# Patient Record
Sex: FEMALE | Race: WHITE | Employment: OTHER | URBAN - METROPOLITAN AREA
[De-identification: names, ages, dates, MRNs, and addresses within clinical notes are randomized per-mention and may not be internally consistent; named-entity substitution may affect disease eponyms.]

---

## 2017-01-10 RX ORDER — SERTRALINE HYDROCHLORIDE 100 MG/1
TABLET, FILM COATED ORAL
Qty: 135 TAB | Refills: 0 | Status: SHIPPED | OUTPATIENT
Start: 2017-01-10 | End: 2017-04-17 | Stop reason: SDUPTHER

## 2017-01-12 RX ORDER — BENAZEPRIL HYDROCHLORIDE 20 MG/1
TABLET ORAL
Qty: 90 TAB | Refills: 0 | Status: SHIPPED | OUTPATIENT
Start: 2017-01-12 | End: 2017-04-17 | Stop reason: SDUPTHER

## 2017-01-20 ENCOUNTER — TELEPHONE (OUTPATIENT)
Dept: INTERNAL MEDICINE CLINIC | Age: 82
End: 2017-01-20

## 2017-01-20 DIAGNOSIS — Z76.89 REFERRAL OF PATIENT: Primary | ICD-10-CM

## 2017-01-20 NOTE — TELEPHONE ENCOUNTER
Called, spoke to pt. Two pt identifiers confirmed. Pt requesting referral for ENT for constant nasal congestion. Pt states that when she wakes up, she sees a substance. Pt referred to Dr. Ayana Lott ENT. Pt verbalized understanding of information discussed w/ no further questions at this time.

## 2017-02-05 ENCOUNTER — HOSPITAL ENCOUNTER (EMERGENCY)
Age: 82
Discharge: HOME | End: 2017-02-05
Attending: EMERGENCY MEDICINE
Payer: MEDICARE

## 2017-02-05 ENCOUNTER — APPOINTMENT (OUTPATIENT)
Dept: GENERAL RADIOLOGY | Age: 82
End: 2017-02-05
Attending: EMERGENCY MEDICINE
Payer: MEDICARE

## 2017-02-05 ENCOUNTER — APPOINTMENT (OUTPATIENT)
Dept: GENERAL RADIOLOGY | Age: 82
End: 2017-02-05
Attending: PHYSICIAN ASSISTANT
Payer: MEDICARE

## 2017-02-05 VITALS
SYSTOLIC BLOOD PRESSURE: 129 MMHG | HEART RATE: 86 BPM | HEIGHT: 61 IN | BODY MASS INDEX: 32.13 KG/M2 | OXYGEN SATURATION: 98 % | DIASTOLIC BLOOD PRESSURE: 50 MMHG | RESPIRATION RATE: 16 BRPM | TEMPERATURE: 97.7 F | WEIGHT: 170.19 LBS

## 2017-02-05 DIAGNOSIS — S62.102A LEFT WRIST FRACTURE, CLOSED, INITIAL ENCOUNTER: Primary | ICD-10-CM
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
## 2017-02-05 LAB
APPEARANCE UR: CLEAR
BACTERIA URNS QL MICRO: NEGATIVE /HPF
BILIRUB UR QL: NEGATIVE
COLOR UR: ABNORMAL
EPITH CASTS URNS QL MICRO: ABNORMAL /LPF
GLUCOSE UR STRIP.AUTO-MCNC: NEGATIVE MG/DL
HGB UR QL STRIP: ABNORMAL
HYALINE CASTS URNS QL MICRO: ABNORMAL /LPF (ref 0–5)
KETONES UR QL STRIP.AUTO: NEGATIVE MG/DL
LEUKOCYTE ESTERASE UR QL STRIP.AUTO: NEGATIVE
NITRITE UR QL STRIP.AUTO: NEGATIVE
PH UR STRIP: 5.5 [PH] (ref 5–8)
PROT UR STRIP-MCNC: NEGATIVE MG/DL
RBC #/AREA URNS HPF: ABNORMAL /HPF (ref 0–5)
SP GR UR REFRACTOMETRY: 1.02 (ref 1–1.03)
UA: UC IF INDICATED,UAUC: ABNORMAL
UROBILINOGEN UR QL STRIP.AUTO: 0.2 EU/DL (ref 0.2–1)
WBC URNS QL MICRO: ABNORMAL /HPF (ref 0–4)

## 2017-02-05 PROCEDURE — 75810000053 HC SPLINT APPLICATION

## 2017-02-05 PROCEDURE — 73130 X-RAY EXAM OF HAND: CPT

## 2017-02-05 PROCEDURE — 74011250637 HC RX REV CODE- 250/637: Performed by: PHYSICIAN ASSISTANT

## 2017-02-05 PROCEDURE — 93005 ELECTROCARDIOGRAM TRACING: CPT

## 2017-02-05 PROCEDURE — 74011250636 HC RX REV CODE- 250/636: Performed by: PHYSICIAN ASSISTANT

## 2017-02-05 PROCEDURE — 99284 EMERGENCY DEPT VISIT MOD MDM: CPT

## 2017-02-05 PROCEDURE — 77030036550 HC SLNG ARM PCH S2SG -A

## 2017-02-05 PROCEDURE — 81001 URINALYSIS AUTO W/SCOPE: CPT | Performed by: PHYSICIAN ASSISTANT

## 2017-02-05 PROCEDURE — 96372 THER/PROPH/DIAG INJ SC/IM: CPT

## 2017-02-05 PROCEDURE — 73110 X-RAY EXAM OF WRIST: CPT

## 2017-02-05 RX ORDER — OXYCODONE AND ACETAMINOPHEN 5; 325 MG/1; MG/1
1 TABLET ORAL
Status: COMPLETED | OUTPATIENT
Start: 2017-02-05 | End: 2017-02-05

## 2017-02-05 RX ORDER — FENTANYL CITRATE 50 UG/ML
50 INJECTION, SOLUTION INTRAMUSCULAR; INTRAVENOUS
Status: COMPLETED | OUTPATIENT
Start: 2017-02-05 | End: 2017-02-05

## 2017-02-05 RX ORDER — POLYETHYLENE GLYCOL 3350 17 G/17G
17 POWDER, FOR SOLUTION ORAL DAILY
Qty: 119 G | Refills: 0 | Status: SHIPPED | OUTPATIENT
Start: 2017-02-05 | End: 2017-12-06

## 2017-02-05 RX ORDER — HYDROCODONE BITARTRATE AND ACETAMINOPHEN 5; 325 MG/1; MG/1
1 TABLET ORAL
Qty: 20 TAB | Refills: 0 | Status: SHIPPED | OUTPATIENT
Start: 2017-02-05 | End: 2017-06-07

## 2017-02-05 RX ADMIN — FENTANYL CITRATE 50 MCG: 50 INJECTION, SOLUTION INTRAMUSCULAR; INTRAVENOUS at 14:22

## 2017-02-05 RX ADMIN — OXYCODONE HYDROCHLORIDE AND ACETAMINOPHEN 1 TABLET: 5; 325 TABLET ORAL at 13:12

## 2017-02-05 NOTE — ED NOTES
Assumed care of patient. Patient is alert and oriented, does not appear to be in distress. Patient c/o left arm/wrist injury last night. Patient tripped and fell in a parking lot. Denies injury. Pulses palpable. Swelling and bruising noted. Patient positioned for comfort with call bell within reach. Side rails up for safety. PA has evaluated in the green.

## 2017-02-05 NOTE — DISCHARGE INSTRUCTIONS
Broken Wrist: Care Instructions  Your Care Instructions    Your wrist can break, or fracture, during sports, a fall, or other accidents. The break may happen when your wrist is hit or is used to protect you in a fall. Fractures can range from a small, hairline crack, to a bone or bones broken into two or more pieces. Your treatment depends on how bad the break is. Your doctor may have put your wrist in a cast or splint. This will help keep your wrist stable until your follow-up appointment. It may take weeks or months for your wrist to heal. You can help it heal with care at home. You heal best when you take good care of yourself. Eat a variety of healthy foods, and don't smoke. Follow-up care is a key part of your treatment and safety. Be sure to make and go to all appointments, and call your doctor if you are having problems. It's also a good idea to know your test results and keep a list of the medicines you take. How can you care for yourself at home? · Put ice or a cold pack on your wrist for 10 to 20 minutes at a time. Try to do this every 1 to 2 hours for the next 3 days (when you are awake). Put a thin cloth between the ice and your cast or splint. Keep your cast or splint dry. · Follow the splint or cast care instructions your doctor gives you. If you have a splint, do not take it off unless your doctor tells you to. Be careful not to put the splint on too tight. · Be safe with medicines. Take pain medicines exactly as directed. ¨ If the doctor gave you a prescription medicine for pain, take it as prescribed. ¨ If you are not taking a prescription pain medicine, ask your doctor if you can take an over-the-counter medicine. · Prop up your wrist on pillows when you sit or lie down in the first few days after the injury. Keep your wrist higher than the level of your heart. This will help reduce swelling.   · Move your fingers often to reduce swelling and stiffness, but do not use that hand to grab or carry anything. · Follow instructions for exercises to keep your arm strong. When should you call for help? Call your doctor now or seek immediate medical care if:  · You have increased or severe pain. · Your cast or splint feels too tight. · You cannot move your fingers. · You have tingling, weakness, or numbness in your hand and fingers. · Your hand and fingers are cool or pale or change color. · You have a lot of swelling near your cast or splint. · The skin under your cast or splint is burning or stinging. Watch closely for changes in your health, and be sure to contact your doctor if:  · You do not get better as expected. Where can you learn more? Go to http://mason-saw.info/. Enter [id number] in the search box to learn more about \"Broken Wrist: Care Instructions. \"  Current as of: May 23, 2016  Content Version: 11.1  © 5229-3347 [employer]. Care instructions adapted under license by [employer] (which disclaims liability or warranty for this information). If you have questions about a medical condition or this instruction, always ask your healthcare professional. [address] any warranty or liability for your use of this information.

## 2017-02-05 NOTE — ED PROVIDER NOTES
HPI Comments: Nico Brown is a 80 y.o. female with PMHx significant for osteoarthrits,cervical spinal stenosis who presents ambulatory to the ED with cc of left hand and wrist pain rated 10/10 s/p GLF last night. Pt reports that she was walking through a parking lot, tripped over a bump in the asphalt, falling backwards. She denies dizziness, CP, and SOB prior to the fall, LOC, and head trauma stating \"the guys caught me\". She is not sure if she landed on her left wrist. Pt reports being right handed. Pt denies any h/o surgery or injury to the left hand but spouse notes that pt \"cracked a finger\" in that hand before. Of note, pt states that she had a surgery for her cervical stenosis. Pt states she hit her left knee in the fall but denies any pain there currently. She denies any other arthralgia, myalgia, CP, SOB, abdominal pain, N/V/D, and ha. Of note, patient is requesting EKG stating she has a history of afib for which she is taking Eliquis and would like to know if she is currently in afib. PCP: Kaden Cao MD    PMHx: sarcoidosis, DM, HTN, hypercholesterolemia, cervical spine stenosis, ataxia, anemia, fatigue, osteoarthritis, coronary atherosclerosis of unspecified type of vessel, native or graft, fatigue, hyperlipidemia, TIAs, sleep apnea with c pap, GERD, gooden's esophagus, liver disease    Social hx: smoking (-) EtOH (-)      There are no other complaints, changes, or physical findings at this time. The history is provided by the patient.         Past Medical History:   Diagnosis Date    Anemia     Ataxia     Gooden's esophagus     Cervical spinal stenosis     Coronary atherosclerosis of unspecified type of vessel, native or graft     Diabetes (HCC)     Fatigue     Fatigue     GERD (gastroesophageal reflux disease)     Hypercholesterolemia     Hyperlipidemia     Hypertension     Ill-defined condition      Gooden's Esophagus    Liver disease     Osteoarthritis     Sarcoidosis ([facility] [address]     Sleep apnea      uses cpap    Stroke [facility])      TIA'S       Past Surgical History:   Procedure Laterality Date    Hx cyst removal  10/15     on head     Hx colonoscopy  5/8/2013     Repeat in 5 years    Hx cholecystectomy      Hx hysterectomy      Hx orthopaedic Bilateral      knee replacement to both knees    Hx orthopaedic       spacer btwn L4-5 and L5-6     Hx cataract removal       both eyes    Hx tonsillectomy      Colonoscopy N/A 12/28/2016     COLONOSCOPY performed by Gorge Degroot MD at [facility]         Family History:   Problem Relation Age of Onset    Other Mother      Fibromyalgia    Pacemaker Mother     Heart Disease Mother     Heart Failure Mother     COPD Mother     Heart Attack Father 61    Cancer Sister      Multiple Myeloma    Diabetes Brother     Obesity Brother     Pacemaker Brother     Heart Attack Brother      [place]    No Known Problems Son     Psychiatric Disorder Daughter      Multiple       Social History     Social History    Marital status:      Spouse name: N/A    Number of children: N/A    Years of education: N/A     Occupational History    Not on file. Social History Main Topics    Smoking status: Never Smoker    Smokeless tobacco: Never Used    Alcohol use No    Drug use: No    Sexual activity: Yes     Partners: Male     Other Topics Concern    Not on file     Social History Narrative         ALLERGIES: Procardia xl [nifedipine]    Review of Systems   Constitutional: Negative. Negative for chills and fever. HENT: Negative. Negative for rhinorrhea and sore throat. Eyes: Negative. Negative for visual disturbance. Respiratory: Negative. Negative for cough, chest tightness, shortness of breath and wheezing. Cardiovascular: Negative. Negative for chest pain and palpitations. Gastrointestinal: Negative. Negative for abdominal pain, constipation, diarrhea, nausea and vomiting. Genitourinary: Negative. Negative for dysuria and hematuria. Musculoskeletal: Positive for arthralgias. Skin: Positive for color change. Negative for rash and wound. Allergic/Immunologic: Negative. Negative for environmental allergies and food allergies. Neurological: Negative. Negative for headaches. Psychiatric/Behavioral: Negative. Negative for suicidal ideas. Vitals:    02/05/17 1253 02/05/17 1353   BP: 151/83 140/53   Pulse: 61    Resp: 18    Temp: 97.7 °F (36.5 °C)    SpO2: 97% 97%   Weight: 77.2 kg (170 lb 3.1 oz)    Height: 5' 1\" (1.549 m)             Physical Exam   Constitutional: She is oriented to person, place, and time. She appears well-developed and well-nourished. No distress. Pt appears in moderate discomfort, awake and alert in mild distress secondary to discomfort. HENT:   Head: Normocephalic and atraumatic. Right Ear: External ear normal.   Left Ear: External ear normal.   Nose: Nose normal.   Mouth/Throat: Oropharynx is clear and moist.   Eyes: Conjunctivae are normal. Right eye exhibits no discharge. Left eye exhibits no discharge. Neck: Normal range of motion. Cardiovascular: Normal rate, normal heart sounds and intact distal pulses. Pulmonary/Chest: Effort normal and breath sounds normal. No respiratory distress. She has no wheezes. She has no rales. She exhibits no tenderness. Abdominal: Soft. Bowel sounds are normal. There is no tenderness. There is no guarding. No CVA tenderness b/l. Musculoskeletal: She exhibits edema and tenderness. LUE: + edema noted to diffuse wrist and 3rd digit. + bruising noted to proximal thumb and 3rd digit. No erythema or break in skin noted. + TTP over diffuse wrist, snuffbox. Patient refuses ROM secondary to discomfort. 2+ radial pulses b/l. Lymphadenopathy:     She has no cervical adenopathy. Neurological: She is alert and oriented to person, place, and time. No cranial nerve deficit.  Coordination normal.   No focal neuro deficits. Skin: Skin is warm and dry. No rash noted. She is not diaphoretic. No erythema. No pallor. Psychiatric: She has a normal mood and affect. Her behavior is normal.   Nursing note and vitals reviewed. MDM  Number of Diagnoses or Management Options  Fall, initial encounter:   Left wrist fracture, closed, initial encounter:   Diagnosis management comments: Ddx: fx, sprain, strain, UTI    Mechanical fall. Patient reports falling over a bump in the asphalt, denies CP, SOB, and dizziness. Amount and/or Complexity of Data Reviewed  Tests in the radiology section of CPT®: ordered and reviewed  Discussion of test results with the performing providers: yes (Ortho)  Review and summarize past medical records: yes  Discuss the patient with other providers: yes (ortho)    Patient Progress  Patient progress: stable    ED Course       Procedures    CONSULT NOTE:   2:25 PM  Cheryl Sanchez PA-C spoke with Delano Ivy RN,   Specialty: Ortho  Discussed pt's hx, disposition, and available diagnostic and imaging results. Reviewed care plans. Consultant agrees with plans as outlined. Advise placing a volar splint  . Written by Romain Graf ED Scribe, as dictated by Cheryl Sanchez PA-C. Procedure Note - Splint Placement:  2:37 PM  Performed by: Cheryl Sanchez PA-C  Neurovascularly intact prior to tx. An volar splint was placed on pt's left wrist.  Joint was placed in neutral position. Neurovascularly intact after tx. The procedure took 1-15 minutes, and pt tolerated well.   Written by Romain Graf ED Scribe, as dictated by Cheryl Sanchez PA-C.       LABORATORY TESTS:  Recent Results (from the past 12 hour(s))   EKG, 12 LEAD, INITIAL    Collection Time: 02/05/17  2:26 PM   Result Value Ref Range    Ventricular Rate 60 BPM    Atrial Rate 60 BPM    P-R Interval 194 ms    QRS Duration 92 ms    Q-T Interval 490 ms    QTC Calculation (Bezet) 490 ms    Calculated P Axis 48 degrees    Calculated R Axis -2 degrees Calculated T Axis 59 degrees    Diagnosis       Sinus rhythm with premature atrial complexes  Nonspecific T wave abnormality  When compared with ECG of 08-SEP-2016 12:45,  premature atrial complexes are now present  LA interval has decreased  Nonspecific T wave abnormality, worse in Lateral leads     URINALYSIS W/ REFLEX CULTURE    Collection Time: 02/05/17  3:19 PM   Result Value Ref Range    Color YELLOW/STRAW      Appearance CLEAR CLEAR      Specific gravity 1.021 1.003 - 1.030      pH (UA) 5.5 5.0 - 8.0      Protein NEGATIVE  NEG mg/dL    Glucose NEGATIVE  NEG mg/dL    Ketone NEGATIVE  NEG mg/dL    Bilirubin NEGATIVE  NEG      Blood TRACE (A) NEG      Urobilinogen 0.2 0.2 - 1.0 EU/dL    Nitrites NEGATIVE  NEG      Leukocyte Esterase NEGATIVE  NEG      WBC 0-4 0 - 4 /hpf    RBC 5-10 0 - 5 /hpf    Epithelial cells FEW FEW /lpf    Bacteria NEGATIVE  NEG /hpf    UA:UC IF INDICATED CULTURE NOT INDICATED BY UA RESULT CNI      Hyaline cast 2-5 0 - 5 /lpf       IMAGING RESULTS:  XR HAND LT MIN 3 V   Final ResultEXAM: XR HAND LT MIN 3 V     INDICATION: Trauma with snuffbox and 3rd digit TTP with bruising and edema x  last night.     COMPARISON: None.     FINDINGS: Three views of the left hand demonstrate a nondisplaced  intra-articular fracture of the radial styloid process. No other fracture or  dislocation is evident. There is osteoarthritis in the first carpal metacarpal  joint. The soft tissues are within normal limits.     IMPRESSION  IMPRESSION: Nondisplaced radial styloid process fracture. No other fracture or  dislocation. .            XR WRIST LT AP/LAT/OBL MIN 3V   Final ResultEXAM: XR WRIST LT AP/LAT/OBL MIN 3V     INDICATION: injury.     COMPARISON: None.     FINDINGS: Three views of the left wrist demonstrate a nondisplaced  intra-articular fracture of the radial styloid process. No other acute fracture  or dislocation is evident.  The soft tissues are within normal limits.     IMPRESSION  IMPRESSION: Nondisplaced intra-articular fracture of the radial styloid  process. MEDICATIONS GIVEN:  Medications   oxyCODONE-acetaminophen (PERCOCET) 5-325 mg per tablet 1 Tab (1 Tab Oral Given 2/5/17 1312)   fentaNYL citrate (PF) injection 50 mcg (50 mcg IntraMUSCular Given 2/5/17 1422)       IMPRESSION:  1. Left wrist fracture, closed, initial encounter    2. Fall, initial encounter        PLAN:  1. Current Discharge Medication List      START taking these medications    Details   HYDROcodone-acetaminophen (NORCO) 5-325 mg per tablet Take 1 Tab by mouth every four (4) hours as needed for Pain. Max Daily Amount: 6 Tabs. Qty: 20 Tab, Refills: 0      polyethylene glycol (MIRALAX) 17 gram/dose powder Take 17 g by mouth daily. 1 tablespoon with 8 oz of water daily  Qty: 119 g, Refills: 0         CONTINUE these medications which have NOT CHANGED    Details   benazepril (LOTENSIN) 20 mg tablet TAKE ONE TABLET BY MOUTH ONCE DAILY  Qty: 90 Tab, Refills: 0      sertraline (ZOLOFT) 100 mg tablet TAKE ONE & ONE-HALF TABLETS BY MOUTH ONCE DAILY  Qty: 135 Tab, Refills: 0      pravastatin (PRAVACHOL) 20 mg tablet TAKE ONE TABLET BY MOUTH NIGHTLY  Qty: 90 Tab, Refills: 0      multivit-min-FA-lycopen-lutein (CENTRUM SILVER) 0.4-300-250 mg-mcg-mcg tab Take 1 Tab by mouth daily. diltiazem XR (DILTIA XT) 120 mg XR capsule Take 1 Cap by mouth daily. Qty: 30 Cap, Refills: 11      metFORMIN ER (GLUCOPHAGE XR) 750 mg tablet TAKE ONE TABLET BY MOUTH TWICE DAILY  Qty: 180 Tab, Refills: 0      apixaban (ELIQUIS) 5 mg tablet Take 1 Tab by mouth two (2) times a day. Qty: 60 Tab, Refills: 11      hydrochlorothiazide (HYDRODIURIL) 25 mg tablet Take 1 Tab by mouth daily. Qty: 30 Tab, Refills: 3      oxybutynin chloride XL (DITROPAN XL) 10 mg CR tablet Take 1 Tab by mouth daily.   Qty: 30 Tab, Refills: 6    Associated Diagnoses: Stress bladder incontinence, female      levothyroxine (SYNTHROID) 50 mcg tablet Take 1 Tab by mouth Daily (before breakfast). Qty: 90 Tab, Refills: 3      omeprazole (PRILOSEC) 20 mg capsule Take 40 mg by mouth two (2) times a day. magnesium 250 mg tab Take 250 mg by mouth daily. biotin 2,500 mcg tab Take 1 Tab by mouth daily. aspirin delayed-release 81 mg tablet Take 81 mg by mouth daily. STOP taking these medications       acetaminophen (TYLENOL) 500 mg tablet Comments:   Reason for Stoppin.   Follow-up Information     Follow up With Details Comments Contact Info    Kassidy Xavier MD Schedule an appointment as soon as possible for a visit in 2 days  [address] [facility]  [phone]      [facility] EMERGENCY DEPT  As needed or, If symptoms worsen [address] [place] [address] Drive  [address] [facility]  [phone]      3. Rest, ice, compression, and elevation    Return to ED if worse       DISCHARGE NOTE  4:16 PM  The patient has been re-evaluated and is ready for discharge. Reviewed available results with patient. Counseled pt on diagnosis and care plan. Pt has expressed understanding, and all questions have been answered. Pt agrees with plan and agrees to F/U as recommended, or return to the ED if their sxs worsen. Discharge instructions have been provided and explained to the pt, along with reasons to return to the ED. Written by Ashley Robison, ED Scribe, as dictated by Stanford Brown PA-C. This note is prepared by Ashley Robison, acting as Scribe for Stanford Brown PA-C. Stanford Brown PA-C: The scribe's documentation has been prepared under my direction and personally reviewed by me in its entirety. I confirm that the note above accurately reflects all work, treatment, procedures, and medical decision making performed by me. This note will not be viewable in [address]

## 2017-02-05 NOTE — LETTER
[address] 
[facility] EMERGENCY DEPT 
[address] [place] [address] [address] [id number] [phone] Work/School Note Date: 2/5/2017 To Whom It May concern: 
 
Chelly Oliver was seen and treated today in the emergency room by the following provider(s): 
Attending Provider: An Lara MD 
Physician Assistant: MELI Mayen Cap. Please excuse Roque Walsh from work today as he accompanied the Mrs. Whatley Daily to the Emergency Dept. Sincerely, Charlette Mayen Cap

## 2017-02-06 LAB
ATRIAL RATE: 60 BPM
CALCULATED P AXIS, ECG09: 48 DEGREES
CALCULATED R AXIS, ECG10: -2 DEGREES
CALCULATED T AXIS, ECG11: 59 DEGREES
DIAGNOSIS, 93000: NORMAL
P-R INTERVAL, ECG05: 194 MS
Q-T INTERVAL, ECG07: 490 MS
QRS DURATION, ECG06: 92 MS
QTC CALCULATION (BEZET), ECG08: 490 MS
VENTRICULAR RATE, ECG03: 60 BPM

## 2017-02-13 ENCOUNTER — OFFICE VISIT (OUTPATIENT)
Dept: NEUROLOGY | Age: 82
End: 2017-02-13

## 2017-02-13 VITALS
WEIGHT: 170 LBS | BODY MASS INDEX: 32.12 KG/M2 | HEART RATE: 78 BPM | SYSTOLIC BLOOD PRESSURE: 130 MMHG | OXYGEN SATURATION: 97 % | DIASTOLIC BLOOD PRESSURE: 70 MMHG

## 2017-02-13 DIAGNOSIS — M48.02 CERVICAL STENOSIS OF SPINAL CANAL: Primary | ICD-10-CM

## 2017-02-13 DIAGNOSIS — R26.89 BALANCE DISORDER: ICD-10-CM

## 2017-02-13 NOTE — LETTER
Date:             2017 Name:  Carlee Guzmán 
:  1935 MRN:  [id number] PCP:  Jordyn Mora MD 
 
Chief Complaint Patient presents with  Neurologic Problem  
  fall on 17 Fx wrist   
 
 
 
HISTORY OF PRESENT ILLNESS: 
Simran Mendiola is a 80 y.o., female who presents today for follow up for cervical stenosis. She broke her left wrist after a fall. Since her last visit she [facility] She saw him in January. She did well but then started having balance issues again and dropping things. She was advised to restart her home exercises. She will be walking and suddenly feel like she is falling backwards. She knows how to catch herself. Her fall when she broke her arm she was in a dark location. She feels like she is being pulled to the left randomly. 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 She has been diagnosed with gluten intolerance after an EGD. She just had her esophagus dilated. She has also diagnosed with afib. She had to be shocked back into rhythm and started on eliquis. Recap: 
She had surgery in February of this year and was initially doing well, she went to [facility] for therapy after that and her balance improved significantly. She was essentially back to baseline. She has not gone back to see Dr LEIGH [facility] since July, she was stable when seen for post-op and released from his care at that time. Return of balance issues is new since that visit. She now complains that she is losing her balance again and dropping things. Balance initially got better after surgery now she will list to the side when walking. She is usually able to catch herself, but did fall last week. Denies weakness, just feels off balance. Does not feel dizzy. It occurs sometimes when she first stands up, sometimes when she is walking. No lightheadedness with standing.   She is keeping up with her PT exercises, although she is somewhat off balance when doing them. She sees an ENT for sinus issues, has not had any workup for BPPV. Has a h/o migraines before menopause, and has been told that she has IIH, has not had headaches since her hysterectomy. Is due for an eye exam soon and has that scheduled. Post-op MRI cervical spine 2.19.2016 1. Status post interval anterior cervical fusion from C4 to C6. 
 
2. New acute central disc herniation at C4-5 causing severe spinal stenosis. 3. Unchanged moderate spinal stenosis at C5-6. 
 
4. Unchanged mild spinal stenosis at C6-7. 
 
5. Areas of neural foraminal narrowing as above. 
 
5.2.2016 recap Ms. Tank Ervin is here for follow up today of cervical stenosis. She had cervical fusion in February. I saw her in the hospital after the surgery when she had a fall at home. Since that time she had done extensive outpt PT at [facility]. She states within in one month she was doing great. She was too good for inpatient so did outpt PT at [facility]. She improved remarkably. She can turn her neck with no issues now. No more balance issues or falls. She was very happy with Dr. Zeenat Meza. She reports she may need further surgery in the future but for now she is doing well and expects to be released from him after her next visit. When I saw her in the hospital she was complaining of a history of IIH. Pt has no headaches or issues. She is getting an eye exam in a few months. Current Outpatient Prescriptions Medication Sig  
 HYDROcodone-acetaminophen (NORCO) 5-325 mg per tablet Take 1 Tab by mouth every four (4) hours as needed for Pain. Max Daily Amount: 6 Tabs.  polyethylene glycol (MIRALAX) 17 gram/dose powder Take 17 g by mouth daily. 1 tablespoon with 8 oz of water daily  benazepril (LOTENSIN) 20 mg tablet TAKE ONE TABLET BY MOUTH ONCE DAILY  sertraline (ZOLOFT) 100 mg tablet TAKE ONE & ONE-HALF TABLETS BY MOUTH ONCE DAILY  pravastatin (PRAVACHOL) 20 mg tablet TAKE ONE TABLET BY MOUTH NIGHTLY  multivit-min-FA-lycopen-lutein (CENTRUM SILVER) 0.4-300-250 mg-mcg-mcg tab Take 1 Tab by mouth daily.  biotin 2,500 mcg tab Take 1 Tab by mouth daily.  diltiazem XR (DILTIA XT) 120 mg XR capsule Take 1 Cap by mouth daily.  metFORMIN ER (GLUCOPHAGE XR) 750 mg tablet TAKE ONE TABLET BY MOUTH TWICE DAILY  apixaban (ELIQUIS) 5 mg tablet Take 1 Tab by mouth two (2) times a day.  aspirin delayed-release 81 mg tablet Take 81 mg by mouth daily.  hydrochlorothiazide (HYDRODIURIL) 25 mg tablet Take 1 Tab by mouth daily. (Patient taking differently: Take 12.5 mg by mouth daily.)  oxybutynin chloride XL (DITROPAN XL) 10 mg CR tablet Take 1 Tab by mouth daily.  levothyroxine (SYNTHROID) 50 mcg tablet Take 1 Tab by mouth Daily (before breakfast).  omeprazole (PRILOSEC) 20 mg capsule Take 40 mg by mouth two (2) times a day.  magnesium 250 mg tab Take 250 mg by mouth daily. No current facility-administered medications for this visit. Allergies Allergen Reactions  Procardia Xl [Nifedipine] Other (comments)  
  weakness Past Medical History Diagnosis Date  Anemia  Ataxia  Webb's esophagus  Cervical spinal stenosis  Coronary atherosclerosis of unspecified type of vessel, native or graft  Diabetes ([address]  Fatigue  Fatigue  GERD (gastroesophageal reflux disease)  Hypercholesterolemia  Hyperlipidemia  Hypertension  Ill-defined condition Webb's Esophagus  Liver disease  Osteoarthritis  Sarcoidosis ([address]  Sleep apnea   
  uses cpap  Stroke ([facility] [address] [address] TIA'S Past Surgical History Procedure Laterality Date  Hx cyst removal  10/15  
  on head  Hx colonoscopy  5/8/2013 Repeat in 5 years  Hx cholecystectomy  Hx hysterectomy  Hx orthopaedic Bilateral   
  knee replacement to both knees  Hx orthopaedic spacer btwn L4-5 and L5-6   
 Hx cataract removal    
  both eyes  Hx tonsillectomy  Colonoscopy N/A 12/28/2016 COLONOSCOPY performed by Swapnil Oseguera MD at [facility] ENDOSCOPY Social History Social History  Marital status:  Spouse name: N/A  
 Number of children: N/A  
 Years of education: N/A Occupational History  Not on file. Social History Main Topics  Smoking status: Never Smoker  Smokeless tobacco: Never Used  Alcohol use No  
 Drug use: No  
 Sexual activity: Yes  
  Partners: Male Other Topics Concern  Not on file Social History Narrative Family History Problem Relation Age of Onset Herschrista Shara Other Mother Fibromyalgia  Pacemaker Mother  Heart Disease Mother  Heart Failure Mother  COPD Mother  Heart Attack Father 61  Cancer Sister Multiple Myeloma  Diabetes Brother  Obesity Brother  Pacemaker Brother  Heart Attack Brother Bundle Branch Block  No Known Problems Son  Psychiatric Disorder Daughter Multiple PHYSICAL EXAMINATION:   
Visit Vitals  Wt 170 lb (77.1 kg)  BMI 32.12 kg/m2 General:  Well defined, nourished, and groomed individual in no acute distress. Neck: Supple, nontender, no bruits, no pain with resistance to active range of motion. Heart: Regular rate and rhythm, no murmurs, rub, or gallop. Normal S1S2. Lungs:  Clear to auscultation bilaterally with equal chest expansion, no cough, no wheeze Musculoskeletal:  Extremities revealed no edema and had full range of motion of joints. Psych:  Good mood and bright affect NEUROLOGICAL EXAMINATION:    
Mental Status:   Alert and oriented to person, place, and time with recent and remote memory intact. Attention span and concentration are normal. Speech is fluent with a full fund of knowledge. Cranial Nerves:   
II, III, IV, VI:  Visual acuity grossly intact. Pupils are equal, round, and reactive to light. Extra-ocular movements are full and fluid. No ptosis or nystagmus. V-XII: Hearing is grossly intact. Facial features are symmetric, with normal sensation and strength. The palate rises symmetrically and the tongue protrudes midline. Sternocleidomastoids 5/5. Motor Examination: Normal tone, bulk, and strength, 5/5 muscle strength throughout except left wrist is 4/5 due to fracture Sensory: Intact throughout to LT, temp and vibration Coordination:  Finger to nose testing was normal.   No resting or intention tremor Gait and Station: Unsteady while walking, tends to fall to the left abruptly. Normal arm swing. No pronator drift. No muscle wasting or fasciculations noted. DTRs: 2+ and equal throughout ASSESSMENT AND PLAN 
 
42-year-old female seen in follow-up for cervical stenosis. She is also complaining now of balance issues and feeling pulled to the left. Since her last visit she has been diagnosed with atrial fibrillation. I question if she could have had any vascular event in her cerebellum. Will do MRI of the brain to evaluate. Also she is having continued weakness in her hands. Will repeat her cervical spine 1. MRI brain 2. Continue Eliquis for stroke prevention 3. MRI cervical spine 4. Continue to follow with Dr. Sharri Partida 5. Continue home physical therapy exercises 6. May need EMG/NCS Follow-up in 4 months Collette Gaucher, MD 
 
This note was created using voice recognition software. Despite editing, there may be syntax errors. This note will not be viewable in [address]

## 2017-02-13 NOTE — PROGRESS NOTES
Date:             2017      Name:  Zora Shepherd  :  1935  MRN:  [id number]     PCP:  Kaden Cao MD    Chief Complaint   Patient presents with    Neurologic Problem     fall on 17 Fx wrist          HISTORY OF PRESENT ILLNESS:  Nico Brown is a 80 y.o., female who presents today for follow up for cervical stenosis. She broke her left wrist after a fall. Since her last visit she was seen by Dr. Cornelio Vance. She saw him in January. She did well but then started having balance issues again and dropping things. She was advised to restart her home exercises. She will be walking and suddenly feel like she is falling backwards. She knows how to catch herself. Her fall when she broke her arm she was in a dark location. She feels like she is being pulled to the left randomly. 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 She has been diagnosed with gluten intolerance after an EGD. She just had her esophagus dilated. She has also diagnosed with afib. She had to be shocked back into rhythm and started on eliquis. Recap:  She had surgery in February of this year and was initially doing well, she went to [facility] for therapy after that and her balance improved significantly. She was essentially back to baseline. She has not gone back to see Dr Cornelio Vance since July, she was stable when seen for post-op and released from his care at that time. Return of balance issues is new since that visit. She now complains that she is losing her balance again and dropping things. Balance initially got better after surgery now she will list to the side when walking. She is usually able to catch herself, but did fall last week. Denies weakness, just feels off balance. Does not feel dizzy. It occurs sometimes when she first stands up, sometimes when she is walking. No lightheadedness with standing.   She is keeping up with her PT exercises, although she is somewhat off balance when doing them. She sees an ENT for sinus issues, has not had any workup for BPPV. Has a h/o migraines before menopause, and has been told that she has IIH, has not had headaches since her hysterectomy. Is due for an eye exam soon and has that scheduled. Post-op MRI cervical spine 2.19.2016  1. Status post interval anterior cervical fusion from C4 to C6.    2. New acute central disc herniation at C4-5 causing severe spinal stenosis. 3. Unchanged moderate spinal stenosis at C5-6.    4. Unchanged mild spinal stenosis at C6-7.    5. Areas of neural foraminal narrowing as above.    5.2.2016 recap  Ms. Beena Paul is here for follow up today of cervical stenosis. She had cervical fusion in February. I saw her in the hospital after the surgery when she had a fall at home. Since that time she had done extensive outpt PT at [facility]. She states within in one month she was doing great. She was too good for inpatient so did outpt PT at [facility]. She improved remarkably. She can turn her neck with no issues now. No more balance issues or falls. She was very happy with Dr. Xavier Medel. She reports she may need further surgery in the future but for now she is doing well and expects to be released from him after her next visit. When I saw her in the hospital she was complaining of a history of IIH. Pt has no headaches or issues. She is getting an eye exam in a few months. Current Outpatient Prescriptions   Medication Sig    HYDROcodone-acetaminophen (NORCO) 5-325 mg per tablet Take 1 Tab by mouth every four (4) hours as needed for Pain. Max Daily Amount: 6 Tabs.  polyethylene glycol (MIRALAX) 17 gram/dose powder Take 17 g by mouth daily.  1 tablespoon with 8 oz of water daily    benazepril (LOTENSIN) 20 mg tablet TAKE ONE TABLET BY MOUTH ONCE DAILY    sertraline (ZOLOFT) 100 mg tablet TAKE ONE & ONE-HALF TABLETS BY MOUTH ONCE DAILY    pravastatin (PRAVACHOL) 20 mg tablet TAKE ONE TABLET BY MOUTH NIGHTLY    multivit-min-FA-lycopen-lutein (CENTRUM SILVER) 0.4-300-250 mg-mcg-mcg tab Take 1 Tab by mouth daily.  biotin 2,500 mcg tab Take 1 Tab by mouth daily.  diltiazem XR (DILTIA XT) 120 mg XR capsule Take 1 Cap by mouth daily.  metFORMIN ER (GLUCOPHAGE XR) 750 mg tablet TAKE ONE TABLET BY MOUTH TWICE DAILY    apixaban (ELIQUIS) 5 mg tablet Take 1 Tab by mouth two (2) times a day.  aspirin delayed-release 81 mg tablet Take 81 mg by mouth daily.  hydrochlorothiazide (HYDRODIURIL) 25 mg tablet Take 1 Tab by mouth daily. (Patient taking differently: Take 12.5 mg by mouth daily.)    oxybutynin chloride XL (DITROPAN XL) 10 mg CR tablet Take 1 Tab by mouth daily.  levothyroxine (SYNTHROID) 50 mcg tablet Take 1 Tab by mouth Daily (before breakfast).  omeprazole (PRILOSEC) 20 mg capsule Take 40 mg by mouth two (2) times a day.  magnesium 250 mg tab Take 250 mg by mouth daily. No current facility-administered medications for this visit.       Allergies   Allergen Reactions    Procardia Xl [Nifedipine] Other (comments)     weakness     Past Medical History   Diagnosis Date    Anemia     Ataxia     Webb's esophagus     Cervical spinal stenosis     Coronary atherosclerosis of unspecified type of vessel, native or graft     Diabetes (HCC)     Fatigue     Fatigue     GERD (gastroesophageal reflux disease)     Hypercholesterolemia     Hyperlipidemia     Hypertension     Ill-defined condition      Webb's Esophagus    Liver disease     Osteoarthritis     S[facility]     Sleep apnea      uses cpap    Stroke [facility])      TIA'S     Past Surgical History   Procedure Laterality Date    Hx cyst removal  10/15     on head     Hx colonoscopy  5/8/2013     Repeat in 5 years    Hx cholecystectomy      Hx hysterectomy      Hx orthopaedic Bilateral      knee replacement to both knees    Hx orthopaedic       spacer btwn L4-5 and L5-6     Hx cataract removal       both eyes    Hx tonsillectomy      Colonoscopy N/A 12/28/2016     COLONOSCOPY performed by Tripp Rogers MD at \[facility]"" ENDOSCOPY     Social History     Social History    Marital status:      Spouse name: N/A    Number of children: N/A    Years of education: N/A     Occupational History    Not on file. Social History Main Topics    Smoking status: Never Smoker    Smokeless tobacco: Never Used    Alcohol use No    Drug use: No    Sexual activity: Yes     Partners: Male     Other Topics Concern    Not on file     Social History Narrative     Family History   Problem Relation Age of Onset    Other Mother      Fibromyalgia    Pacemaker Mother     Heart Disease Mother     Heart Failure Mother     COPD Mother     Heart Attack Father 61    Cancer Sister      Multiple Myeloma    Diabetes Brother     Obesity Brother     Pacemaker Brother     Heart Attack Brother      Bundle Branch Block    No Known Problems Son     Psychiatric Disorder Daughter      Multiple         PHYSICAL EXAMINATION:    Visit Vitals    Wt 170 lb (77.1 kg)    BMI 32.12 kg/m2     General:  Well defined, nourished, and groomed individual in no acute distress. Neck: Supple, nontender, no bruits, no pain with resistance to active range of motion. Heart: Regular rate and rhythm, no murmurs, rub, or gallop. Normal S1S2. Lungs:  Clear to auscultation bilaterally with equal chest expansion, no cough, no wheeze  Musculoskeletal:  Extremities revealed no edema and had full range of motion of joints. Psych:  Good mood and bright affect    NEUROLOGICAL EXAMINATION:     Mental Status:   Alert and oriented to person, place, and time with recent and remote memory intact. Attention span and concentration are normal. Speech is fluent with a full fund of knowledge. Cranial Nerves:    II, III, IV, VI:  Visual acuity grossly intact. Pupils are equal, round, and reactive to light. Extra-ocular movements are full and fluid.   No ptosis or nystagmus. V-XII: Hearing is grossly intact. Facial features are symmetric, with normal sensation and strength. The palate rises symmetrically and the tongue protrudes midline. Sternocleidomastoids 5/5. Motor Examination: Normal tone, bulk, and strength, 5/5 muscle strength throughout except left wrist is 4/5 due to fracture  Sensory: Intact throughout to LT, temp and vibration  Coordination:  Finger to nose testing was normal.   No resting or intention tremor  Gait and Station: Unsteady while walking, tends to fall to the left abruptly. Normal arm swing. No pronator drift. No muscle wasting or fasciculations noted. DTRs: 2+ and equal throughout      ASSESSMENT AND PLAN    53-year-old female seen in follow-up for cervical stenosis. She is also complaining now of balance issues and feeling pulled to the left. Since her last visit she has been diagnosed with atrial fibrillation. I question if she could have had any vascular event in her cerebellum. Will do MRI of the brain to evaluate. Also she is having continued weakness in her hands. Will repeat her cervical spine    1. MRI brain  2. Continue Eliquis for stroke prevention  3. MRI cervical spine  4. Continue to follow with Dr. Danny Concepcion  5. Continue home physical therapy exercises  6. May need EMG/NCS    Follow-up in 4 months    Zainab Hill MD    This note was created using voice recognition software. Despite editing, there may be syntax errors. This note will not be viewable in [address]

## 2017-02-13 NOTE — PATIENT INSTRUCTIONS
Patient Instructions/Plans:      As of May 1st 2017 Dr. Darshana King will be at the following locations:    Please call for an appointment in June.     [facility] on [facility] [address] ([address])  [address] [place] [address] [facility], [address] [place] [address]    [phone] [address] [facility] at [facility]   [place] [address]  [address] [place] [address]    [phone]

## 2017-02-13 NOTE — MR AVS SNAPSHOT
Visit Information Date & Time Provider Department Dept. Phone Encounter #  
 2/13/2017  1:40 PM Yelena Ricketts MD Neurology Clinic at [facility] [phone] [id number] Your Appointments 3/7/2017  1:30 PM  
ROUTINE CARE with Inna Leonard, [address] [place] [address],[address]) Appt Note: 3 month follow up  
 [address]  
[phone]  
  
   
 [facility] [address] [facility]  [address] [address]  
  
    
 4/18/2017  9:00 AM  
6 MONTH with Donavan Scott MD  
[facility] [address]) Appt Note: 6 month per Dr. Domenico Brown, no cp; 6mo switching to Dr TAY $0CP. REM  
 [facility]  
[phone] [facility] Upcoming Health Maintenance Date Due HEMOGLOBIN A1C Q6M 5/29/2017 EYE EXAM RETINAL OR DILATED Q1 6/23/2017 FOOT EXAM Q1 8/16/2017 Pneumococcal 65+ Low/Medium Risk (2 of 2 - PPSV23) 8/18/2017 LIPID PANEL Q1 9/8/2017 MICROALBUMIN Q1 11/29/2017 MEDICARE YEARLY EXAM 12/8/2017 GLAUCOMA SCREENING Q2Y 6/23/2018 DTaP/Tdap/Td series (2 - Td) 4/27/2025 Allergies as of 2/13/2017  Review Complete On: 2/5/2017 By: Lazaro Mir RN Severity Noted Reaction Type Reactions Procardia Xl [Nifedipine]  07/09/2014    Other (comments)  
 weakness Current Immunizations  Reviewed on 12/7/2016 Name Date Influenza High Dose Vaccine PF 9/19/2015 Influenza Vaccine 10/10/2014 12:38 PM  
 Influenza Vaccine (Quad) PF 9/14/2016 Pneumococcal Conjugate (PCV-13) 8/18/2016 Tdap 4/27/2015 Zoster Vaccine, Live 2/29/2016 Not reviewed this visit You Were Diagnosed With   
  
 Codes Comments Balance disorder    -  Primary ICD-10-CM: R26.89 
ICD-9-CM: 781.99 Cervical stenosis of spinal canal     ICD-10-CM: M48.02 
ICD-9-CM: 723.0 Vitals BP Pulse Weight(growth percentile) SpO2 BMI OB Status 130/70 78 170 lb (77.1 kg) 97% 32.12 kg/m2 Hysterectomy Smoking Status Never Smoker Vitals History BMI and BSA Data Body Mass Index Body Surface Area  
 32.12 kg/m 2 1.82 m 2 Preferred Pharmacy Pharmacy Name Phone [facility] [address] [place] [address] [phone] Your Updated Medication List  
  
   
This list is accurate as of: 2/13/17  1:56 PM.  Always use your most recent med list.  
  
  
  
  
 apixaban 5 mg tablet Commonly known as:  Aide Haver Take 1 Tab by mouth two (2) times a day. aspirin delayed-release 81 mg tablet Take 81 mg by mouth daily. benazepril 20 mg tablet Commonly known as:  LOTENSIN  
TAKE ONE TABLET BY MOUTH ONCE DAILY  
  
 biotin 2,500 mcg Tab Take 1 Tab by mouth daily. CENTRUM SILVER 0.4-300-250 mg-mcg-mcg Tab Generic drug:  multivit-min-FA-lycopen-lutein Take 1 Tab by mouth daily. dilTIAZem  mg XR capsule Commonly known as:  Heartwell Bene Take 1 Cap by mouth daily. hydroCHLOROthiazide 25 mg tablet Commonly known as:  HYDRODIURIL Take 1 Tab by mouth daily. HYDROcodone-acetaminophen 5-325 mg per tablet Commonly known as:  Nomi Songster Take 1 Tab by mouth every four (4) hours as needed for Pain. Max Daily Amount: 6 Tabs. levothyroxine 50 mcg tablet Commonly known as:  SYNTHROID Take 1 Tab by mouth Daily (before breakfast). magnesium 250 mg Tab Take 250 mg by mouth daily. metFORMIN  mg tablet Commonly known as:  GLUCOPHAGE XR  
TAKE ONE TABLET BY MOUTH TWICE DAILY  
  
 omeprazole 20 mg capsule Commonly known as:  PRILOSEC Take 40 mg by mouth two (2) times a day. oxybutynin chloride XL 10 mg CR tablet Commonly known as:  DITROPAN XL Take 1 Tab by mouth daily. polyethylene glycol 17 gram/dose powder Commonly known as:  Darrylrachel Painting Take 17 g by mouth daily. 1 tablespoon with 8 oz of water daily  
  
 pravastatin 20 mg tablet Commonly known as:  PRAVACHOL  
TAKE ONE TABLET BY MOUTH NIGHTLY  
  
 sertraline 100 mg tablet Commonly known as:  ZOLOFT  
TAKE ONE & ONE-HALF TABLETS BY MOUTH ONCE DAILY To-Do List   
 02/14/2017 Imaging:  MRI BRAIN WO CONT   
  
 02/14/2017 Imaging:  MRI CERV SPINE WO CONT Patient Instructions Patient Instructions/Plans: As of May 1st 2017 Dr. Darshana King will be at the following locations: 
 
Please call for an appointment in June. [facility] on [address] ([address]) [address]  [address] 
 
[phone] [facility] [address] [facility] [address] 
 
[phone] [facility] SERVICES! Dear [place]: Thank you for requesting a Click With Me Now account. Our records indicate that you already have an active Click With Me Now account. You can access your account anytime at [email or web address] Did you know that you can access your hospital and ER discharge instructions at any time in Click With Me Now? You can also review all of your test results from your hospital stay or ER visit. Additional Information If you have questions, please visit the Frequently Asked Questions section of the Click With Me Now website at [email or web address] Remember, Click With Me Now is NOT to be used for urgent needs. For medical emergencies, dial 911. Now available from your iPhone and Android! Please provide this summary of care documentation to your next provider. Your primary care clinician is listed as Nette Moreno. If you have any questions after today's visit, please call [phone].

## 2017-02-21 ENCOUNTER — HOSPITAL ENCOUNTER (OUTPATIENT)
Dept: MRI IMAGING | Age: 82
Discharge: HOME | End: 2017-02-21
Attending: PSYCHIATRY & NEUROLOGY
Payer: MEDICARE

## 2017-02-21 DIAGNOSIS — M48.02 CERVICAL STENOSIS OF SPINAL CANAL: ICD-10-CM

## 2017-02-21 DIAGNOSIS — R26.89 BALANCE DISORDER: ICD-10-CM

## 2017-02-21 PROCEDURE — 72141 MRI NECK SPINE W/O DYE: CPT

## 2017-02-21 PROCEDURE — 70551 MRI BRAIN STEM W/O DYE: CPT

## 2017-02-23 RX ORDER — OXYBUTYNIN CHLORIDE 10 MG/1
TABLET, EXTENDED RELEASE ORAL
Qty: 90 TAB | Refills: 0 | Status: SHIPPED | OUTPATIENT
Start: 2017-02-23 | End: 2017-06-07

## 2017-02-28 ENCOUNTER — TELEPHONE (OUTPATIENT)
Dept: NEUROLOGY | Age: 82
End: 2017-02-28

## 2017-02-28 NOTE — TELEPHONE ENCOUNTER
----- Message from Jimmy Warren sent at 2/28/2017  2:48 PM EST -----  Regarding: FW: Dr. Matthew Flies: [phone]      ----- Message -----     From: Karolyn Vila: 2/27/2017  10:31 AM       To: [facility] Front Office  Subject: Dr. Xavier Mendoza                             Message: Pt requesting results of MRI done this past week. Best contact number is [phone]. Thank you!

## 2017-03-03 ENCOUNTER — HOSPITAL ENCOUNTER (OUTPATIENT)
Dept: LAB | Age: 82
Discharge: HOME | End: 2017-03-03
Payer: MEDICARE

## 2017-03-03 ENCOUNTER — APPOINTMENT (OUTPATIENT)
Dept: INTERNAL MEDICINE CLINIC | Age: 82
End: 2017-03-03

## 2017-03-03 DIAGNOSIS — D50.9 IRON DEFICIENCY ANEMIA, UNSPECIFIED IRON DEFICIENCY ANEMIA TYPE: ICD-10-CM

## 2017-03-03 DIAGNOSIS — F32.9 REACTIVE DEPRESSION: ICD-10-CM

## 2017-03-03 DIAGNOSIS — I48.0 PAROXYSMAL ATRIAL FIBRILLATION (HCC): ICD-10-CM

## 2017-03-03 DIAGNOSIS — I10 ESSENTIAL HYPERTENSION: ICD-10-CM

## 2017-03-03 DIAGNOSIS — E03.9 HYPOTHYROIDISM, ADULT: ICD-10-CM

## 2017-03-03 DIAGNOSIS — E78.5 HYPERLIPIDEMIA, UNSPECIFIED HYPERLIPIDEMIA TYPE: ICD-10-CM

## 2017-03-03 DIAGNOSIS — E11.9 TYPE 2 DIABETES MELLITUS WITHOUT COMPLICATION, WITHOUT LONG-TERM CURRENT USE OF INSULIN (HCC): ICD-10-CM

## 2017-03-03 DIAGNOSIS — M48.02 CERVICAL STENOSIS OF SPINE: ICD-10-CM

## 2017-03-03 DIAGNOSIS — G47.33 OSA (OBSTRUCTIVE SLEEP APNEA): ICD-10-CM

## 2017-03-03 PROCEDURE — 84443 ASSAY THYROID STIM HORMONE: CPT

## 2017-03-03 PROCEDURE — 36415 COLL VENOUS BLD VENIPUNCTURE: CPT

## 2017-03-03 PROCEDURE — 80053 COMPREHEN METABOLIC PANEL: CPT

## 2017-03-03 PROCEDURE — 85027 COMPLETE CBC AUTOMATED: CPT

## 2017-03-03 PROCEDURE — 83036 HEMOGLOBIN GLYCOSYLATED A1C: CPT

## 2017-03-04 LAB
ALBUMIN SERPL-MCNC: 3.8 G/DL (ref 3.5–4.8)
ALBUMIN/GLOB SERPL: 1.4 {RATIO} (ref 1.1–2.5)
ALP SERPL-CCNC: 71 IU/L (ref 39–117)
ALT SERPL-CCNC: 10 IU/L (ref 0–32)
AST SERPL-CCNC: 14 IU/L (ref 0–40)
BILIRUB SERPL-MCNC: 0.4 MG/DL (ref 0–1.2)
BUN SERPL-MCNC: 23 MG/DL (ref 8–27)
BUN/CREAT SERPL: 23 (ref 11–26)
CALCIUM SERPL-MCNC: 9.2 MG/DL (ref 8.7–10.3)
CHLORIDE SERPL-SCNC: 99 MMOL/L (ref 96–106)
CO2 SERPL-SCNC: 25 MMOL/L (ref 18–29)
CREAT SERPL-MCNC: 0.99 MG/DL (ref 0.57–1)
ERYTHROCYTE [DISTWIDTH] IN BLOOD BY AUTOMATED COUNT: 14.9 % (ref 12.3–15.4)
EST. AVERAGE GLUCOSE BLD GHB EST-MCNC: 148 MG/DL
GLOBULIN SER CALC-MCNC: 2.8 G/DL (ref 1.5–4.5)
GLUCOSE SERPL-MCNC: 106 MG/DL (ref 65–99)
HBA1C MFR BLD: 6.8 % (ref 4.8–5.6)
HCT VFR BLD AUTO: 33.2 % (ref 34–46.6)
HGB BLD-MCNC: 10.4 G/DL (ref 11.1–15.9)
MCH RBC QN AUTO: 26.9 PG (ref 26.6–33)
MCHC RBC AUTO-ENTMCNC: 31.3 G/DL (ref 31.5–35.7)
MCV RBC AUTO: 86 FL (ref 79–97)
PLATELET # BLD AUTO: 309 X10E3/UL (ref 150–379)
POTASSIUM SERPL-SCNC: 4.7 MMOL/L (ref 3.5–5.2)
PROT SERPL-MCNC: 6.6 G/DL (ref 6–8.5)
RBC # BLD AUTO: 3.87 X10E6/UL (ref 3.77–5.28)
SODIUM SERPL-SCNC: 140 MMOL/L (ref 134–144)
TSH SERPL DL<=0.005 MIU/L-ACNC: 1.52 UIU/ML (ref 0.45–4.5)
WBC # BLD AUTO: 9.6 X10E3/UL (ref 3.4–10.8)

## 2017-03-07 ENCOUNTER — OFFICE VISIT (OUTPATIENT)
Dept: INTERNAL MEDICINE CLINIC | Age: 82
End: 2017-03-07

## 2017-03-07 VITALS
TEMPERATURE: 97.3 F | SYSTOLIC BLOOD PRESSURE: 126 MMHG | DIASTOLIC BLOOD PRESSURE: 65 MMHG | HEIGHT: 61 IN | HEART RATE: 60 BPM | BODY MASS INDEX: 31.95 KG/M2 | OXYGEN SATURATION: 99 % | WEIGHT: 169.2 LBS

## 2017-03-07 DIAGNOSIS — E03.9 HYPOTHYROIDISM, ADULT: ICD-10-CM

## 2017-03-07 DIAGNOSIS — K22.70 BARRETT'S ESOPHAGUS WITHOUT DYSPLASIA: ICD-10-CM

## 2017-03-07 DIAGNOSIS — E78.00 PURE HYPERCHOLESTEROLEMIA: ICD-10-CM

## 2017-03-07 DIAGNOSIS — G47.33 OSA (OBSTRUCTIVE SLEEP APNEA): ICD-10-CM

## 2017-03-07 DIAGNOSIS — M48.02 CERVICAL STENOSIS OF SPINE: ICD-10-CM

## 2017-03-07 DIAGNOSIS — G45.9 TRANSIENT CEREBRAL ISCHEMIA, UNSPECIFIED TYPE: ICD-10-CM

## 2017-03-07 DIAGNOSIS — E11.9 TYPE 2 DIABETES MELLITUS WITHOUT COMPLICATION, WITHOUT LONG-TERM CURRENT USE OF INSULIN (HCC): Primary | ICD-10-CM

## 2017-03-07 DIAGNOSIS — I48.0 PAROXYSMAL ATRIAL FIBRILLATION (HCC): ICD-10-CM

## 2017-03-07 DIAGNOSIS — I10 ESSENTIAL HYPERTENSION: ICD-10-CM

## 2017-03-07 DIAGNOSIS — F32.9 REACTIVE DEPRESSION: ICD-10-CM

## 2017-03-07 DIAGNOSIS — K90.0 CELIAC SPRUE: ICD-10-CM

## 2017-03-07 RX ORDER — MONTELUKAST SODIUM 4 MG/1
TABLET, CHEWABLE ORAL
COMMUNITY
Start: 2017-02-14 | End: 2017-10-15

## 2017-03-07 NOTE — PROGRESS NOTES
HISTORY OF PRESENT ILLNESS  Keyanna Trevino is a 80 y.o. female. HPI   Last here 12/07/16.  Pt is here to f/u on chronic conditions    BP today is 126/65  BP at home running around 127/63    Continues benazepril 20mg, half tablet HCTZ 25mg, and dilt 120mg daily   No longer taking norvasc- I stopped this as her BP was too low and was not needed anymore    BS at home running around 110, 115, 120s, occasionally 130s  Continues metformin XR 750mg BID   She also takes ASA 81mg daily     Pt previously followed with Dr. Danika Ely (cardio)   She is now following with Dr. Mirza Anton (cardio), has appt pending 4/18/17  Continues on eliquis for stroke prevention    Reviewed last labs 3/17  a1c mildly improved and at goal, kidney nl, liver nl, minimal anemia    Continues pravachol 20mg daily for cholesterol     Pt was in ED 2/05/17 for wrist fracture following a fall   Reviewed notes from Dr. Radha Deleon (ortho): L radius fracture healing well   She notes of some other falls at home in addition to this   She has seen Dr. Ness Ruiz (ortho) to f/u on her knees     Continues ditropan for urinary sx which works well overall   She states she continues to have sx but is not overly bothersome  Advised Kegel exercises and timed bathroom breaks to improve this    Pt has been following with Dr. Jan Nassar (ENT)   Reviewed last notes from 1/26/17 and will get most recent notes  She last saw him earlier this morning   She had her nose cauterized d/t recurrent bleeding  Per pt, this physician suggested she f/u with rheumatology d/t abnormal autoimmune labs   Discussed that Soham Escobar is a rheumatologist and she already sees her   She has f/u pending with rheumatologist 3/14/17    Wt is down 8 lbs since last visit  She has been working on her diet   Pt has not been eating candy at home as frequently   She does like eating muffins in the mornings for breakfast   Discussed diet and need for further weight loss     Continues zoloft 150mg daily for depression, works well, happy with dose    Pt had EGD completed 12/28/16 with Dr. 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 Elizabeth Saeed for the celiac sprue  Discussed gluten free diet would improve her anemia  She is also now taking increased dose prilosec 40mg BID   Advised her to start taking iron daily OTC at this time for the anemia- pt is amenable  She wonders if iron causes constipation-addressed this, can use miralax prn if needed with the iron    Continues synthroid 50mcg daily     Advised completing sleep study    Reviewed last notes from Dr. Deborah Skinner (neuro) 2/13/17  ASSESSMENT AND PLAN  79-year-old female seen in follow-up for cervical stenosis. She is also complaining now of balance issues and feeling pulled to the left. Since her last visit she has been diagnosed with atrial fibrillation. I question if she could have had any vascular event in her cerebellum. Will do MRI of the brain to evaluate. Also she is having continued weakness in her hands. Will repeat her cervical spine  1. MRI brain  2. Continue Eliquis for stroke prevention  3. MRI cervical spine  4. Continue to follow with Dr. Levi Maria  5. Continue home physical therapy exercises  6. May need EMG/NCS  Follow-up in 4 months    Recall that She has a h/o sarcoid, gets annual cxr for this, not seeing pulmonary Yet.  Will monitor.            PREVENTIVE:    Colonoscopy: 12/28/16, Dr. Elizabeth Saeed, repeat 10 years  EGD: 12/28/16, Dr. Elizabeth Saeed   Pap: 2013, declines further   Mammogram: 6/03/16 negative  Dexa: 11/15 mild osteopenia    Tdap: 4/27/2015  Pneumovax: 2014 per pt  Agarhzd27: 8/18/2016  Zostavax: 2/29/2016  Flu shot: 9/14/2016  Foot exam: 8/16/16  Microalbumin: 11/16 minimal  A1c: 7/14 6.6, 10/14 6.6,4/15 6.8, 7/15 6.8, 11/15 6.9, 2/16 6.9 , 5/16 6.5, 8/16 6.8, 11/16 7.0, 3/17 6.8  Eye exam: Dr. Ramon Barajas, 6/23/16, mild diabetic retinopathy in R eye   Lipids: 9/16 LDL 42        Patient Active Problem List    Diagnosis Date Noted    Paroxysmal atrial fibrillation ([facility] 09/07/2016    Precordial pain 09/07/2016    Ataxia 02/17/2016    Dehydration 02/17/2016    Webb esophagus 11/11/2015    ACP (advance care planning) 11/11/2015    Hypothyroidism, adult 07/31/2015    Type 2 diabetes mellitus without complication ([facility] 23/90/0338    Essential hypertension 04/27/2015    MALLORY (obstructive sleep apnea) 07/09/2014    Hyperlipidemia 07/09/2014    Sarcoid ([facility] 07/09/2014    Stress bladder incontinence, female 07/09/2014    Obesity 07/09/2014    TIA (transient ischemic attack) 07/09/2014    AR (aortic regurgitation) 07/09/2014    Mitral valve insufficiency 07/09/2014    Cervical stenosis of spine 07/09/2014     Current Outpatient Prescriptions   Medication Sig Dispense Refill    colestipol (COLESTID) 1 gram tablet       HYDROcodone-acetaminophen (NORCO) 5-325 mg per tablet Take 1 Tab by mouth every four (4) hours as needed for Pain. Max Daily Amount: 6 Tabs. 20 Tab 0    polyethylene glycol (MIRALAX) 17 gram/dose powder Take 17 g by mouth daily. 1 tablespoon with 8 oz of water daily 119 g 0    benazepril (LOTENSIN) 20 mg tablet TAKE ONE TABLET BY MOUTH ONCE DAILY 90 Tab 0    sertraline (ZOLOFT) 100 mg tablet TAKE ONE & ONE-HALF TABLETS BY MOUTH ONCE DAILY 135 Tab 0    pravastatin (PRAVACHOL) 20 mg tablet TAKE ONE TABLET BY MOUTH NIGHTLY 90 Tab 0    multivit-min-FA-lycopen-lutein (CENTRUM SILVER) 0.4-300-250 mg-mcg-mcg tab Take 1 Tab by mouth daily.  biotin 2,500 mcg tab Take 1 Tab by mouth daily.  diltiazem XR (DILTIA XT) 120 mg XR capsule Take 1 Cap by mouth daily. 30 Cap 11    metFORMIN ER (GLUCOPHAGE XR) 750 mg tablet TAKE ONE TABLET BY MOUTH TWICE DAILY 180 Tab 0    apixaban (ELIQUIS) 5 mg tablet Take 1 Tab by mouth two (2) times a day.  60 Tab 11    aspirin delayed-release 81 mg tablet Take 81 mg by mouth daily.  hydrochlorothiazide (HYDRODIURIL) 25 mg tablet Take 1 Tab by mouth daily. (Patient taking differently: Take 12.5 mg by mouth daily.) 30 Tab 3    oxybutynin chloride XL (DITROPAN XL) 10 mg CR tablet Take 1 Tab by mouth daily. 30 Tab 6    levothyroxine (SYNTHROID) 50 mcg tablet Take 1 Tab by mouth Daily (before breakfast). 90 Tab 3    omeprazole (PRILOSEC) 20 mg capsule Take 40 mg by mouth two (2) times a day.  magnesium 250 mg tab Take 250 mg by mouth daily.  oxybutynin chloride XL (DITROPAN XL) 10 mg CR tablet TAKE ONE TABLET BY MOUTH NIGHTLY 90 Tab 0     Past Surgical History:   Procedure Laterality Date    COLONOSCOPY N/A 12/28/2016    COL[facility]COPY performed [facility] at [facility] ENDOSCOPY    HX CATARACT REMOVAL      both eyes    HX CHOLECYSTECTOMY      HX COLONOSCOPY  5/8/2013    Repeat in 5 years    HX CYST REMOVAL  10/15    on head     HX HYSTERECTOMY      HX ORTHOPAEDIC Bilateral     knee replacement to both knees    HX ORTHOPAEDIC      spacer btwn L4-5 and L5-6     HX TONSILLECTOMY        Lab Results  Component Value Date/Time   WBC 9.6 03/03/2017 09:49 AM   HGB 10.4 03/03/2017 09:49 AM   HCT 33.2 03/03/2017 09:49 AM   PLATELET 986 74/02/0505 09:49 AM   MCV 86 03/03/2017 09:49 AM       Lab Results  Component Value Date/Time   Cholesterol, total 138 09/08/2016 02:13 AM   HDL Cholesterol 71 09/08/2016 02:13 AM   LDL, calculated 45.2 09/08/2016 02:13 AM   Triglyceride 109 09/08/2016 02:13 AM   CHOL/HDL Ratio 1.9 09/08/2016 02:13 AM       Lab Results  Component Value Date/Time   GFR est AA 63 03/03/2017 09:49 AM   GFR est non-AA 54 03/03/2017 09:49 AM   Creatinine 0.99 03/03/2017 09:49 AM   BUN 23 03/03/2017 09:49 AM   Sodium 140 03/03/2017 09:49 AM   Potassium 4.7 03/03/2017 09:49 AM   Chloride 99 03/03/2017 09:49 AM   CO2 25 03/03/2017 09:49 AM         Review of Systems   Respiratory: Negative for shortness of breath.     Cardiovascular: Negative for chest pain.       Physical Exam   Constitutional: She is oriented to person, place, and time. She appears well-developed and well-nourished. No distress. HENT:   Head: Normocephalic and atraumatic. Mouth/Throat: Oropharynx is clear and moist. No oropharyngeal exudate. Eyes: Conjunctivae and EOM are normal. Right eye exhibits no discharge. Left eye exhibits no discharge. Neck: Normal range of motion. Neck supple. Cardiovascular: Normal rate, regular rhythm and intact distal pulses. Exam reveals no gallop and no friction rub. Murmur (2/6) heard. Pulmonary/Chest: Effort normal and breath sounds normal. No respiratory distress. She has no wheezes. She has no rales. She exhibits no tenderness. Musculoskeletal: She exhibits no edema, tenderness or deformity. Lymphadenopathy:     She has no cervical adenopathy. Neurological: She is alert and oriented to person, place, and time. Coordination abnormal.   Skin: Skin is warm and dry. No rash noted. She is not diaphoretic. No erythema. No pallor. Psychiatric: She has a normal mood and affect. Her behavior is normal.       ASSESSMENT and PLAN    ICD-10-CM ICD-9-CM    1. Type 2 diabetes mellitus without complication, without long-term current use of insulin (HCC)    A1c now at goal, diet improved, wt improved, continue metformin 750mg BID E11.9 250.00 HEMOGLOBIN A1C WITH EAG      METABOLIC PANEL, COMPREHENSIVE   2. Celiac sprue    New dx per Dr. Nidia Smith, she is working on gluten free diet, also had some abnormal autoimmune labs per ENT that she will be reviewing with her rheumatologist later this month, also discussed with her that her anemia is likely related to her celiac sprue and gluten free diet would improve this, will get notes from Dr. Nidia Smith. K90.0 579.0    3.  Paroxysmal atrial fibrillation (HCC)    Currently in nsr, rate controlled on dilt, continues eliquis for anticoagulation, has had episodes of epistaxis but this has been cauterized by ENT I48.0 427.31    4. MALLORY (obstructive sleep apnea)    Still needs to complete sleep study, addressed this with her, she has information to schedule   G47.33 327.23    5. Cervical stenosis of spine    Follows with Dr. hess and Dr. Cj Colorado for this, had recent MRI imaging, still issues with balance and neuropathy, does PT exercises at home. Will have EMG next if progressive neuropathy sx   M48.02 723.0    6. Hypothyroidism, adult    On synthroid 50mcg daily, at goal last check    E03.9 244.9    7. Essential hypertension    Controlled on dilt 120, half tablet HCTZ daily, and benazepril   I10 401.9    8. Webb's esophagus without dysplasia    UTD with Dr. Nik Kerns, had recent EGD. Will get his notes for review. K22.70 530.85    9. Transient cerebral ischemia, unspecified type    Currently anticoagulated with eliquis, no recurrent episodes   G45.9 435.9    10. Pure hypercholesterolemia    On pravachol, controlled   E78.00 272.0    11. Reactive depression    Stable on zoloft 150mg daily    F32.9 300.4           Depression screen reviewed and negative      Written by Glory Shepard, as dictated by Jamie Marsh MD.    Current diagnosis and concerns discussed with pt at length. Understands risks and benefits or current treatment plan and medications and accepts the treatment and medication with any possible risks.   Pt asks appropriate questions which were answered.   Pt instructed to call with any concerns or problems. This note will not be viewable in [address]

## 2017-03-07 NOTE — MR AVS SNAPSHOT
Visit Information Date & Time Provider Department Dept. Phone Encounter #  
 3/7/2017  1:30 PM Ashanti Levy, [address] [place] [address],[address] [phone] Follow-up Instructions Return in about 3 months (around 6/7/2017). Your Appointments 4/18/2017  9:00 AM  
6 MONTH with Suraj Wayne MD  
[facility]) Appt Note: 6 month per Dr. Eber Squires, no cp; 6mo switching to Dr TAY $0CP. REM  
 [address] [facility]  
[phone] [address] [facility] Upcoming Health Maintenance Date Due  
 EYE EXAM RETINAL OR DILATED Q1 6/23/2017 FOOT EXAM Q1 8/16/2017 Pneumococcal 65+ Low/Medium Risk (2 of 2 - PPSV23) 8/18/2017 HEMOGLOBIN A1C Q6M 9/3/2017 LIPID PANEL Q1 9/8/2017 MICROALBUMIN Q1 11/29/2017 MEDICARE YEARLY EXAM 12/8/2017 GLAUCOMA SCREENING Q2Y 6/23/2018 DTaP/Tdap/Td series (2 - Td) 4/27/2025 Allergies as of 3/7/2017  Review Complete On: 3/7/2017 By: Ashanti Levy MD  
  
 Severity Noted Reaction Type Reactions Procardia Xl [Nifedipine]  07/09/2014    Other (comments)  
 weakness Current Immunizations  Reviewed on 12/7/2016 Name Date Influenza High Dose Vaccine PF 9/19/2015 Influenza Vaccine 10/10/2014 12:38 PM  
 Influenza Vaccine (Quad) PF 9/14/2016 Pneumococcal Conjugate (PCV-13) 8/18/2016 Tdap 4/27/2015 Zoster Vaccine, Live 2/29/2016 Not reviewed this visit You Were Diagnosed With   
  
 Codes Comments Type 2 diabetes mellitus without complication, without long-term current use of insulin (HCC)    -  Primary ICD-10-CM: E11.9 ICD-9-CM: 250.00 Celiac sprue     ICD-10-CM: K90.0 ICD-9-CM: 579.0 Paroxysmal atrial fibrillation (HCC)     ICD-10-CM: I48.0 ICD-9-CM: 427.31   
 MALLORY (obstructive sleep apnea)     ICD-10-CM: G47.33 
ICD-9-CM: 327.23   
 Cervical stenosis of spine     ICD-10-CM: M48.02 
ICD-9-CM: 723.0 Hypothyroidism, adult     ICD-10-CM: E03.9 ICD-9-CM: 244.9 Essential hypertension     ICD-10-CM: I10 
ICD-9-CM: 401.9 Webb's esophagus without dysplasia     ICD-10-CM: K22.70 ICD-9-CM: 530.85 Transient cerebral ischemia, unspecified type     ICD-10-CM: G45.9 ICD-9-CM: 435.9 Pure hypercholesterolemia     ICD-10-CM: E78.00 ICD-9-CM: 272.0 Reactive depression     ICD-10-CM: F32.9 ICD-9-CM: 300.4 Vitals BP Pulse Temp Height(growth percentile) Weight(growth percentile) SpO2  
 126/65 (BP 1 Location: Right arm, BP Patient Position: Sitting) 60 97.3 °F (36.3 °C) (Oral) 5' 1\" (1.549 m) 169 lb 3.2 oz (76.7 kg) 99% BMI OB Status Smoking Status 31.97 kg/m2 Hysterectomy Never Smoker BMI and BSA Data Body Mass Index Body Surface Area  
 31.97 kg/m 2 1.82 m 2 Preferred Pharmacy Pharmacy Name Phone [facility] [address] [phone] Your Updated Medication List  
  
   
This list is accurate as of: 3/7/17  2:03 PM.  Always use your most recent med list.  
  
  
  
  
 apixaban 5 mg tablet Commonly known as:  Kellie Can Take 1 Tab by mouth two (2) times a day. aspirin delayed-release 81 mg tablet Take 81 mg by mouth daily. benazepril 20 mg tablet Commonly known as:  LOTENSIN  
TAKE ONE TABLET BY MOUTH ONCE DAILY  
  
 biotin 2,500 mcg Tab Take 1 Tab by mouth daily. CENTRUM SILVER 0.4-300-250 mg-mcg-mcg Tab Generic drug:  multivit-min-FA-lycopen-lutein Take 1 Tab by mouth daily. colestipol 1 gram tablet Commonly known as:  COLESTID  
  
 dilTIAZem  mg XR capsule Commonly known as:  Micheal Erwin Take 1 Cap by mouth daily. hydroCHLOROthiazide 25 mg tablet Commonly known as:  HYDRODIURIL Take 1 Tab by mouth daily. HYDROcodone-acetaminophen 5-325 mg per tablet Commonly known as:  Toni Bent Take 1 Tab by mouth every four (4) hours as needed for Pain. Max Daily Amount: 6 Tabs. levothyroxine 50 mcg tablet Commonly known as:  SYNTHROID Take 1 Tab by mouth Daily (before breakfast). magnesium 250 mg Tab Take 250 mg by mouth daily. metFORMIN  mg tablet Commonly known as:  GLUCOPHAGE XR  
TAKE ONE TABLET BY MOUTH TWICE DAILY  
  
 omeprazole 20 mg capsule Commonly known as:  PRILOSEC Take 40 mg by mouth two (2) times a day. * oxybutynin chloride XL 10 mg CR tablet Commonly known as:  DITROPAN XL Take 1 Tab by mouth daily. * oxybutynin chloride XL 10 mg CR tablet Commonly known as:  DITROPAN XL  
TAKE ONE TABLET BY MOUTH NIGHTLY polyethylene glycol 17 gram/dose powder Commonly known as:  Kody Sport Take 17 g by mouth daily. 1 tablespoon with 8 oz of water daily  
  
 pravastatin 20 mg tablet Commonly known as:  PRAVACHOL  
TAKE ONE TABLET BY MOUTH NIGHTLY  
  
 sertraline 100 mg tablet Commonly known as:  ZOLOFT  
TAKE ONE & ONE-HALF TABLETS BY MOUTH ONCE DAILY * Notice: This list has 2 medication(s) that are the same as other medications prescribed for you. Read the directions carefully, and ask your doctor or other care provider to review them with you. Follow-up Instructions Return in about 3 months (around 6/7/2017). To-Do List   
 03/08/2017 Lab:  HEMOGLOBIN A1C WITH EAG   
  
 03/08/2017 Lab:  METABOLIC PANEL, COMPREHENSIVE Patient Instructions Start daily iron for anemia Labs prior to follow up Introducing [facility] SERVICES! Dear Beena Muñoz: Thank you for requesting a quitchen account. Our records indicate that you already have an active quitchen account. You can access your account anytime at [email or web address] Did you know that you can access your hospital and ER discharge instructions at any time in Breather? You can also review all of your test results from your hospital stay or ER visit. Additional Information If you have questions, please visit the Frequently Asked Questions section of the Breather website at [email or web address] Remember, Breather is NOT to be used for urgent needs. For medical emergencies, dial 911. Now available from your iPhone and Android! Please provide this summary of care documentation to your next provider. Your primary care clinician is listed as Flaca Ugarte. If you have any questions after today's visit, please call [phone].

## 2017-03-15 RX ORDER — PRAVASTATIN SODIUM 20 MG/1
TABLET ORAL
Qty: 90 TAB | Refills: 0 | Status: SHIPPED | OUTPATIENT
Start: 2017-03-15 | End: 2017-06-15 | Stop reason: SDUPTHER

## 2017-03-20 RX ORDER — METFORMIN HYDROCHLORIDE 750 MG/1
TABLET, EXTENDED RELEASE ORAL
Qty: 180 TAB | Refills: 0 | Status: SHIPPED | OUTPATIENT
Start: 2017-03-20 | End: 2017-06-22 | Stop reason: SDUPTHER

## 2017-04-05 ENCOUNTER — PATIENT OUTREACH (OUTPATIENT)
Dept: INTERNAL MEDICINE CLINIC | Age: 82
End: 2017-04-05

## 2017-04-17 RX ORDER — LEVOTHYROXINE SODIUM 50 UG/1
TABLET ORAL
Qty: 90 TAB | Refills: 0 | Status: SHIPPED | OUTPATIENT
Start: 2017-04-17 | End: 2017-07-20 | Stop reason: SDUPTHER

## 2017-04-17 RX ORDER — BENAZEPRIL HYDROCHLORIDE 20 MG/1
TABLET ORAL
Qty: 90 TAB | Refills: 0 | Status: SHIPPED | OUTPATIENT
Start: 2017-04-17 | End: 2017-07-20 | Stop reason: SDUPTHER

## 2017-04-17 RX ORDER — SERTRALINE HYDROCHLORIDE 100 MG/1
TABLET, FILM COATED ORAL
Qty: 135 TAB | Refills: 0 | Status: SHIPPED | OUTPATIENT
Start: 2017-04-17 | End: 2017-07-20 | Stop reason: SDUPTHER

## 2017-04-18 ENCOUNTER — OFFICE VISIT (OUTPATIENT)
Dept: CARDIOLOGY CLINIC | Age: 82
End: 2017-04-18

## 2017-04-18 VITALS
SYSTOLIC BLOOD PRESSURE: 140 MMHG | DIASTOLIC BLOOD PRESSURE: 64 MMHG | RESPIRATION RATE: 18 BRPM | WEIGHT: 169.1 LBS | HEIGHT: 61 IN | OXYGEN SATURATION: 97 % | BODY MASS INDEX: 31.93 KG/M2 | HEART RATE: 58 BPM

## 2017-04-18 DIAGNOSIS — G45.9 TRANSIENT CEREBRAL ISCHEMIA, UNSPECIFIED TYPE: ICD-10-CM

## 2017-04-18 DIAGNOSIS — I48.0 PAROXYSMAL ATRIAL FIBRILLATION (HCC): Primary | Chronic | ICD-10-CM

## 2017-04-18 DIAGNOSIS — I10 ESSENTIAL HYPERTENSION: ICD-10-CM

## 2017-04-18 DIAGNOSIS — I35.1 AORTIC VALVE REGURGITATION, UNSPECIFIED ETIOLOGY: ICD-10-CM

## 2017-04-18 DIAGNOSIS — I34.0 MITRAL VALVE INSUFFICIENCY, UNSPECIFIED ETIOLOGY: ICD-10-CM

## 2017-04-18 RX ORDER — CHOLECALCIFEROL (VITAMIN D3) 125 MCG
CAPSULE ORAL
COMMUNITY
End: 2017-10-15 | Stop reason: DRUGHIGH

## 2017-04-18 RX ORDER — ACETAMINOPHEN 500 MG
TABLET ORAL
COMMUNITY
End: 2017-10-15 | Stop reason: DRUGHIGH

## 2017-04-18 RX ORDER — TITANIUM DIOXIDE/OXYBEN/CINOX
325 LOTION (ML) TOPICAL EVERY OTHER DAY
COMMUNITY

## 2017-04-18 NOTE — PROGRESS NOTES
Keaton Maldonado NP  Subjective/HPI:     Ko Osborne is a 80 y.o. female is here for routine f/u. The patient denies chest pain/ shortness of breath, orthopnea, PND, LE edema, palpitations, syncope, presyncope or fatigue. Patient reports feeling well and in her usual state of health. Recently placed on oral iron and concerns were darkening of her stools. Tolerating Eliquis well without any excessive bruising or bleeding.       PCP Provider  Rosario Scruggs MD  Past Medical History:   Diagnosis Date    Anemia     Ataxia     Webb's esophagus     Cervical spinal stenosis     Coronary atherosclerosis of unspecified type of vessel, native or graft     Diabetes ([facility] [address]     Fatigue     Fatigue     GERD (gastroesophageal reflux disease)     Hypercholesterolemia     Hyperlipidemia     Hypertension     Ill-defined condition     Webb's Esophagus    Liver disease     Osteoarthritis     [facility]arcoidosis ([facility] [address]     Sleep apnea     uses cpap    Stroke [facility])     TIA'S      Past Surgical History:   Procedure Laterality Date    COLONOSCOPY N/A 12/28/2016    COLONOSCOPY performed by [facility]jane Zheng [facility] at [facility] END[facility]COPY    HX CATARACT REMOVAL      both eyes    HX CHOLECYSTECTOMY      HX COLONOSCOPY  5/8/2013    Repeat in 5 years    HX CYST REMOVAL  10/15    on head     HX HYSTERECTOMY      HX ORTHOPAEDIC Bilateral     knee replacement to both knees    HX ORTHOPAEDIC      spacer btwn L4-5 and L5-6     HX TONSILLECTOMY       Allergies   Allergen Reactions    Procardia Xl [Nifedipine] Other (comments)     weakness      Family History   Problem Relation Age of Onset    Other Mother      Fibromyalgia    Pacemaker Mother     Heart Disease Mother     Heart Failure Mother     COPD Mother     Heart Attack Father 61    Cancer Sister      Multiple Myeloma    Diabetes Brother     Obesity Brother     Pacemaker Brother     Heart Attack Brother      Bundle Branch Block    No Known Problems Son  Psychiatric Disorder Daughter      Multiple      Current Outpatient Prescriptions   Medication Sig    cholecalciferol, vitamin D3, (VITAMIN D3) 2,000 unit tab Take  by mouth.  ferrous sulfate (IRON) 325 mg (65 mg iron) tablet Take  by mouth Daily (before breakfast).  acetaminophen (TYLENOL EXTRA STRENGTH) 500 mg tablet Take  by mouth every six (6) hours as needed for Pain.  benazepril (LOTENSIN) 20 mg tablet TAKE ONE TABLET BY MOUTH ONCE DAILY    sertraline (ZOLOFT) 100 mg tablet TAKE ONE & ONE-HALF TABLETS BY MOUTH ONCE DAILY    levothyroxine (SYNTHROID) 50 mcg tablet TAKE ONE TABLET BY MOUTH ONCE DAILY BEFORE BREAKFAST    metFORMIN ER (GLUCOPHAGE XR) 750 mg tablet TAKE ONE TABLET BY MOUTH TWICE DAILY    pravastatin (PRAVACHOL) 20 mg tablet TAKE ONE TABLET BY MOUTH ONCE NIGHTLY    colestipol (COLESTID) 1 gram tablet     oxybutynin chloride XL (DITROPAN XL) 10 mg CR tablet TAKE ONE TABLET BY MOUTH NIGHTLY    multivit-min-FA-lycopen-lutein (CENTRUM SILVER) 0.4-300-250 mg-mcg-mcg tab Take 1 Tab by mouth daily.  biotin 2,500 mcg tab Take 1 Tab by mouth daily.  diltiazem XR (DILTIA XT) 120 mg XR capsule Take 1 Cap by mouth daily.  apixaban (ELIQUIS) 5 mg tablet Take 1 Tab by mouth two (2) times a day.  aspirin delayed-release 81 mg tablet Take 81 mg by mouth daily.  hydrochlorothiazide (HYDRODIURIL) 25 mg tablet Take 1 Tab by mouth daily. (Patient taking differently: Take 12.5 mg by mouth daily.)    omeprazole (PRILOSEC) 20 mg capsule Take 40 mg by mouth two (2) times a day.  magnesium 250 mg tab Take 250 mg by mouth daily.  HYDROcodone-acetaminophen (NORCO) 5-325 mg per tablet Take 1 Tab by mouth every four (4) hours as needed for Pain. Max Daily Amount: 6 Tabs.  polyethylene glycol (MIRALAX) 17 gram/dose powder Take 17 g by mouth daily. 1 tablespoon with 8 oz of water daily     No current facility-administered medications for this visit.        Vitals:    04/18/17 9900 04/18/17 0908   BP: 142/66 140/64   Pulse: (!) 58    Resp: 18    SpO2: 97%    Weight: 169 lb 1.6 oz (76.7 kg)    Height: 5' 1\" (1.549 m)      Social History     Social History    Marital status:      Spouse name: N/A    Number of children: N/A    Years of education: N/A     Occupational History    Not on file. Social History Main Topics    Smoking status: Never Smoker    Smokeless tobacco: Never Used    Alcohol use No    Drug use: No    Sexual activity: Yes     Partners: Male     Other Topics Concern    Not on file     Social History Narrative       I have reviewed the nurses notes, vitals, problem list, allergy list, medical history, family, social history and medications. Review of Symptoms:    General: Pt denies excessive weight gain or loss. Pt is able to conduct ADL's  HEENT: Denies blurred vision, headaches, epistaxis and difficulty swallowing. Respiratory: Denies shortness of breath, STRICKLAND, wheezing or stridor. Cardiovascular: Denies precordial pain, palpitations, edema or PND  Gastrointestinal: Denies poor appetite, indigestion, abdominal pain or blood in stool  Musculoskeletal: Denies pain or swelling from muscles or joints  Neurologic: Denies tremor, paresthesias, or sensory motor disturbance  Skin: Denies rash, itching or texture change. Physical Exam:      General: Well developed, in no acute distress, cooperative and alert  HEENT: No carotid bruits, no JVD, trach is midline. Neck Supple, PEERL, EOM intact. Heart:  Normal S1/S2 negative S3 or S4. Regular, no murmur, gallop or rub.   Respiratory: Clear bilaterally x 4, no wheezing or rales  Abdomen:   Soft, non-tender, no masses, bowel sounds are active.   Extremities:  No edema, normal cap refill, no cyanosis, atraumatic. Neuro: A&Ox3, speech clear, gait stable. Skin: Skin color is normal. No rashes or lesions.  Non diaphoretic  Vascular: 2+ pulses symmetric in all extremities    Cardiographics    ECG: Sinus bradycardia  Results for orders placed or performed during the hospital encounter of 02/05/17   EKG, 12 LEAD, INITIAL   Result Value Ref Range    Ventricular Rate 60 BPM    Atrial Rate 60 BPM    P-R Interval 194 ms    QRS Duration 92 ms    Q-T Interval 490 ms    QTC Calculation (Bezet) 490 ms    Calculated P Axis 48 degrees    Calculated R Axis -2 degrees    Calculated T Axis 59 degrees    Diagnosis       Sinus rhythm with premature atrial complexes  Nonspecific T wave abnormality  When compared with ECG of 08-SEP-2016 12:45,  premature atrial complexes are now present  Nonspecific T wave abnormality, worse in Lateral leads  Confirmed by Felisha Hayes PHELEN ([id number]) on 2/6/2017 2:30:54 PM           Cardiology Labs:  Lab Results   Component Value Date/Time    Cholesterol, total 138 09/08/2016 02:13 AM    HDL Cholesterol 71 09/08/2016 02:13 AM    LDL, calculated 45.2 09/08/2016 02:13 AM    Triglyceride 109 09/08/2016 02:13 AM    CHOL/HDL Ratio 1.9 09/08/2016 02:13 AM       Lab Results   Component Value Date/Time    Sodium 140 03/03/2017 09:49 AM    Potassium 4.7 03/03/2017 09:49 AM    Chloride 99 03/03/2017 09:49 AM    CO2 25 03/03/2017 09:49 AM    Anion gap 9 09/08/2016 02:13 AM    Glucose 106 03/03/2017 09:49 AM    BUN 23 03/03/2017 09:49 AM    Creatinine 0.99 03/03/2017 09:49 AM    BUN/Creatinine ratio 23 03/03/2017 09:49 AM    GFR est AA 63 03/03/2017 09:49 AM    GFR est non-AA 54 03/03/2017 09:49 AM    Calcium 9.2 03/03/2017 09:49 AM    Bilirubin, total 0.4 03/03/2017 09:49 AM    AST (SGOT) 14 03/03/2017 09:49 AM    Alk. phosphatase 71 03/03/2017 09:49 AM    Protein, total 6.6 03/03/2017 09:49 AM    Albumin 3.8 03/03/2017 09:49 AM    Globulin 4.2 09/07/2016 01:11 AM    A-G Ratio 1.4 03/03/2017 09:49 AM    ALT (SGPT) 10 03/03/2017 09:49 AM           Assessment:     Assessment:     Vida Jerome was seen today for other.     Diagnoses and all orders for this visit:    Paroxysmal atrial fibrillation [facility])    Essential hypertension  -     AMB POC EKG ROUTINE W/ 12 LEADS, INTER & REP    Mitral valve insufficiency, unspecified etiology    Aortic valve regurgitation, unspecified etiology    Transient cerebral ischemia, unspecified type        ICD-10-CM ICD-9-CM    1. Paroxysmal atrial fibrillation (HCC) I48.0 427.31    2. Essential hypertension I10 401.9 AMB POC EKG ROUTINE W/ 12 LEADS, INTER & REP   3. Mitral valve insufficiency, unspecified etiology I34.0 424.0    4. Aortic valve regurgitation, unspecified etiology I35.1 424.1    5. Transient cerebral ischemia, unspecified type G45.9 435.9      Orders Placed This Encounter    AMB POC EKG ROUTINE W/ 12 LEADS, INTER & REP     Order Specific Question:   Reason for Exam:     Answer:   routine    cholecalciferol, vitamin D3, (VITAMIN D3) 2,000 unit tab     Sig: Take  by mouth.  ferrous sulfate (IRON) 325 mg (65 mg iron) tablet     Sig: Take  by mouth Daily (before breakfast).  acetaminophen (TYLENOL EXTRA STRENGTH) 500 mg tablet     Sig: Take  by mouth every six (6) hours as needed for Pain. Plan:     Patient presents doing well and is stable from cardiac stand point. Continue current care and f/u in 6 months. 1.  Paroxysmal atrial fibrillation: She is post cardioversion maintaining sinus rhythm with calcium channel blocker. Chads vas score 7 continue Eliquis 5 mg twice a day. Discussed with patient black stools typically from oral iron therapy. She plans to discuss further with Dr. Josephine Jaquez. 2.  Hypertension: Repeat 132/74. Discussed with patient low-sodium diet, congratulated on weight loss.   3.  Hyperlipidemia: On low-dose statin therapy continue current medications labs per PCP  Adali Maxwell NP

## 2017-04-18 NOTE — MR AVS SNAPSHOT
Visit Information Date & Time Provider Department Dept. Phone Encounter #  
 4/18/2017  9:00 AM Hunter Burnette, [address] [facility] [phone] [id number] Your Appointments 6/7/2017  1:15 PM  
ROUTINE CARE with Akua Garcia, [address] [place] [address],[address] [facility]) Appt Note: 3 month follow up  
 [address] [id number]  
[address] [facility] [address] Upcoming Health Maintenance Date Due  
 EYE EXAM RETINAL OR DILATED Q1 6/23/2017 FOOT EXAM Q1 8/16/2017 Pneumococcal 65+ Low/Medium Risk (2 of 2 - PPSV23) 8/18/2017 HEMOGLOBIN A1C Q6M 9/3/2017 LIPID PANEL Q1 9/8/2017 MICROALBUMIN Q1 11/29/2017 MEDICARE YEARLY EXAM 12/8/2017 GLAUCOMA SCREENING Q2Y 6/23/2018 DTaP/Tdap/Td series (2 - Td) 4/27/2025 Allergies as of 4/18/2017  Review Complete On: 4/18/2017 By: Elisha Sevilla NP Severity Noted Reaction Type Reactions Procardia Xl [Nifedipine]  07/09/2014    Other (comments)  
 weakness Current Immunizations  Reviewed on 12/7/2016 Name Date Influenza High Dose Vaccine PF 9/19/2015 Influenza Vaccine 10/10/2014 12:38 PM  
 Influenza Vaccine (Quad) PF 9/14/2016 Pneumococcal Conjugate (PCV-13) 8/18/2016 Tdap 4/27/2015 Zoster Vaccine, Live 2/29/2016 Not reviewed this visit You Were Diagnosed With   
  
 Codes Comments Paroxysmal atrial fibrillation (HCC)    -  Primary ICD-10-CM: I48.0 ICD-9-CM: 427.31 Essential hypertension     ICD-10-CM: I10 
ICD-9-CM: 401.9 Mitral valve insufficiency, unspecified etiology     ICD-10-CM: I34.0 ICD-9-CM: 424.0 Aortic valve regurgitation, unspecified etiology     ICD-10-CM: I35.1 ICD-9-CM: 424.1 Transient cerebral ischemia, unspecified type     ICD-10-CM: G45.9 ICD-9-CM: 435.9 Vitals BP Pulse Resp Height(growth percentile) Weight(growth percentile) SpO2  
 140/64 (BP 1 Location: Right arm, BP Patient Position: Sitting) (!) 58 18 5' 1\" (1.549 m) 169 lb 1.6 oz (76.7 kg) 97% BMI OB Status Smoking Status 31.95 kg/m2 Hysterectomy Never Smoker Vitals History BMI and BSA Data Body Mass Index Body Surface Area 31.95 kg/m 2 1.82 m 2 Preferred Pharmacy Pharmacy Name Phone [facility] [address] [phone] Your Updated Medication List  
  
   
This list is accurate as of: 4/18/17  9:29 AM.  Always use your most recent med list.  
  
  
  
  
 apixaban 5 mg tablet Commonly known as:  Vernelle Olegario Take 1 Tab by mouth two (2) times a day. aspirin delayed-release 81 mg tablet Take 81 mg by mouth daily. benazepril 20 mg tablet Commonly known as:  LOTENSIN  
TAKE ONE TABLET BY MOUTH ONCE DAILY  
  
 biotin 2,500 mcg Tab Take 1 Tab by mouth daily. CENTRUM SILVER 0.4-300-250 mg-mcg-mcg Tab Generic drug:  multivit-min-FA-lycopen-lutein Take 1 Tab by mouth daily. colestipol 1 gram tablet Commonly known as:  COLESTID  
  
 dilTIAZem  mg XR capsule Commonly known as:  Sharlette Adrian Take 1 Cap by mouth daily. hydroCHLOROthiazide 25 mg tablet Commonly known as:  HYDRODIURIL Take 1 Tab by mouth daily. HYDROcodone-acetaminophen 5-325 mg per tablet Commonly known as:  Elyn Barley Take 1 Tab by mouth every four (4) hours as needed for Pain. Max Daily Amount: 6 Tabs. Iron 325 mg (65 mg iron) tablet Generic drug:  ferrous sulfate Take  by mouth Daily (before breakfast). levothyroxine 50 mcg tablet Commonly known as:  SYNTHROID  
TAKE ONE TABLET BY MOUTH ONCE DAILY BEFORE BREAKFAST  
  
 magnesium 250 mg Tab Take 250 mg by mouth daily. metFORMIN  mg tablet Commonly known as:  GLUCOPHAGE XR  
TAKE ONE TABLET BY MOUTH TWICE DAILY omeprazole 20 mg capsule Commonly known as:  PRILOSEC Take 40 mg by mouth two (2) times a day. oxybutynin chloride XL 10 mg CR tablet Commonly known as:  DITROPAN XL  
TAKE ONE TABLET BY MOUTH NIGHTLY polyethylene glycol 17 gram/dose powder Commonly known as:  Azell Beans Take 17 g by mouth daily. 1 tablespoon with 8 oz of water daily  
  
 pravastatin 20 mg tablet Commonly known as:  PRAVACHOL  
TAKE ONE TABLET BY MOUTH ONCE NIGHTLY  
  
 sertraline 100 mg tablet Commonly known as:  ZOLOFT  
TAKE ONE & ONE-HALF TABLETS BY MOUTH ONCE DAILY  
  
 TYLENOL EXTRA STRENGTH 500 mg tablet Generic drug:  acetaminophen Take  by mouth every six (6) hours as needed for Pain. VITAMIN D3 2,000 unit Tab Generic drug:  cholecalciferol (vitamin D3) Take  by mouth. We Performed the Following AMB POC EKG ROUTINE W/ 12 LEADS, INTER & REP [50981 CPT(R)] Introducing [facility] SERVICES! Dear Betzy Bowles: Thank you for requesting a [employer] account. Our records indicate that you already have an active [email or web address] account. You can access your account anytime at [email or web address]. [email or web address] Did you know that you can access your hospital and ER discharge instructions at any time in [email or web address]? You can also review all of your test results from your hospital stay or ER visit. Additional Information If you have questions, please visit the Frequently Asked Questions section of the [employer] website at [email or web address] Remember, [employer] is NOT to be used for urgent needs. For medical emergencies, dial 911. Now available from your iPhone and Android! Please provide this summary of care documentation to your next provider. Your primary care clinician is listed as Katt Law. If you have any questions after today's visit, please call [phone].

## 2017-06-02 ENCOUNTER — TELEPHONE (OUTPATIENT)
Dept: INTERNAL MEDICINE CLINIC | Age: 82
End: 2017-06-02

## 2017-06-02 NOTE — TELEPHONE ENCOUNTER
Patient states Oxybutin is not working and wants another recommendation. If something else is prescribed, please use [employer].

## 2017-06-05 ENCOUNTER — HOSPITAL ENCOUNTER (OUTPATIENT)
Dept: MAMMOGRAPHY | Age: 82
Discharge: HOME | End: 2017-06-05
Attending: INTERNAL MEDICINE
Payer: MEDICARE

## 2017-06-05 DIAGNOSIS — Z12.31 VISIT FOR SCREENING MAMMOGRAM: ICD-10-CM

## 2017-06-05 PROCEDURE — 77067 SCR MAMMO BI INCL CAD: CPT

## 2017-06-05 NOTE — TELEPHONE ENCOUNTER
Left vm for pt that PCP will address this matter at [facility] 6/7/17. Will close encounter.

## 2017-06-06 ENCOUNTER — HOSPITAL ENCOUNTER (OUTPATIENT)
Dept: LAB | Age: 82
Discharge: HOME | End: 2017-06-06
Payer: MEDICARE

## 2017-06-06 ENCOUNTER — LAB ONLY (OUTPATIENT)
Dept: INTERNAL MEDICINE CLINIC | Age: 82
End: 2017-06-06

## 2017-06-06 DIAGNOSIS — E11.9 TYPE 2 DIABETES MELLITUS WITHOUT COMPLICATION, WITHOUT LONG-TERM CURRENT USE OF INSULIN (HCC): ICD-10-CM

## 2017-06-06 PROCEDURE — 36415 COLL VENOUS BLD VENIPUNCTURE: CPT

## 2017-06-06 PROCEDURE — 80053 COMPREHEN METABOLIC PANEL: CPT

## 2017-06-06 PROCEDURE — 83036 HEMOGLOBIN GLYCOSYLATED A1C: CPT

## 2017-06-07 ENCOUNTER — OFFICE VISIT (OUTPATIENT)
Dept: INTERNAL MEDICINE CLINIC | Age: 82
End: 2017-06-07

## 2017-06-07 ENCOUNTER — TELEPHONE (OUTPATIENT)
Dept: INTERNAL MEDICINE CLINIC | Age: 82
End: 2017-06-07

## 2017-06-07 VITALS
BODY MASS INDEX: 32.51 KG/M2 | RESPIRATION RATE: 16 BRPM | WEIGHT: 172.2 LBS | HEIGHT: 61 IN | DIASTOLIC BLOOD PRESSURE: 73 MMHG | TEMPERATURE: 97.9 F | HEART RATE: 56 BPM | SYSTOLIC BLOOD PRESSURE: 147 MMHG | OXYGEN SATURATION: 97 %

## 2017-06-07 DIAGNOSIS — N39.3 STRESS INCONTINENCE OF URINE: ICD-10-CM

## 2017-06-07 DIAGNOSIS — K90.0 CELIAC SPRUE: ICD-10-CM

## 2017-06-07 DIAGNOSIS — I10 ESSENTIAL HYPERTENSION: ICD-10-CM

## 2017-06-07 DIAGNOSIS — E03.9 HYPOTHYROIDISM, ADULT: ICD-10-CM

## 2017-06-07 DIAGNOSIS — I48.0 PAROXYSMAL ATRIAL FIBRILLATION (HCC): ICD-10-CM

## 2017-06-07 DIAGNOSIS — E11.9 TYPE 2 DIABETES MELLITUS WITHOUT COMPLICATION, WITHOUT LONG-TERM CURRENT USE OF INSULIN (HCC): Primary | ICD-10-CM

## 2017-06-07 DIAGNOSIS — D50.0 IRON DEFICIENCY ANEMIA DUE TO CHRONIC BLOOD LOSS: ICD-10-CM

## 2017-06-07 DIAGNOSIS — K21.9 GASTROESOPHAGEAL REFLUX DISEASE WITHOUT ESOPHAGITIS: ICD-10-CM

## 2017-06-07 DIAGNOSIS — B37.9 CANDIDA INFECTION: ICD-10-CM

## 2017-06-07 DIAGNOSIS — G47.33 OSA (OBSTRUCTIVE SLEEP APNEA): ICD-10-CM

## 2017-06-07 DIAGNOSIS — M48.02 CERVICAL STENOSIS OF SPINE: ICD-10-CM

## 2017-06-07 DIAGNOSIS — E78.5 HYPERLIPIDEMIA, UNSPECIFIED HYPERLIPIDEMIA TYPE: ICD-10-CM

## 2017-06-07 DIAGNOSIS — I34.0 MITRAL VALVE INSUFFICIENCY, UNSPECIFIED ETIOLOGY: ICD-10-CM

## 2017-06-07 LAB
ALBUMIN SERPL-MCNC: 3.9 G/DL (ref 3.5–4.8)
ALBUMIN/GLOB SERPL: 1.5 {RATIO} (ref 1.2–2.2)
ALP SERPL-CCNC: 71 IU/L (ref 39–117)
ALT SERPL-CCNC: 17 IU/L (ref 0–32)
AST SERPL-CCNC: 18 IU/L (ref 0–40)
BILIRUB SERPL-MCNC: 0.4 MG/DL (ref 0–1.2)
BUN SERPL-MCNC: 27 MG/DL (ref 8–27)
BUN/CREAT SERPL: 26 (ref 12–28)
CALCIUM SERPL-MCNC: 9.1 MG/DL (ref 8.7–10.3)
CHLORIDE SERPL-SCNC: 102 MMOL/L (ref 96–106)
CO2 SERPL-SCNC: 24 MMOL/L (ref 18–29)
CREAT SERPL-MCNC: 1.03 MG/DL (ref 0.57–1)
EST. AVERAGE GLUCOSE BLD GHB EST-MCNC: 151 MG/DL
GLOBULIN SER CALC-MCNC: 2.6 G/DL (ref 1.5–4.5)
GLUCOSE SERPL-MCNC: 111 MG/DL (ref 65–99)
HBA1C MFR BLD: 6.9 % (ref 4.8–5.6)
POTASSIUM SERPL-SCNC: 4.7 MMOL/L (ref 3.5–5.2)
PROT SERPL-MCNC: 6.5 G/DL (ref 6–8.5)
SODIUM SERPL-SCNC: 142 MMOL/L (ref 134–144)

## 2017-06-07 RX ORDER — SOLIFENACIN SUCCINATE 5 MG/1
5 TABLET, FILM COATED ORAL DAILY
Qty: 30 TAB | Refills: 3 | Status: SHIPPED | OUTPATIENT
Start: 2017-06-07 | End: 2017-09-13

## 2017-06-07 RX ORDER — NYSTATIN 100000 [USP'U]/G
POWDER TOPICAL 2 TIMES DAILY
Qty: 60 G | Refills: 1 | Status: SHIPPED | OUTPATIENT
Start: 2017-06-07 | End: 2018-02-05

## 2017-06-07 NOTE — TELEPHONE ENCOUNTER
Pt returned a voicemail that was left in regards to her prescriptions. Pt best contact number is [phone].        Message received & copied from [facility]

## 2017-06-07 NOTE — TELEPHONE ENCOUNTER
Call to pt, left general message on VM to return call to office. Re: per Dr. Iesha Rocha, has the pt received the Prevnar 13 vaccine?

## 2017-06-07 NOTE — PROGRESS NOTES
HISTORY OF PRESENT ILLNESS  Hailee Cedeño is a 80 y.o. female. HPI  Last here 3/7/17. Pt is here to f/u on chronic conditions    Pt followed by dr. Bekah Varela (neurosurg)    Pt reports seeing him 1.5 months ago and she was having some difficulty with balance. He recommended getting a cane for walking and she has used this intermittently. Pt follows with Dr. Marie Colby (neuro), has f/u pending      Pt follows with Dr. Liberty Zheng (rheum) --but plans to change to dr Hany Stearns   Reviewed 4/17 note: Hx of sarcoid in remission. PT for back. She completed PT for balance, which has improved. She has received some back injections for pain  Pt c/o some back pain when she is walking around the house and with movement   She wanted to switch rheumatologists because she was not happy with her at her last visit. Pt saw Dr. Stan Egan (rheum). --sent to him by [facility] ent  Reviewed note from last visit: /60. SICCA syndrome and hx of sarcoidosis. He checked ACE enzyme. Positive CHEVY, but was not concerned about this. Followed by Dr. Shanda Ray (ENT)  Reviewed note 5/9/17: increased 40 mg Prilosec BID, he states throat clearing was from laryngeal reflux.      Blood sugars at home running around 108-112, up to 120s occasionally  Continues on metformin [id number] BID    BP today is 147/73  BP running around 143/65, 130s SBP  She has element of [facility]  Continues on 1/2 tablet HCTZ, diltiazem 120 mg, and benazepril 20mg    Pt continues on zoloft 150mg for depression, she is happy with dose overall but does report feeling depressed and down some days  She is depressed sometimes that her grandchildren live in [place] and she is not able to see them often     Reviewed last labs 6/17  Kidney nl, liver nl, tsh nl     Continues on pravachol for cholesterol - at goal   Pt wonders if she needs fish oil -- addressed this, she does not need this at this time.     Pt continues on 50mcg synthroid daily    Pt is using her cpap for kelby intermittently--rarely   Her sister states she snores frequently   Discussed importance of using the cpap machine  She states when she uses this she has much more energy     Pt saw Dr. 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 She has been moderately compliant with gluten free diet. She denies abdominal cramping.      Pt needs to schedule f/u with Dr. Julio Prince (cardio), she is working on getting an appt   It has been over a year since she last saw him  Pt sees Dr. Xi Dejesus (cardio)  Reviewed note from 4/2017: He was continuing Eliquis for a fib. She mentioned iron was making her stool very dark. She is wondering if she could take iron every other day to see if this improves stool colo will do this    Pt reports ongoing shoulder pain. She has been evaluated by Dr. Ramsay (ortho) and was told she had a rotator cuff tear. She is undergoing PT and received a cortisone injection. He did not want to pursue surgery at this time. Follow up appointment scheduled for 6/19/17 with Dr. Ramsay. Pt reports urinary frequency. She had been on ditropan, but stopped taking this because she no longer felt this was effective. She is interested in trying vesicare. Pt c/o of rash over RLQ and right inguinal region for the past few months. She has tried using cornstarch over the area with minimal improvement.      Pt states she had a hearing test and has some high frequency hearing loss  She is holding off on getting a hearing aide at this time  It is not affecting her significantly at this point      Wt is up 3 lbs since last visit  Pt states she does have a heavy appetite but she is eating a lot of sweets   Her sister states pt eats at odd times and snacks between meal times   Advised to avoid eating right before bedtime.     Recall that She has a h/o sarcoid, gets annual cxr for this, not seeing pulmonary Yet. Will monitor.      Reviewed labs    Reviewed colo report and egd:     1. Scope advanced to the cecum and terminal ileum. 2. Narrowed area in the left colon in sigmoid in area of moderate diverticulosis. 3. S/P Bx of R and L to r/o microscopic colitis. 4. No polyps seen.      PREVENTIVE:    Colonoscopy: 12/28/16 manetas, repeat 10 years  EGD: s12/16 manetas  Pap: 2013    Mammogram 06/05/17 -- normal  Dexa: 11/15 mild osteopenia    Tdap:4/15    Pneumovax: around 2012 per the patient    Prevnar 13: 8/16  Zostavax: 2/29/16  Flu shot 9/16  Ophthalmology:  Dr. 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    ACP (advance care planning) 11/11/2015    Hypothyroidism, adult 07/31/2015    Type 2 diabetes mellitus without complication ([facility] 94/02/7442    Essential hypertension 04/27/2015    MALLORY (obstructive sleep apnea) 07/09/2014    Hyperlipidemia 07/09/2014    Sarcoid ([facility] 07/09/2014    Stress bladder incontinence, female 07/09/2014    Obesity 07/09/2014    TIA (transient ischemic attack) 07/09/2014    AR (aortic regurgitation) 07/09/2014    Mitral valve insufficiency 07/09/2014    Cervical stenosis of spine 07/09/2014     Current Outpatient Prescriptions   Medication Sig Dispense Refill    cholecalciferol, vitamin D3, (VITAMIN D3) 2,000 unit tab Take  by mouth.  ferrous sulfate (IRON) 325 mg (65 mg iron) tablet Take  by mouth Daily (before breakfast).  acetaminophen (TYLENOL EXTRA STRENGTH) 500 mg tablet Take  by mouth every six (6) hours as needed for Pain.       benazepril (LOTENSIN) 20 mg tablet TAKE ONE TABLET BY MOUTH ONCE DAILY 90 Tab 0    sertraline (ZOLOFT) 100 mg tablet TAKE ONE & ONE-HALF TABLETS BY MOUTH ONCE DAILY 135 Tab 0    levothyroxine (SYNTHROID) 50 mcg tablet TAKE ONE TABLET BY MOUTH ONCE DAILY BEFORE BREAKFAST 90 Tab 0    metFORMIN ER (GLUCOPHAGE XR) 750 mg tablet TAKE ONE TABLET BY MOUTH TWICE DAILY 180 Tab 0    pravastatin (PRAVACHOL) 20 mg tablet TAKE ONE TABLET BY MOUTH ONCE NIGHTLY 90 Tab 0    colestipol (COLESTID) 1 gram tablet       oxybutynin chloride XL (DITROPAN XL) 10 mg CR tablet TAKE ONE TABLET BY MOUTH NIGHTLY 90 Tab 0    HYDROcodone-acetaminophen (NORCO) 5-325 mg per tablet Take 1 Tab by mouth every four (4) hours as needed for Pain. Max Daily Amount: 6 Tabs. 20 Tab 0    polyethylene glycol (MIRALAX) 17 gram/dose powder Take 17 g by mouth daily. 1 tablespoon with 8 oz of water daily 119 g 0    multivit-min-FA-lycopen-lutein (CENTRUM SILVER) 0.4-300-250 mg-mcg-mcg tab Take 1 Tab by mouth daily.  biotin 2,500 mcg tab Take 1 Tab by mouth daily.  diltiazem XR (DILTIA XT) 120 mg XR capsule Take 1 Cap by mouth daily. 30 Cap 11    apixaban (ELIQUIS) 5 mg tablet Take 1 Tab by mouth two (2) times a day. 60 Tab 11    aspirin delayed-release 81 mg tablet Take 81 mg by mouth daily.  hydrochlorothiazide (HYDRODIURIL) 25 mg tablet Take 1 Tab by mouth daily. (Patient taking differently: Take 12.5 mg by mouth daily.) 30 Tab 3    omeprazole (PRILOSEC) 20 mg capsule Take 40 mg by mouth two (2) times a day.  magnesium 250 mg tab Take 250 mg by mouth daily.        Past Surgical History:   Procedure Laterality Date    COLONOSCOPY N/A 12/28/2016    COL[facility]OSCOPY performed by Gerard Campbell [facility] at [facility] ENDOSCOPY    HX CATARACT REMOVAL      both eyes    HX CHOLECYSTECTOMY      HX COLONOSCOPY  5/8/2013    Repeat in 5 years    HX CYST REMOVAL  10/15    on head     HX HYSTERECTOMY      HX ORTHOPAEDIC Bilateral     knee replacement to both knees    HX ORTHOPAEDIC      spacer btwn L4-5 and L5-6     HX TONSILLECTOMY        Lab Results  Component Value Date/Time   WBC 9.6 03/03/2017 09:49 AM   HGB 10.4 03/03/2017 09:49 AM   HCT 33.2 03/03/2017 09:49 AM   PLATELET 351 00/11/2035 09:49 AM   MCV 86 03/03/2017 09:49 AM       Lab Results  Component Value Date/Time   Cholesterol, total 138 09/08/2016 02:13 AM   HDL Cholesterol 71 09/08/2016 02:13 AM   LDL, calculated 45.2 09/08/2016 02:13 AM   Triglyceride 109 09/08/2016 02:13 AM   CHOL/HDL Ratio 1.9 09/08/2016 02:13 AM       Lab Results  Component Value Date/Time   GFR est AA 60 06/06/2017 08:24 AM   GFR est non-AA 52 06/06/2017 08:24 AM   Creatinine 1.03 06/06/2017 08:24 AM   BUN 27 06/06/2017 08:24 AM   Sodium 142 06/06/2017 08:24 AM   Potassium 4.7 06/06/2017 08:24 AM   Chloride 102 06/06/2017 08:24 AM   CO2 24 06/06/2017 08:24 AM         Review of Systems   Respiratory: Negative for shortness of breath. Cardiovascular: Negative for chest pain. Gastrointestinal: Negative for abdominal pain. Genitourinary: Positive for frequency. Musculoskeletal: Positive for joint pain (shoulder pain). Skin: Positive for rash. Physical Exam   Constitutional: She is oriented to person, place, and time. She appears well-developed and well-nourished. No distress. HENT:   Head: Normocephalic and atraumatic. Mouth/Throat: Oropharynx is clear and moist. No oropharyngeal exudate. Eyes: Conjunctivae and EOM are normal. Right eye exhibits no discharge. Left eye exhibits no discharge. Neck: Normal range of motion. Neck supple. Cardiovascular: Normal rate, regular rhythm and intact distal pulses. Exam reveals no gallop and no friction rub. Murmur (2/6) heard. Pulmonary/Chest: Effort normal and breath sounds normal. No respiratory distress. She has no wheezes. She has no rales. She exhibits no tenderness. Abdominal: Soft. She exhibits no distension and no mass. There is no tenderness.  There is no rebound and no guarding. Musculoskeletal: She exhibits no edema, tenderness or deformity. Lymphadenopathy:     She has no cervical adenopathy. Neurological: She is alert and oriented to person, place, and time. Coordination abnormal.   Skin: Skin is warm and dry. Rash noted. She is not diaphoretic. There is erythema (over RLQ of pannus). No pallor. Psychiatric: She has a normal mood and affect. Her behavior is normal.       ASSESSMENT and PLAN--high complex med decision making    ICD-10-CM ICD-9-CM    1. Type 2 diabetes mellitus without complication, without long-term current use of insulin (HCC)    Controlled on metformin  mg BID. Eye exam due this month, advised to schedule. Continue to work on weight loss. E11.9 250.00 MICROALBUMIN, UR, RAND W/ MICROALBUMIN/CREA RATIO      METABOLIC PANEL, COMPREHENSIVE      HEMOGLOBIN A1C WITH EAG      LIPID PANEL      TSH 3RD GENERATION      CBC W/O DIFF       DIABETES FOOT EXAM   2. Paroxysmal atrial fibrillation ([facility]    Follows with Dr. Pardeep Camilo for this. Continues on 5 mg eliquis for anticoagulation. Remains in NSR. I48.0 427.31 MICROALBUMIN, UR, RAND W/ MICROALBUMIN/CREA RATIO      METABOLIC PANEL, COMPREHENSIVE      HEMOGLOBIN A1C WITH EAG      LIPID PANEL      TSH 3RD GENERATION      CBC W/O DIFF   3. Hypothyroidism, adult    Controlled on synthroid 50 mcg daily. Continue. E03.9 244.9 MICROALBUMIN, UR, RAND W/ MICROALBUMIN/CREA RATIO      METABOLIC PANEL, COMPREHENSIVE      HEMOGLOBIN A1C WITH EAG      LIPID PANEL      TSH 3RD GENERATION      CBC W/O DIFF   4. Essential hypertension    Borderline control. Ok given advanced age of almost [de-identified]. Continue 1/2 tablet HCTZ, diltiazem 120 mg, and benazepril 20mg. I10 401.9 MICROALBUMIN, UR, RAND W/ MICROALBUMIN/CREA RATIO      METABOLIC PANEL, COMPREHENSIVE      HEMOGLOBIN A1C WITH EAG      LIPID PANEL      TSH 3RD GENERATION      CBC W/O DIFF   5. MALLORY (obstructive sleep apnea)    Rarely using CPAP.  Still needs to complete sleep study. She has the information and states she will do this. G47.33 327.23 MICROALBUMIN, UR, RAND W/ MICROALBUMIN/CREA RATIO      METABOLIC PANEL, COMPREHENSIVE      HEMOGLOBIN A1C WITH EAG      LIPID PANEL      TSH 3RD GENERATION      CBC W/O DIFF   6. Hyperlipidemia, unspecified hyperlipidemia type    Controlled on pravachol in 9/16. Repeat lipids at next blood draw.  E78.5 272.4 MICROALBUMIN, UR, RAND W/ MICROALBUMIN/CREA RATIO      METABOLIC PANEL, COMPREHENSIVE      HEMOGLOBIN A1C WITH EAG      LIPID PANEL      TSH 3RD GENERATION      CBC W/O DIFF   7. Mitral valve insufficiency, unspecified etiology    Follows with Dr. Ricki Garcia UTD. I34.0 424.0 MICROALBUMIN, UR, RAND W/ MICROALBUMIN/CREA RATIO      METABOLIC PANEL, COMPREHENSIVE      HEMOGLOBIN A1C WITH EAG      LIPID PANEL      TSH 3RD GENERATION      CBC W/O DIFF   8. Cervical stenosis of spine    Had surgery with Dr. Luis E Govea. Has completed PT. Overall balance and sxs improved. M48.02 723.0 MICROALBUMIN, UR, RAND W/ MICROALBUMIN/CREA RATIO      METABOLIC PANEL, COMPREHENSIVE      HEMOGLOBIN A1C WITH EAG      LIPID PANEL      TSH 3RD GENERATION      CBC W/O DIFF   9. Celiac sprue    Pt is supposed to follow gluten free diet but appears to not be following this diet at all. Discussed important of following gluten free diet. K90.0 579.0 MICROALBUMIN, UR, RAND W/ MICROALBUMIN/CREA RATIO      METABOLIC PANEL, COMPREHENSIVE      HEMOGLOBIN A1C WITH EAG      LIPID PANEL      TSH 3RD GENERATION      CBC W/O DIFF   10. Iron deficiency anemia due to chronic blood loss    Would like to decrease iron to every other day dosing. Repeat CBC at follow up. Celiac sprue contributes to this. D50.0 280.0 MICROALBUMIN, UR, RAND W/ MICROALBUMIN/CREA RATIO   Had recent colo/egd in 31/88   METABOLIC PANEL, COMPREHENSIVE      HEMOGLOBIN A1C WITH EAG      LIPID PANEL      TSH 3RD GENERATION      CBC W/O DIFF   11.  Gastroesophageal reflux disease without esophagitis    Controlled on prilosec. Takes 40 mg BID. This was increased by ENT Dr. Oh Pat due to laryngeal reflux. K21.9 530.81    12. Stress incontinence of urine    Pt sxs are not adequately contrlled on ditropan and she would like an alternate medications. Will given vesicare 5 mg daily. N39.3 [id number]    92. Candida infection    Will tx with nystatin powder. B37.9 112.9         Written by Harinder Li, as dictated by Jean-Pierre Dominguez MD.     Current diagnosis and concerns discussed with pt at length. Understands risks and benefits or current treatment plan and medications and accepts the treatment and medication with any possible risks.   Pt asks appropriate questions which were answered.   Pt instructed to call with any concerns or problems.

## 2017-06-07 NOTE — TELEPHONE ENCOUNTER
Called, spoke to pt. Two pt identifiers confirmed. Pt informed that pcp inquiring if pt had prevnar 13. Pt had prevnar in 2016. Pt verbalized understanding of information discussed w/ no further questions at this time.

## 2017-06-07 NOTE — MR AVS SNAPSHOT
Visit Information Date & Time Provider Department Dept. Phone Encounter #  
 6/7/2017  1:15 PM Hola Barroso, [address] [place] [address],[address] [phone] Follow-up Instructions Return in about 3 months (around 9/7/2017). Your Appointments 10/24/2017  9:00 AM  
6 MONTH with Ilsa Childress MD  
[facility] [address] [place] [address]) Appt Note: $0 cp  ekr [address]  
[phone] [address] Upcoming Health Maintenance Date Due  
 EYE EXAM RETINAL OR DILATED Q1 6/23/2017 INFLUENZA AGE 9 TO ADULT 8/1/2017 FOOT EXAM Q1 8/16/2017 Pneumococcal 65+ Low/Medium Risk (2 of 2 - PPSV23) 8/18/2017 HEMOGLOBIN A1C Q6M 9/3/2017 LIPID PANEL Q1 9/8/2017 MICROALBUMIN Q1 11/29/2017 MEDICARE YEARLY EXAM 12/8/2017 GLAUCOMA SCREENING Q2Y 6/23/2018 DTaP/Tdap/Td series (2 - Td) 4/27/2025 Allergies as of 6/7/2017  Review Complete On: 6/5/2017 By: [facility] Severity Noted Reaction Type Reactions Procardia Xl [Nifedipine]  07/09/2014    Other (comments)  
 weakness Current Immunizations  Reviewed on 12/7/2016 Name Date Influenza High Dose Vaccine PF 9/19/2015 Influenza Vaccine 10/10/2014 12:38 PM  
 Influenza Vaccine (Quad) PF 9/14/2016 Pneumococcal Conjugate (PCV-13) 8/18/2016 Tdap 4/27/2015 Zoster Vaccine, Live 2/29/2016 Not reviewed this visit You Were Diagnosed With   
  
 Codes Comments Type 2 diabetes mellitus without complication, without long-term current use of insulin (HCC)    -  Primary ICD-10-CM: E11.9 ICD-9-CM: 250.00 Paroxysmal atrial fibrillation (HCC)     ICD-10-CM: I48.0 ICD-9-CM: 427.31 Hypothyroidism, adult     ICD-10-CM: E03.9 ICD-9-CM: 244.9 Essential hypertension     ICD-10-CM: I10 
ICD-9-CM: 401.9  MALLORY (obstructive sleep apnea)     ICD-10-CM: R27.54 
 ICD-9-CM: 327.23 Hyperlipidemia, unspecified hyperlipidemia type     ICD-10-CM: E78.5 ICD-9-CM: 272.4 Mitral valve insufficiency, unspecified etiology     ICD-10-CM: I34.0 ICD-9-CM: 424.0 Cervical stenosis of spine     ICD-10-CM: M48.02 
ICD-9-CM: 723.0 Celiac sprue     ICD-10-CM: K90.0 ICD-9-CM: 579.0 Iron deficiency anemia due to chronic blood loss     ICD-10-CM: D50.0 ICD-9-CM: 280.0 Gastroesophageal reflux disease without esophagitis     ICD-10-CM: K21.9 ICD-9-CM: 530.81 Stress incontinence of urine     ICD-10-CM: N39.3 ICD-9-CM: [id number] Candida infection     ICD-10-CM: B37.9 ICD-9-CM: 112.9 Vitals Weight(growth percentile) BMI OB Status Smoking Status 172 lb (78 kg) 32.5 kg/m2 Hysterectomy Never Smoker BMI and BSA Data Body Mass Index Body Surface Area 32.5 kg/m 2 1.83 m 2 Preferred Pharmacy Pharmacy Name Phone [facility] [address] [place] [address] [phone] Your Updated Medication List  
  
   
This list is accurate as of: 6/7/17  1:26 PM.  Always use your most recent med list.  
  
  
  
  
 apixaban 5 mg tablet Commonly known as:  Leeroy Cruz Take 1 Tab by mouth two (2) times a day. aspirin delayed-release 81 mg tablet Take 81 mg by mouth daily. benazepril 20 mg tablet Commonly known as:  LOTENSIN  
TAKE ONE TABLET BY MOUTH ONCE DAILY  
  
 biotin 2,500 mcg Tab Take 1 Tab by mouth daily. CENTRUM SILVER 0.4-300-250 mg-mcg-mcg Tab Generic drug:  multivit-min-FA-lycopen-lutein Take 1 Tab by mouth daily. colestipol 1 gram tablet Commonly known as:  COLESTID  
  
 dilTIAZem  mg XR capsule Commonly known as:  Martita Fraction Take 1 Cap by mouth daily. hydroCHLOROthiazide 25 mg tablet Commonly known as:  HYDRODIURIL Take 1 Tab by mouth daily. Iron 325 mg (65 mg iron) tablet Generic drug:  ferrous sulfate Take  by mouth Daily (before breakfast). levothyroxine 50 mcg tablet Commonly known as:  SYNTHROID  
TAKE ONE TABLET BY MOUTH ONCE DAILY BEFORE BREAKFAST  
  
 magnesium 250 mg Tab Take 250 mg by mouth daily. metFORMIN  mg tablet Commonly known as:  GLUCOPHAGE XR  
TAKE ONE TABLET BY MOUTH TWICE DAILY  
  
 nystatin powder Commonly known as:  MYCOSTATIN Apply  to affected area two (2) times a day. omeprazole 20 mg capsule Commonly known as:  PRILOSEC Take 40 mg by mouth two (2) times a day. polyethylene glycol 17 gram/dose powder Commonly known as:  Ellan Grout Take 17 g by mouth daily. 1 tablespoon with 8 oz of water daily  
  
 pravastatin 20 mg tablet Commonly known as:  PRAVACHOL  
TAKE ONE TABLET BY MOUTH ONCE NIGHTLY  
  
 sertraline 100 mg tablet Commonly known as:  ZOLOFT  
TAKE ONE & ONE-HALF TABLETS BY MOUTH ONCE DAILY  
  
 solifenacin 5 mg tablet Commonly known as:  VESIcare Take 1 Tab by mouth daily. TYLENOL EXTRA STRENGTH 500 mg tablet Generic drug:  acetaminophen Take  by mouth every six (6) hours as needed for Pain. VITAMIN D3 2,000 unit Tab Generic drug:  cholecalciferol (vitamin D3) Take  by mouth. Prescriptions Sent to Pharmacy Refills  
 solifenacin (VESICARE) 5 mg tablet 3 Sig: Take 1 Tab by mouth daily. Class: Normal  
 Pharmacy: [address] Ph #: [phone] Route: Oral  
 nystatin (MYCOSTATIN) powder 1 Sig: Apply  to affected area two (2) times a day. Class: Normal  
 Pharmacy: [address] Ph #: [phone] Route: Topical  
  
We Performed the Following  DIABETES FOOT EXAM [[facility]] Follow-up Instructions Return in about 3 months (around 9/7/2017). To-Do List   
 06/08/2017 Lab:  CBC W/O DIFF   
  
 06/08/2017   Lab:  HEMOGLOBIN A1C WITH EAG   
  
 06/08/2017 Lab:  LIPID PANEL   
  
 06/08/2017 Lab:  METABOLIC PANEL, COMPREHENSIVE   
  
 06/08/2017 Lab:  MICROALBUMIN, UR, RAND W/ MICROALBUMIN/CREA RATIO   
  
 06/08/2017 Lab:  TSH 3RD GENERATION Patient Instructions Try vesicare for bladder Labs prior to follow up See eye doctor. Introducing [facility] SERVICES! Dear Faiza Mark: Thank you for requesting a [facility] account. Our records indicate that you already have an active [facility] account. You can access your account anytime at [email or web address] Did you know that you can access your hospital and ER discharge instructions at any time in [facility]? You can also review all of your test results from your hospital stay or ER visit. Additional Information If you have questions, please visit the Frequently Asked Questions section of the [employer] website at [email or web address] Remember, [facility] is NOT to be used for urgent needs. For medical emergencies, dial 911. Now available from your iPhone and Android! Please provide this summary of care documentation to your next provider. Your primary care clinician is listed as Griselda Hayes. If you have any questions after today's visit, please call [phone].

## 2017-06-13 ENCOUNTER — APPOINTMENT (OUTPATIENT)
Dept: GENERAL RADIOLOGY | Age: 82
End: 2017-06-13
Attending: PHYSICIAN ASSISTANT
Payer: MEDICARE

## 2017-06-13 ENCOUNTER — HOSPITAL ENCOUNTER (EMERGENCY)
Age: 82
Discharge: HOME | End: 2017-06-13
Attending: EMERGENCY MEDICINE
Payer: MEDICARE

## 2017-06-13 VITALS
RESPIRATION RATE: 18 BRPM | SYSTOLIC BLOOD PRESSURE: 104 MMHG | TEMPERATURE: 98.6 F | BODY MASS INDEX: 32.31 KG/M2 | OXYGEN SATURATION: 100 % | HEART RATE: 56 BPM | WEIGHT: 171 LBS | DIASTOLIC BLOOD PRESSURE: 58 MMHG

## 2017-06-13 DIAGNOSIS — S76.311A RIGHT HAMSTRING MUSCLE STRAIN, INITIAL ENCOUNTER: Primary | ICD-10-CM

## 2017-06-13 PROCEDURE — 97116 GAIT TRAINING THERAPY: CPT

## 2017-06-13 PROCEDURE — G8979 MOBILITY GOAL STATUS: HCPCS

## 2017-06-13 PROCEDURE — G8978 MOBILITY CURRENT STATUS: HCPCS

## 2017-06-13 PROCEDURE — 99284 EMERGENCY DEPT VISIT MOD MDM: CPT

## 2017-06-13 PROCEDURE — G8980 MOBILITY D/C STATUS: HCPCS

## 2017-06-13 PROCEDURE — 74011250637 HC RX REV CODE- 250/637: Performed by: PHYSICIAN ASSISTANT

## 2017-06-13 PROCEDURE — 73502 X-RAY EXAM HIP UNI 2-3 VIEWS: CPT

## 2017-06-13 PROCEDURE — 97161 PT EVAL LOW COMPLEX 20 MIN: CPT

## 2017-06-13 RX ORDER — OXYCODONE AND ACETAMINOPHEN 5; 325 MG/1; MG/1
1 TABLET ORAL
Qty: 20 TAB | Refills: 0 | Status: SHIPPED | OUTPATIENT
Start: 2017-06-13 | End: 2017-09-13

## 2017-06-13 RX ORDER — OXYCODONE AND ACETAMINOPHEN 5; 325 MG/1; MG/1
1 TABLET ORAL
Status: COMPLETED | OUTPATIENT
Start: 2017-06-13 | End: 2017-06-13

## 2017-06-13 RX ORDER — ONDANSETRON 4 MG/1
4 TABLET, ORALLY DISINTEGRATING ORAL
Qty: 20 TAB | Refills: 0 | Status: SHIPPED | OUTPATIENT
Start: 2017-06-13 | End: 2017-12-06

## 2017-06-13 RX ORDER — ONDANSETRON 4 MG/1
4 TABLET, ORALLY DISINTEGRATING ORAL
Status: COMPLETED | OUTPATIENT
Start: 2017-06-13 | End: 2017-06-13

## 2017-06-13 RX ADMIN — ONDANSETRON 4 MG: 4 TABLET, ORALLY DISINTEGRATING ORAL at 12:10

## 2017-06-13 RX ADMIN — OXYCODONE HYDROCHLORIDE AND ACETAMINOPHEN 1 TABLET: 5; 325 TABLET ORAL at 12:10

## 2017-06-13 NOTE — ED TRIAGE NOTES
Pt brought back to triage with reports of feeling short of breath, pt reports she is feeling \"anxious\", pt able to slow breathing down and vital signs reassessed, pt reports feeling better at this time

## 2017-06-13 NOTE — ED NOTES
Pt placed in bed in position of comfort with side rails up x 2, call bell within reach. Pt instructed to call for nursing assistance with any ambulation or other needs.

## 2017-06-13 NOTE — PROGRESS NOTES
physical Therapy Emergency Department EVALUATION/DISCHARGE with CMS G codes  Patient: Hailee Cedeño (20 y.o. female)  Date: 6/13/2017  Primary Diagnosis: There are no admission diagnoses documented for this encounter. Precautions:      ASSESSMENT : Asked by MELI Buchanan to see pt for eval  Based on the objective data described below, the patient presents with R buttocks pain w/o radiation limiting functional mobility and gait. Pt states she was in process of sitting down over weekend and felt at \"pop\" and had pain in her buttocks and when she sits on that area. Pt lives with  and sister in one story home with 3 steps to enter with rail. She is normally fully independent and uses a SPC in the home and at [facility]. She is currently in OPPT for her R shoulder. She states she has had therapy at [facility] in the past post cervical surgery for balance issues. Pt has had one small dose of percocet and states pain is improved. She exhibits good strength and ROM. She c/o pain with resisted R knee flexion and with ROM knee extension with hip flexed at 90 degrees. She describes same pain when trying to reach to floor without stepping back with R leg. She is tender to palpation at the R ischial tuberosity. She has no radiation of pain and no back pain. Pain is very localized to the ischial tuberosity area and is painful with sitting on hard surface. Pt is able to complete rolling independently. She requires min A to sit up to EOB due to pain c/o. She stands with S.  She amb with RW with S with very minimal antalgia to gait. Amb with no device shows a greater degree of antalgia and greater unsteadiness due to it. Given local tenderness over ischial tuberosity, pain with hamstring stretch and with resisted knee flexion, suspect pain due to local musculotendinous sprain/strain injury at proximal hamstring origin.   Recommend use of RW which pt has at home until pain subsides and pt regains steady gait given previous balance issues. Discussed stair climbing sequence with which pt is familiar from previous knee surgeries. Reviewed walker height adjust and home safety issues. Provided home safety checklist.  Reviewed caution with any side effects of pain meds effecting safety in mobility. Rounded with PA who concurs with above and has made decision to d/c pt from ED to home with family. Pt and  voiced no further questions. Further acute physical therapy is not indicated at this time. PLAN :  Discharge Recommendations:     [x]   Home with family  []   Skilled nursing facility  []   Admission to hospital with rehab likely needed  []   Inpatient rehab referral  []   Outpatient physical therapy referral  []   Other:    Further Equipment Recommendations for Discharge:   [x]   Rolling walker with 5\" wheels (pt owns)  []   Crutches   []   [address] [facility]   []   Wheelchair   []   Other:     COMMUNICATION/EDUCATION:   Communication/Collaboration:  [x]   Fall prevention education was provided and the patient/caregiver indicated understanding. [x]   Patient/family have participated as able and agree with findings and recommendations. []   Patient is unable to participate in plan of care at this time. Findings and recommendations were discussed with: MD physician and physician assistant       SUBJECTIVE:   Patient stated It feels much better after the meds.     OBJECTIVE DATA SUMMARY:     Past Medical History:   Diagnosis Date    Anemia     Ataxia     Webb's esophagus     Cervical spinal stenosis     Coronary atherosclerosis of unspecified type of vessel, native or graft     Diabetes (HCC)     Fatigue     Fatigue     GERD (gastroesophageal reflux disease)     Hypercholesterolemia     Hyperlipidemia     Hypertension     Ill-defined condition     Webb's Esophagus    Liver disease     Osteoarthritis     Sarcoidosis ([facility] [address]     Sleep apnea     uses cpap    Stroke [facility])     TIA'S     Past Surgical History:   Procedure Laterality Date    COLONOSCOPY N/A 12/28/2016    COLONOSCOPY performed by Praveen Salguero MD at hospitals ENDOSCOPY    HX CATARACT REMOVAL      both eyes    HX CHOLECYSTECTOMY      HX COLONOSCOPY  5/8/2013    Repeat in 5 years    HX CYST REMOVAL  10/15    on head     HX HYSTERECTOMY      HX ORTHOPAEDIC Bilateral     knee replacement to both knees    HX ORTHOPAEDIC      spacer btwn L4-5 and L5-6     HX TONSILLECTOMY       Prior Level of Function/Home Situation: Pt lives with  and sister in one story home with 3 steps to enter with rail. She is normally fully independent and uses a SPC in the home and at [facility]. She is currently in OPPT for her R shoulder. She states she has had therapy at [facility] in the past post cervical surgery for balance issues. Home Situation  Home Environment: Private residence  # Steps to Enter: 4  Rails to Enter: Yes  Hand Rails : Right  One/Two Story Residence: One story  Living Alone: No  Support Systems: Family member(s), Spouse/Significant Other/Partner  Patient Expects to be Discharged to[de-identified] Private residence  Current DME Used/Available at Home: Lonell Brilliant, straight, Walker, rolling  Critical Behavior:  Neurologic State: Alert  Orientation Level: Oriented X4  Cognition: Follows commands     Strength:    Strength: Within functional limits                    Tone & Sensation:   Tone: Normal              Sensation: Intact               Range Of Motion:  AROM: Within functional limits           PROM: Within functional limits           Coordination:  Coordination: Within functional limits    Functional Mobility:  Bed Mobility:  Rolling: Independent  Supine to Sit: Minimum assistance        Transfers:  Sit to Stand: Supervision  Stand to Sit: Supervision                       Balance:   Sitting: Intact  Standing: Impaired  Standing - Static: Good; Other (comment) (with RW)  Standing - Dynamic :  (good with RW)  Ambulation/Gait Training:  Distance (ft): 75 Feet (ft)  Assistive Device: Walker, rolling  Ambulation - Level of Assistance: Supervision        Gait Abnormalities: Antalgic (minimally antalgic on R)              Speed/Kayla: Slow             Functional Measure:  Barthel Index:    Bathin  Bladder: 10  Bowels: 10  Groomin  Dressin  Feeding: 10  Mobility: 10  Stairs: 5  Toilet Use: 10  Transfer (Bed to Chair and Back): 10  Total: 75       Barthel and G-code impairment scale:  Percentage of impairment CH  0% CI  1-19% CJ  20-39% CK  40-59% CL  60-79% CM  80-99% CN  100%   Barthel Score 0-100 100 99-80 79-60 59-40 20-39 1-19   0   Barthel Score 0-20 20 17-19 13-16 9-12 5-8 1-4 0      The Barthel ADL Index: Guidelines  1. The index should be used as a record of what a patient does, not as a record of what a patient could do. 2. The main aim is to establish degree of independence from any help, physical or verbal, however minor and for whatever reason. 3. The need for supervision renders the patient not independent. 4. A patient's performance should be established using the best available evidence. Asking the patient, friends/relatives and nurses are the usual sources, but direct observation and common sense are also important. However direct testing is not needed. 5. Usually the patient's performance over the preceding 24-48 hours is important, but occasionally longer periods will be relevant. 6. Middle categories imply that the patient supplies over 50 per cent of the effort. 7. Use of aids to be independent is allowed. Cuco Lovett., Barthel, D.W. (1260). Functional evaluation: the Barthel Index. [address] [facility] (14)2. SAKINA Laureano, Tre Gonzalez., Ahmet Sal., [facility], [address] [place] [address] (). Measuring the change indisability after inpatient rehabilitation; comparison of the responsiveness of the Barthel Index and Functional [place] Measure. Journal of Neurology, Neurosurgery, and Psychiatry, 66(4), 298-060.   HARVEY Mirza, Christina op ROSSY Roman, Nancy Cordero M.A. (2004.) Assessment of post-stroke quality of life in cost-effectiveness studies: The usefulness of the Barthel Index and the EuroQoL-5D. Quality of Life Research, 13, 427-28       In compliance with CMSs Claims Based Outcome Reporting, the following G-code set was chosen for this patient based on their primary functional limitation being treated: The outcome measure chosen to determine the severity of the functional limitation was the barthel with a score of 75/100 which was correlated with the impairment scale. ? Mobility - Walking and Moving Around:     - CURRENT STATUS: CJ - 20%-39% impaired, limited or restricted    - GOAL STATUS: CJ - 20%-39% impaired, limited or restricted    - D/C STATUS:  CJ - 20%-39% impaired, limited or restricted   Pain:  Pain Scale 1: Numeric (0 - 10)  Pain Intensity 1: 8  Pain Location 1: Buttocks  Pain Orientation 1: Right        Activity Tolerance:   Improved post meds and with use of walker  Please refer to the flowsheet for vital signs taken during this treatment.   After treatment:   [x]         Patient left in no apparent distress sitting at EOB with  present   []         Patient left in no apparent distress in bed  [x]         Call bell left within reach  [x]         Nursing notified  []         Caregiver present  []         Bed alarm activated        Thank you for this referral.  Pierce Browne, PT   Time Calculation: 20 mins

## 2017-06-13 NOTE — ED NOTES
Discharge instructions given to patient by Brigida GARRISON. Patient verbalized understanding of discharge instructions. Pt discharged without difficulty. Pt. Discharged in stable condition via wheelchair with RN , accompanied by .

## 2017-06-13 NOTE — DISCHARGE INSTRUCTIONS
Hamstring Syndrome: Care Instructions  Your Care Instructions  The hamstring muscles are the three muscles in the back of the thigh. The sciatic nerve is a large nerve that runs from the low back down the legs. Hamstring syndrome is a condition caused by pressure on this nerve. The nerve may be pinched between the hamstring muscles and the pelvic bone or by the band of tissue that connects the hamstring muscles. This condition can cause pain in the hip and buttock and sometimes numbness down the back of the leg. It may hurt to sit down or stretch the hamstrings. You may have less pain when you lie on your back. Hamstring syndrome may be the result of wear and tear to the back and hamstrings. It is most often seen in people who play sports that involve running, kicking, or jumping. Other problems can cause leg pain and numbness. To diagnose hamstring syndrome, the doctor will ask about your symptoms and your activities and examine your leg. Hamstring syndrome usually gets better in a few weeks with rest and home care. The doctor may recommend exercises to stretch and strengthen your hip muscles. If home care doesn't help, your doctor may suggest a steroid shot to help reduce pain and swelling. Follow-up care is a key part of your treatment and safety. Be sure to make and go to all appointments, and call your doctor if you are having problems. It's also a good idea to know your test results and keep a list of the medicines you take. How can you care for yourself at home? · Ask your doctor if you can take an over-the-counter pain medicine, such as acetaminophen (Tylenol), ibuprofen (Advil, Motrin), or naproxen (Aleve). Be safe with medicines. Read and follow all instructions on the label. · Put ice or a cold pack on the painful area for 10 to 20 minutes at a time. Try to do this every 1 to 2 hours for the next 3 days (when you are awake) or until the swelling goes down.  Put a thin cloth between the ice and your skin. · After 2 or 3 days, if your swelling is gone, apply heat. Put a warm water bottle, a heating pad set on low, or a warm cloth over the painful area. Do not go to sleep with a heating pad on your skin. · Avoid sitting if possible, unless it feels better than standing. · Alternate lying down with short walks. Increase your walking distance as you are able to walk without making your symptoms worse. · Don't do anything that makes your symptoms worse. Return to your usual level of activity slowly. When should you call for help? Watch closely for changes in your health, and be sure to contact your doctor if:  · You have new or worse pain. · You have new symptoms. · You do not get better as expected. Where can you learn more? Go to http://mason-saw.info/. Enter G214 in the search box to learn more about \"Hamstring Syndrome: Care Instructions. \"  Current as of: May 23, 2016  Content Version: 11.2  © 3181-9903 [employer]. Care instructions adapted under license by [employer] (which disclaims liability or warranty for this information). If you have questions about a medical condition or this instruction, always ask your healthcare professional. [place] [address] any warranty or liability for your use of this information.

## 2017-06-13 NOTE — ED PROVIDER NOTES
HPI Comments: Noa Edward is a [de-identified] y.o. female with hx of HTN, HLD, DM, liver disease who presents ambulatory to the ED c/o R buttock and upper leg pain x 2 days. She also notes breathing heavily during triage, which she states is possibly associated with anxiety and has since resolved. Pt reports she was sitting on a chair when symptoms started. She expresses concern for muscle strain. Pt notes difficulty ambulating due to severity of pain. Pt reports taking tylenol with no significant relief. She reports using a cane at home. She specifically denies back pain, fever, chills, cough, CP, SOB, sore throat, rhinorrhea, and N/V/D. Social hx: - Tobacco use, - EtOH use, - Illicit drug use    PCP: Leanna Mueller MD    There are no other complaints, changes or physical findings at this time. The history is provided by the patient. No  was used.         Past Medical History:   Diagnosis Date    Anemia     Ataxia     Webb's esophagus     Cervical spinal stenosis     Coronary atherosclerosis of unspecified type of vessel, native or graft     Diabetes (HCC)     Fatigue     Fatigue     GERD (gastroesophageal reflux disease)     Hypercholesterolemia     Hyperlipidemia     Hypertension     Ill-defined condition     Webb's Esophagus    Liver disease     Osteoarthritis     Sarcoidosis ([facility] [address]     Sleep apnea     uses cpap    Stroke [facility])     TIA'S       Past Surgical History:   Procedure Laterality Date    COLONOSCOPY N/A 12/28/2016    COLON[facility] by Vin [facility]a [facility] at [facility] ENDOSCOPY    HX CATARACT REMOVAL      both eyes    HX CHOLECYSTECTOMY      HX COLONOSCOPY  5/8/2013    Repeat in 5 years    HX CYST REMOVAL  10/15    on head     HX HYSTERECTOMY      HX ORTHOPAEDIC Bilateral     knee replacement to both knees    HX ORTHOPAEDIC      spacer btwn L4-5 and L5-6     HX TONSILLECTOMY           Family History:   Problem Relation Age of Onset    Other Mother      Fibromyalgia    Pacemaker Mother     Heart Disease Mother     Heart Failure Mother     COPD Mother     Heart Attack Father 61    Cancer Sister      Multiple Myeloma    Diabetes Brother     Obesity Brother     Pacemaker Brother     Heart Attack Brother      [place]    No Known Problems Son     Psychiatric Disorder Daughter      Multiple       Social History     Social History    Marital status:      Spouse name: N/A    Number of children: N/A    Years of education: N/A     Occupational History    Not on file. Social History Main Topics    Smoking status: Never Smoker    Smokeless tobacco: Never Used    Alcohol use No    Drug use: No    Sexual activity: Yes     Partners: Male     Other Topics Concern    Not on file     Social History Narrative         ALLERGIES: Procardia xl [nifedipine]    Review of Systems   Constitutional: Negative for fatigue and fever. HENT: Negative for ear pain and sore throat. Eyes: Negative for pain, redness and visual disturbance. Respiratory: Negative for cough and shortness of breath. Cardiovascular: Negative for chest pain and palpitations. Gastrointestinal: Negative for abdominal pain, nausea and vomiting. Genitourinary: Negative for dysuria, frequency and urgency. Musculoskeletal: Negative for back pain, gait problem, neck pain and neck stiffness. Positive for R buttock and R upper leg pain   Skin: Negative for rash and wound. Neurological: Negative for dizziness, weakness, light-headedness, numbness and headaches. Vitals:    06/13/17 1026 06/13/17 1036   BP: 144/56 104/58   Pulse: 66 (!) 56   Resp: 16 18   Temp: 98.6 °F (37 °C)    SpO2: 96% 100%   Weight: 77.6 kg (171 lb)             Physical Exam   Constitutional: She is oriented to person, place, and time. She appears well-developed and well-nourished. Non-toxic appearance. No distress. HENT:   Head: Normocephalic and atraumatic.    Right Ear: External ear normal.   Left Ear: External ear normal.   Nose: Nose normal.   Mouth/Throat: Uvula is midline. No trismus in the jaw. Eyes: Conjunctivae and EOM are normal. Pupils are equal, round, and reactive to light. No scleral icterus. Neck: Normal range of motion and full passive range of motion without pain. Cardiovascular: Normal rate and regular rhythm. Pulmonary/Chest: Effort normal. No accessory muscle usage. No tachypnea. No respiratory distress. She has no decreased breath sounds. She has no wheezes. Abdominal: Soft. There is no tenderness. Musculoskeletal: Normal range of motion. Able to flex R hip > 90 with pain. No ecchymosis, erythema, or edema. Tenderness of ischial tuberosity radiating along hamstring. Neurological: She is alert and oriented to person, place, and time. She is not disoriented. No cranial nerve deficit. GCS eye subscore is 4. GCS verbal subscore is 5. GCS motor subscore is 6. Skin: Skin is intact. No rash noted. Psychiatric: She has a normal mood and affect. Her speech is normal.   Nursing note and vitals reviewed. MDM  Number of Diagnoses or Management Options  Diagnosis management comments: DDx: sprain, strain, fracture       Amount and/or Complexity of Data Reviewed  Tests in the radiology section of CPT®: ordered and reviewed  Review and summarize past medical records: yes    Patient Progress  Patient progress: stable    ED Course       Procedures    12:52 PM  Discussed results with pt. She states he has somewhat improved. Will ask PT to help ambulate. CONSULT NOTE:   1:25PM  ALON Fontaine spoke with Cristela Oneal, PT,  Specialty: physical therapy  Discussed pt's hx, disposition, and available diagnostic and imaging results. Reviewed care plans. Consultant agrees with plans as outlined. Consultant will evaluate pt and assist with exercises. Written by TRICIA Lara, as dictated by Elizabet Hart.     1:38 PM  Pt is ambulating with assistance from physical therapist.    LABORATORY TESTS:  No results found for this or any previous visit (from the past 12 hour(s)). IMAGING RESULTS:    XR HIP RT W OR WO PELV 2-3 VWS   Final Result   EXAM: XR HIP RT W OR WO PELV 2-3 VWS     INDICATION: pain. No trauma, strain.     COMPARISON: None.     FINDINGS: An AP view of the pelvis and a frogleg lateral view of the right hip  demonstrate no fracture, dislocation or other acute abnormality. There is no  focal bone destruction or significant arthritic change.     IMPRESSION  IMPRESSION: No significant abnormalities       MEDICATIONS GIVEN:  Medications   oxyCODONE-acetaminophen (PERCOCET) 5-325 mg per tablet 1 Tab (1 Tab Oral Given 6/13/17 1210)   ondansetron (ZOFRAN ODT) tablet 4 mg (4 mg Oral Given 6/13/17 1210)       IMPRESSION:  1. Right hamstring muscle strain, initial encounter        PLAN: Discharge home  1. Current Discharge Medication List      START taking these medications    Details   ondansetron (ZOFRAN ODT) 4 mg disintegrating tablet Take 1 Tab by mouth every eight (8) hours as needed for Nausea. Qty: 20 Tab, Refills: 0      oxyCODONE-acetaminophen (PERCOCET) 5-325 mg per tablet Take 1 Tab by mouth every four (4) hours as needed for Pain. Max Daily Amount: 6 Tabs. Qty: 20 Tab, Refills: 0           2. Follow-up Information     Follow up With Details Comments Contact Info    Karen Horn MD Schedule an appointment as soon as possible for a visit  [address] [place] [address]  [phone]          3. Return to ED if worse     DISCHARGE NOTE  2:10 PM  The patient has been re-evaluated and is ready for discharge. Reviewed available results with patient. Counseled pt on diagnosis and care plan. Pt has expressed understanding, and all questions have been answered. Pt agrees with plan and agrees to F/U as recommended, or return to the ED if their sxs worsen.  Discharge instructions have been provided and explained to the pt, along with reasons to return to the ED. This note is prepared by Sivlia Sanchez, acting as Scribe for Lara Fang. ALON Timmons Second: The scribe's documentation has been prepared under my direction and personally reviewed by me in its entirety. I confirm that the note above accurately reflects all work, treatment, procedures, and medical decision making performed by me.

## 2017-06-15 RX ORDER — PRAVASTATIN SODIUM 20 MG/1
TABLET ORAL
Qty: 90 TAB | Refills: 0 | Status: SHIPPED | OUTPATIENT
Start: 2017-06-15 | End: 2017-09-21 | Stop reason: SDUPTHER

## 2017-06-22 RX ORDER — HYDROCHLOROTHIAZIDE 25 MG/1
TABLET ORAL
Qty: 30 TAB | Refills: 0 | Status: SHIPPED | OUTPATIENT
Start: 2017-06-22 | End: 2017-08-16 | Stop reason: SDUPTHER

## 2017-06-22 RX ORDER — METFORMIN HYDROCHLORIDE 750 MG/1
TABLET, EXTENDED RELEASE ORAL
Qty: 180 TAB | Refills: 0 | Status: SHIPPED | OUTPATIENT
Start: 2017-06-22 | End: 2017-09-21 | Stop reason: SDUPTHER

## 2017-07-03 ENCOUNTER — HOSPITAL ENCOUNTER (OUTPATIENT)
Dept: MRI IMAGING | Age: 82
Discharge: HOME | End: 2017-07-03
Attending: ORTHOPAEDIC SURGERY
Payer: MEDICARE

## 2017-07-03 DIAGNOSIS — M75.41 IMPINGEMENT SYNDROME OF RIGHT SHOULDER: ICD-10-CM

## 2017-07-03 PROCEDURE — 73221 MRI JOINT UPR EXTREM W/O DYE: CPT

## 2017-07-20 RX ORDER — BENAZEPRIL HYDROCHLORIDE 20 MG/1
TABLET ORAL
Qty: 90 TAB | Refills: 0 | Status: SHIPPED | OUTPATIENT
Start: 2017-07-20 | End: 2017-10-23 | Stop reason: SDUPTHER

## 2017-07-20 RX ORDER — LEVOTHYROXINE SODIUM 50 UG/1
TABLET ORAL
Qty: 90 TAB | Refills: 0 | Status: SHIPPED | OUTPATIENT
Start: 2017-07-20 | End: 2017-10-23 | Stop reason: SDUPTHER

## 2017-07-20 RX ORDER — SERTRALINE HYDROCHLORIDE 100 MG/1
TABLET, FILM COATED ORAL
Qty: 135 TAB | Refills: 0 | Status: SHIPPED | OUTPATIENT
Start: 2017-07-20 | End: 2017-10-23 | Stop reason: SDUPTHER

## 2017-08-08 ENCOUNTER — TELEPHONE (OUTPATIENT)
Dept: CARDIOLOGY CLINIC | Age: 82
End: 2017-08-08

## 2017-08-08 NOTE — TELEPHONE ENCOUNTER
Returned pt's call,verified pt with two pt identifiers, pt stated that she is in donut hole with her Eliquis. She filled out assistance papers and faxed to our office. Advised her to refax to our back office. Gave her the number. She advised that she would fax once her sister gets home. I advised her we would fill out the paperwork and fax back to her at home @ [phone] once done. She verbalized that she understood everything. Received pt's assistance form for Eliquis. Filled out form and will have  sign once he is in office. Called pt,verified pt with two pt identifiers, told pt that her Eliquis paperwork is ready. She advised that she would call me once her sister is home and then I can fax to her at home. Faxed paperwork to Eliquis assistance program and received fax confirmation. Called pt,verified pt with two pt identifiers, asked pt if she is ready for me to fax paperwork to her. She advised that she faxed her portion to [employer] and said she has received a phone call from them stating that they are working on this. She asked if there are any samples she may get. I advised I would get her samples together and she can p/u today at 3:30-4:30 pm. She verbalized that she understood everything. RECEIVED LETTER FROM [employer] STATING THAT PT  HAS BEEN APPROVED FOR HER ELIQUIS THRU THE [facility]. SHE CAN RECEIVE FREE  ELIQUIS FROM 8/11/17-12/31/17. Pt called in confused because on her my chart says a Rx has been sent to her pharmacy. Advised that it is just the samples I pulled for her earlier. Advised her that she has been approved for the assistance program. Advised her to come and p/u the samples any way. She verbalized that she understood everything. Pt received her samples today on 8/11/17.

## 2017-08-16 RX ORDER — HYDROCHLOROTHIAZIDE 25 MG/1
TABLET ORAL
Qty: 30 TAB | Refills: 0 | Status: SHIPPED | OUTPATIENT
Start: 2017-08-16 | End: 2017-12-06

## 2017-09-07 ENCOUNTER — TELEPHONE (OUTPATIENT)
Dept: CARDIOLOGY CLINIC | Age: 82
End: 2017-09-07

## 2017-09-08 ENCOUNTER — LAB ONLY (OUTPATIENT)
Dept: INTERNAL MEDICINE CLINIC | Age: 82
End: 2017-09-08

## 2017-09-08 ENCOUNTER — HOSPITAL ENCOUNTER (OUTPATIENT)
Dept: LAB | Age: 82
Discharge: HOME | End: 2017-09-08
Payer: MEDICARE

## 2017-09-08 DIAGNOSIS — E03.9 HYPOTHYROIDISM, ADULT: ICD-10-CM

## 2017-09-08 DIAGNOSIS — M48.02 CERVICAL STENOSIS OF SPINE: ICD-10-CM

## 2017-09-08 DIAGNOSIS — I34.0 MITRAL VALVE INSUFFICIENCY, UNSPECIFIED ETIOLOGY: ICD-10-CM

## 2017-09-08 DIAGNOSIS — E78.5 HYPERLIPIDEMIA, UNSPECIFIED HYPERLIPIDEMIA TYPE: ICD-10-CM

## 2017-09-08 DIAGNOSIS — D50.0 IRON DEFICIENCY ANEMIA DUE TO CHRONIC BLOOD LOSS: ICD-10-CM

## 2017-09-08 DIAGNOSIS — I48.0 PAROXYSMAL ATRIAL FIBRILLATION (HCC): ICD-10-CM

## 2017-09-08 DIAGNOSIS — K90.0 CELIAC SPRUE: ICD-10-CM

## 2017-09-08 DIAGNOSIS — G47.33 OSA (OBSTRUCTIVE SLEEP APNEA): ICD-10-CM

## 2017-09-08 DIAGNOSIS — E11.9 TYPE 2 DIABETES MELLITUS WITHOUT COMPLICATION, WITHOUT LONG-TERM CURRENT USE OF INSULIN (HCC): ICD-10-CM

## 2017-09-08 DIAGNOSIS — I10 ESSENTIAL HYPERTENSION: ICD-10-CM

## 2017-09-08 PROCEDURE — 80061 LIPID PANEL: CPT

## 2017-09-08 PROCEDURE — 80053 COMPREHEN METABOLIC PANEL: CPT

## 2017-09-08 PROCEDURE — 85027 COMPLETE CBC AUTOMATED: CPT

## 2017-09-08 PROCEDURE — 36415 COLL VENOUS BLD VENIPUNCTURE: CPT

## 2017-09-08 PROCEDURE — 83036 HEMOGLOBIN GLYCOSYLATED A1C: CPT

## 2017-09-08 PROCEDURE — 84443 ASSAY THYROID STIM HORMONE: CPT

## 2017-09-08 PROCEDURE — 82043 UR ALBUMIN QUANTITATIVE: CPT

## 2017-09-08 NOTE — TELEPHONE ENCOUNTER
Verified patient with two identifiers. Pt called back asking if she can be off of Eliquis for Botox procedure on Monday 9/11. Dr. Sadia Mojica told pt to stop medication today. Pt did not stop until she had the ok from Dr. Sai Palomo. Pt is to see Dr. Sadia Mojica on Monday.

## 2017-09-09 LAB
ALBUMIN SERPL-MCNC: 3.9 G/DL (ref 3.5–4.7)
ALBUMIN/CREAT UR: 9 MG/G CREAT (ref 0–30)
ALBUMIN/GLOB SERPL: 1.4 {RATIO} (ref 1.2–2.2)
ALP SERPL-CCNC: 84 IU/L (ref 39–117)
ALT SERPL-CCNC: 12 IU/L (ref 0–32)
AST SERPL-CCNC: 17 IU/L (ref 0–40)
BILIRUB SERPL-MCNC: 0.4 MG/DL (ref 0–1.2)
BUN SERPL-MCNC: 25 MG/DL (ref 8–27)
BUN/CREAT SERPL: 23 (ref 12–28)
CALCIUM SERPL-MCNC: 9.1 MG/DL (ref 8.7–10.3)
CHLORIDE SERPL-SCNC: 102 MMOL/L (ref 96–106)
CHOLEST SERPL-MCNC: 149 MG/DL (ref 100–199)
CO2 SERPL-SCNC: 23 MMOL/L (ref 18–29)
CREAT SERPL-MCNC: 1.08 MG/DL (ref 0.57–1)
CREAT UR-MCNC: 73.4 MG/DL
ERYTHROCYTE [DISTWIDTH] IN BLOOD BY AUTOMATED COUNT: 15.1 % (ref 12.3–15.4)
EST. AVERAGE GLUCOSE BLD GHB EST-MCNC: 134 MG/DL
GLOBULIN SER CALC-MCNC: 2.7 G/DL (ref 1.5–4.5)
GLUCOSE SERPL-MCNC: 110 MG/DL (ref 65–99)
HBA1C MFR BLD: 6.3 % (ref 4.8–5.6)
HCT VFR BLD AUTO: 33.3 % (ref 34–46.6)
HDLC SERPL-MCNC: 82 MG/DL
HGB BLD-MCNC: 11 G/DL (ref 11.1–15.9)
LDLC SERPL CALC-MCNC: 48 MG/DL (ref 0–99)
MCH RBC QN AUTO: 30.2 PG (ref 26.6–33)
MCHC RBC AUTO-ENTMCNC: 33 G/DL (ref 31.5–35.7)
MCV RBC AUTO: 92 FL (ref 79–97)
MICROALBUMIN UR-MCNC: 6.6 UG/ML
PLATELET # BLD AUTO: 294 X10E3/UL (ref 150–379)
POTASSIUM SERPL-SCNC: 4.9 MMOL/L (ref 3.5–5.2)
PROT SERPL-MCNC: 6.6 G/DL (ref 6–8.5)
RBC # BLD AUTO: 3.64 X10E6/UL (ref 3.77–5.28)
SODIUM SERPL-SCNC: 141 MMOL/L (ref 134–144)
TRIGL SERPL-MCNC: 96 MG/DL (ref 0–149)
TSH SERPL DL<=0.005 MIU/L-ACNC: 1.96 UIU/ML (ref 0.45–4.5)
VLDLC SERPL CALC-MCNC: 19 MG/DL (ref 5–40)
WBC # BLD AUTO: 9.1 X10E3/UL (ref 3.4–10.8)

## 2017-09-11 NOTE — TELEPHONE ENCOUNTER
Returned pt's call,verified pt with two pt identifiers,  Pt stated that she had her Botox procedure today and was off of her Eliquis since last Thursday. She will be starting her Eliquis tomorrow per the dr's orders. She had no side effects from stopping. Advised that I had tried to call pt before and left messages. She verbalized that she got them and that she understood everything.

## 2017-09-13 ENCOUNTER — OFFICE VISIT (OUTPATIENT)
Dept: INTERNAL MEDICINE CLINIC | Age: 82
End: 2017-09-13

## 2017-09-13 VITALS
OXYGEN SATURATION: 94 % | BODY MASS INDEX: 32.47 KG/M2 | TEMPERATURE: 97.3 F | HEIGHT: 61 IN | SYSTOLIC BLOOD PRESSURE: 135 MMHG | DIASTOLIC BLOOD PRESSURE: 66 MMHG | HEART RATE: 90 BPM | RESPIRATION RATE: 16 BRPM | WEIGHT: 172 LBS

## 2017-09-13 DIAGNOSIS — E66.01 MORBID OBESITY DUE TO EXCESS CALORIES (HCC): ICD-10-CM

## 2017-09-13 DIAGNOSIS — D64.9 ANEMIA, UNSPECIFIED TYPE: ICD-10-CM

## 2017-09-13 DIAGNOSIS — F32.9 REACTIVE DEPRESSION: ICD-10-CM

## 2017-09-13 DIAGNOSIS — M85.89 OSTEOPENIA OF MULTIPLE SITES: ICD-10-CM

## 2017-09-13 DIAGNOSIS — K90.0 CELIAC SPRUE: ICD-10-CM

## 2017-09-13 DIAGNOSIS — M15.9 PRIMARY OSTEOARTHRITIS INVOLVING MULTIPLE JOINTS: ICD-10-CM

## 2017-09-13 DIAGNOSIS — I10 ESSENTIAL HYPERTENSION: ICD-10-CM

## 2017-09-13 DIAGNOSIS — I48.0 PAROXYSMAL ATRIAL FIBRILLATION (HCC): ICD-10-CM

## 2017-09-13 DIAGNOSIS — I34.0 MITRAL VALVE INSUFFICIENCY, UNSPECIFIED ETIOLOGY: ICD-10-CM

## 2017-09-13 DIAGNOSIS — E03.9 HYPOTHYROIDISM, ADULT: ICD-10-CM

## 2017-09-13 DIAGNOSIS — N39.3 STRESS BLADDER INCONTINENCE, FEMALE: ICD-10-CM

## 2017-09-13 DIAGNOSIS — Z23 ENCOUNTER FOR IMMUNIZATION: ICD-10-CM

## 2017-09-13 DIAGNOSIS — E11.9 TYPE 2 DIABETES MELLITUS WITHOUT COMPLICATION, WITHOUT LONG-TERM CURRENT USE OF INSULIN (HCC): Primary | ICD-10-CM

## 2017-09-13 DIAGNOSIS — G47.33 OSA (OBSTRUCTIVE SLEEP APNEA): ICD-10-CM

## 2017-09-13 DIAGNOSIS — D86.9 SARCOID: ICD-10-CM

## 2017-09-13 NOTE — MR AVS SNAPSHOT
Visit Information Date & Time Provider Department Dept. Phone Encounter #  
 9/13/2017  1:15 PM Melchor Knapp, [address] [id number] Follow-up Instructions Return in about 3 months (around 12/13/2017). Your Appointments 10/24/2017  9:00 AM  
6 MONTH with Yvette Castaneda MD  
[facility] [address]) Appt Note: $0 cp  ekr [id number] [facility] [address]  
[phone] [id number] [facility] [address] Upcoming Health Maintenance Date Due INFLUENZA AGE 9 TO ADULT 8/1/2017 Pneumococcal 65+ Low/Medium Risk (2 of 2 - PPSV23) 8/18/2017 MEDICARE YEARLY EXAM 12/8/2017 HEMOGLOBIN A1C Q6M 3/8/2018 EYE EXAM RETINAL OR DILATED Q1 3/20/2018 FOOT EXAM Q1 6/7/2018 MICROALBUMIN Q1 9/8/2018 LIPID PANEL Q1 9/8/2018 GLAUCOMA SCREENING Q2Y 3/20/2019 DTaP/Tdap/Td series (2 - Td) 4/27/2025 Allergies as of 9/13/2017  Review Complete On: 9/13/2017 By: Melchor Knapp MD  
  
 Severity Noted Reaction Type Reactions Procardia Xl [Nifedipine]  07/09/2014    Other (comments)  
 weakness Current Immunizations  Reviewed on 6/7/2017 Name Date Influenza High Dose Vaccine PF 9/19/2015 Influenza Vaccine 10/10/2014 12:38 PM  
 Influenza Vaccine (Quad) PF 9/14/2016 Pneumococcal Conjugate (PCV-13) 8/18/2016 Tdap 4/27/2015 Zoster Vaccine, Live 2/29/2016 Not reviewed this visit You Were Diagnosed With   
  
 Codes Comments Type 2 diabetes mellitus without complication, without long-term current use of insulin (HCC)    -  Primary ICD-10-CM: E11.9 ICD-9-CM: 250.00 Paroxysmal atrial fibrillation (HCC)     ICD-10-CM: I48.0 ICD-9-CM: 427.31 Celiac sprue     ICD-10-CM: K90.0 ICD-9-CM: 579.0 Essential hypertension     ICD-10-CM: I10 
ICD-9-CM: 401.9  MALLORY (obstructive sleep apnea)     ICD-10-CM: Q17.19 
 ICD-9-CM: 327.23 Morbid obesity due to excess calories (HCC)     ICD-10-CM: E66.01 
ICD-9-CM: 278.01 Stress bladder incontinence, female     ICD-10-CM: N39.3 ICD-9-CM: 625.6 Mitral valve insufficiency, unspecified etiology     ICD-10-CM: I34.0 ICD-9-CM: 424.0 Hypothyroidism, adult     ICD-10-CM: E03.9 ICD-9-CM: 244.9 Anemia, unspecified type     ICD-10-CM: D64.9 ICD-9-CM: 285.9 Osteopenia of multiple sites     ICD-10-CM: M85.89 ICD-9-CM: 733.90 Vitals BP Pulse Temp Resp Height(growth percentile) Weight(growth percentile) 135/66 (BP 1 Location: Left arm, BP Patient Position: Sitting) 90 97.3 °F (36.3 °C) (Oral) 16 5' 1\" (1.549 m) 172 lb (78 kg) SpO2 BMI OB Status Smoking Status 94% 32.5 kg/m2 Hysterectomy Never Smoker BMI and BSA Data Body Mass Index Body Surface Area 32.5 kg/m 2 1.83 m 2 Preferred Pharmacy Pharmacy Name Phone [facility] [phone] Your Updated Medication List  
  
   
This list is accurate as of: 9/13/17  2:00 PM.  Always use your most recent med list.  
  
  
  
  
 apixaban 5 mg tablet Commonly known as:  Rayo Corrente Take 1 Tab by mouth two (2) times a day. aspirin delayed-release 81 mg tablet Take 81 mg by mouth daily. benazepril 20 mg tablet Commonly known as:  LOTENSIN  
TAKE ONE TABLET BY MOUTH ONCE DAILY  
  
 biotin 2,500 mcg Tab Take 1 Tab by mouth daily. CENTRUM SILVER 0.4-300-250 mg-mcg-mcg Tab Generic drug:  multivit-min-FA-lycopen-lutein Take 1 Tab by mouth daily. colestipol 1 gram tablet Commonly known as:  COLESTID  
  
 dilTIAZem  mg XR capsule Commonly known as:  Dori Fermo Take 1 Cap by mouth daily. hydroCHLOROthiazide 25 mg tablet Commonly known as:  HYDRODIURIL  
TAKE ONE TABLET BY MOUTH ONCE DAILY Iron 325 mg (65 mg iron) tablet Generic drug:  ferrous sulfate Take  by mouth Daily (before breakfast). levothyroxine 50 mcg tablet Commonly known as:  SYNTHROID  
TAKE ONE TABLET BY MOUTH ONCE DAILY BEFORE BREAKFAST  
  
 magnesium 250 mg Tab Take 250 mg by mouth daily. metFORMIN  mg tablet Commonly known as:  GLUCOPHAGE XR  
TAKE ONE TABLET BY MOUTH TWICE DAILY  
  
 nystatin powder Commonly known as:  MYCOSTATIN Apply  to affected area two (2) times a day. omeprazole 20 mg capsule Commonly known as:  PRILOSEC Take 40 mg by mouth two (2) times a day. ondansetron 4 mg disintegrating tablet Commonly known as:  ZOFRAN ODT Take 1 Tab by mouth every eight (8) hours as needed for Nausea. polyethylene glycol 17 gram/dose powder Commonly known as:  Marlane Blinks Take 17 g by mouth daily. 1 tablespoon with 8 oz of water daily  
  
 pravastatin 20 mg tablet Commonly known as:  PRAVACHOL  
TAKE ONE TABLET BY MOUTH NIGHTLY  
  
 sertraline 100 mg tablet Commonly known as:  ZOLOFT  
TAKE ONE & ONE-HALF TABLETS BY MOUTH ONCE DAILY  
  
 TYLENOL EXTRA STRENGTH 500 mg tablet Generic drug:  acetaminophen Take  by mouth every six (6) hours as needed for Pain. VITAMIN D3 2,000 unit Tab Generic drug:  cholecalciferol (vitamin D3) Take  by mouth. Follow-up Instructions Return in about 3 months (around 12/13/2017). To-Do List   
 09/14/2017 Lab:  HEMOGLOBIN A1C WITH EAG   
  
 09/14/2017 Lab:  METABOLIC PANEL, COMPREHENSIVE   
  
 11/13/2017 Imaging:  DEXA BONE DENSITY STUDY AXIAL Patient Instructions Schedule bone density for November Schedule sleep evaluation Follow up with dr hess Introducing \[facility]\"" & HEALTH SERVICES! Dear Lynsey Current: Thank you for requesting a frenting account. Our records indicate that you already have an active frenting account. You can access your account anytime at [email or web address] Did you know that you can access your hospital and ER discharge instructions at any time in [employer]? You can also review all of your test results from your hospital stay or ER visit. Additional Information If you have questions, please visit the Frequently Asked Questions section of the [employer] website at [email or web address] Remember, [employer] is NOT to be used for urgent needs. For medical emergencies, dial 911. Now available from your iPhone and Android! Please provide this summary of care documentation to your next provider. Your primary care clinician is listed as Annabelle Fox. If you have any questions after today's visit, please call [phone].

## 2017-09-13 NOTE — PROGRESS NOTES
HISTORY OF PRESENT ILLNESS  Rachel Blair is a [de-identified] y.o. female. HPI   Last here 6/7/17. Pt is here to f/u on chronic conditions. Pt presents with her sister. BP today is 147/73  BP at home running around 130s-140s/80s  Continues on 1/2 tablet HCTZ, diltiazem 120 mg, and benazepril 20mg  Of note, she has an element of [facility]    Blood sugars at home running around 110, 112, 115 in the AM fasting  Continues on metformin XR 750mg BID    Wt is stable since last visit  Pt has been exercising regularly  She and her sister joined a fitness club recently  Discussed continued diet and w/l    Pt follows with Dr. Brennen Tobias (neuro)  Has not seen this physician in some time    Pt follows with Dr. Richa Ferguson (uro) for urinary incontinence  Reviewed notes from Dr. Richa Ferguson (uro) 8/23/17: urinary incontinence, ditropan and vesicare did not help well, botox is a possibility   She had a botox shot on Monday, she is not sure if it helped or not yet    Reviewed notes from Dr. Carolann Pardo (ortho) 6/17: R rotator cuff injury, will give injection in shoulder  She has been doing PT, works well  Pt is no longer taking percocet for chronic pain    Reviewed notes from Dr. Kyler Mcdaniel (ophtho) 3/20/17: 1 year f/u    Pt is interested in seeing another rheum  She is followed for sicca syndrome and h/o sarcoidosis has pos CHEVY as well   Provided referral for Dr. Leigha Antonio (rheum) today  Recall that she has a h/o sarcoid, gets annual cxr for this, not seeing pulmonary yet.  Will monitor.      Reviewed last labs  a1c 6.3, kidney nl, thyroid nl, liver nl    Pt sees Dr. Petey Lovett (cardio)  Last visit was 4/17  Next visit scheduled for 10/17  Continues on eliquis 5mg per Dr. Tyson Mckinney (cardio) for h/o afib  She stopped this for 3 days per Dr. Tyson Mckinney (cardio) - she has since resumed this medication  She was in the donut hole, but called the company and now is provided with eliquis for free    Continues on iron 325mg daily, works well  Has h/o mild anemia which is stable    Pt is followed by Dr. Josephine Castaneda (ENT)  Continues on prilosec BID per Dr. Josephine Castaneda (ENT)  And flonase and this has improved her cough dramatically  Very happy with this. Last visit, pt c/o of rash over RLQ and right inguinal region  I provided pt with nystatin for rash, works well and rash has primarily resolved.      Pt saw Dr. Grazyna Garcia in 9/16 for EGD and COLO  Of note, pt has celiac sprue, discussed what this means with her  She has not been compliant with gluten free diet  Stressed adherence to this diet    Pt is rarely using her CPAP for MALLORY   Discussed importance of using the CPAP machine  Pt has not had a sleep apnea test as of yet  She is interested in scheduling an appt with sleep specialist soon     Pt was followed by Dr. Leatha Givens (neurosurg)  She has since been d/c    Continues on zoloft 150mg for depression, works well, happy with dose  Not too many down days     Continues on pravachol 20mg for cholesterol, tolerating well     Continues on 50mcg synthroid daily      PREVENTIVE:    Colonoscopy: 12/28/16, Dr. Grazyna Garcia, repeat 10 years  EGD: s12/16 manetas  Pap: 2013    Mammogram: 06/05/17 -- normal  Dexa: 11/15 mild osteopenia    Tdap: 4/15    Pneumovax: around 2012 per the patient    Prevnar 13: 8/16  Zostavax: 2/29/16  Flu shot: 9/13/17  Ophthalmology:  Dr. 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    ACP (advance care planning) 11/11/2015    Hypothyroidism, adult 07/31/2015    [facility] 53/15/9461    Essential hypertension 04/27/2015    MALLORY (obstructive sleep apnea) 07/09/2014    Hyperlipidemia 07/09/2014    Sarcoid ([address] 07/09/2014    Stress bladder incontinence, female 07/09/2014    Obesity 07/09/2014    TIA (transient ischemic attack) 07/09/2014    AR (aortic regurgitation) 07/09/2014    Mitral valve insufficiency 07/09/2014    Cervical stenosis of spine 07/09/2014     Current Outpatient Prescriptions   Medication Sig Dispense Refill    hydroCHLOROthiazide (HYDRODIURIL) 25 mg tablet TAKE ONE TABLET BY MOUTH ONCE DAILY 30 Tab 0    apixaban (ELIQUIS) 5 mg tablet Take 1 Tab by mouth two (2) times a day. 56 Tab 0    benazepril (LOTENSIN) 20 mg tablet TAKE ONE TABLET BY MOUTH ONCE DAILY 90 Tab 0    sertraline (ZOLOFT) 100 mg tablet TAKE ONE & ONE-HALF TABLETS BY MOUTH ONCE DAILY 135 Tab 0    levothyroxine (SYNTHROID) 50 mcg tablet TAKE ONE TABLET BY MOUTH ONCE DAILY BEFORE BREAKFAST 90 Tab 0    solifenacin (VESICARE) 10 mg tablet Take 1 Tab by mouth daily. 30 Tab 1    metFORMIN ER (GLUCOPHAGE XR) 750 mg tablet TAKE ONE TABLET BY MOUTH TWICE DAILY 180 Tab 0    pravastatin (PRAVACHOL) 20 mg tablet TAKE ONE TABLET BY MOUTH NIGHTLY 90 Tab 0    ondansetron (ZOFRAN ODT) 4 mg disintegrating tablet Take 1 Tab by mouth every eight (8) hours as needed for Nausea. 20 Tab 0    oxyCODONE-acetaminophen (PERCOCET) 5-325 mg per tablet Take 1 Tab by mouth every four (4) hours as needed for Pain. Max Daily Amount: 6 Tabs. 20 Tab 0    solifenacin (VESICARE) 5 mg tablet Take 1 Tab by mouth daily. 30 Tab 3    nystatin (MYCOSTATIN) powder Apply  to affected area two (2) times a day. 60 g 1    cholecalciferol, vitamin D3, (VITAMIN D3) 2,000 unit tab Take  by mouth.  ferrous sulfate (IRON) 325 mg (65 mg iron) tablet Take  by mouth Daily (before breakfast).  acetaminophen (TYLENOL EXTRA STRENGTH) 500 mg tablet Take  by mouth every six (6) hours as needed for Pain.       colestipol (COLESTID) 1 gram tablet       polyethylene glycol (MIRALAX) 17 gram/dose powder Take 17 g by mouth daily. 1 tablespoon with 8 oz of water daily 119 g 0    multivit-min-FA-lycopen-lutein (CENTRUM SILVER) 0.4-300-250 mg-mcg-mcg tab Take 1 Tab by mouth daily.  biotin 2,500 mcg tab Take 1 Tab by mouth daily.  diltiazem XR (DILTIA XT) 120 mg XR capsule Take 1 Cap by mouth daily. 30 Cap 11    aspirin delayed-release 81 mg tablet Take 81 mg by mouth daily.  omeprazole (PRILOSEC) 20 mg capsule Take 40 mg by mouth two (2) times a day.  magnesium 250 mg tab Take 250 mg by mouth daily.        Past Surgical History:   Procedure Laterality Date    COLONOSCOPY N/A 12/28/2016    COLONOSCOPY performed by Blaine Thorpe MD at \[facility]\"" ENDOSCOPY    HX CATARACT REMOVAL      both eyes    HX CHOLECYSTECTOMY      HX COLONOSCOPY  5/8/2013    Repeat in 5 years    HX CYST REMOVAL  10/15    on head     HX HYSTERECTOMY      HX ORTHOPAEDIC Bilateral     knee replacement to both knees    HX ORTHOPAEDIC      spacer btwn L4-5 and L5-6     HX TONSILLECTOMY        Lab Results  Component Value Date/Time   WBC 9.1 09/08/2017 09:47 AM   HGB 11.0 09/08/2017 09:47 AM   HCT 33.3 09/08/2017 09:47 AM   PLATELET 106 62/47/5751 09:47 AM   MCV 92 09/08/2017 09:47 AM     Lab Results  Component Value Date/Time   Cholesterol, total 149 09/08/2017 09:47 AM   HDL Cholesterol 82 09/08/2017 09:47 AM   LDL, calculated 48 09/08/2017 09:47 AM   LDL-C, External 77 10/18/2013 11:09 AM   Triglyceride 96 09/08/2017 09:47 AM   CHOL/HDL Ratio 1.9 09/08/2016 02:13 AM     Lab Results  Component Value Date/Time   GFR est non-AA 49 09/08/2017 09:47 AM   GFR est AA 56 09/08/2017 09:47 AM   Creatinine 1.08 09/08/2017 09:47 AM   BUN 25 09/08/2017 09:47 AM   Sodium 141 09/08/2017 09:47 AM   Potassium 4.9 09/08/2017 09:47 AM   Chloride 102 09/08/2017 09:47 AM   CO2 23 09/08/2017 09:47 AM   Magnesium 1.9 09/07/2016 01:11 AM   Phosphorus 3.3 09/07/2016 01:11 AM          Review of Systems   Respiratory: Negative for shortness of breath. Cardiovascular: Negative for chest pain. Physical Exam   Constitutional: She is oriented to person, place, and time. She appears well-developed and well-nourished. No distress. HENT:   Head: Normocephalic and atraumatic. Mouth/Throat: No oropharyngeal exudate. Slightly dry mucous membranes   Eyes: Conjunctivae and EOM are normal. Right eye exhibits no discharge. Left eye exhibits no discharge. Neck: Normal range of motion. Neck supple. No thyromegaly present. Cardiovascular: Normal rate, regular rhythm, normal heart sounds and intact distal pulses. Exam reveals no gallop and no friction rub. No murmur heard. Pulmonary/Chest: Effort normal and breath sounds normal. No respiratory distress. She has no wheezes. She has no rales. She exhibits no tenderness. Musculoskeletal: She exhibits no edema, tenderness or deformity. Lymphadenopathy:     She has no cervical adenopathy. Neurological: She is alert and oriented to person, place, and time. She has normal reflexes. She exhibits normal muscle tone. Coordination abnormal.   Skin: Skin is warm and dry. No rash noted. She is not diaphoretic. No erythema. No pallor. Psychiatric: She has a normal mood and affect. Her behavior is normal.       ASSESSMENT and PLAN    ICD-10-CM ICD-9-CM    1. Type 2 diabetes mellitus without complication, without long-term current use of insulin (HCC)    Controlled on metformin XR 750mg, two tabs per day X56.5 016.53 METABOLIC PANEL, COMPREHENSIVE      HEMOGLOBIN A1C WITH EAG   2. Paroxysmal atrial fibrillation (HCC)    Remains in nsr and on eliquis for anticoagulation, continue, no change to dose, follows with Dr. Jesus Sutherland and has an appt scheduled to see him U04.8 718.41 METABOLIC PANEL, COMPREHENSIVE      HEMOGLOBIN A1C WITH EAG   3.  Celiac sprue    Partially following gluten-free diet, stressed importance of complete adherence to gluten-free diet K90.0 180.2 METABOLIC PANEL, COMPREHENSIVE      HEMOGLOBIN A1C WITH EAG   4. Essential hypertension    Controlled on half tab HCTZ, dilt and benazepril, continue, no change to dose B21 300.9 METABOLIC PANEL, COMPREHENSIVE      HEMOGLOBIN A1C WITH EAG   5. MALLORY (obstructive sleep apnea)    Not compliant with CPAP, still has not had sleep eval to have her machine eval, addressed this with her, she states that she will complete this Q29.24 617.42 METABOLIC PANEL, COMPREHENSIVE      HEMOGLOBIN A1C WITH EAG   6. Morbid obesity due to excess calories (HCC)    Wt not improved, has joined gym recently and is planning a new w/l exercise routine with her sister L46.16 058.30 METABOLIC PANEL, COMPREHENSIVE      HEMOGLOBIN A1C WITH EAG   7. Stress bladder incontinence, female    Had botox this month with Dr. Merritt Abel as ditropan and vesicare did not improve her sx O19.9 816.1 METABOLIC PANEL, COMPREHENSIVE      HEMOGLOBIN A1C WITH EAG   8. Mitral valve insufficiency, unspecified etiology    ECHO UTD, follows with cardio X08.3 420.5 METABOLIC PANEL, COMPREHENSIVE      HEMOGLOBIN A1C WITH EAG   9. Hypothyroidism, adult    On synthroid 50mcg daily, at goal on recent labs B23.4 214.4 METABOLIC PANEL, COMPREHENSIVE      HEMOGLOBIN A1C WITH EAG   10. Anemia, unspecified type    Mild anemia, stable, related to chronic disease, will continue to monitor S87.4 426.6 METABOLIC PANEL, COMPREHENSIVE      HEMOGLOBIN A1C WITH EAG   11. Osteopenia of multiple sites    Vit D for tx, due for DEXA in 11/17, advised her to schedule M85.89 733.90 DEXA BONE DENSITY STUDY AXIAL      METABOLIC PANEL, COMPREHENSIVE      HEMOGLOBIN A1C WITH EAG   12. Primary osteoarthritis involving multiple joints    Would like to see a new rheum, refer to Dr. Emiliano Antonio, she is also followed for sicca syndrome and h/o sarcoidosis   M15.0 715.09    13.  Sarcoid ([facility] [address]    See above   D86.9 135 REFERRAL TO RHEUMATOLOGY   14. Encounter for immunization    Flu shot today Z23 V03.89 ADMIN INFLUENZA VIRUS VAC      INFLUENZA VIRUS VACCINE, HIGH DOSE SEASONAL, PRESERVATIVE FREE    15. Depression - pt has +1 on dpr screen, we addressed this, she is on zoloft 150mg and this actually does a good job for her depression, no need to adjust dose today    Depression screen reviewed and positive. Scribed by Rachel Justice of [address] [place] [address] [address], as dictated by Dr. Li Rojas. Current diagnosis and concerns discussed with pt at length. Understands risks and benefits or current treatment plan and medications and accepts the treatment and medication with any possible risks. Pt asks appropriate questions which were answered. Pt instructed to call with any concerns or problems. This note will not be viewable in [address]

## 2017-09-21 RX ORDER — METFORMIN HYDROCHLORIDE 750 MG/1
TABLET, EXTENDED RELEASE ORAL
Qty: 180 TAB | Refills: 0 | Status: SHIPPED | OUTPATIENT
Start: 2017-09-21 | End: 2018-01-24 | Stop reason: SDUPTHER

## 2017-09-21 RX ORDER — PRAVASTATIN SODIUM 20 MG/1
TABLET ORAL
Qty: 90 TAB | Refills: 0 | Status: SHIPPED | OUTPATIENT
Start: 2017-09-21 | End: 2018-01-02 | Stop reason: SDUPTHER

## 2017-10-05 RX ORDER — DILTIAZEM HYDROCHLORIDE 120 MG/1
CAPSULE, EXTENDED RELEASE ORAL
Qty: 30 CAP | Refills: 11 | Status: SHIPPED | OUTPATIENT
Start: 2017-10-05 | End: 2018-04-12

## 2017-10-11 ENCOUNTER — OFFICE VISIT (OUTPATIENT)
Dept: INTERNAL MEDICINE CLINIC | Age: 82
End: 2017-10-11

## 2017-10-11 VITALS
BODY MASS INDEX: 32.1 KG/M2 | RESPIRATION RATE: 16 BRPM | WEIGHT: 170 LBS | SYSTOLIC BLOOD PRESSURE: 117 MMHG | HEART RATE: 61 BPM | DIASTOLIC BLOOD PRESSURE: 65 MMHG | HEIGHT: 61 IN | OXYGEN SATURATION: 96 % | TEMPERATURE: 97.2 F

## 2017-10-11 DIAGNOSIS — I10 ESSENTIAL HYPERTENSION: ICD-10-CM

## 2017-10-11 DIAGNOSIS — I72.9 ANEURYSM (HCC): ICD-10-CM

## 2017-10-11 DIAGNOSIS — E11.9 TYPE 2 DIABETES MELLITUS WITHOUT COMPLICATION, WITHOUT LONG-TERM CURRENT USE OF INSULIN (HCC): ICD-10-CM

## 2017-10-11 DIAGNOSIS — R26.89 POOR BALANCE: ICD-10-CM

## 2017-10-11 DIAGNOSIS — D72.829 LEUKOCYTOSIS, UNSPECIFIED TYPE: ICD-10-CM

## 2017-10-11 DIAGNOSIS — S06.0X0A CONCUSSION WITHOUT LOSS OF CONSCIOUSNESS, INITIAL ENCOUNTER: Primary | ICD-10-CM

## 2017-10-11 DIAGNOSIS — N28.1 KIDNEY CYSTS: ICD-10-CM

## 2017-10-11 DIAGNOSIS — I77.89 ENLARGED THORACIC AORTA (HCC): ICD-10-CM

## 2017-10-11 DIAGNOSIS — I48.0 PAROXYSMAL ATRIAL FIBRILLATION (HCC): ICD-10-CM

## 2017-10-11 DIAGNOSIS — E78.5 HYPERLIPIDEMIA, UNSPECIFIED HYPERLIPIDEMIA TYPE: ICD-10-CM

## 2017-10-11 NOTE — MR AVS SNAPSHOT
Visit Information Date & Time Provider Department Dept. Phone Encounter #  
 10/11/2017  2:15 PM Cristina Hawk, [address] [id number] Follow-up Instructions Return for as scheduled. Your Appointments 10/24/2017  9:00 AM  
6 MONTH with MD [facility]) Appt Note: $0 cp  ekr [address]  
[phone] [address]  
  
    
 12/15/2017  1:30 PM  
ROUTINE CARE with Cristina Arechiga, [address] [facility]) Appt Note: 3 month follow up  
 [facility] [address] [id number]  
[address] [place] [address] [address] [place] [address] Upcoming Health Maintenance Date Due Pneumococcal 65+ Low/Medium Risk (2 of 2 - PPSV23) 8/18/2017 MEDICARE YEARLY EXAM 12/8/2017 HEMOGLOBIN A1C Q6M 3/8/2018 EYE EXAM RETINAL OR DILATED Q1 3/20/2018 FOOT EXAM Q1 6/7/2018 MICROALBUMIN Q1 9/8/2018 LIPID PANEL Q1 9/8/2018 GLAUCOMA SCREENING Q2Y 3/20/2019 DTaP/Tdap/Td series (2 - Td) 4/27/2025 Allergies as of 10/11/2017  Review Complete On: 10/11/2017 By: Debi Chaney LPN Severity Noted Reaction Type Reactions Procardia Xl [Nifedipine]  07/09/2014    Other (comments)  
 weakness Current Immunizations  Reviewed on 6/7/2017 Name Date Influenza High Dose Vaccine PF 9/13/2017, 9/19/2015 Influenza Vaccine 10/10/2014 12:38 PM  
 Influenza Vaccine (Quad) PF 9/14/2016 Pneumococcal Conjugate (PCV-13) 8/18/2016 Tdap 4/27/2015 Zoster Vaccine, Live 2/29/2016 Not reviewed this visit You Were Diagnosed With   
  
 Codes Comments Concussion without loss of consciousness, initial encounter    -  Primary ICD-10-CM: S06.0X0A 
ICD-9-CM: 850.0 Kidney cysts     ICD-10-CM: N28.1 ICD-9-CM: 753.10 Type 2 diabetes mellitus without complication, without long-term current use of insulin (HCC)     ICD-10-CM: E11.9 ICD-9-CM: 250.00 Leukocytosis, unspecified type     ICD-10-CM: D72.829 ICD-9-CM: 288.60 Poor balance     ICD-10-CM: R26.89 
ICD-9-CM: 781.99 Aneurysm ([address]     ICD-10-CM: I72.9 ICD-9-CM: 442.9 Enlarged thoracic aorta (HCC)     ICD-10-CM: I77.89 ICD-9-CM: 447.8 Paroxysmal atrial fibrillation (HCC)     ICD-10-CM: I48.0 ICD-9-CM: 427.31 Hyperlipidemia, unspecified hyperlipidemia type     ICD-10-CM: E78.5 ICD-9-CM: 272.4 Essential hypertension     ICD-10-CM: I10 
ICD-9-CM: 401.9 Vitals BP Pulse Temp Resp Height(growth percentile) Weight(growth percentile)  
 117/65 (BP 1 Location: Left arm, BP Patient Position: Sitting) 61 97.2 °F (36.2 °C) (Oral) 16 5' 1\" (1.549 m) 170 lb (77.1 kg) SpO2 BMI OB Status Smoking Status 96% 32.12 kg/m2 Hysterectomy Never Smoker BMI and BSA Data Body Mass Index Body Surface Area  
 32.12 kg/m 2 1.82 m 2 Preferred Pharmacy Pharmacy Name Phone [facility] [place] [phone] Your Updated Medication List  
  
   
This list is accurate as of: 10/11/17  2:55 PM.  Always use your most recent med list.  
  
  
  
  
 apixaban 5 mg tablet Commonly known as:  Dave Net Take 1 Tab by mouth two (2) times a day. aspirin delayed-release 81 mg tablet Take 81 mg by mouth daily. benazepril 20 mg tablet Commonly known as:  LOTENSIN  
TAKE ONE TABLET BY MOUTH ONCE DAILY  
  
 biotin 2,500 mcg Tab Take 1 Tab by mouth daily. CENTRUM SILVER 0.4-300-250 mg-mcg-mcg Tab Generic drug:  multivit-min-FA-lycopen-lutein Take 1 Tab by mouth daily. colestipol 1 gram tablet Commonly known as:  COLESTID DILT- mg XR capsule Generic drug:  dilTIAZem XR  
TAKE ONE CAPSULE BY MOUTH ONCE DAILY  
  
 hydroCHLOROthiazide 25 mg tablet Commonly known as:  HYDRODIURIL  
TAKE ONE TABLET BY MOUTH ONCE DAILY Iron 325 mg (65 mg iron) tablet Generic drug:  ferrous sulfate Take  by mouth every other day. levothyroxine 50 mcg tablet Commonly known as:  SYNTHROID  
TAKE ONE TABLET BY MOUTH ONCE DAILY BEFORE BREAKFAST  
  
 magnesium 250 mg Tab Take 250 mg by mouth daily. metFORMIN  mg tablet Commonly known as:  GLUCOPHAGE XR  
TAKE ONE TABLET BY MOUTH TWICE DAILY  
  
 nystatin powder Commonly known as:  MYCOSTATIN Apply  to affected area two (2) times a day. omeprazole 20 mg capsule Commonly known as:  PRILOSEC Take 40 mg by mouth two (2) times a day. ondansetron 4 mg disintegrating tablet Commonly known as:  ZOFRAN ODT Take 1 Tab by mouth every eight (8) hours as needed for Nausea. polyethylene glycol 17 gram/dose powder Commonly known as:  Vaishali Justine Take 17 g by mouth daily. 1 tablespoon with 8 oz of water daily  
  
 pravastatin 20 mg tablet Commonly known as:  PRAVACHOL  
TAKE ONE TABLET BY MOUTH NIGHTLY  
  
 sertraline 100 mg tablet Commonly known as:  ZOLOFT  
TAKE ONE & ONE-HALF TABLETS BY MOUTH ONCE DAILY  
  
 TYLENOL EXTRA STRENGTH 500 mg tablet Generic drug:  acetaminophen Take  by mouth every six (6) hours as needed for Pain. VITAMIN D3 2,000 unit Tab Generic drug:  cholecalciferol (vitamin D3) Take  by mouth. We Performed the Following CBC W/O DIFF [48919 CPT(R)] METABOLIC PANEL, COMPREHENSIVE [34839 CPT(R)] [address] Comments:  
 Needs HH and PT at home traumatic injury and concussion Follow-up Instructions Return for as scheduled. To-Do List   
 11/20/2017 9:30 AM  
  Appointment with [facility] DEXSRINIVAS 1 at [address] [place] ([phone]) Please, no calcium supplements or antacids that coat the stomach (ex: Emil Kebede) 24 hours prior to procedure. Maintain normal diet and medications. Dairy products are allowed. Wear an outfit with an elastic waistband (no zipper or metal snaps). Check in at registration 15min before your appointment time unless you were instructed to do otherwise. Referral Information Referral ID Referred By Referred To  
  
 [id number] Ronel Cheema Not Available Visits Status Start Date End Date 1 New Request 10/11/17 10/11/18 If your referral has a status of pending review or denied, additional information will be sent to support the outcome of this decision. Patient Instructions Home health Come back for labs in 1 week Monitor blood pressure Stop hctz for now See cardiology as schedule Schedule dr hess and [facility] appointment for next week. Introducing [facility] & HEALTH SERVICES! Dear Eliana Tavera: Thank you for requesting a [employer] account. Our records indicate that you already have an active [employer] account. You can access your account anytime at [email or web address] Did you know that you can access your hospital and ER discharge instructions at any time in [employer]? You can also review all of your test results from your hospital stay or ER visit. Additional Information If you have questions, please visit the Frequently Asked Questions section of the [employer] website at [email or web address] Remember, [employer] is NOT to be used for urgent needs. For medical emergencies, dial 911. Now available from your iPhone and Android! Please provide this summary of care documentation to your next provider. Your primary care clinician is listed as Leonel Hinojosa. If you have any questions after today's visit, please call [phone].

## 2017-10-11 NOTE — PROGRESS NOTES
HISTORY OF PRESENT ILLNESS  Radha Dias is a [de-identified] y.o. female. HPI   Last here 9/13/17. Pt is here for transitional care. Pt presents with her . Pt ambulates with a cane. Ms. Radha Dias is a [de-identified]y.o. year old female, she is seen today for Transition of Care services following a hospital discharge for a fall 10/7/17-10/10/17. Our office Nurse Navigator has not yet performed an outreach to Ms. Chandu Moran,  But is seeing me today 1 day after she was discharged, but will complete medication reconciliation and a telephonic assessment of her condition.     Pt was admitted to [address] [place] [address] for a fall 10/7/17-10/10/17  Pt states that she was talking to her neighbors and fell down the steps  Pt hit her head, but does not report having a syncopal episode at that time    Reviewed both CT Scans of Head 10/17: non-specific C spine image  Reviewed US kidneys 10/17: kidney cysts, repeat in 6 months-1 year  Pt states that she was very dizzy when going to the bathroom in the hospital  Pt attributes this to lying flat for 3-4 days and then, getting up to go to the bathroom  Pt denies vomiting in the hospital  Pt was on an NPO diet from 10/7/17-10/8/17 d/t tests that had to be completed    Reviewed discharge summary 10/10/17: pt was told to f/u with Trauma surgery Team in 2 weeks, staples in place, remove in 7-10 days, bacitracin ointment to wound, sx of concussion, if concussion sx do not improve in 1-2 weeks go to Dr. Ac Montoya or Dr. 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 Eliana Flores (neurosurg) and Dr. Darshana King (neuro) for next week    Advised pt to have her staples removed in 7-10 days--can remove here if needed    [address] for BP    Mammogram: 06/05/17 -- normal  Dexa: 11/15 mild osteopenia    Tdap: 4/15    Pneumovax: around 2012 per the patient    Prevnar 13: 8/16  Zostavax: 2/29/16  Flu shot: 9/13/17  Ophthalmology:  Dr. 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    ACP (advance care planning) 11/11/2015    Hypothyroidism, adult 07/31/2015    Type [facility] diabetes mellitus [facility] ([facility] 08/87/3541    Essential hypertension 04/27/2015    MALLORY (obstructive sleep apnea) 07/09/2014    Hyperlipidemia 07/09/2014    Sarcoid 07/09/2014    Stress bladder incontinence, female 07/09/2014    Obesity 07/09/2014    TIA (transient ischemic attack) 07/09/2014    AR (aortic regurgitation) 07/09/2014    Mitral valve insufficiency 07/09/2014    Cervical stenosis of spine 07/09/2014     Current Outpatient Prescriptions   Medication Sig Dispense Refill    DILT- mg XR capsule TAKE ONE CAPSULE BY MOUTH ONCE DAILY 30 Cap 11    pravastatin (PRAVACHOL) 20 mg tablet TAKE ONE TABLET BY MOUTH NIGHTLY 90 Tab 0    metFORMIN ER (GLUCOPHAGE XR) 750 mg tablet TAKE ONE TABLET BY MOUTH TWICE DAILY 180 Tab 0    hydroCHLOROthiazide (HYDRODIURIL) 25 mg tablet TAKE ONE TABLET BY MOUTH ONCE DAILY 30 Tab 0    apixaban (ELIQUIS) 5 mg tablet Take 1 Tab by mouth two (2) times a day.  56 Tab 0    benazepril (LOTENSIN) 20 mg tablet TAKE ONE TABLET BY MOUTH ONCE DAILY 90 Tab 0    sertraline (ZOLOFT) 100 mg tablet TAKE ONE & ONE-HALF TABLETS BY MOUTH ONCE DAILY 135 Tab 0    levothyroxine (SYNTHROID) 50 mcg tablet TAKE ONE TABLET BY MOUTH ONCE DAILY BEFORE BREAKFAST 90 Tab 0    nystatin (MYCOSTATIN) powder Apply  to affected area two (2) times a day. 60 g 1    cholecalciferol, vitamin D3, (VITAMIN D3) 2,000 unit tab Take  by mouth.  ferrous sulfate (IRON) 325 mg (65 mg iron) tablet Take  by mouth every other day.  multivit-min-FA-lycopen-lutein (CENTRUM SILVER) 0.4-300-250 mg-mcg-mcg tab Take 1 Tab by mouth daily.  biotin 2,500 mcg tab Take 1 Tab by mouth daily.  aspirin delayed-release 81 mg tablet Take 81 mg by mouth daily.  omeprazole (PRILOSEC) 20 mg capsule Take 40 mg by mouth two (2) times a day.  magnesium 250 mg tab Take 250 mg by mouth daily.  ondansetron (ZOFRAN ODT) 4 mg disintegrating tablet Take 1 Tab by mouth every eight (8) hours as needed for Nausea. 20 Tab 0    acetaminophen (TYLENOL EXTRA STRENGTH) 500 mg tablet Take  by mouth every six (6) hours as needed for Pain.  colestipol (COLESTID) 1 gram tablet       polyethylene glycol (MIRALAX) 17 gram/dose powder Take 17 g by mouth daily.  1 tablespoon with 8 oz of water daily 119 g 0     Past Surgical History:   Procedure Laterality Date    COLONOSCOPY N/A 12/28/2016    COLONOSCOPY performed by Nette Dennis MD at \[facility]\"" ENDOSCOPY    HX CATARACT REMOVAL      both eyes    HX CHOLECYSTECTOMY      HX COLONOSCOPY  5/8/2013    Repeat in 5 years    HX CYST REMOVAL  10/15    on head     HX HYSTERECTOMY      HX ORTHOPAEDIC Bilateral     knee replacement to both knees    HX ORTHOPAEDIC      spacer btwn L4-5 and L5-6     HX TONSILLECTOMY        Lab Results  Component Value Date/Time   WBC 9.1 09/08/2017 09:47 AM   HGB 11.0 09/08/2017 09:47 AM   HCT 33.3 09/08/2017 09:47 AM   PLATELET 119 74/51/2604 09:47 AM   MCV 92 09/08/2017 09:47 AM     Lab Results  Component Value Date/Time   Cholesterol, total 149 09/08/2017 09:47 AM   HDL Cholesterol 82 09/08/2017 09:47 AM   LDL, calculated 48 09/08/2017 09:47 AM   LDL-C, External 77 10/18/2013 11:09 AM Triglyceride 96 09/08/2017 09:47 AM   CHOL/HDL Ratio 1.9 09/08/2016 02:13 AM     Lab Results  Component Value Date/Time   GFR est non-AA 49 09/08/2017 09:47 AM   GFR est AA 56 09/08/2017 09:47 AM   Creatinine 1.08 09/08/2017 09:47 AM   BUN 25 09/08/2017 09:47 AM   Sodium 141 09/08/2017 09:47 AM   Potassium 4.9 09/08/2017 09:47 AM   Chloride 102 09/08/2017 09:47 AM   CO2 23 09/08/2017 09:47 AM   Magnesium 1.9 09/07/2016 01:11 AM   Phosphorus 3.3 09/07/2016 01:11 AM          Review of Systems   Respiratory: Negative for shortness of breath. Cardiovascular: Negative for chest pain. Gastrointestinal: Negative for vomiting. Musculoskeletal: Negative for falls. Neurological: Negative for dizziness and loss of consciousness. Physical Exam   Constitutional: She is oriented to person, place, and time. She appears well-developed and well-nourished. No distress. HENT:   Head: Normocephalic and atraumatic. Mouth/Throat: Oropharynx is clear and moist. No oropharyngeal exudate. Eyes: Conjunctivae and EOM are normal. Right eye exhibits no discharge. Left eye exhibits no discharge. Neck: Normal range of motion. Neck supple. No thyromegaly present. Cardiovascular: Normal rate, regular rhythm and intact distal pulses. Exam reveals no gallop and no friction rub. Murmur (Slight) heard. Pulmonary/Chest: Effort normal and breath sounds normal. No respiratory distress. She has no wheezes. She has no rales. She exhibits no tenderness. Abdominal: Soft. She exhibits no distension and no mass. There is no tenderness. There is no rebound and no guarding. Musculoskeletal: She exhibits no edema, tenderness or deformity. Lymphadenopathy:     She has no cervical adenopathy. Neurological: She is alert and oriented to person, place, and time. She has normal reflexes. She exhibits normal muscle tone. Coordination abnormal.   Skin: Skin is warm and dry. No rash noted. She is not diaphoretic. No erythema.  No pallor. Ecchymosis on R buttocks  Staples in place on R side of head   Psychiatric: She has a normal mood and affect. Her behavior is normal.   wound clean/dry /intact    ASSESSMENT and PLAN    ICD-10-CM ICD-9-CM    1. Concussion without loss of consciousness, initial encounter    Pt was admitted to [facility] and had several head CTs completed, she had no intercranial bleeding and her blood thinner had not been held, she has staples in place from the laceration she sustained on her scalp, staples need to be removed in 10 days, she has f/u with Trauma pending and will likely have staples removed at that time, she has mild concussive sx primarily dizziness, if her sx pass 2-4 weeks she is to f/u with concussion clinic at [facility] with Dejan Thompson or Charmaine Valenzuela    Will order Home Health for BP monitoring and PT S06.0X0A 850.0 CBC W/O DIFF      METABOLIC PANEL, COMPREHENSIVE      REFERRAL TO HOME HEALTH   2. Kidney cysts    Found during her hospitalization, will repeat and US kidneys in 6 months to ensure stability N28.1 753.10 CBC W/O DIFF      METABOLIC PANEL, COMPREHENSIVE      REFERRAL TO HOME HEALTH   3. Type 2 diabetes mellitus without complication, without long-term current use of insulin (HCC)    Metformin was held during her hospital stay, she is now back on it and BS have been stable and well-controlled, will continue to monitor  E11.9 250.00 CBC W/O DIFF      METABOLIC PANEL, COMPREHENSIVE      REFERRAL TO HOME HEALTH   4. Leukocytosis, unspecified type    Repeat CBC in one week to ensure back to nl D72.829 288.60 CBC W/O DIFF      METABOLIC PANEL, COMPREHENSIVE      REFERRAL TO HOME HEALTH   5. Poor balance    PT at home for now and once she is feeling better will start outpatient PT R26.89 781.99 CBC W/O DIFF      METABOLIC PANEL, COMPREHENSIVE      REFERRAL TO HOME HEALTH   6.  Aneurysm ([address]    There was a small possibly insignificant R ICA aneurysm, neurosurg at [facility] evaluated this and said no intervention is recommended, pt normally f/u with Dr. Jose Guadalupe Marcum in any case and will address with him further as needed I72.9 442.9 CBC W/O DIFF      METABOLIC PANEL, COMPREHENSIVE      REFERRAL TO HOME HEALTH   7. Enlarged thoracic aorta (HCC)    Unclear significance, mildly enlarged per [facility] report but not aneurysmal, pt has an appt with Dr. Markel Goins pending, last ECHO was a year ago, may just need a repeat ECHO if anything at all I77.89 447.8 CBC W/O DIFF      METABOLIC PANEL, COMPREHENSIVE      REFERRAL TO [place] [address]   8. Paroxysmal atrial fibrillation (HCC)    Continues on eliquis, will be seeing cardio next week, remains in nsr   I48.0 427.31    9. Hyperlipidemia, unspecified hyperlipidemia type    Controled on pravachol   E78.5 272.4    10. Essential hypertension    BP too low today, will stop her HCTZ, continue dilt, benazepril and benicar for now, monitor BP at home, if low BP persists may need to stop benicar, I suspect that as she starts to feel better HCTZ may need to be added back on, monitor for now  I10 401.9         Scribed by Devendra Gomez of [address] [place] [address] [address], as dictated by Dr. Edgardo Morales. Current diagnosis and concerns discussed with pt at length. Pt understands risks and benefits or current treatment plan and medications, and accepts the treatment and medication with any possible risks. Pt asks appropriate questions, which were answered. Pt was instructed to call with any concerns or problems. This note will not be viewable in [address]

## 2017-10-11 NOTE — PATIENT INSTRUCTIONS
Home health    Come back for labs in 1 week    Monitor blood pressure    Stop hctz for now     See cardiology as schedule    Schedule dr hess and [facility] appointment for next week.

## 2017-10-12 ENCOUNTER — PATIENT OUTREACH (OUTPATIENT)
Dept: INTERNAL MEDICINE CLINIC | Age: 82
End: 2017-10-12

## 2017-10-12 NOTE — PROGRESS NOTES
[address] - AWILDA - 10/12/2017          Hospital Discharge [facility] Follow-Up    Desiree Borden, was hospitalized @ [facility] 10/7/17 - 10/10/17. Diagnosis:  Fall with head injury / concussion    Discharge Instructions/Plans:  Reviewed with Dr. Fernie Leon - 10/11/17    NN post hospital interactive contact done by telephone within 2 business days of discharge     10/12/2017  - pt outreach (call). Pt ID verified with 2 identifiers, name and . NN introduction. Reason for call stated.     Pt reported she had many questions about hospital stay and information given to her by various MDs. States she continues to feel some dizziness when she lays down and seems to need a lot of sleep. Patient encouraged to rest as much as she wants. Provided education for patient's concerns. Med reconciliation done with pt during OV with Dr. Fernie Leon on 10/11/17. Red flags- reviewed and patient understands when to seek medical attention from PCP/ED. Low BP readings and symptoms reviewed. Patient verbalized understanding to seek help if HA appears, dizziness / light-headedness increases.     Support System:  Family    ACP - On file     Discharge Instructions- reviewed with pt. Pt verbalized understanding. Given opportunity to ask questions. NN contact # given to call as needed. Goals      Attends follow-up appointments as directed. 10/12/2017-AFD    Subjective - \"I have so many appointments to make. \"  Objective - Patient reviewed the requests from various physicians for f/u appointments. Asking information about why the MDs at [facility] want several appointments. Assessment - Patient denies transportation issues. States her  will drive. Patient made multiple comments about the number of physicians and appointments she needed to schedule and how she was prioritizing the different medical issues.   Plan - Patient encouraged to get a calendar and make the most important appointments first.       Understands red flags post discharge. 10/12/2017 - AFD    Subjective - \"I've never had problems with my blood pressure but it was really high while I was in [facility] for my head injury. \"  Assessment - BP in [facility] was recorded as 190s/80s. Patient states her usual BP is 130-140/70-80. Patient has been started on medication to manage BP. Patient acknowledges Dr. Awa Carlisle request that she check her BP every day and keep a record to bring with her on OVs. Patient denied knowledge of what a low BP reading was or s/s. Plan - Patient asked to take her BP every day at the same time of day, in the same arm, and while sitting - then record the BP in a notebook.  If SBP (\"top number\") is lower than 100, patient is to call the office to speak to an office nurse or NN for instructions about taking her medication.

## 2017-10-13 ENCOUNTER — TELEPHONE (OUTPATIENT)
Dept: INTERNAL MEDICINE CLINIC | Age: 82
End: 2017-10-13

## 2017-10-13 ENCOUNTER — HOME HEALTH ADMISSION (OUTPATIENT)
Dept: HOME HEALTH SERVICES | Facility: HOME HEALTH | Age: 82
End: 2017-10-13
Payer: MEDICARE

## 2017-10-13 DIAGNOSIS — R26.89 POOR BALANCE: Primary | ICD-10-CM

## 2017-10-13 NOTE — TELEPHONE ENCOUNTER
#[phone] Jimmy RODRIGUEZ [facility] requesting call as she needs more information on pt's home bound status.

## 2017-10-13 NOTE — TELEPHONE ENCOUNTER
Spoke to [facility]. Jaci states referral needs reason why pt is home bound. Also, that it needs to say pt need Home health nursing and physical therapy eval treatment and safety eval; for insurance purposes. New referral placed and faxed to [facility].

## 2017-10-15 ENCOUNTER — HOME CARE VISIT (OUTPATIENT)
Dept: SCHEDULING | Facility: HOME HEALTH | Age: 82
End: 2017-10-15
Payer: MEDICARE

## 2017-10-15 VITALS
DIASTOLIC BLOOD PRESSURE: 70 MMHG | TEMPERATURE: 97.8 F | SYSTOLIC BLOOD PRESSURE: 140 MMHG | WEIGHT: 169 LBS | HEIGHT: 63 IN | HEART RATE: 60 BPM | BODY MASS INDEX: 29.95 KG/M2 | RESPIRATION RATE: 18 BRPM | OXYGEN SATURATION: 98 %

## 2017-10-15 PROCEDURE — 400013 HH SOC

## 2017-10-15 PROCEDURE — 3331090001 HH PPS REVENUE CREDIT

## 2017-10-15 PROCEDURE — 3331090002 HH PPS REVENUE DEBIT

## 2017-10-15 PROCEDURE — G0299 HHS/HOSPICE OF RN EA 15 MIN: HCPCS

## 2017-10-16 ENCOUNTER — HOME CARE VISIT (OUTPATIENT)
Dept: SCHEDULING | Facility: HOME HEALTH | Age: 82
End: 2017-10-16
Payer: MEDICARE

## 2017-10-16 PROCEDURE — G0151 HHCP-SERV OF PT,EA 15 MIN: HCPCS

## 2017-10-16 PROCEDURE — 3331090001 HH PPS REVENUE CREDIT

## 2017-10-16 PROCEDURE — 3331090002 HH PPS REVENUE DEBIT

## 2017-10-17 PROCEDURE — 3331090002 HH PPS REVENUE DEBIT

## 2017-10-17 PROCEDURE — 3331090001 HH PPS REVENUE CREDIT

## 2017-10-18 ENCOUNTER — HOME CARE VISIT (OUTPATIENT)
Dept: SCHEDULING | Facility: HOME HEALTH | Age: 82
End: 2017-10-18
Payer: MEDICARE

## 2017-10-18 VITALS — SYSTOLIC BLOOD PRESSURE: 130 MMHG | HEART RATE: 52 BPM | DIASTOLIC BLOOD PRESSURE: 60 MMHG | OXYGEN SATURATION: 97 %

## 2017-10-18 PROCEDURE — G0151 HHCP-SERV OF PT,EA 15 MIN: HCPCS

## 2017-10-18 PROCEDURE — 3331090002 HH PPS REVENUE DEBIT

## 2017-10-18 PROCEDURE — 3331090001 HH PPS REVENUE CREDIT

## 2017-10-19 ENCOUNTER — PATIENT OUTREACH (OUTPATIENT)
Dept: INTERNAL MEDICINE CLINIC | Age: 82
End: 2017-10-19

## 2017-10-19 ENCOUNTER — HOME CARE VISIT (OUTPATIENT)
Dept: SCHEDULING | Facility: HOME HEALTH | Age: 82
End: 2017-10-19
Payer: MEDICARE

## 2017-10-19 PROCEDURE — 3331090002 HH PPS REVENUE DEBIT

## 2017-10-19 PROCEDURE — G0300 HHS/HOSPICE OF LPN EA 15 MIN: HCPCS

## 2017-10-19 PROCEDURE — 3331090001 HH PPS REVENUE CREDIT

## 2017-10-19 NOTE — PROGRESS NOTES
[address] RODRIGUEZ [facility] - 10/19/2017    Attempted to contact patient for f/u, message left with contact information for this NN.

## 2017-10-20 ENCOUNTER — HOME CARE VISIT (OUTPATIENT)
Dept: SCHEDULING | Facility: HOME HEALTH | Age: 82
End: 2017-10-20
Payer: MEDICARE

## 2017-10-20 VITALS
RESPIRATION RATE: 17 BRPM | SYSTOLIC BLOOD PRESSURE: 112 MMHG | OXYGEN SATURATION: 97 % | DIASTOLIC BLOOD PRESSURE: 78 MMHG | TEMPERATURE: 97.9 F | HEART RATE: 88 BPM

## 2017-10-20 PROCEDURE — 3331090002 HH PPS REVENUE DEBIT

## 2017-10-20 PROCEDURE — 3331090001 HH PPS REVENUE CREDIT

## 2017-10-20 PROCEDURE — G0151 HHCP-SERV OF PT,EA 15 MIN: HCPCS

## 2017-10-21 VITALS
TEMPERATURE: 97.8 F | HEART RATE: 57 BPM | OXYGEN SATURATION: 97 % | SYSTOLIC BLOOD PRESSURE: 124 MMHG | DIASTOLIC BLOOD PRESSURE: 62 MMHG

## 2017-10-21 PROCEDURE — 3331090001 HH PPS REVENUE CREDIT

## 2017-10-21 PROCEDURE — 3331090002 HH PPS REVENUE DEBIT

## 2017-10-22 VITALS
HEART RATE: 65 BPM | SYSTOLIC BLOOD PRESSURE: 152 MMHG | OXYGEN SATURATION: 98 % | DIASTOLIC BLOOD PRESSURE: 70 MMHG | TEMPERATURE: 97.8 F

## 2017-10-22 PROCEDURE — 3331090002 HH PPS REVENUE DEBIT

## 2017-10-22 PROCEDURE — 3331090001 HH PPS REVENUE CREDIT

## 2017-10-23 ENCOUNTER — HOME CARE VISIT (OUTPATIENT)
Dept: SCHEDULING | Facility: HOME HEALTH | Age: 82
End: 2017-10-23
Payer: MEDICARE

## 2017-10-23 VITALS
DIASTOLIC BLOOD PRESSURE: 72 MMHG | RESPIRATION RATE: 18 BRPM | SYSTOLIC BLOOD PRESSURE: 138 MMHG | HEART RATE: 53 BPM | TEMPERATURE: 96.6 F | OXYGEN SATURATION: 95 %

## 2017-10-23 PROCEDURE — 3331090001 HH PPS REVENUE CREDIT

## 2017-10-23 PROCEDURE — 3331090002 HH PPS REVENUE DEBIT

## 2017-10-23 PROCEDURE — G0299 HHS/HOSPICE OF RN EA 15 MIN: HCPCS

## 2017-10-23 RX ORDER — SERTRALINE HYDROCHLORIDE 100 MG/1
TABLET, FILM COATED ORAL
Qty: 135 TAB | Refills: 0 | Status: SHIPPED | OUTPATIENT
Start: 2017-10-23 | End: 2018-01-23 | Stop reason: SDUPTHER

## 2017-10-23 RX ORDER — BENAZEPRIL HYDROCHLORIDE 20 MG/1
TABLET ORAL
Qty: 90 TAB | Refills: 0 | Status: SHIPPED | OUTPATIENT
Start: 2017-10-23 | End: 2018-01-23 | Stop reason: SDUPTHER

## 2017-10-23 RX ORDER — LEVOTHYROXINE SODIUM 50 UG/1
TABLET ORAL
Qty: 90 TAB | Refills: 0 | Status: SHIPPED | OUTPATIENT
Start: 2017-10-23 | End: 2018-01-23 | Stop reason: SDUPTHER

## 2017-10-24 ENCOUNTER — HOME CARE VISIT (OUTPATIENT)
Dept: SCHEDULING | Facility: HOME HEALTH | Age: 82
End: 2017-10-24
Payer: MEDICARE

## 2017-10-24 ENCOUNTER — HOSPITAL ENCOUNTER (OUTPATIENT)
Dept: LAB | Age: 82
Discharge: HOME | End: 2017-10-24
Payer: MEDICARE

## 2017-10-24 ENCOUNTER — LAB ONLY (OUTPATIENT)
Dept: INTERNAL MEDICINE CLINIC | Age: 82
End: 2017-10-24

## 2017-10-24 ENCOUNTER — TELEPHONE (OUTPATIENT)
Dept: INTERNAL MEDICINE CLINIC | Age: 82
End: 2017-10-24

## 2017-10-24 ENCOUNTER — OFFICE VISIT (OUTPATIENT)
Dept: CARDIOLOGY CLINIC | Age: 82
End: 2017-10-24

## 2017-10-24 VITALS
HEART RATE: 60 BPM | SYSTOLIC BLOOD PRESSURE: 140 MMHG | HEIGHT: 63 IN | WEIGHT: 174.9 LBS | RESPIRATION RATE: 16 BRPM | OXYGEN SATURATION: 95 % | BODY MASS INDEX: 30.99 KG/M2 | DIASTOLIC BLOOD PRESSURE: 60 MMHG

## 2017-10-24 DIAGNOSIS — G47.33 OSA (OBSTRUCTIVE SLEEP APNEA): ICD-10-CM

## 2017-10-24 DIAGNOSIS — I34.0 MITRAL VALVE INSUFFICIENCY, UNSPECIFIED ETIOLOGY: ICD-10-CM

## 2017-10-24 DIAGNOSIS — E11.9 TYPE 2 DIABETES MELLITUS WITHOUT COMPLICATION, WITHOUT LONG-TERM CURRENT USE OF INSULIN (HCC): ICD-10-CM

## 2017-10-24 DIAGNOSIS — M85.89 OSTEOPENIA OF MULTIPLE SITES: ICD-10-CM

## 2017-10-24 DIAGNOSIS — S06.0X0D CONCUSSION WITHOUT LOSS OF CONSCIOUSNESS, SUBSEQUENT ENCOUNTER: ICD-10-CM

## 2017-10-24 DIAGNOSIS — I48.0 PAROXYSMAL ATRIAL FIBRILLATION (HCC): ICD-10-CM

## 2017-10-24 DIAGNOSIS — E03.9 HYPOTHYROIDISM, ADULT: ICD-10-CM

## 2017-10-24 DIAGNOSIS — N39.3 STRESS BLADDER INCONTINENCE, FEMALE: ICD-10-CM

## 2017-10-24 DIAGNOSIS — D64.9 ANEMIA, UNSPECIFIED TYPE: ICD-10-CM

## 2017-10-24 DIAGNOSIS — K90.0 CELIAC SPRUE: ICD-10-CM

## 2017-10-24 DIAGNOSIS — E66.01 MORBID OBESITY DUE TO EXCESS CALORIES (HCC): ICD-10-CM

## 2017-10-24 DIAGNOSIS — E78.2 MIXED HYPERLIPIDEMIA: ICD-10-CM

## 2017-10-24 DIAGNOSIS — I10 ESSENTIAL HYPERTENSION: ICD-10-CM

## 2017-10-24 DIAGNOSIS — R27.0 ATAXIA: ICD-10-CM

## 2017-10-24 DIAGNOSIS — I48.0 PAROXYSMAL ATRIAL FIBRILLATION (HCC): Primary | ICD-10-CM

## 2017-10-24 PROCEDURE — 3331090002 HH PPS REVENUE DEBIT

## 2017-10-24 PROCEDURE — 85025 COMPLETE CBC W/AUTO DIFF WBC: CPT

## 2017-10-24 PROCEDURE — G0151 HHCP-SERV OF PT,EA 15 MIN: HCPCS

## 2017-10-24 PROCEDURE — 3331090001 HH PPS REVENUE CREDIT

## 2017-10-24 NOTE — PROGRESS NOTES
10/24/2017 9:38 AM      Subjective:     Thalia Cancino is here for f/u visit after recent admission at [facility] for fall and concussion. Recurrent event. Now feels better. She denies chest pain, chest pressure/discomfort, dyspnea, palpitations, irregular heart beats, near-syncope, syncope, fatigue, orthopnea, paroxysmal nocturnal dyspnea, exertional chest pressure/discomfort, claudication, lower extremity edema. Visit Vitals    /60    Pulse 60    Resp 16    Ht 5' 3\" (1.6 m)    Wt 174 lb 14.4 oz (79.3 kg)    SpO2 95%    BMI 30.98 kg/m2     Current Outpatient Prescriptions   Medication Sig    levothyroxine (SYNTHROID) 50 mcg tablet TAKE ONE TABLET BY MOUTH ONCE DAILY BEFORE BREAKFAST    sertraline (ZOLOFT) 100 mg tablet TAKE ONE AND ONE-HALF TABLETS BY MOUTH ONCE DAILY    benazepril (LOTENSIN) 20 mg tablet TAKE ONE TABLET BY MOUTH ONCE DAILY    acetaminophen (TYLENOL) 500 mg tablet Take 500 mg by mouth every four (4) hours as needed for Pain.  cholecalciferol (VITAMIN D3) 1,000 unit cap Take 1,000 Units by mouth daily.  omeprazole (PRILOSEC) 20 mg capsule Take 2 Caps by mouth two (2) times a day.  DILT- mg XR capsule TAKE ONE CAPSULE BY MOUTH ONCE DAILY    pravastatin (PRAVACHOL) 20 mg tablet TAKE ONE TABLET BY MOUTH NIGHTLY    metFORMIN ER (GLUCOPHAGE XR) 750 mg tablet TAKE ONE TABLET BY MOUTH TWICE DAILY    apixaban (ELIQUIS) 5 mg tablet Take 1 Tab by mouth two (2) times a day.  nystatin (MYCOSTATIN) powder Apply  to affected area two (2) times a day.  ferrous sulfate (IRON) 325 mg (65 mg iron) tablet Take  by mouth every other day.  multivit-min-FA-lycopen-lutein (CENTRUM SILVER) 0.4-300-250 mg-mcg-mcg tab Take 1 Tab by mouth daily.  aspirin delayed-release 81 mg tablet Take 81 mg by mouth daily.  magnesium 250 mg tab Take 250 mg by mouth daily.     hydroCHLOROthiazide (HYDRODIURIL) 25 mg tablet TAKE ONE TABLET BY MOUTH ONCE DAILY (Patient not taking: No sig reported)    ondansetron (ZOFRAN ODT) 4 mg disintegrating tablet Take 1 Tab by mouth every eight (8) hours as needed for Nausea. (Patient not taking: Reported on 10/15/2017)    polyethylene glycol (MIRALAX) 17 gram/dose powder Take 17 g by mouth daily. 1 tablespoon with 8 oz of water daily     No current facility-administered medications for this visit.           Objective:      Visit Vitals    /60    Pulse 60    Resp 16    Ht 5' 3\" (1.6 m)    Wt 174 lb 14.4 oz (79.3 kg)    SpO2 95%    BMI 30.98 kg/m2       Data Review:     EKG: Normal sinus rhythm, 1st degree AV block, no acute st/t changes    Reviewed and/or ordered active problem list, medication list tests    Past Medical History:   Diagnosis Date    Anemia     Ataxia     Webb's esophagus     Cervical spinal stenosis     Coronary atherosclerosis of unspecified type of vessel, native or graft     Diabetes (HCC)     Fatigue     Fatigue     GERD (gastroesophageal reflux disease)     Hypercholesterolemia     Hyperlipidemia     Hypertension     Ill-defined condition     Webb's Esophagus    Liver disease     Osteoarthritis     Sarcoidosis     Sleep apnea     uses cpap    Stroke [facility])     TIA'S      Past Surgical History:   Procedure Laterality Date    COLONOSCOPY N/A 12/28/2016    COLONOSCOPY performed by Isaiah Acuña [facility] at [facility] ENDOSCOPY    HX CATARACT REMOVAL      both eyes    HX CHOLECYSTECTOMY      HX COLONOSCOPY  5/8/2013    Repeat in 5 years    HX CYST REMOVAL  10/15    on head     HX HYSTERECTOMY      HX ORTHOPAEDIC Bilateral     knee replacement to both knees    HX ORTHOPAEDIC      spacer btwn L4-5 and L5-6     HX TONSILLECTOMY       Allergies   Allergen Reactions    Procardia Xl [Nifedipine] Other (comments)     weakness      Family History   Problem Relation Age of Onset    Other Mother      Fibromyalgia    Pacemaker Mother     Heart Disease Mother     Heart Failure Mother     COPD Mother     Heart Attack Father 61    Cancer Sister      Multiple Myeloma    Diabetes Brother     Obesity Brother     Pacemaker Brother     Heart Attack Brother      Bundle Branch Block    No Known Problems Son     Psychiatric Disorder Daughter      Multiple      Social History     Social History    Marital status:      Spouse name: N/A    Number of children: N/A    Years of education: N/A     Occupational History    Not on file. Social History Main Topics    Smoking status: Never Smoker    Smokeless tobacco: Never Used    Alcohol use No    Drug use: No    Sexual activity: Yes     Partners: Male     Other Topics Concern    Not on file     Social History Narrative         Review of Systems     General: Not Present- Anorexia, Chills, Dietary Changes, Fatigue, Fever, Medication Changes, Night Sweats, Weight Gain > 10lbs. and Weight Loss > 10lbs. .  Skin: Not Present- Bruising and Excessive Sweating. HEENT: Not Present- Headache, Visual Loss and Vertigo. Respiratory: Not Present- Cough, Decreased Exercise Tolerance, Difficulty Breathing, Snoring and Wheezing. Cardiovascular: Not Present- Abnormal Blood Pressure, Chest Pain, Claudications, Difficulty Breathing On Exertion, Edema, Fainting / Blacking Out, Irregular Heart Beat, Night Cramps, Orthopnea, Palpitations, Paroxysmal Nocturnal Dyspnea, Rapid Heart Rate, Shortness of Breath and Swelling of Extremities. Gastrointestinal: Not Present- Black, Tarry Stool, Bloody Stool, Diarrhea, Hematemesis, Rectal Bleeding and Vomiting. Musculoskeletal: Not Present- Muscle Pain and Muscle Weakness. Neurological: Not Present- Dizziness. Psychiatric: Not Present- Depression. Endocrine: Not Present- Cold Intolerance, Heat Intolerance and Thyroid Problems. Hematology: Not Present- Abnormal Bleeding, Anemia, Blood Clots and Easy Bruising.       Physical Exam   The physical exam findings are as follows:       General   Mental Status - Alert.  General Appearance - Not in acute distress. Chest and Lung Exam   Inspection: Accessory muscles - No use of accessory muscles in breathing. Auscultation:   Breath sounds: - Normal.      Cardiovascular   Inspection: Jugular vein - Bilateral - Inspection Normal.  Palpation/Percussion:   Apical Impulse: - Normal.  Auscultation: Rhythm - Regular. Heart Sounds - S1 WNL and S2 WNL. No S3 or S4. Murmurs & Other Heart Sounds: Auscultation of the heart reveals - No Murmurs. Carotid arteries - No Carotid bruit. Peripheral Vascular   Upper Extremity: Inspection - Bilateral - No Cyanotic nailbeds or Digital clubbing. Lower Extremity:   Palpation: Edema - Bilateral - No edema. Assessment:       ICD-10-CM ICD-9-CM    1. Paroxysmal atrial fibrillation (HCC) I48.0 427.31 AMB POC EKG ROUTINE W/ 12 LEADS, INTER & REP   2. Mixed hyperlipidemia E78.2 272.2    3. Essential hypertension I10 401.9    4. Ataxia R27.0 781.3    5. Concussion without loss of consciousness, subsequent encounter S06.0X0D V58.89      850.0        Plan:     1. PAF: has remained in sinus rhythm for > 1 year. Continue current meds. Due to recurrent fall, and most recent fall with concussion, recommend watchman instead of long term oral AC. I discussed the risks/benefits/alternatives of the procedure with the patient. The patient understands and agrees to proceed. 2. HTN: BP controlled. Continue current meds. 3. On statin. Last FLP reviewed. 4. Concussion: on bleed reported. F/u at [facility].

## 2017-10-24 NOTE — TELEPHONE ENCOUNTER
Nicole Hyde PT  [facility] #[phone]  Requesting orders to remove staples from pt's head. There are about 3 staples. These should have been out a couple of days ago. Can put in [facility] care or fax #[phone]. Had attempted to get in touch with [facility] with no return.

## 2017-10-24 NOTE — LETTER
10/24/2017 3:09 PM 
 
Ms. Amairani Holm [phone] To whom this may concern. Please have Ms. Fisher's sandy removed. If there are any questions or concerns, feel free to contact the office.   
 
 
 
Sincerely, 
 
 
Rogena Brittle, MD

## 2017-10-24 NOTE — MR AVS SNAPSHOT
Visit Information Date & Time Provider Department Dept. Phone Encounter #  
 10/24/2017  9:00 AM Vj Garcia, [address] [facility] [phone] [id number] Follow-up Instructions Return in about 6 months (around 4/24/2018). Your Appointments 12/15/2017  1:30 PM  
ROUTINE CARE with Leonel Hinojosa, [address]) Appt Note: 3 month follow up  
 [address] [phone]  
[address] [facility] [address] [facility] Upcoming Health Maintenance Date Due Pneumococcal 65+ High/Highest Risk (2 of 2 - PPSV23) 10/13/2016 MEDICARE YEARLY EXAM 12/8/2017 HEMOGLOBIN A1C Q6M 3/8/2018 EYE EXAM RETINAL OR DILATED Q1 3/20/2018 FOOT EXAM Q1 6/7/2018 MICROALBUMIN Q1 9/8/2018 LIPID PANEL Q1 9/8/2018 GLAUCOMA SCREENING Q2Y 3/20/2019 DTaP/Tdap/Td series (2 - Td) 4/27/2025 Allergies as of 10/24/2017  Review Complete On: 10/24/2017 By: Vj Garcia MD  
  
 Severity Noted Reaction Type Reactions Procardia Xl [Nifedipine]  07/09/2014    Other (comments)  
 weakness Current Immunizations  Reviewed on 6/7/2017 Name Date Influenza High Dose Vaccine PF 9/13/2017, 9/19/2015 Influenza Vaccine 10/10/2014 12:38 PM  
 Influenza Vaccine (Quad) PF 9/14/2016 Pneumococcal Conjugate (PCV-13) 8/18/2016 Tdap 4/27/2015 Zoster Vaccine, Live 2/29/2016 Not reviewed this visit You Were Diagnosed With   
  
 Codes Comments Paroxysmal atrial fibrillation (HCC)    -  Primary ICD-10-CM: I48.0 ICD-9-CM: 427.31 Mixed hyperlipidemia     ICD-10-CM: E78.2 ICD-9-CM: 272.2 Essential hypertension     ICD-10-CM: I10 
ICD-9-CM: 401.9 Ataxia     ICD-10-CM: R27.0 ICD-9-CM: 606. 3 Concussion without loss of consciousness, subsequent encounter     ICD-10-CM: S06.0X0D ICD-9-CM: V58.89, 850.0 Vitals BP Pulse Resp Height(growth percentile) Weight(growth percentile) SpO2  
 140/60 60 16 5' 3\" (1.6 m) 174 lb 14.4 oz (79.3 kg) 95% BMI OB Status Smoking Status 30.98 kg/m2 Hysterectomy Never Smoker Vitals History BMI and BSA Data Body Mass Index Body Surface Area 30.98 kg/m 2 1.88 m 2 Preferred Pharmacy Pharmacy Name Phone [facility] [address] [phone] Your Updated Medication List  
  
   
This list is accurate as of: 10/24/17  9:38 AM.  Always use your most recent med list.  
  
  
  
  
 acetaminophen 500 mg tablet Commonly known as:  TYLENOL Take 500 mg by mouth every four (4) hours as needed for Pain. apixaban 5 mg tablet Commonly known as:  Lyell Mola Take 1 Tab by mouth two (2) times a day. aspirin delayed-release 81 mg tablet Take 81 mg by mouth daily. benazepril 20 mg tablet Commonly known as:  LOTENSIN  
TAKE ONE TABLET BY MOUTH ONCE DAILY CENTRUM SILVER 0.4-300-250 mg-mcg-mcg Tab Generic drug:  multivit-min-FA-lycopen-lutein Take 1 Tab by mouth daily. DILT- mg XR capsule Generic drug:  dilTIAZem XR  
TAKE ONE CAPSULE BY MOUTH ONCE DAILY  
  
 hydroCHLOROthiazide 25 mg tablet Commonly known as:  HYDRODIURIL  
TAKE ONE TABLET BY MOUTH ONCE DAILY Iron 325 mg (65 mg iron) tablet Generic drug:  ferrous sulfate Take  by mouth every other day. levothyroxine 50 mcg tablet Commonly known as:  SYNTHROID  
TAKE ONE TABLET BY MOUTH ONCE DAILY BEFORE BREAKFAST  
  
 magnesium 250 mg Tab Take 250 mg by mouth daily. metFORMIN  mg tablet Commonly known as:  GLUCOPHAGE XR  
TAKE ONE TABLET BY MOUTH TWICE DAILY  
  
 nystatin powder Commonly known as:  MYCOSTATIN Apply  to affected area two (2) times a day. omeprazole 20 mg capsule Commonly known as:  PRILOSEC Take 2 Caps by mouth two (2) times a day. ondansetron 4 mg disintegrating tablet Commonly known as:  ZOFRAN ODT Take 1 Tab by mouth every eight (8) hours as needed for Nausea. polyethylene glycol 17 gram/dose powder Commonly known as:  Loni Mt Take 17 g by mouth daily. 1 tablespoon with 8 oz of water daily  
  
 pravastatin 20 mg tablet Commonly known as:  PRAVACHOL  
TAKE ONE TABLET BY MOUTH NIGHTLY  
  
 sertraline 100 mg tablet Commonly known as:  ZOLOFT  
TAKE ONE AND ONE-HALF TABLETS BY MOUTH ONCE DAILY  
  
 VITAMIN D3 1,000 unit Cap Generic drug:  cholecalciferol Take 1,000 Units by mouth daily. We Performed the Following AMB POC EKG ROUTINE W/ 12 LEADS, INTER & REP [20664 CPT(R)] Follow-up Instructions Return in about 6 months (around 4/24/2018). To-Do List   
 10/25/2017 To Be Determined Appointment with Dionisio Fletcher PT at [place] [address]  
  
 10/27/2017 To Be Determined Appointment with Dionisio Fletcher PT at [place] [address]  
  
 10/27/2017 To Be Determined Appointment with Yon Hill RN at [place] [address]  
  
 10/30/2017 To Be Determined Appointment with Yon Hill RN at [place] [address]  
  
 11/02/2017 To Be Determined Appointment with Yon Hill RN at [place] [address]  
  
 11/20/2017 9:30 AM  
  Appointment with [facility] at [address] [place] ([phone]) Please, no calcium supplements or antacids that coat the stomach (ex: Tums, Mylanta) 24 hours prior to procedure. Maintain normal diet and medications. Dairy products are allowed. Wear an outfit with an elastic waistband (no zipper or metal snaps). Check in at registration 15min before your appointment time unless you were instructed to do otherwise. Introducing [facility]! Dear Atul Look: Thank you for requesting a [employer] account. Our records indicate that you already have an active [employer]press account. You can access your account anytime at [email or web address] Did you know that you can access your hospital and ER discharge instructions at any time in [employer]press? You can also review all of your test results from your hospital stay or ER visit. Additional Information If you have questions, please visit the Frequently Asked Questions section of the 23press website at [email or web address] Remember, [employer]press is NOT to be used for urgent needs. For medical emergencies, dial 911. Now available from your iPhone and Android! Please provide this summary of care documentation to your next provider. Your primary care clinician is listed as Robin Mejias. If you have any questions after today's visit, please call [phone].

## 2017-10-24 NOTE — TELEPHONE ENCOUNTER
Spoke to Leyla Suazo at [facility]. Leyla Suazo asking for order to have a nurse remove sandy from pt. Leyla Suazo asking order be sent to [phone] via fax.

## 2017-10-25 ENCOUNTER — HOME CARE VISIT (OUTPATIENT)
Dept: SCHEDULING | Facility: HOME HEALTH | Age: 82
End: 2017-10-25
Payer: MEDICARE

## 2017-10-25 VITALS
TEMPERATURE: 98 F | DIASTOLIC BLOOD PRESSURE: 60 MMHG | SYSTOLIC BLOOD PRESSURE: 110 MMHG | OXYGEN SATURATION: 95 % | HEART RATE: 97 BPM

## 2017-10-25 VITALS
SYSTOLIC BLOOD PRESSURE: 126 MMHG | HEART RATE: 55 BPM | TEMPERATURE: 97.8 F | OXYGEN SATURATION: 98 % | DIASTOLIC BLOOD PRESSURE: 68 MMHG

## 2017-10-25 LAB
ALBUMIN SERPL-MCNC: 3.8 G/DL (ref 3.5–4.7)
ALBUMIN/GLOB SERPL: 1.3 {RATIO} (ref 1.2–2.2)
ALP SERPL-CCNC: 101 IU/L (ref 39–117)
ALT SERPL-CCNC: 10 IU/L (ref 0–32)
AST SERPL-CCNC: 15 IU/L (ref 0–40)
BILIRUB SERPL-MCNC: 0.4 MG/DL (ref 0–1.2)
BUN SERPL-MCNC: 26 MG/DL (ref 8–27)
BUN/CREAT SERPL: 25 (ref 12–28)
CALCIUM SERPL-MCNC: 9.1 MG/DL (ref 8.7–10.3)
CHLORIDE SERPL-SCNC: 101 MMOL/L (ref 96–106)
CO2 SERPL-SCNC: 24 MMOL/L (ref 18–29)
CREAT SERPL-MCNC: 1.04 MG/DL (ref 0.57–1)
EST. AVERAGE GLUCOSE BLD GHB EST-MCNC: 146 MG/DL
GFR SERPLBLD CREATININE-BSD FMLA CKD-EPI: 51 ML/MIN/1.73
GFR SERPLBLD CREATININE-BSD FMLA CKD-EPI: 59 ML/MIN/1.73
GLOBULIN SER CALC-MCNC: 3 G/DL (ref 1.5–4.5)
GLUCOSE SERPL-MCNC: 113 MG/DL (ref 65–99)
HBA1C MFR BLD: 6.7 % (ref 4.8–5.6)
POTASSIUM SERPL-SCNC: 4.8 MMOL/L (ref 3.5–5.2)
PROT SERPL-MCNC: 6.8 G/DL (ref 6–8.5)
SODIUM SERPL-SCNC: 140 MMOL/L (ref 134–144)

## 2017-10-25 PROCEDURE — 3331090001 HH PPS REVENUE CREDIT

## 2017-10-25 PROCEDURE — G0151 HHCP-SERV OF PT,EA 15 MIN: HCPCS

## 2017-10-25 PROCEDURE — 3331090002 HH PPS REVENUE DEBIT

## 2017-10-26 ENCOUNTER — OFFICE VISIT (OUTPATIENT)
Dept: CARDIOLOGY CLINIC | Age: 82
End: 2017-10-26

## 2017-10-26 ENCOUNTER — HOME CARE VISIT (OUTPATIENT)
Dept: SCHEDULING | Facility: HOME HEALTH | Age: 82
End: 2017-10-26
Payer: MEDICARE

## 2017-10-26 ENCOUNTER — PATIENT OUTREACH (OUTPATIENT)
Dept: INTERNAL MEDICINE CLINIC | Age: 82
End: 2017-10-26

## 2017-10-26 VITALS
TEMPERATURE: 97.4 F | RESPIRATION RATE: 20 BRPM | SYSTOLIC BLOOD PRESSURE: 138 MMHG | DIASTOLIC BLOOD PRESSURE: 62 MMHG | OXYGEN SATURATION: 98 % | HEART RATE: 60 BPM

## 2017-10-26 VITALS
HEIGHT: 63 IN | OXYGEN SATURATION: 97 % | DIASTOLIC BLOOD PRESSURE: 70 MMHG | RESPIRATION RATE: 16 BRPM | BODY MASS INDEX: 31.04 KG/M2 | WEIGHT: 175.2 LBS | HEART RATE: 62 BPM | SYSTOLIC BLOOD PRESSURE: 136 MMHG

## 2017-10-26 DIAGNOSIS — I48.0 PAROXYSMAL ATRIAL FIBRILLATION (HCC): Primary | ICD-10-CM

## 2017-10-26 DIAGNOSIS — E11.9 TYPE 2 DIABETES MELLITUS WITHOUT COMPLICATION, WITHOUT LONG-TERM CURRENT USE OF INSULIN (HCC): ICD-10-CM

## 2017-10-26 DIAGNOSIS — I10 ESSENTIAL HYPERTENSION: ICD-10-CM

## 2017-10-26 DIAGNOSIS — G45.9 TRANSIENT CEREBRAL ISCHEMIA, UNSPECIFIED TYPE: ICD-10-CM

## 2017-10-26 DIAGNOSIS — G47.33 OSA (OBSTRUCTIVE SLEEP APNEA): ICD-10-CM

## 2017-10-26 LAB
BASOPHILS # BLD AUTO: 0.1 X10E3/UL (ref 0–0.2)
BASOPHILS NFR BLD AUTO: 1 %
EOSINOPHIL # BLD AUTO: 0.3 X10E3/UL (ref 0–0.4)
EOSINOPHIL NFR BLD AUTO: 3 %
ERYTHROCYTE [DISTWIDTH] IN BLOOD BY AUTOMATED COUNT: 13.9 % (ref 12.3–15.4)
HCT VFR BLD AUTO: 35.1 % (ref 34–46.6)
HGB BLD-MCNC: 11.4 G/DL (ref 11.1–15.9)
IMM GRANULOCYTES # BLD: 0 X10E3/UL (ref 0–0.1)
IMM GRANULOCYTES NFR BLD: 0 %
LYMPHOCYTES # BLD AUTO: 1.8 X10E3/UL (ref 0.7–3.1)
LYMPHOCYTES NFR BLD AUTO: 19 %
MCH RBC QN AUTO: 30.3 PG (ref 26.6–33)
MCHC RBC AUTO-ENTMCNC: 32.5 G/DL (ref 31.5–35.7)
MCV RBC AUTO: 93 FL (ref 79–97)
MONOCYTES # BLD AUTO: 0.6 X10E3/UL (ref 0.1–0.9)
MONOCYTES NFR BLD AUTO: 6 %
NEUTROPHILS # BLD AUTO: 6.7 X10E3/UL (ref 1.4–7)
NEUTROPHILS NFR BLD AUTO: 71 %
PLATELET # BLD AUTO: 359 X10E3/UL (ref 150–379)
RBC # BLD AUTO: 3.76 X10E6/UL (ref 3.77–5.28)
SPECIMEN STATUS REPORT, ROLRST: NORMAL
WBC # BLD AUTO: 9.4 X10E3/UL (ref 3.4–10.8)

## 2017-10-26 PROCEDURE — 3331090001 HH PPS REVENUE CREDIT

## 2017-10-26 PROCEDURE — G0299 HHS/HOSPICE OF RN EA 15 MIN: HCPCS

## 2017-10-26 PROCEDURE — 3331090002 HH PPS REVENUE DEBIT

## 2017-10-26 NOTE — PROGRESS NOTES
[address] - AFD - 10/26/2017    This note will not be viewable in [address] Have attempted contact with patient with out-going call. Message left with purpose of call and this NN contact information. 1540  Have spoken to patient. Staples were removed today. Patient has completed her course of home PT. Has agreed that if she does not continue to make progress on her own she will seek therapy from [address] [place] [address]. Patient states she has up-coming watch study for cardiology. States that [facility] also has tests/evaluation planned. Goals      Attends follow-up appointments as directed. 10/12/2017-AFD    Subjective - \"I have so many appointments to make. \"  Objective - Patient reviewed the requests from various physicians for f/u appointments. Asking information about why the MDs at [facility] want several appointments. Assessment - Patient denies transportation issues. States her  will drive. Patient made multiple comments about the number of physicians and appointments she needed to schedule and how she was prioritizing the different medical issues. Plan - Patient encouraged to get a calendar and make the most important appointments first.    AFD - 10/26/2017    Patient has kept all scheduled appointments. PCP appointment is scheduled for December 15. Patient states she still has multiple f/u appointments with neurologists and cardiologists.  Understands red flags post discharge. 10/12/2017 - AFD    Subjective - \"I've never had problems with my blood pressure but it was really high while I was in [facility] for my head injury. \"  Assessment - BP in [facility] was recorded as 190s/80s. Patient states her usual BP is 130-140/70-80. Patient has been started on medication to manage BP. Patient acknowledges Dr. Karolina Cano request that she check her BP every day and keep a record to bring with her on OVs. Patient denied knowledge of what a low BP reading was or s/s.    Plan - Patient asked to take her BP every day at the same time of day, in the same arm, and while sitting - then record the BP in a notebook. If SBP (\"top number\") is lower than 100, patient is to call the office to speak to an office nurse or NN for instructions about taking her medication. AFD - 10/26/2017    Patient states she still feels ocassional dizziness when transferring from lying down to getting up. Patient has f/u with her cardiologist and has future studies planned. Patient is able to describe her safety precautions.

## 2017-10-26 NOTE — PROGRESS NOTES
Subjective:      Faith Hernandez is a [de-identified] y.o. female is here for EP consult to discuss watchman. The patient denies chest pain/ shortness of breath, orthopnea, PND, LE edema, palpitations, syncope, presyncope or fatigue.        Patient Active Problem List    Diagnosis Date Noted    Celiac sprue 03/07/2017    Paroxysmal atrial fibrillation (HCC) 09/07/2016    Precordial pain 09/07/2016    Ataxia 02/17/2016    Dehydration 02/17/2016    Webb esophagus 11/11/2015    ACP (advance care planning) 11/11/2015    Hypothyroidism, adult 07/31/2015    Type 2 diabetes mellitus without complication ([facility] [address] 34/46/9959    Essential hypertension 04/27/2015    MALLORY (obstructive sleep apnea) 07/09/2014    Hyperlipidemia 07/09/2014    Sarcoid 07/09/2014    Stress bladder incontinence, female 07/09/2014    Obesity 07/09/2014    TIA (transient ischemic attack) 07/09/2014    AR (aortic regurgitation) 07/09/2014    Mitral valve insufficiency 07/09/2014    Cervical stenosis of spine 07/09/2014      Edgardo Morales MD  Past Medical History:   Diagnosis Date    Anemia     Ataxia     Webb's esophagus     Cervical spinal stenosis     Coronary atherosclerosis of unspecified type of vessel, native or graft     Diabetes ([facility] [address]     Fatigue     Fatigue     GERD (gastroesophageal reflux disease)     Hypercholesterolemia     Hyperlipidemia     Hypertension     Ill-defined condition     Webb's Esophagus    Liver disease     Osteoarthritis     Sarcoidosis     Sleep apnea     uses cpap    Stroke [facility])     TIA'S      Past Surgical History:   Procedure Laterality Date    COLONOSCOPY N/A 12/28/2016    COLON[facility]COPY performed by Raman Haas [facility] at [facility] ENDOSCOPY    HX CATARACT REMOVAL      both eyes    HX CHOLECYSTECTOMY      HX COLONOSCOPY  5/8/2013    Repeat in 5 years    HX CYST REMOVAL  10/15    on head     HX HYSTERECTOMY      HX ORTHOPAEDIC Bilateral     knee replacement to both knees    HX ORTHOPAEDIC      spacer btwn L4-5 and L5-6     HX TONSILLECTOMY       Allergies   Allergen Reactions    Procardia Xl [Nifedipine] Other (comments)     weakness      Family History   Problem Relation Age of Onset    Other Mother      Fibromyalgia    Pacemaker Mother     Heart Disease Mother     Heart Failure Mother     COPD Mother     Heart Attack Father 61    Cancer Sister      Multiple Myeloma    Diabetes Brother     Obesity Brother     Pacemaker Brother     Heart Attack Brother      Bundle Branch Block    No Known Problems Son     Psychiatric Disorder Daughter      Multiple    negative for cardiac disease  Social History     Social History    Marital status:      Spouse name: N/A    Number of children: N/A    Years of education: N/A     Social History Main Topics    Smoking status: Never Smoker    Smokeless tobacco: Never Used    Alcohol use No    Drug use: No    Sexual activity: Yes     Partners: Male     Other Topics Concern    None     Social History Narrative     Current Outpatient Prescriptions   Medication Sig    levothyroxine (SYNTHROID) 50 mcg tablet TAKE ONE TABLET BY MOUTH ONCE DAILY BEFORE BREAKFAST    sertraline (ZOLOFT) 100 mg tablet TAKE ONE AND ONE-HALF TABLETS BY MOUTH ONCE DAILY    benazepril (LOTENSIN) 20 mg tablet TAKE ONE TABLET BY MOUTH ONCE DAILY    acetaminophen (TYLENOL) 500 mg tablet Take 500 mg by mouth every four (4) hours as needed for Pain.  cholecalciferol (VITAMIN D3) 1,000 unit cap Take 2,000 Units by mouth daily.  omeprazole (PRILOSEC) 20 mg capsule Take 2 Caps by mouth two (2) times a day.  DILT- mg XR capsule TAKE ONE CAPSULE BY MOUTH ONCE DAILY    pravastatin (PRAVACHOL) 20 mg tablet TAKE ONE TABLET BY MOUTH NIGHTLY    metFORMIN ER (GLUCOPHAGE XR) 750 mg tablet TAKE ONE TABLET BY MOUTH TWICE DAILY    apixaban (ELIQUIS) 5 mg tablet Take 1 Tab by mouth two (2) times a day.     nystatin (MYCOSTATIN) powder Apply  to affected area two (2) times a day.  ferrous sulfate (IRON) 325 mg (65 mg iron) tablet Take  by mouth every other day.  multivit-min-FA-lycopen-lutein (CENTRUM SILVER) 0.4-300-250 mg-mcg-mcg tab Take 1 Tab by mouth daily.  aspirin delayed-release 81 mg tablet Take 81 mg by mouth daily.  magnesium 250 mg tab Take 250 mg by mouth daily.  hydroCHLOROthiazide (HYDRODIURIL) 25 mg tablet TAKE ONE TABLET BY MOUTH ONCE DAILY (Patient not taking: No sig reported)    ondansetron (ZOFRAN ODT) 4 mg disintegrating tablet Take 1 Tab by mouth every eight (8) hours as needed for Nausea. (Patient not taking: Reported on 10/15/2017)    polyethylene glycol (MIRALAX) 17 gram/dose powder Take 17 g by mouth daily. 1 tablespoon with 8 oz of water daily     No current facility-administered medications for this visit. Vitals:    10/26/17 1027   BP: 136/70   Pulse: 62   Resp: 16   SpO2: 97%   Weight: 175 lb 3.2 oz (79.5 kg)   Height: 5' 3\" (1.6 m)       I have reviewed the nurses notes, vitals, problem list, allergy list, medical history, family, social history and medications. Review of Symptoms:    General: Pt denies excessive weight gain or loss. Pt is able to conduct ADL's  HEENT: Denies blurred vision, headaches, epistaxis and difficulty swallowing. Respiratory: Denies shortness of breath, STRICKLAND, wheezing or stridor. Cardiovascular: Denies precordial pain, palpitations, edema or PND  Gastrointestinal: Denies poor appetite, indigestion, abdominal pain or blood in stool  Urinary: Denies dysuria, pyuria  Musculoskeletal: Denies pain or swelling from muscles or joints  Neurologic: Denies tremor, paresthesias, or sensory motor disturbance  Skin: Denies rash, itching or texture change. Psych: Denies depression      Physical Exam:      General: Well developed, in no acute distress. HEENT: Eyes - PERRL, no jvd  Heart:  Normal S1/S2 negative S3 or S4.  Regular, no murmur, gallop or rub.   Respiratory: Clear bilaterally x 4, no wheezing or rales  Abdomen:   Soft, non-tender, bowel sounds are active.   Extremities:  No edema, normal cap refill, no cyanosis. Musculoskeletal: No clubbing  Neuro: A&Ox3, speech clear, gait stable. Skin: Skin color is normal. No rashes or lesions. Non diaphoretic  Vascular: 2+ pulses symmetric in all extremities    Cardiographics    Ekg: sinus bradycardia with 1st degree AVB    Results for orders placed or performed during the hospital encounter of 02/05/17   EKG, 12 LEAD, INITIAL   Result Value Ref Range    Ventricular Rate 60 BPM    Atrial Rate 60 BPM    P-R Interval 194 ms    QRS Duration 92 ms    Q-T Interval 490 ms    QTC Calculation (Bezet) 490 ms    Calculated P Axis 48 degrees    Calculated R Axis -2 degrees    Calculated T Axis 59 degrees    Diagnosis       Sinus rhythm with premature atrial complexes  Nonspecific T wave abnormality  When compared with ECG of 08-SEP-2016 12:45,  premature atrial complexes are now present  Nonspecific T wave abnormality, worse in Lateral leads  Confirmed by KAREEM Gonzalez ([id number]) on 2/6/2017 2:30:54 PM           Lab Results   Component Value Date/Time    WBC 9.4 10/24/2017 10:07 AM    HGB 11.4 10/24/2017 10:07 AM    HCT 35.1 10/24/2017 10:07 AM    PLATELET 172 55/82/6858 10:07 AM    MCV 93 10/24/2017 10:07 AM      Lab Results   Component Value Date/Time    Sodium 140 10/24/2017 10:07 AM    Potassium 4.8 10/24/2017 10:07 AM    Chloride 101 10/24/2017 10:07 AM    CO2 24 10/24/2017 10:07 AM    Anion gap 9 09/08/2016 02:13 AM    Glucose 113 10/24/2017 10:07 AM    BUN 26 10/24/2017 10:07 AM    Creatinine 1.04 10/24/2017 10:07 AM    BUN/Creatinine ratio 25 10/24/2017 10:07 AM    GFR est AA 59 10/24/2017 10:07 AM    GFR est non-AA 51 10/24/2017 10:07 AM    Calcium 9.1 10/24/2017 10:07 AM    Bilirubin, total 0.4 10/24/2017 10:07 AM    AST (SGOT) 15 10/24/2017 10:07 AM    Alk.  phosphatase 101 10/24/2017 10:07 AM    Protein, total 6.8 10/24/2017 10:07 AM    Albumin 3.8 10/24/2017 10:07 AM    Globulin 4.2 09/07/2016 01:11 AM    A-G Ratio 1.3 10/24/2017 10:07 AM    ALT (SGPT) 10 10/24/2017 10:07 AM         Assessment:     Assessment:        ICD-10-CM ICD-9-CM    1. Paroxysmal atrial fibrillation (HCC) I48.0 427.31 AMB POC EKG ROUTINE W/ 12 LEADS, INTER & REP   2. Essential hypertension I10 401.9    3. Type 2 diabetes mellitus without complication, without long-term current use of insulin (HCC) E11.9 250.00    4. MALLORY (obstructive sleep apnea) G47.33 327.23    5. Transient cerebral ischemia, unspecified type G45.9 435.9      Orders Placed This Encounter    AMB POC EKG ROUTINE W/ 12 LEADS, INTER & REP     Order Specific Question:   Reason for Exam:     Answer:   Routine        Plan:   Ms. Anila Seth is here for EP consult for Watchman device. The patient has a CHADSVASC score of 6 for the diagnosis/diagnoses of female, age >76, HTN, DM, stroke. He/she should avoid long term anticoagulation due to HAS BLED score of 2- intermediate annual bleeding risk and current medical history of multiple falls with concussion. The patient feels strongly that anticoagulation and documented stroke risk greatly impacts his/her quality of life. The patient is a candidate for Watchman, a left atrial appendage closure (LAAC) device to reduce risk of thromboembolism. The patient has need for anticoagulation and is considered a high risk for stroke, but has a relative contraindication for long term anticoagulation. The patient is suitable for short term warfarin but deemed unable to take long term oral anticoagulation following the conclusion of shared decision making interaction with the patient. I have discussed at length the risks/benefits and alternatives of this procedure with regards to stroke risk versus bleeding risk. The patient understands that anticoagulation will be maintained in a variety of forms during the first year and agrees with plan.  Risks include but not limited to: infection, bleeding, vessel injury, cardiac perforation at times requiring drainage or surgery, heart failure, migration of the device, emergency surgery, myocardial infarction, stroke and death. We will schedule. Continue medical management for AF, HTN, MALLORY. Thank you for allowing me to participate in Tracy Vera 's care.     AVERY Kent MD, Gwendolyn Pro

## 2017-10-27 ENCOUNTER — HOME CARE VISIT (OUTPATIENT)
Dept: SCHEDULING | Facility: HOME HEALTH | Age: 82
End: 2017-10-27
Payer: MEDICARE

## 2017-10-27 PROCEDURE — 3331090002 HH PPS REVENUE DEBIT

## 2017-10-27 PROCEDURE — 3331090001 HH PPS REVENUE CREDIT

## 2017-10-27 PROCEDURE — G0151 HHCP-SERV OF PT,EA 15 MIN: HCPCS

## 2017-10-28 VITALS
OXYGEN SATURATION: 98 % | HEART RATE: 51 BPM | SYSTOLIC BLOOD PRESSURE: 140 MMHG | TEMPERATURE: 97.3 F | DIASTOLIC BLOOD PRESSURE: 78 MMHG

## 2017-10-28 PROCEDURE — 3331090002 HH PPS REVENUE DEBIT

## 2017-10-28 PROCEDURE — 3331090001 HH PPS REVENUE CREDIT

## 2017-10-29 PROCEDURE — 3331090002 HH PPS REVENUE DEBIT

## 2017-10-29 PROCEDURE — 3331090001 HH PPS REVENUE CREDIT

## 2017-10-30 PROCEDURE — 3331090001 HH PPS REVENUE CREDIT

## 2017-10-30 PROCEDURE — 3331090002 HH PPS REVENUE DEBIT

## 2017-10-31 ENCOUNTER — HOME CARE VISIT (OUTPATIENT)
Dept: SCHEDULING | Facility: HOME HEALTH | Age: 82
End: 2017-10-31
Payer: MEDICARE

## 2017-10-31 VITALS
HEART RATE: 59 BPM | TEMPERATURE: 97.8 F | RESPIRATION RATE: 20 BRPM | SYSTOLIC BLOOD PRESSURE: 134 MMHG | OXYGEN SATURATION: 98 % | DIASTOLIC BLOOD PRESSURE: 60 MMHG

## 2017-10-31 PROCEDURE — G0299 HHS/HOSPICE OF RN EA 15 MIN: HCPCS

## 2017-10-31 PROCEDURE — 3331090001 HH PPS REVENUE CREDIT

## 2017-10-31 PROCEDURE — 3331090003 HH PPS REVENUE ADJ

## 2017-10-31 PROCEDURE — 3331090002 HH PPS REVENUE DEBIT

## 2017-11-15 ENCOUNTER — PATIENT OUTREACH (OUTPATIENT)
Dept: INTERNAL MEDICINE CLINIC | Age: 82
End: 2017-11-15

## 2017-11-15 NOTE — PROGRESS NOTES
[address] - [facility] - 11/15/2017  This note will not be viewable in [address] [facility] Post Hospitalization     - Patient attended [facility] appointment with Dr. Radha Garcia on 10/11/17; was advised to follow-up in December. To the best of this NN's knowledge, this patient had no additional ED visits or Hospital Admissions during 30 day LOU period following admission to [facility]. LOU period has ended. Post-Hospitalization Episode Resolved. Patient has NN contact information if any questions/concerns arise in the future. Future Appointments:  Dr. Radha Garcia - 12/15/17    NN will route this encounter to Dr. Radha Garcia for notification/review.

## 2017-11-20 ENCOUNTER — HOSPITAL ENCOUNTER (OUTPATIENT)
Dept: BONE DENSITY | Age: 82
Discharge: HOME | End: 2017-11-20
Attending: INTERNAL MEDICINE
Payer: MEDICARE

## 2017-11-20 DIAGNOSIS — M85.89 OSTEOPENIA OF MULTIPLE SITES: ICD-10-CM

## 2017-11-20 PROCEDURE — 77080 DXA BONE DENSITY AXIAL: CPT

## 2017-12-06 ENCOUNTER — HOSPITAL ENCOUNTER (OUTPATIENT)
Dept: GENERAL RADIOLOGY | Age: 82
Discharge: HOME | End: 2017-12-06
Attending: INTERNAL MEDICINE
Payer: MEDICARE

## 2017-12-06 ENCOUNTER — HOSPITAL ENCOUNTER (OUTPATIENT)
Dept: PREADMISSION TESTING | Age: 82
Discharge: HOME | End: 2017-12-06
Attending: INTERNAL MEDICINE
Payer: MEDICARE

## 2017-12-06 VITALS
SYSTOLIC BLOOD PRESSURE: 173 MMHG | BODY MASS INDEX: 32.74 KG/M2 | WEIGHT: 177.91 LBS | RESPIRATION RATE: 16 BRPM | HEART RATE: 53 BPM | DIASTOLIC BLOOD PRESSURE: 52 MMHG | OXYGEN SATURATION: 100 % | TEMPERATURE: 97.6 F | HEIGHT: 62 IN

## 2017-12-06 LAB
ANION GAP SERPL CALC-SCNC: 8 MMOL/L (ref 5–15)
ATRIAL RATE: 55 BPM
BUN SERPL-MCNC: 21 MG/DL (ref 6–20)
BUN/CREAT SERPL: 21 (ref 12–20)
CALCIUM SERPL-MCNC: 8.8 MG/DL (ref 8.5–10.1)
CALCULATED P AXIS, ECG09: 52 DEGREES
CALCULATED R AXIS, ECG10: -15 DEGREES
CALCULATED T AXIS, ECG11: 75 DEGREES
CHLORIDE SERPL-SCNC: 105 MMOL/L (ref 97–108)
CO2 SERPL-SCNC: 26 MMOL/L (ref 21–32)
CREAT SERPL-MCNC: 0.98 MG/DL (ref 0.55–1.02)
DIAGNOSIS, 93000: NORMAL
ERYTHROCYTE [DISTWIDTH] IN BLOOD BY AUTOMATED COUNT: 13.7 % (ref 11.5–14.5)
GLUCOSE SERPL-MCNC: 109 MG/DL (ref 65–100)
HCT VFR BLD AUTO: 35.9 % (ref 35–47)
HGB BLD-MCNC: 11.5 G/DL (ref 11.5–16)
MCH RBC QN AUTO: 29.8 PG (ref 26–34)
MCHC RBC AUTO-ENTMCNC: 32 G/DL (ref 30–36.5)
MCV RBC AUTO: 93 FL (ref 80–99)
P-R INTERVAL, ECG05: 212 MS
PLATELET # BLD AUTO: 260 K/UL (ref 150–400)
POTASSIUM SERPL-SCNC: 4.1 MMOL/L (ref 3.5–5.1)
Q-T INTERVAL, ECG07: 458 MS
QRS DURATION, ECG06: 82 MS
QTC CALCULATION (BEZET), ECG08: 438 MS
RBC # BLD AUTO: 3.86 M/UL (ref 3.8–5.2)
SODIUM SERPL-SCNC: 139 MMOL/L (ref 136–145)
VENTRICULAR RATE, ECG03: 55 BPM
WBC # BLD AUTO: 8.9 K/UL (ref 3.6–11)

## 2017-12-06 PROCEDURE — 93005 ELECTROCARDIOGRAM TRACING: CPT

## 2017-12-06 PROCEDURE — 85027 COMPLETE CBC AUTOMATED: CPT | Performed by: INTERNAL MEDICINE

## 2017-12-06 PROCEDURE — 36415 COLL VENOUS BLD VENIPUNCTURE: CPT | Performed by: INTERNAL MEDICINE

## 2017-12-06 PROCEDURE — 80048 BASIC METABOLIC PNL TOTAL CA: CPT | Performed by: INTERNAL MEDICINE

## 2017-12-06 PROCEDURE — 71020 XR CHEST PA LAT: CPT

## 2017-12-06 RX ORDER — ACETAMINOPHEN 500 MG
2000 TABLET ORAL DAILY
COMMUNITY

## 2017-12-06 RX ORDER — OMEPRAZOLE 40 MG/1
40 CAPSULE, DELAYED RELEASE ORAL DAILY
COMMUNITY
End: 2018-03-16

## 2017-12-07 LAB
BACTERIA SPEC CULT: NORMAL
BACTERIA SPEC CULT: NORMAL
SERVICE CMNT-IMP: NORMAL

## 2017-12-11 ENCOUNTER — APPOINTMENT (OUTPATIENT)
Dept: INTERNAL MEDICINE CLINIC | Age: 82
End: 2017-12-11

## 2017-12-11 ENCOUNTER — HOSPITAL ENCOUNTER (OUTPATIENT)
Dept: LAB | Age: 82
Discharge: HOME | End: 2017-12-11
Payer: MEDICARE

## 2017-12-11 PROCEDURE — 85027 COMPLETE CBC AUTOMATED: CPT

## 2017-12-11 PROCEDURE — 80053 COMPREHEN METABOLIC PANEL: CPT

## 2017-12-11 PROCEDURE — 83036 HEMOGLOBIN GLYCOSYLATED A1C: CPT

## 2017-12-11 PROCEDURE — 36415 COLL VENOUS BLD VENIPUNCTURE: CPT

## 2017-12-12 LAB
ALBUMIN SERPL-MCNC: 4.1 G/DL (ref 3.5–4.7)
ALBUMIN/GLOB SERPL: 1.6 {RATIO} (ref 1.2–2.2)
ALP SERPL-CCNC: 82 IU/L (ref 39–117)
ALT SERPL-CCNC: 15 IU/L (ref 0–32)
AST SERPL-CCNC: 15 IU/L (ref 0–40)
BILIRUB SERPL-MCNC: 0.4 MG/DL (ref 0–1.2)
BUN SERPL-MCNC: 23 MG/DL (ref 8–27)
BUN/CREAT SERPL: 23 (ref 12–28)
CALCIUM SERPL-MCNC: 9 MG/DL (ref 8.7–10.3)
CHLORIDE SERPL-SCNC: 102 MMOL/L (ref 96–106)
CO2 SERPL-SCNC: 24 MMOL/L (ref 18–29)
CREAT SERPL-MCNC: 0.98 MG/DL (ref 0.57–1)
ERYTHROCYTE [DISTWIDTH] IN BLOOD BY AUTOMATED COUNT: 14.5 % (ref 12.3–15.4)
GFR SERPLBLD CREATININE-BSD FMLA CKD-EPI: 55 ML/MIN/1.73
GFR SERPLBLD CREATININE-BSD FMLA CKD-EPI: 63 ML/MIN/1.73
GLOBULIN SER CALC-MCNC: 2.5 G/DL (ref 1.5–4.5)
GLUCOSE SERPL-MCNC: 101 MG/DL (ref 65–99)
HCT VFR BLD AUTO: 36.3 % (ref 34–46.6)
HGB BLD-MCNC: 11.7 G/DL (ref 11.1–15.9)
MCH RBC QN AUTO: 29.1 PG (ref 26.6–33)
MCHC RBC AUTO-ENTMCNC: 32.2 G/DL (ref 31.5–35.7)
MCV RBC AUTO: 90 FL (ref 79–97)
PLATELET # BLD AUTO: 275 X10E3/UL (ref 150–379)
POTASSIUM SERPL-SCNC: 4.7 MMOL/L (ref 3.5–5.2)
PROT SERPL-MCNC: 6.6 G/DL (ref 6–8.5)
RBC # BLD AUTO: 4.02 X10E6/UL (ref 3.77–5.28)
SODIUM SERPL-SCNC: 141 MMOL/L (ref 134–144)
WBC # BLD AUTO: 8.5 X10E3/UL (ref 3.4–10.8)

## 2017-12-13 ENCOUNTER — DOCUMENTATION ONLY (OUTPATIENT)
Dept: INTERNAL MEDICINE CLINIC | Age: 82
End: 2017-12-13

## 2017-12-13 ENCOUNTER — ANESTHESIA EVENT (OUTPATIENT)
Dept: NON INVASIVE DIAGNOSTICS | Age: 82
DRG: 274 | End: 2017-12-13
Payer: MEDICARE

## 2017-12-13 ENCOUNTER — HOSPITAL ENCOUNTER (INPATIENT)
Dept: NON INVASIVE DIAGNOSTICS | Age: 82
LOS: 1 days | Discharge: HOME | DRG: 274 | End: 2017-12-14
Attending: INTERNAL MEDICINE | Admitting: INTERNAL MEDICINE
Payer: MEDICARE

## 2017-12-13 ENCOUNTER — ANESTHESIA (OUTPATIENT)
Dept: NON INVASIVE DIAGNOSTICS | Age: 82
DRG: 274 | End: 2017-12-13
Payer: MEDICARE

## 2017-12-13 LAB
ABO + RH BLD: NORMAL
BLOOD GROUP ANTIBODIES SERPL: NORMAL
GLUCOSE BLD STRIP.AUTO-MCNC: 114 MG/DL (ref 65–100)
GLUCOSE BLD STRIP.AUTO-MCNC: 139 MG/DL (ref 65–100)
GLUCOSE BLD STRIP.AUTO-MCNC: 95 MG/DL (ref 65–100)
GLUCOSE BLD STRIP.AUTO-MCNC: 99 MG/DL (ref 65–100)
SERVICE CMNT-IMP: ABNORMAL
SERVICE CMNT-IMP: ABNORMAL
SERVICE CMNT-IMP: NORMAL
SERVICE CMNT-IMP: NORMAL
SPECIMEN EXP DATE BLD: NORMAL

## 2017-12-13 PROCEDURE — 77030008771 HC TU NG SALEM SUMP -A: Performed by: ANESTHESIOLOGY

## 2017-12-13 PROCEDURE — 77030013079 HC BLNKT BAIR HGGR 3M -A: Performed by: ANESTHESIOLOGY

## 2017-12-13 PROCEDURE — 82962 GLUCOSE BLOOD TEST: CPT

## 2017-12-13 PROCEDURE — 74011250636 HC RX REV CODE- 250/636

## 2017-12-13 PROCEDURE — 77030016707 HC CATH ANGI DX SUPT1 CARD -B

## 2017-12-13 PROCEDURE — 77030005401 HC CATH RAD ARRO -A: Performed by: ANESTHESIOLOGY

## 2017-12-13 PROCEDURE — 86900 BLOOD TYPING SEROLOGIC ABO: CPT | Performed by: INTERNAL MEDICINE

## 2017-12-13 PROCEDURE — C1894 INTRO/SHEATH, NON-LASER: HCPCS

## 2017-12-13 PROCEDURE — 74011636320 HC RX REV CODE- 636/320: Performed by: INTERNAL MEDICINE

## 2017-12-13 PROCEDURE — C1769 GUIDE WIRE: HCPCS

## 2017-12-13 PROCEDURE — 02L73DK OCCLUSION OF LEFT ATRIAL APPENDAGE WITH INTRALUMINAL DEVICE, PERCUTANEOUS APPROACH: ICD-10-PCS | Performed by: INTERNAL MEDICINE

## 2017-12-13 PROCEDURE — 77030008684 HC TU ET CUF COVD -B: Performed by: ANESTHESIOLOGY

## 2017-12-13 PROCEDURE — 76210000017 HC OR PH I REC 1.5 TO 2 HR

## 2017-12-13 PROCEDURE — 76060000033 HC ANESTHESIA 1 TO 1.5 HR

## 2017-12-13 PROCEDURE — 77030026438 HC STYL ET INTUB CARD -A: Performed by: ANESTHESIOLOGY

## 2017-12-13 PROCEDURE — 33340 PERQ CLSR TCAT L ATR APNDGE: CPT

## 2017-12-13 PROCEDURE — 77030002996 HC SUT SLK J&J -A

## 2017-12-13 PROCEDURE — 36415 COLL VENOUS BLD VENIPUNCTURE: CPT | Performed by: INTERNAL MEDICINE

## 2017-12-13 PROCEDURE — 74011250637 HC RX REV CODE- 250/637: Performed by: INTERNAL MEDICINE

## 2017-12-13 PROCEDURE — 93325 DOPPLER ECHO COLOR FLOW MAPG: CPT

## 2017-12-13 PROCEDURE — 77030019908 HC STETH ESOPH SIMS -A: Performed by: ANESTHESIOLOGY

## 2017-12-13 PROCEDURE — B24BZZ4 ULTRASONOGRAPHY OF HEART WITH AORTA, TRANSESOPHAGEAL: ICD-10-PCS | Performed by: ANESTHESIOLOGY

## 2017-12-13 PROCEDURE — C1893 INTRO/SHEATH, FIXED,NON-PEEL: HCPCS

## 2017-12-13 PROCEDURE — 77030018729 HC ELECTRD DEFIB PAD CARD -B

## 2017-12-13 PROCEDURE — 77030013797 HC KT TRNSDUC PRSSR EDWD -A

## 2017-12-13 PROCEDURE — 77030038111 HC IMPL LAA WATCHMAN 27MM BSC -L

## 2017-12-13 PROCEDURE — 77030020506 HC NDL TRNSPTL NRG BAYL -F

## 2017-12-13 PROCEDURE — 85347 COAGULATION TIME ACTIVATED: CPT

## 2017-12-13 PROCEDURE — 74011000250 HC RX REV CODE- 250

## 2017-12-13 PROCEDURE — 77030013797 HC KT TRNSDUC PRSSR EDWD -A: Performed by: ANESTHESIOLOGY

## 2017-12-13 PROCEDURE — 65660000000 HC RM CCU STEPDOWN

## 2017-12-13 RX ORDER — SODIUM CHLORIDE 9 MG/ML
50 INJECTION, SOLUTION INTRAVENOUS CONTINUOUS
Status: DISCONTINUED | OUTPATIENT
Start: 2017-12-13 | End: 2017-12-13

## 2017-12-13 RX ORDER — HEPARIN SODIUM 1000 [USP'U]/ML
INJECTION, SOLUTION INTRAVENOUS; SUBCUTANEOUS AS NEEDED
Status: DISCONTINUED | OUTPATIENT
Start: 2017-12-13 | End: 2017-12-13 | Stop reason: HOSPADM

## 2017-12-13 RX ORDER — MORPHINE SULFATE 10 MG/ML
2 INJECTION, SOLUTION INTRAMUSCULAR; INTRAVENOUS
Status: DISCONTINUED | OUTPATIENT
Start: 2017-12-13 | End: 2017-12-13

## 2017-12-13 RX ORDER — PROTAMINE SULFATE 10 MG/ML
25-100 INJECTION, SOLUTION INTRAVENOUS
Status: DISCONTINUED | OUTPATIENT
Start: 2017-12-13 | End: 2017-12-14 | Stop reason: HOSPADM

## 2017-12-13 RX ORDER — LANOLIN ALCOHOL/MO/W.PET/CERES
325 CREAM (GRAM) TOPICAL EVERY OTHER DAY
Status: DISCONTINUED | OUTPATIENT
Start: 2017-12-13 | End: 2017-12-14 | Stop reason: HOSPADM

## 2017-12-13 RX ORDER — ROCURONIUM BROMIDE 10 MG/ML
INJECTION, SOLUTION INTRAVENOUS AS NEEDED
Status: DISCONTINUED | OUTPATIENT
Start: 2017-12-13 | End: 2017-12-13 | Stop reason: HOSPADM

## 2017-12-13 RX ORDER — MIDAZOLAM HYDROCHLORIDE 1 MG/ML
1-5 INJECTION, SOLUTION INTRAMUSCULAR; INTRAVENOUS
Status: DISCONTINUED | OUTPATIENT
Start: 2017-12-13 | End: 2017-12-14 | Stop reason: HOSPADM

## 2017-12-13 RX ORDER — SODIUM CHLORIDE 0.9 % (FLUSH) 0.9 %
5-10 SYRINGE (ML) INJECTION AS NEEDED
Status: CANCELLED | OUTPATIENT
Start: 2017-12-13

## 2017-12-13 RX ORDER — BENAZEPRIL HYDROCHLORIDE 10 MG/1
20 TABLET ORAL DAILY
Status: DISCONTINUED | OUTPATIENT
Start: 2017-12-14 | End: 2017-12-14 | Stop reason: HOSPADM

## 2017-12-13 RX ORDER — PANTOPRAZOLE SODIUM 40 MG/1
40 TABLET, DELAYED RELEASE ORAL
Status: DISCONTINUED | OUTPATIENT
Start: 2017-12-13 | End: 2017-12-14 | Stop reason: HOSPADM

## 2017-12-13 RX ORDER — LEVOTHYROXINE SODIUM 50 UG/1
50 TABLET ORAL
Status: DISCONTINUED | OUTPATIENT
Start: 2017-12-14 | End: 2017-12-14 | Stop reason: HOSPADM

## 2017-12-13 RX ORDER — FENTANYL CITRATE 50 UG/ML
25 INJECTION, SOLUTION INTRAMUSCULAR; INTRAVENOUS
Status: DISCONTINUED | OUTPATIENT
Start: 2017-12-13 | End: 2017-12-13

## 2017-12-13 RX ORDER — SODIUM CHLORIDE 9 MG/ML
INJECTION, SOLUTION INTRAVENOUS
Status: DISCONTINUED | OUTPATIENT
Start: 2017-12-13 | End: 2017-12-13 | Stop reason: HOSPADM

## 2017-12-13 RX ORDER — SODIUM CHLORIDE 0.9 % (FLUSH) 0.9 %
5-10 SYRINGE (ML) INJECTION EVERY 8 HOURS
Status: DISCONTINUED | OUTPATIENT
Start: 2017-12-13 | End: 2017-12-14 | Stop reason: HOSPADM

## 2017-12-13 RX ORDER — SODIUM CHLORIDE 0.9 % (FLUSH) 0.9 %
5-10 SYRINGE (ML) INJECTION AS NEEDED
Status: DISCONTINUED | OUTPATIENT
Start: 2017-12-13 | End: 2017-12-14 | Stop reason: HOSPADM

## 2017-12-13 RX ORDER — SODIUM CHLORIDE 0.9 % (FLUSH) 0.9 %
5-10 SYRINGE (ML) INJECTION AS NEEDED
Status: DISCONTINUED | OUTPATIENT
Start: 2017-12-13 | End: 2017-12-13

## 2017-12-13 RX ORDER — WARFARIN SODIUM 5 MG/1
5 TABLET ORAL EVERY EVENING
Status: DISCONTINUED | OUTPATIENT
Start: 2017-12-13 | End: 2017-12-14 | Stop reason: HOSPADM

## 2017-12-13 RX ORDER — MIDAZOLAM HYDROCHLORIDE 1 MG/ML
.5-2 INJECTION, SOLUTION INTRAMUSCULAR; INTRAVENOUS
Status: DISCONTINUED | OUTPATIENT
Start: 2017-12-13 | End: 2017-12-13 | Stop reason: HOSPADM

## 2017-12-13 RX ORDER — FENTANYL CITRATE 50 UG/ML
INJECTION, SOLUTION INTRAMUSCULAR; INTRAVENOUS
Status: COMPLETED
Start: 2017-12-13 | End: 2017-12-13

## 2017-12-13 RX ORDER — HEPARIN SODIUM 200 [USP'U]/100ML
INJECTION, SOLUTION INTRAVENOUS
Status: COMPLETED
Start: 2017-12-13 | End: 2017-12-13

## 2017-12-13 RX ORDER — ONDANSETRON 2 MG/ML
4 INJECTION INTRAMUSCULAR; INTRAVENOUS AS NEEDED
Status: DISCONTINUED | OUTPATIENT
Start: 2017-12-13 | End: 2017-12-13

## 2017-12-13 RX ORDER — ASPIRIN 81 MG/1
81 TABLET ORAL DAILY
Status: DISCONTINUED | OUTPATIENT
Start: 2017-12-14 | End: 2017-12-14 | Stop reason: HOSPADM

## 2017-12-13 RX ORDER — MIDAZOLAM HYDROCHLORIDE 1 MG/ML
1 INJECTION, SOLUTION INTRAMUSCULAR; INTRAVENOUS AS NEEDED
Status: CANCELLED | OUTPATIENT
Start: 2017-12-13

## 2017-12-13 RX ORDER — MIDAZOLAM HYDROCHLORIDE 1 MG/ML
INJECTION, SOLUTION INTRAMUSCULAR; INTRAVENOUS AS NEEDED
Status: DISCONTINUED | OUTPATIENT
Start: 2017-12-13 | End: 2017-12-13 | Stop reason: HOSPADM

## 2017-12-13 RX ORDER — OXYCODONE AND ACETAMINOPHEN 5; 325 MG/1; MG/1
1 TABLET ORAL AS NEEDED
Status: DISCONTINUED | OUTPATIENT
Start: 2017-12-13 | End: 2017-12-13

## 2017-12-13 RX ORDER — SERTRALINE HYDROCHLORIDE 50 MG/1
100 TABLET, FILM COATED ORAL DAILY
Status: DISCONTINUED | OUTPATIENT
Start: 2017-12-14 | End: 2017-12-14 | Stop reason: HOSPADM

## 2017-12-13 RX ORDER — MIDAZOLAM HYDROCHLORIDE 1 MG/ML
INJECTION, SOLUTION INTRAMUSCULAR; INTRAVENOUS
Status: COMPLETED
Start: 2017-12-13 | End: 2017-12-13

## 2017-12-13 RX ORDER — MORPHINE SULFATE 2 MG/ML
INJECTION, SOLUTION INTRAMUSCULAR; INTRAVENOUS AS NEEDED
Status: DISCONTINUED | OUTPATIENT
Start: 2017-12-13 | End: 2017-12-13 | Stop reason: HOSPADM

## 2017-12-13 RX ORDER — INSULIN LISPRO 100 [IU]/ML
INJECTION, SOLUTION INTRAVENOUS; SUBCUTANEOUS
Status: DISCONTINUED | OUTPATIENT
Start: 2017-12-13 | End: 2017-12-14 | Stop reason: HOSPADM

## 2017-12-13 RX ORDER — DEXTROSE 50 % IN WATER (D50W) INTRAVENOUS SYRINGE
12.5-25 AS NEEDED
Status: DISCONTINUED | OUTPATIENT
Start: 2017-12-13 | End: 2017-12-14 | Stop reason: HOSPADM

## 2017-12-13 RX ORDER — PROPOFOL 10 MG/ML
INJECTION, EMULSION INTRAVENOUS AS NEEDED
Status: DISCONTINUED | OUTPATIENT
Start: 2017-12-13 | End: 2017-12-13 | Stop reason: HOSPADM

## 2017-12-13 RX ORDER — MIDAZOLAM HYDROCHLORIDE 1 MG/ML
0.5 INJECTION, SOLUTION INTRAMUSCULAR; INTRAVENOUS
Status: DISCONTINUED | OUTPATIENT
Start: 2017-12-13 | End: 2017-12-13

## 2017-12-13 RX ORDER — DIPHENHYDRAMINE HYDROCHLORIDE 50 MG/ML
12.5 INJECTION, SOLUTION INTRAMUSCULAR; INTRAVENOUS AS NEEDED
Status: DISCONTINUED | OUTPATIENT
Start: 2017-12-13 | End: 2017-12-13

## 2017-12-13 RX ORDER — FENTANYL CITRATE 50 UG/ML
12.5-5 INJECTION, SOLUTION INTRAMUSCULAR; INTRAVENOUS
Status: DISCONTINUED | OUTPATIENT
Start: 2017-12-13 | End: 2017-12-14 | Stop reason: HOSPADM

## 2017-12-13 RX ORDER — MAGNESIUM SULFATE 100 %
4 CRYSTALS MISCELLANEOUS AS NEEDED
Status: DISCONTINUED | OUTPATIENT
Start: 2017-12-13 | End: 2017-12-14 | Stop reason: HOSPADM

## 2017-12-13 RX ORDER — HYDROMORPHONE HYDROCHLORIDE 1 MG/ML
0.2 INJECTION, SOLUTION INTRAMUSCULAR; INTRAVENOUS; SUBCUTANEOUS
Status: DISCONTINUED | OUTPATIENT
Start: 2017-12-13 | End: 2017-12-13

## 2017-12-13 RX ORDER — ONDANSETRON 2 MG/ML
INJECTION INTRAMUSCULAR; INTRAVENOUS AS NEEDED
Status: DISCONTINUED | OUTPATIENT
Start: 2017-12-13 | End: 2017-12-13 | Stop reason: HOSPADM

## 2017-12-13 RX ORDER — SUCCINYLCHOLINE CHLORIDE 20 MG/ML
INJECTION INTRAMUSCULAR; INTRAVENOUS AS NEEDED
Status: DISCONTINUED | OUTPATIENT
Start: 2017-12-13 | End: 2017-12-13 | Stop reason: HOSPADM

## 2017-12-13 RX ORDER — FENTANYL CITRATE 50 UG/ML
50 INJECTION, SOLUTION INTRAMUSCULAR; INTRAVENOUS AS NEEDED
Status: CANCELLED | OUTPATIENT
Start: 2017-12-13

## 2017-12-13 RX ORDER — FENTANYL CITRATE 50 UG/ML
INJECTION, SOLUTION INTRAMUSCULAR; INTRAVENOUS AS NEEDED
Status: DISCONTINUED | OUTPATIENT
Start: 2017-12-13 | End: 2017-12-13 | Stop reason: HOSPADM

## 2017-12-13 RX ORDER — PRAVASTATIN SODIUM 40 MG/1
20 TABLET ORAL
Status: DISCONTINUED | OUTPATIENT
Start: 2017-12-13 | End: 2017-12-14 | Stop reason: HOSPADM

## 2017-12-13 RX ORDER — LIDOCAINE HYDROCHLORIDE 20 MG/ML
INJECTION, SOLUTION EPIDURAL; INFILTRATION; INTRACAUDAL; PERINEURAL AS NEEDED
Status: DISCONTINUED | OUTPATIENT
Start: 2017-12-13 | End: 2017-12-13 | Stop reason: HOSPADM

## 2017-12-13 RX ORDER — HEPARIN SODIUM 1000 [USP'U]/ML
2500 INJECTION, SOLUTION INTRAVENOUS; SUBCUTANEOUS ONCE
Status: DISCONTINUED | OUTPATIENT
Start: 2017-12-13 | End: 2017-12-13 | Stop reason: HOSPADM

## 2017-12-13 RX ORDER — MORPHINE SULFATE 2 MG/ML
INJECTION, SOLUTION INTRAMUSCULAR; INTRAVENOUS
Status: COMPLETED
Start: 2017-12-13 | End: 2017-12-13

## 2017-12-13 RX ORDER — SODIUM CHLORIDE, SODIUM LACTATE, POTASSIUM CHLORIDE, CALCIUM CHLORIDE 600; 310; 30; 20 MG/100ML; MG/100ML; MG/100ML; MG/100ML
75 INJECTION, SOLUTION INTRAVENOUS CONTINUOUS
Status: DISCONTINUED | OUTPATIENT
Start: 2017-12-13 | End: 2017-12-13

## 2017-12-13 RX ORDER — FENTANYL CITRATE 50 UG/ML
25-50 INJECTION, SOLUTION INTRAMUSCULAR; INTRAVENOUS
Status: DISCONTINUED | OUTPATIENT
Start: 2017-12-13 | End: 2017-12-13 | Stop reason: HOSPADM

## 2017-12-13 RX ORDER — SODIUM CHLORIDE, SODIUM LACTATE, POTASSIUM CHLORIDE, CALCIUM CHLORIDE 600; 310; 30; 20 MG/100ML; MG/100ML; MG/100ML; MG/100ML
75 INJECTION, SOLUTION INTRAVENOUS CONTINUOUS
Status: CANCELLED | OUTPATIENT
Start: 2017-12-13 | End: 2017-12-14

## 2017-12-13 RX ORDER — SODIUM CHLORIDE 0.9 % (FLUSH) 0.9 %
5-10 SYRINGE (ML) INJECTION EVERY 8 HOURS
Status: CANCELLED | OUTPATIENT
Start: 2017-12-13

## 2017-12-13 RX ORDER — HEPARIN SODIUM 200 [USP'U]/100ML
2500 INJECTION, SOLUTION INTRAVENOUS ONCE
Status: COMPLETED | OUTPATIENT
Start: 2017-12-13 | End: 2017-12-13

## 2017-12-13 RX ORDER — LIDOCAINE HYDROCHLORIDE 10 MG/ML
0.1 INJECTION, SOLUTION EPIDURAL; INFILTRATION; INTRACAUDAL; PERINEURAL AS NEEDED
Status: CANCELLED | OUTPATIENT
Start: 2017-12-13

## 2017-12-13 RX ORDER — SODIUM CHLORIDE 9 MG/ML
50 INJECTION, SOLUTION INTRAVENOUS CONTINUOUS
Status: CANCELLED | OUTPATIENT
Start: 2017-12-13 | End: 2017-12-14

## 2017-12-13 RX ADMIN — MIDAZOLAM HYDROCHLORIDE 1 MG: 1 INJECTION, SOLUTION INTRAMUSCULAR; INTRAVENOUS at 11:05

## 2017-12-13 RX ADMIN — MIDAZOLAM HYDROCHLORIDE 2 MG: 1 INJECTION, SOLUTION INTRAMUSCULAR; INTRAVENOUS at 12:42

## 2017-12-13 RX ADMIN — LIDOCAINE HYDROCHLORIDE 100 MG: 20 INJECTION, SOLUTION EPIDURAL; INFILTRATION; INTRACAUDAL; PERINEURAL at 12:49

## 2017-12-13 RX ADMIN — SUCCINYLCHOLINE CHLORIDE 140 MG: 20 INJECTION INTRAMUSCULAR; INTRAVENOUS at 12:51

## 2017-12-13 RX ADMIN — MIDAZOLAM HYDROCHLORIDE 1 MG: 1 INJECTION, SOLUTION INTRAMUSCULAR; INTRAVENOUS at 10:58

## 2017-12-13 RX ADMIN — FENTANYL CITRATE 100 MCG: 50 INJECTION, SOLUTION INTRAMUSCULAR; INTRAVENOUS at 12:49

## 2017-12-13 RX ADMIN — Medication 10 ML: at 22:02

## 2017-12-13 RX ADMIN — ROCURONIUM BROMIDE 10 MG: 10 INJECTION, SOLUTION INTRAVENOUS at 12:49

## 2017-12-13 RX ADMIN — PROPOFOL 200 MG: 10 INJECTION, EMULSION INTRAVENOUS at 12:50

## 2017-12-13 RX ADMIN — IOPAMIDOL 50 ML: 755 INJECTION, SOLUTION INTRAVENOUS at 13:19

## 2017-12-13 RX ADMIN — HEPARIN SODIUM 10000 UNITS: 1000 INJECTION, SOLUTION INTRAVENOUS; SUBCUTANEOUS at 13:12

## 2017-12-13 RX ADMIN — PANTOPRAZOLE SODIUM 40 MG: 40 TABLET, DELAYED RELEASE ORAL at 17:27

## 2017-12-13 RX ADMIN — FENTANYL CITRATE 50 MCG: 50 INJECTION, SOLUTION INTRAMUSCULAR; INTRAVENOUS at 10:59

## 2017-12-13 RX ADMIN — Medication 10 ML: at 17:28

## 2017-12-13 RX ADMIN — FERROUS SULFATE TAB 325 MG (65 MG ELEMENTAL FE) 325 MG: 325 (65 FE) TAB at 17:27

## 2017-12-13 RX ADMIN — ONDANSETRON 4 MG: 2 INJECTION INTRAMUSCULAR; INTRAVENOUS at 13:32

## 2017-12-13 RX ADMIN — Medication 5000 UNITS: at 13:19

## 2017-12-13 RX ADMIN — MORPHINE SULFATE 2 MG: 2 INJECTION, SOLUTION INTRAMUSCULAR; INTRAVENOUS at 13:32

## 2017-12-13 RX ADMIN — SODIUM CHLORIDE: 9 INJECTION, SOLUTION INTRAVENOUS at 12:42

## 2017-12-13 RX ADMIN — HEPARIN SODIUM 5000 UNITS: 200 INJECTION, SOLUTION INTRAVENOUS at 13:19

## 2017-12-13 RX ADMIN — PRAVASTATIN SODIUM 20 MG: 40 TABLET ORAL at 22:02

## 2017-12-13 RX ADMIN — WARFARIN SODIUM 5 MG: 5 TABLET ORAL at 17:27

## 2017-12-13 NOTE — ANESTHESIA POSTPROCEDURE EVALUATION
Post-Anesthesia Evaluation and Assessment    Patient: Finn Nelson MRN: [id number]  SSN: [id number]    YOB: 1935  Age: [de-identified] y.o. Sex: female       Cardiovascular Function/Vital Signs  Visit Vitals    /55 (BP 1 Location: Left arm, BP Patient Position: At rest)    Pulse (!) 55    Temp 36.6 °C (97.8 °F)    Resp 16    Ht 5' 1.5\" (1.562 m)    Wt 78.9 kg (174 lb)    SpO2 93%    BMI 32.34 kg/m2       Patient is status post general anesthesia for * No procedures listed *. Nausea/Vomiting: None    Postoperative hydration reviewed and adequate. Pain:  Pain Scale 1: Numeric (0 - 10) (12/13/17 1530)  Pain Intensity 1: 0 (12/13/17 1530)   Managed    Neurological Status:   Neuro (WDL): Within Defined Limits (12/13/17 1530)  Neuro  Neurologic State: Alert (12/13/17 1530)  Orientation Level: Oriented X4 (12/13/17 1530)  Cognition: Appropriate for age attention/concentration; Follows commands (12/13/17 1530)  LUE Motor Response: Purposeful (12/13/17 1530)  LLE Motor Response: Purposeful (12/13/17 1530)  RUE Motor Response: Purposeful (12/13/17 1530)  RLE Motor Response: Purposeful (12/13/17 1530)   At baseline    Mental Status and Level of Consciousness: Alert and oriented     Pulmonary Status:   O2 Device: Room air (12/13/17 1530)   Adequate oxygenation and airway patent    Complications related to anesthesia: None    Post-anesthesia assessment completed. No concerns, slight blue stain from JESSIE probe, otherwise no complaints.     Signed By: Alfonzo Schmitz DO     December 13, 2017

## 2017-12-13 NOTE — H&P
Subjective:   Lexi Brown is a [de-identified] y.o. female is here for EP consult to discuss watchman. The patient denies chest pain/ shortness of breath, orthopnea, PND, LE edema, palpitations, syncope, presyncope or fatigue.         Patient Active Problem List    Diagnosis Date Noted    Celiac sprue 03/07/2017    Paroxysmal atrial fibrillation (HCC) 09/07/2016    Precordial pain 09/07/2016    Ataxia 02/17/2016    Dehydration 02/17/2016    Webb esophagus 11/11/2015    ACP (advance care planning) 11/11/2015    Hypothyroidism, adult 07/31/2015    Type 2 diabetes mellitus without complication ([facility] [address] 02/51/2302    Essential hypertension 04/27/2015    MALLORY (obstructive sleep apnea) 07/09/2014    Hyperlipidemia 07/09/2014    Sarcoid 07/09/2014    Stress bladder incontinence, female 07/09/2014    Obesity 07/09/2014    TIA (transient ischemic attack) 07/09/2014    AR (aortic regurgitation) 07/09/2014    Mitral valve insufficiency 07/09/2014    Cervical stenosis of spine 07/09/2014     Flaca Ugarte MD        Past Medical History:   Diagnosis Date    Anemia     Ataxia     Webb's esophagus     Cervical spinal stenosis     Coronary atherosclerosis of unspecified type of vessel, native or graft     Diabetes ([facility] [address]     Fatigue     Fatigue     GERD (gastroesophageal reflux disease)     Hypercholesterolemia     Hyperlipidemia     Hypertension     Ill-defined condition     Webb's Esophagus    Liver disease     Osteoarthritis     Sarcoidosis     Sleep apnea     uses cpap    Stroke [facility])     TIA'S           Past Surgical History:   Procedure Laterality Date    COLONOSCOPY N/A 12/28/2016    COLONOSCOPY performed by Steffi Mcdaniel MD at [facility] ENDOSCOPY    HX CATARACT REMOVAL      both eyes    HX CHOLECYSTECTOMY      HX COLONOSCOPY  5/8/2013    Repeat in 5 years    HX CYST REMOVAL  10/15    on head     HX HYSTERECTOMY      HX ORTHOPAEDIC Bilateral     knee replacement to both knees    HX ORTHOPAEDIC      spacer btwn L4-5 and L5-6     HX TONSILLECTOMY             Allergies   Allergen Reactions    Procardia Xl [Nifedipine] Other (comments)     weakness            Family History   Problem Relation Age of Onset    Other Mother      Fibromyalgia    Pacemaker Mother     Heart Disease Mother     Heart Failure Mother     COPD Mother     Heart Attack Father 61    Cancer Sister      Multiple Myeloma    Diabetes Brother     Obesity Brother     Pacemaker Brother     Heart Attack Brother      Bundle Branch Block    No Known Problems Son     Psychiatric Disorder Daughter      Multiple   negative for cardiac disease    Social History                                                                                                                                         Current Outpatient Prescriptions   Medication Sig    levothyroxine (SYNTHROID) 50 mcg tablet TAKE ONE TABLET BY MOUTH ONCE DAILY BEFORE BREAKFAST    sertraline (ZOLOFT) 100 mg tablet TAKE ONE AND ONE-HALF TABLETS BY MOUTH ONCE DAILY    benazepril (LOTENSIN) 20 mg tablet TAKE ONE TABLET BY MOUTH ONCE DAILY    acetaminophen (TYLENOL) 500 mg tablet Take 500 mg by mouth every four (4) hours as needed for Pain.  cholecalciferol (VITAMIN D3) 1,000 unit cap Take 2,000 Units by mouth daily.  omeprazole (PRILOSEC) 20 mg capsule Take 2 Caps by mouth two (2) times a day.  DILT- mg XR capsule TAKE ONE CAPSULE BY MOUTH ONCE DAILY    pravastatin (PRAVACHOL) 20 mg tablet TAKE ONE TABLET BY MOUTH NIGHTLY    metFORMIN ER (GLUCOPHAGE XR) 750 mg tablet TAKE ONE TABLET BY MOUTH TWICE DAILY    apixaban (ELIQUIS) 5 mg tablet Take 1 Tab by mouth two (2) times a day.  nystatin (MYCOSTATIN) powder Apply to affected area two (2) times a day.  ferrous sulfate (IRON) 325 mg (65 mg iron) tablet Take by mouth every other day.  multivit-min-FA-lycopen-lutein (CENTRUM SILVER) 0.4-300-250 mg-mcg-mcg tab Take 1 Tab by mouth daily.     aspirin delayed-release 81 mg tablet Take 81 mg by mouth daily.  magnesium 250 mg tab Take 250 mg by mouth daily.  hydroCHLOROthiazide (HYDRODIURIL) 25 mg tablet TAKE ONE TABLET BY MOUTH ONCE DAILY (Patient not taking: No sig reported)    ondansetron (ZOFRAN ODT) 4 mg disintegrating tablet Take 1 Tab by mouth every eight (8) hours as needed for Nausea. (Patient not taking: Reported on 10/15/2017)    polyethylene glycol (MIRALAX) 17 gram/dose powder Take 17 g by mouth daily. 1 tablespoon with 8 oz of water daily     No current facility-administered medications for this visit. Vitals:    10/26/17 1027   BP: 136/70   Pulse: 62   Resp: 16   SpO2: 97%   Weight: 175 lb 3.2 oz (79.5 kg)   Height: 5' 3\" (1.6 m)     I have reviewed the nurses notes, vitals, problem list, allergy list, medical history, family, social history and medications. Review of Symptoms:   General: Pt denies excessive weight gain or loss. Pt is able to conduct ADL's   HEENT: Denies blurred vision, headaches, epistaxis and difficulty swallowing. Respiratory: Denies shortness of breath, STRICKLAND, wheezing or stridor. Cardiovascular: Denies precordial pain, palpitations, edema or PND   Gastrointestinal: Denies poor appetite, indigestion, abdominal pain or blood in stool   Urinary: Denies dysuria, pyuria   Musculoskeletal: Denies pain or swelling from muscles or joints   Neurologic: Denies tremor, paresthesias, or sensory motor disturbance   Skin: Denies rash, itching or texture change. Psych: Denies depression   Physical Exam:   General: Well developed, in no acute distress. HEENT: Eyes - PERRL, no jvd   Heart: Normal S1/S2 negative S3 or S4. Regular, no murmur, gallop or rub. Respiratory: Clear bilaterally x 4, no wheezing or rales   Abdomen: Soft, non-tender, bowel sounds are active. Extremities: No edema, normal cap refill, no cyanosis. Musculoskeletal: No clubbing   Neuro: A&Ox3, speech clear, gait stable.    Skin: Skin color is normal. No rashes or lesions. Non diaphoretic   Vascular: 2+ pulses symmetric in all extremities   Cardiographics   Ekg: sinus bradycardia with 1st degree AVB          Results for orders placed or performed during the hospital encounter of 02/05/17   EKG, 12 LEAD, INITIAL   Result Value Ref Range    Ventricular Rate 60 BPM    Atrial Rate 60 BPM    P-R Interval 194 ms    QRS Duration 92 ms    Q-T Interval 490 ms    QTC Calculation (Bezet) 490 ms    Calculated P Axis 48 degrees    Calculated R Axis -2 degrees    Calculated T Axis 59 degrees    Diagnosis       Sinus rhythm with premature atrial complexes   Nonspecific T wave abnormality   When compared with ECG of 08-SEP-2016 12:45,   premature atrial complexes are now present   Nonspecific T wave abnormality, worse in Lateral leads   Confirmed by Tejal Contreras PHELEN ([id number]) on 2/6/2017 2:30:54 PM            Lab Results   Component Value Date/Time    WBC 9.4 10/24/2017 10:07 AM    HGB 11.4 10/24/2017 10:07 AM    HCT 35.1 10/24/2017 10:07 AM    PLATELET 590 06/94/2369 10:07 AM    MCV 93 10/24/2017 10:07 AM           Lab Results   Component Value Date/Time    Sodium 140 10/24/2017 10:07 AM    Potassium 4.8 10/24/2017 10:07 AM    Chloride 101 10/24/2017 10:07 AM    CO2 24 10/24/2017 10:07 AM    Anion gap 9 09/08/2016 02:13 AM    Glucose 113 10/24/2017 10:07 AM    BUN 26 10/24/2017 10:07 AM    Creatinine 1.04 10/24/2017 10:07 AM    BUN/Creatinine ratio 25 10/24/2017 10:07 AM    GFR est AA 59 10/24/2017 10:07 AM    GFR est non-AA 51 10/24/2017 10:07 AM    Calcium 9.1 10/24/2017 10:07 AM    Bilirubin, total 0.4 10/24/2017 10:07 AM    AST (SGOT) 15 10/24/2017 10:07 AM    Alk. phosphatase 101 10/24/2017 10:07 AM    Protein, total 6.8 10/24/2017 10:07 AM    Albumin 3.8 10/24/2017 10:07 AM    Globulin 4.2 09/07/2016 01:11 AM    A-G Ratio 1.3 10/24/2017 10:07 AM    ALT (SGPT) 10 10/24/2017 10:07 AM     Assessment:   Assessment:       ICD-10-CM ICD-9-CM    1.  Paroxysmal atrial fibrillation (HCC) I48.0 427.31 AMB POC EKG ROUTINE W/ 12 LEADS, INTER & REP   2. Essential hypertension I10 401.9    3. Type 2 diabetes mellitus without complication, without long-term current use of insulin (HCC) E11.9 250.00    4. MALLORY (obstructive sleep apnea) G47.33 327.23    5. Transient cerebral ischemia, unspecified type G45.9 435.9            Orders Placed This Encounter    AMB POC EKG ROUTINE W/ 12 LEADS, INTER & REP     Order Specific Question:  Reason for Exam:     Answer:  Routine     Plan:   Ms. Lokesh Holland is here for EP consult for Watchman device. The patient has a CHADSVASC score of 6 for the diagnosis/diagnoses of female, age >76, HTN, DM, stroke. He/she should avoid long term anticoagulation due to HAS BLED score of 2- intermediate annual bleeding risk and current medical history of multiple falls with concussion. The patient feels strongly that anticoagulation and documented stroke risk greatly impacts his/her quality of life. The patient is a candidate for Watchman, a left atrial appendage closure (LAAC) device to reduce risk of thromboembolism. The patient has need for anticoagulation and is considered a high risk for stroke, but has a relative contraindication for long term anticoagulation. The patient is suitable for short term warfarin but deemed unable to take long term oral anticoagulation following the conclusion of shared decision making interaction with the patient. I have discussed at length the risks/benefits and alternatives of this procedure with regards to stroke risk versus bleeding risk. The patient understands that anticoagulation will be maintained in a variety of forms during the first year and agrees with plan. Risks include but not limited to: infection, bleeding, vessel injury, cardiac perforation at times requiring drainage or surgery, heart failure, migration of the device, emergency surgery, myocardial infarction, stroke and death. We will schedule.    Continue medical management for AF, HTN, MALLORY.

## 2017-12-13 NOTE — PROGRESS NOTES
Medicare Part B Preventive Services Limitations Recommendation Scheduled   Bone Mass Measurement  (age 72 & older, biennial) Requires diagnosis related to osteoporosis or estrogen deficiency. Biennial benefit unless patient has history of long-term glucocorticoid tx or baseline is needed because initial test was by other method Completed 11/2017    Recommended every 2 years Due 11/2019   Cardiovascular Screening Blood Tests (every 5 years)  Total cholesterol, HDL, Triglycerides Order as a panel if possible Completed 9/2017    Recommended annually Due 9/2018   Colorectal Cancer Screening  -Fecal occult blood test (annual)  -Flexible sigmoidoscopy (5y)  -Screening colonoscopy (10y)  -Barium Enema   Completed 5/2013 with Dr. Jerry Reynolds     Repeat in 10 years Due 5/2023   Counseling to Prevent Tobacco Use (up to 8 sessions per year)  - Counseling greater than 3 and up to 10 minutes  - Counseling greater than 10 minutes Patients must be asymptomatic of tobacco-related conditions to receive as preventive service N/A N/A   Diabetes Screening Tests (at least every 3 years, Medicare covers annually or at 6-month intervals for prediabetic patients)     Fasting blood sugar (FBS) or glucose tolerance test (GTT) Patient must be diagnosed with one of the following:  -Hypertension, Dyslipidemia, obesity, previous impaired FBS or GTT  Or any two of the following: overweight, FH of diabetes, age ? 72, history of gestational diabetes, birth of baby weighing more than 9 pounds Completed 10/2017 with A1C 6.7     Recommended every 3-6 months for diabetics Due 1/2018-4/2018   Diabetes Self-Management Training (DSMT) (no USPSTF recommendation) Requires referral by treating physician for patient with diabetes or renal disease. 10 hours of initial DSMT session of no less than 30 minutes each in a continuous 12-month period. 2 hours of follow-up DSMT in subsequent years.  Pt attended this class with her  in the past. Maty Layton are eligible for this class every 2 years. Please let us know if you are interested in this class. Glaucoma Screening (no USPSTF recommendation) Diabetes mellitus, family history, , age 48 or over,  American, age 72 or over Completed 3/20/17     Recommended annually  Due 3/2018   Human Immunodeficiency Virus (HIV) Screening (annually for increased risk patients)  HIV-1 and HIV-2 by EIA, SIMON, rapid antibody test, or oral mucosa transudate Patient must be at increased risk for HIV infection per USPSTF guidelines or pregnant. Tests covered annually for patients at increased risk. Pregnant patients may receive up to 3 test during pregnancy. N/A N/A   Medical Nutrition Therapy (MNT) (for diabetes or renal disease not recommended schedule) Requires referral by treating physician for patient with diabetes or renal disease. Can be provided in same year as diabetes self-management training (DSMT), and CMS recommends medical nutrition therapy take place after DSMT. Up to 3 hours for initial year and 2 hours in subsequent years. Pt attended this class with her  in the past. Angeles Graft are eligible for this class every 2 years. Please let us know if you are interested in this class. Seasonal Influenza Vaccination (annually)   Completed 9/2017     Recommended annually Due Fall 2018   Shingles Vaccination A shingles vaccine is also recommended once in a lifetime after age 61  Completed 2/29/16 Completed   Pneumococcal Vaccination (once after 72)   Prevnar 13: 8/18/16    Pneumovax: 2012    Both recommended once over the age of 72 Completed    Completed   Hepatitis B Vaccinations (if medium/high risk) Medium/high risk factors:  End-stage renal disease,  Hemophiliacs who received Factor VIII or IX concentrates, Clients of institutions for the mentally retarded, Persons who live in the same house as a HepB virus carrier, Homosexual men, Illicit injectable drug abusers.  N/A N/A   Screening Mammography (biennial age 50-74)? Annually (age 36 or over) Completed 6/5/17    Recommended annually Due 6/2018   Screening Pap Tests and Pelvic Examination (up to age 72 and after 72 if unknown history or abnormal study last 10 years) Every 24 months except high risk Completed 2013 No longer indicated? Ultrasound Screening for Abdominal Aortic Aneurysm (AAA) (once) Patient must be referred through IP[facility] and not have had a screening for abdominal aortic aneurysm before under Medicare.   Limited to patients who meet one of the following criteria:  - Men who are 73-68 years old and have smoked more than 100 cigarettes in their lifetime.  -Anyone with a FH of AAA  -Anyone recommended for screening by USPSTF N/A N/A

## 2017-12-13 NOTE — PROCEDURES
[address] [place] [address]  [phone]    Indications and Pre-Procedure Diagnosis:  Keyanna Trevino is a [de-identified] y.o. with atrial fibrillation is referred for Watchman implantation. Post Procedure Diagnosis    Atrial fibrillation    Watchman Implant Summary  Informed consent was obtained. The patient was brought to the EP lab where continuous JESSIE was performed by anesthesia and demonstrated no thrombus in the left atrial appendage. All vascular access sites were prepped and draped in the usual sterile fashion and the Seldinger technique was used to catherize the RFV. Continuous pulse oximetry and cuff BP monitoring were performed. During the procedure, the patient received Versed, Fentanyl and Propofol for sedation per anesthesia personnel. Transeptal Puncture  A transeptal atrial puncture was performed using fluoroscopic and intracardiac ultrasound guidance. An SL1 sheath was dragged from the SVC along the septum until a sudden leftward displacement was observed. Engagement of the Fossa Ovalis was confirmed by the interatrial tenting seen under intracardiac ultrasound guidance. The [place] NRG needle was advanced into the left atrium and its positioned confirmed with contrast injection. The dilator and sheath were advanced carefully over the needle into the body of the left atrium and the dilator/needle removed. A 16Fr sheath was exchanged over the wire at the groin site. The Watchman access sheath was exchanged over the wire for the SL1 sheath. No pericardial effusion was appreciated on intracardiac ultrasound so a bolus injection of heparin [id number] units was administered with additional boluses q 30 minutes as necessary. The wire was withdrawn and a 6Fr pigtail catheter was advanced into the Watchman access sheath. Under JESSIE guidance, the pigtail was placed in the VANDA. Angiogram was performed and confirmed that the sheath was in the left atrial appendage.  The pigtail catheter was pulled from the sheath and the Watchman device was implanted in the left atrial appendage under fluoroscopic and JESSIE guidance. Color flow and repeat angiogram demonstrated that the left atrial appendage was appropriately occluded. The Watchman was deployed and the sheath was removed. A figure of 8 stitch was applied over the 16 Fr sheath and the sheath was pulled. At the end of the procedure, heparin was reversed and hemostasis achieved. . The patient left the laboratory in a stable condition. Fluoroscopy and procedure times were 4.4 and 23 minutes respectively. Estimated blood loss: <10 ml. Sharp counts: correct. Specimen (s) collected: none. The procedure related complication occurred: none. The following problems were encountered: none.   Dr. Mecca Contreras    Findings and Summary    This study demonstrates:  1. 27 mm VANDA at the orifice  2. Successful implant of the watchman device in the VANDA    Recommendation:  1. Coumadin for 45 days  2. Repeat JESSIE in 45 days    Thank you for allowing me to participate in this patients care.     Efrain Pacheco MD, Taylor Shane

## 2017-12-13 NOTE — ANESTHESIA PROCEDURE NOTES
Arterial Line Placement    Performed by: Eber Fontaine  Authorized by: Eber Fontaine     Pre-Procedure  Indications:  Arterial pressure monitoring and blood sampling  Preanesthetic Checklist: patient identified, risks and benefits discussed, anesthesia consent, site marked, patient being monitored, timeout performed and patient being monitored      Procedure:   Prep:  ChloraPrep  Seldinger Technique?: Yes    Orientation:  Right  Location:  Radial artery  Catheter size:  20 G  Number of attempts:  2    Assessment:   Post-procedure:  Line secured and sterile dressing applied  Patient Tolerance:  Patient tolerated the procedure well with no immediate complications  Comment:   Collateral perfusion verified

## 2017-12-13 NOTE — PROCEDURES
[address] [address]  [phone]    Indications and Pre-Procedure Diagnosis:  Sally Jalloh is a [de-identified] y.o. with atrial fibrillation is referred for Watchman implantation. Post Procedure Diagnosis    Atrial fibrillation    Watchman Implant Summary  Informed consent was obtained. The patient was brought to the EP lab where continuous JESSIE was performed by anesthesia and demonstrated no thrombus in the left atrial appendage. All vascular access sites were prepped and draped in the usual sterile fashion and the Seldinger technique was used to catherize the RFV. Continuous pulse oximetry and cuff BP monitoring were performed. During the procedure, the patient received Versed, Fentanyl and Propofol for sedation per anesthesia personnel. Transeptal Puncture  A transeptal atrial puncture was performed using fluoroscopic and intracardiac ultrasound guidance. An SL1 sheath was dragged from the SVC along the septum until a sudden leftward displacement was observed. Engagement of the Fossa Ovalis was confirmed by the interatrial tenting seen under intracardiac ultrasound guidance. The [place] NRG needle was advanced into the left atrium and its positioned confirmed with contrast injection. The dilator and sheath were advanced carefully over the needle into the body of the left atrium and the dilator/needle removed. A 16Fr sheath was exchanged over the wire at the groin site. The Watchman access sheath was exchanged over the wire for the SL1 sheath. No pericardial effusion was appreciated on intracardiac ultrasound so a bolus injection of heparin [id number] units was administered with additional boluses q 30 minutes as necessary. The wire was withdrawn and a 6Fr pigtail catheter was advanced into the Watchman access sheath. Under JESSIE guidance, the pigtail was placed in the VANDA. Angiogram was performed and confirmed that the sheath was in the left atrial appendage.  The pigtail catheter was pulled from the sheath and the Watchman device was implanted in the left atrial appendage under fluoroscopic and JESSIE guidance. Color flow and repeat angiogram demonstrated that the left atrial appendage was appropriately occluded. The Watchman was deployed and the sheath was removed. A figure of 8 stitch was applied over the 16 Fr sheath and the sheath was pulled. At the end of the procedure, heparin was reversed and hemostasis achieved. . The patient left the laboratory in a stable condition. Fluoroscopy and procedure times were 3 and 30 minutes respectively. Estimated blood loss: <10 ml. Sharp counts: correct. Specimen (s) collected: none. The procedure related complication occurred: none. The following problems were encountered: none. : Dr Melia Jorge: Dr Christiano Craft    Findings and Summary    This study demonstrates:  1. 27 mm VANDA at the orifice  2. Successful implant of the watchman device in the VANDA    Recommendation:  1. Coumadin for 45 days  2. Repeat JESSIE in 45 days    Thank you for allowing me to participate in this patients care.     Gisell Aviles MD, Duran Hyde

## 2017-12-13 NOTE — ANESTHESIA PREPROCEDURE EVALUATION
Anesthetic History   No history of anesthetic complications            Review of Systems / Medical History  Patient summary reviewed, nursing notes reviewed and pertinent labs reviewed    Pulmonary        Sleep apnea: CPAP           Neuro/Psych         TIA and psychiatric history  Pertinent negatives: No CVA  Comments: Cervical spinal stenosis (M48.02) Cardiovascular    Hypertension          CAD and hyperlipidemia    Exercise tolerance: <4 METS  Comments: Ejection fraction was estimated in the range of 55 % to 60 %.    GI/Hepatic/Renal     GERD (Webb's esophagus): well controlled      Liver disease    Comments: Webb's Esophagus Endo/Other    Diabetes: type 2  Hypothyroidism  Obesity, morbid obesity, arthritis and anemia     Other Findings   Comments: Hx of Sarcoidosis    Cervical spinal stenosis           Physical Exam    Airway  Mallampati: II  TM Distance: 4 - 6 cm  Neck ROM: normal range of motion   Mouth opening: Normal     Cardiovascular    Rhythm: irregular  Rate: abnormal        Comments: bradycardia Dental    Dentition: Full upper dentures and Lower partial plate     Pulmonary  Breath sounds clear to auscultation               Abdominal  GI exam deferred       Other Findings            Anesthetic Plan    ASA: 3  Anesthesia type: general    Monitoring Plan: Arterial line and JESSIE      Induction: Intravenous  Anesthetic plan and risks discussed with: Patient

## 2017-12-13 NOTE — IP AVS SNAPSHOT
[address] [facility] 
[phone] Patient: Elisa Cruz MRN: [id number] :1935 About your hospitalization You were admitted on:  2017 You last received care in the:  \[facility]\"" 2 INTRVNTNL CARDIO You were discharged on:  2017 Why you were hospitalized Your primary diagnosis was:  Not on File Your diagnoses also included:  Paroxysmal Atrial Fibrillation (Hcc) Things You Need To Do (next 8 weeks) Follow up with Johanne Cole MD  
  
Phone:  [phone] Where:  [address], [address] [address] Friday Dec 15, 2017 ROUTINE CARE with Johanne Cole MD at  1:30 PM  
Where:  [address]) Monday Dec 18, 2017 ANTICOAG NURSE with NOVA REIS at  1:45 PM  
Where:  [facility] [address]) ESTABLISHED PATIENT with Jocelin Hammond NP at  2:00 PM  
Where:  [facility] [address])  Follow Up with Christo Lindo MD at  8:40 AM  
Where: [address] [place] [address] Neurology Clinic ([address] [place] [address]) Discharge Orders None A check abby indicates which time of day the medication should be taken. My Medications TAKE these medications as instructed Instructions Each Dose to Equal  
 Morning Noon Evening Bedtime  
 acetaminophen 500 mg tablet Commonly known as:  TYLENOL Your last dose was: Your next dose is: Take 500 mg by mouth every four (4) hours as needed for Pain. 500 mg  
    
   
   
   
  
 aspirin delayed-release 81 mg tablet Your last dose was: Your next dose is: Take 81 mg by mouth daily. 81 mg  
    
   
   
   
  
 benazepril 20 mg tablet Commonly known as:  LOTENSIN  
   
 Your last dose was: Your next dose is: TAKE ONE TABLET BY MOUTH ONCE DAILY CENTRUM SILVER 0.4-300-250 mg-mcg-mcg Tab Generic drug:  multivit-min-FA-lycopen-lutein Your last dose was: Your next dose is: Take 1 Tab by mouth daily. 1 Tab Cholecalciferol (Vitamin D3) 2,000 unit Cap capsule Commonly known as:  VITAMIN D3 Your last dose was: Your next dose is: Take 2,000 Units by mouth daily. 2000 Units DILT- mg XR capsule Generic drug:  dilTIAZem XR Your last dose was: Your next dose is: TAKE ONE CAPSULE BY MOUTH ONCE DAILY Iron 325 mg (65 mg iron) tablet Generic drug:  ferrous sulfate Your last dose was: Your next dose is: Take 325 mg by mouth every other day. 325 mg  
    
   
   
   
  
 levothyroxine 50 mcg tablet Commonly known as:  SYNTHROID Your last dose was: Your next dose is: TAKE ONE TABLET BY MOUTH ONCE DAILY BEFORE BREAKFAST  
     
   
   
   
  
 magnesium 250 mg Tab Your last dose was: Your next dose is: Take 250 mg by mouth daily. 250 mg  
    
   
   
   
  
 metFORMIN  mg tablet Commonly known as:  GLUCOPHAGE XR Your last dose was: Your next dose is: TAKE ONE TABLET BY MOUTH TWICE DAILY  
     
   
   
   
  
 nystatin powder Commonly known as:  MYCOSTATIN Your last dose was: Your next dose is:    
   
   
 Apply  to affected area two (2) times a day. omeprazole 40 mg capsule Commonly known as:  PRILOSEC Your last dose was: Your next dose is: Take 40 mg by mouth two (2) times a day. 40 mg  
    
   
   
   
  
 pravastatin 20 mg tablet Commonly known as:  PRAVACHOL Your last dose was: Your next dose is: TAKE ONE TABLET BY MOUTH NIGHTLY  
     
   
   
   
  
 sertraline 100 mg tablet Commonly known as:  ZOLOFT Your last dose was: Your next dose is: TAKE ONE AND ONE-HALF TABLETS BY MOUTH ONCE DAILY  
     
   
   
   
  
 warfarin 5 mg tablet Commonly known as:  COUMADIN Your last dose was: Your next dose is: Take 1 Tab by mouth every evening for 45 days. 5 mg ASK your physician about these medications Instructions Each Dose to Equal  
 Morning Noon Evening Bedtime  
 apixaban 5 mg tablet Commonly known as:  Cass Stade Your last dose was: Your next dose is: Take 1 Tab by mouth two (2) times a day. 5 mg Where to Get Your Medications These medications were sent to [place] [address], [address] [facility] [address]  [address] [place] [address], [address] [place] [address] Phone:  [phone]  
  warfarin 5 mg tablet Discharge Instructions [id number] [facility], [address] [place] [address] [address] [facility]  [phone] WATCHMAN DISCHARGE INSTRUCTIONS Patient ID: 
Nahum Dunn 
[id number] 
58 y.o. 
1935 Admit Date: 12/13/2017 Discharge Date: 12/14/2017 Admitting Physician: Jose J Hart MD  
 
Discharge Physician: Teodoro Sandoval MD 
 
Admission Diagnoses:  
cath 
watchman procedure Paroxysmal atrial fibrillation (HCC) Discharge Diagnoses: Active Problems: 
  Paroxysmal atrial fibrillation ([address] (9/7/2016) Discharge Condition: Good Cardiology Procedures this Admission:  WATCHMAN implant Disposition: home Reference discharge instructions provided by nursing for diet and activity. Follow-up with Dr Carlos Abdi in one week. Call [phone] to make an appointment. Signed: 
Teodoro Sandoval MD 
12/14/2017 
9:20 AM 
 
S/P WATCHMAN DISCHARGE INSTRUCTIONS It is normal to feel tired the first couple days. Take it easy and follow the physicians instructions. CHECK THE CATHETER INSERTION SITE DAILY: 
You may shower 24 hours after the procedure, remove the bandage during showering. Wash with soap and water and pat dry. Gentle cleaning of the site with soap and water is sufficient, cover with a dry clean dressing or bandage. Do not apply creams or powders to the area. Do not sit in a bathtub or pool of water for 7 days or until wound has completely healed. Temporary bruising and discomfort is normal and may last a few weeks. You may have a  formation of a small lump at the site which may last up to 6 weeks. CALL THE PHYSICIAN: If the site becomes red, swollen or feels warm to the touch If there is bleeding or drainage or if there is unusual pain at the groin or down the leg. If there is any bleeding, lie down, apply pressure or have someone apply pressure with a clean cloth until the bleeding stops. If the bleeding continues, call 911 to be transported to the hospital. 
DO  [place] [address]. Activity: For the first 24-48 hours or as instructed by the physician: No lifting, pushing or pulling over 5 pounds and no straining the insertion site. Do not life grocery bags or the garbage can, do not run the vacuum  or  for 7 days. Start with short walks as in the hospital and gradually increase as tolerated each day. It is recommended to walk 30 minutes 5-7 days per week. Follow your physicians instructions on activity. Avoid walking outside in extremes of heat or cold. Walk inside when it is cold and windy or hot and humid. Things to keep in mind: 
No driving for at least 5 days, or as designated by your physician. Limit the number of times you go up and down the stairs Take rests and pace yourself with activity. Be careful and do not strain with bowel movements. Medications: Take all medications as prescribed Call your physician if you have any questions Keep an updated list of your medications with you at all times and give a list to your physician and pharmacist 
 
Signs and Symptoms: 
Be cautious of symptoms of angina or recurrent symptoms such as chest discomfort, unusual shortness of breath or fatigue, palpitations. After Care: Follow up with your physician as instructed. Follow a heart healthy diet with proper portion control, daily stress management, daily exercise, blood pressure and cholesterol control , and smoking cessation. AFTER YOU TRANSESOPHAGEAL ECHOCARDIOGRAM 
 
Do not eat or drink for at least two hours after your procedure. Your throat will be numb and there is a risk you might have difficulty swallowing for a while. Be careful when you do eat or drink for the first time especially with hot fluids since you could easily burn your throat. Call your doctor if: 
 
· You are bleeding from your throat or mouth. · You have trouble breathing all of a sudden. · You have chest pain or any pain that spreads to your neck, jaw, or arms. · You have questions or concerns. · You have a fever greater than 101°F. ACO Transitions of Care [facility] [address] offers a voluntary care coordination program to provide high quality service and care to [facility] fee-for-service beneficiaries. Jese Roachu was designed to help you enhance your health and well-being through the following services: ? Transitions of Care  support for individuals who are transitioning from one care setting to another (example: Hospital to home). ? Chronic and Complex Care Coordination  support for individuals and caregivers of those with serious or chronic illnesses or with more than one chronic (ongoing) condition and those who take a number of different medications. If you meet specific medical criteria, a [facility] [address] may call you directly to coordinate your care with your primary care physician and your other care providers. For questions about the [facility] programs, please, contact your physicians office. For general questions or additional information about Accountable Care Organizations: 
Please visit www.medicare.gov/acos. html or call 1-800-MEDICARE (9-720.548.6843) TTY users should call 4-394.110.9902. Introducing [facility] HE[facility]! Dear Xiomara Humphries: Thank you for requesting a 99Bill account. Our records indicate that you already have an active 99Bill account. You can access your account anytime at [email or web address]. [email or web address] Did you know that you can access your hospital and ER discharge instructions at any time in 99Bill? You can also review all of your test results from your hospital stay or ER visit. Additional Information If you have questions, please visit the Frequently Asked Questions section of the 99Bill website at [email or web address] Remember, 99Bill is NOT to be used for urgent needs. For medical emergencies, dial 911. Now available from your iPhone and Android! Providers Seen During Your Hospitalization Provider Specialty Primary office phone Antwan Rosales MD Cardiology [phone] Maxi Brunson MD Cardiology [phone] Your Primary Care Physician (PCP) Primary Care Physician Office Phone Office Fax Calista Knutson [phone] You are allergic to the following Allergen Reactions Procardia Xl (Nifedipine) Other (comments)  
 weakness Recent Documentation Height Weight BMI OB Status Smoking Status 1.562 m 78.9 kg 32.34 kg/m2 Hysterectomy Never Smoker Emergency Contacts Name Discharge Info Relation Home Work Mobile UlArabella Medrano [address] CAREGIVER [3] Spouse [3] [phone] Ximena Thompson DISCHARGE CAREGIVER [3] Sister [23] [phone] [address],Jose  Spouse [3] [phone] Patient Belongings The following personal items are in your possession at time of discharge: 
                   Clothing: At bedside Discharge Instructions Attachments/References WARFARIN: LONG-TERM USE (ENGLISH) Patient Handouts Taking Warfarin Safely: Care Instructions Your Care Instructions Warfarin is a medicine that you take to prevent blood clots. It is often called a blood thinner. Doctors give warfarin (such as Coumadin) to reduce the risk of blood clots. You may be at risk for blood clots if you have atrial fibrillation or deep vein thrombosis. Some other health problems may also put you at risk. Warfarin slows the amount of time it takes for your blood to clot. It can cause bleeding problems. Even if you've been taking warfarin for a while, it's important to know how to take it safely. Foods and other medicines can affect the way warfarin works. Some can make warfarin work too well. This can cause bleeding problems. And some can make it work poorly, so that it does not prevent blood clots very well. You will need regular blood tests to check how long it takes for your blood to form a clot. This test is called a PT or prothrombin time test. The result of the test is called an INR level. Depending on the test results, your doctor or anticoagulation clinic may adjust your dose of warfarin. Follow-up care is a key part of your treatment and safety. Be sure to make and go to all appointments, and call your doctor if you are having problems. It's also a good idea to know your test results and keep a list of the medicines you take. How can you care for yourself at home? Take warfarin safely · Take your warfarin at the same time each day. · If you miss a dose of warfarin, don't take an extra dose to make up for it. Your doctor can tell you exactly what to do so you don't take too much or too little. · Wear medical alert jewelry that lets others know that you take warfarin. You can buy this at most drugstores. · Don't take warfarin if you are pregnant or planning to get pregnant. Talk to your doctor about how you can prevent getting pregnant while you are taking it. · Don't change your dose or stop taking warfarin unless your doctor tells you to. Effects of medicines and food on warfarin · Don't start or stop taking any medicines, vitamins, or natural remedies unless you first talk to your doctor. Many medicines can affect how warfarin works. These include aspirin and other pain relievers, over-the-counter medicines, multivitamins, dietary supplements, and herbal products. · Tell all of your doctors and pharmacists that you take warfarin. Some prescription medicines can affect how warfarin works. · Keep the amount of vitamin K in your diet about the same from day to day. Do not suddenly eat a lot more or a lot less food that is rich in vitamin K than you usually do. Vitamin K affects how warfarin works and how your blood clots. Talk with your doctor before making big changes in your diet. Foods that have a lot of vitamin K include cooked green vegetables, such as: ¨ Kale, spinach, turnip greens, naseem greens, Swiss chard, and mustard greens. ¨ Mark sprouts, broccoli, and asparagus. · Limit your use of alcohol. Avoid bleeding by preventing falls and injuries · Wear slippers or shoes with nonskid soles. · Remove throw rugs and clutter. · Rearrange furniture and electrical cords to keep them out of walking paths. · Keep stairways, porches, and outside walkways well lit. Use night-lights in hallways and bathrooms. · Be extra careful when you work with sharp tools or knives. When should you call for help? Call 911 anytime you think you may need emergency care. For example, call if: 
? · You have a sudden, severe headache that is different from past headaches. ?Call your doctor now or seek immediate medical care if: 
? · You have any abnormal bleeding, such as: 
¨ Nosebleeds. ¨ Vaginal bleeding that is different (heavier, more frequent, at a different time of the month) than what you are used to. ¨ Bloody or black stools, or rectal bleeding. ¨ Bloody or pink urine. ? Watch closely for changes in your health, and be sure to contact your doctor if you have any problems. Where can you learn more? Go to http://mason-saw.info/. Enter Y180 in the search box to learn more about \"Taking Warfarin Safely: Care Instructions. \" Current as of: September 21, 2016 Content Version: 11.4 © 9583-4467 Healthwise, [employer]. Care instructions adapted under license by [employer] (which disclaims liability or warranty for this information). If you have questions about a medical condition or this instruction, always ask your healthcare professional. [address] any warranty or liability for your use of this information. Please provide this summary of care documentation to your next provider. Signatures-by signing, you are acknowledging that this After Visit Summary has been reviewed with you and you have received a copy. Patient Signature:  ____________________________________________________________ Date:  ____________________________________________________________  
  
Alta Welsey Provider Signature:  ____________________________________________________________ Date:  ____________________________________________________________

## 2017-12-13 NOTE — IP AVS SNAPSHOT
[address] [facility] 
[phone] Patient: Simran Mendiola MRN: [id number] :1935 My Medications TAKE these medications as instructed Instructions Each Dose to Equal  
 Morning Noon Evening Bedtime  
 acetaminophen 500 mg tablet Commonly known as:  TYLENOL Your last dose was: Your next dose is: Take 500 mg by mouth every four (4) hours as needed for Pain. 500 mg  
    
   
   
   
  
 aspirin delayed-release 81 mg tablet Your last dose was: Your next dose is: Take 81 mg by mouth daily. 81 mg  
    
   
   
   
  
 benazepril 20 mg tablet Commonly known as:  LOTENSIN Your last dose was: Your next dose is: TAKE ONE TABLET BY MOUTH ONCE DAILY CENTRUM SILVER 0.4-300-250 mg-mcg-mcg Tab Generic drug:  multivit-min-FA-lycopen-lutein Your last dose was: Your next dose is: Take 1 Tab by mouth daily. 1 Tab Cholecalciferol (Vitamin D3) 2,000 unit Cap capsule Commonly known as:  VITAMIN D3 Your last dose was: Your next dose is: Take 2,000 Units by mouth daily. 2000 Units DILT- mg XR capsule Generic drug:  dilTIAZem XR Your last dose was: Your next dose is: TAKE ONE CAPSULE BY MOUTH ONCE DAILY Iron 325 mg (65 mg iron) tablet Generic drug:  ferrous sulfate Your last dose was: Your next dose is: Take 325 mg by mouth every other day. 325 mg  
    
   
   
   
  
 levothyroxine 50 mcg tablet Commonly known as:  SYNTHROID Your last dose was: Your next dose is: TAKE ONE TABLET BY MOUTH ONCE DAILY BEFORE BREAKFAST  
     
   
   
   
  
 magnesium 250 mg Tab Your last dose was: Your next dose is: Take 250 mg by mouth daily. 250 mg  
    
   
   
   
  
 metFORMIN  mg tablet Commonly known as:  GLUCOPHAGE XR Your last dose was: Your next dose is: TAKE ONE TABLET BY MOUTH TWICE DAILY  
     
   
   
   
  
 nystatin powder Commonly known as:  MYCOSTATIN Your last dose was: Your next dose is:    
   
   
 Apply  to affected area two (2) times a day. omeprazole 40 mg capsule Commonly known as:  PRILOSEC Your last dose was: Your next dose is: Take 40 mg by mouth two (2) times a day. 40 mg  
    
   
   
   
  
 pravastatin 20 mg tablet Commonly known as:  PRAVACHOL Your last dose was: Your next dose is: TAKE ONE TABLET BY MOUTH NIGHTLY  
     
   
   
   
  
 sertraline 100 mg tablet Commonly known as:  ZOLOFT Your last dose was: Your next dose is: TAKE ONE AND ONE-HALF TABLETS BY MOUTH ONCE DAILY  
     
   
   
   
  
 warfarin 5 mg tablet Commonly known as:  COUMADIN Your last dose was: Your next dose is: Take 1 Tab by mouth every evening for 45 days. 5 mg ASK your physician about these medications Instructions Each Dose to Equal  
 Morning Noon Evening Bedtime  
 apixaban 5 mg tablet Commonly known as:  Lyell Mola Your last dose was: Your next dose is: Take 1 Tab by mouth two (2) times a day. 5 mg Where to Get Your Medications These medications were sent to [address], [address]  [address] [place] [address], [address] [place] [address] Phone:  [phone]  
  warfarin 5 mg tablet

## 2017-12-13 NOTE — PERIOP NOTES
TRANSFER - OUT REPORT:    Verbal report given to Ángela Wells RN on Simran Mendiola  being transferred to [facility] for routine progression of care. Report consisted of patients Situation, Background, Assessment and   Recommendations(SBAR). Information from the following report(s) SBAR, Kardex, Procedure Summary, Intake/Output, MAR, Accordion and Cardiac Rhythm sinus isela was reviewed with the receiving nurse. Opportunity for questions and clarification was provided.       Patient transported with:   Monitor  Registered Nurse  Tech   Patient chart

## 2017-12-14 VITALS
TEMPERATURE: 98 F | RESPIRATION RATE: 14 BRPM | HEART RATE: 59 BPM | DIASTOLIC BLOOD PRESSURE: 74 MMHG | WEIGHT: 174 LBS | SYSTOLIC BLOOD PRESSURE: 138 MMHG | OXYGEN SATURATION: 93 % | HEIGHT: 62 IN | BODY MASS INDEX: 32.02 KG/M2

## 2017-12-14 LAB
ACT BLD: 252 SECS (ref 79–138)
ERYTHROCYTE [DISTWIDTH] IN BLOOD BY AUTOMATED COUNT: 13.7 % (ref 11.5–14.5)
GLUCOSE BLD STRIP.AUTO-MCNC: 132 MG/DL (ref 65–100)
HBA1C MFR BLD: 6.3 % (ref 4.8–5.6)
HCT VFR BLD AUTO: 32.9 % (ref 35–47)
HGB BLD-MCNC: 10.5 G/DL (ref 11.5–16)
INR PPP: 1.1 (ref 0.9–1.1)
MCH RBC QN AUTO: 29.2 PG (ref 26–34)
MCHC RBC AUTO-ENTMCNC: 31.9 G/DL (ref 30–36.5)
MCV RBC AUTO: 91.4 FL (ref 80–99)
PLATELET # BLD AUTO: 230 K/UL (ref 150–400)
PROTHROMBIN TIME: 11.1 SEC (ref 9–11.1)
RBC # BLD AUTO: 3.6 M/UL (ref 3.8–5.2)
SERVICE CMNT-IMP: ABNORMAL
SPECIMEN STATUS REPORT, ROLRST: NORMAL
WBC # BLD AUTO: 11.1 K/UL (ref 3.6–11)

## 2017-12-14 PROCEDURE — 85610 PROTHROMBIN TIME: CPT | Performed by: INTERNAL MEDICINE

## 2017-12-14 PROCEDURE — 82962 GLUCOSE BLOOD TEST: CPT

## 2017-12-14 PROCEDURE — 85027 COMPLETE CBC AUTOMATED: CPT | Performed by: INTERNAL MEDICINE

## 2017-12-14 PROCEDURE — 74011250637 HC RX REV CODE- 250/637: Performed by: INTERNAL MEDICINE

## 2017-12-14 PROCEDURE — 36415 COLL VENOUS BLD VENIPUNCTURE: CPT | Performed by: INTERNAL MEDICINE

## 2017-12-14 PROCEDURE — 93308 TTE F-UP OR LMTD: CPT

## 2017-12-14 RX ORDER — WARFARIN SODIUM 5 MG/1
5 TABLET ORAL EVERY EVENING
Qty: 45 TAB | Refills: 0 | Status: SHIPPED | OUTPATIENT
Start: 2017-12-14 | End: 2018-01-26 | Stop reason: SDUPTHER

## 2017-12-14 RX ADMIN — SERTRALINE HYDROCHLORIDE 100 MG: 50 TABLET ORAL at 07:59

## 2017-12-14 RX ADMIN — LEVOTHYROXINE SODIUM 50 MCG: 50 TABLET ORAL at 06:31

## 2017-12-14 RX ADMIN — BENAZEPRIL HYDROCHLORIDE 20 MG: 10 TABLET, FILM COATED ORAL at 07:58

## 2017-12-14 RX ADMIN — ASPIRIN 81 MG: 81 TABLET ORAL at 07:59

## 2017-12-14 RX ADMIN — Medication 10 ML: at 06:31

## 2017-12-14 RX ADMIN — PANTOPRAZOLE SODIUM 40 MG: 40 TABLET, DELAYED RELEASE ORAL at 07:59

## 2017-12-14 NOTE — ROUTINE PROCESS
Suture removed from procedure site ( right groin). 0957: Discharge instructions reviewed withPatient. Patient verbalized understanding of instructions. Opportunities for questions were provided and explained. Patient discharged Home. Discharge medications reviewed with patient and appropriate educational materials and side effects teaching were provided. Went over site care with patient, and signs and symptome to watch out for.

## 2017-12-14 NOTE — PROGRESS NOTES
Cardiology Progress Note            [address] [address]  [phone]    12/14/2017 9:18 AM    Admit Date: 12/13/2017    Admit Diagnosis: cath;watchman procedure;Paroxysmal atrial fibrillation (HCC)    Subjective:     Megha Mauro   denies chest pain.     Visit Vitals    /74    Pulse (!) 59    Temp 98 °F (36.7 °C)    Resp 14    Ht 5' 1.5\" (1.562 m)    Wt 174 lb (78.9 kg)    SpO2 93%    BMI 32.34 kg/m2     Current Facility-Administered Medications   Medication Dose Route Frequency    fentaNYL citrate (PF) injection 12.5-50 mcg  12.5-50 mcg IntraVENous Multiple    midazolam (VERSED) injection 1-5 mg  1-5 mg IntraVENous Multiple    protamine injection  mg   mg IntraVENous Multiple    aspirin delayed-release tablet 81 mg  81 mg Oral DAILY    benazepril (LOTENSIN) tablet 20 mg  20 mg Oral DAILY    ferrous sulfate tablet 325 mg  325 mg Oral EVERY OTHER DAY    levothyroxine (SYNTHROID) tablet 50 mcg  50 mcg Oral 6am    pantoprazole (PROTONIX) tablet 40 mg  40 mg Oral ACB&D    pravastatin (PRAVACHOL) tablet 20 mg  20 mg Oral QHS    sertraline (ZOLOFT) tablet 100 mg  100 mg Oral DAILY    insulin lispro (HUMALOG) injection   SubCUTAneous AC&HS    glucose chewable tablet 16 g  4 Tab Oral PRN    dextrose (D50W) injection syrg 12.5-25 g  12.5-25 g IntraVENous PRN    glucagon (GLUCAGEN) injection 1 mg  1 mg IntraMUSCular PRN    warfarin (COUMADIN) tablet 5 mg  5 mg Oral QPM    sodium chloride (NS) flush 5-10 mL  5-10 mL IntraVENous Q8H    sodium chloride (NS) flush 5-10 mL  5-10 mL IntraVENous PRN         Objective:      Visit Vitals    /74    Pulse (!) 59    Temp 98 °F (36.7 °C)    Resp 14    Ht 5' 1.5\" (1.562 m)    Wt 174 lb (78.9 kg)    SpO2 93%    BMI 32.34 kg/m2       Physical Exam:  Abdomen: soft, non-tender  Extremities: extremities normal  Heart: regular rate and rhythm  Lungs: clear to auscultation bilaterally  Pulses: 2+ and symmetric    Data Review:   Labs:    Recent Labs      12/14/17   0251  12/11/17   1009   WBC  11.1*  8.5   HGB  10.5*  11.7   HCT  32.9*  36.3   PLT  230  275     Recent Labs      12/14/17   0251  12/11/17   1009   NA   --   141   K   --   4.7   CL   --   102   CO2   --   24   GLU   --   101*   BUN   --   23   CREA   --   0.98   CA   --   9.0   ALB   --   4.1   TBILI   --   0.4   SGOT   --   15   ALT   --   15   INR  1.1   --        No results for input(s): TROIQ, CPK, CKMB in the last 72 hours. Intake/Output Summary (Last 24 hours) at 12/14/17 0918  Last data filed at 12/13/17 1332   Gross per 24 hour   Intake              500 ml   Output                5 ml   Net              495 ml        Telemetry: nsr    Echo - no effusion    Assessment:     Active Problems:    Paroxysmal atrial fibrillation ([facility] [address] (9/7/2016)        Plan:     Chelly Oliver is sp watchman. Echo this am reveals no effusion. Coumadin started last night. Kenneth Chavez for [employer]. F/u on the 18th.      Lauren Lora MD, [facility]    12/14/2017

## 2017-12-14 NOTE — PROGRESS NOTES
Bedside and Verbal shift change report given to Paty Mccabe (oncoming nurse) by Cassie Smith (offgoing nurse). Report included the following information SBAR, Kardex, Intake/Output, MAR, Accordion and Recent Results.

## 2017-12-14 NOTE — DISCHARGE INSTRUCTIONS
[address] [place] [address]  [phone]        [address] [place] [address] INSTRUCTIONS    Patient ID:  Finn Nelson  [id number]  4 y.o.  1935    Admit Date: 12/13/2017    Discharge Date: 12/14/2017     Admitting Physician: Donavan Scott MD     Discharge Physician: Orlin Reyes MD    Admission Diagnoses:   cath  watchman procedure  Paroxysmal atrial fibrillation [facility])    Discharge Diagnoses: Active Problems:    Paroxysmal atrial fibrillation ([address] (9/7/2016)        Discharge Condition: Good    Cardiology Procedures this Admission:  WATCHMAN implant    Disposition: home    Reference discharge instructions provided by nursing for diet and activity. Follow-up with Dr Darryn Faith in one week. Call [phone] to make an appointment. Signed:  Orlin Reyes MD  12/14/2017  9:20 AM    S/P WATCHMAN DISCHARGE INSTRUCTIONS    It is normal to feel tired the first couple days. Take it easy and follow the physicians instructions. CHECK THE CATHETER INSERTION SITE DAILY:  You may shower 24 hours after the procedure, remove the bandage during showering. Wash with soap and water and pat dry. Gentle cleaning of the site with soap and water is sufficient, cover with a dry clean dressing or bandage. Do not apply creams or powders to the area. Do not sit in a bathtub or pool of water for 7 days or until wound has completely healed. Temporary bruising and discomfort is normal and may last a few weeks. You may have a  formation of a small lump at the site which may last up to 6 weeks. CALL THE PHYSICIAN:  If the site becomes red, swollen or feels warm to the touch  If there is bleeding or drainage or if there is unusual pain at the groin or down the leg. If there is any bleeding, lie down, apply pressure or have someone apply pressure with a clean cloth until the bleeding stops.   If the bleeding continues, call [phone] to be transported to the hospital.  DO NOT DRIVE YOURSELF, OR HAVE ANYONE ELSE DRIVE YOU - CALL [phone]. Activity:      For the first 24-48 hours or as instructed by the physician:  No lifting, pushing or pulling over 5 pounds and no straining the insertion site. Do not life grocery bags or the garbage can, do not run the vacuum  or  for 7 days. Start with short walks as in the hospital and gradually increase as tolerated each day. It is recommended to walk 30 minutes 5-7 days per week. Follow your physicians instructions on activity. Avoid walking outside in extremes of heat or cold. Walk inside when it is cold and windy or hot and humid. Things to keep in mind:  No driving for at least 5 days, or as designated by your physician. Limit the number of times you go up and down the stairs  Take rests and pace yourself with activity. Be careful and do not strain with bowel movements. Medications: Take all medications as prescribed  Call your physician if you have any questions  Keep an updated list of your medications with you at all times and give a list to your physician and pharmacist    Signs and Symptoms:  Be cautious of symptoms of angina or recurrent symptoms such as chest discomfort, unusual shortness of breath or fatigue, palpitations. After Care: Follow up with your physician as instructed. Follow a heart healthy diet with proper portion control, daily stress management, daily exercise, blood pressure and cholesterol control , and smoking cessation. AFTER YOU TRANSESOPHAGEAL ECHOCARDIOGRAM    Do not eat or drink for at least two hours after your procedure. Your throat will be numb and there is a risk you might have difficulty swallowing for a while. Be careful when you do eat or drink for the first time especially with hot fluids since you could easily burn your throat. Call your doctor if:    · You are bleeding from your throat or mouth. · You have trouble breathing all of a sudden.   · You have chest pain or any pain that spreads to your neck, jaw, or arms. · You have questions or concerns.   · You have a fever greater than 101°F.

## 2017-12-15 ENCOUNTER — OFFICE VISIT (OUTPATIENT)
Dept: INTERNAL MEDICINE CLINIC | Age: 82
End: 2017-12-15

## 2017-12-15 VITALS
HEIGHT: 62 IN | TEMPERATURE: 97.5 F | BODY MASS INDEX: 32.02 KG/M2 | HEART RATE: 63 BPM | DIASTOLIC BLOOD PRESSURE: 76 MMHG | SYSTOLIC BLOOD PRESSURE: 144 MMHG | RESPIRATION RATE: 16 BRPM | WEIGHT: 174 LBS | OXYGEN SATURATION: 97 %

## 2017-12-15 DIAGNOSIS — I10 ESSENTIAL HYPERTENSION: Primary | ICD-10-CM

## 2017-12-15 DIAGNOSIS — E03.9 HYPOTHYROIDISM, ADULT: ICD-10-CM

## 2017-12-15 DIAGNOSIS — E66.9 OBESITY (BMI 30.0-34.9): ICD-10-CM

## 2017-12-15 DIAGNOSIS — E78.2 MIXED HYPERLIPIDEMIA: ICD-10-CM

## 2017-12-15 DIAGNOSIS — Z00.00 MEDICARE ANNUAL WELLNESS VISIT, SUBSEQUENT: ICD-10-CM

## 2017-12-15 DIAGNOSIS — K21.9 GASTROESOPHAGEAL REFLUX DISEASE WITHOUT ESOPHAGITIS: ICD-10-CM

## 2017-12-15 DIAGNOSIS — R26.89 POOR BALANCE: ICD-10-CM

## 2017-12-15 DIAGNOSIS — G47.33 OSA (OBSTRUCTIVE SLEEP APNEA): ICD-10-CM

## 2017-12-15 DIAGNOSIS — E11.21 TYPE 2 DIABETES MELLITUS WITH NEPHROPATHY (HCC): ICD-10-CM

## 2017-12-15 DIAGNOSIS — M48.02 CERVICAL STENOSIS OF SPINE: ICD-10-CM

## 2017-12-15 DIAGNOSIS — R35.0 URINARY FREQUENCY: ICD-10-CM

## 2017-12-15 DIAGNOSIS — K90.0 CELIAC SPRUE: ICD-10-CM

## 2017-12-15 NOTE — PROGRESS NOTES
HISTORY OF PRESENT ILLNESS  Sally Jalloh is a [de-identified] y.o. female. HPI   Last here 10/11/17. Pt is here for transitional care. Pt ambulates with a cane.     Ms. Sally Jalloh is a [de-identified]y.o. year old female, she is seen today for Transition of Care services following a hospital discharge for a watchman procedure 12/14/17. Our office Nurse Navigator performed an outreach to Ms. Toan Brandt on 11/15/17, 10/26/17 (x2), 10/19/17 and 10/12/17 two days after she was discharged to complete medication reconciliation and a telephonic assessment of her condition. Pt was admitted to the hospital for a watchman procedure 12/13/17-12/14/17    Reviewed labs 12/17    Reviewed discharge summary from Dr. Niki Michael 12/13/17: Ms. Toan Brandt is here for EP consult for Watchman device. The patient has a CHADSVASC score of 6 for the diagnosis/diagnoses of female, age >76, HTN, DM, stroke. He/she should avoid long term anticoagulation due to HAS BLED score of 2- intermediate annual bleeding risk and current medical history of multiple falls with concussion. The patient feels strongly that anticoagulation and documented stroke risk greatly impacts his/her quality of life. The patient is a candidate for Watchman, a left atrial appendage closure (LAAC) device to reduce risk of thromboembolism. The patient has need for anticoagulation and is considered a high risk for stroke, but has a relative contraindication for long term anticoagulation. The patient is suitable for short term warfarin but deemed unable to take long term oral anticoagulation following the conclusion of shared decision making interaction with the patient. I have discussed at length the risks/benefits and alternatives of this procedure with regards to stroke risk versus bleeding risk. The patient understands that anticoagulation will be maintained in a variety of forms during the first year and agrees with plan.  Risks include but not limited to: infection, bleeding, vessel injury, cardiac perforation at times requiring drainage or surgery, heart failure, migration of the device, emergency surgery, myocardial infarction, stroke and death. We will schedule. Continue medical management for AF, HTN, MALLORY.     Pt is now on coumadin for 45 days per cardio    Lov, pt was admitted to [address] [place] [address] Unit for a fall 10/7/17-10/10/17    Lov, I ordered Home Health and PT for her balance   Home Health has come to her house several times  However, per pt, [facility] stated that she did not need PT       BP is 172/72, will repeat this today --improved   Continues on diltiazem 120mg and benazepril 20mg daily  Lov, I stopped her HCTZ   Pt reports dizziness , especially when getting up from bed too quickly  Recall she has an element of [email or web address]    BS at home running around 118, 107, 110, 100, 112, 123 in the AM fasting  Continues on metformin XR 750mg BID     Wt is up 4 lbs since last visit  Pt has been working on her celiac sprue diet  Pt does not snack at home  Discussed diet and w/l    Reviewed depression screen 12/15/17: +2  Pt is upset with her and her sister's health  Overall, her sx are mild  She does not want any medications at this time     Pt follows with Dr. Carlos De La Garza (cardio)  Last visit was 12/13/17 for a watchman procedure (please see above)  Pt is no longer on eliquis  Pt is now on coumadin for 45 days  Reviewed CXR 12/6/17: No acute process      Pt follows with Dr. David Alexander (neuro)  Next visit is scheduled for 1/18    Pt follows with Dr. Xavier Medel (neurosurg)  Pt has seen this physician recently - will get notes for review      Pt follows with Dr. Doroteo Arellano ([facility]) for urinary incontinence  Last visit was 9/11/17 - will get notes for review  Pt has had botox in the past  Pt still reports urinary incontinence      Pt follows with Dr. Yudelka Moreno (ortho)   Last visit was 6/17    Pt follows with Dr. Robin Palomino (ENT)  Pt uses neti pot once daily   Continues on prilosec 40mg BID - works well, no breakthrough   Will have her take prilosec 40mg once daily      Pt follows with Dr. Artemio Ferguson (GI)   Last visit was 10/17  Pt believes that she has IBS, instead of celiac sprue - addressed this today  Recall pt has celiac sprue and is somewhat compliant with her gluten-free diet      Continues on iron 325mg daily - works well  Has a h/o mild anemia, which is stable    Continues on zoloft 150mg for depression, works well, happy with dose      Continues on pravachol 20mg for cholesterol, tolerating well      Continues on 50mcg synthroid daily      Pt is rarely using her CPAP for MALLORY   Pt has not had a sleep apnea test as of yet    Reviewed DEXA 11/20/17: nl    Pt is fairly independent (pt can feed and bathe herself)  Pt lives and gets along well with her   Her  helps her with the housework and drives     ACP on file. SDMs are her  Phil Harmon) and sister Marvin Dickey).       PREVENTIVE:    Colonoscopy: 12/28/16, Dr. Artemio Ferguson, repeat 10 years  EGD: 12/16, Dr. Artemio Ferguson  Pap: 2017 with Dr. Jory Jackson    Mammogram: 06/05/17 -- normal  Dexa: 11/20/17, normal    Tdap: 4/15    Pneumovax: around 2012 per the patient    Prevnar 13: 8/16  Zostavax: 2/29/16  Flu shot: 9/13/17  Ophthalmology:  Dr. 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    ACP (advance care planning) 11/11/2015    Hypothyroidism, adult 07/31/2015    Type 2 diabetes mellitus without complication ([facility] 59/95/3076    Essential hypertension 04/27/2015    MALLORY (obstructive sleep apnea) 07/09/2014    Hyperlipidemia 07/09/2014    Sarcoid 07/09/2014    Stress bladder incontinence, female 07/09/2014    Obesity 07/09/2014    TIA (transient ischemic attack) 07/09/2014    AR (aortic regurgitation) 07/09/2014    Mitral valve insufficiency 07/09/2014    Cervical stenosis of spine 07/09/2014     Current Outpatient Prescriptions   Medication Sig Dispense Refill    warfarin (COUMADIN) 5 mg tablet Take 1 Tab by mouth every evening for 45 days. 45 Tab 0    Cholecalciferol, Vitamin D3, (VITAMIN D3) 2,000 unit cap capsule Take 2,000 Units by mouth daily.  omeprazole (PRILOSEC) 40 mg capsule Take 40 mg by mouth two (2) times a day.  levothyroxine (SYNTHROID) 50 mcg tablet TAKE ONE TABLET BY MOUTH ONCE DAILY BEFORE BREAKFAST 90 Tab 0    sertraline (ZOLOFT) 100 mg tablet TAKE ONE AND ONE-HALF TABLETS BY MOUTH ONCE DAILY (Patient taking differently: TAKE ONE AND ONE-HALF TABLETS BY MOUTH ONCE DAILY AT BEDTIME) 135 Tab 0    benazepril (LOTENSIN) 20 mg tablet TAKE ONE TABLET BY MOUTH ONCE DAILY 90 Tab 0    acetaminophen (TYLENOL) 500 mg tablet Take 500 mg by mouth every four (4) hours as needed for Pain.  DILT- mg XR capsule TAKE ONE CAPSULE BY MOUTH ONCE DAILY 30 Cap 11    pravastatin (PRAVACHOL) 20 mg tablet TAKE ONE TABLET BY MOUTH NIGHTLY 90 Tab 0    metFORMIN ER (GLUCOPHAGE XR) 750 mg tablet TAKE ONE TABLET BY MOUTH TWICE DAILY 180 Tab 0    nystatin (MYCOSTATIN) powder Apply  to affected area two (2) times a day. (Patient taking differently: Apply  to affected area two (2) times daily as needed.) 60 g 1    ferrous sulfate (IRON) 325 mg (65 mg iron) tablet Take 325 mg by mouth every other day.  multivit-min-FA-lycopen-lutein (CENTRUM SILVER) 0.4-300-250 mg-mcg-mcg tab Take 1 Tab by mouth daily.  aspirin delayed-release 81 mg tablet Take 81 mg by mouth daily.  magnesium 250 mg tab Take 250 mg by mouth daily.        Past Surgical History:   Procedure Laterality Date    CARDIAC SURG PROCEDURE UNLIST      cardioversion     COLONOSCOPY N/A 12/28/2016    COLONOSCOPY [facility] Steffi Mcdaniel [facility] at [facility] ENDOSCOPY    HX CATARACT REMOVAL      both eyes    HX CHOLECYSTECTOMY      HX COLONOSCOPY  5/8/2013    Repeat in 5 years    HX CYST REMOVAL  10/15    on head     HX HYSTERECTOMY      HX ORTHOPAEDIC Bilateral     knee replacement to both knees    HX ORTHOPAEDIC      spacer btwn L4-5 and L5-6     HX TONSILLECTOMY      HX UROLOGICAL      botox in bladder      Lab Results  Component Value Date/Time   WBC 11.1 12/14/2017 02:51 AM   HGB 10.5 12/14/2017 02:51 AM   HCT 32.9 12/14/2017 02:51 AM   PLATELET 880 84/30/9403 02:51 AM   MCV 91.4 12/14/2017 02:51 AM     Lab Results  Component Value Date/Time   Cholesterol, total 149 09/08/2017 09:47 AM   HDL Cholesterol 82 09/08/2017 09:47 AM   LDL, calculated 48 09/08/2017 09:47 AM   LDL-C, External 77 10/18/2013 11:09 AM   Triglyceride 96 09/08/2017 09:47 AM   CHOL/HDL Ratio 1.9 09/08/2016 02:13 AM     Lab Results  Component Value Date/Time   GFR est non-AA 55 12/11/2017 10:09 AM   GFR est AA 63 12/11/2017 10:09 AM   Creatinine 0.98 12/11/2017 10:09 AM   BUN 23 12/11/2017 10:09 AM   Sodium 141 12/11/2017 10:09 AM   Potassium 4.7 12/11/2017 10:09 AM   Chloride 102 12/11/2017 10:09 AM   CO2 24 12/11/2017 10:09 AM   Magnesium 1.9 09/07/2016 01:11 AM   Phosphorus 3.3 09/07/2016 01:11 AM          Review of Systems   HENT: Positive for hearing loss. Respiratory: Negative for shortness of breath. Cardiovascular: Negative for chest pain. Musculoskeletal: Negative for falls. Neurological: Positive for dizziness. Psychiatric/Behavioral: Positive for depression. Negative for memory loss. Physical Exam   Constitutional: She is oriented to person, place, and time. She appears well-developed and well-nourished. No distress. HENT:   Head: Normocephalic and atraumatic.    Right Ear: External ear normal.   Left Ear: External ear normal.   Mouth/Throat: Oropharynx is clear and moist. No oropharyngeal exudate. Eyes: Conjunctivae and EOM are normal. Right eye exhibits no discharge. Left eye exhibits no discharge. Neck: Normal range of motion. Neck supple. No carotid bruits    Cardiovascular: Normal rate and normal heart sounds. Exam reveals no gallop and no friction rub. No murmur heard. A few extra heart beats   Pulmonary/Chest: Effort normal and breath sounds normal. No respiratory distress. She has no wheezes. She has no rales. She exhibits no tenderness. Abdominal: Soft. She exhibits no distension and no mass. There is no tenderness. There is no rebound and no guarding. Musculoskeletal: She exhibits no edema, tenderness or deformity. Lymphadenopathy:     She has no cervical adenopathy. Neurological: She is alert and oriented to person, place, and time. Coordination abnormal.   Skin: Skin is warm and dry. No rash noted. She is not diaphoretic. No erythema. No pallor. Psychiatric: She has a normal mood and affect. Her behavior is normal.       ASSESSMENT and PLAN    ICD-10-CM ICD-9-CM    1. Essential hypertension    Initial BP elevated, repeat BP showed adeqaute control for age and comorbidities especially given her propensity for falls, continue dilt and benazepril, will stay off HCTZ for now   D55 986.8 METABOLIC PANEL, COMPREHENSIVE      HEMOGLOBIN A1C WITH EAG      TSH 3RD GENERATION      CBC W/O DIFF   2. Medicare annual wellness visit, subsequent G67.88 O35.6 METABOLIC PANEL, COMPREHENSIVE      HEMOGLOBIN A1C WITH EAG      TSH 3RD GENERATION      CBC W/O DIFF   3. Poor balance    Needs to resume PT, placed order for her   R26.89 781.99 REFERRAL TO PHYSICAL THERAPY      METABOLIC PANEL, COMPREHENSIVE      HEMOGLOBIN A1C WITH EAG      TSH 3RD GENERATION      CBC W/O DIFF   4.  MALLORY (obstructive sleep apnea)    Still not wearing CPAP, still has not schedule sleep study, addressed this again today, she states that she will follow through with sleep eval, she understands the benefits of treating sleep apnea   B73.63 812.22 METABOLIC PANEL, COMPREHENSIVE      HEMOGLOBIN A1C WITH EAG      TSH 3RD GENERATION      CBC W/O DIFF   5. Type 2 diabetes mellitus with nephropathy (HCC)    Well-controled on metformin XR 750mg BID, continue    Q61.82 922.88 METABOLIC PANEL, COMPREHENSIVE     583.81 HEMOGLOBIN A1C WITH EAG      TSH 3RD GENERATION      CBC W/O DIFF   6. Mixed hyperlipidemia    Controled on pravachol 20mg daily   F95.4 020.9 METABOLIC PANEL, COMPREHENSIVE      HEMOGLOBIN A1C WITH EAG      TSH 3RD GENERATION      CBC W/O DIFF   7. Hypothyroidism, adult    On synthroid 50mcg daily, controled, no change to dose   I34.4 343.9 METABOLIC PANEL, COMPREHENSIVE      HEMOGLOBIN A1C WITH EAG      TSH 3RD GENERATION      CBC W/O DIFF   8. Cervical stenosis of spine    Follows with Dr. Betty Zavala, reports that she saw him recently after having her traumatic head injury, will get his notes for reivdw   L14.17 903.3 METABOLIC PANEL, COMPREHENSIVE      HEMOGLOBIN A1C WITH EAG      TSH 3RD GENERATION      CBC W/O DIFF   9. Celiac sprue    Not compliant with a gluten-free diet, discussed the importance of a gluten-free diet   I71.3 474.8 METABOLIC PANEL, COMPREHENSIVE      HEMOGLOBIN A1C WITH EAG      TSH 3RD GENERATION      CBC W/O DIFF   10. Gastroesophageal reflux disease without esophagitis    Decrease prilosec from BID to once daily dosing    J69.6 731.19 METABOLIC PANEL, COMPREHENSIVE      HEMOGLOBIN A1C WITH EAG      TSH 3RD GENERATION      CBC W/O DIFF   11. Urinary frequency    Sees Dr. Rema Shetty for this, has had botox, having recurrent sx, will f/u with him to determine tx plan   H28.0 343.91 METABOLIC PANEL, COMPREHENSIVE      HEMOGLOBIN A1C WITH EAG      TSH 3RD GENERATION      CBC W/O DIFF   12. Obesity (BMI 30.0-34. 9)    Wt not improved, discussed [employer] and focusing on portions    E66.9 278.00 13 depression --controlled overall on zoloft    Depression screen reviewed and positive (2). Pt is upset with her and her sister's health. Overall, her sx are mild. She does not want any additional medications at this time. Scribed by Erasto Miramontes of [address] [place] [address] [address], as dictated by Dr. Kaden Cao. Current diagnosis and concerns discussed with pt at length. Pt understands risks and benefits or current treatment plan and medications, and accepts the treatment and medication with any possible risks. Pt asks appropriate questions, which were answered. Pt was instructed to call with any concerns or problems. This note will not be viewable in [address]

## 2017-12-15 NOTE — PROGRESS NOTES
This is a Subsequent Medicare Annual Wellness Exam (AWV) (Performed 12 months after IPPE or effective date of Medicare Part B enrollment)    I have reviewed the patient's medical history in detail and updated the computerized patient record. History     Past Medical History:   Diagnosis Date    Anemia     Arrhythmia     AFIB    Ataxia     Webb's esophagus     Cervical spinal stenosis     Coronary atherosclerosis of unspecified type of vessel, native or graft     Diabetes (HCC)     Fatigue     Fatigue     GERD (gastroesophageal reflux disease)     Hypercholesterolemia     Hyperlipidemia     Hypertension     Ill-defined condition     Webb's Esophagus    Ill-defined condition     right torn rotater cuff- no surgery at this time    Liver disease     pt is unaware    Osteoarthritis     Psychiatric disorder     anxiety and depression    Sarcoidosis     Sleep apnea     uses cpap    Stroke ([facility] [address]     TIA'S    Thyroid disease       Past Surgical History:   Procedure Laterality Date    CARDIAC SURG PROCEDURE UNLIST      cardioversion     COLONOSCOPY N/A 12/28/2016    [facility] ENDOSCOPY    HX CATARACT REMOVAL      both eyes    HX CHOLECYSTECTOMY      HX COLONOSCOPY  5/8/2013    Repeat in 5 years    HX CYST REMOVAL  10/15    on head     HX HYSTERECTOMY      HX ORTHOPAEDIC Bilateral     knee replacement to both knees    HX ORTHOPAEDIC      spacer btwn L4-5 and L5-6     HX TONSILLECTOMY      HX UROLOGICAL      botox in bladder     Current Outpatient Prescriptions   Medication Sig Dispense Refill    warfarin (COUMADIN) 5 mg tablet Take 1 Tab by mouth every evening for 45 days. 45 Tab 0    Cholecalciferol, Vitamin D3, (VITAMIN D3) 2,000 unit cap capsule Take 2,000 Units by mouth daily.  omeprazole (PRILOSEC) 40 mg capsule Take 40 mg by mouth two (2) times a day.       levothyroxine (SYNTHROID) 50 mcg tablet TAKE ONE TABLET BY MOUTH ONCE DAILY BEFORE BREAKFAST 90 Tab 0    sertraline (ZOLOFT) 100 mg tablet TAKE ONE AND ONE-HALF TABLETS BY MOUTH ONCE DAILY (Patient taking differently: TAKE ONE AND ONE-HALF TABLETS BY MOUTH ONCE DAILY AT BEDTIME) 135 Tab 0    benazepril (LOTENSIN) 20 mg tablet TAKE ONE TABLET BY MOUTH ONCE DAILY 90 Tab 0    acetaminophen (TYLENOL) 500 mg tablet Take 500 mg by mouth every four (4) hours as needed for Pain.  DILT- mg XR capsule TAKE ONE CAPSULE BY MOUTH ONCE DAILY 30 Cap 11    pravastatin (PRAVACHOL) 20 mg tablet TAKE ONE TABLET BY MOUTH NIGHTLY 90 Tab 0    metFORMIN ER (GLUCOPHAGE XR) 750 mg tablet TAKE ONE TABLET BY MOUTH TWICE DAILY 180 Tab 0    nystatin (MYCOSTATIN) powder Apply  to affected area two (2) times a day. (Patient taking differently: Apply  to affected area two (2) times daily as needed.) 60 g 1    ferrous sulfate (IRON) 325 mg (65 mg iron) tablet Take 325 mg by mouth every other day.  multivit-min-FA-lycopen-lutein (CENTRUM SILVER) 0.4-300-250 mg-mcg-mcg tab Take 1 Tab by mouth daily.  aspirin delayed-release 81 mg tablet Take 81 mg by mouth daily.  magnesium 250 mg tab Take 250 mg by mouth daily.        Allergies   Allergen Reactions    Procardia Xl [Nifedipine] Other (comments)     weakness     Family History   Problem Relation Age of Onset    Other Mother      Fibromyalgia    Pacemaker Mother     Heart Disease Mother     Heart Failure Mother     COPD Mother     Heart Attack Father 61    Cancer Sister      Multiple Myeloma    Diabetes Brother     Obesity Brother     Pacemaker Brother     Heart Attack Brother      Bundle branch block    No Known Problems Son     Psychiatric Disorder Daughter      Multiple     Social History   Substance Use Topics    Smoking status: Never Smoker    Smokeless tobacco: Never Used    Alcohol use No     Patient Active Problem List   Diagnosis Code    MALLORY (obstructive sleep apnea) G47.33    Hyperlipidemia E78.5    Sarcoid D86.9    Stress bladder incontinence, female N39.3    Obesity E66.9    TIA (transient ischemic attack) G45.9    AR (aortic regurgitation) I35.1    Mitral valve insufficiency I34.0    Cervical stenosis of spine M48.02    Type 2 diabetes mellitus without complication (HCC) Z47.2    Essential hypertension I10    Hypothyroidism, adult E03.9    Webb esophagus K22.70    ACP (advance care planning) Z71.89    Ataxia R27.0    Dehydration E86.0    Paroxysmal atrial fibrillation (HCC) I48.0    Precordial pain R07.2    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 Alcohol Risk Factor Screening: You do not drink alcohol or very rarely. none    Functional Ability and Level of Safety:   Hearing Loss  The patient needs further evaluation. --decreased on the right , plans on seeing audiology    Activities of Daily Living  The home contains: handrails and grab bars  Patient needs help with:  transportation, shopping and preparing meals    Fall Risk  Fall Risk Assessment, last 12 mths 12/15/2017   Able to walk? Yes   Fall in past 12 months? Yes   Fall with injury?  Yes   Number of falls in past 12 months 2   Fall Risk Score 3   needs to repeat PT , did PT for balance in the past ,discussed will resume pt  Walks with cane    Abuse Screen  Patient is not abused  Lives with sister and   Cognitive Screening   Evaluation of Cognitive Function:  Has your family/caregiver stated any concerns about your memory: no  Normal    Patient Care Team   Patient Care Team:  Rogena Brittle, MD as PCP - General (Internal Medicine)  Jerrica Guevara MD (Ophthalmology)  Domingo Jefferson MD (Orthopedic Surgery)  Bola Garcia MD as Physician (Cardiology)  Tripp Rogers MD (Gastroenterology)  Jose Sneed MD (Rheumatology)  Danna Parker MD (Neurology)  Ky Read, RN as Ambulatory Care Navigator  Ledy Park MD (Otolaryngology)  Sean Knutson MD (Cardiology)  Kwan Mcclendon, RN as Ambulatory Care Navigator (Internal Medicine)  Marti Todd MD (Cardiology)  Lito Arce MD (Neurosurgery)  Chicho Greenberg MD (Orthopedic Surgery)   Rick sampson --urogyn     Assessment/Plan   Education and counseling provided:  Are appropriate based on today's review and evaluation  Screening Mammography  Screening for glaucoma  Diabetes screening test    Diagnoses and all orders for this visit:    1. Medicare annual wellness visit, subsequent      Health Maintenance Due   Topic Date Due    Pneumococcal 65+ High/Highest Risk (2 of 2 - PPSV23) 10/13/2016    MEDICARE YEARLY EXAM  12/08/2017     Discussed with patient about advance medical directive. ACP on file. SDMs are her  Ryan Ahn) and sister Liliya Gardiner).       Colonoscopy: 12/28/16, Dr. Ernestina Burgess, repeat 10 years  Pap: 2017 with Dr. Balderrama Minor other year  Mammogram: 06/05/17 -- normal annual   Dexa: 11/20/17, normal      Tdap: 4/15    Pneumovax: around 2012 per the patient    Prevnar 13: 8/16  Zostavax: 2/29/16  Flu shot: 9/13/17    Ophthalmology:  Dr. Teresa Gutiérrez, 3/2017,  Annual     A1c:   12/17 6.3 q3 month  Lipids: 9/17 LDL 48  Annual     Medication reconciliation completed by MA and reviewed by me. Medical/surgical/social/family history reviewed and updated by me. Patient provided AVS and preventative screening table. Patient verbalized understanding of all information discussed.

## 2017-12-15 NOTE — MR AVS SNAPSHOT
Visit Information Date & Time Provider Department Dept. Phone Encounter #  
 12/15/2017  1:30 PM Jamey Marrero, [address] [place] [address],[address] [phone] [id number] Follow-up Instructions Return in about 3 months (around 3/15/2018). Your Appointments 12/18/2017  1:45 PM  
ANTICOAG NURSE with COUMADIN, [facility] [address]) Appt Note: INR/post [address] [facility]  
[phone] [address] [facility]  
  
    
 12/18/2017  2:00 PM  
ESTABLISHED PATIENT with Diane Flor NP [facility] [address]) Appt Note: [place] [id number] [facility]  
[phone] [address] [facility]  
  
    
 1/22/2018  8:40 AM  
Follow Up with Monserrat Lee MD  
[address] [facility] [address] [facility]) Appt Note: f/up from fall and hit her head [facility] 11/17/17  
 [address] [facility] [address] [address]  
[facility] [address] [address] Upcoming Health Maintenance Date Due Pneumococcal 65+ High/Highest Risk (2 of 2 - PPSV23) 10/13/2016 MEDICARE YEARLY EXAM 12/8/2017 EYE EXAM RETINAL OR DILATED Q1 3/20/2018 FOOT EXAM Q1 6/7/2018 HEMOGLOBIN A1C Q6M 6/11/2018 MICROALBUMIN Q1 9/8/2018 LIPID PANEL Q1 9/8/2018 GLAUCOMA SCREENING Q2Y 3/20/2019 DTaP/Tdap/Td series (2 - Td) 4/27/2025 Allergies as of 12/15/2017  Review Complete On: 12/15/2017 By: Jamey Marrero MD  
  
 Severity Noted Reaction Type Reactions Procardia Xl [Nifedipine]  07/09/2014    Other (comments)  
 weakness Current Immunizations  Reviewed on 6/7/2017 Name Date Influenza High Dose Vaccine PF 9/13/2017, 9/19/2015 Influenza Vaccine 10/10/2014 12:38 PM  
 Influenza Vaccine (Quad) PF 9/14/2016 Pneumococcal Conjugate (PCV-13) 8/18/2016 Tdap 4/27/2015 Zoster Vaccine, Live 2/29/2016 Not reviewed this visit You Were Diagnosed With   
  
 Codes Comments Essential hypertension    -  Primary ICD-10-CM: I10 
ICD-9-CM: 401.9 Medicare annual wellness visit, subsequent     ICD-10-CM: Z00.00 ICD-9-CM: V70.0 Poor balance     ICD-10-CM: R26.89 
ICD-9-CM: 781.99   
 MALLORY (obstructive sleep apnea)     ICD-10-CM: G47.33 
ICD-9-CM: 327.23 Type 2 diabetes mellitus with nephropathy (HCC)     ICD-10-CM: E11.21 
ICD-9-CM: 250.40, 583.81 Mixed hyperlipidemia     ICD-10-CM: E78.2 ICD-9-CM: 272.2 Hypothyroidism, adult     ICD-10-CM: E03.9 ICD-9-CM: 244.9 Cervical stenosis of spine     ICD-10-CM: M48.02 
ICD-9-CM: 723.0 Celiac sprue     ICD-10-CM: K90.0 ICD-9-CM: 579.0 Gastroesophageal reflux disease without esophagitis     ICD-10-CM: K21.9 ICD-9-CM: 530.81 Urinary frequency     ICD-10-CM: R35.0 ICD-9-CM: 788.41 Obesity (BMI 30.0-34.9)     ICD-10-CM: X15.0 ICD-9-CM: 278.00 Vitals BP Pulse Temp Resp Height(growth percentile) Weight(growth percentile) 172/72 (BP 1 Location: Left arm, BP Patient Position: Sitting) 63 97.5 °F (36.4 °C) (Oral) 16 5' 1.5\" (1.562 m) 174 lb (78.9 kg) SpO2 BMI OB Status Smoking Status 97% 32.34 kg/m2 Hysterectomy Never Smoker Vitals History BMI and BSA Data Body Mass Index Body Surface Area  
 32.34 kg/m 2 1.85 m 2 Preferred Pharmacy Pharmacy Name Phone [facility] [address] [place] [phone] Your Updated Medication List  
  
   
This list is accurate as of: 12/15/17  1:58 PM.  Always use your most recent med list.  
  
  
  
  
 acetaminophen 500 mg tablet Commonly known as:  TYLENOL Take 500 mg by mouth every four (4) hours as needed for Pain. aspirin delayed-release 81 mg tablet Take 81 mg by mouth daily. benazepril 20 mg tablet Commonly known as:  LOTENSIN  
TAKE ONE TABLET BY MOUTH ONCE DAILY CENTRUM SILVER 0.4-300-250 mg-mcg-mcg Tab Generic drug:  multivit-min-FA-lycopen-lutein Take 1 Tab by mouth daily. Cholecalciferol (Vitamin D3) 2,000 unit Cap capsule Commonly known as:  VITAMIN D3 Take 2,000 Units by mouth daily. DILT- mg XR capsule Generic drug:  dilTIAZem XR  
TAKE ONE CAPSULE BY MOUTH ONCE DAILY Iron 325 mg (65 mg iron) tablet Generic drug:  ferrous sulfate Take 325 mg by mouth every other day. levothyroxine 50 mcg tablet Commonly known as:  SYNTHROID  
TAKE ONE TABLET BY MOUTH ONCE DAILY BEFORE BREAKFAST  
  
 magnesium 250 mg Tab Take 250 mg by mouth daily. metFORMIN  mg tablet Commonly known as:  GLUCOPHAGE XR  
TAKE ONE TABLET BY MOUTH TWICE DAILY  
  
 nystatin powder Commonly known as:  MYCOSTATIN Apply  to affected area two (2) times a day. omeprazole 40 mg capsule Commonly known as:  PRILOSEC Take 40 mg by mouth two (2) times a day. pravastatin 20 mg tablet Commonly known as:  PRAVACHOL  
TAKE ONE TABLET BY MOUTH NIGHTLY  
  
 sertraline 100 mg tablet Commonly known as:  ZOLOFT  
TAKE ONE AND ONE-HALF TABLETS BY MOUTH ONCE DAILY  
  
 warfarin 5 mg tablet Commonly known as:  COUMADIN Take 1 Tab by mouth every evening for 45 days. We Performed the Following REFERRAL TO PHYSICAL THERAPY [RFR70 Custom] Follow-up Instructions Return in about 3 months (around 3/15/2018). To-Do List   
 12/20/2017 Lab:  CBC W/O DIFF   
  
 12/20/2017 Lab:  HEMOGLOBIN A1C WITH EAG   
  
 12/20/2017 Lab:  METABOLIC PANEL, COMPREHENSIVE   
  
 12/20/2017 Lab:  TSH 3RD GENERATION Referral Information Referral ID Referred By Referred To  
  
 [id number] WANDA, [address] [place], [address] [place] [address] Phone: [phone] Fax: [phone] Visits Status Start Date End Date 1 New Request 12/15/17 12/15/18 If your referral has a status of pending review or denied, additional information will be sent to support the outcome of this decision. Patient Instructions Medicare Part B Preventive Services Limitations Recommendation Scheduled Bone Mass Measurement 
(age 72 & older, biennial) Requires diagnosis related to osteoporosis or estrogen deficiency. Biennial benefit unless patient has history of long-term glucocorticoid tx or baseline is needed because initial test was by other method Completed 11/2017 
  
Recommended every 2 years Due 11/2019 Cardiovascular Screening Blood Tests (every 5 years) Total cholesterol, HDL, Triglycerides Order as a panel if possible Completed 9/2017 
  
Recommended annually Due 9/2018 Colorectal Cancer Screening 
-Fecal occult blood test (annual) -Flexible sigmoidoscopy (5y) 
-Screening colonoscopy (10y) -Barium Enema    Completed 5/2013 with Dr. Unique Dominguez 
Repeat in 10 years Due 5/2023 Counseling to Prevent Tobacco Use (up to 8 sessions per year) - Counseling greater than 3 and up to 10 minutes - Counseling greater than 10 minutes Patients must be asymptomatic of tobacco-related conditions to receive as preventive service N/A N/A Diabetes Screening Tests (at least every 3 years, Medicare covers annually or at 6-month intervals for prediabetic patients) 
   
Fasting blood sugar (FBS) or glucose tolerance test (GTT) Patient must be diagnosed with one of the following: 
-Hypertension, Dyslipidemia, obesity, previous impaired FBS or GTT 
Or any two of the following: overweight, FH of diabetes, age ? 72, history of gestational diabetes, birth of baby weighing more than 9 pounds Completed 10/2017 with A1C 6.7 
   
Recommended every 3-6 months for diabetics Due 1/2018-4/2018 Diabetes Self-Management Training (DSMT) (no USPSTF recommendation) Requires referral by treating physician for patient with diabetes or renal disease. 10 hours of initial DSMT session of no less than 30 minutes each in a continuous 12-month period.  2 hours of follow-up DSMT in subsequent years. Pt attended this class with her  in the past. Merari Crenshaw are eligible for this class every 2 years. Please let us know if you are interested in this class. Glaucoma Screening (no USPSTF recommendation) Diabetes mellitus, family history, , age 48 or over,  American, age 72 or over Completed 3/20/17 
   
Recommended annually  Due 3/2018 Human Immunodeficiency Virus (HIV) Screening (annually for increased risk patients) HIV-1 and HIV-2 by EIA, SIMON, rapid antibody test, or oral mucosa transudate Patient must be at increased risk for HIV infection per USPSTF guidelines or pregnant.  Tests covered annually for patients at increased risk.  Pregnant patients may receive up to 3 test during pregnancy. N/A N/A Medical Nutrition Therapy (MNT) (for diabetes or renal disease not recommended schedule) Requires referral by treating physician for patient with diabetes or renal disease.  Can be provided in same year as diabetes self-management training (DSMT), and CMS recommends medical nutrition therapy take place after DSMT.  Up to 3 hours for initial year and 2 hours in subsequent years. Pt attended this class with her  in the past. Merari Crenshaw are eligible for this class every 2 years. Please let us know if you are interested in this class. Seasonal Influenza Vaccination (annually)    Completed 9/2017 
   
Recommended annually Due Fall 2018 Shingles Vaccination A shingles vaccine is also recommended once in a lifetime after age 61  Completed 2/29/16 Completed Pneumococcal Vaccination (once after 65)    Prevnar 13: 8/18/16 
  
Pneumovax: 2012 
  
Both recommended once over the age of 72 Completed 
  
Completed Hepatitis B Vaccinations (if medium/high risk) Medium/high risk factors:  End-stage renal disease, Hemophiliacs who received Factor VIII or IX concentrates, Clients of institutions for the mentally retarded, Persons who live in the same house as a HepB virus carrier, Homosexual men, Illicit injectable drug abusers. N/A N/A Screening Mammography (biennial age 54-69)? Annually (age 36 or over) Completed 6/5/17 
  
Recommended annually Due 6/2018 Screening Pap Tests and Pelvic Examination (up to age 72 and after 72 if unknown history or abnormal study last 10 years) Every 24 months except high risk Completed 2017--dr mon No longer indicated? Ultrasound Screening for Abdominal Aortic Aneurysm (AAA) (once) Patient must be referred through IPPE and not have had a screening for abdominal aortic aneurysm before under Medicare.  Limited to patients who meet one of the following criteria: 
- Men who are 73-68 years old and have smoked more than 100 cigarettes in their lifetime. 
-Anyone with a FH of AAA 
-Anyone recommended for screening by USPSTF N/A N/A  
  
  
 
 
Medicare Wellness Visit, Female The best way to live healthy is to have a healthy lifestyle by eating a well-balanced diet, exercising regularly, limiting alcohol and stopping smoking. Regular physical exams and screening tests are another way to keep healthy. Preventive exams provided by your health care provider can find health problems before they become diseases or illnesses. Preventive services including immunizations, screening tests, monitoring and exams can help you take care of your own health. All people over age 72 should have a pneumovax  and and a prevnar shot to prevent pneumonia. These are once in a lifetime unless you and your provider decide differently. All people over 65 should have a yearly flu shot and a tetanus vaccine every 10 years.  
 
A bone mass density to screen for osteoporosis or thinning of the bones should be done every 2 years after 72. Screening for diabetes mellitus with a blood sugar test should be done every year. Glaucoma is a disease of the eye due to increased ocular pressure that can lead to blindness and it should be done every year by an eye professional. 
 
Cardiovascular screening tests that check for elevated lipids (fatty part of blood) which can lead to heart disease and strokes should be done every 5 years. Colorectal screening that evaluates for blood or polyps in your colon should be done yearly as a stool test or every five years as a flexible sigmoidoscope or every 10 years as a colonoscopy up to age 76. Breast cancer screening with a mammogram is recommended biennially  for women age 54-69. Screening for cervical cancer with a pap smear and pelvic exam is recommended for women after age 72 years every 2 years up to age 79 or when the provider and patient decide to stop. If there is a history of cervical abnormalities or other increased risk for cancer then the test is recommended yearly. Hepatitis C screening is also recommended for anyone born between 80 through [address]. A shingles vaccine is also recommended once in a lifetime after age 61. Your Medicare Wellness Exam is recommended annually. Here is a list of your current Health Maintenance items with a due date: 
Health Maintenance Due Topic Date Due  Pneumococcal Vaccine (2 of 2 - PPSV23) 10/13/2016 [address] [place] [address] Annual Well Visit  12/08/2017 Decrease prilosec to once daily Schedule sleep study Introducing [facility]! Dear Eliana Tavera: Thank you for requesting a [employer] account. Our records indicate that you already have an active [employer] account. You can access your account anytime at [email or web address] Did you know that you can access your hospital and ER discharge instructions at any time in [employer]?   You can also review all of your test results from your hospital stay or ER visit. Additional Information If you have questions, please visit the Frequently Asked Questions section of the [employer] website at [email or web address] Remember, [employer] is NOT to be used for urgent needs. For medical emergencies, dial 911. Now available from your iPhone and Android! Please provide this summary of care documentation to your next provider. Your primary care clinician is listed as Charlie Meza. If you have any questions after today's visit, please call [phone].

## 2017-12-15 NOTE — PATIENT INSTRUCTIONS
Medicare Part B Preventive Services Limitations Recommendation Scheduled   Bone Mass Measurement  (age 72 & older, biennial) Requires diagnosis related to osteoporosis or estrogen deficiency. Biennial benefit unless patient has history of long-term glucocorticoid tx or baseline is needed because initial test was by other method Completed 11/2017     Recommended every 2 years Due 11/2019   Cardiovascular Screening Blood Tests (every 5 years)  Total cholesterol, HDL, Triglycerides Order as a panel if possible Completed 9/2017     Recommended annually Due 9/2018   Colorectal Cancer Screening  -Fecal occult blood test (annual)  -Flexible sigmoidoscopy (5y)  -Screening colonoscopy (10y)  -Barium Enema    Completed 5/2013 with Dr. Ej Hassan  Repeat in 10 years Due 5/2023   Counseling to Prevent Tobacco Use (up to 8 sessions per year)  - Counseling greater than 3 and up to 10 minutes  - Counseling greater than 10 minutes Patients must be asymptomatic of tobacco-related conditions to receive as preventive service N/A N/A   Diabetes Screening Tests (at least every 3 years, Medicare covers annually or at 6-month intervals for prediabetic patients)      Fasting blood sugar (FBS) or glucose tolerance test (GTT) Patient must be diagnosed with one of the following:  -Hypertension, Dyslipidemia, obesity, previous impaired FBS or GTT  Or any two of the following: overweight, FH of diabetes, age ? 72, history of gestational diabetes, birth of baby weighing more than 9 pounds Completed 10/2017 with A1C 6.7      Recommended every 3-6 months for diabetics Due 1/2018-4/2018   Diabetes Self-Management Training (DSMT) (no USPSTF recommendation) Requires referral by treating physician for patient with diabetes or renal disease. 10 hours of initial DSMT session of no less than 30 minutes each in a continuous 12-month period.  2 hours of follow-up DSMT in subsequent years.  Pt attended this class with her  in the past. Rayray eisenberg eligible for this class every 2 years. Please let us know if you are interested in this class. Glaucoma Screening (no USPSTF recommendation) Diabetes mellitus, family history, , age 48 or over,  American, age 72 or over Completed 3/20/17      Recommended annually  Due 3/2018   Human Immunodeficiency Virus (HIV) Screening (annually for increased risk patients)  HIV-1 and HIV-2 by EIA, SIMON, rapid antibody test, or oral mucosa transudate Patient must be at increased risk for HIV infection per USPSTF guidelines or pregnant.  Tests covered annually for patients at increased risk.  Pregnant patients may receive up to 3 test during pregnancy. N/A N/A   Medical Nutrition Therapy (MNT) (for diabetes or renal disease not recommended schedule) Requires referral by treating physician for patient with diabetes or renal disease.  Can be provided in same year as diabetes self-management training (DSMT), and CMS recommends medical nutrition therapy take place after DSMT.  Up to 3 hours for initial year and 2 hours in subsequent years. Pt attended this class with her  in the past. Sandra Bob are eligible for this class every 2 years. Please let us know if you are interested in this class. Seasonal Influenza Vaccination (annually)    Completed 9/2017      Recommended annually Due Fall 2018   Shingles Vaccination A shingles vaccine is also recommended once in a lifetime after age 61  Completed 2/29/16 Completed   Pneumococcal Vaccination (once after 72)    Prevnar 13: 8/18/16     Pneumovax: 2012     Both recommended once over the age of 72 Completed     Completed   Hepatitis B Vaccinations (if medium/high risk) Medium/high risk factors:  End-stage renal disease,  Hemophiliacs who received Factor VIII or IX concentrates, Clients of institutions for the mentally retarded, Persons who live in the same house as a HepB virus carrier, Homosexual men, Illicit injectable drug abusers.  N/A N/A   Screening Mammography (biennial age 54-69)? Annually (age 36 or over) Completed 6/5/17     Recommended annually Due 6/2018   Screening Pap Tests and Pelvic Examination (up to age 72 and after 72 if unknown history or abnormal study last 10 years) Every 24 months except high risk Completed 2017--dr mon No longer indicated? Ultrasound Screening for Abdominal Aortic Aneurysm (AAA) (once) Patient must be referred through IPPE and not have had a screening for abdominal aortic aneurysm before under Medicare.  Limited to patients who meet one of the following criteria:  - Men who are 73-68 years old and have smoked more than 100 cigarettes in their lifetime.  -Anyone with a FH of AAA  -Anyone recommended for screening by USPSTF N/A N/A             Medicare Wellness Visit, Female    The best way to live healthy is to have a healthy lifestyle by eating a well-balanced diet, exercising regularly, limiting alcohol and stopping smoking. Regular physical exams and screening tests are another way to keep healthy. Preventive exams provided by your health care provider can find health problems before they become diseases or illnesses. Preventive services including immunizations, screening tests, monitoring and exams can help you take care of your own health. All people over age 72 should have a pneumovax  and and a prevnar shot to prevent pneumonia. These are once in a lifetime unless you and your provider decide differently. All people over 65 should have a yearly flu shot and a tetanus vaccine every 10 years. A bone mass density to screen for osteoporosis or thinning of the bones should be done every 2 years after 65. Screening for diabetes mellitus with a blood sugar test should be done every year.     Glaucoma is a disease of the eye due to increased ocular pressure that can lead to blindness and it should be done every year by an eye professional.    Cardiovascular screening tests that check for elevated lipids (fatty part of blood) which can lead to heart disease and strokes should be done every 5 years. Colorectal screening that evaluates for blood or polyps in your colon should be done yearly as a stool test or every five years as a flexible sigmoidoscope or every 10 years as a colonoscopy up to age 76. Breast cancer screening with a mammogram is recommended biennially  for women age 54-69. Screening for cervical cancer with a pap smear and pelvic exam is recommended for women after age 72 years every 2 years up to age 79 or when the provider and patient decide to stop. If there is a history of cervical abnormalities or other increased risk for cancer then the test is recommended yearly. Hepatitis C screening is also recommended for anyone born between 80 through [address]. A shingles vaccine is also recommended once in a lifetime after age 61. Your Medicare Wellness Exam is recommended annually.     Here is a list of your current Health Maintenance items with a due date:  Health Maintenance Due   Topic Date Due    Pneumococcal Vaccine (2 of 2 - PPSV23) 10/13/2016    Annual Well Visit  12/08/2017         Decrease prilosec to once daily     Schedule sleep study

## 2017-12-15 NOTE — PROCEDURES
[facility]  JESSIE    Suly Maldonado  MR#: [id number]  : 1935  ACCOUNT #: [de-identified]   DATE OF SERVICE: 2017    CARDIOLOGIST:  Dr. Jesus Garcia     The transesophageal echocardiographic exam was requested by the cardiologist in order to evaluate cardiac and valvular form and function in realtime in a patient scheduled for a Watchman procedure. A transesophageal echocardiographic probe was easily and atraumatically inserted into the patient's esophagus while the patient was sedated inside the cardiac catheterization lab under general anesthesia. Modalities incorporated included 2D, color flow mode and continuous wave Doppler. AORTA   Ascending aorta: Patient's ascending aorta was dilated at 3.6 cm. There was no evidence of dissection. There was minimal and nonmobile plaque. Aortic arch: The aortic arch measured 1.8 cm in diameter. There was no evidence of dissection. There was minimal and nonmobile plaque. Descending aorta: The descending aorta measured 2.3 cm in diameter. There was no evidence of dissection. There was minimal and nonmobile plaque. VALVES   Aortic valve: The aortic valve annulus measured 2 cm. There was no evidence of aortic valve stenosis. There was mild aortic insufficiency, which was centrally located and directed, with an aortic insufficiency pressure half-time of 600 ms. The aortic leaflet morphology and motion were both normal.   Mitral valve: The mitral valve annulus measured 2.8 cm. There was no significant mitral stenosis. There was trace mitral regurgitation. Mitral leaflet morphology and motion were both normal.   Tricuspid valve: The tricuspid valve annulus measured 3.6 cm. There was  no evidence of tricuspid stenosis. There was no significant tricuspid regurgitation. The tricuspid leaflet morphology and motion were both normal.   Pulmonic valve: The pulmonic valve annulus measured 2.5 cm. There was no significant pulmonic regurgitation.   The pulmonic valve was grossly normal.   ATRIA   Right atrium: The right atrial size was upper normal. There was no spontaneous echo contrast. There was no right atrial thrombus or tumor. Left atrium: The left atrial size was 4.6 cm. There was no spontaneous echo contrast. There was no left atrial thrombus or tumor. Left atrial appendage: There was no thrombus visualized within the left atrial appendage. Left atrial appendage dimensions were within normal limits. Intraatrial septum: The intraatrial septum morphology was normal. There was no patent foramen ovale with color flow mode. VENTRICLES   Right ventricle: Right ventricular major axis was 6.1 cm. There was no right ventricular hypertrophy, there was no right ventricular thrombus, and the right ventricular ejection fraction was normal.   Left ventricle: The left ventricular cavity size were normal. There was no left ventricular hypertrophy, there was no left ventricular thrombus, and the left ventricular ejection fraction was calculated at 55%. REGIONAL FUNCTION:  There were no isolated regional wall motion abnormalities. PERICARDIUM:  There was a trace to prominent pericardial fatpad. PLEURA:  There were no pleural effusions. POST-INTERVENTION FOLLOWUP STUDY:  After placement of the Watchman device, the left atrial appendage contained the device without residual flow within the appendage. The left and right ventricular functions were unchanged. The aortic, mitral, tricuspid and pulmonic valves were unchanged. There was a residual trace to prominent fatpad pericardially. There was no pleural effusion. There was a residual patent foramen ovale. These results were discussed and viewed with the cardiologist.   The transesophageal echocardiographic probe was easily and atraumatically removed from the patient's esophagus. The patient tolerated the procedure without event.       Manuela Cooper MD       TV / KAYLYNN  D: 12/13/2017 13:43     T: 12/14/2017 13:19  JOB #: [id number]

## 2017-12-18 ENCOUNTER — OFFICE VISIT (OUTPATIENT)
Dept: CARDIOLOGY CLINIC | Age: 82
End: 2017-12-18

## 2017-12-18 ENCOUNTER — ANTI-COAG VISIT (OUTPATIENT)
Dept: CARDIOLOGY CLINIC | Age: 82
End: 2017-12-18

## 2017-12-18 VITALS
SYSTOLIC BLOOD PRESSURE: 156 MMHG | HEIGHT: 62 IN | OXYGEN SATURATION: 96 % | BODY MASS INDEX: 32.31 KG/M2 | RESPIRATION RATE: 18 BRPM | WEIGHT: 175.6 LBS | DIASTOLIC BLOOD PRESSURE: 78 MMHG | HEART RATE: 56 BPM

## 2017-12-18 DIAGNOSIS — I48.0 PAROXYSMAL ATRIAL FIBRILLATION (HCC): ICD-10-CM

## 2017-12-18 DIAGNOSIS — Z09 POSTOP CHECK: ICD-10-CM

## 2017-12-18 DIAGNOSIS — Z79.01 LONG-TERM (CURRENT) USE OF ANTICOAGULANTS: Primary | ICD-10-CM

## 2017-12-18 DIAGNOSIS — I49.9 IRREGULAR HEARTBEAT: Primary | ICD-10-CM

## 2017-12-18 LAB
INR BLD: 1.4 (ref 1–1.5)
PT POC: 16.9 SECONDS (ref 9.1–12)
VALID INTERNAL CONTROL?: YES

## 2017-12-18 NOTE — PROGRESS NOTES
1. Have you been to the ER, urgent care clinic since your last visit? Hospitalized since your last visit? No    2. Have you seen or consulted any other health care providers outside of the [address] [place] [address] since your last visit? Include any pap smears or colon screening. No    Chief Complaint   Patient presents with    Irregular Heart Beat     S/P watchman. Denied cardiac symptoms.

## 2017-12-18 NOTE — MR AVS SNAPSHOT
Visit Information Date & Time Provider Department Dept. Phone Encounter #  
 12/18/2017  2:00 PM Disha Reyna, [address] [facility] [phone] [id number] Follow-up Instructions Return in about 6 months (around 6/18/2018). Follow-up and Disposition History Your Appointments 1/22/2018  8:40 AM  
Follow Up with Kavya Yip MD  
[address] [facility]) Appt Note: f/up from fall and hit her head JAMES PAGE [facility] 11/17/17  
 [address] [place] [address] [address]  
[facility] [address]  
  
    
 3/20/2018  1:30 PM  
ROUTINE CARE with Any Gonsalez, [address] [place] [address]-[facility]) Appt Note: 3 mon [facility] [address] [phone]  
[address] [facility] [address] [place] [address] Upcoming Health Maintenance Date Due Pneumococcal 65+ High/Highest Risk (2 of 2 - PPSV23) 10/13/2016 EYE EXAM RETINAL OR DILATED Q1 3/20/2018 FOOT EXAM Q1 6/7/2018 HEMOGLOBIN A1C Q6M 6/11/2018 MICROALBUMIN Q1 9/8/2018 LIPID PANEL Q1 9/8/2018 MEDICARE YEARLY EXAM 12/16/2018 GLAUCOMA SCREENING Q2Y 3/20/2019 DTaP/Tdap/Td series (2 - Td) 4/27/2025 Allergies as of 12/18/2017  Review Complete On: 12/18/2017 By: Disha Reyna NP Severity Noted Reaction Type Reactions Procardia Xl [Nifedipine]  07/09/2014    Other (comments)  
 weakness Current Immunizations  Reviewed on 6/7/2017 Name Date Influenza High Dose Vaccine PF 9/13/2017, 9/19/2015 Influenza Vaccine 10/10/2014 12:38 PM  
 Influenza Vaccine (Quad) PF 9/14/2016 Pneumococcal Conjugate (PCV-13) 8/18/2016 Tdap 4/27/2015 Zoster Vaccine, Live 2/29/2016 Not reviewed this visit You Were Diagnosed With   
  
 Codes Comments Irregular heartbeat    -  Primary ICD-10-CM: I49.9 ICD-9-CM: 427.9 Paroxysmal atrial fibrillation (HCC)     ICD-10-CM: I48.0 ICD-9-CM: 427.31 Postop check     ICD-10-CM: G83 ICD-9-CM: V67.00 Vitals BP Pulse Resp Height(growth percentile) Weight(growth percentile) SpO2  
 156/78 (BP 1 Location: Right arm, BP Patient Position: Sitting) (!) 56 18 5' 1.5\" (1.562 m) 175 lb 9.6 oz (79.7 kg) 96% BMI OB Status Smoking Status 32.64 kg/m2 Hysterectomy Never Smoker Vitals History BMI and BSA Data Body Mass Index Body Surface Area  
 32.64 kg/m 2 1.86 m 2 Preferred Pharmacy Pharmacy Name Phone [facility] [address] [phone] Your Updated Medication List  
  
   
This list is accurate as of: 12/18/17  2:27 PM.  Always use your most recent med list.  
  
  
  
  
 acetaminophen 500 mg tablet Commonly known as:  TYLENOL Take 500 mg by mouth every four (4) hours as needed for Pain. aspirin delayed-release 81 mg tablet Take 81 mg by mouth daily. benazepril 20 mg tablet Commonly known as:  LOTENSIN  
TAKE ONE TABLET BY MOUTH ONCE DAILY CENTRUM SILVER 0.4-300-250 mg-mcg-mcg Tab Generic drug:  multivit-min-FA-lycopen-lutein Take 1 Tab by mouth daily. Cholecalciferol (Vitamin D3) 2,000 unit Cap capsule Commonly known as:  VITAMIN D3 Take 2,000 Units by mouth daily. DILT- mg XR capsule Generic drug:  dilTIAZem XR  
TAKE ONE CAPSULE BY MOUTH ONCE DAILY Iron 325 mg (65 mg iron) tablet Generic drug:  ferrous sulfate Take 325 mg by mouth every other day. levothyroxine 50 mcg tablet Commonly known as:  SYNTHROID  
TAKE ONE TABLET BY MOUTH ONCE DAILY BEFORE BREAKFAST  
  
 magnesium 250 mg Tab Take 250 mg by mouth daily. metFORMIN  mg tablet Commonly known as:  GLUCOPHAGE XR  
TAKE ONE TABLET BY MOUTH TWICE DAILY  
  
 nystatin powder Commonly known as:  MYCOSTATIN  
 Apply  to affected area two (2) times a day. omeprazole 40 mg capsule Commonly known as:  PRILOSEC Take 40 mg by mouth daily. pravastatin 20 mg tablet Commonly known as:  PRAVACHOL  
TAKE ONE TABLET BY MOUTH NIGHTLY  
  
 sertraline 100 mg tablet Commonly known as:  ZOLOFT  
TAKE ONE AND ONE-HALF TABLETS BY MOUTH ONCE DAILY  
  
 warfarin 5 mg tablet Commonly known as:  COUMADIN Take 1 Tab by mouth every evening for 45 days. We Performed the Following AMB POC EKG ROUTINE W/ 12 LEADS, INTER & REP [31515 CPT(R)] Follow-up Instructions Return in about 6 months (around 6/18/2018). Introducing [facility]! Dear Annalise Matson: Thank you for requesting a [employer] account. Our records indicate that you already have an active [employer] account. You can access your account anytime at [email or web address] Did you know that you can access your hospital and ER discharge instructions at any time in [employer]? You can also review all of your test results from your hospital stay or ER visit. Additional Information If you have questions, please visit the Frequently Asked Questions section of the [employer] website at [email or web address] Remember, [employer] is NOT to be used for urgent needs. For medical emergencies, dial 911. Now available from your iPhone and Android! Please provide this summary of care documentation to your next provider. Your primary care clinician is listed as Eleonora Salguero. If you have any questions after today's visit, please call [phone].

## 2017-12-18 NOTE — PROGRESS NOTES
A full discussion of the nature of anticoagulants has been carried out. A benefit risk analysis has been presented to the patient, so that they understand the justification for choosing anticoagulation at this time. The need for frequent and regular monitoring, precise dosage adjustment and compliance is stressed. Side effects of potential bleeding are discussed. The patient should avoid any OTC items containing aspirin, naproxen or ibuprofen, and should avoid great swings in general diet. Avoid alcohol consumption. Advised to notify the office if antibiotic or steroid therapy is initiated. Call if any signs of abnormal bleeding are present. Next PT/INR test Friday.

## 2017-12-18 NOTE — PROGRESS NOTES
Subjective:      Debi Roper is a [de-identified] y.o. female is here for follow up s/p Watchman device placmement. She reports some dizziness with positional changes. The patient denies chest pain/ shortness of breath, orthopnea, PND, LE edema, palpitations, syncope, or fatigue.        Patient Active Problem List    Diagnosis Date Noted    Irregular heartbeat 12/18/2017    Type 2 diabetes mellitus with nephropathy ([facility] 12/15/2017    Celiac sprue 03/07/2017    Paroxysmal atrial fibrillation ([facility] 09/07/2016    Precordial pain 09/07/2016    Ataxia 02/17/2016    Dehydration 02/17/2016    Webb esophagus 11/11/2015    ACP (advance care planning) 11/11/2015    Hypothyroidism, adult 07/31/2015    Type 2 diabetes mellitus without complication ([facility] 38/45/8815    Essential hypertension 04/27/2015    MALLORY (obstructive sleep apnea) 07/09/2014    Hyperlipidemia 07/09/2014    Sarcoid 07/09/2014    Stress bladder incontinence, female 07/09/2014    Obesity 07/09/2014    TIA (transient ischemic attack) 07/09/2014    AR (aortic regurgitation) 07/09/2014    Mitral valve insufficiency 07/09/2014    Cervical stenosis of spine 07/09/2014      Denisse Lamas MD  Past Medical History:   Diagnosis Date    Anemia     Arrhythmia     AFIB    Ataxia     Webb's esophagus     Cervical spinal stenosis     Coronary atherosclerosis of unspecified type of vessel, native or graft     Diabetes ([facility]     Fatigue     Fatigue     GERD (gastroesophageal reflux disease)     Hypercholesterolemia     Hyperlipidemia     Hypertension     Ill-defined condition     Webb's Esophagus    Ill-defined condition     right torn rotater cuff- no surgery at this time    Liver disease     pt is unaware    Osteoarthritis     Psychiatric disorder     anxiety and depression    Sarcoidosis     Sleep apnea     uses cpap    [facility]roke [facility])     TIA'S    Thyroid disease       Past Surgical History:   Procedure Laterality Date  CARDIAC SURG PROCEDURE UNLIST      cardioversion     COLONOSCOPY N/A 12/28/2016    COLONOSCOPY performed by Swapnil Oseguera MD at hospitals ENDOSCOPY    HX CATARACT REMOVAL      both eyes    HX CHOLECYSTECTOMY      HX COLONOSCOPY  5/8/2013    Repeat in 5 years    HX CYST REMOVAL  10/15    on head     HX HYSTERECTOMY      HX ORTHOPAEDIC Bilateral     knee replacement to both knees    HX ORTHOPAEDIC      spacer btwn L4-5 and L5-6     HX TONSILLECTOMY      HX UROLOGICAL      botox in bladder     Allergies   Allergen Reactions    Procardia Xl [Nifedipine] Other (comments)     weakness      Family History   Problem Relation Age of Onset    Other Mother      Fibromyalgia    Pacemaker Mother     Heart Disease Mother     Heart Failure Mother     COPD Mother     Heart Attack Father 61    Cancer Sister      Multiple Myeloma    Diabetes Brother     Obesity Brother     Pacemaker Brother     Heart Attack Brother      Bundle branch block    No Known Problems Son     Psychiatric Disorder Daughter      Multiple    negative for cardiac disease  Social History     Social History    Marital status:      Spouse name: N/A    Number of children: N/A    Years of education: N/A     Social History Main Topics    Smoking status: Never Smoker    Smokeless tobacco: Never Used    Alcohol use No    Drug use: No    Sexual activity: Yes     Partners: Male     Other Topics Concern    None     Social History Narrative     Current Outpatient Prescriptions   Medication Sig    warfarin (COUMADIN) 5 mg tablet Take 1 Tab by mouth every evening for 45 days.  Cholecalciferol, Vitamin D3, (VITAMIN D3) 2,000 unit cap capsule Take 2,000 Units by mouth daily.  omeprazole (PRILOSEC) 40 mg capsule Take 40 mg by mouth daily.     levothyroxine (SYNTHROID) 50 mcg tablet TAKE ONE TABLET BY MOUTH ONCE DAILY BEFORE BREAKFAST    sertraline (ZOLOFT) 100 mg tablet TAKE ONE AND ONE-HALF TABLETS BY MOUTH ONCE DAILY (Patient taking differently: TAKE ONE AND ONE-HALF TABLETS BY MOUTH ONCE DAILY AT BEDTIME)    benazepril (LOTENSIN) 20 mg tablet TAKE ONE TABLET BY MOUTH ONCE DAILY    acetaminophen (TYLENOL) 500 mg tablet Take 500 mg by mouth every four (4) hours as needed for Pain.  DILT- mg XR capsule TAKE ONE CAPSULE BY MOUTH ONCE DAILY    pravastatin (PRAVACHOL) 20 mg tablet TAKE ONE TABLET BY MOUTH NIGHTLY    metFORMIN ER (GLUCOPHAGE XR) 750 mg tablet TAKE ONE TABLET BY MOUTH TWICE DAILY    nystatin (MYCOSTATIN) powder Apply  to affected area two (2) times a day. (Patient taking differently: Apply  to affected area two (2) times daily as needed.)    ferrous sulfate (IRON) 325 mg (65 mg iron) tablet Take 325 mg by mouth every other day.  multivit-min-FA-lycopen-lutein (CENTRUM SILVER) 0.4-300-250 mg-mcg-mcg tab Take 1 Tab by mouth daily.  aspirin delayed-release 81 mg tablet Take 81 mg by mouth daily.  magnesium 250 mg tab Take 250 mg by mouth daily. No current facility-administered medications for this visit. Vitals:    12/18/17 1341   BP: 156/78   Pulse: (!) 56   Resp: 18   SpO2: 96%   Weight: 175 lb 9.6 oz (79.7 kg)   Height: 5' 1.5\" (1.562 m)       I have reviewed the nurses notes, vitals, problem list, allergy list, medical history, family, social history and medications. Review of Symptoms:    General: Pt denies excessive weight gain or loss. Pt is able to conduct ADL's  HEENT: Denies blurred vision, headaches, epistaxis and difficulty swallowing. Respiratory: Denies shortness of breath, [place]ICK[place], wheezing or stridor.   Cardiovascular: Denies precordial pain, palpitations, edema or PND  Gastrointestinal: Denies poor appetite, indigestion, abdominal pain or blood in stool  Urinary: Denies dysuria, pyuria  Musculoskeletal: Denies pain or swelling from muscles or joints  Neurologic: +dizziness, Denies tremor, paresthesias, or sensory motor disturbance  Skin: Denies rash, itching or texture change. Psych: Denies depression      Physical Exam:      General: Well developed, in no acute distress. HEENT: Eyes - PERRL, no jvd  Heart:  Normal S1/S2 negative S3 or S4. Regular, no murmur, gallop or rub.   Respiratory: Clear bilaterally x 4, no wheezing or rales  Abdomen:   Soft, non-tender, bowel sounds are active.   Extremities:  No edema, normal cap refill, no cyanosis. Musculoskeletal: No clubbing  Neuro: A&Ox3, speech clear, gait stable. Skin: Skin color is normal. No rashes or lesions. Non diaphoretic  Vascular: 2+ pulses symmetric in all extremities    Cardiographics    Ekg: sinus bradycardia    Results for orders placed or performed during the hospital encounter of 12/06/17   EKG, 12 LEAD, INITIAL   Result Value Ref Range    Ventricular Rate 55 BPM    Atrial Rate 55 BPM    P-R Interval 212 ms    QRS Duration 82 ms    Q-T Interval 458 ms    QTC Calculation (Bezet) 438 ms    Calculated P Axis 52 degrees    Calculated R Axis -15 degrees    Calculated T Axis 75 degrees    Diagnosis       Sinus bradycardia  Nonspecific T wave abnormality  Confirmed by Julieth Reis ([id number]) on 12/6/2017 10:58:57 AM           Lab Results   Component Value Date/Time    WBC 11.1 12/14/2017 02:51 AM    HGB 10.5 12/14/2017 02:51 AM    HCT 32.9 12/14/2017 02:51 AM    PLATELET 999 12/32/8423 02:51 AM    MCV 91.4 12/14/2017 02:51 AM      Lab Results   Component Value Date/Time    Sodium 141 12/11/2017 10:09 AM    Potassium 4.7 12/11/2017 10:09 AM    Chloride 102 12/11/2017 10:09 AM    CO2 24 12/11/2017 10:09 AM    Anion gap 8 12/06/2017 10:25 AM    Glucose 101 12/11/2017 10:09 AM    BUN 23 12/11/2017 10:09 AM    Creatinine 0.98 12/11/2017 10:09 AM    BUN/Creatinine ratio 23 12/11/2017 10:09 AM    GFR est AA 63 12/11/2017 10:09 AM    GFR est non-AA 55 12/11/2017 10:09 AM    Calcium 9.0 12/11/2017 10:09 AM    Bilirubin, total 0.4 12/11/2017 10:09 AM    AST (SGOT) 15 12/11/2017 10:09 AM    Alk.  phosphatase 82 12/11/2017 10:09 AM    Protein, total 6.6 12/11/2017 10:09 AM    Albumin 4.1 12/11/2017 10:09 AM    Globulin 4.2 09/07/2016 01:11 AM    A-G Ratio 1.6 12/11/2017 10:09 AM    ALT (SGPT) 15 12/11/2017 10:09 AM         Assessment:     Assessment:        ICD-10-CM ICD-9-CM    1. Irregular heartbeat I49.9 427.9 AMB POC EKG ROUTINE W/ 12 LEADS, INTER & REP   2. Paroxysmal atrial fibrillation (HCC) I48.0 427.31    3. Postop check Z09 V67.00      Orders Placed This Encounter    AMB POC EKG ROUTINE W/ 12 LEADS, INTER & REP     Order Specific Question:   Reason for Exam:     Answer:   routine        Plan:   Ms. Claudy Desouza is here for follow up s/p Watchman placement for LAAO. She is having some dizziness with position changes but denies other complaints. She has follow up with neurology pending. EKG shows sinus bradycardia. She is tolerating coumadin well. Continue current medical therapy and will schedule 45 day JESSIE after which will transition off of coumadin to Plavix/ASA regimen. Follow up 6 months from JESSIE. Continue medical management for AF. Thank you for allowing me to participate in Nico Brown 's care.     Lazarus Elkins NP

## 2017-12-22 ENCOUNTER — DOCUMENTATION ONLY (OUTPATIENT)
Dept: CARDIOLOGY CLINIC | Age: 82
End: 2017-12-22

## 2017-12-22 ENCOUNTER — ANTI-COAG VISIT (OUTPATIENT)
Dept: CARDIOLOGY CLINIC | Age: 82
End: 2017-12-22

## 2017-12-22 DIAGNOSIS — Z79.01 LONG-TERM (CURRENT) USE OF ANTICOAGULANTS: Primary | ICD-10-CM

## 2017-12-22 DIAGNOSIS — I48.0 PAROXYSMAL ATRIAL FIBRILLATION (HCC): ICD-10-CM

## 2017-12-22 LAB
INR BLD: 2.1 (ref 1–1.5)
PT POC: 25.2 SECONDS (ref 9.1–12)
VALID INTERNAL CONTROL?: YES

## 2017-12-22 NOTE — PROGRESS NOTES
Received [employer] assistance program application but pt recently had Watchman Procedure and is on Coumadin. No longer on Eliquis.

## 2017-12-22 NOTE — PROGRESS NOTES
A full discussion of the nature of anticoagulants has been carried out. A benefit risk analysis has been presented to the patient, so that they understand the justification for choosing anticoagulation at this time. The need for frequent and regular monitoring, precise dosage adjustment and compliance is stressed. Side effects of potential bleeding are discussed. The patient should avoid any OTC items containing aspirin, naproxen or ibuprofen, and should avoid great swings in general diet. Avoid alcohol consumption. Advised to notify the office if antibiotic or steroid therapy is initiated. Call if any signs of abnormal bleeding are present. Next PT/INR test in 1 week.

## 2017-12-29 ENCOUNTER — ANTI-COAG VISIT (OUTPATIENT)
Dept: CARDIOLOGY CLINIC | Age: 82
End: 2017-12-29

## 2017-12-29 DIAGNOSIS — Z79.01 LONG-TERM (CURRENT) USE OF ANTICOAGULANTS: Primary | ICD-10-CM

## 2017-12-29 LAB
INR BLD: 3.4 (ref 1–1.5)
PT POC: 40.3 SECONDS (ref 9.1–12)
VALID INTERNAL CONTROL?: YES

## 2017-12-29 NOTE — PROGRESS NOTES
A full discussion of the nature of anticoagulants has been carried out. A benefit risk analysis has been presented to the patient, so that they understand the justification for choosing anticoagulation at this time. The need for frequent and regular monitoring, precise dosage adjustment and compliance is stressed. Side effects of potential bleeding are discussed. The patient should avoid any OTC items containing aspirin , naproxen or Ibuprofen, and should avoid great swings in general diet. Avoid alcohol consumption. Advised to notify the office if antibiotic or steroid therapy is initiated. Call if any signs of abnormal bleeding are present. Next PT/INR test in 1 week.

## 2018-01-02 ENCOUNTER — TELEPHONE (OUTPATIENT)
Dept: CARDIOLOGY CLINIC | Age: 83
End: 2018-01-02

## 2018-01-02 RX ORDER — PRAVASTATIN SODIUM 20 MG/1
TABLET ORAL
Qty: 90 TAB | Refills: 0 | Status: SHIPPED | OUTPATIENT
Start: 2018-01-02 | End: 2018-03-23 | Stop reason: SDUPTHER

## 2018-01-02 NOTE — DISCHARGE SUMMARY
[address] [place] [address]  [phone]        [address] [place] [address] INSTRUCTIONS    Patient ID:  Lexi Brown  [id number]  11 y.o.  1935    Admit Date: 12/13/2017    Discharge Date: 1/2/2018     Admitting Physician: Haley Goetz MD     Discharge Physician: Timothy Skinner MD    Admission Diagnoses:   cath;watchman procedure;Paroxysmal atrial fibrillation [facility])    Discharge Diagnoses: Active Problems:    Paroxysmal atrial fibrillation ([address] (9/7/2016)        Discharge Condition: Good    Cardiology Procedures this Admission:  WATCHMAN implant. Pt had watchman implant performed and was observed overnight. She was started on coumadin. Limited echo the next am was wnl - no effusion noted and she was dc/d to home. Disposition: home    Reference discharge instructions provided by nursing for diet and activity. Follow-up with Dr Liza Davis in one week. Call [phone] to make an appointment. Signed:  Timothy Skinnre MD  1/2/2018  12:28 PM    S/P WATCHMAN DISCHARGE INSTRUCTIONS    It is normal to feel tired the first couple days. Take it easy and follow the physicians instructions. CHECK THE CATHETER INSERTION SITE DAILY:  You may shower 24 hours after the procedure, remove the bandage during showering. Wash with soap and water and pat dry. Gentle cleaning of the site with soap and water is sufficient, cover with a dry clean dressing or bandage. Do not apply creams or powders to the area. Do not sit in a bathtub or pool of water for 7 days or until wound has completely healed. Temporary bruising and discomfort is normal and may last a few weeks. You may have a  formation of a small lump at the site which may last up to 6 weeks. CALL THE PHYSICIAN:  If the site becomes red, swollen or feels warm to the touch  If there is bleeding or drainage or if there is unusual pain at the groin or down the leg.   If there is any bleeding, lie down, apply pressure or have someone apply pressure with a clean cloth until the bleeding stops. If the bleeding continues, call 911 to be transported to the hospital.  DO  [place] [address] Activity:      For the first 24-48 hours or as instructed by the physician:  No lifting, pushing or pulling over 5 pounds and no straining the insertion site. Do not life grocery bags or the garbage can, do not run the vacuum  or  for 7 days. Start with short walks as in the hospital and gradually increase as tolerated each day. It is recommended to walk 30 minutes 5-7 days per week. Follow your physicians instructions on activity. Avoid walking outside in extremes of heat or cold. Walk inside when it is cold and windy or hot and humid. Things to keep in mind:  No driving for at least 5 days, or as designated by your physician. Limit the number of times you go up and down the stairs  Take rests and pace yourself with activity. Be careful and do not strain with bowel movements. Medications: Take all medications as prescribed  Call your physician if you have any questions  Keep an updated list of your medications with you at all times and give a list to your physician and pharmacist    Signs and Symptoms:  Be cautious of symptoms of angina or recurrent symptoms such as chest discomfort, unusual shortness of breath or fatigue, palpitations. After Care: Follow up with your physician as instructed. Follow a heart healthy diet with proper portion control, daily stress management, daily exercise, blood pressure and cholesterol control , and smoking cessation. AFTER YOU TRANSESOPHAGEAL ECHOCARDIOGRAM    Do not eat or drink for at least two hours after your procedure. Your throat will be numb and there is a risk you might have difficulty swallowing for a while.  Be careful when you do eat or drink for the first time especially with hot fluids since you could easily burn your throat. Call your doctor if:    · You are bleeding from your throat or mouth. · You have trouble breathing all of a sudden. · You have chest pain or any pain that spreads to your neck, jaw, or arms. · You have questions or concerns.   · You have a fever greater than 101°F.

## 2018-01-02 NOTE — TELEPHONE ENCOUNTER
Spoke with patient  Verified patient with 2 patient identifiers    Patient reported had nose bleed 12/31/2017 and spoke with on call physician Dr Yokasta Rojas, was informed to hold Coumadin dost that night. Currently no bleeding. Pt has f[facility]u appointment in [facility], will continue current dose Coumadin  5 mg daily.

## 2018-01-05 ENCOUNTER — ANTI-COAG VISIT (OUTPATIENT)
Dept: CARDIOLOGY CLINIC | Age: 83
End: 2018-01-05

## 2018-01-05 DIAGNOSIS — Z79.01 LONG-TERM (CURRENT) USE OF ANTICOAGULANTS: Primary | ICD-10-CM

## 2018-01-05 LAB
INR BLD: 1.5 (ref 1–1.5)
PT POC: 17.5 SECONDS (ref 9.1–12)
VALID INTERNAL CONTROL?: YES

## 2018-01-12 ENCOUNTER — ANTI-COAG VISIT (OUTPATIENT)
Dept: CARDIOLOGY CLINIC | Age: 83
End: 2018-01-12

## 2018-01-12 DIAGNOSIS — Z79.01 LONG-TERM (CURRENT) USE OF ANTICOAGULANTS: Primary | ICD-10-CM

## 2018-01-12 LAB
INR BLD: 2.1 (ref 1–1.5)
PT POC: 25.4 SECONDS (ref 9.1–12)
VALID INTERNAL CONTROL?: YES

## 2018-01-16 ENCOUNTER — TELEPHONE (OUTPATIENT)
Dept: INTERNAL MEDICINE CLINIC | Age: 83
End: 2018-01-16

## 2018-01-16 NOTE — TELEPHONE ENCOUNTER
Pt stated the [facility] is closing and she is requesting all her Rx get sent over to the [facility] on [address], [place]). Pt best contact number [phone].        Message received & copied from [facility]

## 2018-01-17 ENCOUNTER — TELEPHONE (OUTPATIENT)
Dept: INTERNAL MEDICINE CLINIC | Age: 83
End: 2018-01-17

## 2018-01-17 NOTE — TELEPHONE ENCOUNTER
----- Message from Kate Wellington sent at 1/17/2018  1:53 PM EST -----  Regarding: Dr. Dobbins/ Telephone  Patient would like to have referral for [facility] for her balance instead of going to physical therapy at [facility] in the same building as Dr. Dayanna Enamorado.   Please call her back at [phone] or [phone]      Message copied/pasted from [facility]

## 2018-01-18 ENCOUNTER — APPOINTMENT (OUTPATIENT)
Dept: PHYSICAL THERAPY | Age: 83
End: 2018-01-18

## 2018-01-18 NOTE — TELEPHONE ENCOUNTER
Called, spoke to pt. Two pt identifiers confirmed. Pt asking for referral for PT to  for balance issues. Pt informed that once Dr. Manuela Morales returns to clinic, she will sign the order and it will be faxed to [facility]. Pt verbalized understanding of information discussed w/ no further questions at this time. Placed in PCP office for signature.

## 2018-01-19 ENCOUNTER — ANTI-COAG VISIT (OUTPATIENT)
Dept: CARDIOLOGY CLINIC | Age: 83
End: 2018-01-19

## 2018-01-19 DIAGNOSIS — Z79.01 LONG-TERM (CURRENT) USE OF ANTICOAGULANTS: Primary | ICD-10-CM

## 2018-01-19 DIAGNOSIS — I48.0 PAROXYSMAL ATRIAL FIBRILLATION (HCC): ICD-10-CM

## 2018-01-19 LAB
INR BLD: 1.8 (ref 1–1.5)
PT POC: 21.6 SECONDS (ref 9.1–12)
VALID INTERNAL CONTROL?: YES

## 2018-01-22 ENCOUNTER — OFFICE VISIT (OUTPATIENT)
Dept: NEUROLOGY | Age: 83
End: 2018-01-22

## 2018-01-22 VITALS — HEART RATE: 99 BPM | DIASTOLIC BLOOD PRESSURE: 80 MMHG | SYSTOLIC BLOOD PRESSURE: 130 MMHG | OXYGEN SATURATION: 96 %

## 2018-01-22 DIAGNOSIS — R26.89 BALANCE DISORDER: Primary | ICD-10-CM

## 2018-01-22 DIAGNOSIS — M48.02 CERVICAL STENOSIS OF SPINAL CANAL: ICD-10-CM

## 2018-01-22 NOTE — PATIENT INSTRUCTIONS
Learning About Mason Titus Bolden  What is a living will? A living will is a legal form you use to write down the kind of care you want at the end of your life. It is used by the health professionals who will treat you if you aren't able to decide for yourself. If you put your wishes in writing, your loved ones and others will know what kind of care you want. They won't need to guess. This can ease your mind and be helpful to others. A living will is not the same as an estate or property will. An estate will explains what you want to happen with your money and property after you die. Is a living will a legal document? A living will is a legal document. Each state has its own laws about living smith. If you move to another state, make sure that your living will is legal in the state where you now live. Or you might use a universal form that has been approved by many states. This kind of form can sometimes be completed and stored online. Your electronic copy will then be available wherever you have a connection to the Internet. In most cases, doctors will respect your wishes even if you have a form from a different state. · You don't need an  to complete a living will. But legal advice can be helpful if your state's laws are unclear, your health history is complicated, or your family can't agree on what should be in your living will. · You can change your living will at any time. Some people find that their wishes about end-of-life care change as their health changes. · In addition to making a living will, think about completing a medical power of  form. This form lets you name the person you want to make end-of-life treatment decisions for you (your \"health care agent\") if you're not able to. Many hospitals and nursing homes will give you the forms you need to complete a living will and a medical power of .   · Your living will is used only if you can't make or communicate decisions for yourself anymore. If you become able to make decisions again, you can accept or refuse any treatment, no matter what you wrote in your living will. · Your state may offer an online registry. This is a place where you can store your living will online so the doctors and nurses who need to treat you can find it right away. What should you think about when creating a living will? Talk about your end-of-life wishes with your family members and your doctor. Let them know what you want. That way the people making decisions for you won't be surprised by your choices. Think about these questions as you make your living will:  · Do you know enough about life support methods that might be used? If not, talk to your doctor so you know what might be done if you can't breathe on your own, your heart stops, or you're unable to swallow. · What things would you still want to be able to do after you receive life-support methods? Would you want to be able to walk? To speak? To eat on your own? To live without the help of machines? · If you have a choice, where do you want to be cared for? In your home? At a hospital or nursing home? · Do you want certain Adventist practices performed if you become very ill? · If you have a choice at the end of your life, where would you prefer to die? At home? In a hospital or nursing home? Somewhere else? · Would you prefer to be buried or cremated? · Do you want your organs to be donated after you die? What should you do with your living will? · Make sure that your family members and your health care agent have copies of your living will. · Give your doctor a copy of your living will to keep in your medical record. If you have more than one doctor, make sure that each one has a copy. · You may want to put a copy of your living will where it can be easily found. Where can you learn more? Go to http://mason-saw.info/.   Enter G322 in the search box to learn more about \"Learning About Living Rich Rhodes. \"  Current as of: September 24, 2016  Content Version: 11.4  © 7274-3932 Healthwise, Incorporated. Care instructions adapted under license by [employer] (which disclaims liability or warranty for this information). If you have questions about a medical condition or this instruction, always ask your healthcare professional. [address] any warranty or liability for your use of this information. 10 [facility]   Statement to Patients  April 1, 2014      In an effort to ensure the large volume of patient prescription refills is processed in the most efficient and expeditious manner, we are asking our patients to assist us by calling your Pharmacy for all prescription refills, this will include also your  Mail Order Pharmacy. The pharmacy will contact our office electronically to continue the refill process. Please do not wait until the last minute to call your pharmacy. We need at least 48 hours (2days) to fill prescriptions. We also encourage you to call your pharmacy before going to  your prescription to make sure it is ready. With regard to controlled substance prescription refill requests (narcotic refills) that need to be picked up at our office, we ask your cooperation by providing us with at least 72 hours (3days) notice that you will need a refill. We will not refill narcotic prescription refill requests after 4:00pm on any weekday, Monday through Thursday, or after 2:00pm on Fridays, or on the weekends. We encourage everyone to explore another way of getting your prescription refill request processed using [employer], our patient web portal through our electronic medical record system. [employer] is an efficient and effective way to communicate your medication request directly to the office and  downloadable as an jeannine on your smart phone .  [employer] also features a review functionality that allows you to view your medication list as well as leave messages for your physician. Are you ready to get connected? If so please review the attatched instructions or speak to any of our staff to get you set up right away! Thank you so much for your cooperation. Should you have any questions please contact our Practice Administrator. The Physicians and Staff,  [facility]     If we have ordered testing for you, we typically do not call patients with results. Your doctor or nurse will contact you if there are critical results that need to be addressed before your next appointment. We also schedule follow up appointments so that your results can be discussed in person and any questions you have regarding them may be addressed. Additionally, results may be found by using the My Chart feature and one of our patient service representatives at the  can give you instructions on how to access this feature of our electronic medical record system. Patient Instructions/Plans:  ·   Postconcussion syndrome (The Basics)    What is postconcussion syndrome? -- Postconcussion syndrome is a condition that happens after a mild brain injury. A concussion is another word for a mild brain injury. Common causes of mild brain injuries include:   Car accidents   Falling down and other accidents that can happen from daily activities   Injuries from playing sports such as football, ice hockey, soccer, and boxing   Injuries that can happen to soldiers during combat. These include injuries from blasts and bullet wounds. What are the symptoms of postconcussion syndrome? -- The symptoms include:  Headache   Dizziness   Feeling very tired   Feeling irritable or anxious   Memory problems or problems paying attention   Problems with sleep  Being easily bothered by noise     Will I need tests? -- Maybe.  Your doctor or nurse should be able to tell if you have postconcussion syndrome by learning about your symptoms and doing an exam.   But depending on your symptoms, you might have tests to make sure you do not have a different problem. For example, some people have an imaging test called an MRI that creates pictures of the brain. How is postconcussion syndrome treated? -- The treatments are different depending on which symptoms a person has. There are medicines that can help with headaches, dizziness, and other problems. If you have postconcussion syndrome, your symptoms will probably start to go away after about a week. Most people start to feel better in a week or 2 and are back to normal in 3 months. But a few people have symptoms that last longer.

## 2018-01-22 NOTE — MR AVS SNAPSHOT
[id number] [place] [address] 
[phone] Patient: Jacque Cordoba MRN: [id number] :[id number] Visit Information Date & Time Provider Department Dept. Phone Encounter #  
 2018  8:40 AM Mala Mcwilliams  [facility] [phone] [id number] Your Appointments 2018  9:00 AM  
ANTICOAG NURSE with COUMADIN, [facility] Appt Note: inr  
 [phone] [facility]  
[phone] [id number] [facility]  
  
    
 2018  2:00 PM  
HOSPITAL FOLLOW-UP with Harlan Lua, [facility] Appt Note: Dex Muhammad; [place],f/u Ric and [address] [facility] [address] [facility]  
[phone] [id number] [facility]  
  
    
 3/20/2018  1:30 PM  
ROUTINE CARE with Joselo Nunez, [address] [place] [address],[address] Floor [facility] Appt Note: 3 mon [facility] [address] [address]  
[address] [place] [address] [address] [facility] Upcoming Health Maintenance Date Due Pneumococcal 65+ High/Highest Risk (2 of 2 - PPSV23) 10/13/2016 EYE EXAM RETINAL OR DILATED Q1 3/20/2018 FOOT EXAM Q1 2018 HEMOGLOBIN A1C Q6M 2018 MICROALBUMIN Q1 2018 LIPID PANEL Q1 2018 MEDICARE YEARLY EXAM 2018 GLAUCOMA SCREENING Q2Y 3/20/2019 DTaP/Tdap/Td series (2 - Td) 2025 Allergies as of 2018  Review Complete On: 2018 By: Gerard Aguayo Severity Noted Reaction Type Reactions Procardia Xl [Nifedipine]  2014    Other (comments)  
 weakness Current Immunizations  Reviewed on 2017 Name Date Influenza High Dose Vaccine PF 2017, 2015 Influenza Vaccine 10/10/2014 12:38 PM  
 Influenza Vaccine (Quad) PF 9/14/2016 Pneumococcal Conjugate (PCV-13) 8/18/2016 Tdap 4/27/2015 Zoster Vaccine, Live 2/29/2016 Not reviewed this visit Vitals BP Pulse SpO2 OB Status Smoking Status 130/80 99 96% Hysterectomy Never Smoker Vitals History Preferred Pharmacy Pharmacy Name Phone [facility] [phone] Your Updated Medication List  
  
   
This list is accurate as of: 1/22/18  9:22 AM.  Always use your most recent med list.  
  
  
  
  
 acetaminophen 500 mg tablet Commonly known as:  TYLENOL Take 500 mg by mouth every four (4) hours as needed for Pain. aspirin delayed-release 81 mg tablet Take 81 mg by mouth daily. benazepril 20 mg tablet Commonly known as:  LOTENSIN  
TAKE ONE TABLET BY MOUTH ONCE DAILY CENTRUM SILVER 0.4-300-250 mg-mcg-mcg Tab Generic drug:  multivit-min-FA-lycopen-lutein Take 1 Tab by mouth daily. Cholecalciferol (Vitamin D3) 2,000 unit Cap capsule Commonly known as:  VITAMIN D3 Take 2,000 Units by mouth daily. DILT- mg XR capsule Generic drug:  dilTIAZem XR  
TAKE ONE CAPSULE BY MOUTH ONCE DAILY Iron 325 mg (65 mg iron) tablet Generic drug:  ferrous sulfate Take 325 mg by mouth every other day. levothyroxine 50 mcg tablet Commonly known as:  SYNTHROID  
TAKE ONE TABLET BY MOUTH ONCE DAILY BEFORE BREAKFAST  
  
 magnesium 250 mg Tab Take 250 mg by mouth daily. metFORMIN  mg tablet Commonly known as:  GLUCOPHAGE XR  
TAKE ONE TABLET BY MOUTH TWICE DAILY  
  
 nystatin powder Commonly known as:  MYCOSTATIN Apply  to affected area two (2) times a day. omeprazole 40 mg capsule Commonly known as:  PRILOSEC Take 40 mg by mouth daily. pravastatin 20 mg tablet Commonly known as:  PRAVACHOL  
TAKE ONE TABLET BY MOUTH NIGHTLY sertraline 100 mg tablet Commonly known as:  ZOLOFT  
TAKE ONE AND ONE-HALF TABLETS BY MOUTH ONCE DAILY  
  
 warfarin 5 mg tablet Commonly known as:  COUMADIN Take 1 Tab by mouth every evening for 45 days. To-Do List   
 01/29/2018 8:00 AM  
(Arrive by 7:00 AM) Appointment with [facility] at hospitals NON-INVASIVE CARD ([phone]) ARRIVE 1 HOUR PRIOR TO APPOINTMENT TIME Patient Instructions Nicolle Kenny 8003 What is a living will? A living will is a legal form you use to write down the kind of care you want at the end of your life. It is used by the health professionals who will treat you if you aren't able to decide for yourself. If you put your wishes in writing, your loved ones and others will know what kind of care you want. They won't need to guess. This can ease your mind and be helpful to others. A living will is not the same as an estate or property will. An estate will explains what you want to happen with your money and property after you die. Is a living will a legal document? A living will is a legal document. Each state has its own laws about living smith. If you move to another state, make sure that your living will is legal in the state where you now live. Or you might use a universal form that has been approved by many states. This kind of form can sometimes be completed and stored online. Your electronic copy will then be available wherever you have a connection to the Internet. In most cases, doctors will respect your wishes even if you have a form from a different state. · You don't need an  to complete a living will. But legal advice can be helpful if your state's laws are unclear, your health history is complicated, or your family can't agree on what should be in your living will. · You can change your living will at any time. Some people find that their wishes about end-of-life care change as their health changes. · In addition to making a living will, think about completing a medical power of  form. This form lets you name the person you want to make end-of-life treatment decisions for you (your \"health care agent\") if you're not able to. Many hospitals and nursing homes will give you the forms you need to complete a living will and a medical power of . · Your living will is used only if you can't make or communicate decisions for yourself anymore. If you become able to make decisions again, you can accept or refuse any treatment, no matter what you wrote in your living will. · Your state may offer an online registry. This is a place where you can store your living will online so the doctors and nurses who need to treat you can find it right away. What should you think about when creating a living will? Talk about your end-of-life wishes with your family members and your doctor. Let them know what you want. That way the people making decisions for you won't be surprised by your choices. Think about these questions as you make your living will: · Do you know enough about life support methods that might be used? If not, talk to your doctor so you know what might be done if you can't breathe on your own, your heart stops, or you're unable to swallow. · What things would you still want to be able to do after you receive life-support methods? Would you want to be able to walk? To speak? To eat on your own? To live without the help of machines? · If you have a choice, where do you want to be cared for? In your home? At a hospital or nursing home? · Do you want certain Gnosticist practices performed if you become very ill? · If you have a choice at the end of your life, where would you prefer to die? At home? In a hospital or nursing home? Somewhere else? · Would you prefer to be buried or cremated? · Do you want your organs to be donated after you die? What should you do with your living will? · Make sure that your family members and your health care agent have copies of your living will. · Give your doctor a copy of your living will to keep in your medical record. If you have more than one doctor, make sure that each one has a copy. · You may want to put a copy of your living will where it can be easily found. Where can you learn more? Go to http://mason-saw.info/. Enter T143 in the search box to learn more about \"Learning About Living Danial Beal. \" Current as of: September 24, 2016 Content Version: 11.4 © 4755-1043 [employer]. Care instructions adapted under license by [employer] (which disclaims liability or warranty for this information). If you have questions about a medical condition or this instruction, always ask your healthcare professional. [address] any warranty or liability for your use of this information. PRES[facility] POLICY [facility] Statement to Patients April 1, 2014 In an effort to ensure the large volume of patient prescription refills is processed in the most efficient and expeditious manner, we are asking our patients to assist us by calling your Pharmacy for all prescription refills, this will include also your  Mail Order Pharmacy. The pharmacy will contact our office electronically to continue the refill process. Please do not wait until the last minute to call your pharmacy. We need at least 48 hours (2days) to fill prescriptions. We also encourage you to call your pharmacy before going to  your prescription to make sure it is ready. With regard to controlled substance prescription refill requests (narcotic refills) that need to be picked up at our office, we ask your cooperation by providing us with at least 72 hours (3days) notice that you will need a refill.  
 
We will not refill narcotic prescription refill requests after 4:00pm on any weekday, Monday through Thursday, or after 2:00pm on Fridays, or on the weekends. We encourage everyone to explore another way of getting your prescription refill request processed using [employer], our patient web portal through our electronic medical record system. [employer]int is an efficient and effective way to communicate your medication request directly to the office and  downloadable as an jeannine on your smart phone . [employer] also features a review functionality that allows you to view your medication list as well as leave messages for your physician. Are you ready to get connected? If so please review the attatched instructions or speak to any of our staff to get you set up right away! Thank you so much for your cooperation. Should you have any questions please contact our Practice Administrator. The Physicians and Staff,  [address] [place] [address] [facility] If we have ordered testing for you, we typically do not call patients with results. Your doctor or nurse will contact you if there are critical results that need to be addressed before your next appointment. We also schedule follow up appointments so that your results can be discussed in person and any questions you have regarding them may be addressed. Additionally, results may be found by using the My Chart feature and one of our patient service representatives at the  can give you instructions on how to access this feature of our electronic medical record system. Patient Instructions/Plans: 
· Postconcussion syndrome (The Basics) What is postconcussion syndrome?  Postconcussion syndrome is a condition that happens after a mild brain injury. A concussion is another word for a mild brain injury. Common causes of mild brain injuries include:  
Car accidents Falling down and other accidents that can happen from daily activities Injuries from playing sports such as football, ice hockey, soccer, and boxing Injuries that can happen to soldiers during combat. These include injuries from blasts and bullet wounds. What are the symptoms of postconcussion syndrome?  The symptoms include: 
Headache Dizziness Feeling very tired Feeling irritable or anxious Memory problems or problems paying attention Problems with sleep Being easily bothered by noise Will I need tests?  Maybe. Your doctor or nurse should be able to tell if you have postconcussion syndrome by learning about your symptoms and doing an exam.  
But depending on your symptoms, you might have tests to make sure you do not have a different problem. For example, some people have an imaging test called an MRI that creates pictures of the brain. How is postconcussion syndrome treated?  The treatments are different depending on which symptoms a person has. There are medicines that can help with headaches, dizziness, and other problems. If you have postconcussion syndrome, your symptoms will probably start to go away after about a week. Most people start to feel better in a week or 2 and are back to normal in 3 months. But a few people have symptoms that last longer. Introducing [facility]! Dear Marisol Santoyo: Thank you for requesting a [employer] account. Our records indicate that you already have an active [employer] account. You can access your account anytime at [email or web address] Did you know that you can access your hospital and ER discharge instructions at any time in [employer]? You can also review all of your test results from your hospital stay or ER visit. Additional Information If you have questions, please visit the Frequently Asked Questions section of the [employer] website at [email or web address] Remember, [employer] is NOT to be used for urgent needs. For medical emergencies, dial 911. Now available from your iPhone and Android! Please provide this summary of care documentation to your next provider. Your primary care clinician is listed as [facility]. If you have any questions after today's visit, please call [phone].

## 2018-01-22 NOTE — PROGRESS NOTES
Date:             2018      Name:  Char Hall  :  1935  MRN:  [id number]     PCP:  Meli Montelongo MD    Chief Complaint   Patient presents with    Other     cervical stenosis    Other     balance disorder          HISTORY OF PRESENT ILLNESS:  Ronnie Brock is a [de-identified] y.o., female who presents today for follow up for cervical stenosis. Since her last visit she did have an MRI brain and C spine. We reviewed this today. She feels like she is starting to have some balance issues again. She is going to start back at [address] [place] [address]. She had a fall on  and fell off her neighbors steps and hit her head on the sidewalk. She was diagnosed with mild concussion. She didn't lose consciousness or have a headache. [place] [facility] [place] She questions if her balance is an issue due to her concussion. She denies vision changes, short term memory issues, or headaches. She did have the Watchman Dec 14th done for her afib. She changed to coumadin from eliquis. She will be on this for a few weeks then convert to plavix and ASA and then just aspirin. She did get botox for her bladder and that seems to be helping. She does still get some dizziness when getting up from bed if she goes to quick. She did follow up with Dr. Radha Forrester but he doesn't feel any more neck surgery is needed. She also follow with her eye doctor for her vision. Recap:  She broke her left wrist after a fall. Since her last visit she was seen by Dr. Radha Forrester. She saw him in January. She did well but then started having balance issues again and dropping things. She was advised to restart her home exercises. She will be walking and suddenly feel like she is falling backwards. She knows how to catch herself. Her fall when she broke her arm she was in a dark location. She feels like she is being pulled to the left randomly. 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      She has been diagnosed with gluten intolerance after an EGD. She just had her esophagus dilated. She has also diagnosed with afib. She had to be shocked back into rhythm and started on eliquis. Current Outpatient Prescriptions   Medication Sig    pravastatin (PRAVACHOL) 20 mg tablet TAKE ONE TABLET BY MOUTH NIGHTLY    warfarin (COUMADIN) 5 mg tablet Take 1 Tab by mouth every evening for 45 days.  Cholecalciferol, Vitamin D3, (VITAMIN D3) 2,000 unit cap capsule Take 2,000 Units by mouth daily.  omeprazole (PRILOSEC) 40 mg capsule Take 40 mg by mouth daily.  levothyroxine (SYNTHROID) 50 mcg tablet TAKE ONE TABLET BY MOUTH ONCE DAILY BEFORE BREAKFAST    sertraline (ZOLOFT) 100 mg tablet TAKE ONE AND ONE-HALF TABLETS BY MOUTH ONCE DAILY (Patient taking differently: TAKE ONE AND ONE-HALF TABLETS BY MOUTH ONCE DAILY AT BEDTIME)    benazepril (LOTENSIN) 20 mg tablet TAKE ONE TABLET BY MOUTH ONCE DAILY    acetaminophen (TYLENOL) 500 mg tablet Take 500 mg by mouth every four (4) hours as needed for Pain.  DILT- mg XR capsule TAKE ONE CAPSULE BY MOUTH ONCE DAILY    metFORMIN ER (GLUCOPHAGE XR) 750 mg tablet TAKE ONE TABLET BY MOUTH TWICE DAILY    nystatin (MYCOSTATIN) powder Apply  to affected area two (2) times a day. (Patient taking differently: Apply  to affected area two (2) times daily as needed.)    ferrous sulfate (IRON) 325 mg (65 mg iron) tablet Take 325 mg by mouth every other day.  multivit-min-FA-lycopen-lutein (CENTRUM SILVER) 0.4-300-250 mg-mcg-mcg tab Take 1 Tab by mouth daily.  aspirin delayed-release 81 mg tablet Take 81 mg by mouth daily.  magnesium 250 mg tab Take 250 mg by mouth daily. No current facility-administered medications for this visit.       Allergies   Allergen Reactions    Procardia Xl [Nifedipine] Other (comments)     weakness     Past Medical History:   Diagnosis Date    Anemia     Arrhythmia     AFIB    Ataxia     Webb's esophagus     Cervical spinal stenosis  Coronary atherosclerosis of unspecified type of vessel, native or graft     Diabetes ([facility] [address]     Fatigue     Fatigue     GERD (gastroesophageal reflux disease)     Hypercholesterolemia     Hyperlipidemia     Hypertension     Ill-defined condition     Webb's Esophagus    Ill-defined condition     right torn rotater cuff- no surgery at this time    Liver disease     pt is unaware    Osteoarthritis     Psychiatric disorder     anxiety and depression    Sarcoidosis     Sleep apnea     uses cpap    Stroke [facility])     TIA'S    Thyroid disease      Past Surgical History:   Procedure Laterality Date    CARDIAC SURG PROCEDURE UNLIST      cardioversion     COLONOSCOPY N/A 12/28/2016    COL[facility]rell [facility] at [facility] ENDOSCOPY    HX CATARACT REMOVAL      both eyes    HX CHOLECYSTECTOMY      HX COLONOSCOPY  5/8/2013    Repeat in 5 years    HX CYST REMOVAL  10/15    on head     HX HYSTERECTOMY      HX ORTHOPAEDIC Bilateral     knee replacement to both knees    HX ORTHOPAEDIC      spacer btwn L4-5 and L5-6     HX TONSILLECTOMY      HX UROLOGICAL      botox in bladder     Social History     Social History    Marital status:      Spouse name: N/A    Number of children: N/A    Years of education: N/A     Occupational History    Not on file.      Social History Main Topics    Smoking status: Never Smoker    Smokeless tobacco: Never Used    Alcohol use No    Drug use: No    Sexual activity: Yes     Partners: Male     Other Topics Concern    Not on file     Social History Narrative     Family History   Problem Relation Age of Onset    Other Mother      Fibromyalgia    Pacemaker Mother     Heart Disease Mother     Heart Failure Mother     COPD Mother     Heart Attack Father 61    Cancer Sister      Multiple Myeloma    Diabetes Brother     Obesity Brother     Pacemaker Brother     Heart Attack Brother      Bundle branch block    No Known Problems Son    [facility] Psychiatric Disorder Daughter      Multiple     Imaging  MRI of the brain: Generalized atrophy and Hyperostosis frontalis   MRI of the cervical spine: Stable postop changes with no new areas of stenosis      PHYSICAL EXAMINATION:    Visit Vitals    /80    Pulse 99    SpO2 96%     General:  Well defined, nourished, and groomed individual in no acute distress. Neck: Supple, nontender, no bruits, no pain with resistance to active range of motion. Heart: Regular rate and rhythm, no murmurs, rub, or gallop. Normal S1S2. Lungs:  Clear to auscultation bilaterally with equal chest expansion, no cough, no wheeze  Musculoskeletal:  Extremities revealed no edema and had full range of motion of joints. Psych:  Good mood and bright affect    NEUROLOGICAL EXAMINATION:     Mental Status:   Alert and oriented to person, place, and time with recent and remote memory intact. Attention span and concentration are normal. Speech is fluent with a full fund of knowledge. Cranial Nerves:    II, III, IV, VI:  Visual acuity grossly intact. Pupils are equal, round, and reactive to light. Extra-ocular movements are full and fluid. No ptosis or nystagmus. V-XII: Hearing is grossly intact. Facial features are symmetric, with normal sensation and strength. The palate rises symmetrically and the tongue protrudes midline. Sternocleidomastoids 5/5. Motor Examination: Normal tone, bulk, and strength, 5/5 muscle strength throughout   Sensory: Intact throughout to LT, temp and vibration  Coordination:  Finger to nose testing was normal.   No resting or intention tremor  Gait and Station: Slight unsteadiness with walking and tends to fall to the left with no obvious trigger  DTRs: 2+ and equal throughout      ASSESSMENT AND PLAN    72-year-old female seen in follow-up for cervical stenosis. She is also complaining now of balance issues and feeling pulled to the left. This is unchanged since I last saw her.   We did an MRI of the brain that showed generalized atrophy. We also did an MRI of the cervical spine that showed that she was stable and her cervical stenosis has not progressed. At this point I agree with trying physical therapy and after patient completes this if she still having balance issues will repeat an MRI of the cervical spine. 1.  MRI brain as above  2. Patient is status post watchman device and will be weaning off of Coumadin and progressing to aspirin under the direction of Dr. Adonna Saint  3. MRI cervical spine as above  4. Continue to follow with Dr. Randa Timmons the patient will hold on follow-up in this we need to repeat an MRI of the cervical spine  5. Agree with going back to [facility] for balance physical therapy  6. May need EMG/NCS if patient does not improve    Follow-up in 3 months    Nick Oliveira MD    This note was created using voice recognition software. Despite editing, there may be syntax errors. This note will not be viewable in [address]

## 2018-01-22 NOTE — LETTER
Date:             2018 Name:  Yasmin Washburn 
:  1935 MRN:  [id number] PCP:  Pily Calderon MD 
 
Chief Complaint Patient presents with  
 Other  
  cervical stenosis  Other  
  balance disorder HISTORY OF PRESENT ILLNESS: 
Vijaya Wright is a [de-identified] y.o., female who presents today for follow up for cervical stenosis. Since her last visit she did have an MRI brain and C spine. We reviewed this today. She feels like she is starting to have some balance issues again. She is going to start back at [address] [place] [address]. She had a fall on  and fell off her neighbors steps and hit her head on the sidewalk. She was diagnosed with mild concussion. She didn't lose consciousness or have a headache. [place] [facility] [place] She questions if her balance is an issue due to her concussion. She denies vision changes, short term memory issues, or headaches. She did have the Watchman Dec 14th done for her afib. She changed to coumadin from eliquis. She will be on this for a few weeks then convert to plavix and ASA and then just aspirin. She did get botox for her bladder and that seems to be helping. She does still get some dizziness when getting up from bed if she goes to quick. She did follow up with Dr. Akanksha Gonzalez but he doesn't feel any more neck surgery is needed. She also follow with her eye doctor for her vision. Recap: She broke her left wrist after a fall. Since her last visit she was seen by Dr. Akanksha Gonzalez. She saw him in January. She did well but then started having balance issues again and dropping things. She was advised to restart her home exercises. She will be walking and suddenly feel like she is falling backwards. She knows how to catch herself. Her fall when she broke her arm she was in a dark location. She feels like she is being pulled to the left randomly. 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 She has been diagnosed with gluten intolerance after an EGD. She just had her esophagus dilated. She has also diagnosed with afib. She had to be shocked back into rhythm and started on eliquis. Current Outpatient Prescriptions Medication Sig  pravastatin (PRAVACHOL) 20 mg tablet TAKE ONE TABLET BY MOUTH NIGHTLY  warfarin (COUMADIN) 5 mg tablet Take 1 Tab by mouth every evening for 45 days.  Cholecalciferol, Vitamin D3, (VITAMIN D3) 2,000 unit cap capsule Take 2,000 Units by mouth daily.  omeprazole (PRILOSEC) 40 mg capsule Take 40 mg by mouth daily.  levothyroxine (SYNTHROID) 50 mcg tablet TAKE ONE TABLET BY MOUTH ONCE DAILY BEFORE BREAKFAST  sertraline (ZOLOFT) 100 mg tablet TAKE ONE AND ONE-HALF TABLETS BY MOUTH ONCE DAILY (Patient taking differently: TAKE ONE AND ONE-HALF TABLETS BY MOUTH ONCE DAILY AT BEDTIME)  benazepril (LOTENSIN) 20 mg tablet TAKE ONE TABLET BY MOUTH ONCE DAILY  acetaminophen (TYLENOL) 500 mg tablet Take 500 mg by mouth every four (4) hours as needed for Pain.  DILT- mg XR capsule TAKE ONE CAPSULE BY MOUTH ONCE DAILY  metFORMIN ER (GLUCOPHAGE XR) 750 mg tablet TAKE ONE TABLET BY MOUTH TWICE DAILY  nystatin (MYCOSTATIN) powder Apply  to affected area two (2) times a day. (Patient taking differently: Apply  to affected area two (2) times daily as needed.)  ferrous sulfate (IRON) 325 mg (65 mg iron) tablet Take 325 mg by mouth every other day.  multivit-min-FA-lycopen-lutein (CENTRUM SILVER) 0.4-300-250 mg-mcg-mcg tab Take 1 Tab by mouth daily.  aspirin delayed-release 81 mg tablet Take 81 mg by mouth daily.  magnesium 250 mg tab Take 250 mg by mouth daily. No current facility-administered medications for this visit. Allergies Allergen Reactions  Procardia Xl [Nifedipine] Other (comments)  
  weakness Past Medical History:  
Diagnosis Date  Anemia  Arrhythmia AFIB  Ataxia  Webb's esophagus  Cervical spinal stenosis  Coronary atherosclerosis of unspecified type of vessel, native or graft  Diabetes ([facility] [address]  Fatigue  Fatigue  GERD (gastroesophageal reflux disease)  Hypercholesterolemia  Hyperlipidemia  Hypertension  Ill-defined condition Webb's Esophagus  Ill-defined condition   
 right torn rotater cuff- no surgery at this time  Liver disease   
 pt is unaware  Osteoarthritis  Psychiatric disorder   
 anxiety and depression  Sarcoidosis  Sleep apnea   
 uses cpap  Stroke ([facility] [address] TIA'S  Thyroid disease Past Surgical History:  
Procedure Laterality Date  CARDIAC SURG PROCEDURE UNLIST    
 cardioversion  COLONOSCOPY N/A 12/28/2016 COLONOSCOPY performed by Jose Alejandro Espana MD at [facility]"" ENDOSCOPY  
 HX CATARACT REMOVAL    
 both eyes  HX CHOLECYSTECTOMY  HX COLONOSCOPY  5/8/2013 Repeat in 5 years  HX CYST REMOVAL  10/15  
 on head  HX HYSTERECTOMY  HX ORTHOPAEDIC Bilateral   
 knee replacement to both knees  HX ORTHOPAEDIC    
 spacer btwn L4-5 and L5-6   
 HX TONSILLECTOMY  HX UROLOGICAL    
 botox in bladder Social History Social History  Marital status:  Spouse name: N/A  
 Number of children: N/A  
 Years of education: N/A Occupational History  Not on file. Social History Main Topics  Smoking status: Never Smoker  Smokeless tobacco: Never Used  Alcohol use No  
 Drug use: No  
 Sexual activity: Yes  
  Partners: Male Other Topics Concern  Not on file Social History Narrative Family History Problem Relation Age of Onset [facility] Other Mother Fibromyalgia  Pacemaker Mother  Heart Disease Mother  Heart Failure Mother  COPD Mother  Heart Attack Father 61  Cancer Sister Multiple Myeloma  Diabetes Brother  Obesity Brother  Pacemaker Brother  Heart Attack Brother Bundle branch block  No Known Problems Son  Psychiatric Disorder Daughter Multiple Imaging MRI of the brain: Generalized atrophy and Hyperostosis frontalis MRI of the cervical spine: Stable postop changes with no new areas of stenosis PHYSICAL EXAMINATION:   
Visit Vitals  /80  Pulse 99  SpO2 96% General:  Well defined, nourished, and groomed individual in no acute distress. Neck: Supple, nontender, no bruits, no pain with resistance to active range of motion. Heart: Regular rate and rhythm, no murmurs, rub, or gallop. Normal S1S2. Lungs:  Clear to auscultation bilaterally with equal chest expansion, no cough, no wheeze Musculoskeletal:  Extremities revealed no edema and had full range of motion of joints. Psych:  Good mood and bright affect NEUROLOGICAL EXAMINATION:    
Mental Status:   Alert and oriented to person, place, and time with recent and remote memory intact. Attention span and concentration are normal. Speech is fluent with a full fund of knowledge. Cranial Nerves:   
II, III, IV, VI:  Visual acuity grossly intact. Pupils are equal, round, and reactive to light. Extra-ocular movements are full and fluid. No ptosis or nystagmus. V-XII: Hearing is grossly intact. Facial features are symmetric, with normal sensation and strength. The palate rises symmetrically and the tongue protrudes midline. Sternocleidomastoids 5/5. Motor Examination: Normal tone, bulk, and strength, 5/5 muscle strength throughout Sensory: Intact throughout to LT, temp and vibration Coordination:  Finger to nose testing was normal.   No resting or intention tremor Gait and Station: Slight unsteadiness with walking and tends to fall to the left with no obvious trigger DTRs: 2+ and equal throughout ASSESSMENT AND PLAN 
 
17-year-old female seen in follow-up for cervical stenosis.   She is also complaining now of balance issues and feeling pulled to the left. This is unchanged since I last saw her. We did an MRI of the brain that showed generalized atrophy. We also did an MRI of the cervical spine that showed that she was stable and her cervical stenosis has not progressed. At this point I agree with trying physical therapy and after patient completes this if she still having balance issues will repeat an MRI of the cervical spine. 1.  MRI brain as above 2. Patient is status post watchman device and will be weaning off of Coumadin and progressing to aspirin under the direction of Dr. Levi Squires 
3. MRI cervical spine as above 4. Continue to follow with Dr. Desi Mcleod the patient will hold on follow-up in this we need to repeat an MRI of the cervical spine 5. Agree with going back to [facility] for balance physical therapy 6. May need EMG/NCS if patient does not improve Follow-up in 3 months Nima Sullivan MD 
 
This note was created using voice recognition software. Despite editing, there may be syntax errors. This note will not be viewable in [address]

## 2018-01-24 RX ORDER — LEVOTHYROXINE SODIUM 50 UG/1
50 TABLET ORAL
Qty: 90 TAB | Refills: 1 | Status: SHIPPED | OUTPATIENT
Start: 2018-01-24 | End: 2018-07-31 | Stop reason: SDUPTHER

## 2018-01-24 RX ORDER — SERTRALINE HYDROCHLORIDE 100 MG/1
100 TABLET, FILM COATED ORAL DAILY
Qty: 135 TAB | Refills: 1 | Status: SHIPPED | OUTPATIENT
Start: 2018-01-24 | End: 2018-10-03 | Stop reason: SDUPTHER

## 2018-01-24 RX ORDER — METFORMIN HYDROCHLORIDE 750 MG/1
TABLET, EXTENDED RELEASE ORAL
Qty: 180 TAB | Refills: 0 | Status: SHIPPED | OUTPATIENT
Start: 2018-01-24 | End: 2018-04-28 | Stop reason: SDUPTHER

## 2018-01-24 RX ORDER — BENAZEPRIL HYDROCHLORIDE 20 MG/1
20 TABLET ORAL DAILY
Qty: 90 TAB | Refills: 1 | Status: SHIPPED | OUTPATIENT
Start: 2018-01-24 | End: 2018-03-20 | Stop reason: DRUGHIGH

## 2018-01-24 NOTE — TELEPHONE ENCOUNTER
Pt stated the [facility] is closing and she is requesting all her Rx get sent over to the [facility] on [address], [address] Pt best contact number [phone]     Home number [phone].    [facility] number [phone]       Message received & copied from [facility]

## 2018-01-24 NOTE — TELEPHONE ENCOUNTER
----- Message from Tanya Bryant sent at 2018  4:59 PM EST -----  Regarding: /Telephone  Pt stated, she called in over a week ago in regards to medication refills. Pt stated, [facility] is now closed and requested for all medications and refills to be sent to [facility] on [address] (P)[phone]. Pt stated, she has been without \"Meformin\" going on a week now. Best contact number [phone] ([phone].        Message copied/pasted from [employer]

## 2018-01-24 NOTE — TELEPHONE ENCOUNTER
Requested Prescriptions     Pending Prescriptions Disp Refills    levothyroxine (SYNTHROID) 50 mcg tablet 90 Tab 0    sertraline (ZOLOFT) 100 mg tablet 135 Tab 0    benazepril (LOTENSIN) 20 mg tablet 90 Tab 0     Last Refill:10/23/17    Last Office Visit: 12/15/17    Upcoming Appointment: 3/20/18

## 2018-01-26 ENCOUNTER — ANTI-COAG VISIT (OUTPATIENT)
Dept: CARDIOLOGY CLINIC | Age: 83
End: 2018-01-26

## 2018-01-26 DIAGNOSIS — Z79.01 LONG-TERM (CURRENT) USE OF ANTICOAGULANTS: Primary | ICD-10-CM

## 2018-01-26 DIAGNOSIS — I48.0 PAROXYSMAL ATRIAL FIBRILLATION (HCC): ICD-10-CM

## 2018-01-26 LAB
INR BLD: 2.2 (ref 1–1.5)
PT POC: 26.3 SECONDS (ref 9.1–12)
VALID INTERNAL CONTROL?: YES

## 2018-01-26 RX ORDER — WARFARIN SODIUM 5 MG/1
TABLET ORAL
Qty: 4 TAB | Refills: 0 | Status: SHIPPED | OUTPATIENT
Start: 2018-01-26 | End: 2018-02-01

## 2018-02-01 ENCOUNTER — HOSPITAL ENCOUNTER (OUTPATIENT)
Dept: NON INVASIVE DIAGNOSTICS | Age: 83
Discharge: HOME | End: 2018-02-01
Attending: INTERNAL MEDICINE
Payer: MEDICARE

## 2018-02-01 VITALS
HEART RATE: 127 BPM | SYSTOLIC BLOOD PRESSURE: 113 MMHG | OXYGEN SATURATION: 94 % | HEIGHT: 62 IN | DIASTOLIC BLOOD PRESSURE: 90 MMHG | BODY MASS INDEX: 31.83 KG/M2 | RESPIRATION RATE: 16 BRPM | WEIGHT: 173 LBS

## 2018-02-01 DIAGNOSIS — I48.91 ATRIAL FIBRILLATION (HCC): ICD-10-CM

## 2018-02-01 PROCEDURE — 99152 MOD SED SAME PHYS/QHP 5/>YRS: CPT

## 2018-02-01 PROCEDURE — 74011000250 HC RX REV CODE- 250: Performed by: INTERNAL MEDICINE

## 2018-02-01 PROCEDURE — 74011000250 HC RX REV CODE- 250

## 2018-02-01 PROCEDURE — 74011250636 HC RX REV CODE- 250/636

## 2018-02-01 PROCEDURE — 93325 DOPPLER ECHO COLOR FLOW MAPG: CPT

## 2018-02-01 RX ORDER — FENTANYL CITRATE 50 UG/ML
INJECTION, SOLUTION INTRAMUSCULAR; INTRAVENOUS
Status: COMPLETED
Start: 2018-02-01 | End: 2018-02-01

## 2018-02-01 RX ORDER — LIDOCAINE HYDROCHLORIDE 20 MG/ML
SOLUTION OROPHARYNGEAL
Status: COMPLETED
Start: 2018-02-01 | End: 2018-02-01

## 2018-02-01 RX ORDER — CLOPIDOGREL BISULFATE 75 MG/1
75 TABLET ORAL DAILY
Qty: 30 TAB | Refills: 5 | Status: SHIPPED | OUTPATIENT
Start: 2018-02-01 | End: 2018-03-03

## 2018-02-01 RX ORDER — FENTANYL CITRATE 50 UG/ML
25-50 INJECTION, SOLUTION INTRAMUSCULAR; INTRAVENOUS
Status: DISCONTINUED | OUTPATIENT
Start: 2018-02-01 | End: 2018-02-01

## 2018-02-01 RX ORDER — LIDOCAINE HYDROCHLORIDE 20 MG/ML
15 SOLUTION OROPHARYNGEAL ONCE
Status: COMPLETED | OUTPATIENT
Start: 2018-02-01 | End: 2018-02-01

## 2018-02-01 RX ORDER — MIDAZOLAM HYDROCHLORIDE 1 MG/ML
INJECTION, SOLUTION INTRAMUSCULAR; INTRAVENOUS
Status: COMPLETED
Start: 2018-02-01 | End: 2018-02-01

## 2018-02-01 RX ORDER — MIDAZOLAM HYDROCHLORIDE 1 MG/ML
.5-2 INJECTION, SOLUTION INTRAMUSCULAR; INTRAVENOUS
Status: DISCONTINUED | OUTPATIENT
Start: 2018-02-01 | End: 2018-02-01

## 2018-02-01 RX ADMIN — MIDAZOLAM HYDROCHLORIDE 1 MG: 1 INJECTION, SOLUTION INTRAMUSCULAR; INTRAVENOUS at 08:04

## 2018-02-01 RX ADMIN — FENTANYL CITRATE 25 MCG: 50 INJECTION, SOLUTION INTRAMUSCULAR; INTRAVENOUS at 07:53

## 2018-02-01 RX ADMIN — MIDAZOLAM HYDROCHLORIDE 1 MG: 1 INJECTION, SOLUTION INTRAMUSCULAR; INTRAVENOUS at 07:53

## 2018-02-01 RX ADMIN — BENZOCAINE, BUTAMBEN, AND TETRACAINE HYDROCHLORIDE 1 SPRAY: .028; .004; .004 AEROSOL, SPRAY TOPICAL at 07:54

## 2018-02-01 RX ADMIN — LIDOCAINE HYDROCHLORIDE 15 ML: 20 SOLUTION ORAL; TOPICAL at 07:54

## 2018-02-01 RX ADMIN — MIDAZOLAM HYDROCHLORIDE 1 MG: 1 INJECTION, SOLUTION INTRAMUSCULAR; INTRAVENOUS at 08:11

## 2018-02-01 RX ADMIN — LIDOCAINE HYDROCHLORIDE 15 ML: 20 SOLUTION OROPHARYNGEAL at 07:54

## 2018-02-01 NOTE — DISCHARGE INSTRUCTIONS
DISCHARGE SUMMARY from [facility], RN        The following personal items collected during your admission are returned to you:   Dental Appliance:  Upper and lower   Clothing:  Bra, shirt and sweater        PATIENT INSTRUCTIONS: Continue taking all the same medications, please begin taking Plavix 75mg daily      You had a transesophageal echocardiogram, start with sips of water and advance diet as swallowing returns to normal. Avoid any scalding hot beverages for the next 2 hours. Keep your appointment with Alexi Hartman NP in 1 week. What to do at Home:  Recommended activity: No driving today      The discharge information has been reviewed with the PATIENT . The PATIENT  verbalized understanding.

## 2018-02-01 NOTE — PROGRESS NOTES
Discharge instructions reviewed with patient and , Sebas Chen. Allowed adequate time to ask questions, all questions answered. Printed copy of AVS given to patient. All belongings gathered, IV and tele discontinued. Transported via wheelchair to main entrance and into care of family.

## 2018-02-01 NOTE — IP AVS SNAPSHOT
[address]. 
[phone] Patient: Nenita Bonilla MRN: [id number] :[id number] About your hospitalization You were admitted on:  2018 You last received care in the:  [facility] [place] NON-INVASIVE CARD You were discharged on:  2018 Why you were hospitalized Your primary diagnosis was:  Not on File Follow-up Information Follow up With Details Comments Contact Info Sheeba Bryan MD   . [address] [facility] 83. 
[phone] Your Scheduled Appointments 2018  2:00 PM [facility] HOSPITAL FOLLOW-UP with [facility] [address] 83.  
[phone] 2018  1:30 PM EDT ROUTINE CARE with Sheeba Bryan, [address] [place] [address] [address] [facility] 83.  
[phone] Discharge Orders None A check abby indicates which time of day the medication should be taken. My Medications START taking these medications Instructions Each Dose to Equal  
 Morning Noon Evening Bedtime  
 clopidogrel 75 mg Tab Commonly known as:  PLAVIX Your last dose was: Your next dose is: Take 1 Tab by mouth daily for 30 days. 75 mg CHANGE how you take these medications Instructions Each Dose to Equal  
 Morning Noon Evening Bedtime  
 nystatin powder Commonly known as:  MYCOSTATIN What changed:   
- when to take this 
- reasons to take this Your last dose was: Your next dose is:    
   
   
 Apply  to affected area two (2) times a day. CONTINUE taking these medications  Instructions Each Dose to Equal  
 Morning Noon Evening Bedtime  
 acetaminophen 500 mg tablet Commonly known as:  TYLENOL Your last dose was: Your next dose is: Take 500 mg by mouth every four (4) hours as needed for Pain. 500 mg  
    
   
   
   
  
 aspirin delayed-release 81 mg tablet Your last dose was: Your next dose is: Take 81 mg by mouth daily. 81 mg  
    
   
   
   
  
 benazepril 20 mg tablet Commonly known as:  LOTENSIN Your last dose was: Your next dose is: Take 1 Tab by mouth daily. 20 mg CENTRUM SILVER 0.4-300-250 mg-mcg-mcg Tab Generic drug:  multivit-min-FA-lycopen-lutein Your last dose was: Your next dose is: Take 1 Tab by mouth daily. 1 Tab Cholecalciferol (Vitamin D3) 2,000 unit Cap capsule Commonly known as:  VITAMIN D3 Your last dose was: Your next dose is: Take 2,000 Units by mouth daily. 2000 Units DILT- mg XR capsule Generic drug:  dilTIAZem XR Your last dose was: Your next dose is: TAKE ONE CAPSULE BY MOUTH ONCE DAILY Iron 325 mg (65 mg iron) tablet Generic drug:  ferrous sulfate Your last dose was: Your next dose is: Take 325 mg by mouth every other day. 325 mg  
    
   
   
   
  
 levothyroxine 50 mcg tablet Commonly known as:  SYNTHROID Your last dose was: Your next dose is: Take 1 Tab by mouth Daily (before breakfast). 50 mcg  
    
   
   
   
  
 magnesium 250 mg Tab Your last dose was: Your next dose is: Take 250 mg by mouth daily. 250 mg  
    
   
   
   
  
 metFORMIN  mg tablet Commonly known as:  GLUCOPHAGE XR Your last dose was: Your next dose is: TAKE ONE TABLET BY MOUTH TWICE DAILY omeprazole 40 mg capsule Commonly known as:  PRILOSEC Your last dose was: Your next dose is: Take 40 mg by mouth daily. 40 mg  
    
   
   
   
  
 pravastatin 20 mg tablet Commonly known as:  PRAVACHOL Your last dose was: Your next dose is: TAKE ONE TABLET BY MOUTH NIGHTLY  
     
   
   
   
  
 sertraline 100 mg tablet Commonly known as:  ZOLOFT Your last dose was: Your next dose is: Take 1 Tab by mouth daily. 100 mg  
    
   
   
   
  
  
STOP taking these medications   
 warfarin 5 mg tablet Commonly known as:  COUMADIN Where to Get Your Medications [facility] [place] [address] [facility] [address], [address] [place] [id number] Phone:  [phone]  
  clopidogrel 75 mg Tab Discharge Instructions DISCHARGE SUMMARY from Roseline Raymundo RN The following personal items collected during your admission are returned to you:  
Dental Appliance:  Upper and lower Clothing:  Bra, shirt and sweater PATIENT INSTRUCTIONS: Continue taking all the same medications, please begin taking Plavix 75mg daily You had a transesophageal echocardiogram, start with sips of water and advance diet as swallowing returns to normal. Avoid any scalding hot beverages for the next 2 hours. Keep your appointment with Gerson Ricketts NP in 1 week. What to do at Home: 
Recommended activity: No driving today The discharge information has been reviewed with the PATIENT . The PATIENT  verbalized understanding. ACO Transitions of Care [address] offers a voluntary care coordination program to provide high quality service and care to [facility] fee-for-service beneficiaries. Monse Mcneil was designed to help you enhance your health and well-being through the following services: ? Transitions of Care  support for individuals who are transitioning from one care setting to another (example: Hospital to home). ? Chronic and Complex Care Coordination  support for individuals and caregivers of those with serious or chronic illnesses or with more than one chronic (ongoing) condition and those who take a number of different medications. If you meet specific medical criteria, a [facility] [address] may call you directly to coordinate your care with your primary care physician and your other care providers. For questions about the [facility] programs, please, contact your physicians office. For general questions or additional information about Accountable Care Organizations: 
Please visit www.medicare.gov/acos. html or call 1-800-MEDICARE (0-115.692.4638) TTY users should call 8-606.910.5356. Introducing hospitals & HEALTH SERVICES! Dear Bruno Noriega: Thank you for requesting a LendAmend account. Our records indicate that you already have an active LendAmend account. You can access your account anytime at [email or web address] Did you know that you can access your hospital and ER discharge instructions at any time in LendAmend? You can also review all of your test results from your hospital stay or ER visit. Additional Information If you have questions, please visit the Frequently Asked Questions section of the LendAmend website at [email or web address] Remember, LendAmend is NOT to be used for urgent needs. For medical emergencies, dial 911. Now available from your iPhone and Android! Providers Seen During Your Hospitalization Provider Specialty Primary office phone Jesus Robles MD Cardiology [phone] Your Primary Care Physician (PCP) Primary Care Physician Office Phone Office Fax Frank Levi [phone] You are allergic to the following Allergen Reactions Procardia Xl (Nifedipine) Other (comments)  
 weakness Recent Documentation Height Weight BMI OB Status Smoking Status 1.562 m 78.5 kg 32.16 kg/m2 Hysterectomy Never Smoker Emergency Contacts Name Discharge Info Relation Home Work Mobile Manav Medrano 126 CAREGIVER [3] Spouse [3] [phone] Ximena Thompson DISCHARGE CAREGIVER [3] Sister [23] [phone] W,Jose  Spouse [3] [phone] Patient Belongings The following personal items are in your possession at time of discharge: 
                             
 
  
  
 Please provide this summary of care documentation to your next provider. Signatures-by signing, you are acknowledging that this After Visit Summary has been reviewed with you and you have received a copy. Patient Signature:  ____________________________________________________________ Date:  ____________________________________________________________  
  
Zaire Dallas Provider Signature:  ____________________________________________________________ Date:  ____________________________________________________________

## 2018-02-01 NOTE — PROGRESS NOTES
ASA 3 and Mallampati 3 per Dr. Jaycob Dougherty     0753-Pt sedated with 3mg Versed and 25mcg Fentanyl for JESSIE(monitored sedation from 7:53am to 8:16am)

## 2018-02-05 ENCOUNTER — OFFICE VISIT (OUTPATIENT)
Dept: CARDIOLOGY CLINIC | Age: 83
End: 2018-02-05

## 2018-02-05 VITALS
SYSTOLIC BLOOD PRESSURE: 114 MMHG | DIASTOLIC BLOOD PRESSURE: 76 MMHG | HEART RATE: 112 BPM | RESPIRATION RATE: 18 BRPM | BODY MASS INDEX: 33.13 KG/M2 | HEIGHT: 62 IN | OXYGEN SATURATION: 95 % | WEIGHT: 180 LBS

## 2018-02-05 DIAGNOSIS — I10 ESSENTIAL HYPERTENSION: ICD-10-CM

## 2018-02-05 DIAGNOSIS — I49.9 IRREGULAR HEARTBEAT: Primary | ICD-10-CM

## 2018-02-05 DIAGNOSIS — I35.1 AORTIC VALVE INSUFFICIENCY, ETIOLOGY OF CARDIAC VALVE DISEASE UNSPECIFIED: ICD-10-CM

## 2018-02-05 DIAGNOSIS — I48.0 PAROXYSMAL ATRIAL FIBRILLATION (HCC): ICD-10-CM

## 2018-02-05 NOTE — PROGRESS NOTES
Subjective:      Garrett Zamora is a [de-identified] y.o. female is here for follow up s/p 45 day JESSIE post watchman implant. The patient denies chest pain/ shortness of breath, orthopnea, PND, LE edema, palpitations, syncope, presyncope or fatigue.        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/11/2015    ACP (advance care planning) 11/11/2015    Hypothyroidism, adult 07/31/2015    Type 2 diabetes mellitus without complication ([address] 00/22/0007    Essential hypertension 04/27/2015    MALLORY (obstructive sleep apnea) 07/09/2014    Hyperlipidemia 07/09/2014    Sarcoid 07/09/2014    Stress bladder incontinence, female 07/09/2014    Obesity 07/09/2014    TIA (transient ischemic attack) 07/09/2014    AR (aortic regurgitation) 07/09/2014    Mitral valve insufficiency 07/09/2014    Cervical stenosis of spine 07/09/2014      Mai Bernard MD  Past Medical History:   Diagnosis Date    Anemia     Arrhythmia     AFIB    Ataxia     Webb's esophagus     Cervical spinal stenosis     Coronary atherosclerosis of unspecified type of vessel, native or graft     Diabetes ([address]     Fatigue     Fatigue     GERD (gastroesophageal reflux disease)     Hypercholesterolemia     Hyperlipidemia     Hypertension     Ill-defined condition     Webb's Esophagus    Ill-defined condition     right torn rotater cuff- no surgery at this time    Liver disease     pt is unaware    Osteoarthritis     Psychiatric disorder     anxiety and depression    Sarcoidosis     Sleep apnea     uses cpap    Stroke ([address]     TIA'S    Thyroid disease       Past Surgical History:   Procedure Laterality Date    CARDIAC SURG PROCEDURE UNLIST cardioversion     CARDIAC SURG PROCEDURE UNLIST      watchman device    COLONOSCOPY N/A 12/28/2016    COLONOSCOPY [facility]jan Harvey [facility] ENDOSCOPY    HX CATARACT REMOVAL      both eyes    HX CHOLECYSTECTOMY      HX COLONOSCOPY  5/8/2013    Repeat in 5 years    HX CYST REMOVAL  10/15    on head     HX HYSTERECTOMY      HX ORTHOPAEDIC Bilateral     knee replacement to both knees    HX ORTHOPAEDIC      spacer btwn L4-5 and L5-6     HX TONSILLECTOMY      HX UROLOGICAL      botox in bladder     Allergies   Allergen Reactions    Procardia Xl [Nifedipine] Other (comments)     weakness      Family History   Problem Relation Age of Onset    Other Mother      Fibromyalgia    Pacemaker Mother     Heart Disease Mother     Heart Failure Mother     COPD Mother     Heart Attack Father 61    Cancer Sister      Multiple Myeloma    Diabetes Brother     Obesity Brother     Pacemaker Brother     Heart Attack Brother      Bundle branch block    No Known Problems Son     Psychiatric Disorder Daughter      Multiple    negative for cardiac disease  Social History     Social History    Marital status:      Spouse name: N/A    Number of children: N/A    Years of education: N/A     Social History Main Topics    Smoking status: Never Smoker    Smokeless tobacco: Never Used    Alcohol use No    Drug use: No    Sexual activity: Yes     Partners: Male     Other Topics Concern    None     Social History Narrative     Current Outpatient Prescriptions   Medication Sig    clopidogrel (PLAVIX) 75 mg tab Take 1 Tab by mouth daily for 30 days.  levothyroxine (SYNTHROID) 50 mcg tablet Take 1 Tab by mouth Daily (before breakfast).  sertraline (ZOLOFT) 100 mg tablet Take 1 Tab by mouth daily.  benazepril (LOTENSIN) 20 mg tablet Take 1 Tab by mouth daily.     metFORMIN ER (GLUCOPHAGE XR) 750 mg tablet TAKE ONE TABLET BY MOUTH TWICE DAILY    pravastatin (PRAVACHOL) 20 mg tablet TAKE ONE TABLET BY MOUTH NIGHTLY    Cholecalciferol, Vitamin D3, (VITAMIN D3) 2,000 unit cap capsule Take 2,000 Units by mouth daily.  omeprazole (PRILOSEC) 40 mg capsule Take 40 mg by mouth daily.  acetaminophen (TYLENOL) 500 mg tablet Take 500 mg by mouth every four (4) hours as needed for Pain.  DILT- mg XR capsule TAKE ONE CAPSULE BY MOUTH ONCE DAILY    ferrous sulfate (IRON) 325 mg (65 mg iron) tablet Take 325 mg by mouth every other day.  multivit-min-FA-lycopen-lutein (CENTRUM SILVER) 0.4-300-250 mg-mcg-mcg tab Take 1 Tab by mouth daily.  aspirin delayed-release 81 mg tablet Take 81 mg by mouth daily.  magnesium 250 mg tab Take 250 mg by mouth daily. No current facility-administered medications for this visit. Vitals:    02/05/18 1351   BP: 114/76   Pulse: (!) 112   Resp: 18   SpO2: 95%   Weight: 180 lb (81.6 kg)   Height: 5' 1.5\" (1.562 m)       I have reviewed the nurses notes, vitals, problem list, allergy list, medical history, family, social history and medications. Review of Symptoms:    General: Pt denies excessive weight gain or loss. Pt is able to conduct ADL's  HEENT: Denies blurred vision, headaches, epistaxis and difficulty swallowing. Respiratory: Denies shortness of breath, STRICKLAND, wheezing or stridor. Cardiovascular: Denies precordial pain, palpitations, edema or PND  Gastrointestinal: Denies poor appetite, indigestion, abdominal pain or blood in stool  Urinary: Denies dysuria, pyuria  Musculoskeletal: Denies pain or swelling from muscles or joints  Neurologic: Denies tremor, paresthesias, or sensory motor disturbance  Skin: Denies rash, itching or texture change. Psych: Denies depression      Physical Exam:      General: Well developed, in no acute distress.   HEENT: Eyes - PERRL, no jvd  Heart:  +irregularly irregular, Normal S1/S2 negative S3 or S4. no murmur, gallop or rub.   Respiratory: Clear bilaterally x 4, no wheezing or rales  Abdomen:   Soft, non-tender, bowel sounds are active.   Extremities:  No edema, normal cap refill, no cyanosis. Musculoskeletal: No clubbing  Neuro: A&Ox3, speech clear, gait stable. Skin: Skin color is normal. No rashes or lesions. Non diaphoretic  Vascular: 2+ pulses symmetric in all extremities    Cardiographics    Ekg: atrial fibrillation (125bpm)    Results for orders placed or performed during the hospital encounter of 12/06/17   EKG, 12 LEAD, INITIAL   Result Value Ref Range    Ventricular Rate 55 BPM    Atrial Rate 55 BPM    P-R Interval 212 ms    QRS Duration 82 ms    Q-T Interval 458 ms    QTC Calculation (Bezet) 438 ms    Calculated P Axis 52 degrees    Calculated R Axis -15 degrees    Calculated T Axis 75 degrees    Diagnosis       Sinus bradycardia  Nonspecific T wave abnormality  Confirmed by Thong Osorio ([id number]) on 12/6/2017 10:58:57 AM           Lab Results   Component Value Date/Time    WBC 11.1 12/14/2017 02:51 AM    HGB 10.5 12/14/2017 02:51 AM    HCT 32.9 12/14/2017 02:51 AM    PLATELET 882 28/85/4600 02:51 AM    MCV 91.4 12/14/2017 02:51 AM      Lab Results   Component Value Date/Time    Sodium 141 12/11/2017 10:09 AM    Potassium 4.7 12/11/2017 10:09 AM    Chloride 102 12/11/2017 10:09 AM    CO2 24 12/11/2017 10:09 AM    Anion gap 8 12/06/2017 10:25 AM    Glucose 101 12/11/2017 10:09 AM    BUN 23 12/11/2017 10:09 AM    Creatinine 0.98 12/11/2017 10:09 AM    BUN/Creatinine ratio 23 12/11/2017 10:09 AM    GFR est AA 63 12/11/2017 10:09 AM    GFR est non-AA 55 12/11/2017 10:09 AM    Calcium 9.0 12/11/2017 10:09 AM    Bilirubin, total 0.4 12/11/2017 10:09 AM    AST (SGOT) 15 12/11/2017 10:09 AM    Alk. phosphatase 82 12/11/2017 10:09 AM    Protein, total 6.6 12/11/2017 10:09 AM    Albumin 4.1 12/11/2017 10:09 AM    Globulin 4.2 09/07/2016 01:11 AM    A-G Ratio 1.6 12/11/2017 10:09 AM    ALT (SGPT) 15 12/11/2017 10:09 AM         Assessment:     Assessment:        ICD-10-CM ICD-9-CM    1.  Irregular heartbeat I49.9 427.9 AMB POC EKG ROUTINE W/ 12 LEADS, INTER & REP   2. Essential hypertension I10 401.9    3. Paroxysmal atrial fibrillation (HCC) I48.0 427.31    4. Aortic valve insufficiency, etiology of cardiac valve disease unspecified I35.1 424.1      Orders Placed This Encounter    AMB POC EKG ROUTINE W/ 12 LEADS, INTER & REP     Order Specific Question:   Reason for Exam:     Answer:   routine        Plan:   Ms. Cari Barclay is here for follow up s/p 45 day JESSIE post Watchman implant. She is doing well and denies cardiac complaints- tolerating current ASA/Plavix regimen. Her EKG today demonstrates AF at a rate of 125bpm. Will increase Diltiazem to 240mg and decrease Lotensin to 10mg daily and have her check blood pressure daily. Follow up on Thursday 2/8/2017 for an EKG. Will consult with Dr. Em Castellon for further recommendations. Continue medical management for HTN, valve disease. Thank you for allowing me to participate in Garrett Zamora 's care.     Jaclyn Petersen NP

## 2018-02-05 NOTE — PROGRESS NOTES
1. Have you been to the ER, urgent care clinic since your last visit? Hospitalized since your last visit? No    2. Have you seen or consulted any other health care providers outside of the [address] [place] [address] since your last visit? Include any pap smears or colon screening. No    Chief Complaint   Patient presents with    Irregular Heart Beat     S/P watchman. Denied cardiac symptoms.

## 2018-02-05 NOTE — MR AVS SNAPSHOT
[facility] [address] 
[phone] Patient: Marjan Allen MRN: [id number] :1935 Visit Information Date & Time Provider Department Dept. Phone Encounter #  
 2018  2:00 PM David Carlisle, [address] [facility] [phone] [id number] Your Appointments 3/20/2018  1:30 PM  
ROUTINE CARE with Ventura Pacheco, [address] [place] [address] Avenue[address]) Appt Note: 3 mon [facility] [address] [facility] [address]  
  
   
 [facility] [address] [facility]  [address] [address]  
  
    
 2018 10:40 AM  
Follow Up with Lang Maria MD  
[address] [place] [address] Neurology Clinic [address] [facility]) Appt Note: balance disorder [address] [facility] [address] [address]  
[facility] [address] [address] Upcoming Health Maintenance Date Due Pneumococcal 65+ High/Highest Risk (2 of 2 - PPSV23) 10/13/2016 EYE EXAM RETINAL OR DILATED Q1 3/20/2018 FOOT EXAM Q1 2018 HEMOGLOBIN A1C Q6M 2018 MICROALBUMIN Q1 2018 LIPID PANEL Q1 2018 MEDICARE YEARLY EXAM 2018 GLAUCOMA SCREENING Q2Y 3/20/2019 DTaP/Tdap/Td series (2 - Td) 2025 Allergies as of 2018  Review Complete On: 2018 By: David Carlisle, NP Severity Noted Reaction Type Reactions Procardia Xl [Nifedipine]  2014    Other (comments)  
 weakness Current Immunizations  Reviewed on 2017 Name Date Influenza High Dose Vaccine PF 2017, 2015 Influenza Vaccine 10/10/2014 12:38 PM  
 Influenza Vaccine (Quad) PF 2016 Pneumococcal Conjugate (PCV-13) 2016 Tdap 2015 Zoster Vaccine, Live 2016 Not reviewed this visit You Were Diagnosed With   
  
 Codes Comments Irregular heartbeat    -  Primary ICD-10-CM: I49.9 ICD-9-CM: 427.9 Vitals BP Pulse Resp Height(growth percentile) Weight(growth percentile) SpO2  
 114/76 (BP 1 Location: Left arm, BP Patient Position: Sitting) (!) 112 18 5' 1.5\" (1.562 m) 180 lb (81.6 kg) 95% BMI OB Status Smoking Status 33.46 kg/m2 Hysterectomy Never Smoker Vitals History BMI and BSA Data Body Mass Index Body Surface Area  
 33.46 kg/m 2 1.88 m 2 Preferred Pharmacy Pharmacy Name Phone [facility], [address] [phone] Your Updated Medication List  
  
   
This list is accurate as of: 2/5/18  2:26 PM.  Always use your most recent med list.  
  
  
  
  
 acetaminophen 500 mg tablet Commonly known as:  TYLENOL Take 500 mg by mouth every four (4) hours as needed for Pain. aspirin delayed-release 81 mg tablet Take 81 mg by mouth daily. benazepril 20 mg tablet Commonly known as:  LOTENSIN Take 1 Tab by mouth daily. CENTRUM SILVER 0.4-300-250 mg-mcg-mcg Tab Generic drug:  multivit-min-FA-lycopen-lutein Take 1 Tab by mouth daily. Cholecalciferol (Vitamin D3) 2,000 unit Cap capsule Commonly known as:  VITAMIN D3 Take 2,000 Units by mouth daily. clopidogrel 75 mg Tab Commonly known as:  PLAVIX Take 1 Tab by mouth daily for 30 days. DILT- mg XR capsule Generic drug:  dilTIAZem XR  
TAKE ONE CAPSULE BY MOUTH ONCE DAILY Iron 325 mg (65 mg iron) tablet Generic drug:  ferrous sulfate Take 325 mg by mouth every other day. levothyroxine 50 mcg tablet Commonly known as:  SYNTHROID Take 1 Tab by mouth Daily (before breakfast). magnesium 250 mg Tab Take 250 mg by mouth daily. metFORMIN  mg tablet Commonly known as:  GLUCOPHAGE XR  
TAKE ONE TABLET BY MOUTH TWICE DAILY  
  
 omeprazole 40 mg capsule Commonly known as:  PRILOSEC Take 40 mg by mouth daily. pravastatin 20 mg tablet Commonly known as:  PRAVACHOL  
TAKE ONE TABLET BY MOUTH NIGHTLY  
  
 sertraline 100 mg tablet Commonly known as:  ZOLOFT Take 1 Tab by mouth daily. We Performed the Following AMB POC EKG ROUTINE W/ 12 LEADS, INTER & REP [69552 CPT(R)] Introducing [facility] & [facility] SERVICES! Dear Erica Wick: Thank you for requesting a Isowalk account. Our records indicate that you already have an active Isowalk account. You can access your account anytime at [email or web address] Did you know that you can access your hospital and ER discharge instructions at any time in Isowalk? You can also review all of your test results from your hospital stay or ER visit. Additional Information If you have questions, please visit the Frequently Asked Questions section of the Isowalk website at [email or web address] Remember, Isowalk is NOT to be used for urgent needs. For medical emergencies, dial 911. Now available from your iPhone and Android! Please provide this summary of care documentation to your next provider. Your primary care clinician is listed as Edmar Ashton. If you have any questions after today's visit, please call [phone].

## 2018-02-08 ENCOUNTER — OFFICE VISIT (OUTPATIENT)
Dept: CARDIOLOGY CLINIC | Age: 83
End: 2018-02-08

## 2018-02-08 VITALS
HEIGHT: 62 IN | HEART RATE: 79 BPM | WEIGHT: 179.2 LBS | BODY MASS INDEX: 32.97 KG/M2 | SYSTOLIC BLOOD PRESSURE: 146 MMHG | DIASTOLIC BLOOD PRESSURE: 80 MMHG | OXYGEN SATURATION: 98 % | RESPIRATION RATE: 18 BRPM

## 2018-02-08 DIAGNOSIS — E11.21 TYPE 2 DIABETES MELLITUS WITH NEPHROPATHY (HCC): ICD-10-CM

## 2018-02-08 DIAGNOSIS — I10 ESSENTIAL HYPERTENSION: ICD-10-CM

## 2018-02-08 DIAGNOSIS — I35.1 AORTIC VALVE INSUFFICIENCY, ETIOLOGY OF CARDIAC VALVE DISEASE UNSPECIFIED: ICD-10-CM

## 2018-02-08 DIAGNOSIS — I48.0 PAROXYSMAL ATRIAL FIBRILLATION (HCC): Primary | ICD-10-CM

## 2018-02-08 DIAGNOSIS — G47.33 OSA (OBSTRUCTIVE SLEEP APNEA): ICD-10-CM

## 2018-02-08 NOTE — MR AVS SNAPSHOT
Melina Choi [address] 
[phone] Patient: Leonie Carbajal MRN: [id number] :1935 Visit Information Date & Time Provider Department Dept. Phone Encounter #  
 2018  9:00 AM Daryl Angelo, [address] [facility] [phone] [id number] Follow-up Instructions Return in about 4 weeks (around 3/8/2018). Routing History Follow-up and Disposition History Your Appointments 3/8/2018  2:30 PM  
ESTABLISHED PATIENT with [facility]) Appt Note: f/u new pm and av node ablation,jaa  
 [address]  
[phone] [address] [facility] [address] [address]  
  
    
 3/8/2018  2:30 PM  
PROCEDURE with PACEMAKER, [facility]) [address] [facility] [address]  
[phone] [address] [facility] [address]  
  
    
 3/20/2018  1:30 PM  
ROUTINE CARE with Ashley Verduzco, [address] [place] [address],4[address] Floor [facility] Appt Note: 3 mon [address] Critical access hospital [address]  
  
   
 [address] [facility] [address] [address]  
  
    
 2018 10:40 AM  
Follow Up with Wolfgang Cantu MD  
[address] [facility]) Appt Note: balance disorder [address] [place] [address] [address]  
[facility] [address] [address] Upcoming Health Maintenance Date Due Pneumococcal 65+ High/Highest Risk (2 of 2 - PPSV23) 10/13/2016 EYE EXAM RETINAL OR DILATED Q1 3/20/2018 FOOT EXAM Q1 2018 HEMOGLOBIN A1C Q6M 2018 MICROALBUMIN Q1 2018 LIPID PANEL Q1 2018 MEDICARE YEARLY EXAM 2018 GLAUCOMA SCREENING Q2Y 3/20/2019 DTaP/Tdap/Td series (2 - Td) 4/27/2025 Allergies as of 2/8/2018  Review Complete On: 2/8/2018 By: Rudy Hurtado MD  
  
 Severity Noted Reaction Type Reactions Procardia Xl [Nifedipine]  07/09/2014    Other (comments)  
 weakness Current Immunizations  Reviewed on 6/7/2017 Name Date Influenza High Dose Vaccine PF 9/13/2017, 9/19/2015 Influenza Vaccine 10/10/2014 12:38 PM  
 Influenza Vaccine (Quad) PF 9/14/2016 Pneumococcal Conjugate (PCV-13) 8/18/2016 Tdap 4/27/2015 Zoster Vaccine, Live 2/29/2016 Not reviewed this visit You Were Diagnosed With   
  
 Codes Comments Paroxysmal atrial fibrillation (HCC)    -  Primary ICD-10-CM: I48.0 ICD-9-CM: 427.31 Essential hypertension     ICD-10-CM: I10 
ICD-9-CM: 401.9 Aortic valve insufficiency, etiology of cardiac valve disease unspecified     ICD-10-CM: I35.1 ICD-9-CM: 424.1 Type 2 diabetes mellitus with nephropathy (HCC)     ICD-10-CM: E11.21 
ICD-9-CM: 250.40, 583.81   
 MALLORY (obstructive sleep apnea)     ICD-10-CM: G47.33 
ICD-9-CM: 327.23 Vitals BP Pulse Resp Height(growth percentile) Weight(growth percentile) SpO2  
 146/80 (BP 1 Location: Right arm, BP Patient Position: Sitting) 79 18 5' 1.5\" (1.562 m) 179 lb 3.2 oz (81.3 kg) 98% BMI OB Status Smoking Status 33.31 kg/m2 Hysterectomy Never Smoker Vitals History BMI and BSA Data Body Mass Index Body Surface Area  
 33.31 kg/m 2 1.88 m 2 Preferred Pharmacy Pharmacy Name Phone [facility] [address] [facility], [address] [place] [address] [phone] Your Updated Medication List  
  
   
This list is accurate as of: 2/8/18 10:10 AM.  Always use your most recent med list.  
  
  
  
  
 acetaminophen 500 mg tablet Commonly known as:  TYLENOL Take 500 mg by mouth every four (4) hours as needed for Pain. aspirin delayed-release 81 mg tablet Take 81 mg by mouth daily. benazepril 20 mg tablet Commonly known as:  LOTENSIN Take 1 Tab by mouth daily. CENTRUM SILVER 0.4-300-250 mg-mcg-mcg Tab Generic drug:  multivit-min-FA-lycopen-lutein Take 1 Tab by mouth daily. Cholecalciferol (Vitamin D3) 2,000 unit Cap capsule Commonly known as:  VITAMIN D3 Take 2,000 Units by mouth daily. clopidogrel 75 mg Tab Commonly known as:  PLAVIX Take 1 Tab by mouth daily for 30 days. DILT- mg XR capsule Generic drug:  dilTIAZem XR  
TAKE ONE CAPSULE BY MOUTH ONCE DAILY Iron 325 mg (65 mg iron) tablet Generic drug:  ferrous sulfate Take 325 mg by mouth every other day. levothyroxine 50 mcg tablet Commonly known as:  SYNTHROID Take 1 Tab by mouth Daily (before breakfast). magnesium 250 mg Tab Take 250 mg by mouth daily. metFORMIN  mg tablet Commonly known as:  GLUCOPHAGE XR  
TAKE ONE TABLET BY MOUTH TWICE DAILY  
  
 omeprazole 40 mg capsule Commonly known as:  PRILOSEC Take 40 mg by mouth daily. pravastatin 20 mg tablet Commonly known as:  PRAVACHOL  
TAKE ONE TABLET BY MOUTH NIGHTLY  
  
 sertraline 100 mg tablet Commonly known as:  ZOLOFT Take 1 Tab by mouth daily. We Performed the Following AMB POC EKG ROUTINE W/ 12 LEADS, INTER & REP [96602 CPT(R)] CBC WITH AUTOMATED DIFF [44108 CPT(R)] METABOLIC PANEL, COMPREHENSIVE [11817 CPT(R)] PROTHROMBIN TIME + INR [38840 CPT(R)] Follow-up Instructions Return in about 4 weeks (around 3/8/2018). To-Do List   
 Around 02/08/2018 Imaging:  XR CHEST PA LAT Introducing [address]! Dear Liudmila Serve: Thank you for requesting a [employer] account. Our records indicate that you already have an active [employer] account. You can access your account anytime at [email or web address] Did you know that you can access your hospital and ER discharge instructions at any time in [employer]? You can also review all of your test results from your hospital stay or ER visit. Additional Information If you have questions, please visit the Frequently Asked Questions section of the [employer] website at [email or web address] Remember, [employer] is NOT to be used for urgent needs. For medical emergencies, dial 911. Now available from your iPhone and Android! Please provide this summary of care documentation to your next provider. Your primary care clinician is listed as Melany Tran. If you have any questions after today's visit, please call [phone].

## 2018-02-08 NOTE — PROGRESS NOTES
Subjective:      Jamar Tracy is a [de-identified] y.o. female is here for follow up of AF. She is doing well. The patient denies chest pain/ shortness of breath, orthopnea, PND, LE edema, palpitations, syncope, presyncope or fatigue.        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/11/2015    ACP (advance care planning) 11/11/2015    Hypothyroidism, adult 07/31/2015    Type 2 diabetes mellitus without complication ([address] 96/54/4770    Essential hypertension 04/27/2015    MALLORY (obstructive sleep apnea) 07/09/2014    Hyperlipidemia 07/09/2014    Sarcoid 07/09/2014    Stress bladder incontinence, female 07/09/2014    Obesity 07/09/2014    TIA (transient ischemic attack) 07/09/2014    AR (aortic regurgitation) 07/09/2014    Mitral valve insufficiency 07/09/2014    Cervical stenosis of spine 07/09/2014      Brandee Franz MD  Past Medical History:   Diagnosis Date    Anemia     Arrhythmia     AFIB    Ataxia     Webb's esophagus     Cervical spinal stenosis     Coronary atherosclerosis of unspecified type of vessel, native or graft     Diabetes ([address]     Fatigue     Fatigue     GERD (gastroesophageal reflux disease)     Hypercholesterolemia     Hyperlipidemia     Hypertension     Ill-defined condition     Webb's Esophagus    Ill-defined condition     right torn rotater cuff- no surgery at this time    Liver disease     pt is unaware    Osteoarthritis     Psychiatric disorder     anxiety and depression    Sarcoidosis     Sleep apnea     uses cpap    Stroke ([address]     TIA'S    Thyroid disease       Past Surgical History:   Procedure Laterality Date    CARDIAC SURG PROCEDURE UNLIST cardioversion     CARDIAC SURG PROCEDURE UNLIST      watchman device    COLONOSCOPY N/A 12/28/2016    COLONOSCOPY performed by Viet Devries MD at [facility] ENDOSCOPY    HX CATARACT REMOVAL      both eyes    HX CHOLECYSTECTOMY      HX COLONOSCOPY  5/8/2013    Repeat in 5 years    HX CYST REMOVAL  10/15    on head     HX HYSTERECTOMY      HX ORTHOPAEDIC Bilateral     knee replacement to both knees    HX ORTHOPAEDIC      spacer btwn L4-5 and L5-6     HX TONSILLECTOMY      HX UROLOGICAL      botox in bladder     Allergies   Allergen Reactions    Procardia Xl [Nifedipine] Other (comments)     weakness      Family History   Problem Relation Age of Onset    Other Mother      Fibromyalgia    Pacemaker Mother     Heart Disease Mother     Heart Failure Mother     COPD Mother     Heart Attack Father 61    Cancer Sister      Multiple Myeloma    Diabetes Brother     Obesity Brother     Pacemaker Brother     Heart Attack Brother      Bundle branch block    No Known Problems Son     Psychiatric Disorder Daughter      Multiple    negative for cardiac disease  Social History     Social History    Marital status:      Spouse name: N/A    Number of children: N/A    Years of education: N/A     Social History Main Topics    Smoking status: Never Smoker    Smokeless tobacco: Never Used    Alcohol use No    Drug use: No    Sexual activity: Yes     Partners: Male     Other Topics Concern    Not on file     Social History Narrative     Current Outpatient Prescriptions   Medication Sig    clopidogrel (PLAVIX) 75 mg tab Take 1 Tab by mouth daily for 30 days.  levothyroxine (SYNTHROID) 50 mcg tablet Take 1 Tab by mouth Daily (before breakfast).  sertraline (ZOLOFT) 100 mg tablet Take 1 Tab by mouth daily.  benazepril (LOTENSIN) 20 mg tablet Take 1 Tab by mouth daily.     metFORMIN ER (GLUCOPHAGE XR) 750 mg tablet TAKE ONE TABLET BY MOUTH TWICE DAILY    pravastatin (PRAVACHOL) 20 mg tablet TAKE ONE TABLET BY MOUTH NIGHTLY    Cholecalciferol, Vitamin D3, (VITAMIN D3) 2,000 unit cap capsule Take 2,000 Units by mouth daily.  omeprazole (PRILOSEC) 40 mg capsule Take 40 mg by mouth daily.  acetaminophen (TYLENOL) 500 mg tablet Take 500 mg by mouth every four (4) hours as needed for Pain.  DILT- mg XR capsule TAKE ONE CAPSULE BY MOUTH ONCE DAILY    ferrous sulfate (IRON) 325 mg (65 mg iron) tablet Take 325 mg by mouth every other day.  multivit-min-FA-lycopen-lutein (CENTRUM SILVER) 0.4-300-250 mg-mcg-mcg tab Take 1 Tab by mouth daily.  aspirin delayed-release 81 mg tablet Take 81 mg by mouth daily.  magnesium 250 mg tab Take 250 mg by mouth daily. No current facility-administered medications for this visit. Vitals:    02/08/18 0848   Pulse: 79   Resp: 18   SpO2: 98%   Weight: 179 lb 3.2 oz (81.3 kg)   Height: 5' 1.5\" (1.562 m)       I have reviewed the nurses notes, vitals, problem list, allergy list, medical history, family, social history and medications. Review of Symptoms:    General: Pt denies excessive weight gain or loss. Pt is able to conduct ADL's  HEENT: Denies blurred vision, headaches, epistaxis and difficulty swallowing. Respiratory: Denies shortness of breath, STRICKLAND, wheezing or stridor. Cardiovascular: Denies precordial pain, palpitations, edema or PND  Gastrointestinal: Denies poor appetite, indigestion, abdominal pain or blood in stool  Urinary: Denies dysuria, pyuria  Musculoskeletal: Denies pain or swelling from muscles or joints  Neurologic: Denies tremor, paresthesias, or sensory motor disturbance  Skin: Denies rash, itching or texture change. Psych: Denies depression      Physical Exam:      General: Well developed, in no acute distress. HEENT: Eyes - PERRL, no jvd  Heart:  Normal S1/S2 negative S3 or S4.  Regular, no murmur, gallop or rub.   Respiratory: Clear bilaterally x 4, no wheezing or rales  Abdomen:   Soft, non-tender, bowel sounds are active.   Extremities:  No edema, normal cap refill, no cyanosis. Musculoskeletal: No clubbing  Neuro: A&Ox3, speech clear, gait stable. Skin: Skin color is normal. No rashes or lesions. Non diaphoretic  Vascular: 2+ pulses symmetric in all extremities    Cardiographics    Ekg: atrial fibrillation    Results for orders placed or performed during the hospital encounter of 12/06/17   EKG, 12 LEAD, INITIAL   Result Value Ref Range    Ventricular Rate 55 BPM    Atrial Rate 55 BPM    P-R Interval 212 ms    QRS Duration 82 ms    Q-T Interval 458 ms    QTC Calculation (Bezet) 438 ms    Calculated P Axis 52 degrees    Calculated R Axis -15 degrees    Calculated T Axis 75 degrees    Diagnosis       Sinus bradycardia  Nonspecific T wave abnormality  Confirmed by Belinda Nunez ([id number]) on 12/6/2017 10:58:57 AM           Lab Results   Component Value Date/Time    WBC 11.1 (H) 12/14/2017 02:51 AM    HGB 10.5 (L) 12/14/2017 02:51 AM    HCT 32.9 (L) 12/14/2017 02:51 AM    PLATELET 179 44/39/1535 02:51 AM    MCV 91.4 12/14/2017 02:51 AM      Lab Results   Component Value Date/Time    Sodium 141 12/11/2017 10:09 AM    Potassium 4.7 12/11/2017 10:09 AM    Chloride 102 12/11/2017 10:09 AM    CO2 24 12/11/2017 10:09 AM    Anion gap 8 12/06/2017 10:25 AM    Glucose 101 (H) 12/11/2017 10:09 AM    BUN 23 12/11/2017 10:09 AM    Creatinine 0.98 12/11/2017 10:09 AM    BUN/Creatinine ratio 23 12/11/2017 10:09 AM    GFR est AA 63 12/11/2017 10:09 AM    GFR est non-AA 55 (L) 12/11/2017 10:09 AM    Calcium 9.0 12/11/2017 10:09 AM    Bilirubin, total 0.4 12/11/2017 10:09 AM    AST (SGOT) 15 12/11/2017 10:09 AM    Alk. phosphatase 82 12/11/2017 10:09 AM    Protein, total 6.6 12/11/2017 10:09 AM    Albumin 4.1 12/11/2017 10:09 AM    Globulin 4.2 (H) 09/07/2016 01:11 AM    A-G Ratio 1.6 12/11/2017 10:09 AM    ALT (SGPT) 15 12/11/2017 10:09 AM         Assessment:     Assessment:        ICD-10-CM ICD-9-CM    1.  Paroxysmal atrial fibrillation ([facility] I48.0 427.31 AMB POC EKG ROUTINE W/ 12 LEADS, INTER & REP   2. Essential hypertension I10 401.9    3. Aortic valve insufficiency, etiology of cardiac valve disease unspecified I35.1 424.1    4. Type 2 diabetes mellitus with nephropathy (HCC) E11.21 250.40      583.81    5. MALLORY (obstructive sleep apnea) G47.33 327.23      Orders Placed This Encounter    AMB POC EKG ROUTINE W/ 12 LEADS, INTER & REP     Order Specific Question:   Reason for Exam:     Answer:   Routine        Plan:   Ms. Rosa Lucero is here for follow up of AF. EKG earlier this week demonstrated AF at a rate of 125bpm at her follow up s/p 45 day JESSIE post Watchman implant. She was asymptomatic. Diltiazem was increased to 240mg and Lotensin decreased to 10mg daily. She is still in AF with rvr and is a candidate for an av node ablation and pacemaker. I discussed the risks/benefits/alternatives of the procedure with the patient. Risks include (but are not limited to) bleeding, heart block, infection, cva/mi/tamponade/death. The patient understands and agrees to proceed. Thank you for this interesting consultation. Continue medical management for HTN, MALLORY, DM. Thank you for allowing me to participate in [facility] 's care.     AVERY Steen MD, Lea Chavez

## 2018-02-08 NOTE — PROGRESS NOTES
1. Have you been to the ER, urgent care clinic since your last visit? Hospitalized since your last visit? No    2. Have you seen or consulted any other health care providers outside of the [address] [place] [address] since your last visit? Include any pap smears or colon screening.  No    Chief Complaint   Patient presents with    Irregular Heart Beat     follow up    Rapid Heart Rate

## 2018-02-09 ENCOUNTER — HOSPITAL ENCOUNTER (OUTPATIENT)
Dept: GENERAL RADIOLOGY | Age: 83
Discharge: HOME | End: 2018-02-09
Payer: MEDICARE

## 2018-02-09 ENCOUNTER — HOSPITAL ENCOUNTER (OUTPATIENT)
Dept: LAB | Age: 83
Discharge: HOME | End: 2018-02-09
Payer: MEDICARE

## 2018-02-09 ENCOUNTER — DOCUMENTATION ONLY (OUTPATIENT)
Dept: CARDIOLOGY CLINIC | Age: 83
End: 2018-02-09

## 2018-02-09 DIAGNOSIS — I48.0 PAROXYSMAL ATRIAL FIBRILLATION (HCC): ICD-10-CM

## 2018-02-09 PROCEDURE — 80053 COMPREHEN METABOLIC PANEL: CPT

## 2018-02-09 PROCEDURE — 85025 COMPLETE CBC W/AUTO DIFF WBC: CPT

## 2018-02-09 PROCEDURE — 85610 PROTHROMBIN TIME: CPT

## 2018-02-09 PROCEDURE — 71046 X-RAY EXAM CHEST 2 VIEWS: CPT

## 2018-02-09 PROCEDURE — 36415 COLL VENOUS BLD VENIPUNCTURE: CPT

## 2018-02-12 LAB
ALBUMIN SERPL-MCNC: 3.9 G/DL (ref 3.5–4.7)
ALBUMIN/GLOB SERPL: 1.4 {RATIO} (ref 1.2–2.2)
ALP SERPL-CCNC: 89 IU/L (ref 39–117)
ALT SERPL-CCNC: 13 IU/L (ref 0–32)
AST SERPL-CCNC: 15 IU/L (ref 0–40)
BASOPHILS # BLD AUTO: 0.1 X10E3/UL (ref 0–0.2)
BASOPHILS NFR BLD AUTO: 1 %
BILIRUB SERPL-MCNC: 0.5 MG/DL (ref 0–1.2)
BUN SERPL-MCNC: 22 MG/DL (ref 8–27)
BUN/CREAT SERPL: 21 (ref 12–28)
CALCIUM SERPL-MCNC: 9.1 MG/DL (ref 8.7–10.3)
CHLORIDE SERPL-SCNC: 101 MMOL/L (ref 96–106)
CO2 SERPL-SCNC: 22 MMOL/L (ref 18–29)
CREAT SERPL-MCNC: 1.07 MG/DL (ref 0.57–1)
EOSINOPHIL # BLD AUTO: 0.5 X10E3/UL (ref 0–0.4)
EOSINOPHIL NFR BLD AUTO: 5 %
ERYTHROCYTE [DISTWIDTH] IN BLOOD BY AUTOMATED COUNT: 14.5 % (ref 12.3–15.4)
GFR SERPLBLD CREATININE-BSD FMLA CKD-EPI: 49 ML/MIN/1.73
GFR SERPLBLD CREATININE-BSD FMLA CKD-EPI: 57 ML/MIN/1.73
GLOBULIN SER CALC-MCNC: 2.8 G/DL (ref 1.5–4.5)
GLUCOSE SERPL-MCNC: 120 MG/DL (ref 65–99)
HCT VFR BLD AUTO: 35.7 % (ref 34–46.6)
HGB BLD-MCNC: 11.5 G/DL (ref 11.1–15.9)
IMM GRANULOCYTES # BLD: 0 X10E3/UL (ref 0–0.1)
IMM GRANULOCYTES NFR BLD: 0 %
INR PPP: 1.1 (ref 0.8–1.2)
INTERPRETATION: NORMAL
LYMPHOCYTES # BLD AUTO: 2 X10E3/UL (ref 0.7–3.1)
LYMPHOCYTES NFR BLD AUTO: 19 %
MCH RBC QN AUTO: 28.5 PG (ref 26.6–33)
MCHC RBC AUTO-ENTMCNC: 32.2 G/DL (ref 31.5–35.7)
MCV RBC AUTO: 88 FL (ref 79–97)
MONOCYTES # BLD AUTO: 0.9 X10E3/UL (ref 0.1–0.9)
MONOCYTES NFR BLD AUTO: 9 %
NEUTROPHILS # BLD AUTO: 6.7 X10E3/UL (ref 1.4–7)
NEUTROPHILS NFR BLD AUTO: 66 %
PLATELET # BLD AUTO: 314 X10E3/UL (ref 150–379)
POTASSIUM SERPL-SCNC: 4.5 MMOL/L (ref 3.5–5.2)
PROT SERPL-MCNC: 6.7 G/DL (ref 6–8.5)
PROTHROMBIN TIME: 11.2 SEC (ref 9.1–12)
RBC # BLD AUTO: 4.04 X10E6/UL (ref 3.77–5.28)
SODIUM SERPL-SCNC: 141 MMOL/L (ref 134–144)
WBC # BLD AUTO: 10.2 X10E3/UL (ref 3.4–10.8)

## 2018-02-14 ENCOUNTER — HOSPITAL ENCOUNTER (OUTPATIENT)
Dept: NON INVASIVE DIAGNOSTICS | Age: 83
Discharge: HOME | End: 2018-02-14
Attending: INTERNAL MEDICINE | Admitting: INTERNAL MEDICINE
Payer: MEDICARE

## 2018-02-14 ENCOUNTER — APPOINTMENT (OUTPATIENT)
Dept: GENERAL RADIOLOGY | Age: 83
End: 2018-02-14
Attending: INTERNAL MEDICINE
Payer: MEDICARE

## 2018-02-14 VITALS
WEIGHT: 172 LBS | OXYGEN SATURATION: 96 % | HEIGHT: 62 IN | TEMPERATURE: 98 F | HEART RATE: 75 BPM | RESPIRATION RATE: 16 BRPM | DIASTOLIC BLOOD PRESSURE: 66 MMHG | SYSTOLIC BLOOD PRESSURE: 111 MMHG | BODY MASS INDEX: 31.65 KG/M2

## 2018-02-14 LAB
GLUCOSE BLD STRIP.AUTO-MCNC: 140 MG/DL (ref 65–100)
SERVICE CMNT-IMP: ABNORMAL

## 2018-02-14 PROCEDURE — C1786 PMKR, SINGLE, RATE-RESP: HCPCS

## 2018-02-14 PROCEDURE — 71045 X-RAY EXAM CHEST 1 VIEW: CPT

## 2018-02-14 PROCEDURE — 77030016894 HC CBL EP DX CATH3 STJU -B

## 2018-02-14 PROCEDURE — 82962 GLUCOSE BLOOD TEST: CPT

## 2018-02-14 PROCEDURE — C1894 INTRO/SHEATH, NON-LASER: HCPCS

## 2018-02-14 PROCEDURE — 33207 INSERT HEART PM VENTRICULAR: CPT

## 2018-02-14 PROCEDURE — 77030031139 HC SUT VCRL2 J&J -A

## 2018-02-14 PROCEDURE — C1733 CATH, EP, OTHR THAN COOL-TIP: HCPCS

## 2018-02-14 PROCEDURE — 74011000250 HC RX REV CODE- 250

## 2018-02-14 PROCEDURE — 77030018836 HC SOL IRR NACL ICUM -A

## 2018-02-14 PROCEDURE — A4565 SLINGS: HCPCS

## 2018-02-14 PROCEDURE — 77030018729 HC ELECTRD DEFIB PAD CARD -B

## 2018-02-14 PROCEDURE — 77030011640 HC PAD GRND REM COVD -A

## 2018-02-14 PROCEDURE — 74011250636 HC RX REV CODE- 250/636

## 2018-02-14 PROCEDURE — 74011000250 HC RX REV CODE- 250: Performed by: INTERNAL MEDICINE

## 2018-02-14 PROCEDURE — C1898 LEAD, PMKR, OTHER THAN TRANS: HCPCS

## 2018-02-14 PROCEDURE — 77030010507 HC ADH SKN DERMBND J&J -B

## 2018-02-14 PROCEDURE — C1730 CATH, EP, 19 OR FEW ELECT: HCPCS

## 2018-02-14 PROCEDURE — 77030002996 HC SUT SLK J&J -A

## 2018-02-14 PROCEDURE — C1892 INTRO/SHEATH,FIXED,PEEL-AWAY: HCPCS

## 2018-02-14 PROCEDURE — 74011250636 HC RX REV CODE- 250/636: Performed by: INTERNAL MEDICINE

## 2018-02-14 PROCEDURE — 77030030806 HC PTCH ENSIT NAVX STJU -G

## 2018-02-14 RX ORDER — LIDOCAINE HYDROCHLORIDE 10 MG/ML
INJECTION INFILTRATION; PERINEURAL
Status: COMPLETED
Start: 2018-02-14 | End: 2018-02-14

## 2018-02-14 RX ORDER — CEFAZOLIN SODIUM/WATER 2 G/20 ML
SYRINGE (ML) INTRAVENOUS
Status: COMPLETED
Start: 2018-02-14 | End: 2018-02-14

## 2018-02-14 RX ORDER — MIDAZOLAM HYDROCHLORIDE 1 MG/ML
INJECTION, SOLUTION INTRAMUSCULAR; INTRAVENOUS
Status: COMPLETED
Start: 2018-02-14 | End: 2018-02-14

## 2018-02-14 RX ORDER — FENTANYL CITRATE 50 UG/ML
INJECTION, SOLUTION INTRAMUSCULAR; INTRAVENOUS
Status: COMPLETED
Start: 2018-02-14 | End: 2018-02-14

## 2018-02-14 RX ORDER — SODIUM CHLORIDE 0.9 % (FLUSH) 0.9 %
5-10 SYRINGE (ML) INJECTION AS NEEDED
Status: DISCONTINUED | OUTPATIENT
Start: 2018-02-14 | End: 2018-02-14 | Stop reason: HOSPADM

## 2018-02-14 RX ORDER — HEPARIN SODIUM 200 [USP'U]/100ML
500 INJECTION, SOLUTION INTRAVENOUS ONCE
Status: DISCONTINUED | OUTPATIENT
Start: 2018-02-14 | End: 2018-02-14 | Stop reason: HOSPADM

## 2018-02-14 RX ORDER — CEFAZOLIN SODIUM/WATER 2 G/20 ML
2 SYRINGE (ML) INTRAVENOUS ONCE
Status: COMPLETED | OUTPATIENT
Start: 2018-02-14 | End: 2018-02-14

## 2018-02-14 RX ORDER — MIDAZOLAM HYDROCHLORIDE 1 MG/ML
1-5 INJECTION, SOLUTION INTRAMUSCULAR; INTRAVENOUS
Status: DISCONTINUED | OUTPATIENT
Start: 2018-02-14 | End: 2018-02-14 | Stop reason: HOSPADM

## 2018-02-14 RX ORDER — DOBUTAMINE HYDROCHLORIDE 400 MG/100ML
INJECTION INTRAVENOUS
Status: COMPLETED
Start: 2018-02-14 | End: 2018-02-14

## 2018-02-14 RX ORDER — HEPARIN SODIUM 200 [USP'U]/100ML
500 INJECTION, SOLUTION INTRAVENOUS ONCE
Status: COMPLETED | OUTPATIENT
Start: 2018-02-14 | End: 2018-02-14

## 2018-02-14 RX ORDER — CEFAZOLIN SODIUM/WATER 2 G/20 ML
2 SYRINGE (ML) INTRAVENOUS ONCE
Status: DISCONTINUED | OUTPATIENT
Start: 2018-02-14 | End: 2018-02-14 | Stop reason: HOSPADM

## 2018-02-14 RX ORDER — SODIUM CHLORIDE 0.9 % (FLUSH) 0.9 %
5-10 SYRINGE (ML) INJECTION EVERY 8 HOURS
Status: DISCONTINUED | OUTPATIENT
Start: 2018-02-14 | End: 2018-02-14 | Stop reason: HOSPADM

## 2018-02-14 RX ORDER — NALOXONE HYDROCHLORIDE 0.4 MG/ML
0.4 INJECTION, SOLUTION INTRAMUSCULAR; INTRAVENOUS; SUBCUTANEOUS AS NEEDED
Status: DISCONTINUED | OUTPATIENT
Start: 2018-02-14 | End: 2018-02-14 | Stop reason: HOSPADM

## 2018-02-14 RX ORDER — LIDOCAINE HYDROCHLORIDE 10 MG/ML
1-40 INJECTION INFILTRATION; PERINEURAL
Status: DISCONTINUED | OUTPATIENT
Start: 2018-02-14 | End: 2018-02-14 | Stop reason: HOSPADM

## 2018-02-14 RX ORDER — FENTANYL CITRATE 50 UG/ML
25-50 INJECTION, SOLUTION INTRAMUSCULAR; INTRAVENOUS
Status: DISCONTINUED | OUTPATIENT
Start: 2018-02-14 | End: 2018-02-14 | Stop reason: HOSPADM

## 2018-02-14 RX ORDER — LIDOCAINE HYDROCHLORIDE AND EPINEPHRINE 10; 10 MG/ML; UG/ML
1-20 INJECTION, SOLUTION INFILTRATION; PERINEURAL
Status: DISCONTINUED | OUTPATIENT
Start: 2018-02-14 | End: 2018-02-14 | Stop reason: HOSPADM

## 2018-02-14 RX ORDER — LIDOCAINE HYDROCHLORIDE AND EPINEPHRINE 10; 10 MG/ML; UG/ML
INJECTION, SOLUTION INFILTRATION; PERINEURAL
Status: COMPLETED
Start: 2018-02-14 | End: 2018-02-14

## 2018-02-14 RX ORDER — BACITRACIN 50000 [IU]/1
50000 INJECTION, POWDER, FOR SOLUTION INTRAMUSCULAR ONCE
Status: COMPLETED | OUTPATIENT
Start: 2018-02-14 | End: 2018-02-14

## 2018-02-14 RX ORDER — FENTANYL CITRATE 50 UG/ML
12.5-5 INJECTION, SOLUTION INTRAMUSCULAR; INTRAVENOUS
Status: DISCONTINUED | OUTPATIENT
Start: 2018-02-14 | End: 2018-02-14 | Stop reason: HOSPADM

## 2018-02-14 RX ADMIN — MIDAZOLAM HYDROCHLORIDE 2 MG: 1 INJECTION, SOLUTION INTRAMUSCULAR; INTRAVENOUS at 08:10

## 2018-02-14 RX ADMIN — DOBUTAMINE HYDROCHLORIDE 5 MCG/KG/MIN: 400 INJECTION INTRAVENOUS at 08:50

## 2018-02-14 RX ADMIN — FENTANYL CITRATE 50 MCG: 50 INJECTION, SOLUTION INTRAMUSCULAR; INTRAVENOUS at 08:15

## 2018-02-14 RX ADMIN — FENTANYL CITRATE 50 MCG: 50 INJECTION, SOLUTION INTRAMUSCULAR; INTRAVENOUS at 11:22

## 2018-02-14 RX ADMIN — MIDAZOLAM HYDROCHLORIDE 2 MG: 1 INJECTION INTRAMUSCULAR; INTRAVENOUS at 08:10

## 2018-02-14 RX ADMIN — LIDOCAINE HYDROCHLORIDE 10 ML: 10 INJECTION INFILTRATION; PERINEURAL at 08:45

## 2018-02-14 RX ADMIN — MIDAZOLAM HYDROCHLORIDE 2 MG: 1 INJECTION, SOLUTION INTRAMUSCULAR; INTRAVENOUS at 08:25

## 2018-02-14 RX ADMIN — MIDAZOLAM HYDROCHLORIDE 1 MG: 1 INJECTION, SOLUTION INTRAMUSCULAR; INTRAVENOUS at 08:49

## 2018-02-14 RX ADMIN — LIDOCAINE HYDROCHLORIDE 30 ML: 10 INJECTION INFILTRATION; PERINEURAL at 08:25

## 2018-02-14 RX ADMIN — LIDOCAINE HYDROCHLORIDE,EPINEPHRINE BITARTRATE 10 ML: 10; .01 INJECTION, SOLUTION INFILTRATION; PERINEURAL at 11:26

## 2018-02-14 RX ADMIN — LIDOCAINE HYDROCHLORIDE 30 ML: 10 INJECTION, SOLUTION INFILTRATION; PERINEURAL at 08:25

## 2018-02-14 RX ADMIN — FENTANYL CITRATE 50 MCG: 50 INJECTION, SOLUTION INTRAMUSCULAR; INTRAVENOUS at 08:25

## 2018-02-14 RX ADMIN — LIDOCAINE HYDROCHLORIDE AND EPINEPHRINE 10 ML: 10; 10 INJECTION, SOLUTION INFILTRATION; PERINEURAL at 11:26

## 2018-02-14 RX ADMIN — HEPARIN SODIUM 1000 UNITS: 200 INJECTION, SOLUTION INTRAVENOUS at 08:27

## 2018-02-14 RX ADMIN — BACITRACIN 50000 UNITS: 50000 INJECTION, POWDER, FOR SOLUTION INTRAMUSCULAR at 08:35

## 2018-02-14 RX ADMIN — Medication 2 G: at 08:14

## 2018-02-14 NOTE — PROGRESS NOTES
Cardiac Cath Lab Recovery Arrival Note:      Issa Verma arrived to Cardiac Cath Lab, Recovery Area. Staff introduced to patient. Patient identifiers verified with NAME and DATE OF BIRTH. Procedure verified with patient. Consent forms reviewed and signed by patient or authorized representative and verified. Allergies verified. Patient and family oriented to department. Patient and family informed of procedure and plan of care. Questions answered with review. Patient prepped for procedure, per orders from physician, prior to arrival.    Patient on cardiac monitor, non-invasive blood pressure, SPO2 monitor. On room air. Patient is A&Ox 3. Patient reports no c/o. Patient in stretcher, in low position, with side rails up, call bell within reach, patient instructed to call if assistance as needed. Patient prep in: [address], [place]. Patient family has pager # none  Family in: [facility] waiting area.    Prep by: Sheridan Haddad, RN and Margarita Aldrich RN

## 2018-02-14 NOTE — PROGRESS NOTES
Dressing saturated again, notified Dr Roula Adame, present to assess pacemaker incision site, premedicated pt with Fentanyl 50 mcg iv, Dr Roula Adame injected incision site with Lidocaine with Epi 10 ml, applied dermabond. Jaiden Haywood RN holding pressure to site 15 min as ordered, will redress incision with steri strips, sterile non-adherent dressing, using sterile technique. Pt tolerated very well. VSS.

## 2018-02-14 NOTE — IP AVS SNAPSHOT
[address] 
[phone] Patient: Marjan Allen MRN: [id number] :1935 About your hospitalization You were admitted on:  2018 You last received care in the:  [facility] CARDIAC CATH LAB You were discharged on:  2018 Why you were hospitalized Your primary diagnosis was:  Not on File Follow-up Information Follow up With Details Comments Contact Info Ventura Pacheco MD   [address] [facility] [address] 
[phone] Your Scheduled Appointments   2:30 PM EST  
ESTABLISHED PATIENT with David Files, AVERY Dalbo [facility]) [address] [facility] [address]  
[phone]   2:30 PM EST PROCEDURE with PACEMAKER, [facility]) [address] [facility] [address]  
[phone] 2018  1:30 PM EDT ROUTINE CARE with Ventura Pacheco, [address] [place] [address],[address] [facility] [address]  
[phone] Discharge Orders None A check abby indicates which time of day the medication should be taken. My Medications CHANGE how you take these medications Instructions Each Dose to Equal  
 Morning Noon Evening Bedtime  
 benazepril 20 mg tablet Commonly known as:  LOTENSIN What changed:  how much to take Your last dose was: Your next dose is: Take 1 Tab by mouth daily. 20 mg DILT- mg XR capsule Generic drug:  dilTIAZem XR What changed:  See the new instructions. Your last dose was: Your next dose is: TAKE ONE CAPSULE BY MOUTH ONCE DAILY CONTINUE taking these medications Instructions Each Dose to Equal  
 Morning Noon Evening Bedtime  
 acetaminophen 500 mg tablet Commonly known as:  TYLENOL Your last dose was: Your next dose is: Take 500 mg by mouth every four (4) hours as needed for Pain. 500 mg  
    
   
   
   
  
 aspirin delayed-release 81 mg tablet Your last dose was: Your next dose is: Take 81 mg by mouth daily. 81 mg CENTRUM SILVER 0.4-300-250 mg-mcg-mcg Tab Generic drug:  multivit-min-FA-lycopen-lutein Your last dose was: Your next dose is: Take 1 Tab by mouth daily. 1 Tab Cholecalciferol (Vitamin D3) 2,000 unit Cap capsule Commonly known as:  VITAMIN D3 Your last dose was: Your next dose is: Take 2,000 Units by mouth daily. 2000 Units  
    
   
   
   
  
 clopidogrel 75 mg Tab Commonly known as:  PLAVIX Your last dose was: Your next dose is: Take 1 Tab by mouth daily for 30 days. 75 mg Iron 325 mg (65 mg iron) tablet Generic drug:  ferrous sulfate Your last dose was: Your next dose is: Take 325 mg by mouth every other day. 325 mg  
    
   
   
   
  
 levothyroxine 50 mcg tablet Commonly known as:  SYNTHROID Your last dose was: Your next dose is: Take 1 Tab by mouth Daily (before breakfast). 50 mcg  
    
   
   
   
  
 magnesium 250 mg Tab Your last dose was: Your next dose is: Take 250 mg by mouth daily. 250 mg  
    
   
   
   
  
 metFORMIN  mg tablet Commonly known as:  GLUCOPHAGE XR Your last dose was: Your next dose is: TAKE ONE TABLET BY MOUTH TWICE DAILY  
     
   
   
   
  
 omeprazole 40 mg capsule Commonly known as:  PRILOSEC Your last dose was: Your next dose is: Take 40 mg by mouth daily. 40 mg  
    
   
   
   
  
 pravastatin 20 mg tablet Commonly known as:  PRAVACHOL Your last dose was: Your next dose is: TAKE ONE TABLET BY MOUTH NIGHTLY  
     
   
   
   
  
 sertraline 100 mg tablet Commonly known as:  ZOLOFT Your last dose was: Your next dose is: Take 1 Tab by mouth daily. 100 mg Discharge Instructions Yusra Gutierrez, [address] [facility]  [phone] ICD/PACEMAKER DISCHARGE INSTRUCTIONS Patient ID: 
Mariama Godinez 
[id number] 
63 y.o. 
1935 Admit Date: 2/14/2018 Discharge Date: 2/14/2018 Admitting Physician: Joslyn Carver MD  
 
Discharge Physician: Joslyn Carver MD 
 
Admission Diagnoses:  
a fib Discharge Diagnoses: Active Problems: * No active hospital problems. * 
AF 
tachycardia Discharge Condition: Good Cardiology Procedures this Admission:  
AV node ablation/pacemaker Disposition: home Reference discharge instructions provided by nursing for diet and activity. Follow-up with Dr. Bryce Sims in 2 weeks. Please contact the office for an appointment at [phone]. Signed: 
Joslyn Carver MD 
2/14/2018 
8:56 AM 
 
DISCHARGE INSTRUCTIONS FOR PATIENTS WITH ICD'S AND PACEMAKERS 1. Carry you ID card for your ICD/Pacemaker with you at all times. This card will be given to you in the hospital or mailed to you. 2. Medic Alert Bracelets are available from your pharmacist to wear at all times. 3. Call for an appointment in 2 weeks [phone]. 4. The pacemaker will bulge slightly under your skin. An ICD bulges a little more because it is larger. The bulge will decrease in size over the next few weeks. Please notify the doctor's office if you notice any of the following around your ICD site: A.  A bruise that does not go away B.  Soreness or yellow, green, or brown drainage from the site. C. Any swelling from the site. D. If you have a fever of 100 degrees or higher that lasts for a few days INCISION CARE 1.  Leave dressing over your site until you see the doctor. 2.  Leave steri-strips over your site until they start to fall off.  
3.   You may shower after as long as your incision isnt submerged or directly sprayed upon until well healed. 4.  For comfort, wear loose fitting clothing. 5.  Ice pack to affected shoulder for first 24 hours, wear your sling for 2 days. 6.  Report any signs of infection, fever, pain, swelling, redness, oozing, or heat at site especially if these symptoms increase after the first 3 to 4 days. ACTIVITY PRECAUTIONS 1. Avoid rough contact with the implant site. 2. No driving for 14 days. 3. Avoid lifting your arm over your head, carrying anything on the affected side, or lifting over 10 pounds for 30 days. For the first 2 days only bend your arm at the elbow. 4. Any extreme activity such as golf, weight lifting or exercise biking should be restricted for 60 days. 5. Do not carry objects by holding them against your implant site. 6.  No shooting rifles or any type of gun with the affected shoulder permanently. 7.  If you have an ICD, welding and chainsaws are prohibited. SPECIAL PRECAUTIONS 1. You should avoid all strong magnetic fields, such as arc welding, large transformers, large motors. Some ICD devices will beep if it detects a strong magnet. If this occurs, move out of the area. 2.  You may not have an MRI. 3.  Treatments or surgery that requires diathermy or electrocautery should be discussed with your doctor before scheduled. 4. Avoid radio frequency transmitters, including radar. 5. Advise dentist or other medical personnel you see that you have a pacemaker or ICD. 6.  Cell phones and microwave oven use is okay. 7.  If you plan to move or take a trip to a new area, the doctor's office will give you a name of a doctor to contact for any problems. SPECIAL INSTRUCTIONS ON SHOCKS 1. Notify your doctor for any of the following: A. Anytime a shock is received in a 24 hour period. An office visit is not usually required for a single shock. B.  Two or more shocks in a row. If you do not feel well, call the Rescue Squad, otherwise call your doctor. This may require an office visit. C. Two or more shocks spaced apart by several hours. This may require an office visit. 2.  Keep a record of events. Include date, time, symptoms and activity at that time. ANTIBIOTIC THERAPY During the first 8 weeks after your pacemaker or ICD insertion, you may need antibiotics before any dental work or certain tests or operations. Let the dentist or doctor who is caring for you know that you have had an implanted device. ACO Transitions of Care Introducing [facility] [address] Alicia Glover offers a voluntary care coordination program to provide high quality service and care to [facility] fee-for-service beneficiaries. [facility] was designed to help you enhance your health and well-being through the following services: ? Transitions of Care  support for individuals who are transitioning from one care setting to another (example: Hospital to home). ? Chronic and Complex Care Coordination  support for individuals and caregivers of those with serious or chronic illnesses or with more than one chronic (ongoing) condition and those who take a number of different medications. If you meet specific medical criteria, a [facility] [address] may call you directly to coordinate your care with your primary care physician and your other care providers. For questions about the [facility] programs, please, contact your physicians office. For general questions or additional information about Accountable Care Organizations: 
Please visit www.medicare.gov/acos. html or call 1-800-MEDICARE (8-461.646.8326) TTY users should call 0-268.267.9747. Introducing [facility]! Dear Alexis Younger: Thank you for requesting a Regalister account. Our records indicate that you already have an active Regalister account. You can access your account anytime at [email or web address] Did you know that you can access your hospital and ER discharge instructions at any time in Regalister? You can also review all of your test results from your hospital stay or ER visit. Additional Information If you have questions, please visit the Frequently Asked Questions section of the Regalister website at [email or web address] Remember, Regalister is NOT to be used for urgent needs. For medical emergencies, dial 911. Now available from your iPhone and Android! Providers Seen During Your Hospitalization Provider Specialty Primary office phone Sonya Boyd MD Cardiology [phone] Your Primary Care Physician (PCP) Primary Care Physician Office Phone Office Fax Jose Armando [facility] [phone] You are allergic to the following Allergen Reactions Procardia Xl (Nifedipine) Other (comments)  
 weakness Recent Documentation Height Weight Breastfeeding? BMI OB Status Smoking Status 1.575 m 78 kg No 31.46 kg/m2 Hysterectomy Never Smoker Emergency Contacts Name Discharge Info Relation Home Work Mobile Arabella Medrano 126 CAREGIVER [3] Spouse [3] [phone] Ximena Thompson DISCHARGE CAREGIVER [3] Sister [23] [phone] Patient Belongings  The following personal items are in your possession at time of discharge: 
  Dental Appliances: Lowers, Uppers  Visual Aid: [address] Medications: None   Jewelry: None  Clothing: At bedside    Other Valuables: None  Personal Items Sent to Safe: none Please provide this summary of care documentation to your next provider. Signatures-by signing, you are acknowledging that this After Visit Summary has been reviewed with you and you have received a copy. Patient Signature:  ____________________________________________________________ Date:  ____________________________________________________________  
  
Lori Payor Provider Signature:  ____________________________________________________________ Date:  ____________________________________________________________

## 2018-02-14 NOTE — PROCEDURES
[address] [address]  [phone]    Indications and Pre-Procedure Diagnosis:  Carla Ely is a [de-identified] y.o. female with AF with rvr is referred for electr-physiologic evaluation and intervention. Post Procedure Diagnosis    AF  tachycardia    AV Beena Ablation Procedure  After informed consent was obtained, the patient was brought back to the EP lab in fasting condition. The presenting rhythm was atrial fibrillation. The patient received Versed and Fentanyl for conscious sedation per nursing personnel. Venous sheaths were placed in the right femoral vein using sterile Seldinger technique. Mapping was performed using standard catheter-based techniques and 3-D electro-anatomic mapping. A quadrapolar catheter was placed in the His position for mapping. An Blazer 8F/10mm Large Curve ablation catheter was advanced to the AV junction and 1 RF lesions totaling 2 minutes were applied resulting in complete heart block. Dobutamine at 5 mcg/min was started and no increased ventricular rates were noted. A slow ventricular escape rhythm of 40 bpm was present. Rapid atrial pacing to 200 ms, on and off dobutamine, demonstrated antegrade AV block. Rapid ventricular pacing to 600 ms, on and off dobutamine, demonstrated retrograde VA block. At the end of the procedure, catheters and sheaths were removed and manual compression held for hemostasis. Fluoroscopy and total procedure times were 1 and 20 minutes respectively. Estimated blood loss: < 10 ml. Sharp counts: correct. Specimen (s) collected: none. The following procedure related complication occurred: none. The following problems were encountered: none. Conduction Intervals (ms)  H-V QRS Q-T R-R   51 84 [phone]     AV Beena Conduction    VA Block when pacing at 600 ms    AV Wenckebach when pacing at 200 ms      Findings and Summary: This study demonstrates:  1. Successful AV node ablation    Recommendations:    1. [address]  2.  VVIR 75 bpm      Thank you for allowing me to participate in this patients care.     Raymond Heath MD, Calista Hinojosa

## 2018-02-14 NOTE — INTERVAL H&P NOTE
H&P Update:  Leonie Carbajal was seen and examined. History and physical has been reviewed. The patient has been examined.  There have been no significant clinical changes since the completion of the originally dated History and Physical.    Signed By: Sherry Diego MD     February 14, 2018 8:19 AM

## 2018-02-14 NOTE — PROGRESS NOTES
Pressure dressing removed from left anterior chest wall. Site soft but now bleeding. Manual pressure applied x 15 minutes. Dressing saturated. Dressing changed using sterile technique. New pressure dressing applied. Will cont to monitor. Ice pack on site.

## 2018-02-14 NOTE — DISCHARGE INSTRUCTIONS
[address] [place] [address], [address] [facility]  [phone]        ICD/PACEMAKER DISCHARGE INSTRUCTIONS    Patient ID:  Jamar Tracy  [id number]  83 y.o.  1935    Admit Date: 2/14/2018    Discharge Date: 2/14/2018     Admitting Physician: Feliz Schofield MD     Discharge Physician: Feliz Schofield MD    Admission Diagnoses:   a fib    Discharge Diagnoses: Active Problems:    * No active hospital problems. *  AF  tachycardia    Discharge Condition: Good    Cardiology Procedures this Admission:   AV node ablation/pacemaker    Disposition: home    Reference discharge instructions provided by nursing for diet and activity. Follow-up with Dr. Adonna Saint in 2 weeks. Please contact the office for an appointment at [phone]. Signed:  Feliz Schofield MD  2/14/2018  8:56 AM    DISCHARGE INSTRUCTIONS FOR PATIENTS WITH ICD'S AND PACEMAKERS    1. Carry you ID card for your ICD/Pacemaker with you at all times. This card will be given to you in the hospital or mailed to you. 2. Medic Alert Bracelets are available from your pharmacist to wear at all times. 3. Call for an appointment in 2 weeks [phone]. 4. The pacemaker will bulge slightly under your skin. An ICD bulges a little more because it is larger. The bulge will decrease in size over the next few weeks. Please notify the doctor's office if you notice any of the following around your ICD site:  A.  A bruise that does not go away  B. Soreness or yellow, green, or brown drainage from the site. C. Any swelling from the site. D. If you have a fever of 100 degrees or higher that lasts for a few days    INCISION CARE       1.  Leave dressing over your site until you see the doctor. 2.  Leave steri-strips over your site until they start to fall off.   3.   You may shower after as long as your incision isnt submerged or directly sprayed upon until well healed. 4.  For comfort, wear loose fitting clothing.   5.  Ice pack to affected shoulder for first 24 hours, wear your sling for 2 days. 6.  Report any signs of infection, fever, pain, swelling, redness, oozing, or heat at site especially if these symptoms increase after the first 3 to 4 days. ACTIVITY PRECAUTIONS     1. Avoid rough contact with the implant site. 2. No driving for 14 days. 3. Avoid lifting your arm over your head, carrying anything on the affected side, or lifting over 10 pounds for 30 days. For the first 2 days only bend your arm at the elbow. 4. Any extreme activity such as golf, weight lifting or exercise biking should be restricted for 60 days. 5. Do not carry objects by holding them against your implant site. 6.  No shooting rifles or any type of gun with the affected shoulder permanently. 7.  If you have an ICD, welding and chainsaws are prohibited. SPECIAL PRECAUTIONS     1. You should avoid all strong magnetic fields, such as arc welding, large transformers, large motors. Some ICD devices will beep if it detects a strong magnet. If this occurs, move out of the area. 2.  You may not have an MRI. 3.  Treatments or surgery that requires diathermy or electrocautery should be discussed with your doctor before scheduled. 4. Avoid radio frequency transmitters, including radar. 5. Advise dentist or other medical personnel you see that you have a pacemaker or ICD. 6.  Cell phones and microwave oven use is okay. 7.  If you plan to move or take a trip to a new area, the doctor's office will give you a name of a doctor to contact for any problems. SPECIAL INSTRUCTIONS ON SHOCKS   1. Notify your doctor for any of the following:      A. Anytime a shock is received in a 24 hour period. An office visit is not usually required for a single shock. B.  Two or more shocks in a row. If you do not feel well, call the Rescue Squad, otherwise call your doctor. This may require an office visit.       C. Two or more shocks spaced apart by several hours.  This may require an office visit. 2.  Keep a record of events. Include date, time, symptoms and activity at that time. ANTIBIOTIC THERAPY    During the first 8 weeks after your pacemaker or ICD insertion, you may need antibiotics before any dental work or certain tests or operations. Let the dentist or doctor who is caring for you know that you have had an implanted device.

## 2018-02-14 NOTE — IP AVS SNAPSHOT
[address] 
[phone] Patient: Serg Stroud MRN: [id number] :1935 A check abby indicates which time of day the medication should be taken. My Medications CHANGE how you take these medications Instructions Each Dose to Equal  
 Morning Noon Evening Bedtime  
 benazepril 20 mg tablet Commonly known as:  LOTENSIN What changed:  how much to take Your last dose was: Your next dose is: Take 1 Tab by mouth daily. 20 mg DILT- mg XR capsule Generic drug:  dilTIAZem XR What changed:  See the new instructions. Your last dose was: Your next dose is: TAKE ONE CAPSULE BY MOUTH ONCE DAILY CONTINUE taking these medications Instructions Each Dose to Equal  
 Morning Noon Evening Bedtime  
 acetaminophen 500 mg tablet Commonly known as:  TYLENOL Your last dose was: Your next dose is: Take 500 mg by mouth every four (4) hours as needed for Pain. 500 mg  
    
   
   
   
  
 aspirin delayed-release 81 mg tablet Your last dose was: Your next dose is: Take 81 mg by mouth daily. 81 mg CENTRUM SILVER 0.4-300-250 mg-mcg-mcg Tab Generic drug:  multivit-min-FA-lycopen-lutein Your last dose was: Your next dose is: Take 1 Tab by mouth daily. 1 Tab Cholecalciferol (Vitamin D3) 2,000 unit Cap capsule Commonly known as:  VITAMIN D3 Your last dose was: Your next dose is: Take 2,000 Units by mouth daily. 2000 Units  
    
   
   
   
  
 clopidogrel 75 mg Tab Commonly known as:  PLAVIX Your last dose was: Your next dose is: Take 1 Tab by mouth daily for 30 days.   
 75 mg  
    
   
   
   
  
 Iron 325 mg (65 mg iron) tablet Generic drug:  ferrous sulfate Your last dose was: Your next dose is: Take 325 mg by mouth every other day. 325 mg  
    
   
   
   
  
 levothyroxine 50 mcg tablet Commonly known as:  SYNTHROID Your last dose was: Your next dose is: Take 1 Tab by mouth Daily (before breakfast). 50 mcg  
    
   
   
   
  
 magnesium 250 mg Tab Your last dose was: Your next dose is: Take 250 mg by mouth daily. 250 mg  
    
   
   
   
  
 metFORMIN  mg tablet Commonly known as:  GLUCOPHAGE XR Your last dose was: Your next dose is: TAKE ONE TABLET BY MOUTH TWICE DAILY  
     
   
   
   
  
 omeprazole 40 mg capsule Commonly known as:  PRILOSEC Your last dose was: Your next dose is: Take 40 mg by mouth daily. 40 mg  
    
   
   
   
  
 pravastatin 20 mg tablet Commonly known as:  PRAVACHOL Your last dose was: Your next dose is: TAKE ONE TABLET BY MOUTH NIGHTLY  
     
   
   
   
  
 sertraline 100 mg tablet Commonly known as:  ZOLOFT Your last dose was: Your next dose is: Take 1 Tab by mouth daily.   
 100 mg

## 2018-02-14 NOTE — H&P (VIEW-ONLY)
Subjective:      Serg Stroud is a [de-identified] y.o. female is here for follow up of AF. She is doing well. The patient denies chest pain/ shortness of breath, orthopnea, PND, LE edema, palpitations, syncope, presyncope or fatigue.        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/11/2015    ACP (advance care planning) 11/11/2015    Hypothyroidism, adult 07/31/2015    Type 2 diabetes mellitus without complication ([address] 05/76/6640    Essential hypertension 04/27/2015    MALLORY (obstructive sleep apnea) 07/09/2014    Hyperlipidemia 07/09/2014    Sarcoid 07/09/2014    Stress bladder incontinence, female 07/09/2014    Obesity 07/09/2014    TIA (transient ischemic attack) 07/09/2014    AR (aortic regurgitation) 07/09/2014    Mitral valve insufficiency 07/09/2014    Cervical stenosis of spine 07/09/2014      Melany Tran MD  Past Medical History:   Diagnosis Date    Anemia     Arrhythmia     AFIB    Ataxia     Webb's esophagus     Cervical spinal stenosis     Coronary atherosclerosis of unspecified type of vessel, native or graft     Diabetes ([address]     Fatigue     Fatigue     GERD (gastroesophageal reflux disease)     Hypercholesterolemia     Hyperlipidemia     Hypertension     Ill-defined condition     Webb's Esophagus    Ill-defined condition     right torn rotater cuff- no surgery at this time    Liver disease     pt is unaware    Osteoarthritis     Psychiatric disorder     anxiety and depression    Sarcoidosis     Sleep apnea     uses cpap    Stroke ([address]     TIA'S    Thyroid disease       Past Surgical History:   Procedure Laterality Date    CARDIAC SURG PROCEDURE UNLIST cardioversion     CARDIAC SURG PROCEDURE UNLIST      watchman device    COLONOSCOPY N/A 12/28/2016    COLONOSCOPY performed by Mirtha Lemon MD at [facility] ENDOSCOPY    HX CATARACT REMOVAL      both eyes    HX CHOLECYSTECTOMY      HX COLONOSCOPY  5/8/2013    Repeat in 5 years    HX CYST REMOVAL  10/15    on head     HX HYSTERECTOMY      HX ORTHOPAEDIC Bilateral     knee replacement to both knees    HX ORTHOPAEDIC      spacer btwn L4-5 and L5-6     HX TONSILLECTOMY      HX UROLOGICAL      botox in bladder     Allergies   Allergen Reactions    Procardia Xl [Nifedipine] Other (comments)     weakness      Family History   Problem Relation Age of Onset    Other Mother      Fibromyalgia    Pacemaker Mother     Heart Disease Mother     Heart Failure Mother     COPD Mother     Heart Attack Father 61    Cancer Sister      Multiple Myeloma    Diabetes Brother     Obesity Brother     Pacemaker Brother     Heart Attack Brother      Bundle branch block    No Known Problems Son     Psychiatric Disorder Daughter      Multiple    negative for cardiac disease  Social History     Social History    Marital status:      Spouse name: N/A    Number of children: N/A    Years of education: N/A     Social History Main Topics    Smoking status: Never Smoker    Smokeless tobacco: Never Used    Alcohol use No    Drug use: No    Sexual activity: Yes     Partners: Male     Other Topics Concern    Not on file     Social History Narrative     Current Outpatient Prescriptions   Medication Sig    clopidogrel (PLAVIX) 75 mg tab Take 1 Tab by mouth daily for 30 days.  levothyroxine (SYNTHROID) 50 mcg tablet Take 1 Tab by mouth Daily (before breakfast).  sertraline (ZOLOFT) 100 mg tablet Take 1 Tab by mouth daily.  benazepril (LOTENSIN) 20 mg tablet Take 1 Tab by mouth daily.     metFORMIN ER (GLUCOPHAGE XR) 750 mg tablet TAKE ONE TABLET BY MOUTH TWICE DAILY    pravastatin (PRAVACHOL) 20 mg tablet TAKE ONE TABLET BY MOUTH NIGHTLY    Cholecalciferol, Vitamin D3, (VITAMIN D3) 2,000 unit cap capsule Take 2,000 Units by mouth daily.  omeprazole (PRILOSEC) 40 mg capsule Take 40 mg by mouth daily.  acetaminophen (TYLENOL) 500 mg tablet Take 500 mg by mouth every four (4) hours as needed for Pain.  DILT- mg XR capsule TAKE ONE CAPSULE BY MOUTH ONCE DAILY    ferrous sulfate (IRON) 325 mg (65 mg iron) tablet Take 325 mg by mouth every other day.  multivit-min-FA-lycopen-lutein (CENTRUM SILVER) 0.4-300-250 mg-mcg-mcg tab Take 1 Tab by mouth daily.  aspirin delayed-release 81 mg tablet Take 81 mg by mouth daily.  magnesium 250 mg tab Take 250 mg by mouth daily. No current facility-administered medications for this visit. Vitals:    02/08/18 0848   Pulse: 79   Resp: 18   SpO2: 98%   Weight: 179 lb 3.2 oz (81.3 kg)   Height: 5' 1.5\" (1.562 m)       I have reviewed the nurses notes, vitals, problem list, allergy list, medical history, family, social history and medications. Review of Symptoms:    General: Pt denies excessive weight gain or loss. Pt is able to conduct ADL's  HEENT: Denies blurred vision, headaches, epistaxis and difficulty swallowing. Respiratory: Denies shortness of breath, [place], wheezing or stridor. Cardiovascular: Denies precordial pain, palpitations, edema or PND  Gastrointestinal: Denies poor appetite, indigestion, abdominal pain or blood in stool  Urinary: Denies dysuria, pyuria  Musculoskeletal: Denies pain or swelling from muscles or joints  Neurologic: Denies tremor, paresthesias, or sensory motor disturbance  Skin: Denies rash, itching or texture change. Psych: Denies depression      Physical Exam:      General: Well developed, in no acute distress. HEENT: Eyes - PERRL, no jvd  Heart:  Normal S1/S2 negative S3 or S4.  Regular, no murmur, gallop or rub.   Respiratory: Clear bilaterally x 4, no wheezing or rales  Abdomen:   Soft, non-tender, bowel sounds are active.   Extremities:  No edema, normal cap refill, no cyanosis. Musculoskeletal: No clubbing  Neuro: A&Ox3, speech clear, gait stable. Skin: Skin color is normal. No rashes or lesions. Non diaphoretic  Vascular: 2+ pulses symmetric in all extremities    Cardiographics    Ekg: atrial fibrillation    Results for orders placed or performed during the hospital encounter of 12/06/17   EKG, 12 LEAD, INITIAL   Result Value Ref Range    Ventricular Rate 55 BPM    Atrial Rate 55 BPM    P-R Interval 212 ms    QRS Duration 82 ms    Q-T Interval 458 ms    QTC Calculation (Bezet) 438 ms    Calculated P Axis 52 degrees    Calculated R Axis -15 degrees    Calculated T Axis 75 degrees    Diagnosis       Sinus bradycardia  Nonspecific T wave abnormality  Confirmed by Mercedez Bunch ([id number]) on 12/6/2017 10:58:57 AM           Lab Results   Component Value Date/Time    WBC 11.1 (H) 12/14/2017 02:51 AM    HGB 10.5 (L) 12/14/2017 02:51 AM    HCT 32.9 (L) 12/14/2017 02:51 AM    PLATELET 998 26/19/5980 02:51 AM    MCV 91.4 12/14/2017 02:51 AM      Lab Results   Component Value Date/Time    Sodium 141 12/11/2017 10:09 AM    Potassium 4.7 12/11/2017 10:09 AM    Chloride 102 12/11/2017 10:09 AM    CO2 24 12/11/2017 10:09 AM    Anion gap 8 12/06/2017 10:25 AM    Glucose 101 (H) 12/11/2017 10:09 AM    BUN 23 12/11/2017 10:09 AM    Creatinine 0.98 12/11/2017 10:09 AM    BUN/Creatinine ratio 23 12/11/2017 10:09 AM    GFR est AA 63 12/11/2017 10:09 AM    GFR est non-AA 55 (L) 12/11/2017 10:09 AM    Calcium 9.0 12/11/2017 10:09 AM    Bilirubin, total 0.4 12/11/2017 10:09 AM    AST (SGOT) 15 12/11/2017 10:09 AM    Alk. phosphatase 82 12/11/2017 10:09 AM    Protein, total 6.6 12/11/2017 10:09 AM    Albumin 4.1 12/11/2017 10:09 AM    Globulin 4.2 (H) 09/07/2016 01:11 AM    A-G Ratio 1.6 12/11/2017 10:09 AM    ALT (SGPT) 15 12/11/2017 10:09 AM         Assessment:     Assessment:        ICD-10-CM ICD-9-CM    1.  Paroxysmal atrial fibrillation ([facility] I48.0 427.31 AMB POC EKG ROUTINE W/ 12 LEADS, INTER & REP   2. Essential hypertension I10 401.9    3. Aortic valve insufficiency, etiology of cardiac valve disease unspecified I35.1 424.1    4. Type 2 diabetes mellitus with nephropathy (HCC) E11.21 250.40      583.81    5. MALLORY (obstructive sleep apnea) G47.33 327.23      Orders Placed This Encounter    AMB POC EKG ROUTINE W/ 12 LEADS, INTER & REP     Order Specific Question:   Reason for Exam:     Answer:   Routine        Plan:   Ms. Laura Ramos is here for follow up of AF. EKG earlier this week demonstrated AF at a rate of 125bpm at her follow up s/p 45 day JESSIE post Watchman implant. She was asymptomatic. Diltiazem was increased to 240mg and Lotensin decreased to 10mg daily. She is still in AF with rvr and is a candidate for an av node ablation and pacemaker. I discussed the risks/benefits/alternatives of the procedure with the patient. Risks include (but are not limited to) bleeding, heart block, infection, cva/mi/tamponade/death. The patient understands and agrees to proceed. Thank you for this interesting consultation. Continue medical management for HTN, MALLORY, DM. Thank you for allowing me to participate in Fani Mendez 's care.     Keegan Agarwal, AVERY Galan MD, Erick Rivas

## 2018-02-14 NOTE — PROCEDURES
[address] [place] [address]  [phone]    Indications and Pre-Procedure Diagnosis:  Nicolette Us is a [de-identified] y.o. female with AF with tachycardia is referred for single chamber pacemaker. Post Procedure Diagnosis:    AF  tachycardia    Pacemaker Implant Procedure and Findings:  Informed consent was obtained and the patient was premedicated with cefazolin. The procedure was performed under local anesthesia. Continuous pulse oximetry and cuff pressure were monitored. During the procedure, the patient received Versed and Fentanyl for sedation per nursing personnel. The left deltopectoral area was prepped and draped in the usual sterile fashion and was liberally infiltrated with 1% lidocaine. An incision was made over the left subpectoral area and a generator pocket was manually dissected. Access was achieved in the left axillary vein under fluoroscopic guidance and using the seldinger technique. Through the left axillary vein, pacing leads were positioned in appropriate regions in the right heart chambers where satisfactory pacing and sensing parameters were measured. Stability of the leads was assessed with deep breathing and there was no diaphragmatic pacing at 10V output. The leads were anchored using the sleeves and a pulse generator pocket fashioned using blunt dissection. The leads were then connected to the pulse generator. The pulse generator pocket was then liberally infiltrated with bacitracin solution, and the device implanted with a single silk fixation suture in the header to prevent migration. The wound was closed in layers using continuous 2-0 Vicryl and 4-0 Vicryl ending with a sub-cuticular closure. A bio-occlusive dressing were applied to the skin. Fluoroscopy and total procedure times were 1 and 20 minutes respectively. Estimated blood loss <10 ml. Sharp count: correct. Specimen(s) collected: none. The following procedure related complication occurred: none.  The following problems were encountered: none. Findings: successful pacemaker placement. Device Data Measurements:  Lead Sensing (mV) Threshold (V)Pulse Width (ms) Impedance (Ohms)  RV 14.8  0.8  0.5   799      Final Programmed Parameters  Bradycardia pacing rate  70 bpm  Pacing Mode    VVIR  Pacing Output    3.5 V@ 0.5 ms    Supplies Summary available in the chart  [employer]    Thank you for allowing me to participate in this patients care.     Anita Pierre MD, Fany Jimenez

## 2018-02-14 NOTE — PROGRESS NOTES
TRANSFER - IN REPORT:    Verbal report received from ISELA Ribera on Nenita Bonilla  being received from EP for routine progression of care. Report consisted of patients Situation, Background, Assessment and Recommendations(SBAR). Information from the following report(s) Procedure Summary and MAR was reviewed with the receiving clinician. Opportunity for questions and clarification was provided. Assessment completed upon patients arrival to [address] [place] [address] care [facility]. Cardiac Cath Lab Recovery Arrival Note:    Nenita Bonilla arrived to [facility] recovery area. Patient procedure= PPI/AVN ablation. Patient on cardiac monitor, non-invasive blood pressure, SPO2 monitor. On O2 @ 2 lpm via nc. Patient status doing well with some problems. Patient is A&Ox 3. Patient reports no c/o. PROCEDURE SITE CHECK:    Procedure site:without any bleeding and small hematoma at PPI site, pressure dressing applied after 10 minutes of manual pressure by procedure staff, no pain/discomfort reported at procedure site. No change in patient status. Continue to monitor patient and status.

## 2018-02-14 NOTE — PROGRESS NOTES
Pacemaker dressing remains dry and intact. Ice pack and sling in place. Pt tolerated lunch well, VSS.  at bedside.

## 2018-02-28 ENCOUNTER — TELEPHONE (OUTPATIENT)
Dept: CARDIOLOGY CLINIC | Age: 83
End: 2018-02-28

## 2018-02-28 ENCOUNTER — HOSPITAL ENCOUNTER (EMERGENCY)
Age: 83
Discharge: HOME | End: 2018-02-28
Attending: EMERGENCY MEDICINE
Payer: MEDICARE

## 2018-02-28 VITALS
WEIGHT: 176.59 LBS | DIASTOLIC BLOOD PRESSURE: 54 MMHG | BODY MASS INDEX: 33.34 KG/M2 | SYSTOLIC BLOOD PRESSURE: 128 MMHG | HEART RATE: 75 BPM | TEMPERATURE: 98.1 F | HEIGHT: 61 IN | OXYGEN SATURATION: 96 % | RESPIRATION RATE: 18 BRPM

## 2018-02-28 DIAGNOSIS — T81.30XA WOUND DEHISCENCE: ICD-10-CM

## 2018-02-28 DIAGNOSIS — I97.621 POSTOPERATIVE HEMATOMA INVOLVING CIRCULATORY SYSTEM FOLLOWING NON-CIRCULATORY SYSTEM PROCEDURE: Primary | ICD-10-CM

## 2018-02-28 PROCEDURE — 99283 EMERGENCY DEPT VISIT LOW MDM: CPT

## 2018-02-28 NOTE — TELEPHONE ENCOUNTER
Patient states she that her site is started to bleed thru the dressing. Patient to hold firm pressure to site for 15 minutes and reassess. If the bleeding does not stop she is to report to the ED for further evaluation.

## 2018-02-28 NOTE — TELEPHONE ENCOUNTER
Patient states she came in yesterday and saw Carlos Walker nothing in system- Has blood on her patch and she is very concerned-        Thanks!

## 2018-03-01 NOTE — ED NOTES
Discharge instructions given to patient by Angel Zafar MD . Pt verbalized understanding of discharge instructions. Pt discharged without difficulty. Pt discharged in stable condition via ambulation, accompanied by family.

## 2018-03-01 NOTE — ED NOTES
Assumed care of pt from triage. Pt presents to ED with chief complaint of post op complication from her pacemaker. Pt reports her pacemaker was placed 2/14/2018 for A-fib. Pt reports on and off bleeding since procedure on the top corner. Pt reports yesterday she saw the cardiologist and the dressing was changed by the nurse. Pt states dressing was saturated in blood today. Pt is A&O x 4. Pt denies any other symptoms at this time. Pt resting comfortably on the stretcher in a position of comfort. Pt in no acute distress at this time. Call bell within reach. Side rails x 2. Cardiac monitor x 2. Stretcher locked in the lowest position. Pt aware of plan to await for MD/PA-C/NP assessment, and pt/family verbalizes understanding. Will continue to monitor.

## 2018-03-01 NOTE — ED PROVIDER NOTES
EMERGENCY DEPARTMENT HISTORY AND PHYSICAL EXAM      Date: 2/28/2018  Patient Name: Leonie Carbajal    History of Presenting Illness     Chief Complaint   Patient presents with    Post OP Complication     ambulatory into triage; pt reports she had a pacemaker placed at [address] on Feb 14th, and is bleeding thru dsg; referred here for stitches; dsg is saturated in triage; pt denies complaints       History Provided By: Patient and Patient's Daughter    HPI: Leonie Carbajal, [de-identified] y.o. female s/p recent pacemaker placed on 2/14/2018, presents ambulatory to the ED with cc of gradually worsening bleeding to her pacemaker incision site that started today. The patient states that she had follow up at her Cardiology office today, and nursing staff applied pressure and applied a pressure dressing and referred the patient to the ED. The patient states that she is prescribed ASA 81 mg and Plavix. She specifically denies all other complaints at this time. There are no other complaints, changes, or physical findings at this time. PCP: Tiburcio Dorsey MD    Current Outpatient Prescriptions   Medication Sig Dispense Refill    clopidogrel (PLAVIX) 75 mg tab Take 1 Tab by mouth daily for 30 days. 30 Tab 5    levothyroxine (SYNTHROID) 50 mcg tablet Take 1 Tab by mouth Daily (before breakfast). 90 Tab 1    sertraline (ZOLOFT) 100 mg tablet Take 1 Tab by mouth daily. 135 Tab 1    benazepril (LOTENSIN) 20 mg tablet Take 1 Tab by mouth daily. (Patient taking differently: Take 10 mg by mouth daily.) 90 Tab 1    metFORMIN ER (GLUCOPHAGE XR) 750 mg tablet TAKE ONE TABLET BY MOUTH TWICE DAILY 180 Tab 0    pravastatin (PRAVACHOL) 20 mg tablet TAKE ONE TABLET BY MOUTH NIGHTLY 90 Tab 0    Cholecalciferol, Vitamin D3, (VITAMIN D3) 2,000 unit cap capsule Take 2,000 Units by mouth daily.  omeprazole (PRILOSEC) 40 mg capsule Take 40 mg by mouth daily.       acetaminophen (TYLENOL) 500 mg tablet Take 500 mg by mouth every four (4) hours as needed for Pain.  DILT- mg XR capsule TAKE ONE CAPSULE BY MOUTH ONCE DAILY (Patient taking differently: TAKE TWO CAPSULES BY MOUTH ONCE DAILY) 30 Cap 11    ferrous sulfate (IRON) 325 mg (65 mg iron) tablet Take 325 mg by mouth every other day.  multivit-min-FA-lycopen-lutein (CENTRUM SILVER) 0.4-300-250 mg-mcg-mcg tab Take 1 Tab by mouth daily.  aspirin delayed-release 81 mg tablet Take 81 mg by mouth daily.  magnesium 250 mg tab Take 250 mg by mouth daily.          Past History     Past Medical History:  Past Medical History:   Diagnosis Date    Anemia     Arrhythmia     AFIB    Ataxia     Webb's esophagus     Cervical spinal stenosis     Coronary atherosclerosis of unspecified type of vessel, native or graft     Diabetes (HCC)     Fatigue     Fatigue     GERD (gastroesophageal reflux disease)     Hypercholesterolemia     Hyperlipidemia     Hypertension     Ill-defined condition     Webb's Esophagus    Ill-defined condition     right torn rotater cuff- no surgery at this time    Liver disease     pt is unaware    Osteoarthritis     Psychiatric disorder     anxiety and depression    Sarcoidosis     Sleep apnea     uses cpap    Stroke ([facility]     TIA'S    Thyroid disease        Past Surgical History:  Past Surgical History:   Procedure Laterality Date    CARDIAC SURG PROCEDURE UNLIST      cardioversion     CARDIAC SURG PROCEDURE UNLIST      watchman device    COLONOSCOPY N/A 12/28/2016    COL[facility]le [facility]COPY    HX CATARACT REMOVAL      both eyes    HX CHOLECYSTECTOMY      HX COLONOSCOPY  5/8/2013    Repeat in 5 years    HX CYST REMOVAL  10/15    on head     HX HYSTERECTOMY      HX ORTHOPAEDIC Bilateral     knee replacement to both knees    HX ORTHOPAEDIC      spacer btwn L4-5 and L5-6     HX TONSILLECTOMY      HX UROLOGICAL      botox in bladder       Family History:  Family History   Problem Relation Age of Onset    Other Mother      Fibromyalgia    Pacemaker Mother     Heart Disease Mother     Heart Failure Mother     COPD Mother     Heart Attack Father 61    Cancer Sister      Multiple Myeloma    Diabetes Brother     Obesity Brother     Pacemaker Brother     Heart Attack Brother      Bundle branch block    No Known Problems Son     Psychiatric Disorder Daughter      Multiple       Social History:  Social History   Substance Use Topics    Smoking status: Never Smoker    Smokeless tobacco: Never Used    Alcohol use No       Allergies: Allergies   Allergen Reactions    Procardia Xl [Nifedipine] Other (comments)     weakness       Review of Systems   Review of Systems   Constitutional: Negative for chills and fever. Respiratory: Negative for cough and shortness of breath. Cardiovascular: Negative for chest pain. Gastrointestinal: Negative for constipation, diarrhea, nausea and vomiting. Skin: Positive for wound. Neurological: Negative for weakness and numbness. All other systems reviewed and are negative. Physical Exam   Physical Exam   Constitutional: She is oriented to person, place, and time. She appears well-developed and well-nourished. HENT:   Head: Normocephalic and atraumatic. Eyes: Conjunctivae and EOM are normal.   Neck: Normal range of motion. Neck supple. Cardiovascular: Normal rate and regular rhythm. Pulmonary/Chest: Effort normal and breath sounds normal. No respiratory distress. 4 cm wound above left pacemaker/left corner of chest with slight wound dehiscence with dark blood oozing. Abdominal: Soft. She exhibits no distension. There is no tenderness. Musculoskeletal: Normal range of motion. Neurological: She is alert and oriented to person, place, and time. Skin: Skin is warm and dry. Psychiatric: She has a normal mood and affect. Nursing note and vitals reviewed. Medical Decision Making   I am the first provider for this patient.     I reviewed the vital signs, available nursing notes, past medical history, past surgical history, family history and social history. Vital Signs-Reviewed the patient's vital signs. Patient Vitals for the past 12 hrs:   Temp Pulse Resp BP SpO2   02/28/18 2016 - - - 128/54 96 %   02/28/18 1916 98.1 °F (36.7 °C) 75 18 118/84 100 %       Records Reviewed: Nursing Notes and Old Medical Records    Provider Notes (Medical Decision Making):   Pt presents with wound dehiscence from recent pacemaker surgical site. It is a slow oozing bleed, will apply surgifoam and compression dressing and re-assess. ED Course:   Initial assessment performed. The patients presenting problems have been discussed, and they are in agreement with the care plan formulated and outlined with them. I have encouraged them to ask questions as they arise throughout their visit. Procedure Note - Wound Care:   7:40 PM  Performed by: Fran Freed MD  The patient has a 4 cm wound above her left pacemaker that is oozing dark blood. Wound was cleansed with Betadine. Dermabond and Surgifoam was applied and Tegriderm was placed over the wound. Will re-assess in 20 minutes. Estimated blood loss: < 5 cc  The procedure took 1-15 minutes, and pt tolerated well. Written by TRICIA Garcia, as dictated by Fran Freed MD.    Progress Note:  8:14 PM  Nursing staff ambulated the patient around the ED with no increased bleeding to the wound. Pt is stable for discharge. Written by TRICIA Garcia, as dictated by Fran Freed MD.    Critical Care Time: 0 minutes    Discharge Note:  8:20 PM  The pt is ready for discharge. The pt's signs, symptoms, diagnosis, and discharge instructions have been discussed and pt has conveyed their understanding. The pt is to follow up as recommended or return to ER should their symptoms worsen. Plan has been discussed and pt is in agreement. PLAN:  1.    Discharge Medication List as of 2/28/2018  8:24 PM        2.   Follow-up Information     Follow up With Details Comments Contact Info    Miya Juarez MD  As needed [address] [facility]  [phone]          Return to ED if worse     Diagnosis     Clinical Impression:   1. Postoperative hematoma involving circulatory system following non-circulatory system procedure    2. Wound dehiscence        Attestations: This note is prepared by Bobbi Klein, acting as a Scribe for Genna Martin MD, ALON Martin MD: The scribe's documentation has been prepared under my direction and personally reviewed by me in its entirety. I confirm that the notes above accurately reflects all work, treatment, procedures, and medical decision making performed by me. This note will not be viewable in [address]

## 2018-03-02 ENCOUNTER — TELEPHONE (OUTPATIENT)
Dept: CARDIOLOGY CLINIC | Age: 83
End: 2018-03-02

## 2018-03-02 NOTE — TELEPHONE ENCOUNTER
Received a call from patient - she states she notices some oozing still from her wound. Advised to hold pressure to the ice with an ice pack for 20mins and rest her arm to see if it will help the oozing. Advised that the office can only change the dressing, not address the active bleeding. If the bleeding continues she will seek further care at the ED. Pt verbalized understanding.

## 2018-03-05 ENCOUNTER — APPOINTMENT (OUTPATIENT)
Dept: GENERAL RADIOLOGY | Age: 83
DRG: 228 | End: 2018-03-05
Attending: EMERGENCY MEDICINE
Payer: MEDICARE

## 2018-03-05 ENCOUNTER — HOSPITAL ENCOUNTER (INPATIENT)
Age: 83
LOS: 6 days | Discharge: HOME HEALTH | DRG: 228 | End: 2018-03-12
Attending: EMERGENCY MEDICINE | Admitting: INTERNAL MEDICINE
Payer: MEDICARE

## 2018-03-05 ENCOUNTER — APPOINTMENT (OUTPATIENT)
Dept: CT IMAGING | Age: 83
DRG: 228 | End: 2018-03-05
Attending: EMERGENCY MEDICINE
Payer: MEDICARE

## 2018-03-05 DIAGNOSIS — T82.7XXA INFECTION OF PACEMAKER POCKET, INITIAL ENCOUNTER (HCC): Primary | ICD-10-CM

## 2018-03-05 DIAGNOSIS — A41.9 SEVERE SEPSIS (HCC): ICD-10-CM

## 2018-03-05 DIAGNOSIS — E11.9 TYPE 2 DIABETES MELLITUS WITHOUT COMPLICATION, WITHOUT LONG-TERM CURRENT USE OF INSULIN (HCC): Primary | ICD-10-CM

## 2018-03-05 DIAGNOSIS — R65.20 SEVERE SEPSIS (HCC): ICD-10-CM

## 2018-03-05 LAB
ALBUMIN SERPL-MCNC: 3.2 G/DL (ref 3.5–5)
ALBUMIN/GLOB SERPL: 0.8 {RATIO} (ref 1.1–2.2)
ALP SERPL-CCNC: 115 U/L (ref 45–117)
ALT SERPL-CCNC: 23 U/L (ref 12–78)
ANION GAP SERPL CALC-SCNC: 9 MMOL/L (ref 5–15)
AST SERPL-CCNC: 20 U/L (ref 15–37)
BASOPHILS # BLD: 0 K/UL (ref 0–0.1)
BASOPHILS NFR BLD: 0 % (ref 0–1)
BILIRUB SERPL-MCNC: 1.4 MG/DL (ref 0.2–1)
BUN SERPL-MCNC: 25 MG/DL (ref 6–20)
BUN/CREAT SERPL: 20 (ref 12–20)
CALCIUM SERPL-MCNC: 8.6 MG/DL (ref 8.5–10.1)
CHLORIDE SERPL-SCNC: 105 MMOL/L (ref 97–108)
CO2 SERPL-SCNC: 23 MMOL/L (ref 21–32)
CREAT SERPL-MCNC: 1.28 MG/DL (ref 0.55–1.02)
DIFFERENTIAL METHOD BLD: ABNORMAL
EOSINOPHIL # BLD: 0 K/UL (ref 0–0.4)
EOSINOPHIL NFR BLD: 0 % (ref 0–7)
ERYTHROCYTE [DISTWIDTH] IN BLOOD BY AUTOMATED COUNT: 14.5 % (ref 11.5–14.5)
GLOBULIN SER CALC-MCNC: 4.1 G/DL (ref 2–4)
GLUCOSE SERPL-MCNC: 191 MG/DL (ref 65–100)
HCT VFR BLD AUTO: 33.6 % (ref 35–47)
HGB BLD-MCNC: 10.6 G/DL (ref 11.5–16)
IMM GRANULOCYTES # BLD: 0.2 K/UL (ref 0–0.04)
IMM GRANULOCYTES NFR BLD AUTO: 1 % (ref 0–0.5)
LACTATE SERPL-SCNC: 2.3 MMOL/L (ref 0.4–2)
LYMPHOCYTES # BLD: 0.5 K/UL (ref 0.8–3.5)
LYMPHOCYTES NFR BLD: 3 % (ref 12–49)
MCH RBC QN AUTO: 29.4 PG (ref 26–34)
MCHC RBC AUTO-ENTMCNC: 31.5 G/DL (ref 30–36.5)
MCV RBC AUTO: 93.1 FL (ref 80–99)
MONOCYTES # BLD: 0.8 K/UL (ref 0–1)
MONOCYTES NFR BLD: 5 % (ref 5–13)
NEUTS SEG # BLD: 14.9 K/UL (ref 1.8–8)
NEUTS SEG NFR BLD: 91 % (ref 32–75)
NRBC # BLD: 0 K/UL (ref 0–0.01)
NRBC BLD-RTO: 0 PER 100 WBC
PLATELET # BLD AUTO: 229 K/UL (ref 150–400)
PMV BLD AUTO: 10.2 FL (ref 8.9–12.9)
POTASSIUM SERPL-SCNC: 4.2 MMOL/L (ref 3.5–5.1)
PROT SERPL-MCNC: 7.3 G/DL (ref 6.4–8.2)
RBC # BLD AUTO: 3.61 M/UL (ref 3.8–5.2)
RBC MORPH BLD: ABNORMAL
SODIUM SERPL-SCNC: 137 MMOL/L (ref 136–145)
TROPONIN I SERPL-MCNC: <0.04 NG/ML
WBC # BLD AUTO: 16.4 K/UL (ref 3.6–11)
WBC MORPH BLD: ABNORMAL

## 2018-03-05 PROCEDURE — 36415 COLL VENOUS BLD VENIPUNCTURE: CPT | Performed by: EMERGENCY MEDICINE

## 2018-03-05 PROCEDURE — 83605 ASSAY OF LACTIC ACID: CPT | Performed by: EMERGENCY MEDICINE

## 2018-03-05 PROCEDURE — 84484 ASSAY OF TROPONIN QUANT: CPT | Performed by: EMERGENCY MEDICINE

## 2018-03-05 PROCEDURE — 74011250637 HC RX REV CODE- 250/637: Performed by: EMERGENCY MEDICINE

## 2018-03-05 PROCEDURE — 80053 COMPREHEN METABOLIC PANEL: CPT | Performed by: EMERGENCY MEDICINE

## 2018-03-05 PROCEDURE — 87186 SC STD MICRODIL/AGAR DIL: CPT | Performed by: EMERGENCY MEDICINE

## 2018-03-05 PROCEDURE — 81001 URINALYSIS AUTO W/SCOPE: CPT | Performed by: INTERNAL MEDICINE

## 2018-03-05 PROCEDURE — 87147 CULTURE TYPE IMMUNOLOGIC: CPT | Performed by: EMERGENCY MEDICINE

## 2018-03-05 PROCEDURE — 93005 ELECTROCARDIOGRAM TRACING: CPT

## 2018-03-05 PROCEDURE — 87077 CULTURE AEROBIC IDENTIFY: CPT | Performed by: EMERGENCY MEDICINE

## 2018-03-05 PROCEDURE — 87040 BLOOD CULTURE FOR BACTERIA: CPT | Performed by: EMERGENCY MEDICINE

## 2018-03-05 PROCEDURE — 96365 THER/PROPH/DIAG IV INF INIT: CPT

## 2018-03-05 PROCEDURE — 96361 HYDRATE IV INFUSION ADD-ON: CPT

## 2018-03-05 PROCEDURE — 71045 X-RAY EXAM CHEST 1 VIEW: CPT

## 2018-03-05 PROCEDURE — 99285 EMERGENCY DEPT VISIT HI MDM: CPT

## 2018-03-05 PROCEDURE — 87086 URINE CULTURE/COLONY COUNT: CPT | Performed by: EMERGENCY MEDICINE

## 2018-03-05 PROCEDURE — 85025 COMPLETE CBC W/AUTO DIFF WBC: CPT | Performed by: EMERGENCY MEDICINE

## 2018-03-05 PROCEDURE — 71250 CT THORAX DX C-: CPT

## 2018-03-05 PROCEDURE — 74011250636 HC RX REV CODE- 250/636: Performed by: EMERGENCY MEDICINE

## 2018-03-05 RX ORDER — SODIUM CHLORIDE 0.9 % (FLUSH) 0.9 %
5-10 SYRINGE (ML) INJECTION AS NEEDED
Status: DISCONTINUED | OUTPATIENT
Start: 2018-03-05 | End: 2018-03-12 | Stop reason: HOSPADM

## 2018-03-05 RX ORDER — VANCOMYCIN/0.9 % SOD CHLORIDE 1 G/100 ML
1000 PLASTIC BAG, INJECTION (ML) INTRAVENOUS EVERY 24 HOURS
Status: DISCONTINUED | OUTPATIENT
Start: 2018-03-06 | End: 2018-03-08

## 2018-03-05 RX ORDER — ACETAMINOPHEN 500 MG
1000 TABLET ORAL ONCE
Status: COMPLETED | OUTPATIENT
Start: 2018-03-05 | End: 2018-03-05

## 2018-03-05 RX ORDER — LANCETS
EACH MISCELLANEOUS
Qty: 1 EACH | Refills: 11 | Status: ON HOLD | OUTPATIENT
Start: 2018-03-05 | End: 2018-04-09

## 2018-03-05 RX ORDER — VANCOMYCIN 2 GRAM/500 ML IN 0.9 % SODIUM CHLORIDE INTRAVENOUS
2000 ONCE
Status: COMPLETED | OUTPATIENT
Start: 2018-03-05 | End: 2018-03-06

## 2018-03-05 RX ADMIN — ACETAMINOPHEN 1000 MG: 500 TABLET ORAL at 23:14

## 2018-03-05 RX ADMIN — SODIUM CHLORIDE 2313 ML: 900 INJECTION, SOLUTION INTRAVENOUS at 21:14

## 2018-03-05 RX ADMIN — VANCOMYCIN HYDROCHLORIDE 2000 MG: 10 INJECTION, POWDER, LYOPHILIZED, FOR SOLUTION INTRAVENOUS at 23:38

## 2018-03-05 NOTE — IP AVS SNAPSHOT
[address] [facility] 
[phone] Patient: Mary Jo Sims MRN: [id number] :1935 About your hospitalization You were admitted on:  2018 You last received care in the:  MRM 2 INTRVNTNL CARDIO You were discharged on:  2018 Why you were hospitalized Your primary diagnosis was:  Not on File Your diagnoses also included:  Infection Of Pacemaker Pocket (Hcc), Tia (Transient Ischemic Attack), Type 2 Diabetes Mellitus Without Complication (Hcc), Paroxysmal Atrial Fibrillation (Hcc), Pacemaker Follow-up Information Follow up With Details Comments Contact Info Hector Winkler MD  Cardiology - Nurse from office will call with scheduled appointment. [address] 
[phone] Sharona Johnson DO On 3/29/2018 Infectious Disease - MD's office   at 4 PM [address] [place] [address] [address] [facility] 
[phone] Neo Green MD Go on 3/20/2018 For scheduled appointment at 1:30PM  [address] [place] [address] IV [address] [facility] 
[phone] [address] [place] [address] provider - they will provide skilled nursing care for picc line management and educaiton  [address] 
1st Floor [place] [address] 
[phone] [facility] 40  This is your provider of your IV antibiotic and supplies [address] [facility]. [address] [place] [address] [phone] Your Scheduled Appointments 2018  1:30 PM EDT ROUTINE CARE with Neo Green, [address] [place] [address],[address]Th [address]) [address] [place], [place] [address] [address] [facility]  
[phone]   4:00 PM EDT ROUTINE CARE with Sharona Johnson DO [facility]) [facility] [place] [address]  
[phone] Monday April 30, 2018 10:40 AM EDT Follow Up with Henrik Patel MD  
[address] [facility]) [address] [place] [address]  
[phone] Discharge Orders None A check abby indicates which time of day the medication should be taken. My Medications START taking these medications Instructions Each Dose to Equal  
 Morning Noon Evening Bedtime ceFAZolin 1 g, ADDaptor 1 Device Your last dose was: Your next dose is:    
   
   
 1 g by IntraVENous route every eight (8) hours for 42 days. 1 g  
    
   
   
   
  
 L. acidoph & paracasei- S therm- Bifido 8 billion cell Cap cap Commonly known as:  FRAN-Q/RISAQUAD Start taking on:  3/13/2018 Your last dose was: Your next dose is: Take 1 Cap by mouth daily. 1 Cap CHANGE how you take these medications Instructions Each Dose to Equal  
 Morning Noon Evening Bedtime  
 benazepril 20 mg tablet Commonly known as:  LOTENSIN What changed:  how much to take Your last dose was: Your next dose is: Take 1 Tab by mouth daily. 20 mg DILT- mg XR capsule Generic drug:  dilTIAZem XR What changed:  See the new instructions. Your last dose was: Your next dose is: TAKE ONE CAPSULE BY MOUTH ONCE DAILY CONTINUE taking these medications Instructions Each Dose to Equal  
 Morning Noon Evening Bedtime  
 acetaminophen 500 mg tablet Commonly known as:  TYLENOL Your last dose was: Your next dose is: Take 500 mg by mouth every four (4) hours as needed for Pain. 500 mg  
    
   
   
   
  
 aspirin delayed-release 81 mg tablet Your last dose was: Your next dose is: Take 81 mg by mouth daily. 81 mg CENTRUM SILVER 0.4-300-250 mg-mcg-mcg Tab Generic drug:  multivit-min-FA-lycopen-lutein Your last dose was: Your next dose is: Take 1 Tab by mouth daily. 1 Tab Cholecalciferol (Vitamin D3) 2,000 unit Cap capsule Commonly known as:  VITAMIN D3 Your last dose was: Your next dose is: Take 2,000 Units by mouth daily. 2000 Units  
    
   
   
   
  
 glucose blood VI test strips strip Commonly known as:  CONTOUR NEXT STRIPS Your last dose was: Your next dose is:    
   
   
 Check blood sugar twice daily Iron 325 mg (65 mg iron) tablet Generic drug:  ferrous sulfate Your last dose was: Your next dose is: Take 325 mg by mouth every other day. 325 mg Lancets Misc Commonly known as:  Susannah Fines Your last dose was: Your next dose is:    
   
   
 Check blood sugar twice daily. levothyroxine 50 mcg tablet Commonly known as:  SYNTHROID Your last dose was: Your next dose is: Take 1 Tab by mouth Daily (before breakfast). 50 mcg  
    
   
   
   
  
 magnesium 250 mg Tab Your last dose was: Your next dose is: Take 250 mg by mouth daily. 250 mg  
    
   
   
   
  
 omeprazole 40 mg capsule Commonly known as:  PRILOSEC Your last dose was: Your next dose is: Take 40 mg by mouth daily. 40 mg  
    
   
   
   
  
 PLAVIX 75 mg Tab Generic drug:  clopidogrel Your last dose was: Your next dose is: Take 75 mg by mouth daily. 75 mg  
    
   
   
   
  
 pravastatin 20 mg tablet Commonly known as:  PRAVACHOL Your last dose was: Your next dose is: TAKE ONE TABLET BY MOUTH NIGHTLY sertraline 100 mg tablet Commonly known as:  ZOLOFT Your last dose was: Your next dose is: Take 1 Tab by mouth daily. 100 mg ASK your doctor about these medications Instructions Each Dose to Equal  
 Morning Noon Evening Bedtime  
 metFORMIN  mg tablet Commonly known as:  GLUCOPHAGE XR Your last dose was: Your next dose is: TAKE ONE TABLET BY MOUTH TWICE DAILY Where to Get Your Medications Information on where to get these meds will be given to you by the nurse or doctor. ! Ask your nurse or doctor about these medications ceFAZolin 1 g, ADDaptor 1 Device L. acidoph & paracasei- S therm- Bifido 8 billion cell Cap cap Discharge Instructions HOSPITALIST DISCHARGE INSTRUCTIONS 
 
NAME: Fani Mendez :  1935 MRN:  [id number] Date/Time:  3/12/2018 11:59 AM 
 
ADMIT DATE: 3/5/2018 DISCHARGE DATE: 3/12/2018 · It is important that you take the medication exactly as they are prescribed. · Keep your medication in the bottles provided by the pharmacist and keep a list of the medication names, dosages, and times to be taken in your wallet. · Do not take other medications without consulting your doctor. What to do at [facility] Recommended diet:  Regular Diet Recommended activity: Activity as tolerated If you have questions regarding the hospital related prescriptions or hospital related issues please call [facility] at [phone]. You can always direct your questions to your primary care doctor if you are unable to reach your hospital physician; your PCP works as an extension of your hospital doctor just like your hospital doctor is an extension of your PCP for your time at the hospital [facility]) If you experience any of the following symptoms then please call your primary care physician or return to the emergency room if you cannot get hold of your doctor: 
 
Fever, chills, nausea, vomiting, or persistent diarrhea Worsening weakness or new problems with your speech or balance Dark stools or visible blood in your stools New Leg swelling or shortness of breath as these could be signs of a clot Additional Instructions: YOU HAVE an appointment  With Dr. Jose M Hernandez    On March 29, 4 pm.  
 Please make sure to keep your follow up appointments Bring these papers with you to your follow up appointments. The papers will help your doctors be sure to continue the care plan from the hospital. 
 
 
 
 
 
 
Information obtained by : 
I understand that if any problems occur once I am at home I am to contact my physician. I understand and acknowledge receipt of the instructions indicated above. Physician's or R.N.'s Signature                                                                  Date/Time Patient or Representative Signature [address] [address]  [phone] ICD/PACEMAKER DISCHARGE INSTRUCTIONS Admission Diagnoses:  
Infection of pacemaker pocket ([facility] [address] Discharge Diagnoses: Active Problems: 
  TIA (transient ischemic attack) (7/9/2014) Type 2 diabetes mellitus without complication ([facility] [address] (6/88/2667) Paroxysmal atrial fibrillation ([facility] [address] (9/7/2016) Overview: S/p Watchman Infection of pacemaker pocket ([facility] [address] (3/6/2018) Pacemaker (3/6/2018) Overview: 3/7/18 explant of pacemaker DISCHARGE INSTRUCTIONS FOR PATIENTS WITH PACEMAKERS 
 
 1. Carry you ID card for your ICD/Pacemaker with you at all times. This card will be given to you in the hospital or mailed to you. 2. Medic Alert Bracelets are available from your pharmacist to wear at all times. 3. Call for an appointment in 1 week  [phone] with Dr Carlo Castaneda.  
4. The previous pacer site is sealed and covered with a dressing. The bulge will decrease in size over the next few weeks. Please notify the doctor's office if you notice any of the following around your site: A.  A bruise that does not go away B. Soreness or yellow, green, or brown drainage from the site. C. Any swelling from the site. D. If you have a fever of 100 degrees or higher that lasts for a few days INCISION CARE 1.  Leave dressing over your site until you see the doctor. 2.  Leave steri-strips over your site until they start to fall off.  
3.   You may shower after as long as your incision isnt submerged or directly sprayed upon until well healed. 4.  For comfort, wear loose fitting clothing. 5.  Ice pack to affected shoulder for first 24 hours, wear your sling for 2 days. 6.  Report any signs of infection, fever, pain, swelling, redness, oozing, or heat at site especially if these symptoms increase after the first 3 to 4 days. If the dressing over your pacemaker comes loose or comes off, call our [place] at [address] [address] [phone]) immediately. Also call if any signs of infection -redness or discharge, notify us. Home health is not to change dressing over explanted PM site or external PM site. ACTIVITY PRECAUTIONS 1. Avoid rough contact with the implant site. 2. No driving for 14 days. 3. Avoid lifting your arm over your head, carrying anything on the affected side, or lifting over 10 pounds for 30 days. For the first 2 days only bend your arm at the elbow. 4. Any extreme activity such as golf, weight lifting or exercise biking should be restricted for 60 days. 5. Do not carry objects by holding them against your implant site. 6.  No shooting rifles or any type of gun with the affected shoulder permanently. SPECIAL PRECAUTIONS 1. You should avoid all strong magnetic fields, such as arc welding, large transformers, large motors. Some ICD devices will beep if it detects a strong magnet. If this occurs, move out of the area. 2.  You may not have an MRI. 3.  Treatments or surgery that requires diathermy or electrocautery should be discussed with your doctor before scheduled. 4. Avoid radio frequency transmitters, including radar. 5. Advise dentist or other medical personnel you see that you have a pacemaker or ICD. 6.  Cell phones and microwave oven use is okay. 7.  If you plan to move or take a trip to a new area, the doctor's office will give you a name of a doctor to contact for any problems. ANTIBIOTIC THERAPY Per ID. ACO Transitions of Care Introducing [employer] [address] Glover offers a voluntary care coordination program to provide high quality service and care to [facility] fee-for-service beneficiaries. Verónica Cerna was designed to help you enhance your health and well-being through the following services: ? Transitions of Care  support for individuals who are transitioning from one care setting to another (example: Hospital to home). ? Chronic and Complex Care Coordination  support for individuals and caregivers of those with serious or chronic illnesses or with more than one chronic (ongoing) condition and those who take a number of different medications. If you meet specific medical criteria, a [address] [place] [address] Rd may call you directly to coordinate your care with your primary care physician and your other care providers. For questions about the [facility] programs, please, contact your physicians office. For general questions or additional information about Accountable Care Organizations: 
Please visit www.medicare.gov/acos. html or call 1-800-MEDICARE (9-345.106.4272) TTY users should call 6-905.296.3422. [employer] Announcement We are excited to announce that we are making your provider's discharge notes available to you in Servio. You will see these notes when they are completed and signed by the physician that discharged you from your recent hospital stay. If you have any questions or concerns about any information you see in Servio, please call the Health Information Department where you were seen or reach out to your Primary Care Provider for more information about your plan of care. Introducing [facility]! Dear Luis Rogers: Thank you for requesting a Servio account. Our records indicate that you already have an active Servio account. You can access your account anytime at [email or web address] Did you know that you can access your hospital and ER discharge instructions at any time in Servio? You can also review all of your test results from your hospital stay or ER visit. Additional Information If you have questions, please visit the Frequently Asked Questions section of the Serv[employer] website at [email or web address] Remember, Servio is NOT to be used for urgent needs. For medical emergencies, dial 911. Now available from your iPhone and Android! Unresulted Labs-Please follow up with your PCP about these lab tests Order Current Status CULTURE, BLOOD, PAIRED Preliminary result CULTURE, BLOOD, PAIRED Preliminary result EKG, 12 LEAD, INITIAL Preliminary result Providers Seen During Your Hospitalization Provider Specialty Primary office phone Ga Harper MD Emergency Medicine [phone] Andrade Yanes MD Internal Medicine [phone] Lluvia Galan MD Cardiology [phone] Your Primary Care Physician (PCP) Primary Care Physician Office Phone Office Fax Jose Armando Craft [phone] You are allergic to the following Allergen Reactions Procardia Xl (Nifedipine) Other (comments)  
 weakness Recent Documentation Height Weight BMI OB Status Smoking Status 1.549 m 86.7 kg 36.11 kg/m2 Hysterectomy Never Smoker Emergency Contacts Name Discharge Info Relation Home Work Mobile Manav Hammond CAREGIVER [3] Spouse [3] [phone] JayXimena DISCHARGE CAREGIVER [3] Sister [23] [phone] Patient Belongings The following personal items are in your possession at time of discharge: 
  Dental Appliances: At bedside  Visual Aid: Glasses      Home Medications: None   Jewelry: Ring  Clothing: At bedside    Other Valuables: None Please provide this summary of care documentation to your next provider. Signatures-by signing, you are acknowledging that this After Visit Summary has been reviewed with you and you have received a copy. Patient Signature:  ____________________________________________________________ Date:  ____________________________________________________________  
  
Serge Officer Provider Signature:  ____________________________________________________________ Date:  ____________________________________________________________

## 2018-03-05 NOTE — IP AVS SNAPSHOT
[address] [facility] 
[phone] Patient: Issa Verma MRN: [id number] :1935 A check abby indicates which time of day the medication should be taken. My Medications ASK your doctor about these medications Instructions Each Dose to Equal  
 Morning Noon Evening Bedtime  
 acetaminophen 500 mg tablet Commonly known as:  TYLENOL Your last dose was: Your next dose is: Take 500 mg by mouth every four (4) hours as needed for Pain. 500 mg  
    
   
   
   
  
 aspirin delayed-release 81 mg tablet Your last dose was: Your next dose is: Take 81 mg by mouth daily. 81 mg  
    
   
   
   
  
 benazepril 20 mg tablet Commonly known as:  LOTENSIN Your last dose was: Your next dose is: Take 1 Tab by mouth daily. 20 mg CENTRUM SILVER 0.4-300-250 mg-mcg-mcg Tab Generic drug:  multivit-min-FA-lycopen-lutein Your last dose was: Your next dose is: Take 1 Tab by mouth daily. 1 Tab Cholecalciferol (Vitamin D3) 2,000 unit Cap capsule Commonly known as:  VITAMIN D3 Your last dose was: Your next dose is: Take 2,000 Units by mouth daily. 2000 Units DILT- mg XR capsule Generic drug:  dilTIAZem XR Your last dose was: Your next dose is: TAKE ONE CAPSULE BY MOUTH ONCE DAILY  
     
   
   
   
  
 glucose blood VI test strips strip Commonly known as:  CONTOUR NEXT STRIPS Your last dose was: Your next dose is:    
   
   
 Check blood sugar twice daily Iron 325 mg (65 mg iron) tablet Generic drug:  ferrous sulfate Your last dose was: Your next dose is: Take 325 mg by mouth every other day.   
 325 mg  
    
   
 Lancets Misc Commonly known as:  Josep De La Garza Your last dose was: Your next dose is:    
   
   
 Check blood sugar twice daily. levothyroxine 50 mcg tablet Commonly known as:  SYNTHROID Your last dose was: Your next dose is: Take 1 Tab by mouth Daily (before breakfast). 50 mcg  
    
   
   
   
  
 magnesium 250 mg Tab Your last dose was: Your next dose is: Take 250 mg by mouth daily. 250 mg  
    
   
   
   
  
 metFORMIN  mg tablet Commonly known as:  GLUCOPHAGE XR Your last dose was: Your next dose is: TAKE ONE TABLET BY MOUTH TWICE DAILY  
     
   
   
   
  
 omeprazole 40 mg capsule Commonly known as:  PRILOSEC Your last dose was: Your next dose is: Take 40 mg by mouth daily. 40 mg  
    
   
   
   
  
 PLAVIX 75 mg Tab Generic drug:  clopidogrel Your last dose was: Your next dose is: Take 75 mg by mouth daily. 75 mg  
    
   
   
   
  
 pravastatin 20 mg tablet Commonly known as:  PRAVACHOL Your last dose was: Your next dose is: TAKE ONE TABLET BY MOUTH NIGHTLY  
     
   
   
   
  
 sertraline 100 mg tablet Commonly known as:  ZOLOFT Your last dose was: Your next dose is: Take 1 Tab by mouth daily.   
 100 mg

## 2018-03-05 NOTE — TELEPHONE ENCOUNTER
PCP: Rodolfo Ogden MD    Last appt: 12/15/2017  Future Appointments  Date Time Provider Paul Davis   3/8/2018 2:30 PM Jaclyn Petersen NP [facility] SCHED   3/8/2018 2:30 PM PACEMAKER, Kristeen Cogan [facility] SCHED   3/20/2018 1:30 PM Rodolfo Ogden MD [address]   4/30/2018 10:40 AM Bryce Goodman  [address]       Requested Prescriptions     Pending Prescriptions Disp Refills    Lancets (MICROLET LANCET) misc 1 Each 11     Sig: Check blood sugar twice daily.     glucose blood VI test strips (CONTOUR NEXT STRIPS) strip 100 Strip 1     Sig: Check blood sugar twice daily

## 2018-03-06 PROBLEM — T82.7XXA INFECTION OF PACEMAKER POCKET (HCC): Status: ACTIVE | Noted: 2018-03-06

## 2018-03-06 PROBLEM — Z95.0 PACEMAKER: Status: ACTIVE | Noted: 2018-03-06

## 2018-03-06 LAB
ANION GAP SERPL CALC-SCNC: 11 MMOL/L (ref 5–15)
APPEARANCE UR: CLEAR
BACTERIA URNS QL MICRO: ABNORMAL /HPF
BASOPHILS # BLD: 0 K/UL (ref 0–0.1)
BASOPHILS NFR BLD: 0 % (ref 0–1)
BILIRUB UR QL CFM: NEGATIVE
BUN SERPL-MCNC: 23 MG/DL (ref 6–20)
BUN/CREAT SERPL: 24 (ref 12–20)
CALCIUM SERPL-MCNC: 7.9 MG/DL (ref 8.5–10.1)
CHLORIDE SERPL-SCNC: 110 MMOL/L (ref 97–108)
CO2 SERPL-SCNC: 19 MMOL/L (ref 21–32)
COLOR UR: ABNORMAL
CREAT SERPL-MCNC: 0.97 MG/DL (ref 0.55–1.02)
DIFFERENTIAL METHOD BLD: ABNORMAL
EOSINOPHIL # BLD: 0 K/UL (ref 0–0.4)
EOSINOPHIL NFR BLD: 0 % (ref 0–7)
EPITH CASTS URNS QL MICRO: ABNORMAL /LPF
ERYTHROCYTE [DISTWIDTH] IN BLOOD BY AUTOMATED COUNT: 14.8 % (ref 11.5–14.5)
GLUCOSE BLD STRIP.AUTO-MCNC: 158 MG/DL (ref 65–100)
GLUCOSE BLD STRIP.AUTO-MCNC: 181 MG/DL (ref 65–100)
GLUCOSE BLD STRIP.AUTO-MCNC: 191 MG/DL (ref 65–100)
GLUCOSE BLD STRIP.AUTO-MCNC: 209 MG/DL (ref 65–100)
GLUCOSE BLD STRIP.AUTO-MCNC: 254 MG/DL (ref 65–100)
GLUCOSE SERPL-MCNC: 184 MG/DL (ref 65–100)
GLUCOSE UR STRIP.AUTO-MCNC: NEGATIVE MG/DL
HCT VFR BLD AUTO: 28.5 % (ref 35–47)
HGB BLD-MCNC: 9 G/DL (ref 11.5–16)
HGB UR QL STRIP: ABNORMAL
IMM GRANULOCYTES # BLD: 0.2 K/UL (ref 0–0.04)
IMM GRANULOCYTES NFR BLD AUTO: 1 % (ref 0–0.5)
INR PPP: 1.2 (ref 0.9–1.1)
KETONES UR QL STRIP.AUTO: NEGATIVE MG/DL
LACTATE SERPL-SCNC: 1 MMOL/L (ref 0.4–2)
LEUKOCYTE ESTERASE UR QL STRIP.AUTO: ABNORMAL
LYMPHOCYTES # BLD: 0.5 K/UL (ref 0.8–3.5)
LYMPHOCYTES NFR BLD: 3 % (ref 12–49)
MCH RBC QN AUTO: 29.3 PG (ref 26–34)
MCHC RBC AUTO-ENTMCNC: 31.6 G/DL (ref 30–36.5)
MCV RBC AUTO: 92.8 FL (ref 80–99)
MONOCYTES # BLD: 1.1 K/UL (ref 0–1)
MONOCYTES NFR BLD: 6 % (ref 5–13)
NEUTS SEG # BLD: 14.9 K/UL (ref 1.8–8)
NEUTS SEG NFR BLD: 90 % (ref 32–75)
NITRITE UR QL STRIP.AUTO: NEGATIVE
NRBC # BLD: 0 K/UL (ref 0–0.01)
NRBC BLD-RTO: 0 PER 100 WBC
PH UR STRIP: 5 [PH] (ref 5–8)
PLATELET # BLD AUTO: 200 K/UL (ref 150–400)
PMV BLD AUTO: 10.5 FL (ref 8.9–12.9)
POTASSIUM SERPL-SCNC: 3.7 MMOL/L (ref 3.5–5.1)
PROT UR STRIP-MCNC: 30 MG/DL
PROTHROMBIN TIME: 12.1 SEC (ref 9–11.1)
RBC # BLD AUTO: 3.07 M/UL (ref 3.8–5.2)
RBC #/AREA URNS HPF: ABNORMAL /HPF (ref 0–5)
SERVICE CMNT-IMP: ABNORMAL
SODIUM SERPL-SCNC: 140 MMOL/L (ref 136–145)
SP GR UR REFRACTOMETRY: 1.02 (ref 1–1.03)
UA: UC IF INDICATED,UAUC: ABNORMAL
UROBILINOGEN UR QL STRIP.AUTO: 1 EU/DL (ref 0.2–1)
WBC # BLD AUTO: 16.7 K/UL (ref 3.6–11)
WBC URNS QL MICRO: ABNORMAL /HPF (ref 0–4)

## 2018-03-06 PROCEDURE — 80048 BASIC METABOLIC PNL TOTAL CA: CPT | Performed by: INTERNAL MEDICINE

## 2018-03-06 PROCEDURE — 82962 GLUCOSE BLOOD TEST: CPT

## 2018-03-06 PROCEDURE — 83605 ASSAY OF LACTIC ACID: CPT | Performed by: EMERGENCY MEDICINE

## 2018-03-06 PROCEDURE — 74011250637 HC RX REV CODE- 250/637: Performed by: INTERNAL MEDICINE

## 2018-03-06 PROCEDURE — 74011250636 HC RX REV CODE- 250/636: Performed by: INTERNAL MEDICINE

## 2018-03-06 PROCEDURE — 65660000000 HC RM CCU STEPDOWN

## 2018-03-06 PROCEDURE — 74011636637 HC RX REV CODE- 636/637: Performed by: INTERNAL MEDICINE

## 2018-03-06 PROCEDURE — 85025 COMPLETE CBC W/AUTO DIFF WBC: CPT | Performed by: INTERNAL MEDICINE

## 2018-03-06 PROCEDURE — 36415 COLL VENOUS BLD VENIPUNCTURE: CPT | Performed by: EMERGENCY MEDICINE

## 2018-03-06 PROCEDURE — 85610 PROTHROMBIN TIME: CPT | Performed by: INTERNAL MEDICINE

## 2018-03-06 RX ORDER — THERA TABS 400 MCG
1 TAB ORAL DAILY
Status: DISCONTINUED | OUTPATIENT
Start: 2018-03-06 | End: 2018-03-12 | Stop reason: HOSPADM

## 2018-03-06 RX ORDER — MELATONIN
2000 DAILY
Status: DISCONTINUED | OUTPATIENT
Start: 2018-03-06 | End: 2018-03-12 | Stop reason: HOSPADM

## 2018-03-06 RX ORDER — INSULIN LISPRO 100 [IU]/ML
INJECTION, SOLUTION INTRAVENOUS; SUBCUTANEOUS EVERY 6 HOURS
Status: DISCONTINUED | OUTPATIENT
Start: 2018-03-06 | End: 2018-03-09

## 2018-03-06 RX ORDER — SODIUM CHLORIDE 9 MG/ML
125 INJECTION, SOLUTION INTRAVENOUS CONTINUOUS
Status: DISCONTINUED | OUTPATIENT
Start: 2018-03-06 | End: 2018-03-11

## 2018-03-06 RX ORDER — ACETAMINOPHEN 325 MG/1
650 TABLET ORAL
Status: DISCONTINUED | OUTPATIENT
Start: 2018-03-06 | End: 2018-03-12 | Stop reason: HOSPADM

## 2018-03-06 RX ORDER — PRAVASTATIN SODIUM 10 MG/1
20 TABLET ORAL
Status: DISCONTINUED | OUTPATIENT
Start: 2018-03-06 | End: 2018-03-12 | Stop reason: HOSPADM

## 2018-03-06 RX ORDER — PANTOPRAZOLE SODIUM 40 MG/1
40 TABLET, DELAYED RELEASE ORAL
Status: DISCONTINUED | OUTPATIENT
Start: 2018-03-06 | End: 2018-03-12 | Stop reason: HOSPADM

## 2018-03-06 RX ORDER — ONDANSETRON 2 MG/ML
4 INJECTION INTRAMUSCULAR; INTRAVENOUS
Status: DISCONTINUED | OUTPATIENT
Start: 2018-03-06 | End: 2018-03-12 | Stop reason: HOSPADM

## 2018-03-06 RX ORDER — LANOLIN ALCOHOL/MO/W.PET/CERES
400 CREAM (GRAM) TOPICAL DAILY
Status: DISCONTINUED | OUTPATIENT
Start: 2018-03-06 | End: 2018-03-12 | Stop reason: HOSPADM

## 2018-03-06 RX ORDER — DILTIAZEM HYDROCHLORIDE 240 MG/1
240 CAPSULE, COATED, EXTENDED RELEASE ORAL DAILY
Status: DISCONTINUED | OUTPATIENT
Start: 2018-03-06 | End: 2018-03-12 | Stop reason: HOSPADM

## 2018-03-06 RX ORDER — MORPHINE SULFATE 2 MG/ML
1 INJECTION, SOLUTION INTRAMUSCULAR; INTRAVENOUS
Status: DISCONTINUED | OUTPATIENT
Start: 2018-03-06 | End: 2018-03-06

## 2018-03-06 RX ORDER — NALOXONE HYDROCHLORIDE 0.4 MG/ML
0.4 INJECTION, SOLUTION INTRAMUSCULAR; INTRAVENOUS; SUBCUTANEOUS AS NEEDED
Status: DISCONTINUED | OUTPATIENT
Start: 2018-03-06 | End: 2018-03-09 | Stop reason: SDUPTHER

## 2018-03-06 RX ORDER — BENAZEPRIL HYDROCHLORIDE 10 MG/1
10 TABLET ORAL DAILY
Status: DISCONTINUED | OUTPATIENT
Start: 2018-03-06 | End: 2018-03-12 | Stop reason: HOSPADM

## 2018-03-06 RX ORDER — LEVOTHYROXINE SODIUM 25 UG/1
50 TABLET ORAL
Status: DISCONTINUED | OUTPATIENT
Start: 2018-03-06 | End: 2018-03-12 | Stop reason: HOSPADM

## 2018-03-06 RX ORDER — ASPIRIN 81 MG/1
81 TABLET ORAL DAILY
Status: DISCONTINUED | OUTPATIENT
Start: 2018-03-06 | End: 2018-03-12 | Stop reason: HOSPADM

## 2018-03-06 RX ORDER — BISACODYL 5 MG
5 TABLET, DELAYED RELEASE (ENTERIC COATED) ORAL DAILY PRN
Status: DISCONTINUED | OUTPATIENT
Start: 2018-03-06 | End: 2018-03-12 | Stop reason: HOSPADM

## 2018-03-06 RX ORDER — SODIUM CHLORIDE 0.9 % (FLUSH) 0.9 %
5-10 SYRINGE (ML) INJECTION AS NEEDED
Status: DISCONTINUED | OUTPATIENT
Start: 2018-03-06 | End: 2018-03-12 | Stop reason: HOSPADM

## 2018-03-06 RX ORDER — SODIUM CHLORIDE 0.9 % (FLUSH) 0.9 %
5-10 SYRINGE (ML) INJECTION EVERY 8 HOURS
Status: DISCONTINUED | OUTPATIENT
Start: 2018-03-06 | End: 2018-03-12 | Stop reason: HOSPADM

## 2018-03-06 RX ORDER — MAGNESIUM SULFATE 100 %
4 CRYSTALS MISCELLANEOUS AS NEEDED
Status: DISCONTINUED | OUTPATIENT
Start: 2018-03-06 | End: 2018-03-12 | Stop reason: HOSPADM

## 2018-03-06 RX ORDER — MORPHINE SULFATE 10 MG/ML
1 INJECTION, SOLUTION INTRAMUSCULAR; INTRAVENOUS
Status: DISCONTINUED | OUTPATIENT
Start: 2018-03-06 | End: 2018-03-12 | Stop reason: HOSPADM

## 2018-03-06 RX ORDER — CLOPIDOGREL BISULFATE 75 MG/1
75 TABLET ORAL DAILY
COMMUNITY
End: 2018-08-16 | Stop reason: ALTCHOICE

## 2018-03-06 RX ORDER — SERTRALINE HYDROCHLORIDE 50 MG/1
100 TABLET, FILM COATED ORAL DAILY
Status: DISCONTINUED | OUTPATIENT
Start: 2018-03-06 | End: 2018-03-12 | Stop reason: HOSPADM

## 2018-03-06 RX ORDER — DEXTROSE 50 % IN WATER (D50W) INTRAVENOUS SYRINGE
12.5-25 AS NEEDED
Status: DISCONTINUED | OUTPATIENT
Start: 2018-03-06 | End: 2018-03-12 | Stop reason: HOSPADM

## 2018-03-06 RX ADMIN — CEFTRIAXONE SODIUM 1 G: 1 INJECTION, POWDER, FOR SOLUTION INTRAMUSCULAR; INTRAVENOUS at 02:36

## 2018-03-06 RX ADMIN — DILTIAZEM HYDROCHLORIDE 240 MG: 240 CAPSULE, COATED, EXTENDED RELEASE ORAL at 09:36

## 2018-03-06 RX ADMIN — THERA TABS 1 TABLET: TAB at 09:36

## 2018-03-06 RX ADMIN — ACETAMINOPHEN 650 MG: 325 TABLET ORAL at 18:35

## 2018-03-06 RX ADMIN — VITAMIN D, TAB 1000IU (100/BT) 2000 UNITS: 25 TAB at 09:36

## 2018-03-06 RX ADMIN — LEVOTHYROXINE SODIUM 50 MCG: 25 TABLET ORAL at 09:36

## 2018-03-06 RX ADMIN — SODIUM CHLORIDE 125 ML/HR: 900 INJECTION, SOLUTION INTRAVENOUS at 06:21

## 2018-03-06 RX ADMIN — Medication 10 ML: at 21:29

## 2018-03-06 RX ADMIN — Medication 10 ML: at 00:53

## 2018-03-06 RX ADMIN — BENAZEPRIL HYDROCHLORIDE 10 MG: 10 TABLET, FILM COATED ORAL at 13:26

## 2018-03-06 RX ADMIN — ONDANSETRON HYDROCHLORIDE 4 MG: 2 INJECTION, SOLUTION INTRAVENOUS at 17:31

## 2018-03-06 RX ADMIN — ASPIRIN 81 MG: 81 TABLET, COATED ORAL at 09:37

## 2018-03-06 RX ADMIN — SERTRALINE HYDROCHLORIDE 100 MG: 50 TABLET ORAL at 09:36

## 2018-03-06 RX ADMIN — PRAVASTATIN SODIUM 20 MG: 10 TABLET ORAL at 02:36

## 2018-03-06 RX ADMIN — Medication 10 ML: at 09:37

## 2018-03-06 RX ADMIN — Medication 400 MG: at 09:36

## 2018-03-06 RX ADMIN — MORPHINE SULFATE 1 MG: 10 INJECTION INTRAMUSCULAR; INTRAVENOUS; SUBCUTANEOUS at 16:25

## 2018-03-06 RX ADMIN — NITROGLYCERIN 1 INCH: 20 OINTMENT TOPICAL at 06:50

## 2018-03-06 RX ADMIN — INSULIN LISPRO 2 UNITS: 100 INJECTION, SOLUTION INTRAVENOUS; SUBCUTANEOUS at 11:47

## 2018-03-06 RX ADMIN — PANTOPRAZOLE SODIUM 40 MG: 40 TABLET, DELAYED RELEASE ORAL at 09:36

## 2018-03-06 RX ADMIN — PRAVASTATIN SODIUM 20 MG: 10 TABLET ORAL at 21:29

## 2018-03-06 RX ADMIN — ACETAMINOPHEN 650 MG: 325 TABLET ORAL at 06:50

## 2018-03-06 NOTE — ED NOTES
Bedside shift change report given to Hailey Wheeler RN (oncoming nurse) by Ketty Hodges RN (offgoing nurse). Report included the following information SBAR, Kardex, ED Summary, Procedure Summary, MAR and Recent Results.

## 2018-03-06 NOTE — ROUTINE PROCESS
Put in consult for infectious disease per MD. Navin Shannon office and spoke with , who said she would pass the consult along. Will continue to monitor patient.

## 2018-03-06 NOTE — ED NOTES
Assumed care of pt at this time from EMS. EMS states that they were called bc pt was warm and had the chills. Upon arrival pt was febrile. Pt has no other complaints. Pt had a pacemaker placed on 2/14/18 and has had issues with the insertion site. Assessment done at this time. Call bell in reach. Family in room.

## 2018-03-06 NOTE — ED NOTES
TRANSFER - OUT REPORT:    Verbal report given to Will RN(name) on Mariama Godinez  being transferred to IVCU(unit) for routine progression of care       Report consisted of patients Situation, Background, Assessment and   Recommendations(SBAR). Information from the following report(s) SBAR, Kardex, ED Summary, Procedure Summary, Intake/Output, MAR, Recent Results, Med Rec Status and Cardiac Rhythm paced was reviewed with the receiving nurse. Lines:   Peripheral IV 03/05/18 Left Antecubital (Active)   Site Assessment Clean, dry, & intact 3/5/2018  9:27 PM   Phlebitis Assessment 0 3/5/2018  9:27 PM   Infiltration Assessment 0 3/5/2018  9:27 PM   Dressing Status Clean, dry, & intact 3/5/2018  9:27 PM   Dressing Type 4 X 4 3/5/2018  9:27 PM   Hub Color/Line Status Pink 3/5/2018  9:27 PM   Alcohol Cap Used No 3/5/2018  9:27 PM        Opportunity for questions and clarification was provided.       Patient transported with:   Monitor  Tech

## 2018-03-06 NOTE — PROGRESS NOTES
Pharmacy Automatic Renal Dosing Protocol - Antimicrobials    Indication for Antimicrobials: SSTI     Current Regimen of Each Antimicrobial:  Vancomycin - pharmacy to dose (Start Date 3/5 Day # 1)    Previous Antimicrobial Therapy:    Goal Level: ~15  Measured / Extrapolated Vancomycin Level:     Significant Cultures:   3/5 blood: pending    Paralysis, amputations, malnutrition:    Labs:  Recent Labs      18   2100   CREA  1.28*   BUN  25*   WBC  16.4*     Temp (24hrs), Av.2 °F (40.1 °C), Min:104.2 °F (40.1 °C), Max:104.2 °F (40.1 °C)      Creatinine Clearance (mL/min) or Dialysis:   No results found for: PCT    Impression/Plan:   · Vancomycin 2000 mg loading dose, then 1000 mg every 24 hours  · Antimicrobial stop date pending     Pharmacy will follow daily and adjust medications as appropriate for renal function and/or serum levels.     Thank you,  Clyde Quintanilla, PHARMD

## 2018-03-06 NOTE — ASSESSMENT & PLAN NOTE
Key CAD CHF Meds             clopidogrel (PLAVIX) 75 mg tab  (Taking) Take 75 mg by mouth daily. benazepril (LOTENSIN) 20 mg tablet  (Taking) Take 1 Tab by mouth daily. DILT- mg XR capsule  (Taking) TAKE ONE CAPSULE BY MOUTH ONCE DAILY    aspirin delayed-release 81 mg tablet  (Taking) Take 81 mg by mouth daily.         Key Antihyperlipidemia Meds             pravastatin (PRAVACHOL) 20 mg tablet TAKE ONE TABLET BY MOUTH NIGHTLY        Lab Results   Component Value Date/Time    Sodium 140 03/06/2018 04:17 AM    Potassium 3.7 03/06/2018 04:17 AM    Cholesterol, total 149 09/08/2017 09:47 AM    HDL Cholesterol 82 09/08/2017 09:47 AM    LDL, calculated 48 09/08/2017 09:47 AM    Triglyceride 96 09/08/2017 09:47 AM    INR 1.2 03/06/2018 04:17 AM    Prothrombin time 12.1 03/06/2018 04:17 AM

## 2018-03-06 NOTE — CONSULTS
Subjective:                [address] [address]  [phone]    Date of  Admission: 3/5/2018  8:27 PM     Admission type:Emergency    Girish Campa is a [de-identified] y.o. female admitted for Infection of pacemaker pocket ([address] Mr Kun Brush is [de-identified] yo female, with PMHx significant for HTN, DM, anemia, Afib and s/p pacemaker placement and AV node ablation on 2/14. She presented to the ER on 2/28 for ooze from the site and had the site manipulated. She presented  via EMS to the ED last night  with cc of constant chills with no alleviating factors. Denies chest pain, pressure, tightness, dyspnea or edema. Family notes that her PM had discharge superior at insertion site. Was seen in ED on the 28th of February in ED at [address] for dehiscence of PM site. Wound was cleansed with Betadine.  Dermabond and Surgifoam was applied and Tegriderm was placed over the wound      Patient Active Problem List    Diagnosis Date Noted    Infection of pacemaker pocket ([address] 03/06/2018    Pacemaker 03/06/2018    Irregular heartbeat 12/18/2017    Type 2 diabetes mellitus with nephropathy ([address] 12/15/2017    Celiac sprue 03/07/2017    Paroxysmal atrial fibrillation ([address] 09/07/2016    Precordial pain 09/07/2016    Ataxia 02/17/2016    Dehydration 02/17/2016    Webb esophagus 11/11/2015    ACP (advance care planning) 11/11/2015    Hypothyroidism, adult 07/31/2015    Type 2 diabetes mellitus without complication ([address] 03/07/9894    Essential hypertension 04/27/2015    MALLORY (obstructive sleep apnea) 07/09/2014    Hyperlipidemia 07/09/2014    Sarcoid 07/09/2014    Stress bladder incontinence, female 07/09/2014    Obesity 07/09/2014    TIA (transient ischemic attack) 07/09/2014    AR (aortic regurgitation) 07/09/2014    Mitral valve insufficiency 07/09/2014    Cervical stenosis of spine 07/09/2014      Tawnya Barker MD  Past Medical History:   Diagnosis Date    Anemia     Arrhythmia     AFIB    Ataxia     Webb's esophagus     Cervical spinal stenosis     Coronary atherosclerosis of unspecified type of vessel, native or graft     Diabetes (HCC)     Fatigue     GERD (gastroesophageal reflux disease)     Hyperlipidemia     Hypertension     Ill-defined condition     right torn rotater cuff- no surgery at this time    Liver disease     pt is unaware    Osteoarthritis     Psychiatric disorder     anxiety and depression    Sarcoidosis     Sleep apnea     uses cpap    Stroke ([facility]     TIA'S    Thyroid disease       Past Surgical History:   Procedure Laterality Date    CARDIAC SURG PROCEDURE UNLIST      cardioversion     CARDIAC SURG PROCEDURE UNLIST      watchman device    COLONOSCOPY N/A 12/28/2016    COLONOSCOPY performed by Mirtha Lemon MD at [facility]"" ENDOSCOPY    HX CATARACT REMOVAL      both eyes    HX CHOLECYSTECTOMY      HX COLONOSCOPY  5/8/2013    Repeat in 5 years    HX CYST REMOVAL  10/15    on head     HX HYSTERECTOMY      HX ORTHOPAEDIC Bilateral     knee replacement to both knees    HX ORTHOPAEDIC      spacer btwn L4-5 and L5-6     HX TONSILLECTOMY      HX UROLOGICAL      botox in bladder     Allergies   Allergen Reactions    Procardia Xl [Nifedipine] Other (comments)     weakness     Social History   Substance Use Topics    Smoking status: Never Smoker    Smokeless tobacco: Never Used    Alcohol use No     Family History   Problem Relation Age of Onset    Other Mother      Fibromyalgia    Pacemaker Mother     Heart Disease Mother     Heart Failure Mother     COPD Mother     Heart Attack Father 61    Cancer Sister      Multiple Myeloma    Diabetes Brother     Obesity Brother     Pacemaker Brother     Heart Attack Brother      Bundle branch block    No Known Problems Son     Psychiatric Disorder Daughter      Multiple      Current Facility-Administered Medications   Medication Dose Route Frequency    aspirin delayed-release tablet 81 mg  81 mg Oral DAILY    benazepril (LOTENSIN) tablet 10 mg  10 mg Oral DAILY    cholecalciferol (VITAMIN D3) tablet 2,000 Units  2,000 Units Oral DAILY    dilTIAZem CD (CARDIZEM CD) capsule 240 mg  240 mg Oral DAILY    levothyroxine (SYNTHROID) tablet 50 mcg  50 mcg Oral ACB    magnesium oxide (MAG-OX) tablet 400 mg  400 mg Oral DAILY    therapeutic multivitamin (THERAGRAN) tablet 1 Tab  1 Tab Oral DAILY    pantoprazole (PROTONIX) tablet 40 mg  40 mg Oral ACB    pravastatin (PRAVACHOL) tablet 20 mg  20 mg Oral QHS    sertraline (ZOLOFT) tablet 100 mg  100 mg Oral DAILY    sodium chloride (NS) flush 5-10 mL  5-10 mL IntraVENous Q8H    sodium chloride (NS) flush 5-10 mL  5-10 mL IntraVENous PRN    acetaminophen (TYLENOL) tablet 650 mg  650 mg Oral Q4H PRN    naloxone (NARCAN) injection 0.4 mg  0.4 mg IntraVENous PRN    ondansetron (ZOFRAN) injection 4 mg  4 mg IntraVENous Q4H PRN    bisacodyl (DULCOLAX) tablet 5 mg  5 mg Oral DAILY PRN    nitroglycerin (NITROBID) 2 % ointment 1 Inch  1 Inch Topical Q6H PRN    cefTRIAXone (ROCEPHIN) 1 g/50 mL NS IVPB  1 g IntraVENous Q24H    0.9% sodium chloride infusion  125 mL/hr IntraVENous CONTINUOUS    insulin lispro (HUMALOG) injection   SubCUTAneous Q6H    glucose chewable tablet 16 g  4 Tab Oral PRN    dextrose (D50W) injection syrg 12.5-25 g  12.5-25 g IntraVENous PRN    glucagon (GLUCAGEN) injection 1 mg  1 mg IntraMUSCular PRN    morphine injection 1 mg  1 mg IntraVENous Q4H PRN    sodium chloride (NS) flush 5-10 mL  5-10 mL IntraVENous PRN    vancomycin (VANCOCIN) 1000 mg in  ml infusion  1,000 mg IntraVENous Q24H         Review of Symptoms:  Constitutional: fevers/chills  Eyes: negative  Ears, nose, mouth, throat, and face: negative  Respiratory: negative  Cardiovascular: negative  Gastrointestinal: negative  Genitourinary: negative  Musculoskeletal: negative  Neurological: negative  Behvioral/Psych: negative  Endocrine: negative     Subjective:      Visit Vitals    /67 (BP 1 Location: Right arm, BP Patient Position: At rest)    Pulse 75    Temp 97.6 °F (36.4 °C)    Resp 26    Ht 5' 1\" (1.549 m)    Wt 170 lb (77.1 kg)    SpO2 90%    BMI 32.12 kg/m2       Physical Exam  Abdomen: soft, non-tender. Extremities: extremities normal  Heart: regular rate and rhythm  Chest: left subclavian PM approximated, dry, warm to touch, edematous, soft. Lungs: clear to auscultation bilaterally  Pulses: 2+ and symmetric    Cardiographics    Telemetry: V paced, PVCs      Echocardiogram: 2/1/18   ft ventricle: Systolic function was normal.    Left atrium: The atrium was moderately dilated. Left atrial appendage: watchman device seated in the VANDA with complete  obstruction of flow    Mitral valve: There was mild to moderate regurgitation. Aortic valve: There was moderate to severe regurgitation. Aorta, systemic arteries: There was mild atheroma. Labs:  Recent Labs      03/06/18   0417  03/05/18   2100   WBC  16.7*  16.4*   HGB  9.0*  10.6*   HCT  28.5*  33.6*   PLT  200  229     Recent Labs      03/06/18   0417  03/05/18   2100   NA  140  137   K  3.7  4.2   CL  110*  105   CO2  19*  23   GLU  184*  191*   BUN  23*  25*   CREA  0.97  1.28*   CA  7.9*  8.6   ALB   --   3.2*   TBILI   --   1.4*   SGOT   --   20   ALT   --   23   INR  1.2*   --        Recent Labs      03/05/18   2100   Shaina Floss  <0.04       No intake or output data in the 24 hours ending 03/06/18 9401      Assessment:           Active Problems:    TIA (transient ischemic attack) (7/9/2014)      Type 2 diabetes mellitus without complication ([facility] (7/33/2800)      Paroxysmal atrial fibrillation ([facility] (9/7/2016)      Overview: S/p Watchman      Infection of pacemaker pocket ([facility] (3/6/2018)      Pacemaker (3/6/2018)         Plan:     Rayburn Blizzard is an [de-identified]year old female, s/p Watchman, atrial fibrillation, PM with Av node ablation (2/14) admitted yesterday for chills, fever with infected PM pocket. Will make NPO after MN tonight. JESSIE will be done to evaluate valves to r/o endocarditis. bc she is av node ablated, she will require a temp pacer from the right IJ and IV antibiotics. I discussed the risks/benefits/alternatives of the procedure with the patient. Risks include (but are not limited to) bleeding, heart block, infection, cva/mi/tamponade/death. The patient understands and agrees to proceed. Thank you for this interesting consultation.         Pennie Lopez MD, [facility]    3/6/2018

## 2018-03-06 NOTE — ED NOTES
Patient shaky, and anxious. Assisted with the bedpan, tolerated well. Rectal temp 99.1. APAP given for back pain, Nitro given for PRN blood pressure control. Patient is very hypertensive. Denies headache, dizziness, or blurry vision.

## 2018-03-06 NOTE — ED PROVIDER NOTES
EMERGENCY DEPARTMENT HISTORY AND PHYSICAL EXAM      Date: 3/5/2018  Patient Name: Toby Pope    History of Presenting Illness     Chief Complaint   Patient presents with    Chills     onset 6pm tonight       History Provided By: Patient    HPI: Toby Pope, [de-identified] y.o. female with PMHx significant for HTN, DM, anemia, Afib and s/p pacemaker placement on 2/14, presents via EMS to the ED with cc of constant chills since 6 pm tonight with no alleviating factors. She also notes that she is not on any anticoagulants currently. Pt denies any N/V/D, abdominal pain, or coughing    PCP: Abilio Darden MD   Cardiologist: Fernie Quiroga MD    There are no other complaints, changes, or physical findings at this time. Current Facility-Administered Medications   Medication Dose Route Frequency Provider Last Rate Last Dose    sodium chloride (NS) flush 5-10 mL  5-10 mL IntraVENous PRN Angeles Graham MD        sodium chloride 0.9 % bolus infusion 2,313 mL  30 mL/kg IntraVENous ONCE Angeles Graham .5 mL/hr at 03/05/18 2114 2,313 mL at 03/05/18 2114    vancomycin (VANCOCIN) 2000 mg in  ml infusion  2,000 mg IntraVENous ONCE Angeles Graham MD       Luther Lace [START ON 3/6/2018] vancomycin (VANCOCIN) 1,000 mg in 0.9% sodium chloride (MBP/ADV) 250 mL  1,000 mg IntraVENous Q24H Angeles Graham MD        acetaminophen (TYLENOL) tablet 1,000 mg  1,000 mg Oral ONCE Angeles Graham MD         Current Outpatient Prescriptions   Medication Sig Dispense Refill    Lancets (MICROLET LANCET) misc Check blood sugar twice daily. 1 Each 11    glucose blood VI test strips (CONTOUR NEXT STRIPS) strip Check blood sugar twice daily 100 Strip 1    levothyroxine (SYNTHROID) 50 mcg tablet Take 1 Tab by mouth Daily (before breakfast). 90 Tab 1    sertraline (ZOLOFT) 100 mg tablet Take 1 Tab by mouth daily. 135 Tab 1    benazepril (LOTENSIN) 20 mg tablet Take 1 Tab by mouth daily.  (Patient taking differently: Take 10 mg by mouth daily.) 90 Tab 1    metFORMIN ER (GLUCOPHAGE XR) 750 mg tablet TAKE ONE TABLET BY MOUTH TWICE DAILY 180 Tab 0    pravastatin (PRAVACHOL) 20 mg tablet TAKE ONE TABLET BY MOUTH NIGHTLY 90 Tab 0    Cholecalciferol, Vitamin D3, (VITAMIN D3) 2,000 unit cap capsule Take 2,000 Units by mouth daily.  omeprazole (PRILOSEC) 40 mg capsule Take 40 mg by mouth daily.  acetaminophen (TYLENOL) 500 mg tablet Take 500 mg by mouth every four (4) hours as needed for Pain.  DILT- mg XR capsule TAKE ONE CAPSULE BY MOUTH ONCE DAILY (Patient taking differently: TAKE TWO CAPSULES BY MOUTH ONCE DAILY) 30 Cap 11    ferrous sulfate (IRON) 325 mg (65 mg iron) tablet Take 325 mg by mouth every other day.  multivit-min-FA-lycopen-lutein (CENTRUM SILVER) 0.4-300-250 mg-mcg-mcg tab Take 1 Tab by mouth daily.  aspirin delayed-release 81 mg tablet Take 81 mg by mouth daily.  magnesium 250 mg tab Take 250 mg by mouth daily.          Past History     Past Medical History:  Past Medical History:   Diagnosis Date    Anemia     Arrhythmia     AFIB    Ataxia     Webb's esophagus     Cervical spinal stenosis     Coronary atherosclerosis of unspecified type of vessel, native or graft     Diabetes (HCC)     Fatigue     Fatigue     GERD (gastroesophageal reflux disease)     Hypercholesterolemia     Hyperlipidemia     Hypertension     Ill-defined condition     Webb's Esophagus    Ill-defined condition     right torn rotater cuff- no surgery at this time    Liver disease     pt is unaware    Osteoarthritis     Psychiatric disorder     anxiety and depression    Sarcoidosis     Sleep apnea     uses cpap    Stroke [facility])     TIA'S    Thyroid disease        Past Surgical History:  Past Surgical History:   Procedure Laterality Date    CARDIAC SURG PROCEDURE UNLIST      cardioversion     CARDIAC SURG PROCEDURE UNLIST      watchman device    COLONOSCOPY N/A 12/28/2016    COLONOSCOPY performed by Maureen Mora MD [facility] ENDOSCOPY    HX CATARACT REMOVAL      both eyes    HX CHOLECYSTECTOMY      HX COLONOSCOPY  5/8/2013    Repeat in 5 years    HX CYST REMOVAL  10/15    on head     HX HYSTERECTOMY      HX ORTHOPAEDIC Bilateral     knee replacement to both knees    HX ORTHOPAEDIC      spacer btwn L4-5 and L5-6     HX TONSILLECTOMY      HX UROLOGICAL      botox in bladder       Family History:  Family History   Problem Relation Age of Onset    Other Mother      Fibromyalgia    Pacemaker Mother     Heart Disease Mother     Heart Failure Mother     COPD Mother     Heart Attack Father 61    Cancer Sister      Multiple Myeloma    Diabetes Brother     Obesity Brother     Pacemaker Brother     Heart Attack Brother      Bundle branch block    No Known Problems Son     Psychiatric Disorder Daughter      Multiple       Social History:  Social History   Substance Use Topics    Smoking status: Never Smoker    Smokeless tobacco: Never Used    Alcohol use No       Allergies: Allergies   Allergen Reactions    Procardia Xl [Nifedipine] Other (comments)     weakness         Review of Systems   Review of Systems   Constitutional: Positive for chills. Negative for activity change and fever. HENT: Negative for congestion and sore throat. Eyes: Negative for pain and redness. Respiratory: Negative for cough, chest tightness and shortness of breath. Cardiovascular: Negative for chest pain and palpitations. Gastrointestinal: Negative for abdominal pain, diarrhea, nausea and vomiting. Genitourinary: Negative for dysuria, frequency and urgency. Musculoskeletal: Negative for back pain and neck pain. Skin: Negative for rash. Neurological: Negative for syncope, light-headedness and headaches. Psychiatric/Behavioral: Negative for confusion. All other systems reviewed and are negative. Physical Exam   Physical Exam   Constitutional: She is oriented to person, place, and time.  She appears well-developed and well-nourished. No distress. HENT:   Head: Normocephalic. Nose: Nose normal.   Mouth/Throat: Oropharynx is clear and moist. No oropharyngeal exudate. Eyes: Conjunctivae are normal. Pupils are equal, round, and reactive to light. No scleral icterus. Neck: Normal range of motion. Neck supple. No JVD present. No tracheal deviation present. No thyromegaly present. Cardiovascular: Normal rate, regular rhythm and intact distal pulses. Exam reveals no gallop and no friction rub. No murmur heard. Pulmonary/Chest: Effort normal and breath sounds normal. No stridor. No respiratory distress. She has no wheezes. She has no rales. Pacemaker on her left chest wall lateral portion of wound is brown drainage   Abdominal: Soft. Bowel sounds are normal. She exhibits no distension. There is no tenderness. There is no rebound and no guarding. Musculoskeletal: Normal range of motion. She exhibits no edema. Lymphadenopathy:     She has no cervical adenopathy. Neurological: She is alert and oriented to person, place, and time. No cranial nerve deficit. She exhibits normal muscle tone. Coordination normal.   Skin: Skin is warm and dry. No rash noted. She is not diaphoretic. No erythema. Psychiatric: She has a normal mood and affect. Her behavior is normal.   Nursing note and vitals reviewed.         Diagnostic Study Results     Labs -     Recent Results (from the past 12 hour(s))   LACTIC ACID    Collection Time: 03/05/18  9:00 PM   Result Value Ref Range    Lactic acid 2.3 (HH) 0.4 - 2.0 MMOL/L   CBC WITH AUTOMATED DIFF    Collection Time: 03/05/18  9:00 PM   Result Value Ref Range    WBC 16.4 (H) 3.6 - 11.0 K/uL    RBC 3.61 (L) 3.80 - 5.20 M/uL    HGB 10.6 (L) 11.5 - 16.0 g/dL    HCT 33.6 (L) 35.0 - 47.0 %    MCV 93.1 80.0 - 99.0 FL    MCH 29.4 26.0 - 34.0 PG    MCHC 31.5 30.0 - 36.5 g/dL    RDW 14.5 11.5 - 14.5 %    PLATELET 953 501 - 744 K/uL    MPV 10.2 8.9 - 12.9 FL    NRBC 0.0 0  WBC ABSOLUTE NRBC 0.00 0.00 - 0.01 K/uL    NEUTROPHILS 91 (H) 32 - 75 %    LYMPHOCYTES 3 (L) 12 - 49 %    MONOCYTES 5 5 - 13 %    EOSINOPHILS 0 0 - 7 %    BASOPHILS 0 0 - 1 %    IMMATURE GRANULOCYTES 1 (H) 0.0 - 0.5 %    ABS. NEUTROPHILS 14.9 (H) 1.8 - 8.0 K/UL    ABS. LYMPHOCYTES 0.5 (L) 0.8 - 3.5 K/UL    ABS. MONOCYTES 0.8 0.0 - 1.0 K/UL    ABS. EOSINOPHILS 0.0 0.0 - 0.4 K/UL    ABS. BASOPHILS 0.0 0.0 - 0.1 K/UL    ABS. IMM. GRANS. 0.2 (H) 0.00 - 0.04 K/UL    DF SMEAR SCANNED      RBC COMMENTS NORMOCYTIC, NORMOCHROMIC      WBC COMMENTS TOXIC GRANULATION     METABOLIC PANEL, COMPREHENSIVE    Collection Time: 03/05/18  9:00 PM   Result Value Ref Range    Sodium 137 136 - 145 mmol/L    Potassium 4.2 3.5 - 5.1 mmol/L    Chloride 105 97 - 108 mmol/L    CO2 23 21 - 32 mmol/L    Anion gap 9 5 - 15 mmol/L    Glucose 191 (H) 65 - 100 mg/dL    BUN 25 (H) 6 - 20 MG/DL    Creatinine 1.28 (H) 0.55 - 1.02 MG/DL    BUN/Creatinine ratio 20 12 - 20      GFR est AA 49 (L) >60 ml/min/1.73m2    GFR est non-AA 40 (L) >60 ml/min/1.73m2    Calcium 8.6 8.5 - 10.1 MG/DL    Bilirubin, total 1.4 (H) 0.2 - 1.0 MG/DL    ALT (SGPT) 23 12 - 78 U/L    AST (SGOT) 20 15 - 37 U/L    Alk. phosphatase 115 45 - 117 U/L    Protein, total 7.3 6.4 - 8.2 g/dL    Albumin 3.2 (L) 3.5 - 5.0 g/dL    Globulin 4.1 (H) 2.0 - 4.0 g/dL    A-G Ratio 0.8 (L) 1.1 - 2.2     TROPONIN I    Collection Time: 03/05/18  9:00 PM   Result Value Ref Range    Troponin-I, Qt. <0.04 <0.05 ng/mL       Radiologic Studies -   CT CHEST WO CONT   Final Result      XR CHEST PORT   Final Result      XR CHEST PORT    (Results Pending)     CT Results  (Last 48 hours)               03/05/18 2223  CT CHEST WO CONT Final result    Impression:  IMPRESSION:    1. Fluid collection is seen adjacent to the left chest ICD. Superimposed   infection is not excluded. 2. Dilatation of the ascending aorta measuring 4.0 cm in diameter.    3. Dilatation of the main pulmonary artery suggesting pulmonary artery   hypertension. 4. Several scattered bilateral lung nodules measuring up to 5 mm may be   infectious or inflammatory but follow-up chest CT could be performed as   clinically warranted. 5. Incompletely characterized left adrenal nodule measuring 1.6 cm. Narrative:  EXAM:  CT CHEST WO CONT       INDICATION:  Acute chest pain. Concern for pacemaker pocket infection. COMPARISON: Chest radiograph 3/5/2018. TECHNIQUE: Helical CT of the chest is performed without intravenous contrast.   Coronal and sagittal reformatted images are obtained. CT dose reduction was   achieved through use of a standardized protocol tailored for this examination   and automatic exposure control for dose modulation. FINDINGS:    There is a left chest ICD with adjacent fluid collection measuring up to 1.9 x   5.1 cm (series 2, image 10. There is minimal surrounding fat stranding. An ICD   wire terminates in the right ventricle. The visualized thyroid gland is unremarkable. The ascending aorta measures up to   4.0 cm in diameter. The main pulmonary artery is dilated measuring 4.1 cm in   diameter. The heart is mildly enlarged. No pericardial effusion. No   lymphadenopathy by CT size criteria. There are several scattered bilateral lung nodules measuring up to 5 mm. There   is no lung mass or consolidation. No pneumothorax or pleural effusion. The   central airways are unremarkable. There is a small hiatal hernia. The gallbladder is surgically absent. There is   an incompletely characterized left adrenal nodule measuring 1.6 cm. There are   degenerative changes in the spine without aggressive bony lesion. CXR Results  (Last 48 hours)               03/05/18 2141  XR CHEST PORT Final result    Impression:  Impression: No acute process. Narrative:   Indication: Sepsis, chills       Comparison: 2/14/2018       Portable exam of the chest obtained at 2108 demonstrates normal heart size. Pacer lead is unchanged in position. There is no acute process in the lung   fields. Degenerative changes are seen in the shoulder joints bilaterally. Medical Decision Making   I am the first provider for this patient. I reviewed the vital signs, available nursing notes, past medical history, past surgical history, family history and social history. Vital Signs-Reviewed the patient's vital signs. Patient Vitals for the past 12 hrs:   Temp Pulse Resp BP SpO2   03/05/18 2042 (!) 104.2 °F (40.1 °C) 75 (!) 31 140/71 94 %       Pulse Oximetry Analysis - 94% on room air    Cardiac Monitor:   Rate: 75 bpm  Rhythm: Normal Sinus Rhythm 140/71     EKG interpretation: (Preliminary)  2053  Rhythm: paced; and regular . Rate (approx.): 75  Written by Edy Cagle ED Scribe, as dictated by Merle Brown MD.    Records Reviewed: Nursing Notes, Old Medical Records and Ambulance Run Sheet    Provider Notes (Medical Decision Making):   DDx: infected pacemaker pouch, cellulitis, PNA, UTI, ACS    ED Course:   Initial assessment performed. The patients presenting problems have been discussed, and they are in agreement with the care plan formulated and outlined with them. I have encouraged them to ask questions as they arise throughout their visit. CONSULT NOTE:  10:28 PM  Merle Brown MD spoke with Anita Pierre MD  Specialty: Cardiology  Discussed patient's hx, disposition, and available diagnostic and imaging results. Reviewed care plans. Consultant agrees with plans as outlined. He recommends admitting the pt to hospitalist.    CONSULT NOTE:   11:06 PM  Merle Brown MD spoke with Danny Robledo MD   Specialty: Hospitalist  Discussed pt's hx, disposition, and available diagnostic and imaging results. Reviewed care plans. Consultant will evaluate pt for admission.   Written by Edy Cagle ED Scribe, as dictated by Merle Brown MD.    CRITICAL CARE NOTE :  11:20 PM    IMPENDING DETERIORATION -Cardiovascular and Metabolic  ASSOCIATED RISK FACTORS - Hypotension, Shock, Dysrhythmia, Metabolic changes, Vascular Compromise and CNS Decompensation  MANAGEMENT- Bedside Assessment and Supervision of Care  INTERPRETATION -  Xrays, ECG and Blood Pressure  INTERVENTIONS - hemodynamic mngmt and Metobolic interventions  CASE REVIEW - Hospitalist, Medical Sub-Specialist, Nursing and Family  TREATMENT RESPONSE -Improved  PERFORMED BY - Self    NOTES   :  I have spent 45 minutes of critical care time involved in lab review, consultations with specialist, family decision- making, bedside attention and documentation. During this entire length of time I was immediately available to the patient . Disposition:  ADMIT NOTE:  11:07 PM  Patient is being admitted to the hospital by Dr. Zachary Mittal. The results of their tests and reasons for their admission have been discussed with them and/or available family. They convey agreement and understanding for the need to be admitted and for their admission diagnosis. Consultation has been made with the inpatient physician specialist for hospitalization. PLAN:  Admit to Hospitalist    Diagnosis     Clinical Impression:   1. Infection of pacemaker pocket, initial encounter ([address]    2. Severe sepsis ([address]        Attestations: This note is prepared by Tricia Simmons, acting as Scribe for Ailyn Hinojosa MD.    Ailyn Hinojosa MD: The scribe's documentation has been prepared under my direction and personally reviewed by me in its entirety. I confirm that the note above accurately reflects all work, treatment, procedures, and medical decision making performed by me.

## 2018-03-06 NOTE — H&P
Hospitalist Admission Note    NAME: Jose Juan Edmonds   :  1935   MRN:  [id number]     Date/Time:  3/6/2018 12:41 AM    Patient PCP: Jaleel Elizondo MD  ______________________________________________________________________  Given the patient's current clinical presentation, I have a high level of concern for decompensation if discharged from the emergency department. Complex decision making was performed, which includes reviewing the patient's available past medical records, laboratory results, and x-ray films. My assessment of this patient's clinical condition and my plan of care is as follows. Assessment / Plan:  Sepsis due to UTI vs PM pocket infection in setting of afib s/p ablation and PM placement with Dr. Ann Aaron 18, present on admission:  Last echo 91 with systolic function normal. 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 Pt denies infectious sx of any type other than continued leakage from PM pocket. - EKG with wide QRS, rate 75  - CT chest tonight with fluid collection seen adjacent to left chest ICD. Superimposed  infection is not excluded. Dilatation of the ascending aorta measuring 4.0 cm in diameter. Dilatation of the main pulmonary artery suggesting pulmonary artery  Hypertension. Several scattered bilateral lung nodules measuring up to 5 mm. - blood culture sent, will follow. UA 3+ bacteria, sent for culture.   - IV vancomycin, rocephin for now  - IV fluids  - cardiology to see in AM - will keep NPO for anticipated procedure tomorrow  - con't diltiazem, lopressor  - con't ASA and plavix  Non insulin dependent DM2 controlled without complication:  - holding glucophage for now  - lispro sliding scale  Hypothyroidism:  con't synthroid  Hyperlipidemia: con't pravachol  Depression: con't zoloft    Code Status: Full  Surrogate Decision Maker:   DVT Prophylaxis: SCDs      Subjective:   CHIEF COMPLAINT: fever    HISTORY OF PRESENT ILLNESS:     Mary Jo Sims is a [de-identified] y.o.  female who presents with above. Pt recently underwent afib ablation and PM placement on 2/14/18. She says that she has continued to have drainage from her pocket since that time. Drainage is mostly brown in color. She has been to the ER to have it re-glued, but it has continued to drain. She also called Dr. Saroj Savage office on 3/2 and received additional instructions for care. For the last 2 days, she has been experiencing shaking chills which were so severe that she finally came to the ER. She denies cough or URI sx. No CP other than tenderness around PM pocket site. She denies nausea or vomiting, but appetite has been poor. No stool changes. She has chronic urinary retention and has had botox injection for this, but denies urine changes. No new edema or focal weakness. We were asked to admit for work up and evaluation of the above problems.      Past Medical History:   Diagnosis Date    Anemia     Arrhythmia     AFIB    Ataxia     Webb's esophagus     Cervical spinal stenosis     Coronary atherosclerosis of unspecified type of vessel, native or graft     Diabetes (HCC)     Fatigue     GERD (gastroesophageal reflux disease)     Hyperlipidemia     Hypertension     Ill-defined condition     right torn rotater cuff- no surgery at this time    Liver disease     pt is unaware    Osteoarthritis     Psychiatric disorder     anxiety and depression    Sarcoidosis     Sleep apnea     uses cpap    Stroke ([facility] [address]     TIA'S    Thyroid disease         Past Surgical History:   Procedure Laterality Date    CARDIAC SURG PROCEDURE UNLIST      cardioversion     CARDIAC SURG PROCEDURE UNLIST      watchman device    COLONOSCOPY N/A 12/28/2016    COLONOSCOPY performed by [facility] at [facility] ENDOSCOPY    HX CATARACT REMOVAL      both eyes    HX CHOLECYSTECTOMY      HX COLONOSCOPY  5/8/2013    Repeat in 5 years    HX CYST REMOVAL 10/15    on head     HX HYSTERECTOMY      HX ORTHOPAEDIC Bilateral     knee replacement to both knees    HX ORTHOPAEDIC      spacer btwn L4-5 and L5-6     HX TONSILLECTOMY      HX UROLOGICAL      botox in bladder       Social History   Substance Use Topics    Smoking status: Never Smoker    Smokeless tobacco: Never Used    Alcohol use No        Family History   Problem Relation Age of Onset    Other Mother      Fibromyalgia    Pacemaker Mother     Heart Disease Mother     Heart Failure Mother     COPD Mother     Heart Attack Father 61    Cancer Sister      Multiple Myeloma    Diabetes Brother     Obesity Brother     Pacemaker Brother     Heart Attack Brother      Bundle branch block    No Known Problems Son     Psychiatric Disorder Daughter      Multiple     Allergies   Allergen Reactions    Procardia Xl [Nifedipine] Other (comments)     weakness        Prior to Admission medications    Medication Sig Start Date End Date Taking? Authorizing Provider   Lancets (MICROLET LANCET) misc Check blood sugar twice daily. 3/5/18   Tra Hussein MD   glucose blood VI test strips (CONTOUR NEXT STRIPS) strip Check blood sugar twice daily 3/5/18   Tra Hussein MD   levothyroxine (SYNTHROID) 50 mcg tablet Take 1 Tab by mouth Daily (before breakfast). 1/24/18   Tra Hussein MD   sertraline (ZOLOFT) 100 mg tablet Take 1 Tab by mouth daily. 1/24/18   Tra Hussein MD   benazepril (LOTENSIN) 20 mg tablet Take 1 Tab by mouth daily. Patient taking differently: Take 10 mg by mouth daily. 1/24/18   Tra Hussein MD   metFORMIN ER (GLUCOPHAGE XR) 750 mg tablet TAKE ONE TABLET BY MOUTH TWICE DAILY 1/24/18   Tra Hussein MD   pravastatin (PRAVACHOL) 20 mg tablet TAKE ONE TABLET BY MOUTH NIGHTLY 1/2/18   Tra Hussein MD   Cholecalciferol, Vitamin D3, (VITAMIN D3) 2,000 unit cap capsule Take 2,000 Units by mouth daily.     Historical Provider   omeprazole (PRILOSEC) 40 mg capsule Take 40 mg by mouth daily. Historical Provider   acetaminophen (TYLENOL) 500 mg tablet Take 500 mg by mouth every four (4) hours as needed for Pain. Historical Provider   DILT- mg XR capsule TAKE ONE CAPSULE BY MOUTH ONCE DAILY  Patient taking differently: TAKE TWO CAPSULES BY MOUTH ONCE DAILY 10/5/17   Sheila Castañeda NP   ferrous sulfate (IRON) 325 mg (65 mg iron) tablet Take 325 mg by mouth every other day. Historical Provider   multivit-min-FA-lycopen-lutein (CENTRUM SILVER) 0.4-300-250 mg-mcg-mcg tab Take 1 Tab by mouth daily. Historical Provider   aspirin delayed-release 81 mg tablet Take 81 mg by mouth daily. Historical Provider   magnesium 250 mg tab Take 250 mg by mouth daily. Historical Provider       REVIEW OF SYSTEMS:     I am not able to complete the review of systems because:    The patient is intubated and sedated    The patient has altered mental status due to his acute medical problems    The patient has baseline aphasia from prior stroke(s)    The patient has baseline dementia and is not reliable historian    The patient is in acute medical distress and unable to provide information           Total of 12 systems reviewed as follows:       POSITIVE= underlined text  Negative = text not underlined  General:  fever, chills, sweats, generalized weakness, weight loss/gain,      loss of appetite   Eyes:    blurred vision, eye pain, loss of vision, double vision  ENT:    rhinorrhea, pharyngitis   Respiratory:   cough, sputum production, SOB, STRICKLAND, wheezing, pleuritic pain   Cardiology:   chest pain, palpitations, orthopnea, PND, edema, syncope   Gastrointestinal:  abdominal pain , N/V, diarrhea, dysphagia, constipation, bleeding   Genitourinary:  frequency, urgency, dysuria, hematuria, incontinence   Muskuloskeletal :  arthralgia, myalgia, back pain  Hematology:  easy bruising, nose or gum bleeding, lymphadenopathy   Dermatological: rash, ulceration, pruritis, color change / jaundice  Endocrine:   hot flashes or polydipsia   Neurological:  headache, dizziness, confusion, focal weakness, paresthesia,     Speech difficulties, memory loss, gait difficulty  Psychological: Feelings of anxiety, depression, agitation    Objective:   VITALS:    Visit Vitals    /64    Pulse 75    Temp (!) 104.2 °F (40.1 °C)    Resp (!) 31    Ht 5' 1\" (1.549 m)    Wt 77.1 kg (170 lb)    SpO2 91%    BMI 32.12 kg/m2       PHYSICAL EXAM:    General:    Alert, cooperative, no distress, appears stated age. HEENT: Atraumatic, anicteric sclerae, pink conjunctivae     No oral ulcers, mucosa moist, throat clear, dentition fair  Neck:  Supple, symmetrical,  thyroid: non tender  Lungs:   Clear to auscultation bilaterally. No Wheezing or Rhonchi. No rales. Chest wall:  No tenderness  No Accessory muscle use. L upper chest PM site with mild warmth, black drainage on open dressing. Heart:   Regular  rhythm,  No  murmur   No edema  Abdomen:   Soft, non-tender. Not distended. Bowel sounds normal  Extremities: No cyanosis. No clubbing,      Skin turgor normal, Capillary refill normal, Radial dial pulse 2+  Skin:     Not pale. Not Jaundiced  No rashes   Psych:  Fair insight. Not depressed. Not anxious or agitated. Neurologic: EOMs intact. No facial asymmetry. No aphasia or slurred speech. Symmetrical strength, Sensation grossly intact.  Alert and oriented X 4.     _______________________________________________________________________  Care Plan discussed with:    Comments   Patient x    Family  x    RN x    Care Manager                    Consultant:      _______________________________________________________________________  Expected  Disposition:   Home with Family x   HH/PT/OT/RN x   SNF/LTC    HORACIO    ________________________________________________________________________  TOTAL TIME:  54 Minutes    Critical Care Provided     Minutes non procedure based      Comments    x Reviewed previous records   >50% of visit spent in counseling and coordination of care x Discussion with patient and/or family and questions answered       ________________________________________________________________________  Signed: Jennifer Antonio MD    Procedures: see electronic medical records for all procedures/Xrays and details which were not copied into this note but were reviewed prior to creation of Plan. LAB DATA REVIEWED:    Recent Results (from the past 24 hour(s))   LACTIC ACID    Collection Time: 03/05/18  9:00 PM   Result Value Ref Range    Lactic acid 2.3 (HH) 0.4 - 2.0 MMOL/L   CBC WITH AUTOMATED DIFF    Collection Time: 03/05/18  9:00 PM   Result Value Ref Range    WBC 16.4 (H) 3.6 - 11.0 K/uL    RBC 3.61 (L) 3.80 - 5.20 M/uL    HGB 10.6 (L) 11.5 - 16.0 g/dL    HCT 33.6 (L) 35.0 - 47.0 %    MCV 93.1 80.0 - 99.0 FL    MCH 29.4 26.0 - 34.0 PG    MCHC 31.5 30.0 - 36.5 g/dL    RDW 14.5 11.5 - 14.5 %    PLATELET 146 010 - 066 K/uL    MPV 10.2 8.9 - 12.9 FL    NRBC 0.0 0  WBC    ABSOLUTE NRBC 0.00 0.00 - 0.01 K/uL    NEUTROPHILS 91 (H) 32 - 75 %    LYMPHOCYTES 3 (L) 12 - 49 %    MONOCYTES 5 5 - 13 %    EOSINOPHILS 0 0 - 7 %    BASOPHILS 0 0 - 1 %    IMMATURE GRANULOCYTES 1 (H) 0.0 - 0.5 %    ABS. NEUTROPHILS 14.9 (H) 1.8 - 8.0 K/UL    ABS. LYMPHOCYTES 0.5 (L) 0.8 - 3.5 K/UL    ABS. MONOCYTES 0.8 0.0 - 1.0 K/UL    ABS. EOSINOPHILS 0.0 0.0 - 0.4 K/UL    ABS. BASOPHILS 0.0 0.0 - 0.1 K/UL    ABS. IMM.  GRANS. 0.2 (H) 0.00 - 0.04 K/UL    DF SMEAR SCANNED      RBC COMMENTS NORMOCYTIC, NORMOCHROMIC      WBC COMMENTS TOXIC GRANULATION     METABOLIC PANEL, COMPREHENSIVE    Collection Time: 03/05/18  9:00 PM   Result Value Ref Range    Sodium 137 136 - 145 mmol/L    Potassium 4.2 3.5 - 5.1 mmol/L    Chloride 105 97 - 108 mmol/L    CO2 23 21 - 32 mmol/L    Anion gap 9 5 - 15 mmol/L    Glucose 191 (H) 65 - 100 mg/dL    BUN 25 (H) 6 - 20 MG/DL    Creatinine 1.28 (H) 0.55 - 1.02 MG/DL    BUN/Creatinine ratio 20 12 - 20 GFR est AA 49 (L) >60 ml/min/1.73m2    GFR est non-AA 40 (L) >60 ml/min/1.73m2    Calcium 8.6 8.5 - 10.1 MG/DL    Bilirubin, total 1.4 (H) 0.2 - 1.0 MG/DL    ALT (SGPT) 23 12 - 78 U/L    AST (SGOT) 20 15 - 37 U/L    Alk.  phosphatase 115 45 - 117 U/L    Protein, total 7.3 6.4 - 8.2 g/dL    Albumin 3.2 (L) 3.5 - 5.0 g/dL    Globulin 4.1 (H) 2.0 - 4.0 g/dL    A-G Ratio 0.8 (L) 1.1 - 2.2     TROPONIN I    Collection Time: 03/05/18  9:00 PM   Result Value Ref Range    Troponin-I, Qt. <0.04 <0.05 ng/mL

## 2018-03-06 NOTE — CONSULTS
Infectious Disease Consult Note    Reason for Consult: bacteremia, pacer infection   Date of Consultation: March 6, 2018  Date of Admission: 3/5/2018  Referring Physician: Kristal Ventura       HPI:  Ms Verna Bravo is a [de-identified]year old lady with hx of Atrial fibrillation S/P ablation and pacemaker inserted (2/14/18), cervical diskectomy/fusion with biomechanical implants C4-5-6,  Bilateral total knee replacements admitted with fever and found to have high grade bacteremia (3/3 bottles on admission). She is noted to have gone to the ER on 2/28 with concerns of oozing/bleeding from the pacer area and noted to be oozing \" dark blood \" per ER notes. Notes indicate she had a 4 cm wound and \"Wound was cleansed with Betadine. Dermabond and Surgifoam was applied and Tegriderm was placed over the wound\". She says that it has been oozing since then. Started getting high fever and chills making her concerned and came to the ER. She denied any cough, URI, nausea, vomiting, diarrhea or dysuria symptoms. She says that she has been taking \"sponge baths\" and not immersing herself in any water. Denied falls or trauma to chest wall area. Says that he has had  Fall in past and went to [address] [place] [address] and dx wt concussion but this was way before this year. Denied any knee pain,edema, erythema or cervical area pain. From chart review, T max 104.2 3/5/18, leukocytosis up to 16. Cr was at 1.28 on admission that is improving. Lactate up to 2.3 with repeat at 1. Blood cx + 3/3 for G+C clusters. CT chest done and showed fluid collection \"left chest ICD with adjacent fluid collection measuring up to 1.9 x  5.1 cm \". She has been started on Ceftriaxone and Vancomycin IV. UA showed 5-10 WBC, 3+bacteria, negative nitrites and small leukocyte esterase. Urine culture has been sent as well.          Past Medical History:  Past Medical History:   Diagnosis Date    Anemia     Arrhythmia     AFIB    Ataxia     Webb's esophagus     Cervical spinal stenosis     Coronary atherosclerosis of unspecified type of vessel, native or graft     Diabetes ([facility] [address]     Fatigue     GERD (gastroesophageal reflux disease)     Hyperlipidemia     Hypertension     Ill-defined condition     right torn rotater cuff- no surgery at this time    Liver disease     pt is unaware    Osteoarthritis     Psychiatric disorder     anxiety and depression    Sarcoidosis     Sleep apnea     uses cpap    Stroke ([facility] [address]     TIA'S    Thyroid disease          Surgical History:  Past Surgical History:   Procedure Laterality Date    CARDIAC SURG PROCEDURE UNLIST      cardioversion     CARDIAC SURG PROCEDURE UNLIST      watchman device    COLONOSCOPY N/A 12/28/2016    COLONOSCOPY performed by [facility] ENDOSCOPY    HX CATARACT REMOVAL      both eyes    HX CHOLECYSTECTOMY      HX COLONOSCOPY  5/8/2013    Repeat in 5 years    HX CYST REMOVAL  10/15    on head     HX HYSTERECTOMY      HX ORTHOPAEDIC Bilateral     knee replacement to both knees    HX ORTHOPAEDIC      spacer btwn L4-5 and L5-6     HX TONSILLECTOMY      HX UROLOGICAL      botox in bladder         Family History:   Family History   Problem Relation Age of Onset    Other Mother      Fibromyalgia    Pacemaker Mother     Heart Disease Mother     Heart Failure Mother     COPD Mother     Heart Attack Father 61    Cancer Sister      Multiple Myeloma    Diabetes Brother     Obesity Brother     Pacemaker Brother     Heart Attack Brother      Bundle branch block    No Known Problems Son     Psychiatric Disorder Daughter      Multiple         Social History:     · Living Situation: at home,    · Tobacco:denied   · Alcohol:denied   · Illicit Drugs:denied   · Sexual History:     · Travel History: denied recently   · Exposures:   · Outdoor/Hiking: denied  · Animal/Pet: has cat   · Construction: denied  · Hot Tub: denied   · Brackish/stagnant exposure: denied  · TB exposure: denied  · Sick Contacts: denied     Allergies: Allergies   Allergen Reactions    Procardia Xl [Nifedipine] Other (comments)     weakness         Review of Systems:     Gen: + chills , fever    HEENT: Negative for headache, vision changes, ear ache or discharge, tingling,  nasal discharge, swelling, lumps in neck, sores on tongue   CV:  Negative for chest pain, dyspnea on exertion, leg edema   Lungs: Negative for shortness of breath, cough, wheezing   Abdomen: Negative for abdominal pain, nausea, vomiting, diarrhea, constipation   Genitourinary: Negative for genital pain or genital discharge     Neuro: Negative for headache, numbness, tingling, extremity weakness,  syncope, seizures    Skin: Negative for rash, sores/open wounds   Musculoskeletal: + discharge from pacer site , edema around pacer site    Endocrine: Negative for high or low blood sugars, heat or cold intolerance    Psych: Negative for manic behavior     Medications:  No current facility-administered medications on file prior to encounter. Current Outpatient Prescriptions on File Prior to Encounter   Medication Sig Dispense Refill    Lancets (MICROLET LANCET) misc Check blood sugar twice daily. 1 Each 11    glucose blood VI test strips (CONTOUR NEXT STRIPS) strip Check blood sugar twice daily 100 Strip 1    levothyroxine (SYNTHROID) 50 mcg tablet Take 1 Tab by mouth Daily (before breakfast). 90 Tab 1    benazepril (LOTENSIN) 20 mg tablet Take 1 Tab by mouth daily. (Patient taking differently: Take 10 mg by mouth daily.) 90 Tab 1    metFORMIN ER (GLUCOPHAGE XR) 750 mg tablet TAKE ONE TABLET BY MOUTH TWICE DAILY 180 Tab 0    Cholecalciferol, Vitamin D3, (VITAMIN D3) 2,000 unit cap capsule Take 2,000 Units by mouth daily.  omeprazole (PRILOSEC) 40 mg capsule Take 40 mg by mouth daily.  acetaminophen (TYLENOL) 500 mg tablet Take 500 mg by mouth every four (4) hours as needed for Pain.       DILT- mg XR capsule TAKE ONE CAPSULE BY MOUTH ONCE DAILY (Patient taking differently: TAKE TWO CAPSULES BY MOUTH ONCE DAILY) 30 Cap 11    multivit-min-FA-lycopen-lutein (CENTRUM SILVER) 0.4-300-250 mg-mcg-mcg tab Take 1 Tab by mouth daily.  aspirin delayed-release 81 mg tablet Take 81 mg by mouth daily.  sertraline (ZOLOFT) 100 mg tablet Take 1 Tab by mouth daily. 135 Tab 1    pravastatin (PRAVACHOL) 20 mg tablet TAKE ONE TABLET BY MOUTH NIGHTLY 90 Tab 0    ferrous sulfate (IRON) 325 mg (65 mg iron) tablet Take 325 mg by mouth every other day.  magnesium 250 mg tab Take 250 mg by mouth daily.              Physical Exam:    Vitals: Patient Vitals for the past 24 hrs:   Temp Pulse Resp BP SpO2   03/06/18 1112 98.9 °F (37.2 °C) 77 16 134/75 91 %   03/06/18 0738 97.6 °F (36.4 °C) 75 26 137/67 90 %   03/06/18 0710 - 75 (!) 32 158/86 91 %   03/06/18 0703 - 76 30 130/61 (!) 86 %   03/06/18 0624 - 78 12 (!) 203/192 94 %   03/06/18 0200 - 75 25 132/63 92 %   03/06/18 0127 98.4 °F (36.9 °C) - - - -   03/06/18 0017 - 75 (!) 31 - 91 %   03/05/18 2345 - 75 28 134/64 92 %   03/05/18 2330 - 83 21 159/55 94 %   03/05/18 2315 - 75 25 117/63 92 %   03/05/18 2300 - 75 (!) 34 128/63 93 %   03/05/18 2245 - 75 27 136/71 93 %   03/05/18 2236 - 75 24 - 93 %   03/05/18 2234 - - - 144/64 -   03/05/18 2200 - 75 27 128/64 93 %   03/05/18 2145 - 75 21 138/68 94 %   03/05/18 2130 - 78 20 138/63 95 %   03/05/18 2116 - 75 19 151/73 96 %   03/05/18 2101 - 75 22 145/70 96 %   03/05/18 2045 - 75 28 152/73 95 %   03/05/18 2042 (!) 104.2 °F (40.1 °C) 75 (!) 31 140/71 94 %   03/05/18 2039 - 75 30 - 94 %   03/05/18 2037 - - - 140/71 -   ·   · GEN: NAD  · HEENT: no scleral icterus,   no thrush  · CV: S1, S2 heard , + pacer site has some edema, fluctuant pocket, mild erythema , slight warmth but not significant, superficial skin tear above site noted , no vesicles , no drainage noted from incision site at this time   · Lungs: Clear to auscultation bilaterally  · Abdomen: soft, non distended, non tender, no organomegaly appreciated, no CVA tenderness   · Genitourinary:  no moreland  · Extremities: no edema, B/L knees non tender, good ROM , no erythema   · Neuro: Alert, oriented to time, place and situation, moves all extremities to commands, verbal   · Skin: no rash  · Psych: good affect, good eye contact, non tearful   · Lines: peripheral       Labs:   Recent Results (from the past 24 hour(s))   EKG, 12 LEAD, INITIAL    Collection Time: 03/05/18  8:53 PM   Result Value Ref Range    Ventricular Rate 75 BPM    Atrial Rate 35 BPM    QRS Duration 150 ms    Q-T Interval 428 ms    QTC Calculation (Bezet) 477 ms    Calculated R Axis -86 degrees    Calculated T Axis 53 degrees    Diagnosis       Wide QRS rhythm  Left axis deviation  Nonspecific intraventricular block  Inferior infarct , age undetermined  Anterolateral infarct , age undetermined  When compared with ECG of 06-DEC-2017 11:11,  Wide QRS rhythm has replaced Sinus rhythm     CULTURE, BLOOD, PAIRED    Collection Time: 03/05/18  9:00 PM   Result Value Ref Range    Special Requests: NO SPECIAL REQUESTS      Culture result: (A)       GRAM POSITIVE COCCI IN CLUSTERS GROWING IN 3 OF 3 BOTTLES DRAWN (SITES = LAC AND RAC)   LACTIC ACID    Collection Time: 03/05/18  9:00 PM   Result Value Ref Range    Lactic acid 2.3 (HH) 0.4 - 2.0 MMOL/L   CBC WITH AUTOMATED DIFF    Collection Time: 03/05/18  9:00 PM   Result Value Ref Range    WBC 16.4 (H) 3.6 - 11.0 K/uL    RBC 3.61 (L) 3.80 - 5.20 M/uL    HGB 10.6 (L) 11.5 - 16.0 g/dL    HCT 33.6 (L) 35.0 - 47.0 %    MCV 93.1 80.0 - 99.0 FL    MCH 29.4 26.0 - 34.0 PG    MCHC 31.5 30.0 - 36.5 g/dL    RDW 14.5 11.5 - 14.5 %    PLATELET 442 580 - 618 K/uL    MPV 10.2 8.9 - 12.9 FL    NRBC 0.0 0  WBC    ABSOLUTE NRBC 0.00 0.00 - 0.01 K/uL    NEUTROPHILS 91 (H) 32 - 75 %    LYMPHOCYTES 3 (L) 12 - 49 %    MONOCYTES 5 5 - 13 %    EOSINOPHILS 0 0 - 7 %    BASOPHILS 0 0 - 1 %    IMMATURE GRANULOCYTES 1 (H) 0.0 - 0.5 %    ABS. NEUTROPHILS 14.9 (H) 1.8 - 8.0 K/UL    ABS. LYMPHOCYTES 0.5 (L) 0.8 - 3.5 K/UL    ABS. MONOCYTES 0.8 0.0 - 1.0 K/UL    ABS. EOSINOPHILS 0.0 0.0 - 0.4 K/UL    ABS. BASOPHILS 0.0 0.0 - 0.1 K/UL    ABS. IMM. GRANS. 0.2 (H) 0.00 - 0.04 K/UL    DF SMEAR SCANNED      RBC COMMENTS NORMOCYTIC, NORMOCHROMIC      WBC COMMENTS TOXIC GRANULATION     METABOLIC PANEL, COMPREHENSIVE    Collection Time: 03/05/18  9:00 PM   Result Value Ref Range    Sodium 137 136 - 145 mmol/L    Potassium 4.2 3.5 - 5.1 mmol/L    Chloride 105 97 - 108 mmol/L    CO2 23 21 - 32 mmol/L    Anion gap 9 5 - 15 mmol/L    Glucose 191 (H) 65 - 100 mg/dL    BUN 25 (H) 6 - 20 MG/DL    Creatinine 1.28 (H) 0.55 - 1.02 MG/DL    BUN/Creatinine ratio 20 12 - 20      GFR est AA 49 (L) >60 ml/min/1.73m2    GFR est non-AA 40 (L) >60 ml/min/1.73m2    Calcium 8.6 8.5 - 10.1 MG/DL    Bilirubin, total 1.4 (H) 0.2 - 1.0 MG/DL    ALT (SGPT) 23 12 - 78 U/L    AST (SGOT) 20 15 - 37 U/L    Alk.  phosphatase 115 45 - 117 U/L    Protein, total 7.3 6.4 - 8.2 g/dL    Albumin 3.2 (L) 3.5 - 5.0 g/dL    Globulin 4.1 (H) 2.0 - 4.0 g/dL    A-G Ratio 0.8 (L) 1.1 - 2.2     TROPONIN I    Collection Time: 03/05/18  9:00 PM   Result Value Ref Range    Troponin-I, Qt. <0.04 <0.05 ng/mL   URINALYSIS W/ REFLEX CULTURE    Collection Time: 03/05/18 11:34 PM   Result Value Ref Range    Color DARK YELLOW      Appearance CLEAR CLEAR      Specific gravity 1.024 1.003 - 1.030      pH (UA) 5.0 5.0 - 8.0      Protein 30 (A) NEG mg/dL    Glucose NEGATIVE  NEG mg/dL    Ketone NEGATIVE  NEG mg/dL    Blood MODERATE (A) NEG      Urobilinogen 1.0 0.2 - 1.0 EU/dL    Nitrites NEGATIVE  NEG      Leukocyte Esterase SMALL (A) NEG      WBC 5-10 0 - 4 /hpf    RBC 5-10 0 - 5 /hpf    Epithelial cells FEW FEW /lpf    Bacteria 3+ (A) NEG /hpf    UA:UC IF INDICATED URINE CULTURE ORDERED (A) CNI     BILIRUBIN, CONFIRM    Collection Time: 03/05/18 11:34 PM   Result Value Ref Range    Bilirubin UA, confirm NEGATIVE  NEG     GLUCOSE, POC    Collection Time: 03/06/18  2:20 AM   Result Value Ref Range    Glucose (POC) 181 (H) 65 - 100 mg/dL    Performed by Markos Antonio    LACTIC ACID    Collection Time: 03/06/18  2:42 AM   Result Value Ref Range    Lactic acid 1.0 0.4 - 2.0 MMOL/L   CBC WITH AUTOMATED DIFF    Collection Time: 03/06/18  4:17 AM   Result Value Ref Range    WBC 16.7 (H) 3.6 - 11.0 K/uL    RBC 3.07 (L) 3.80 - 5.20 M/uL    HGB 9.0 (L) 11.5 - 16.0 g/dL    HCT 28.5 (L) 35.0 - 47.0 %    MCV 92.8 80.0 - 99.0 FL    MCH 29.3 26.0 - 34.0 PG    MCHC 31.6 30.0 - 36.5 g/dL    RDW 14.8 (H) 11.5 - 14.5 %    PLATELET 192 169 - 577 K/uL    MPV 10.5 8.9 - 12.9 FL    NRBC 0.0 0  WBC    ABSOLUTE NRBC 0.00 0.00 - 0.01 K/uL    NEUTROPHILS 90 (H) 32 - 75 %    LYMPHOCYTES 3 (L) 12 - 49 %    MONOCYTES 6 5 - 13 %    EOSINOPHILS 0 0 - 7 %    BASOPHILS 0 0 - 1 %    IMMATURE GRANULOCYTES 1 (H) 0.0 - 0.5 %    ABS. NEUTROPHILS 14.9 (H) 1.8 - 8.0 K/UL    ABS. LYMPHOCYTES 0.5 (L) 0.8 - 3.5 K/UL    ABS. MONOCYTES 1.1 (H) 0.0 - 1.0 K/UL    ABS. EOSINOPHILS 0.0 0.0 - 0.4 K/UL    ABS. BASOPHILS 0.0 0.0 - 0.1 K/UL    ABS. IMM.  GRANS. 0.2 (H) 0.00 - 0.04 K/UL    DF AUTOMATED     METABOLIC PANEL, BASIC    Collection Time: 03/06/18  4:17 AM   Result Value Ref Range    Sodium 140 136 - 145 mmol/L    Potassium 3.7 3.5 - 5.1 mmol/L    Chloride 110 (H) 97 - 108 mmol/L    CO2 19 (L) 21 - 32 mmol/L    Anion gap 11 5 - 15 mmol/L    Glucose 184 (H) 65 - 100 mg/dL    BUN 23 (H) 6 - 20 MG/DL    Creatinine 0.97 0.55 - 1.02 MG/DL    BUN/Creatinine ratio 24 (H) 12 - 20      GFR est AA >60 >60 ml/min/1.73m2    GFR est non-AA 55 (L) >60 ml/min/1.73m2    Calcium 7.9 (L) 8.5 - 10.1 MG/DL   PROTHROMBIN TIME + INR    Collection Time: 03/06/18  4:17 AM   Result Value Ref Range    INR 1.2 (H) 0.9 - 1.1      Prothrombin time 12.1 (H) 9.0 - 11.1 sec   GLUCOSE, POC    Collection Time: 03/06/18  6:26 AM   Result Value Ref Range    Glucose (POC) 158 (H) 65 - 100 mg/dL    Performed by Arpit Alex (PCT)    GLUCOSE, POC    Collection Time: 03/06/18 11:11 AM   Result Value Ref Range    Glucose (POC) 209 (H) 65 - 100 mg/dL    Performed by Kaley Lorenzana        Microbiology Data:       Blood:       Component Results      Component Value Ref Range & Units Status     Special Requests: NO SPECIAL REQUESTS   Preliminary     Culture result:    Preliminary     GRAM POSITIVE COCCI IN CLUSTERS GROWING IN 3 OF 3 BOTTLES DRAWN (SITES = LAC AND RAC) (A)       Result History            Imaging:     CT Chest   FINDINGS:   There is a left chest ICD with adjacent fluid collection measuring up to 1.9 x  5.1 cm (series 2, image 10. There is minimal surrounding fat stranding. An ICD  wire terminates in the right ventricle.     The visualized thyroid gland is unremarkable. The ascending aorta measures up to  4.0 cm in diameter. The main pulmonary artery is dilated measuring 4.1 cm in  diameter. The heart is mildly enlarged. No pericardial effusion. No  lymphadenopathy by CT size criteria.     There are several scattered bilateral lung nodules measuring up to 5 mm. There  is no lung mass or consolidation. No pneumothorax or pleural effusion. The  central airways are unremarkable.     There is a small hiatal hernia. The gallbladder is surgically absent. There is  an incompletely characterized left adrenal nodule measuring 1.6 cm. There are  degenerative changes in the spine without aggressive bony lesion.     IMPRESSION  IMPRESSION:   1. Fluid collection is seen adjacent to the left chest ICD. Superimposed  infection is not excluded. 2. Dilatation of the ascending aorta measuring 4.0 cm in diameter. 3. Dilatation of the main pulmonary artery suggesting pulmonary artery  hypertension. 4. Several scattered bilateral lung nodules measuring up to 5 mm may be  infectious or inflammatory but follow-up chest CT could be performed as  clinically warranted.   5. Incompletely characterized left adrenal nodule measuring 1.6 cm.          Assessment / Plan:     Ms Iam Huerta is a [de-identified]year old lady with hx of Atrial fibrillation S/P ablation and pacemaker inserted (2/14/18), cervical diskectomy/fusion with biomechanical implants C4-5-6,  Bilateral total knee replacements admitted with fever and found to have high grade bacteremia (3/3 bottles on admission). She is noted to have gone to the ER on 2/28 with concerns of oozing/bleeding from the pacer area and noted to be oozing \" dark blood \" per ER notes. Notes indicate she had a 4 cm wound and \"Wound was cleansed with Betadine. Dermabond and Surgifoam was applied and Tegriderm was placed over the wound\". She says that it has been oozing since then. Started getting high fever and chills making her concerned and came to the ER. She denied any cough, URI, nausea, vomiting, diarrhea or dysuria symptoms. She says that she has been taking \"sponge baths\" and not immersing herself in any water. Denied falls or trauma to chest wall area. Says that he has had  Fall in past and went to [facility] and dx wt concussion but this was way before this year. Denied any knee pain,edema, erythema or cervical area pain. From chart review, T max 104.2 3/5/18, leukocytosis up to 16. Cr was at 1.28 on admission that is improving. Lactate up to 2.3 with repeat at 1. Blood cx + 3/3 for G+C clusters. CT chest done and showed fluid collection \"left chest ICD with adjacent fluid collection measuring up to 1.9 x  5.1 cm \". She has been started on Ceftriaxone and Vancomycin IV. UA showed 5-10 WBC, 3+bacteria, negative nitrites and small leukocyte esterase. Urine culture has been sent as well.      1) G+C clusters bacteremia ( high grade 3/3 bottles) and pacer site infection     Recommendations  Continue IV Vancomycin with trough goal of 15-20 with renal monitoring  Check JESSIE  Noted plans for pacer extraction   JESSIE findings will help guide duration of antibiotics   Await Identification and susceptibilities of organism and de escalate antibiotics as able   Check blood cultures ( 2 sets) Q 24-48 hours until clearance obtained     2) Bacteruria:  Asymptomatic, would suspect high fever from already known source bacteremia and pacer pocket infection   On Rocephin. Noted cultures sent. Would expect bacteruria in a elderly female patient  Would suggest stopping Ceftriaxone  and if any clinical deterioration, can evaluate for gram negative coverage     3) TKR B/L  Asymptomatic   Bacteremia is risk for seeding    4) S/P Cervical diskectomy and fusion wt biomechanical implants C4-6  Asymptomatic at this time     5) DVT Px per primary team       Thank for the opportunity to participate in the care of this patient. Please contact with questions or concerns.        Leo Nascimento,   2:46 PM

## 2018-03-06 NOTE — PROGRESS NOTES
Physical Therapy Screening: Pt should mobilize with nursing to maintain current functional mobility     An InBasket screening referral was triggered for physical therapy based on results obtained during the nursing admission assessment. The patients chart was reviewed and the patient is appropriate for a skilled therapy evaluation if there is a decline in functional mobility from baseline. Please order a consult for physical therapy if you are in agreement and would like an evaluation to be completed. Thank you.     Mazin Kelley, PT, DPT

## 2018-03-06 NOTE — PROGRESS NOTES
Hospitalist Progress Note    NAME: Nenita Bonilla   :  1935   MRN:  [id number]       Assessment / Plan:  Sepsis due to UTI vs PM pocket infection in setting of afib s/p ablation and PM placement with Dr. Mcgraw Roles 18, present on admission:  Last echo 9/3/48 with systolic function normal. 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 Pt denies infectious sx of any type other than continued leakage from PM pocket. SHE DOES not look Septic however T max 104.2  WBC is still elevated, UA cultures pending but no growth but Blood culture looks like it is going to be Positive. Concerning that this is more a PM Pocket infection. Consulted/ Notified Cardiology  On Vanc and Ceftriaxone change to Zosyn  - EKG with wide QRS, rate 75  - CT chest tonight with fluid collection seen adjacent to left chest ICD. Superimposed  infection is not excluded. Dilatation of the ascending aorta measuring 4.0 cm in diameter. Dilatation of the main pulmonary artery suggesting pulmonary artery  Hypertension. Several scattered bilateral lung nodules measuring up to 5 mm. - blood culture sent, will follow. UA 3+ bacteria, sent for culture. - IV fluids  - cardiology to see in AM - will keep NPO for anticipated procedure tomorrow  - con't diltiazem, lopressor  - con't ASA and plavix  Non insulin dependent DM2 controlled without complication:  - holding glucophage for now  - lispro sliding scale  Hypothyroidism:  con't synthroid  Hyperlipidemia: con't pravachol  Depression: con't zoloft     Code Status: Full  Surrogate Decision Maker:   DVT Prophylaxis: SCDs      Body mass index is 32.12 kg/(m^2). Subjective:     Chief Complaint / Reason for Physician Visit f/u for SEPSIS, UTI versus PM pocket infection    3/6  \"  I feel a lot better than when I came in. They say I catrina have to have my PM out? \". Discussed with RN events overnight.      Review of Systems:  Symptom Y/N Comments  Symptom Y/N Comments   Fever/Chills y   Chest Pain n    Poor Appetite n   Edema n    Cough n   Abdominal Pain n    Sputum n   Joint Pain     SOB/STRICKLAND n   Pruritis/Rash     Nausea/vomit n   Tolerating PT/OT     Diarrhea n   Tolerating Diet y    Constipation    Other       Could NOT obtain due to:      Objective:     VITALS:   Last 24hrs VS reviewed since prior progress note. Most recent are:  Patient Vitals for the past 24 hrs:   Temp Pulse Resp BP SpO2   03/06/18 0738 97.6 °F (36.4 °C) 75 26 137/67 90 %   03/06/18 0710 - 75 (!) 32 158/86 91 %   03/06/18 0703 - 76 30 130/61 (!) 86 %   03/06/18 0624 - 78 12 (!) 203/192 94 %   03/06/18 0200 - 75 25 132/63 92 %   03/06/18 0127 98.4 °F (36.9 °C) - - - -   03/06/18 0017 - 75 (!) 31 - 91 %   03/05/18 2345 - 75 28 134/64 92 %   03/05/18 2330 - 83 21 159/55 94 %   03/05/18 2315 - 75 25 117/63 92 %   03/05/18 2300 - 75 (!) 34 128/63 93 %   03/05/18 2245 - 75 27 136/71 93 %   03/05/18 2236 - 75 24 - 93 %   03/05/18 2234 - - - 144/64 -   03/05/18 2200 - 75 27 128/64 93 %   03/05/18 2145 - 75 21 138/68 94 %   03/05/18 2130 - 78 20 138/63 95 %   03/05/18 2116 - 75 19 151/73 96 %   03/05/18 2101 - 75 22 145/70 96 %   03/05/18 2045 - 75 28 152/73 95 %   03/05/18 2042 (!) 104.2 °F (40.1 °C) 75 (!) 31 140/71 94 %   03/05/18 2039 - 75 30 - 94 %   03/05/18 2037 - - - 140/71 -     No intake or output data in the 24 hours ending 03/06/18 0849     PHYSICAL EXAM:   She does not look septic  General: WD, WN. Alert, cooperative, no acute distress    EENT:  EOMI. Anicteric sclerae. MMM  Resp:  CTA bilaterally, no wheezing or rales. No accessory muscle use  CV:  Regular  rhythm,  No edema, PM site is not erythematous, non tender, not warm,   GI:  Soft, Non distended, Non tender.  +Bowel sounds  Neurologic:  Alert and oriented X 3, normal speech,   Psych:   Good insight. Not anxious nor agitated  Skin:  No rashes.   No jaundice    Reviewed most current lab test results and cultures  YES  Reviewed most current radiology test results   YES  Review and summation of old records today    NO  Reviewed patient's current orders and MAR    YES  PMH/SH reviewed - no change compared to H&P  ________________________________________________________________________  Care Plan discussed with:    Comments   Patient y    Family      RN     Care Manager     Consultant                        Multidiciplinary team rounds were held today with , nursing, pharmacist and clinical coordinator. Patient's plan of care was discussed; medications were reviewed and discharge planning was addressed. ________________________________________________________________________  Total NON critical care TIME:  25   Minutes    Total CRITICAL CARE TIME Spent:   Minutes non procedure based      Comments   >50% of visit spent in counseling and coordination of care Yes Reviewed Chart   ________________________________________________________________________  Michela Apgar, MD     Procedures: see electronic medical records for all procedures/Xrays and details which were not copied into this note but were reviewed prior to creation of Plan. LABS:  I reviewed today's most current labs and imaging studies.   Pertinent labs include:  Recent Labs      18   0417  18   2100   WBC  16.7*  16.4*   HGB  9.0*  10.6*   HCT  28.5*  33.6*   PLT  200  229     Recent Labs      18   0417  18   2100   NA  140  137   K  3.7  4.2   CL  110*  105   CO2  19*  23   GLU  184*  191*   BUN  23*  25*   CREA  0.97  1.28*   CA  7.9*  8.6   ALB   --   3.2*   TBILI   --   1.4*   SGOT   --   20   ALT   --   23   INR  1.2*   --        Signed: Michela Apgar, MD

## 2018-03-06 NOTE — ROUTINE PROCESS
Received a call from lab stating that one blood culture bottles ( Left AC)  collected at 2100 yesterday was growing gram + cocci. MD notified. No new orders. Will continue to monitor patient. Received a call from [facility] lab stating that all three bottles are growing gram + cocci. MD paged.

## 2018-03-07 ENCOUNTER — ANESTHESIA (OUTPATIENT)
Dept: NON INVASIVE DIAGNOSTICS | Age: 83
DRG: 228 | End: 2018-03-07
Payer: MEDICARE

## 2018-03-07 ENCOUNTER — APPOINTMENT (OUTPATIENT)
Dept: GENERAL RADIOLOGY | Age: 83
DRG: 228 | End: 2018-03-07
Attending: INTERNAL MEDICINE
Payer: MEDICARE

## 2018-03-07 ENCOUNTER — ANESTHESIA EVENT (OUTPATIENT)
Dept: NON INVASIVE DIAGNOSTICS | Age: 83
DRG: 228 | End: 2018-03-07
Payer: MEDICARE

## 2018-03-07 LAB
ANION GAP SERPL CALC-SCNC: 10 MMOL/L (ref 5–15)
ATRIAL RATE: 35 BPM
BUN SERPL-MCNC: 26 MG/DL (ref 6–20)
BUN/CREAT SERPL: 25 (ref 12–20)
CALCIUM SERPL-MCNC: 7.4 MG/DL (ref 8.5–10.1)
CALCULATED R AXIS, ECG10: -86 DEGREES
CALCULATED T AXIS, ECG11: 53 DEGREES
CHLORIDE SERPL-SCNC: 109 MMOL/L (ref 97–108)
CO2 SERPL-SCNC: 19 MMOL/L (ref 21–32)
CREAT SERPL-MCNC: 1.06 MG/DL (ref 0.55–1.02)
DIAGNOSIS, 93000: NORMAL
ERYTHROCYTE [DISTWIDTH] IN BLOOD BY AUTOMATED COUNT: 15 % (ref 11.5–14.5)
GLUCOSE BLD STRIP.AUTO-MCNC: 146 MG/DL (ref 65–100)
GLUCOSE BLD STRIP.AUTO-MCNC: 146 MG/DL (ref 65–100)
GLUCOSE BLD STRIP.AUTO-MCNC: 186 MG/DL (ref 65–100)
GLUCOSE BLD STRIP.AUTO-MCNC: 200 MG/DL (ref 65–100)
GLUCOSE BLD STRIP.AUTO-MCNC: 217 MG/DL (ref 65–100)
GLUCOSE SERPL-MCNC: 139 MG/DL (ref 65–100)
HCT VFR BLD AUTO: 29.7 % (ref 35–47)
HGB BLD-MCNC: 9.4 G/DL (ref 11.5–16)
MAGNESIUM SERPL-MCNC: 2 MG/DL (ref 1.6–2.4)
MCH RBC QN AUTO: 29.1 PG (ref 26–34)
MCHC RBC AUTO-ENTMCNC: 31.6 G/DL (ref 30–36.5)
MCV RBC AUTO: 92 FL (ref 80–99)
NRBC # BLD: 0 K/UL (ref 0–0.01)
NRBC BLD-RTO: 0 PER 100 WBC
PLATELET # BLD AUTO: 205 K/UL (ref 150–400)
PMV BLD AUTO: 10.7 FL (ref 8.9–12.9)
POTASSIUM SERPL-SCNC: 3.8 MMOL/L (ref 3.5–5.1)
Q-T INTERVAL, ECG07: 428 MS
QRS DURATION, ECG06: 150 MS
QTC CALCULATION (BEZET), ECG08: 477 MS
RBC # BLD AUTO: 3.23 M/UL (ref 3.8–5.2)
SERVICE CMNT-IMP: ABNORMAL
SODIUM SERPL-SCNC: 138 MMOL/L (ref 136–145)
VENTRICULAR RATE, ECG03: 75 BPM
WBC # BLD AUTO: 11.8 K/UL (ref 3.6–11)

## 2018-03-07 PROCEDURE — 85027 COMPLETE CBC AUTOMATED: CPT | Performed by: INTERNAL MEDICINE

## 2018-03-07 PROCEDURE — B246ZZ4 ULTRASONOGRAPHY OF RIGHT AND LEFT HEART, TRANSESOPHAGEAL: ICD-10-PCS | Performed by: INTERNAL MEDICINE

## 2018-03-07 PROCEDURE — 87077 CULTURE AEROBIC IDENTIFY: CPT | Performed by: INTERNAL MEDICINE

## 2018-03-07 PROCEDURE — 33233 REMOVAL OF PM GENERATOR: CPT

## 2018-03-07 PROCEDURE — 74011250636 HC RX REV CODE- 250/636

## 2018-03-07 PROCEDURE — 74011250636 HC RX REV CODE- 250/636: Performed by: EMERGENCY MEDICINE

## 2018-03-07 PROCEDURE — 36415 COLL VENOUS BLD VENIPUNCTURE: CPT | Performed by: INTERNAL MEDICINE

## 2018-03-07 PROCEDURE — 87040 BLOOD CULTURE FOR BACTERIA: CPT | Performed by: INTERNAL MEDICINE

## 2018-03-07 PROCEDURE — 77030011640 HC PAD GRND REM COVD -A

## 2018-03-07 PROCEDURE — 0JPT0PZ REMOVAL OF CARDIAC RHYTHM RELATED DEVICE FROM TRUNK SUBCUTANEOUS TISSUE AND FASCIA, OPEN APPROACH: ICD-10-PCS | Performed by: INTERNAL MEDICINE

## 2018-03-07 PROCEDURE — 87186 SC STD MICRODIL/AGAR DIL: CPT | Performed by: INTERNAL MEDICINE

## 2018-03-07 PROCEDURE — 77030018836 HC SOL IRR NACL ICUM -A

## 2018-03-07 PROCEDURE — 71045 X-RAY EXAM CHEST 1 VIEW: CPT

## 2018-03-07 PROCEDURE — 74011250636 HC RX REV CODE- 250/636: Performed by: INTERNAL MEDICINE

## 2018-03-07 PROCEDURE — 80048 BASIC METABOLIC PNL TOTAL CA: CPT | Performed by: INTERNAL MEDICINE

## 2018-03-07 PROCEDURE — 93325 DOPPLER ECHO COLOR FLOW MAPG: CPT

## 2018-03-07 PROCEDURE — 87147 CULTURE TYPE IMMUNOLOGIC: CPT | Performed by: INTERNAL MEDICINE

## 2018-03-07 PROCEDURE — 83735 ASSAY OF MAGNESIUM: CPT | Performed by: INTERNAL MEDICINE

## 2018-03-07 PROCEDURE — 65660000000 HC RM CCU STEPDOWN

## 2018-03-07 PROCEDURE — 77030018786 HC NDL GD F/USND BARD -B

## 2018-03-07 PROCEDURE — 77030031139 HC SUT VCRL2 J&J -A

## 2018-03-07 PROCEDURE — C1751 CATH, INF, PER/CENT/MIDLINE: HCPCS

## 2018-03-07 PROCEDURE — 87205 SMEAR GRAM STAIN: CPT | Performed by: INTERNAL MEDICINE

## 2018-03-07 PROCEDURE — 77010033678 HC OXYGEN DAILY

## 2018-03-07 PROCEDURE — 82962 GLUCOSE BLOOD TEST: CPT

## 2018-03-07 PROCEDURE — 77030018729 HC ELECTRD DEFIB PAD CARD -B

## 2018-03-07 PROCEDURE — 74011000250 HC RX REV CODE- 250

## 2018-03-07 PROCEDURE — C1786 PMKR, SINGLE, RATE-RESP: HCPCS

## 2018-03-07 PROCEDURE — C1898 LEAD, PMKR, OTHER THAN TRANS: HCPCS

## 2018-03-07 PROCEDURE — 74011636637 HC RX REV CODE- 636/637: Performed by: INTERNAL MEDICINE

## 2018-03-07 PROCEDURE — C1892 INTRO/SHEATH,FIXED,PEEL-AWAY: HCPCS

## 2018-03-07 PROCEDURE — 77030002996 HC SUT SLK J&J -A

## 2018-03-07 PROCEDURE — 74011250637 HC RX REV CODE- 250/637: Performed by: INTERNAL MEDICINE

## 2018-03-07 PROCEDURE — 77030011992 HC AIRWY NASOPHGL TELE -A: Performed by: ANESTHESIOLOGY

## 2018-03-07 PROCEDURE — 77030018719 HC DRSG PTCH ANTIMIC J&J -A

## 2018-03-07 PROCEDURE — 77030022017 HC DRSG HEMO QCLOT ZMED -A

## 2018-03-07 PROCEDURE — 02PA3MZ REMOVAL OF CARDIAC LEAD FROM HEART, PERCUTANEOUS APPROACH: ICD-10-PCS | Performed by: INTERNAL MEDICINE

## 2018-03-07 PROCEDURE — 76937 US GUIDE VASCULAR ACCESS: CPT

## 2018-03-07 PROCEDURE — 74011000250 HC RX REV CODE- 250: Performed by: INTERNAL MEDICINE

## 2018-03-07 PROCEDURE — 02HK3NZ INSERTION OF INTRACARDIAC PACEMAKER INTO RIGHT VENTRICLE, PERCUTANEOUS APPROACH: ICD-10-PCS | Performed by: INTERNAL MEDICINE

## 2018-03-07 RX ORDER — SODIUM CHLORIDE 900 MG/100ML
INJECTION INTRAVENOUS
Status: DISCONTINUED
Start: 2018-03-07 | End: 2018-03-12 | Stop reason: HOSPADM

## 2018-03-07 RX ORDER — BACITRACIN 500 UNIT/G
1 PACKET (EA) TOPICAL AS NEEDED
Status: CANCELLED | OUTPATIENT
Start: 2018-03-07

## 2018-03-07 RX ORDER — SODIUM CHLORIDE 9 MG/ML
INJECTION, SOLUTION INTRAVENOUS
Status: DISCONTINUED | OUTPATIENT
Start: 2018-03-07 | End: 2018-03-07 | Stop reason: HOSPADM

## 2018-03-07 RX ORDER — HEPARIN SODIUM 200 [USP'U]/100ML
1000 INJECTION, SOLUTION INTRAVENOUS ONCE
Status: COMPLETED | OUTPATIENT
Start: 2018-03-07 | End: 2018-03-07

## 2018-03-07 RX ORDER — LIDOCAINE HYDROCHLORIDE 10 MG/ML
INJECTION, SOLUTION EPIDURAL; INFILTRATION; INTRACAUDAL; PERINEURAL
Status: COMPLETED
Start: 2018-03-07 | End: 2018-03-07

## 2018-03-07 RX ORDER — PROPOFOL 10 MG/ML
INJECTION, EMULSION INTRAVENOUS
Status: DISCONTINUED | OUTPATIENT
Start: 2018-03-07 | End: 2018-03-07 | Stop reason: HOSPADM

## 2018-03-07 RX ORDER — SODIUM CHLORIDE 0.9 % (FLUSH) 0.9 %
5-10 SYRINGE (ML) INJECTION AS NEEDED
Status: DISCONTINUED | OUTPATIENT
Start: 2018-03-07 | End: 2018-03-12 | Stop reason: HOSPADM

## 2018-03-07 RX ORDER — NALOXONE HYDROCHLORIDE 0.4 MG/ML
0.4 INJECTION, SOLUTION INTRAMUSCULAR; INTRAVENOUS; SUBCUTANEOUS AS NEEDED
Status: DISCONTINUED | OUTPATIENT
Start: 2018-03-07 | End: 2018-03-12 | Stop reason: HOSPADM

## 2018-03-07 RX ORDER — VANCOMYCIN HYDROCHLORIDE 1 G/20ML
INJECTION, POWDER, LYOPHILIZED, FOR SOLUTION INTRAVENOUS
Status: DISCONTINUED
Start: 2018-03-07 | End: 2018-03-12 | Stop reason: HOSPADM

## 2018-03-07 RX ORDER — SODIUM CHLORIDE 0.9 % (FLUSH) 0.9 %
5-10 SYRINGE (ML) INJECTION EVERY 8 HOURS
Status: DISCONTINUED | OUTPATIENT
Start: 2018-03-07 | End: 2018-03-12 | Stop reason: HOSPADM

## 2018-03-07 RX ORDER — MIDAZOLAM HYDROCHLORIDE 1 MG/ML
1-5 INJECTION, SOLUTION INTRAMUSCULAR; INTRAVENOUS
Status: DISCONTINUED | OUTPATIENT
Start: 2018-03-07 | End: 2018-03-12 | Stop reason: HOSPADM

## 2018-03-07 RX ORDER — FENTANYL CITRATE 50 UG/ML
INJECTION, SOLUTION INTRAMUSCULAR; INTRAVENOUS AS NEEDED
Status: DISCONTINUED | OUTPATIENT
Start: 2018-03-07 | End: 2018-03-07 | Stop reason: HOSPADM

## 2018-03-07 RX ORDER — FENTANYL CITRATE 50 UG/ML
12.5-5 INJECTION, SOLUTION INTRAMUSCULAR; INTRAVENOUS
Status: DISCONTINUED | OUTPATIENT
Start: 2018-03-07 | End: 2018-03-12 | Stop reason: HOSPADM

## 2018-03-07 RX ORDER — BACITRACIN 50000 [IU]/1
INJECTION, POWDER, FOR SOLUTION INTRAMUSCULAR
Status: COMPLETED
Start: 2018-03-07 | End: 2018-03-07

## 2018-03-07 RX ORDER — SODIUM CHLORIDE 0.9 % (FLUSH) 0.9 %
SYRINGE (ML) INJECTION
Status: DISCONTINUED
Start: 2018-03-07 | End: 2018-03-12 | Stop reason: HOSPADM

## 2018-03-07 RX ORDER — HEPARIN SODIUM 200 [USP'U]/100ML
INJECTION, SOLUTION INTRAVENOUS
Status: COMPLETED
Start: 2018-03-07 | End: 2018-03-07

## 2018-03-07 RX ORDER — LIDOCAINE HYDROCHLORIDE 10 MG/ML
1-40 INJECTION INFILTRATION; PERINEURAL
Status: DISCONTINUED | OUTPATIENT
Start: 2018-03-07 | End: 2018-03-12 | Stop reason: HOSPADM

## 2018-03-07 RX ORDER — BACITRACIN 50000 [IU]/1
50000 INJECTION, POWDER, FOR SOLUTION INTRAMUSCULAR ONCE
Status: COMPLETED | OUTPATIENT
Start: 2018-03-07 | End: 2018-03-07

## 2018-03-07 RX ORDER — SODIUM CHLORIDE 0.9 % (FLUSH) 0.9 %
10-30 SYRINGE (ML) INJECTION AS NEEDED
Status: CANCELLED | OUTPATIENT
Start: 2018-03-07

## 2018-03-07 RX ORDER — SODIUM CHLORIDE 0.9 % (FLUSH) 0.9 %
10-40 SYRINGE (ML) INJECTION EVERY 8 HOURS
Status: CANCELLED | OUTPATIENT
Start: 2018-03-07

## 2018-03-07 RX ORDER — HEPARIN 100 UNIT/ML
300 SYRINGE INTRAVENOUS AS NEEDED
Status: CANCELLED | OUTPATIENT
Start: 2018-03-07

## 2018-03-07 RX ORDER — SODIUM CHLORIDE 0.9 % (FLUSH) 0.9 %
10 SYRINGE (ML) INJECTION EVERY 24 HOURS
Status: CANCELLED | OUTPATIENT
Start: 2018-03-07

## 2018-03-07 RX ADMIN — THERA TABS 1 TABLET: TAB at 13:34

## 2018-03-07 RX ADMIN — LEVOTHYROXINE SODIUM 50 MCG: 25 TABLET ORAL at 13:35

## 2018-03-07 RX ADMIN — INSULIN LISPRO 2 UNITS: 100 INJECTION, SOLUTION INTRAVENOUS; SUBCUTANEOUS at 00:39

## 2018-03-07 RX ADMIN — DILTIAZEM HYDROCHLORIDE 240 MG: 240 CAPSULE, COATED, EXTENDED RELEASE ORAL at 13:34

## 2018-03-07 RX ADMIN — Medication 10 ML: at 15:03

## 2018-03-07 RX ADMIN — VITAMIN D, TAB 1000IU (100/BT) 2000 UNITS: 25 TAB at 13:34

## 2018-03-07 RX ADMIN — BACITRACIN 50000 UNITS: 50000 INJECTION, POWDER, FOR SOLUTION INTRAMUSCULAR at 09:40

## 2018-03-07 RX ADMIN — VANCOMYCIN HYDROCHLORIDE 1000 MG: 10 INJECTION, POWDER, LYOPHILIZED, FOR SOLUTION INTRAVENOUS at 00:38

## 2018-03-07 RX ADMIN — ASPIRIN 81 MG: 81 TABLET, COATED ORAL at 13:34

## 2018-03-07 RX ADMIN — PANTOPRAZOLE SODIUM 40 MG: 40 TABLET, DELAYED RELEASE ORAL at 13:35

## 2018-03-07 RX ADMIN — FENTANYL CITRATE 50 MCG: 50 INJECTION, SOLUTION INTRAMUSCULAR; INTRAVENOUS at 08:12

## 2018-03-07 RX ADMIN — FENTANYL CITRATE 25 MCG: 50 INJECTION, SOLUTION INTRAMUSCULAR; INTRAVENOUS at 17:33

## 2018-03-07 RX ADMIN — BENAZEPRIL HYDROCHLORIDE 10 MG: 10 TABLET, FILM COATED ORAL at 13:39

## 2018-03-07 RX ADMIN — FENTANYL CITRATE 50 MCG: 50 INJECTION, SOLUTION INTRAMUSCULAR; INTRAVENOUS at 08:15

## 2018-03-07 RX ADMIN — LIDOCAINE HYDROCHLORIDE 10 ML: 10 INJECTION, SOLUTION INFILTRATION; PERINEURAL at 08:30

## 2018-03-07 RX ADMIN — SODIUM CHLORIDE: 9 INJECTION, SOLUTION INTRAVENOUS at 08:10

## 2018-03-07 RX ADMIN — SODIUM CHLORIDE 125 ML/HR: 900 INJECTION, SOLUTION INTRAVENOUS at 05:48

## 2018-03-07 RX ADMIN — BACITRACIN 50000 UNITS: 5000 INJECTION, POWDER, FOR SOLUTION INTRAMUSCULAR at 09:40

## 2018-03-07 RX ADMIN — INSULIN LISPRO 2 UNITS: 100 INJECTION, SOLUTION INTRAVENOUS; SUBCUTANEOUS at 18:47

## 2018-03-07 RX ADMIN — MORPHINE SULFATE 1 MG: 10 INJECTION INTRAMUSCULAR; INTRAVENOUS; SUBCUTANEOUS at 05:49

## 2018-03-07 RX ADMIN — Medication 10 ML: at 05:01

## 2018-03-07 RX ADMIN — LIDOCAINE HYDROCHLORIDE 30 ML: 10 INJECTION, SOLUTION EPIDURAL; INFILTRATION; INTRACAUDAL; PERINEURAL at 09:25

## 2018-03-07 RX ADMIN — Medication 400 MG: at 13:35

## 2018-03-07 RX ADMIN — PROPOFOL 160 MCG/KG/MIN: 10 INJECTION, EMULSION INTRAVENOUS at 08:15

## 2018-03-07 RX ADMIN — MORPHINE SULFATE 1 MG: 10 INJECTION INTRAMUSCULAR; INTRAVENOUS; SUBCUTANEOUS at 15:01

## 2018-03-07 RX ADMIN — HEPARIN SODIUM 2000 UNITS: 200 INJECTION, SOLUTION INTRAVENOUS at 08:00

## 2018-03-07 RX ADMIN — SERTRALINE HYDROCHLORIDE 100 MG: 50 TABLET ORAL at 13:35

## 2018-03-07 RX ADMIN — VANCOMYCIN HYDROCHLORIDE 1000 MG: 1 INJECTION, POWDER, LYOPHILIZED, FOR SOLUTION INTRAVENOUS at 14:52

## 2018-03-07 NOTE — PROGRESS NOTES
Unsuccessful PICC (Peripherally Inserted Central Catheter) line insertion  procedure note :     MD ordered PICC line placement for long term IV antibiotic administration. Pt had temp pacemaker placed to right chest today. Port to left chest was removed due to infection. It was okay with Dr Vidal  and ID Doctor  to insert the PICC . Procedure explained to patient along with risks and benefits  and patient agreed to proceed. Informed consent obtained from  patient. Patient teaching completed. Timeout completed. Pre-procedure assessment done. Maximum sterile barrier precautions observed throughout procedure. Lidocaine 1%  3.0    ml sq given prior to cannulation. Cannulated basilic  vein to right arm  using ultrasound guidance and modified seldinger technique. Was able to pass the wire but not able to pass the PICC catheter. Met resistance at subclavian vein. Removed PICC catheter and notified to primary nurse.   Recommended send pt to [facility] [place]

## 2018-03-07 NOTE — PROCEDURES
[address] [address]  [phone]    Indications and Pre-Procedure Diagnosis:  Garrett Zamora is a [de-identified] y.o. female with referred for bacteremia and pacemaker in situ is referred for lead extraction and removal of single chamber pacemaker. Post Procedure Diagnosis:    CHB  AF    Lead extraction, device removal and pocket revision Procedure and Findings:  Informed consent was obtained and the patient was premedicated with vancomycin. The procedure was performed under local anesthesia. Continuous pulse oximetry and cuff pressure were monitored. During the procedure, the patient received Fentanyl and propofol for sedation per nursing personnel. The left deltopectoral area was prepped and draped in the usual sterile fashion and was liberally infiltrated with 1% lidocaine. An incision was made over the left subpectoral area on top of the old incision. The generator pocket was manually dissected. The device was disconnected from the pacing lead and removed from the body. The suture sleeve was cut. A stylet was advanced to the tip of the right ventricular lead and the screw was retracted. The lead was manually extracted under fluoroscopic guidance with gentle traction. The pocket was expanded caudally with electrocautery. Subcutaneous fat was removed from below the incision site on both sides so that the pocket was revised. The pulse generator pocket was then liberally infiltrated with bacitracin solution. The wound was closed in layers using continuous 2-0 Vicryl and 4-0 Vicryl ending with a sub-cuticular closure. A bio-occlusive dressing were applied to the skin. Fluoroscopy and total procedure times were 1 and 20 minutes respectively. Estimated blood loss <10 ml. Sharp count: correct. Specimen(s) collected: none. The following procedure related complication occurred: none. The following problems were encountered: none.  Findings: successful lead extraction, device removal and pocket revision. Supplies Summary available in the chart    Thank you for allowing me to participate in this patients care.     Yohannes Mark MD, Jonny Duarte

## 2018-03-07 NOTE — PROCEDURES
[address]  [phone]    Indications and Pre-Procedure Diagnosis:  Carlotta Zuleta is a [de-identified] y.o. female with chb and bacteremia is referred for temporary/permanent single chamber pacemaker. Post Procedure Diagnosis:    chb  AF  bactermeia    Temp/Permanent Pacemaker Implant Procedure and Findings:  Informed consent was obtained and the patient was premedicated with vancomycin. The procedure was performed under local anesthesia. Continuous pulse oximetry and cuff pressure were monitored. During the procedure, the patient received Fentanyl and Propofol for sedation per anesthesia personnel. The right neck area was prepped and draped in the usual sterile fashion. Right IJ access was obtained by anesthesia (see separate procedure note). A wire was introduced into the sheath placed by anesthesia. The sheath was swapped out for a sheath that could be split. Through the right IJ vein, a pacing lead was positioned in appropriate regions in the right ventricle where satisfactory pacing and sensing parameters were measured. Stability of the lead was assessed with deep breathing and there was no diaphragmatic pacing at 10V output. The lead was anchored using the sleeve and then connected to the pulse generator. The device was implanted with a single silk fixation suture in the header at the neck to prevent migration. The lead and generator were covered with a bio-occlusive dressing applied to the skin. Fluoroscopy and total procedure times were 1 and 20 minutes respectively. Estimated blood loss <10 ml. Sharp count: correct. Specimen(s) collected: none. The following procedure related complication occurred: none. The following problems were encountered: none. Findings: successful temporary/permanent pacemaker placement.       Device Data Measurements:  Lead Sensing (mV) Threshold (V)Pulse Width (ms) Impedance (Ohms)    RV 15  1  0.5   980      Final Programmed Parameters  Bradycardia pacing rate  VVI bpm  Pacing Mode    75  Pacing Output    3.5 V@ 0.5 ms    Supplies Summary available in the chart  [employer]    Thank you for allowing me to participate in this patients care.     Nani Dave MD, Bree Sanchez

## 2018-03-07 NOTE — PROGRESS NOTES
Infectious Disease Progress Note       Subjective:   Ms Lee Ends seen after JESSIE/pacer extraction with temporary pacer insertion today. She says she is doing ok. Denied pain. Denied diarrhea, tolerating antibiotics       ROS: 10 point ROS obtained and pertinent positives include above. All others negative.          Objective:    Vitals:   Patient Vitals for the past 24 hrs:   Temp Pulse Resp BP SpO2   03/07/18 1404 98.7 °F (37.1 °C) 74 18 132/79 95 %   03/07/18 1300 - 75 - 131/64 96 %   03/07/18 1245 - 75 - 117/87 96 %   03/07/18 1230 - 75 - 90/49 94 %   03/07/18 1200 - 75 - 118/63 98 %   03/07/18 1145 - 75 - 128/72 95 %   03/07/18 1136 97.8 °F (36.6 °C) 75 20 135/72 97 %   03/07/18 1100 - 75 21 140/72 97 %   03/07/18 1045 - 75 19 141/86 95 %   03/07/18 1031 - 75 19 125/72 94 %   03/07/18 1025 - 75 23 143/67 93 %   03/07/18 1020 - 75 20 127/72 96 %   03/07/18 1015 - 75 17 91/67 93 %   03/07/18 1010 - 75 20 135/63 94 %   03/07/18 1005 98.1 °F (36.7 °C) 75 21 119/74 93 %   03/07/18 0954 - 75 13 112/73 97 %   03/07/18 0631 97.7 °F (36.5 °C) 75 20 120/72 93 %   03/07/18 0445 98 °F (36.7 °C) 73 19 117/72 95 %   03/07/18 0036 98.5 °F (36.9 °C) 75 18 130/70 95 %   03/06/18 2241 98.4 °F (36.9 °C) 74 18 117/72 94 %   03/06/18 2122 - 75 - 126/71 93 %   03/06/18 1946 98.7 °F (37.1 °C) 75 20 100/70 93 %   03/06/18 1731 98.9 °F (37.2 °C) 75 20 148/73 93 %   03/06/18 1442 98.3 °F (36.8 °C) 74 18 122/72 94 %       Physical Exam:  Gen: No apparent distress  HEENT:  Old blood noted in pillow case and back of her head/hair, no thrush, no cervical lymphadenopathy  CV: S1,2 heard, + temporary pacer noted R chest wall     Lungs: Clear to auscultation bilaterally, no wheezing  Abdomen: soft, non tender, non distended  Skin: no rash   Psych: good affect, good eye contact, non tearful  Neuro: alert, oriented to self,  place, and situation, moves all extremities to commands, verbal  Musculoskeletal:  No joint edema, erythema or tenderness noted  Knees       Medications:    Current Facility-Administered Medications:     vancomycin (VANCOCIN) 1,000 mg in 0.9% sodium chloride (MBP/ADV) 250 mL, 1,000 mg, IntraVENous, ONCE, Jose Garcia MD    fentaNYL citrate (PF) injection 12.5-50 mcg, 12.5-50 mcg, IntraVENous, Multiple, Jose Garcia MD    lidocaine (XYLOCAINE) 10 mg/mL (1 %) injection 1-40 mL, 1-40 mL, SubCUTAneous, Multiple, Jose Kirby MD, 10 mL at 03/07/18 0830    midazolam (VERSED) injection 1-5 mg, 1-5 mg, IntraVENous, Multiple, Jose Garcia MD    ADDaptor, , , ,     vancomycin (VANCOCIN) 1,000 mg injection, , , ,     0.9% sodium chloride (MBP/ADV) infusion, , , ,     sodium chloride (NS) flush, , , ,     sodium chloride (NS) flush 5-10 mL, 5-10 mL, IntraVENous, Q8H, Jose Kirby MD    sodium chloride (NS) flush 5-10 mL, 5-10 mL, IntraVENous, PRN, Jose Garcia MD    naloxone (NARCAN) injection 0.4 mg, 0.4 mg, IntraVENous, PRN, Mehreen Leija MD    aspirin delayed-release tablet 81 mg, 81 mg, Oral, DAILY, Minnie Alicea MD, 81 mg at 03/07/18 1334    benazepril (LOTENSIN) tablet 10 mg, 10 mg, Oral, DAILY, Minnie Alicea MD, 10 mg at 03/07/18 1339    cholecalciferol (VITAMIN D3) tablet 2,000 Units, 2,000 Units, Oral, DAILY, Minnie Alicea MD, 2,000 Units at 03/07/18 1334    dilTIAZem CD (CARDIZEM CD) capsule 240 mg, 240 mg, Oral, DAILY, Minnie Alicea MD, 240 mg at 03/07/18 1334    levothyroxine (SYNTHROID) tablet 50 mcg, 50 mcg, Oral, ACB, Minnie Alicea MD, 50 mcg at 03/07/18 1335    magnesium oxide (MAG-OX) tablet 400 mg, 400 mg, Oral, DAILY, Minnie Alicea MD, 400 mg at 03/07/18 1335    therapeutic multivitamin (THERAGRAN) tablet 1 Tab, 1 Tab, Oral, DAILY, Minnie Alicea MD, 1 Tab at 03/07/18 1334    pantoprazole (PROTONIX) tablet 40 mg, 40 mg, Oral, ACB, Minnie Alicea MD, 40 mg at 03/07/18 1335    pravastatin (PRAVACHOL) tablet 20 mg, 20 mg, Oral, QHS, Minnie Alicea MD, 20 mg at 03/06/18 5214   sertraline (ZOLOFT) tablet 100 mg, 100 mg, Oral, DAILY, Michael Morgan MD, 100 mg at 03/07/18 1335    sodium chloride (NS) flush 5-10 mL, 5-10 mL, IntraVENous, Q8H, Michael Morgan MD, 10 mL at 03/07/18 0501    sodium chloride (NS) flush 5-10 mL, 5-10 mL, IntraVENous, PRN, Michael Morgan MD    acetaminophen (TYLENOL) tablet 650 mg, 650 mg, Oral, Q4H PRN, Michael Morgan MD, 650 mg at 03/06/18 1835    naloxone [facility]) injection 0.4 mg, 0.4 mg, IntraVENous, PRN, Michael Morgan MD    ondansetron [facility]) injection 4 mg, 4 mg, IntraVENous, Q4H PRN, Michael Morgan MD, 4 mg at 03/06/18 1731    bisacodyl (DULCOLAX) tablet 5 mg, 5 mg, Oral, DAILY PRN, Michael Morgan MD    nitroglycerin (NITROBID) 2 % ointment 1 Inch, 1 Inch, Topical, Q6H PRN, Michael Morgan MD, 1 Inch at 03/06/18 0650    0.9% sodium chloride infusion, 125 mL/hr, IntraVENous, CONTINUOUS, Michael Morgan MD, Last Rate: 125 mL/hr at 03/07/18 0548, 125 mL/hr at 03/07/18 0548    insulin lispro (HUMALOG) injection, , SubCUTAneous, Q6H, Michael Morgan MD, Stopped at 03/07/18 0600    glucose chewable tablet 16 g, 4 Tab, Oral, PRN, Michael Morgan MD    dextrose (D50W) injection syrg 12.5-25 g, 12.5-25 g, IntraVENous, PRN, Michael Morgan MD    glucagon [facility]) injection 1 mg, 1 mg, IntraMUSCular, PRN, Michael Morgan MD    morphine injection 1 mg, 1 mg, IntraVENous, Q4H PRN, Jesus Robles MD, 1 mg at 03/07/18 0549    sodium chloride (NS) flush 5-10 mL, 5-10 mL, IntraVENous, PRN, Saida Haro MD    vancomycin (VANCOCIN) 1000 mg in  ml infusion, 1,000 mg, IntraVENous, Q24H, Saida Haro MD, Last Rate: 125 mL/hr at 03/07/18 0038, 1,000 mg at 03/07/18 0038      Labs:  Recent Results (from the past 24 hour(s))   GLUCOSE, POC    Collection Time: 03/06/18  3:40 PM   Result Value Ref Range    Glucose (POC) 191 (H) 65 - 100 mg/dL    Performed by [address], POC    Collection Time: 03/06/18 10:06 PM   Result Value Ref Range    Glucose (POC) 254 (H) 65 - 100 mg/dL    Performed by Ryan Caceres    METABOLIC PANEL, BASIC    Collection Time: 03/07/18  5:20 AM   Result Value Ref Range    Sodium 138 136 - 145 mmol/L    Potassium 3.8 3.5 - 5.1 mmol/L    Chloride 109 (H) 97 - 108 mmol/L    CO2 19 (L) 21 - 32 mmol/L    Anion gap 10 5 - 15 mmol/L    Glucose 139 (H) 65 - 100 mg/dL    BUN 26 (H) 6 - 20 MG/DL    Creatinine 1.06 (H) 0.55 - 1.02 MG/DL    BUN/Creatinine ratio 25 (H) 12 - 20      GFR est AA >60 >60 ml/min/1.73m2    GFR est non-AA 50 (L) >60 ml/min/1.73m2    Calcium 7.4 (L) 8.5 - 10.1 MG/DL   CBC W/O DIFF    Collection Time: 03/07/18  5:20 AM   Result Value Ref Range    WBC 11.8 (H) 3.6 - 11.0 K/uL    RBC 3.23 (L) 3.80 - 5.20 M/uL    HGB 9.4 (L) 11.5 - 16.0 g/dL    HCT 29.7 (L) 35.0 - 47.0 %    MCV 92.0 80.0 - 99.0 FL    MCH 29.1 26.0 - 34.0 PG    MCHC 31.6 30.0 - 36.5 g/dL    RDW 15.0 (H) 11.5 - 14.5 %    PLATELET 384 863 - 032 K/uL    MPV 10.7 8.9 - 12.9 FL    NRBC 0.0 0  WBC    ABSOLUTE NRBC 0.00 0.00 - 0.01 K/uL   MAGNESIUM    Collection Time: 03/07/18  5:20 AM   Result Value Ref Range    Magnesium 2.0 1.6 - 2.4 mg/dL   GLUCOSE, POC    Collection Time: 03/07/18  5:26 AM   Result Value Ref Range    Glucose (POC) 146 (H) 65 - 100 mg/dL    Performed by Marilyn Tapia    CULTURE, WOUND Sujata Hilts STAIN    Collection Time: 03/07/18  8:30 AM   Result Value Ref Range    Special Requests: NO SPECIAL REQUESTS      GRAM STAIN PENDING     Culture result: PENDING    CULTURE, WOUND W GRAM STAIN    Collection Time: 03/07/18  9:30 AM   Result Value Ref Range    Special Requests: NO SPECIAL REQUESTS      GRAM STAIN        GRAM STAIN NOT ROUTINELY PERFORMED ON THIS SPECIMEN    Culture result: PENDING            Micro:     Blood: 3/5/18  Culture result:    Preliminary      Preliminary report of probable S. aureus (Negative for antigen detection) confirmation and sensitivities to follow.    GROWING IN 3 OF 3 BOTTLES DRAWN   (SITES = LAC AND RAC)            Imaging:    JESSIE 3/7/18 SUMMARY:  Left ventricle: Systolic function was normal.    Left atrium: The atrium was moderately dilated. Left atrial appendage: watchman in VANDA with complete obstruction of flow    Right atrium: The atrium was mildly dilated. Mitral valve: There was mild regurgitation. Aortic valve: There was moderate to severe regurgitation. Tricuspid valve: There was mild regurgitation. Aorta, systemic arteries: There was moderate atheroma. Assessment / Plan    Ms Domingo Amos is a [de-identified]year old lady with hx of Atrial fibrillation S/P ablation and pacemaker inserted (2/14/18), cervical diskectomy/fusion with biomechanical implants C4-5-6,  Bilateral total knee replacements admitted with fever and found to have high grade bacteremia (3/3 bottles on admission). She is noted to have gone to the ER on 2/28 with concerns of oozing/bleeding from the pacer area and noted to be oozing \" dark blood \" per ER notes. Notes indicate she had a 4 cm wound and \"Wound was cleansed with Betadine. Dermabond and Surgifoam was applied and Tegriderm was placed over the wound\". 1) G+C clusters bacteremia ( high grade 3/3 bottles) and pacer site infection   S/P Pacer removal 3/7, and insertion of temporary pacer   JESSIE 3/7 without valvular vegetations      Recommendations  Continue IV Vancomycin with trough goal of 15-20 with renal monitoring. Will de escalate once culture finalized   Await pacer pocket cultures   Check blood cultures ( 2 sets) Q 24-48 hours until clearance obtained  ( ordered today 3/7)  Based on clearance of bacteremia, will likely need 4 weeks IV antibiotics   Final choice of antibiotics to be determined ( pending susceptibilities)  Will arrange ID follow up in 1-2 weeks of DC          2) Bacteruria:  Asymptomatic, would suspect high fever from already known source bacteremia and pacer pocket infection   On Rocephin. Noted cultures sent.    Would expect bacteruria in a elderly female patient  Stopped Ceftriaxone. If any clinical deterioration, can evaluate for gram negative coverage , urine culture 50 000 GNR but she was asymptomatic from UTI standpoint on admission and this is asymptomatic bacteruria        3) TKR B/L  Asymptomatic   Bacteremia is risk for seeding     4) S/P Cervical diskectomy and fusion wt biomechanical implants C4-6  Asymptomatic at this time      5) DVT Px per primary team     Thank you for the opportunity to participate in the care of this patient. Please contact with questions or concerns.       Charito Lira DO   2:22 PM

## 2018-03-07 NOTE — ANESTHESIA PREPROCEDURE EVALUATION
Anesthetic History   No history of anesthetic complications            Review of Systems / Medical History  Patient summary reviewed, nursing notes reviewed and pertinent labs reviewed    Pulmonary        Sleep apnea: CPAP           Neuro/Psych         TIA and psychiatric history  Pertinent negatives: No CVA  Comments: Cervical spinal stenosis  Anxiety  Depression  Ataxia Cardiovascular    Hypertension        Dysrhythmias : atrial fibrillation  Pacemaker, CAD and hyperlipidemia    Exercise tolerance: <4 METS  Comments: Ejection fraction was estimated in the range of 55 % to 60 %.    GI/Hepatic/Renal     GERD (Webb's esophagus): well controlled      Liver disease    Comments: Webb's Esophagus  Celiac sprue Endo/Other    Diabetes: type 2  Hypothyroidism  Obesity, morbid obesity, arthritis and anemia     Other Findings   Comments: Hx of Sarcoidosis    Cervical spinal stenosis           Physical Exam    Airway  Mallampati: II  TM Distance: 4 - 6 cm  Neck ROM: normal range of motion   Mouth opening: Normal     Cardiovascular    Rhythm: irregular  Rate: abnormal        Comments: bradycardia Dental    Dentition: Full upper dentures and Lower partial plate     Pulmonary  Breath sounds clear to auscultation               Abdominal  GI exam deferred       Other Findings            Anesthetic Plan    ASA: 3  Anesthesia type: total IV anesthesia          Induction: Intravenous  Anesthetic plan and risks discussed with: Patient      Cordis to be placed for temporary pacer access

## 2018-03-07 NOTE — PROGRESS NOTES
Cardiology Progress Note            [facility], [address]  [phone]    3/7/2018 9:56 AM    Admit Date: 3/5/2018    Admit Diagnosis: Infection of pacemaker pocket [facility])    Subjective:     Jose Juan Edmonds   denies chest pain.     Visit Vitals    /73    Pulse 75  Comment: v pcd    Temp 97.7 °F (36.5 °C)    Resp 13    Ht 5' 1\" (1.549 m)    Wt 185 lb 6.5 oz (84.1 kg)    SpO2 97%    BMI 35.03 kg/m2     Current Facility-Administered Medications   Medication Dose Route Frequency    vancomycin (VANCOCIN) 1,000 mg in 0.9% sodium chloride (MBP/ADV) 250 mL  1,000 mg IntraVENous ONCE    fentaNYL citrate (PF) injection 12.5-50 mcg  12.5-50 mcg IntraVENous Multiple    lidocaine (XYLOCAINE) 10 mg/mL (1 %) injection 1-40 mL  1-40 mL SubCUTAneous Multiple    midazolam (VERSED) injection 1-5 mg  1-5 mg IntraVENous Multiple    ADDaptor        vancomycin (VANCOCIN) 1,000 mg injection        0.9% sodium chloride (MBP/ADV) infusion        sodium chloride (NS) flush        sodium chloride (NS) flush 5-10 mL  5-10 mL IntraVENous Q8H    sodium chloride (NS) flush 5-10 mL  5-10 mL IntraVENous PRN    naloxone (NARCAN) injection 0.4 mg  0.4 mg IntraVENous PRN    aspirin delayed-release tablet 81 mg  81 mg Oral DAILY    benazepril (LOTENSIN) tablet 10 mg  10 mg Oral DAILY    cholecalciferol (VITAMIN D3) tablet 2,000 Units  2,000 Units Oral DAILY    dilTIAZem CD (CARDIZEM CD) capsule 240 mg  240 mg Oral DAILY    levothyroxine (SYNTHROID) tablet 50 mcg  50 mcg Oral ACB    magnesium oxide (MAG-OX) tablet 400 mg  400 mg Oral DAILY    therapeutic multivitamin (THERAGRAN) tablet 1 Tab  1 Tab Oral DAILY    pantoprazole (PROTONIX) tablet 40 mg  40 mg Oral ACB    pravastatin (PRAVACHOL) tablet 20 mg  20 mg Oral QHS    sertraline (ZOLOFT) tablet 100 mg  100 mg Oral DAILY    sodium chloride (NS) flush 5-10 mL  5-10 mL IntraVENous Q8H    sodium chloride (NS) flush 5-10 mL  5-10 mL IntraVENous PRN    acetaminophen (TYLENOL) tablet 650 mg  650 mg Oral Q4H PRN    naloxone (NARCAN) injection 0.4 mg  0.4 mg IntraVENous PRN    ondansetron (ZOFRAN) injection 4 mg  4 mg IntraVENous Q4H PRN    bisacodyl (DULCOLAX) tablet 5 mg  5 mg Oral DAILY PRN    nitroglycerin (NITROBID) 2 % ointment 1 Inch  1 Inch Topical Q6H PRN    0.9% sodium chloride infusion  125 mL/hr IntraVENous CONTINUOUS    insulin lispro (HUMALOG) injection   SubCUTAneous Q6H    glucose chewable tablet 16 g  4 Tab Oral PRN    dextrose (D50W) injection syrg 12.5-25 g  12.5-25 g IntraVENous PRN    glucagon (GLUCAGEN) injection 1 mg  1 mg IntraMUSCular PRN    morphine injection 1 mg  1 mg IntraVENous Q4H PRN    sodium chloride (NS) flush 5-10 mL  5-10 mL IntraVENous PRN    vancomycin (VANCOCIN) 1000 mg in  ml infusion  1,000 mg IntraVENous Q24H     Facility-Administered Medications Ordered in Other Encounters   Medication Dose Route Frequency    fentaNYL citrate (PF) injection    PRN    propofol (DIPRIVAN) 10 mg/mL injection   IntraVENous CONTINUOUS    0.9% sodium chloride infusion   IntraVENous CONTINUOUS         Objective:      Visit Vitals    /73    Pulse 75    Temp 97.7 °F (36.5 °C)    Resp 13    Ht 5' 1\" (1.549 m)    Wt 185 lb 6.5 oz (84.1 kg)    SpO2 97%    BMI 35.03 kg/m2       Physical Exam:  Abdomen: soft, non-tender  Extremities: extremities normal  Heart: regular rate and rhythm  Lungs: clear to auscultation bilaterally  Pulses: 2+ and symmetric    Data Review:   Labs:    Recent Labs      03/07/18   0520  03/06/18   0417  03/05/18   2100   WBC  11.8*  16.7*  16.4*   HGB  9.4*  9.0*  10.6*   HCT  29.7*  28.5*  33.6*   PLT  205  200  229     Recent Labs      03/07/18   0520  03/06/18   0417  03/05/18   2100   NA  138  140  137   K  3.8  3.7  4.2   CL  109*  110*  105   CO2  19*  19*  23   GLU  139*  184*  191*   BUN  26*  23*  25*   CREA  1.06*  0.97  1.28*   CA  7.4*  7.9*  8.6   MG  2.0   -- --    ALB   --    --   3.2*   TBILI   --    --   1.4*   SGOT   --    --   20   ALT   --    --   23   INR   --   1.2*   --        Recent Labs      03/05/18   2100   Tacy Lank  <0.04         Intake/Output Summary (Last 24 hours) at 03/07/18 0956  Last data filed at 03/07/18 0945   Gross per 24 hour   Intake          4430.83 ml   Output               25 ml   Net          4405.83 ml        Telemetry: v paced    Assessment:     Active Problems:    TIA (transient ischemic attack) (7/9/2014)      Type 2 diabetes mellitus without complication ([facility] (0/10/5758)      Paroxysmal atrial fibrillation ([facility] (9/7/2016)      Overview: S/p Watchman      Infection of pacemaker pocket ([facility] (3/6/2018)      Pacemaker (3/6/2018)        Plan:     Jamar Tracy had a temp/perm pacemaker implanted via the neck and lead extraction/device removal from the left chest. Ric was wnl - no vegetations noted. Abx per ID. Will reimplant pacemaker in 4-6 weeks.     Feliz Schofield MD, [facility]    3/7/2018

## 2018-03-07 NOTE — ROUTINE PROCESS
Bedside and Verbal shift change report given to Edilia Larios 71 (oncoming nurse) by Will RN (offgoing nurse). Report included the following information SBAR, Intake/Output and MAR.

## 2018-03-07 NOTE — ANESTHESIA POSTPROCEDURE EVALUATION
Post-Anesthesia Evaluation and Assessment    Patient: Barrington Meigs MRN: [id number]  SSN: [id number]    YOB: 1935  Age: [de-identified] y.o. Sex: female       Cardiovascular Function/Vital Signs  Visit Vitals    /72    Pulse 75    Temp 36.6 °C (97.8 °F)    Resp 20    Ht 5' 1\" (1.549 m)    Wt 84.1 kg (185 lb 6.5 oz)    SpO2 95%    BMI 35.03 kg/m2       Patient is status post total IV anesthesia, general anesthesia for * No procedures listed *. Nausea/Vomiting: None    Postoperative hydration reviewed and adequate. Pain:  Pain Scale 1: Numeric (0 - 10) (03/07/18 1100)  Pain Intensity 1: 0 (03/07/18 1100)   Managed    Neurological Status:   Neuro  Neurologic State: Alert (03/07/18 1100)  Orientation Level: Oriented X4 (03/07/18 1100)  Cognition: Follows commands (03/07/18 0445)  Speech: Clear (03/07/18 0445)   At baseline    Mental Status and Level of Consciousness: Arousable    Pulmonary Status:   O2 Device: Nasal cannula (03/07/18 1100)   Adequate oxygenation and airway patent    Complications related to anesthesia: None    Post-anesthesia assessment completed.  No concerns    Signed By: Radha Tucker MD     March 7, 2018

## 2018-03-07 NOTE — ANESTHESIA PROCEDURE NOTES
Central Line Placement    Performed by: Erick Underwood  Authorized by: Erick Underwood     Indications: central pressure monitoring and vascular access  Preanesthetic Checklist: patient identified, risks and benefits discussed, anesthesia consent, site marked, patient being monitored and timeout performed      Pre-procedure: All elements of maximal sterile barrier technique followed? Yes    2% Chlorhexidine for cutaneous antisepsis, Hand hygiene performed prior to catheter insertion and Ultrasound guidance              Procedure:   Prep:  Chlorhexidine  Location:  Internal jugular  Orientation:  Right  Patient position:  Trendelenburg    Catheter size:  9 Fr  Catheter length: Other (comment)    Successful placement: Yes      Assessment:   Post-procedure:  Catheter secured and sterile dressing applied  Assessment:  Placement verified by x-ray, free fluid flow and blood return through all ports  Insertion:  Uncomplicated  Patient tolerance:  Patient tolerated the procedure well with no immediate complications  Central Line Procedure Note    Indication: venous access for temporary pacer    Risks, benefits, alternatives explained and patient agrees to proceed. Patient positioned in Trendelenburg. 7-Step Sterility Protocol followed. (cap, mask sterile gown, sterile gloves, large sterile sheet, hand hygiene, 2% chlorhexidine for cutaneous antisepsis)  5 mL 1% Lidocaine placed at insertion site. Right internal jugular cannulated x 1 attempt(s) utilizing the Seldinger technique. Dr. Elia Kruger to place temporary pacing wire through [place].

## 2018-03-07 NOTE — PROGRESS NOTES
TRANSFER - OUT REPORT:    Verbal report given to Meg Ding RN(name) on Ab Render  being transferred to IVCU(unit) for routine progression of care       Report consisted of patients Situation, Background, Assessment and   Recommendations(SBAR). Information from the following report(s) Procedure Summary was reviewed with the receiving nurse. Lines:   Single Lumen Venous Catheter 03/07/18 Right Neck (Active)       Peripheral IV Right Antecubital (Active)   Site Assessment Clean, dry, & intact 3/7/2018  4:45 AM   Phlebitis Assessment 0 3/7/2018  4:45 AM   Infiltration Assessment 0 3/7/2018  4:45 AM   Dressing Status Clean, dry, & intact 3/7/2018  4:45 AM   Dressing Type Tape;Transparent 3/7/2018  4:45 AM   Hub Color/Line Status Pink;Flushed;Capped 3/7/2018  4:45 AM   Alcohol Cap Used Yes 3/6/2018  2:03 PM       Peripheral IV 03/06/18 Left Forearm (Active)   Site Assessment Clean, dry, & intact 3/7/2018  8:00 AM   Phlebitis Assessment 0 3/7/2018  8:00 AM   Infiltration Assessment 0 3/7/2018  8:00 AM   Dressing Status Clean, dry, & intact 3/7/2018  8:00 AM   Dressing Type Tape;Transparent 3/7/2018  8:00 AM   Hub Color/Line Status Pink;Patent 3/7/2018  8:00 AM   Alcohol Cap Used Yes 3/6/2018  2:03 PM        Opportunity for questions and clarification was provided.       Patient transported with:   Registered Nurse

## 2018-03-07 NOTE — PROGRESS NOTES
Bedside report received from Molli Lundborg, RN                  Assessment, Background, Procedure summary, Intake/Output, MAR, and recent results discussed. Care assumed.

## 2018-03-08 ENCOUNTER — TELEPHONE (OUTPATIENT)
Dept: FAMILY MEDICINE CLINIC | Age: 83
End: 2018-03-08

## 2018-03-08 LAB
ANION GAP SERPL CALC-SCNC: 10 MMOL/L (ref 5–15)
ATRIAL RATE: 48 BPM
BACTERIA SPEC CULT: ABNORMAL
BACTERIA SPEC CULT: ABNORMAL
BUN SERPL-MCNC: 29 MG/DL (ref 6–20)
BUN/CREAT SERPL: 27 (ref 12–20)
CALCIUM SERPL-MCNC: 7.9 MG/DL (ref 8.5–10.1)
CALCULATED R AXIS, ECG10: -84 DEGREES
CALCULATED T AXIS, ECG11: 27 DEGREES
CC UR VC: ABNORMAL
CHLORIDE SERPL-SCNC: 107 MMOL/L (ref 97–108)
CO2 SERPL-SCNC: 18 MMOL/L (ref 21–32)
CREAT SERPL-MCNC: 1.07 MG/DL (ref 0.55–1.02)
DATE LAST DOSE: ABNORMAL
DIAGNOSIS, 93000: NORMAL
GLUCOSE BLD STRIP.AUTO-MCNC: 149 MG/DL (ref 65–100)
GLUCOSE BLD STRIP.AUTO-MCNC: 197 MG/DL (ref 65–100)
GLUCOSE BLD STRIP.AUTO-MCNC: 198 MG/DL (ref 65–100)
GLUCOSE BLD STRIP.AUTO-MCNC: 242 MG/DL (ref 65–100)
GLUCOSE SERPL-MCNC: 194 MG/DL (ref 65–100)
POTASSIUM SERPL-SCNC: 4.3 MMOL/L (ref 3.5–5.1)
Q-T INTERVAL, ECG07: 466 MS
QRS DURATION, ECG06: 176 MS
QTC CALCULATION (BEZET), ECG08: 520 MS
REPORTED DOSE,DOSE: ABNORMAL UNITS
REPORTED DOSE/TIME,TMG: ABNORMAL
SERVICE CMNT-IMP: ABNORMAL
SODIUM SERPL-SCNC: 135 MMOL/L (ref 136–145)
VANCOMYCIN TROUGH SERPL-MCNC: 17.6 UG/ML (ref 5–10)
VENTRICULAR RATE, ECG03: 75 BPM

## 2018-03-08 PROCEDURE — 74011250636 HC RX REV CODE- 250/636: Performed by: INTERNAL MEDICINE

## 2018-03-08 PROCEDURE — 93005 ELECTROCARDIOGRAM TRACING: CPT

## 2018-03-08 PROCEDURE — 65660000000 HC RM CCU STEPDOWN

## 2018-03-08 PROCEDURE — 80048 BASIC METABOLIC PNL TOTAL CA: CPT | Performed by: INTERNAL MEDICINE

## 2018-03-08 PROCEDURE — 74011636637 HC RX REV CODE- 636/637: Performed by: INTERNAL MEDICINE

## 2018-03-08 PROCEDURE — 74011250636 HC RX REV CODE- 250/636: Performed by: EMERGENCY MEDICINE

## 2018-03-08 PROCEDURE — 36415 COLL VENOUS BLD VENIPUNCTURE: CPT | Performed by: INTERNAL MEDICINE

## 2018-03-08 PROCEDURE — 77010033678 HC OXYGEN DAILY

## 2018-03-08 PROCEDURE — 74011000258 HC RX REV CODE- 258: Performed by: INTERNAL MEDICINE

## 2018-03-08 PROCEDURE — 74011250637 HC RX REV CODE- 250/637: Performed by: INTERNAL MEDICINE

## 2018-03-08 PROCEDURE — 82962 GLUCOSE BLOOD TEST: CPT

## 2018-03-08 PROCEDURE — 80202 ASSAY OF VANCOMYCIN: CPT | Performed by: INTERNAL MEDICINE

## 2018-03-08 RX ORDER — CEFAZOLIN SODIUM/WATER 2 G/20 ML
2 SYRINGE (ML) INTRAVENOUS EVERY 8 HOURS
Status: DISCONTINUED | OUTPATIENT
Start: 2018-03-08 | End: 2018-03-08

## 2018-03-08 RX ADMIN — VANCOMYCIN HYDROCHLORIDE 1000 MG: 10 INJECTION, POWDER, LYOPHILIZED, FOR SOLUTION INTRAVENOUS at 00:50

## 2018-03-08 RX ADMIN — DILTIAZEM HYDROCHLORIDE 240 MG: 240 CAPSULE, COATED, EXTENDED RELEASE ORAL at 09:37

## 2018-03-08 RX ADMIN — VITAMIN D, TAB 1000IU (100/BT) 2000 UNITS: 25 TAB at 09:37

## 2018-03-08 RX ADMIN — Medication 10 ML: at 06:46

## 2018-03-08 RX ADMIN — PRAVASTATIN SODIUM 20 MG: 10 TABLET ORAL at 00:39

## 2018-03-08 RX ADMIN — ACETAMINOPHEN 650 MG: 325 TABLET ORAL at 09:37

## 2018-03-08 RX ADMIN — ASPIRIN 81 MG: 81 TABLET, COATED ORAL at 09:37

## 2018-03-08 RX ADMIN — Medication 400 MG: at 09:00

## 2018-03-08 RX ADMIN — MORPHINE SULFATE 1 MG: 10 INJECTION INTRAMUSCULAR; INTRAVENOUS; SUBCUTANEOUS at 00:51

## 2018-03-08 RX ADMIN — INSULIN LISPRO 2 UNITS: 100 INJECTION, SOLUTION INTRAVENOUS; SUBCUTANEOUS at 18:07

## 2018-03-08 RX ADMIN — Medication 10 ML: at 13:39

## 2018-03-08 RX ADMIN — BENAZEPRIL HYDROCHLORIDE 10 MG: 10 TABLET, FILM COATED ORAL at 09:37

## 2018-03-08 RX ADMIN — Medication 10 ML: at 00:40

## 2018-03-08 RX ADMIN — Medication 10 ML: at 22:10

## 2018-03-08 RX ADMIN — PRAVASTATIN SODIUM 20 MG: 10 TABLET ORAL at 22:10

## 2018-03-08 RX ADMIN — LEVOTHYROXINE SODIUM 50 MCG: 25 TABLET ORAL at 09:37

## 2018-03-08 RX ADMIN — ACETAMINOPHEN 650 MG: 325 TABLET ORAL at 00:51

## 2018-03-08 RX ADMIN — PANTOPRAZOLE SODIUM 40 MG: 40 TABLET, DELAYED RELEASE ORAL at 09:37

## 2018-03-08 RX ADMIN — CEFAZOLIN SODIUM 1 G: 1 INJECTION, POWDER, FOR SOLUTION INTRAMUSCULAR; INTRAVENOUS at 22:20

## 2018-03-08 RX ADMIN — INSULIN LISPRO 1 UNITS: 100 INJECTION, SOLUTION INTRAVENOUS; SUBCUTANEOUS at 00:50

## 2018-03-08 RX ADMIN — THERA TABS 1 TABLET: TAB at 09:37

## 2018-03-08 RX ADMIN — SERTRALINE HYDROCHLORIDE 100 MG: 50 TABLET ORAL at 09:37

## 2018-03-08 RX ADMIN — ACETAMINOPHEN 650 MG: 325 TABLET ORAL at 15:20

## 2018-03-08 RX ADMIN — Medication 10 ML: at 06:47

## 2018-03-08 RX ADMIN — Medication 10 ML: at 22:21

## 2018-03-08 NOTE — TELEPHONE ENCOUNTER
----- Message from Chidi Hawk sent at 3/8/2018 11:51 AM EST -----  Regarding: Dr. Wally Vaca with [facility] has questions regarding the pt's inpatient care. Pt was seen in hospital by Dr. Elver Lyons contact number [phone].

## 2018-03-08 NOTE — PROGRESS NOTES
Cardiology Progress Note            [address] [address]  [phone]    3/8/2018 8:46 AM    Admit Date: 3/5/2018    Admit Diagnosis: Infection of pacemaker pocket [facility])    Subjective:     Andre Coronel  Is a pleasant [de-identified]year old female, s/p , temp/perm pacemaker implanted via the neck and lead extraction/device removal from the left chest. Dinah Lightning was wnl - no vegetations noted. Abx per ID. She reports discomfort overnight at the left subclavian site. Denies dyspnea, chest pain, palpitations, dizziness, edema or syncope. She notes PICC was not inserted in right arm due to temp pace leads on left. She anticipates the line placement today.      Visit Vitals    BP (P) 120/74 (BP 1 Location: Left arm, BP Patient Position: At rest)    Pulse 75    Temp (P) 98.2 °F (36.8 °C)    Resp (P) 18    Ht 5' 1\" (1.549 m)    Wt 185 lb 3 oz (84 kg)    SpO2 98%    BMI 34.99 kg/m2     Current Facility-Administered Medications   Medication Dose Route Frequency    fentaNYL citrate (PF) injection 12.5-50 mcg  12.5-50 mcg IntraVENous Multiple    lidocaine (XYLOCAINE) 10 mg/mL (1 %) injection 1-40 mL  1-40 mL SubCUTAneous Multiple    midazolam (VERSED) injection 1-5 mg  1-5 mg IntraVENous Multiple    ADDaptor        vancomycin (VANCOCIN) 1,000 mg injection        0.9% sodium chloride (MBP/ADV) infusion        sodium chloride (NS) flush        sodium chloride (NS) flush 5-10 mL  5-10 mL IntraVENous Q8H    sodium chloride (NS) flush 5-10 mL  5-10 mL IntraVENous PRN    naloxone (NARCAN) injection 0.4 mg  0.4 mg IntraVENous PRN    aspirin delayed-release tablet 81 mg  81 mg Oral DAILY    benazepril (LOTENSIN) tablet 10 mg  10 mg Oral DAILY    cholecalciferol (VITAMIN D3) tablet 2,000 Units  2,000 Units Oral DAILY    dilTIAZem CD (CARDIZEM CD) capsule 240 mg  240 mg Oral DAILY    levothyroxine (SYNTHROID) tablet 50 mcg  50 mcg Oral ACB    magnesium oxide (MAG-OX) tablet 400 mg  400 mg Oral DAILY    therapeutic multivitamin (THERAGRAN) tablet 1 Tab  1 Tab Oral DAILY    pantoprazole (PROTONIX) tablet 40 mg  40 mg Oral ACB    pravastatin (PRAVACHOL) tablet 20 mg  20 mg Oral QHS    sertraline (ZOLOFT) tablet 100 mg  100 mg Oral DAILY    sodium chloride (NS) flush 5-10 mL  5-10 mL IntraVENous Q8H    sodium chloride (NS) flush 5-10 mL  5-10 mL IntraVENous PRN    acetaminophen (TYLENOL) tablet 650 mg  650 mg Oral Q4H PRN    naloxone (NARCAN) injection 0.4 mg  0.4 mg IntraVENous PRN    ondansetron (ZOFRAN) injection 4 mg  4 mg IntraVENous Q4H PRN    bisacodyl (DULCOLAX) tablet 5 mg  5 mg Oral DAILY PRN    nitroglycerin (NITROBID) 2 % ointment 1 Inch  1 Inch Topical Q6H PRN    0.9% sodium chloride infusion  125 mL/hr IntraVENous CONTINUOUS    insulin lispro (HUMALOG) injection   SubCUTAneous Q6H    glucose chewable tablet 16 g  4 Tab Oral PRN    dextrose (D50W) injection syrg 12.5-25 g  12.5-25 g IntraVENous PRN    glucagon (GLUCAGEN) injection 1 mg  1 mg IntraMUSCular PRN    morphine injection 1 mg  1 mg IntraVENous Q4H PRN    sodium chloride (NS) flush 5-10 mL  5-10 mL IntraVENous PRN    vancomycin (VANCOCIN) 1000 mg in  ml infusion  1,000 mg IntraVENous Q24H         Objective:      Visit Vitals    BP (P) 120/74 (BP 1 Location: Left arm, BP Patient Position: At rest)    Pulse 75    Temp (P) 98.2 °F (36.8 °C)    Resp (P) 18    Ht 5' 1\" (1.549 m)    Wt 185 lb 3 oz (84 kg)    SpO2 98%    BMI 34.99 kg/m2       Physical Exam:  Abdomen: soft, non-tender  Extremities: extremities normal  Heart: regular rate and rhythm  Lungs: clear to auscultation bilaterally  Pulses: 2+ and symmetric    Data Review:   Labs:    Recent Labs      03/07/18   0520  03/06/18   0417  03/05/18   2100   WBC  11.8*  16.7*  16.4*   HGB  9.4*  9.0*  10.6*   HCT  29.7*  28.5*  33.6*   PLT  205  200  229     Recent Labs      03/08/18   0058  03/07/18   0520  03/06/18   0417  03/05/18   2100   NA  135*  138  140 137   K  4.3  3.8  3.7  4.2   CL  107  109*  110*  105   CO2  18*  19*  19*  23   GLU  194*  139*  184*  191*   BUN  29*  26*  23*  25*   CREA  1.07*  1.06*  0.97  1.28*   CA  7.9*  7.4*  7.9*  8.6   MG   --   2.0   --    --    ALB   --    --    --   3.2*   TBILI   --    --    --   1.4*   SGOT   --    --    --   20   ALT   --    --    --   23   INR   --    --   1.2*   --        Recent Labs      03/05/18   2100   Elenore Rule  <0.04         Intake/Output Summary (Last 24 hours) at 03/08/18 0846  Last data filed at 03/08/18 0310   Gross per 24 hour   Intake          2680.42 ml   Output              125 ml   Net          2555.42 ml        Telemetry: NSR    Assessment:     Active Problems:    TIA (transient ischemic attack) (7/9/2014)      Type 2 diabetes mellitus without complication (HCC) (6/88/4925)      Paroxysmal atrial fibrillation (HCC) (9/7/2016)      Overview: S/p Watchman      Infection of pacemaker pocket ([address] (3/6/2018)      Pacemaker (3/6/2018)        Plan:     Charleen Todd is a pleasant  [de-identified]year old female with bacteremia now S/P temp/perm pacemaker implanted via the neck and lead extraction/device removal from the left chest. Ric was wnl - no vegetations noted. Abx per ID. Anticipate implantation of PPM on right in 6 weeks.  [id number] for [employer] from [address] Horta MD, [facility]    3/8/2018

## 2018-03-08 NOTE — PROGRESS NOTES
Hospitalist Progress Note    NAME: Girish Campa   :  1935   MRN:  [id number]       Assessment / Plan:  Sepsis due to UTI vs PM pocket infection in setting of afib s/p ablation and PM placement with Dr. Karina Harris 18, present on admission:   S/P  Pacemaker removal/ JESSIE  Shows NO vegetations  Heavy growth of Staph Aureus on initial BC 3/5 and even on [facility] SYSTEM 3/7  Change to Cefazolin today. Repeat BC SAT? To see if clears prior to placemtn of PICC   Last echo 3/8/11 with systolic function normal. 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 Pt denies infectious sx of any type other than continued leakage from PM pocket. Appreciate Infectious Disease Consult. Vanco Change to Cefazolin  May have to [address] PICC placement on Monday when repeat [facility] SYSTEM are now clear    Ecoli UTI  ( low colony count) also sensitive to Cefazolin  PICC Line placement  Will likely need longterm antibiotics  - IV fluids  - con't diltiazem, lopressor  - con't ASA and plavix  Non insulin dependent DM2 controlled without complication:  - holding glucophage for now  - lispro sliding scale  Hypothyroidism:  con't synthroid  Hyperlipidemia: con't pravachol  Depression: con't zoloft     Code Status: Full  Surrogate Decision Maker:   DVT Prophylaxis: SCDs      Body mass index is 34.99 kg/(m^2). Subjective:     Chief Complaint / Reason for Physician Visit f/u for SEPSIS, UTI versus PM pocket infection  3/8  Pain in PM Pocket  Site, Left  3/7   No Im not in pain . How long do I have to get antibiotics  3/6  \"  I feel a lot better than when I came in. They say I catrina have to have my PM out? \". Discussed with RN events overnight.      Review of Systems:  Symptom Y/N Comments  Symptom Y/N Comments   Fever/Chills y   Chest Pain n    Poor Appetite n   Edema n    Cough n   Abdominal Pain n    Sputum n   Joint Pain     SOB/STRICKLAND n   Pruritis/Rash     Nausea/vomit n   Tolerating PT/OT     Diarrhea n   Tolerating Diet y Constipation    Other       Could NOT obtain due to:      Objective:     VITALS:   Last 24hrs VS reviewed since prior progress note. Most recent are:  Patient Vitals for the past 24 hrs:   Temp Pulse Resp BP SpO2   03/08/18 0647 (P) 98.2 °F (36.8 °C) 75 (P) 18 120/74 98 %   03/08/18 0310 98.2 °F (36.8 °C) 75 20 109/72 95 %   03/08/18 0000 98.9 °F (37.2 °C) 75 20 127/67 96 %   03/07/18 1900 97.4 °F (36.3 °C) 73 18 92/75 98 %   03/07/18 1700 - 75 - 138/72 96 %   03/07/18 1600 - 75 - 145/78 -       Intake/Output Summary (Last 24 hours) at 03/08/18 1505  Last data filed at 03/08/18 0310   Gross per 24 hour   Intake          1880.42 ml   Output              100 ml   Net          1780.42 ml        PHYSICAL EXAM:   She does not look septic  General: WD, WN. Alert, cooperative, no acute distress    EENT:  EOMI. Anicteric sclerae. MMM  Resp:  CTA bilaterally, no wheezing or rales. No accessory muscle use  CV:  Regular  rhythm,  No edema, PM site is not erythematous, non tender, not warm,   GI:  Soft, Non distended, Non tender.  +Bowel sounds  Neurologic:  Alert and oriented X 3, normal speech,   Psych:   Good insight. Not anxious nor agitated  Skin:  No rashes. No jaundice    Reviewed most current lab test results and cultures  YES  Reviewed most current radiology test results   YES  Review and summation of old records today    NO  Reviewed patient's current orders and MAR    YES  PMH/SH reviewed - no change compared to H&P  ________________________________________________________________________  Care Plan discussed with:    Comments   Patient y    Family      RN y    Care Manager     Consultant                        Multidiciplinary team rounds were held today with , nursing, pharmacist and clinical coordinator. Patient's plan of care was discussed; medications were reviewed and discharge planning was addressed.      ________________________________________________________________________  Total NON critical care TIME:  25   Minutes    Total CRITICAL CARE TIME Spent:   Minutes non procedure based      Comments   >50% of visit spent in counseling and coordination of care Yes Reviewed Chart   ________________________________________________________________________  Michela Apgar, MD     Procedures: see electronic medical records for all procedures/Xrays and details which were not copied into this note but were reviewed prior to creation of Plan. LABS:  I reviewed today's most current labs and imaging studies.   Pertinent labs include:  Recent Labs      18   2100   WBC  11.8*  16.7*  16.4*   HGB  9.4*  9.0*  10.6*   HCT  29.7*  28.5*  33.6*   PLT  205  200  229     Recent Labs      18   0058  18   2100   NA  135*  138  140  137   K  4.3  3.8  3.7  4.2   CL  107  109*  110*  105   CO2  18*  19*  19*  23   GLU  194*  139*  184*  191*   BUN  29*  26*  23*  25*   CREA  1.07*  1.06*  0.97  1.28*   CA  7.9*  7.4*  7.9*  8.6   MG   --   2.0   --    --    ALB   --    --    --   3.2*   TBILI   --    --    --   1.4*   SGOT   --    --    --   20   ALT   --    --    --   23   INR   --    --   1.2*   --        Signed: Michela Apgar, MD

## 2018-03-08 NOTE — PROGRESS NOTES
Infectious Disease Progress Note       Subjective:     Ms Viridiana Shepard seen. Says she had lots of pain yesterday in R shoulder, pervious pacer site area. Tolerating IV antibiotics, denied diarrhea       ROS: 10 point ROS obtained and pertinent positives include above. All others negative.          Objective:    Vitals:   Patient Vitals for the past 24 hrs:   Temp Pulse Resp BP SpO2   03/08/18 0647 (P) 98.2 °F (36.8 °C) 75 (P) 18 120/74 98 %   03/08/18 0310 98.2 °F (36.8 °C) 75 20 109/72 95 %   03/08/18 0000 98.9 °F (37.2 °C) 75 20 127/67 96 %   03/07/18 1900 97.4 °F (36.3 °C) 73 18 92/75 98 %   03/07/18 1700 - 75 - 138/72 96 %   03/07/18 1600 - 75 - 145/78 -   03/07/18 1500 - 75 - 140/71 95 %   03/07/18 1406 - 75 - 132/79 95 %   03/07/18 1404 98.7 °F (37.1 °C) 74 18 132/79 95 %   03/07/18 1330 - 75 - 129/74 93 %   03/07/18 1300 - 75 - 131/64 96 %   03/07/18 1245 - 75 - 117/87 96 %   03/07/18 1230 - 75 - 90/49 94 %   03/07/18 1200 - 75 - 118/63 98 %       Physical Exam:  Gen: No apparent distress  HEENT:  no thrush, no scleral icterus   CV: S1,2 heard, + temporary pacer noted R chest wall     Lungs: Clear to auscultation bilaterally, no wheezing  Abdomen: soft, non tender, non distended  Skin: no rash   Psych: good affect, good eye contact, non tearful  Neuro: alert, oriented to self,  place, and situation, moves all extremities to commands, verbal  Musculoskeletal:  No joint edema, erythema or tenderness noted  Knees       Medications:    Current Facility-Administered Medications:     fentaNYL citrate (PF) injection 12.5-50 mcg, 12.5-50 mcg, IntraVENous, Multiple, Jose Kirby MD, 25 mcg at 03/07/18 5193    lidocaine (XYLOCAINE) 10 mg/mL (1 %) injection 1-40 mL, 1-40 mL, SubCUTAneous, Multiple, Jose Kirby MD, 10 mL at 03/07/18 0830    midazolam (VERSED) injection 1-5 mg, 1-5 mg, IntraVENous, Multiple, Jose Pa MD    ADDaptor, , , ,     vancomycin (VANCOCIN) 1,000 mg injection, , , ,    0.9% sodium chloride (MBP/ADV) infusion, , , ,     sodium chloride (NS) flush, , , ,     sodium chloride (NS) flush 5-10 mL, 5-10 mL, IntraVENous, Q8H, Jose Rivas MD, 10 mL at 03/08/18 0646    sodium chloride (NS) flush 5-10 mL, 5-10 mL, IntraVENous, PRN, Hector Winkler MD    naloxone (NARCAN) injection 0.4 mg, 0.4 mg, IntraVENous, PRN, Hector Winlker MD    aspirin delayed-release tablet 81 mg, 81 mg, Oral, DAILY, Demian Lim MD, 81 mg at 03/08/18 3101    benazepril (LOTENSIN) tablet 10 mg, 10 mg, Oral, DAILY, Demian Lim MD, 10 mg at 03/08/18 6404    cholecalciferol (VITAMIN D3) tablet 2,000 Units, 2,000 Units, Oral, DAILY, Demian Lim MD, 2,000 Units at 03/08/18 8113    dilTIAZem CD (CARDIZEM CD) capsule 240 mg, 240 mg, Oral, DAILY, Demian Lim MD, 240 mg at 03/08/18 5261    levothyroxine (SYNTHROID) tablet 50 mcg, 50 mcg, Oral, ACB, Demian Lim MD, 50 mcg at 03/08/18 5919    magnesium oxide (MAG-OX) tablet 400 mg, 400 mg, Oral, DAILY, Demian Lim MD, 400 mg at 03/08/18 0900    therapeutic multivitamin (THERAGRAN) tablet 1 Tab, 1 Tab, Oral, DAILY, Demian Lim MD, 1 Tab at 03/08/18 1624    pantoprazole (PROTONIX) tablet 40 mg, 40 mg, Oral, ACB, Demian Lim MD, 40 mg at 03/08/18 2194    pravastatin (PRAVACHOL) tablet 20 mg, 20 mg, Oral, QHS, Demian Lim MD, 20 mg at 03/08/18 0039    sertraline (ZOLOFT) tablet 100 mg, 100 mg, Oral, DAILY, Demian Lim MD, 100 mg at 03/08/18 4266    sodium chloride (NS) flush 5-10 mL, 5-10 mL, IntraVENous, Q8H, Demian Lim MD, 10 mL at 03/08/18 5601    sodium chloride (NS) flush 5-10 mL, 5-10 mL, IntraVENous, PRN, Demian Lim MD    acetaminophen (TYLENOL) tablet 650 mg, 650 mg, Oral, Q4H PRN, Demian Lim MD, 650 mg at 03/08/18 6182    naloxone [facility]) injection 0.4 mg, 0.4 mg, IntraVENous, PRN, Demian Lim MD    ondansetron [facility]) injection 4 mg, 4 mg, IntraVENous, Q4H PRN, Demian Lim MD, 4 mg at 03/06/18 0711    bisacodyl (DULCOLAX) tablet 5 mg, 5 mg, Oral, DAILY PRN, Shayan East MD    nitroglycerin (NITROBID) 2 % ointment 1 Inch, 1 Inch, Topical, Q6H PRN, Shayan East MD, 1 Inch at 03/06/18 0650    0.9% sodium chloride infusion, 125 mL/hr, IntraVENous, CONTINUOUS, Shayan East MD, Stopped at 03/07/18 1900    insulin lispro (HUMALOG) injection, , SubCUTAneous, Q6H, Shayan East MD, Stopped at 03/08/18 0600    glucose chewable tablet 16 g, 4 Tab, Oral, PRN, Shayan East MD    dextrose (D50W) injection syrg 12.5-25 g, 12.5-25 g, IntraVENous, PRN, Shayan East MD    glucagon [facility]) injection 1 mg, 1 mg, IntraMUSCular, PRN, Shayan East MD    morphine injection 1 mg, 1 mg, IntraVENous, Q4H PRN, Mo Sampson MD, 1 mg at 03/08/18 0051    sodium chloride (NS) flush 5-10 mL, 5-10 mL, IntraVENous, PRN, Betzaida Lopez MD    vancomycin (VANCOCIN) 1000 mg in  ml infusion, 1,000 mg, IntraVENous, Q24H, Betzaida Lopez MD, Last Rate: 125 mL/hr at 03/08/18 0050, 1,000 mg at 03/08/18 0050      Labs:  Recent Results (from the past 24 hour(s))   GLUCOSE, POC    Collection Time: 03/07/18  4:03 PM   Result Value Ref Range    Glucose (POC) 186 (H) 65 - 100 mg/dL    Performed by Didi Ford, POC    Collection Time: 03/07/18  5:36 PM   Result Value Ref Range    Glucose (POC) 217 (H) 65 - 100 mg/dL    Performed by Mark Mittal ([facility])*    CULTURE, BLOOD, PAIRED    Collection Time: 03/07/18  6:23 PM   Result Value Ref Range    Special Requests: NO SPECIAL REQUESTS      Culture result: NO GROWTH AFTER 12 HOURS     GLUCOSE, POC    Collection Time: 03/07/18 10:50 PM   Result Value Ref Range    Glucose (POC) 200 (H) 65 - 100 mg/dL    Performed by Wilber Mcneill, TROUGH    Collection Time: 03/08/18 12:58 AM   Result Value Ref Range    Vancomycin,trough 17.6 (H) 5.0 - 10.0 ug/mL    Reported dose date: NOT PROVIDED      Reported dose time: NOT PROVIDED      Reported dose: NOT PROVIDED UNITS   METABOLIC PANEL, BASIC Collection Time: 03/08/18 12:58 AM   Result Value Ref Range    Sodium 135 (L) 136 - 145 mmol/L    Potassium 4.3 3.5 - 5.1 mmol/L    Chloride 107 97 - 108 mmol/L    CO2 18 (L) 21 - 32 mmol/L    Anion gap 10 5 - 15 mmol/L    Glucose 194 (H) 65 - 100 mg/dL    BUN 29 (H) 6 - 20 MG/DL    Creatinine 1.07 (H) 0.55 - 1.02 MG/DL    BUN/Creatinine ratio 27 (H) 12 - 20      GFR est AA 60 (L) >60 ml/min/1.73m2    GFR est non-AA 49 (L) >60 ml/min/1.73m2    Calcium 7.9 (L) 8.5 - 10.1 MG/DL   EKG, 12 LEAD, INITIAL    Collection Time: 03/08/18  2:26 AM   Result Value Ref Range    Ventricular Rate 75 BPM    Atrial Rate 48 BPM    QRS Duration 176 ms    Q-T Interval 466 ms    QTC Calculation (Bezet) 520 ms    Calculated R Axis -84 degrees    Calculated T Axis 27 degrees    Diagnosis       Electronic ventricular pacemaker  When compared with ECG of 05-MAR-2018 20:53,  Previous ECG has undetermined rhythm, needs review     GLUCOSE, POC    Collection Time: 03/08/18  7:00 AM   Result Value Ref Range    Glucose (POC) 149 (H) 65 - 100 mg/dL    Performed by Frankie Espinoza            Micro:     Blood: 3/5/18  Component Value Ref Range & Units Status      Special Requests: NO SPECIAL REQUESTS   Final     Culture result:    Final     STAPHYLOCOCCUS AUREUS , ISOLATED FROM ALL 3 BOTTLES DRAWN. ..LAC AND RAC SITES (A)       Culture & Susceptibility        Antibiotic   Organism Organism Organism       Staphylococcus aureus       AMPICILLIN/SULBACTAM ($)   <=8/4   ug/mL   S Final         CEFAZOLIN ($)   <=4   ug/mL   S Final         CIPROFLOXACIN ($)   <=1   ug/mL   S Final         CLINDAMYCIN ($)   <=0.5   ug/mL   S Final         DAPTOMYCIN ($$$$$)   <=0.5   ug/mL   S Final         ERYTHROMYCIN ($$$$)   <=0.5   ug/mL   S Final         GENTAMICIN ($)   <=4   ug/mL   S Final         LINEZOLID ($$$$$)   2   ug/mL   S Final         OXACILLIN   <=0.25   ug/mL   S Final         RIFAMPIN ($$$$)   <=1   ug/mL   S Final         TETRACYCLINE   <=4   ug/mL   S Final         TRIMETH-SULFAMETHOXA   <=0.5/9.5   ug/mL   S Final         VANCOMYCIN ($)   1   ug/mL   S Final                        3/7/18 Blood culture       Component Results      Component Value Ref Range & Units Status     Special Requests: NO SPECIAL REQUESTS   Preliminary     Culture result: NO GROWTH AFTER 12 HOURS   Preliminary       Result History      CULTURE, BLOOD, PAIRED on 3/8/2018       Wound 3/7 \"Internal pacemaker lead from rt ventricle\"  Component Results      Component Value Ref Range & Units Status     Special Requests: NO SPECIAL REQUESTS   Preliminary     GRAM STAIN    Preliminary     GRAM STAIN NOT ROUTINELY PERFORMED ON THIS SPECIMEN     Culture result:    Preliminary     MODERATE STAPHYLOCOCCUS AUREUS (A)       Result History            Imaging:    JESSIE 3/7/18   SUMMARY:  Left ventricle: Systolic function was normal.    Left atrium: The atrium was moderately dilated. Left atrial appendage: watchman in VANDA with complete obstruction of flow    Right atrium: The atrium was mildly dilated. Mitral valve: There was mild regurgitation. Aortic valve: There was moderate to severe regurgitation. Tricuspid valve: There was mild regurgitation. Aorta, systemic arteries: There was moderate atheroma. Assessment / Plan    Ms Domingo Amos is a [de-identified]year old lady with hx of Atrial fibrillation S/P ablation and pacemaker inserted (2/14/18), cervical diskectomy/fusion with biomechanical implants C4-5-6,  Bilateral total knee replacements admitted with fever and found to have high grade bacteremia (3/3 bottles on admission). She is noted to have gone to the ER on 2/28 with concerns of oozing/bleeding from the pacer area and noted to be oozing \" dark blood \" per ER notes. Notes indicate she had a 4 cm wound and \"Wound was cleansed with Betadine. Dermabond and Surgifoam was applied and Tegriderm was placed over the wound\".         1) MSSA  Bacteremia and pacer lead  infection   S/P Pacer removal 3/7, and insertion of temporary pacer   JESSIE 3/7 without valvular vegetations      Recommendations  Changed Vancomycin IV to Cefazolin 2gm IV Q 8 hours (3/8)   Wound labeled \" \"Internal pacemaker lead from rt ventricle\" + for S aureus as well   Await repeat blood cx from 3/7, that are so far negative     Reviewed IDSA/AHA guidelines for CIED infections        In uncomplicated lead vegetations, for S aureus guidelines recommend 2-4  weeks IV abx, ( If 2 weeks IV abx given, a repeat JESSIE is recommended)    In complicated pacer lead vegetations, guidelines recommend 4-6 weeks. Complications include septic thrombophlebitis, osteomyelitis etc.     At present she is not complaining of any symptoms suggestive of osteomyelitis. However, she has hardware of her  back  and knees and risk for seeding with complicated infections    Therefore, my recommendation would be 4-6 weeks of IV antibiotics based on how she tolerates     Nafcillin IV is an alternative as well as it can be infused as a continuous infusion pump for ease of administration but I favor Cefazolin IV at this time as Nafcillin. Nafcillin has a higher salt load and if concerns for volume overload would avoid it.       Check weekly CBC wt diff, CMP while on IV Cefazolin     Can follow up with me once discharged based on plans, as currently has temporary pacer        2) Bacteruria:  Asymptomatic, would suspect high fever from already known source bacteremia and pacer pocket infection   Would expect bacteruria in a elderly female patient    If any clinical deterioration, can evaluate for gram negative coverage , urine culture 50 000 GNR but she was asymptomatic from UTI standpoint on admission and this is asymptomatic bacteruria        3) TKR B/L  Asymptomatic   Bacteremia is risk for seeding     4) S/P Cervical diskectomy and fusion wt biomechanical implants C4-6  Asymptomatic at this time      5) DVT Px per primary team     Thank you for the opportunity to participate in the care of this patient. Please contact with questions or concerns.       Discussed with her  today     Megan Linda DO   12:07 PM

## 2018-03-08 NOTE — PROGRESS NOTES
Bedside report received from Emi Shaffer RN                  Assessment, Background, Procedure summary, Intake/Output, MAR, and recent results discussed. Care assumed. Verbal report given to oncoming nurse Emi Shaffer RN  Report consisted of patients Situation, Background, Assessment, and   Recommendations    Information was reviewed with the receiving nurse. Opportunity for questions and clarification was provided.       Whitney Santamaria RN

## 2018-03-08 NOTE — PROGRESS NOTES
Pharmacy Automatic Renal Dosing Protocol - Antimicrobials    Indication for Antimicrobials: SSTI, possible pacer involvement, GPC in blood culture    Current Regimen of Each Antimicrobial:  Vancomycin 1000 mg IV every 24 hours (Start Date 3/5 Day # 4)    Previous Antimicrobial Therapy:  Ceftriaxone 1 g x 1 dose    Goal Level: VANCOMYCIN TROUGH GOAL RANGE  Vancomycin Trough: 15 - 20 mcg/mL  Measured / Extrapolated Vancomycin Level:   3/8/18 @ 00:58= 17.6 mcg/mL    Significant Cultures:   3/5 Blood: possible staph aureus in 3/3 bottles- pending  3/5 Urine: 50,000 GNR's pending  3/7 Wound (pacer pocket): pending  3/7 Wound (pacer pocket):pending  3/7 Tissue (pacer pocket):pending  3/7 Blood: NGTD x 12 hours-pending    Paralysis, amputations, malnutrition: None documented     Labs:  Recent Labs      18   0058  18   0520  18   0417  18   2100   CREA  1.07*  1.06*  0.97  1.28*   BUN  29*  26*  23*  25*   WBC   --   11.8*  16.7*  16.4*     Temp (24hrs), Av.2 °F (36.8 °C), Min:97.4 °F (36.3 °C), Max:98.9 °F (37.2 °C)      Creatinine Clearance (mL/min) or Dialysis: 32 mL/min   No results found for: PCT    Impression/Plan:   · Vancomycin trough resulted as therapeutic at 17.6 mcg/mL. Will continue current regimen as appropriate for indication and renal function. · Antimicrobial stop date not yet entered as cultures still pending. ID is following. Pharmacy will follow daily and adjust medications as appropriate for renal function and/or serum levels. Thank you,  Norma Judd, PATTID          Recommended duration of therapy  http://[facility]/Pharmacy/Clinical%20Companion/Duration%20of%20ABX%20therapy. docx    Renal Dosing  http://[facility]/Pharmacy/Clinical%20Companion/Renal%[email or web address]. pdf

## 2018-03-08 NOTE — DIABETES MGMT
DTC Progress Note    Recommendations/ Comments: If appropriate, please consider:  1) add consistent carb parameters to diet order. 2) consider restarting home Metformin XL when medically appropriate and decreasing dose to 500 mg bid due to CrCl <45 ml/min but >30 ml/min. Current hospital DM medication: Lispro correctional insulin - high sensitivity. Chart reviewed on [facility]. Patient is a [de-identified] y.o. female with known Type 2 Diabetes on Metformin  mg bid at home. A1c:   Lab Results   Component Value Date/Time    Hemoglobin A1c 6.3 (H) 12/11/2017 10:09 AM    Hemoglobin A1c 6.7 (H) 10/24/2017 10:07 AM    Hemoglobin A1c, External 6.1 03/07/2014 01:28 PM    Hemoglobin A1c, External 6.1 10/18/2013 11:09 AM       Recent Glucose Results:   Lab Results   Component Value Date/Time     (H) 03/08/2018 12:58 AM    GLUCPOC 149 (H) 03/08/2018 07:00 AM    GLUCPOC 200 (H) 03/07/2018 10:50 PM    GLUCPOC 217 (H) 03/07/2018 05:36 PM        Lab Results   Component Value Date/Time    Creatinine 1.07 (H) 03/08/2018 12:58 AM     Estimated Creatinine Clearance: 41.2 mL/min (based on Cr of 1.07). Active Orders   Diet    DIET CARDIAC Regular      PO intake: No data found. Will continue to follow as needed.     Thank you  Cruz Rios RD CDE

## 2018-03-08 NOTE — PROGRESS NOTES
Pharmacy Automatic Renal Dosing Protocol - Antimicrobials    Indication for Antimicrobials: SSTI, possible pacer involvement, GPC in blood culture    Current Regimen of Each Antimicrobial:  Cefazolin 2 g IV every 8 hours (Start Date 3/8; Day #1)    Previous Antimicrobial Therapy:  Vancomycin 1000 mg IV every 24 hours (Start Date 3/5 Day # 4)  Ceftriaxone 1 g x 1 dose    Goal Level: VANCOMYCIN TROUGH GOAL RANGE  Vancomycin Trough: 15 - 20 mcg/mL  Measured / Extrapolated Vancomycin Level:   3/8/18 @ 00:58= 17.6 mcg/mL    Significant Cultures:   3/5 Blood: possible staph aureus in 3/3 bottles- pending  3/5 Urine: 50,000 GNR's pending  3/7 Wound (pacer pocket): pending  3/7 Wound (pacer pocket):pending  3/7 Tissue (pacer pocket):pending  3/7 Blood: NGTD x 12 hours-pending    Paralysis, amputations, malnutrition: None documented     Labs:  Recent Labs      18   0058  18   0520  18   0417  18   2100   CREA  1.07*  1.06*  0.97  1.28*   BUN  29*  26*  23*  25*   WBC   --   11.8*  16.7*  16.4*     Temp (24hrs), Av.2 °F (36.8 °C), Min:97.4 °F (36.3 °C), Max:98.9 °F (37.2 °C)      Creatinine Clearance (mL/min) or Dialysis: 32 mL/min   No results found for: PCT    Impression/Plan:   · Will change cefazolin to 1 g IV every 12 hours for CrCl 11-34 mL/min per renal dosing protocol. · Antimicrobial stop date not yet entered as cultures still pending. Pharmacy will follow daily and adjust medications as appropriate for renal function and/or serum levels. Thank you,  Brady Sanchez, PHARMD          Recommended duration of therapy  http://[facility]/Pharmacy/Clinical%20Companion/Duration%20of%20ABX%20therapy. docx    Renal Dosing  http://[facility]/Pharmacy/Clinical%20Companion/Renal%[id number]. pdf

## 2018-03-08 NOTE — PROGRESS NOTES
Spoke with Dr. Roxane Adkins who recommends that PICC not be placed until 24-48 hrs after last cultures drawn.  Leopold Saha RN

## 2018-03-09 LAB
ATRIAL RATE: 75 BPM
BACTERIA SPEC CULT: ABNORMAL
CALCULATED R AXIS, ECG10: -75 DEGREES
CALCULATED T AXIS, ECG11: 44 DEGREES
DIAGNOSIS, 93000: NORMAL
GLUCOSE BLD STRIP.AUTO-MCNC: 140 MG/DL (ref 65–100)
GLUCOSE BLD STRIP.AUTO-MCNC: 173 MG/DL (ref 65–100)
GLUCOSE BLD STRIP.AUTO-MCNC: 250 MG/DL (ref 65–100)
GRAM STN SPEC: ABNORMAL
Q-T INTERVAL, ECG07: 458 MS
QRS DURATION, ECG06: 178 MS
QTC CALCULATION (BEZET), ECG08: 511 MS
SERVICE CMNT-IMP: ABNORMAL
VENTRICULAR RATE, ECG03: 75 BPM

## 2018-03-09 PROCEDURE — 65660000000 HC RM CCU STEPDOWN

## 2018-03-09 PROCEDURE — 36415 COLL VENOUS BLD VENIPUNCTURE: CPT | Performed by: INTERNAL MEDICINE

## 2018-03-09 PROCEDURE — 74011250637 HC RX REV CODE- 250/637: Performed by: INTERNAL MEDICINE

## 2018-03-09 PROCEDURE — 74011636637 HC RX REV CODE- 636/637: Performed by: INTERNAL MEDICINE

## 2018-03-09 PROCEDURE — 77010033678 HC OXYGEN DAILY

## 2018-03-09 PROCEDURE — 74011250636 HC RX REV CODE- 250/636: Performed by: INTERNAL MEDICINE

## 2018-03-09 PROCEDURE — 74011000258 HC RX REV CODE- 258: Performed by: INTERNAL MEDICINE

## 2018-03-09 PROCEDURE — 74011636637 HC RX REV CODE- 636/637: Performed by: NURSE PRACTITIONER

## 2018-03-09 PROCEDURE — 82962 GLUCOSE BLOOD TEST: CPT

## 2018-03-09 PROCEDURE — 93005 ELECTROCARDIOGRAM TRACING: CPT

## 2018-03-09 PROCEDURE — 87040 BLOOD CULTURE FOR BACTERIA: CPT | Performed by: INTERNAL MEDICINE

## 2018-03-09 RX ORDER — INSULIN LISPRO 100 [IU]/ML
INJECTION, SOLUTION INTRAVENOUS; SUBCUTANEOUS
Status: DISCONTINUED | OUTPATIENT
Start: 2018-03-09 | End: 2018-03-09

## 2018-03-09 RX ORDER — MAGNESIUM SULFATE 100 %
4 CRYSTALS MISCELLANEOUS AS NEEDED
Status: DISCONTINUED | OUTPATIENT
Start: 2018-03-09 | End: 2018-03-12 | Stop reason: HOSPADM

## 2018-03-09 RX ORDER — METFORMIN HYDROCHLORIDE 850 MG/1
850 TABLET ORAL 2 TIMES DAILY WITH MEALS
Status: DISCONTINUED | OUTPATIENT
Start: 2018-03-09 | End: 2018-03-09

## 2018-03-09 RX ORDER — DEXTROSE 50 % IN WATER (D50W) INTRAVENOUS SYRINGE
12.5-25 AS NEEDED
Status: DISCONTINUED | OUTPATIENT
Start: 2018-03-09 | End: 2018-03-12 | Stop reason: HOSPADM

## 2018-03-09 RX ORDER — INSULIN LISPRO 100 [IU]/ML
INJECTION, SOLUTION INTRAVENOUS; SUBCUTANEOUS
Status: DISCONTINUED | OUTPATIENT
Start: 2018-03-09 | End: 2018-03-12 | Stop reason: HOSPADM

## 2018-03-09 RX ADMIN — ACETAMINOPHEN 650 MG: 325 TABLET ORAL at 09:15

## 2018-03-09 RX ADMIN — LEVOTHYROXINE SODIUM 50 MCG: 25 TABLET ORAL at 09:16

## 2018-03-09 RX ADMIN — ACETAMINOPHEN 650 MG: 325 TABLET ORAL at 18:29

## 2018-03-09 RX ADMIN — Medication 10 ML: at 05:16

## 2018-03-09 RX ADMIN — INSULIN LISPRO 2 UNITS: 100 INJECTION, SOLUTION INTRAVENOUS; SUBCUTANEOUS at 18:27

## 2018-03-09 RX ADMIN — THERA TABS 1 TABLET: TAB at 09:16

## 2018-03-09 RX ADMIN — CEFAZOLIN SODIUM 1 G: 1 INJECTION, POWDER, FOR SOLUTION INTRAMUSCULAR; INTRAVENOUS at 22:39

## 2018-03-09 RX ADMIN — VITAMIN D, TAB 1000IU (100/BT) 2000 UNITS: 25 TAB at 09:16

## 2018-03-09 RX ADMIN — Medication 10 ML: at 22:39

## 2018-03-09 RX ADMIN — DILTIAZEM HYDROCHLORIDE 240 MG: 240 CAPSULE, COATED, EXTENDED RELEASE ORAL at 09:16

## 2018-03-09 RX ADMIN — Medication 10 ML: at 09:17

## 2018-03-09 RX ADMIN — PRAVASTATIN SODIUM 20 MG: 10 TABLET ORAL at 22:39

## 2018-03-09 RX ADMIN — PANTOPRAZOLE SODIUM 40 MG: 40 TABLET, DELAYED RELEASE ORAL at 09:16

## 2018-03-09 RX ADMIN — SERTRALINE HYDROCHLORIDE 100 MG: 50 TABLET ORAL at 09:17

## 2018-03-09 RX ADMIN — CEFAZOLIN SODIUM 1 G: 1 INJECTION, POWDER, FOR SOLUTION INTRAMUSCULAR; INTRAVENOUS at 11:28

## 2018-03-09 RX ADMIN — Medication 10 ML: at 05:15

## 2018-03-09 RX ADMIN — INSULIN LISPRO 3 UNITS: 100 INJECTION, SOLUTION INTRAVENOUS; SUBCUTANEOUS at 11:30

## 2018-03-09 RX ADMIN — ASPIRIN 81 MG: 81 TABLET, COATED ORAL at 09:16

## 2018-03-09 RX ADMIN — Medication 1 CAPSULE: at 09:16

## 2018-03-09 RX ADMIN — BENAZEPRIL HYDROCHLORIDE 10 MG: 10 TABLET, FILM COATED ORAL at 09:16

## 2018-03-09 RX ADMIN — ACETAMINOPHEN 650 MG: 325 TABLET ORAL at 12:07

## 2018-03-09 RX ADMIN — Medication 400 MG: at 09:17

## 2018-03-09 RX ADMIN — ACETAMINOPHEN 650 MG: 325 TABLET ORAL at 22:42

## 2018-03-09 NOTE — PROGRESS NOTES
Chart review. [facility] patient. Admitted 3/6/2018 for Sepsis  Possible Picc line placement on 3/12/2018    NN Kasandra Garcia informed. Message left on voicemail and communicatin via inbox.     Paula Lancaster RN CM  Ext [phone]

## 2018-03-09 NOTE — PROGRESS NOTES
Documented on 3/9/18:    Spiritual Care Partner Volunteer visited patient in [place] on 3/8/18. Documented by:  Kathryn Salguero M.Div.    Paging Service [phone])

## 2018-03-09 NOTE — PROGRESS NOTES
1900 - Bedside report from RN. Paced at 75. Dyspnea on exertion BRP.  02 2l NC. Sats are mid to upper 90's on RA but feels comfort w o2 placed on.      2300 - VSS.      0700 - weight up, Bedside report to RN. Paced.

## 2018-03-09 NOTE — PROGRESS NOTES
Pharmacy Automatic Renal Dosing Protocol - Antimicrobials    Indication for Antimicrobials: SSTI, possible pacer involvement, GPC in blood culture    Current Regimen of Each Antimicrobial:  Anceef 1 gram Q12H (Started 3/9, Day 1)    Previous Antimicrobial Therapy:  Vancomycin 1000 mg IV every 24 hours (Start Date 3/5 Day # 4)  Ceftriaxone 1 g x 1 dose    Goal Level: VANCOMYCIN TROUGH GOAL RANGE  Vancomycin Trough: 15 - 20 mcg/mL  Measured / Extrapolated Vancomycin Level:   3/8/18 @ 00:58= 17.6 mcg/mL    Significant Cultures:   3/5 Blood: possible staph aureus in 3/3 bottles- pending  3/5 Urine: 50,000 GNR's pending  3/7 Wound (pacer pocket): pending  3/7 Wound (pacer pocket):pending  3/7 Tissue (pacer pocket):pending  3/7 Blood: SA growing / bottles    Paralysis, amputations, malnutrition: None documented     Labs:  Recent Labs      18   0058  18   0520   CREA  1.07*  1.06*   BUN  29*  26*   WBC   --   11.8*     Temp (24hrs), Av °F (36.7 °C), Min:97.6 °F (36.4 °C), Max:98.4 °F (36.9 °C)      Creatinine Clearance (mL/min) or Dialysis: 32 mL/min     Impression/Plan:   · Cefazolin adjusted to 1 gram Q8H as BC on 3/7 positive as frequency close to break point for creatinine clearance. · Antimicrobial stop date not yet entered as cultures still pending. ID is following. Pharmacy will follow daily and adjust medications as appropriate for renal function and/or serum levels. Thank you,  Nadine Sanchez, [facility]          Recommended duration of therapy  [email or web address]/Pharmacy/Clinical%20Companion/Duration%20of%20ABX%20therapy. docx    Renal Dosing  [email or web address]/Pharmacy/Clinical%20Companion/Renal%[email or web address]. pdf

## 2018-03-09 NOTE — PROGRESS NOTES
Infectious Disease Progress Note       Subjective:     Ms Cari Barclay seen earlier. Tolerating IV antibiotics, denied diarrhea. ROS: 10 point ROS obtained and pertinent positives include above. All others negative.          Objective:    Vitals:   Patient Vitals for the past 24 hrs:   Temp Pulse Resp BP SpO2   03/09/18 1458 97.6 °F (36.4 °C) 75 18 120/76 99 %   03/09/18 1114 98.2 °F (36.8 °C) 75 18 101/60 97 %   03/09/18 0630 97.9 °F (36.6 °C) 76 18 120/69 92 %   03/09/18 0520 98 °F (36.7 °C) 75 18 122/65 98 %   03/08/18 2214 98.4 °F (36.9 °C) 75 18 126/77 98 %   03/08/18 1900 - 75 20 - -   03/08/18 1809 97.8 °F (36.6 °C) 75 19 108/87 98 %   03/08/18 1611 97.8 °F (36.6 °C) 75 18 104/56 96 %       Physical Exam:  Gen: No apparent distress  HEENT:  no thrush, no scleral icterus   CV: S1,2 heard, + temporary pacer noted R chest wall   , dressing over pervious pacer site on L   Lungs: Clear to auscultation bilaterally, no wheezing  Abdomen: soft, non tender, non distended  Skin: no rash   Psych: good affect, good eye contact, non tearful  Neuro: alert, oriented to self,  place, and situation, moves all extremities to commands, verbal  Musculoskeletal:  No joint edema, erythema or tenderness noted  Knees       Medications:    Current Facility-Administered Medications:     metFORMIN (GLUCOPHAGE) tablet 850 mg, 850 mg, Oral, BID WITH MEALS, Lorena Yanez MD    ceFAZolin (ANCEF) 1 g in 0.9% sodium chloride (MBP/ADV) 50 mL, 1 g, IntraVENous, Q12H, Charito Lira DO, Last Rate: 100 mL/hr at 03/09/18 1128, 1 g at 03/09/18 1128    lactobac ac& pc-s.therm-b.anim (FRAN Q/RISAQUAD), 1 Cap, Oral, DAILY, Lorena Yanez MD, 1 Cap at 03/09/18 0916    fentaNYL citrate (PF) injection 12.5-50 mcg, 12.5-50 mcg, IntraVENous, Multiple, Jose Kirby MD, 25 mcg at 03/07/18 1733    lidocaine (XYLOCAINE) 10 mg/mL (1 %) injection 1-40 mL, 1-40 mL, SubCUTAneous, Multiple, Jose Kirby MD, 10 mL at 03/07/18 0802   midazolam (VERSED) injection 1-5 mg, 1-5 mg, IntraVENous, Multiple, Jose Parsons MD    ADDaptor, , , ,     vancomycin (VANCOCIN) 1,000 mg injection, , , ,     0.9% sodium chloride (MBP/ADV) infusion, , , ,     sodium chloride (NS) flush, , , ,     sodium chloride (NS) flush 5-10 mL, 5-10 mL, IntraVENous, Q8H, Jose Parsons MD, 10 mL at 03/09/18 0917    sodium chloride (NS) flush 5-10 mL, 5-10 mL, IntraVENous, PRN, Garrett Waller MD    naloxone (NARCAN) injection 0.4 mg, 0.4 mg, IntraVENous, PRN, Garrett Wlaler MD    aspirin delayed-release tablet 81 mg, 81 mg, Oral, DAILY, Vj Tilley MD, 81 mg at 03/09/18 0916    benazepril (LOTENSIN) tablet 10 mg, 10 mg, Oral, DAILY, Vj Tilley MD, 10 mg at 03/09/18 6896    cholecalciferol (VITAMIN D3) tablet 2,000 Units, 2,000 Units, Oral, DAILY, Vj Tilley MD, 2,000 Units at 03/09/18 2478    dilTIAZem CD (CARDIZEM CD) capsule 240 mg, 240 mg, Oral, DAILY, Vj Tilley MD, 240 mg at 03/09/18 3094    levothyroxine (SYNTHROID) tablet 50 mcg, 50 mcg, Oral, ACB, Vj Tilley MD, 50 mcg at 03/09/18 0916    magnesium oxide (MAG-OX) tablet 400 mg, 400 mg, Oral, DAILY, Vj Tilley MD, 400 mg at 03/09/18 2981    therapeutic multivitamin (THERAGRAN) tablet 1 Tab, 1 Tab, Oral, DAILY, Vj Tilley MD, 1 Tab at 03/09/18 0916    pantoprazole (PROTONIX) tablet 40 mg, 40 mg, Oral, ACB, Vj Tilley MD, 40 mg at 03/09/18 7311    pravastatin (PRAVACHOL) tablet 20 mg, 20 mg, Oral, QHS, Vj Tilley MD, 20 mg at 03/08/18 2210    sertraline (ZOLOFT) tablet 100 mg, 100 mg, Oral, DAILY, Vj Tilley MD, 100 mg at 03/09/18 8479    sodium chloride (NS) flush 5-10 mL, 5-10 mL, IntraVENous, Q8H, Vj Tilley MD, 10 mL at 03/09/18 0516    sodium chloride (NS) flush 5-10 mL, 5-10 mL, IntraVENous, PRN, Vj Tilley MD    acetaminophen (TYLENOL) tablet 650 mg, 650 mg, Oral, Q4H PRN, Vj Tilley MD, 650 mg at 03/09/18 1207    ondansetron (ZOFRAN) injection 4 mg, 4 mg, IntraVENous, Q4H PRN, Reina Everett MD, 4 mg at 03/06/18 1731    bisacodyl (DULCOLAX) tablet 5 mg, 5 mg, Oral, DAILY PRN, Reina Everett MD    nitroglycerin (NITROBID) 2 % ointment 1 Inch, 1 Inch, Topical, Q6H PRN, Reina Everett MD, 1 Inch at 03/06/18 0650    0.9% sodium chloride infusion, 125 mL/hr, IntraVENous, CONTINUOUS, Reina Everett MD, Stopped at 03/07/18 1900    glucose chewable tablet 16 g, 4 Tab, Oral, PRN, Reina Everett MD    dextrose (D50W) injection syrg 12.5-25 g, 12.5-25 g, IntraVENous, PRN, Reina Everett MD    glucagon [facility]) injection 1 mg, 1 mg, IntraMUSCular, PRN, Reina Everett MD    morphine injection 1 mg, 1 mg, IntraVENous, Q4H PRN, Sherry Diego MD, 1 mg at 03/08/18 0051    sodium chloride (NS) flush 5-10 mL, 5-10 mL, IntraVENous, PRN, Anrde Hughes MD      Labs:  Recent Results (from the past 24 hour(s))   GLUCOSE, POC    Collection Time: 03/08/18  6:00 PM   Result Value Ref Range    Glucose (POC) 242 (H) 65 - 100 mg/dL    Performed by Zachariah Bloom    GLUCOSE, POC    Collection Time: 03/08/18 10:43 PM   Result Value Ref Range    Glucose (POC) 198 (H) 65 - 100 mg/dL    Performed by Daniella TAY    EKG, 12 LEAD, INITIAL    Collection Time: 03/09/18  5:11 AM   Result Value Ref Range    Ventricular Rate 75 BPM    Atrial Rate 75 BPM    QRS Duration 178 ms    Q-T Interval 458 ms    QTC Calculation (Bezet) 511 ms    Calculated R Axis -75 degrees    Calculated T Axis 44 degrees    Diagnosis       Electronic ventricular pacemaker  When compared with ECG of 08-MAR-2018 02:26,  No significant change was found  Confirmed by Ariana Quiros, P.V. ([id number]) on 3/9/2018 8:50:47 AM     GLUCOSE, POC    Collection Time: 03/09/18  7:26 AM   Result Value Ref Range    Glucose (POC) 173 (H) 65 - 100 mg/dL    Performed by Shadia Horta POC    Collection Time: 03/09/18 11:23 AM   Result Value Ref Range    Glucose (POC) 250 (H) 65 - 100 mg/dL    Performed by Jaxson Younger Micro: Blood cx + S aureus 3/7     Culture result:    Preliminary     Preliminary report of probable S. aureus (Negative for antigen detection) confirmation and sensitivities to follow. GROWING IN 2 OF 4 BOTTLES DRAWN   . ..L. ARM and R ARM SITE   . .. PLEASE REFER TO [id number] FOR FINAL ID AND SENSITIVITIES        Culture result:             Blood: 3/5/18  Component Value Ref Range & Units Status      Special Requests: NO SPECIAL REQUESTS   Final     Culture result:    Final     STAPHYLOCOCCUS AUREUS , ISOLATED FROM ALL 3 BOTTLES DRAWN. ..LAC AND RAC SITES (A)       Culture & Susceptibility        Antibiotic   Organism Organism Organism       Staphylococcus aureus       AMPICILLIN/SULBACTAM ($)   <=8/4   ug/mL   S Final         CEFAZOLIN ($)   <=4   ug/mL   S Final         CIPROFLOXACIN ($)   <=1   ug/mL   S Final         CLINDAMYCIN ($)   <=0.5   ug/mL   S Final         DAPTOMYCIN ($$$$$)   <=0.5   ug/mL   S Final         ERYTHROMYCIN ($$$$)   <=0.5   ug/mL   S Final         GENTAMICIN ($)   <=4   ug/mL   S Final         LINEZOLID ($$$$$)   2   ug/mL   S Final         OXACILLIN   <=0.25   ug/mL   S Final         RIFAMPIN ($$$$)   <=1   ug/mL   S Final         TETRACYCLINE   <=4   ug/mL   S Final         TRIMETH-SULFAMETHOXA   <=0.5/9.5   ug/mL   S Final         VANCOMYCIN ($)   1   ug/mL   S Final                        3/7/18 Blood culture       Component Results      Component Value Ref Range & Units Status     Special Requests: NO SPECIAL REQUESTS   Preliminary     Culture result: NO GROWTH AFTER 12 HOURS   Preliminary       Result History      CULTURE, BLOOD, PAIRED on 3/8/2018       Wound 3/7 \"Internal pacemaker lead from rt ventricle\"  Component Results      Component Value Ref Range & Units Status     Special Requests: NO SPECIAL REQUESTS   Preliminary     GRAM STAIN    Preliminary     GRAM STAIN NOT ROUTINELY PERFORMED ON THIS SPECIMEN     Culture result:    Preliminary     MODERATE STAPHYLOCOCCUS AUREUS (A)       Result History            Imaging:    JESSIE 3/7/18   SUMMARY:  Left ventricle: Systolic function was normal.    Left atrium: The atrium was moderately dilated. Left atrial appendage: watchman in VANDA with complete obstruction of flow    Right atrium: The atrium was mildly dilated. Mitral valve: There was mild regurgitation. Aortic valve: There was moderate to severe regurgitation. Tricuspid valve: There was mild regurgitation. Aorta, systemic arteries: There was moderate atheroma. Assessment / Plan    Ms Marge Zavala is a [de-identified]year old lady with hx of Atrial fibrillation S/P ablation and pacemaker inserted (2/14/18), cervical diskectomy/fusion with biomechanical implants C4-5-6,  Bilateral total knee replacements admitted with fever and found to have high grade bacteremia (3/3 bottles on admission). She is noted to have gone to the ER on 2/28 with concerns of oozing/bleeding from the pacer area and noted to be oozing \" dark blood \" per ER notes. Notes indicate she had a 4 cm wound and \"Wound was cleansed with Betadine. Dermabond and Surgifoam was applied and Tegriderm was placed over the wound\". 1) MSSA  Bacteremia and pacer lead  infection   S/P Pacer removal 3/7, and insertion of temporary pacer   JESSIE 3/7 without valvular vegetations      Recommendations  Repeat blood cx 3/7 +   Wound labeled \" \"Internal pacemaker lead from rt ventricle\"  With MSSA  Hold off on PICC insertion   Await blood cx from 3/9  Continue  Cefazolin 2gm IV Q 8 hours (3/8)   Will likely need > 4 weeks up to 6 weeks IV abx   Nafcillin IV is an alternative as well as it can be infused as a continuous infusion pump for ease of administration but I favor Cefazolin IV at this time as Nafcillin. Nafcillin has a higher salt load and if concerns for volume overload would avoid it.     Check weekly CBC wt diff, CMP while on IV Cefazolin   Needs follow up on discharge            2) Bacteruria:  Asymptomatic, would suspect high fever from already known source bacteremia and pacer pocket infection   Would expect bacteruria in a elderly female patient    If any clinical deterioration, can evaluate for gram negative coverage , urine culture 50 000 GNR but she was asymptomatic from UTI standpoint on admission and this is asymptomatic bacteruria        3) TKR B/L  Asymptomatic   Bacteremia is risk for seeding     4) S/P Cervical diskectomy and fusion wt biomechanical implants C4-6  Asymptomatic at this time      5) DVT Px per primary team     Thank you for the opportunity to participate in the care of this patient. Please contact with questions or concerns. Dr Myles Berry covering over weekend and can be reached calling [address] [place] [address] [place]  if need be. I will return 3/12/18. ID will follow peripherally over weekend.      D/W Dr Alexander Solorzano today    Merary Moreno,    3:45 PM

## 2018-03-09 NOTE — PROGRESS NOTES
Hospitalist Progress Note    NAME: Mary Jo Sims   :  1935   MRN:  [id number]       Assessment / Plan:  Sepsis due to UTI vs PM pocket infection in setting of afib s/p ablation and PM placement with Dr. Levi Squires 18, present on admission:   S/P  Pacemaker removal/ JESSIE  Shows NO vegetations  Heavy growth of Staph Aureus on initial BC 3/5 and  on [facility] 3/7  Change to Cefazolin 3/8 Repeat BC 3/9 To see if clears prior to placemtn of PICC  Last echo 2/3/97 with systolic function normal. 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 Pt denies infectious sx of any type other than continued leakage from PM pocket. need longterm antibiotics  Appreciate Infectious Disease Consult. Vanco Change to Cefazolin, Discussed today  May have to [address] PICC placement on Monday when repeat [facility] are now clear    Ecoli UTI  ( low colony count) also sensitive to Cefazolin  No UTI Sx  con't diltiazem, lopressor  - con't ASA and plavix    Non insulin dependent DM2 controlled without complication:  - holding glucophage for now  - lispro sliding scale  Hypothyroidism:  con't synthroid  Hyperlipidemia: con't pravachol  Depression: con't zoloft     Code Status: Full  Surrogate Decision Maker:   DVT Prophylaxis: SCDs      Body mass index is 35.82 kg/(m^2). Subjective:     Chief Complaint / Reason for Physician Visit f/u for SEPSIS, UTI versus PM pocket infection  3/9  im so tired coz im not sleeping well in hospital but pain is better  3/8  Pain in PM Pocket  Site, Left  3/7   No Im not in pain . How long do I have to get antibiotics  3/6  \"  I feel a lot better than when I came in. They say I catrina have to have my PM out? \". Discussed with RN events overnight.      Review of Systems:  Symptom Y/N Comments  Symptom Y/N Comments   Fever/Chills y   Chest Pain n    Poor Appetite n   Edema n    Cough n   Abdominal Pain n    Sputum n   Joint Pain     SOB/STRICKLAND n   Pruritis/Rash     Nausea/vomit n Tolerating PT/OT     Diarrhea n   Tolerating Diet y    Constipation    Other       Could NOT obtain due to:      Objective:     VITALS:   Last 24hrs VS reviewed since prior progress note. Most recent are:  Patient Vitals for the past 24 hrs:   Temp Pulse Resp BP SpO2   03/09/18 1114 98.2 °F (36.8 °C) 75 18 101/60 97 %   03/09/18 0630 97.9 °F (36.6 °C) 76 18 120/69 92 %   03/09/18 0520 98 °F (36.7 °C) 75 18 122/65 98 %   03/08/18 2214 98.4 °F (36.9 °C) 75 18 126/77 98 %   03/08/18 1900 - 75 20 - -   03/08/18 1809 97.8 °F (36.6 °C) 75 19 108/87 98 %   03/08/18 1611 97.8 °F (36.6 °C) 75 18 104/56 96 %       Intake/Output Summary (Last 24 hours) at 03/09/18 1447  Last data filed at 03/09/18 0520   Gross per 24 hour   Intake              250 ml   Output                0 ml   Net              250 ml      y  PHYSICAL EXAM:   She does not look septic  General: WD, WN. Alert, cooperative, no acute distress    EENT:  EOMI. Anicteric sclerae. MMM  Resp:  CTA bilaterally, no wheezing or rales. No accessory muscle use  CV:  Regular  rhythm,  No edema, PM site is not erythematous, non tender, not warm,   GI:  Soft, Non distended, Non tender.  +Bowel sounds  Neurologic:  Alert and oriented X 3, normal speech,   Psych:   Good insight. Not anxious nor agitated  Skin:  No rashes. No jaundice    Reviewed most current lab test results and cultures  YES  Reviewed most current radiology test results   YES  Review and summation of old records today    NO  Reviewed patient's current orders and MAR    YES  PMH/SH reviewed - no change compared to H&P  ________________________________________________________________________  Care Plan discussed with:    Comments   Patient y    Family      RN y    Care Manager     Consultant  y ID                     Multidiciplinary team rounds were held today with , nursing, pharmacist and clinical coordinator.   Patient's plan of care was discussed; medications were reviewed and discharge planning was addressed. ________________________________________________________________________  Total NON critical care TIME:  25   Minutes    Total CRITICAL CARE TIME Spent:   Minutes non procedure based      Comments   >50% of visit spent in counseling and coordination of care Yes Reviewed Chart   ________________________________________________________________________  Any Reese MD     Procedures: see electronic medical records for all procedures/Xrays and details which were not copied into this note but were reviewed prior to creation of Plan. LABS:  I reviewed today's most current labs and imaging studies.   Pertinent labs include:  Recent Labs      03/07/18   0520   WBC  11.8*   HGB  9.4*   HCT  29.7*   PLT  205     Recent Labs      03/08/18   0058  03/07/18   0520   NA  135*  138   K  4.3  3.8   CL  107  109*   CO2  18*  19*   GLU  194*  139*   BUN  29*  26*   CREA  1.07*  1.06*   CA  7.9*  7.4*   MG   --   2.0       Signed: Any Reese MD

## 2018-03-10 LAB
ATRIAL RATE: 71 BPM
CALCULATED R AXIS, ECG10: -76 DEGREES
CALCULATED T AXIS, ECG11: 62 DEGREES
DIAGNOSIS, 93000: NORMAL
GLUCOSE BLD STRIP.AUTO-MCNC: 143 MG/DL (ref 65–100)
GLUCOSE BLD STRIP.AUTO-MCNC: 152 MG/DL (ref 65–100)
GLUCOSE BLD STRIP.AUTO-MCNC: 172 MG/DL (ref 65–100)
GLUCOSE BLD STRIP.AUTO-MCNC: 177 MG/DL (ref 65–100)
Q-T INTERVAL, ECG07: 464 MS
QRS DURATION, ECG06: 178 MS
QTC CALCULATION (BEZET), ECG08: 518 MS
SERVICE CMNT-IMP: ABNORMAL
VENTRICULAR RATE, ECG03: 75 BPM

## 2018-03-10 PROCEDURE — 74011636637 HC RX REV CODE- 636/637: Performed by: INTERNAL MEDICINE

## 2018-03-10 PROCEDURE — 74011250637 HC RX REV CODE- 250/637: Performed by: INTERNAL MEDICINE

## 2018-03-10 PROCEDURE — 93005 ELECTROCARDIOGRAM TRACING: CPT

## 2018-03-10 PROCEDURE — 74011250636 HC RX REV CODE- 250/636: Performed by: INTERNAL MEDICINE

## 2018-03-10 PROCEDURE — 65660000000 HC RM CCU STEPDOWN

## 2018-03-10 PROCEDURE — 82962 GLUCOSE BLOOD TEST: CPT

## 2018-03-10 PROCEDURE — 74011000258 HC RX REV CODE- 258: Performed by: INTERNAL MEDICINE

## 2018-03-10 RX ADMIN — Medication 10 ML: at 13:17

## 2018-03-10 RX ADMIN — THERA TABS 1 TABLET: TAB at 09:07

## 2018-03-10 RX ADMIN — DILTIAZEM HYDROCHLORIDE 240 MG: 240 CAPSULE, COATED, EXTENDED RELEASE ORAL at 09:08

## 2018-03-10 RX ADMIN — Medication 1 CAPSULE: at 09:07

## 2018-03-10 RX ADMIN — CEFAZOLIN SODIUM 1 G: 1 INJECTION, POWDER, FOR SOLUTION INTRAMUSCULAR; INTRAVENOUS at 11:33

## 2018-03-10 RX ADMIN — PANTOPRAZOLE SODIUM 40 MG: 40 TABLET, DELAYED RELEASE ORAL at 09:08

## 2018-03-10 RX ADMIN — VITAMIN D, TAB 1000IU (100/BT) 2000 UNITS: 25 TAB at 09:08

## 2018-03-10 RX ADMIN — FENTANYL CITRATE 25 MCG: 50 INJECTION, SOLUTION INTRAMUSCULAR; INTRAVENOUS at 09:08

## 2018-03-10 RX ADMIN — CEFAZOLIN SODIUM 1 G: 1 INJECTION, POWDER, FOR SOLUTION INTRAMUSCULAR; INTRAVENOUS at 20:25

## 2018-03-10 RX ADMIN — INSULIN LISPRO 2 UNITS: 100 INJECTION, SOLUTION INTRAVENOUS; SUBCUTANEOUS at 13:16

## 2018-03-10 RX ADMIN — Medication 400 MG: at 09:07

## 2018-03-10 RX ADMIN — CEFAZOLIN SODIUM 1 G: 1 INJECTION, POWDER, FOR SOLUTION INTRAMUSCULAR; INTRAVENOUS at 04:40

## 2018-03-10 RX ADMIN — Medication 10 ML: at 20:28

## 2018-03-10 RX ADMIN — INSULIN LISPRO 2 UNITS: 100 INJECTION, SOLUTION INTRAVENOUS; SUBCUTANEOUS at 17:54

## 2018-03-10 RX ADMIN — LEVOTHYROXINE SODIUM 50 MCG: 25 TABLET ORAL at 09:07

## 2018-03-10 RX ADMIN — ASPIRIN 81 MG: 81 TABLET, COATED ORAL at 09:08

## 2018-03-10 RX ADMIN — Medication 10 ML: at 05:15

## 2018-03-10 RX ADMIN — PRAVASTATIN SODIUM 20 MG: 10 TABLET ORAL at 20:29

## 2018-03-10 RX ADMIN — Medication 10 ML: at 20:29

## 2018-03-10 RX ADMIN — SERTRALINE HYDROCHLORIDE 100 MG: 50 TABLET ORAL at 09:07

## 2018-03-10 RX ADMIN — BENAZEPRIL HYDROCHLORIDE 10 MG: 10 TABLET, FILM COATED ORAL at 09:09

## 2018-03-10 NOTE — PROGRESS NOTES
1900 - Bedside report from [facility]. Paced at 75. No complaints. Up with assist.    4288 - 2 tylenol for back discomfort.    0700 - Bedside report to RN. Paced, no complaints.

## 2018-03-10 NOTE — PROGRESS NOTES
Bedside and Verbal shift change report given to Xavier Du (oncoming nurse) by Federica Wagner (offgoing nurse). Report included the following information SBAR, Kardex, Intake/Output, MAR, Accordion and Recent Results.

## 2018-03-11 LAB
ANION GAP SERPL CALC-SCNC: 9 MMOL/L (ref 5–15)
BUN SERPL-MCNC: 21 MG/DL (ref 6–20)
BUN/CREAT SERPL: 24 (ref 12–20)
CALCIUM SERPL-MCNC: 8.2 MG/DL (ref 8.5–10.1)
CHLORIDE SERPL-SCNC: 105 MMOL/L (ref 97–108)
CO2 SERPL-SCNC: 24 MMOL/L (ref 21–32)
CREAT SERPL-MCNC: 0.89 MG/DL (ref 0.55–1.02)
GLUCOSE BLD STRIP.AUTO-MCNC: 138 MG/DL (ref 65–100)
GLUCOSE BLD STRIP.AUTO-MCNC: 147 MG/DL (ref 65–100)
GLUCOSE BLD STRIP.AUTO-MCNC: 165 MG/DL (ref 65–100)
GLUCOSE SERPL-MCNC: 155 MG/DL (ref 65–100)
POTASSIUM SERPL-SCNC: 4.1 MMOL/L (ref 3.5–5.1)
SERVICE CMNT-IMP: ABNORMAL
SODIUM SERPL-SCNC: 138 MMOL/L (ref 136–145)

## 2018-03-11 PROCEDURE — 36415 COLL VENOUS BLD VENIPUNCTURE: CPT | Performed by: INTERNAL MEDICINE

## 2018-03-11 PROCEDURE — 74011636637 HC RX REV CODE- 636/637: Performed by: INTERNAL MEDICINE

## 2018-03-11 PROCEDURE — 74011250637 HC RX REV CODE- 250/637: Performed by: INTERNAL MEDICINE

## 2018-03-11 PROCEDURE — 65660000000 HC RM CCU STEPDOWN

## 2018-03-11 PROCEDURE — 74011250636 HC RX REV CODE- 250/636: Performed by: INTERNAL MEDICINE

## 2018-03-11 PROCEDURE — 82962 GLUCOSE BLOOD TEST: CPT

## 2018-03-11 PROCEDURE — 80048 BASIC METABOLIC PNL TOTAL CA: CPT | Performed by: INTERNAL MEDICINE

## 2018-03-11 PROCEDURE — 74011000258 HC RX REV CODE- 258: Performed by: INTERNAL MEDICINE

## 2018-03-11 RX ADMIN — INSULIN LISPRO 2 UNITS: 100 INJECTION, SOLUTION INTRAVENOUS; SUBCUTANEOUS at 08:38

## 2018-03-11 RX ADMIN — CEFAZOLIN SODIUM 1 G: 1 INJECTION, POWDER, FOR SOLUTION INTRAMUSCULAR; INTRAVENOUS at 11:16

## 2018-03-11 RX ADMIN — ASPIRIN 81 MG: 81 TABLET, COATED ORAL at 08:37

## 2018-03-11 RX ADMIN — THERA TABS 1 TABLET: TAB at 08:38

## 2018-03-11 RX ADMIN — CEFAZOLIN SODIUM 1 G: 1 INJECTION, POWDER, FOR SOLUTION INTRAMUSCULAR; INTRAVENOUS at 21:04

## 2018-03-11 RX ADMIN — LEVOTHYROXINE SODIUM 50 MCG: 25 TABLET ORAL at 08:37

## 2018-03-11 RX ADMIN — VITAMIN D, TAB 1000IU (100/BT) 2000 UNITS: 25 TAB at 08:38

## 2018-03-11 RX ADMIN — Medication 400 MG: at 08:37

## 2018-03-11 RX ADMIN — ACETAMINOPHEN 650 MG: 325 TABLET ORAL at 21:06

## 2018-03-11 RX ADMIN — SERTRALINE HYDROCHLORIDE 100 MG: 50 TABLET ORAL at 08:37

## 2018-03-11 RX ADMIN — PRAVASTATIN SODIUM 20 MG: 10 TABLET ORAL at 21:07

## 2018-03-11 RX ADMIN — PANTOPRAZOLE SODIUM 40 MG: 40 TABLET, DELAYED RELEASE ORAL at 08:37

## 2018-03-11 RX ADMIN — Medication 10 ML: at 01:56

## 2018-03-11 RX ADMIN — BENAZEPRIL HYDROCHLORIDE 10 MG: 10 TABLET, FILM COATED ORAL at 09:31

## 2018-03-11 RX ADMIN — Medication 1 CAPSULE: at 08:38

## 2018-03-11 RX ADMIN — ACETAMINOPHEN 650 MG: 325 TABLET ORAL at 01:59

## 2018-03-11 RX ADMIN — Medication 10 ML: at 21:08

## 2018-03-11 RX ADMIN — CEFAZOLIN SODIUM 1 G: 1 INJECTION, POWDER, FOR SOLUTION INTRAMUSCULAR; INTRAVENOUS at 05:18

## 2018-03-11 RX ADMIN — DILTIAZEM HYDROCHLORIDE 240 MG: 240 CAPSULE, COATED, EXTENDED RELEASE ORAL at 08:37

## 2018-03-11 NOTE — PROGRESS NOTES
Hospitalist Progress Note    NAME: Ray Boswell   :  1935   MRN:  [id number]       Assessment / Plan:  Sepsis due to UTI vs PM pocket infection in setting of afib s/p ablation and PM placement with Dr. Roula Adame 18, present on admission:   S/P  Pacemaker removal/ JESSIE  Shows NO vegetations  Heavy growth of Staph Aureus on initial BC 3/5 and  on [facility] 3/7  Change to Cefazolin 3/8 Repeat BC 3/9 To see if clears prior to placemtn of PICC  Last echo 2/3/71 with systolic function normal. 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 Pt denies infectious sx of any type other than continued leakage from PM pocket. need longterm antibiotics  Appreciate Infectious Disease Consult. Vanco Change to Cefazolin, Discussed today  May have to [address] Trudy Berkowitz PICC placement on Monday when repeat [facility] are now clear  Await BC done yesterday , if clear, Picc line placement on Monday then discharge      Non insulin dependent DM2 controlled without complication:  - holding glucophage for now  - lispro sliding scale  con't diltiazem, lopressor  - con't ASA and plavix  Hypothyroidism:  con't synthroid  Hyperlipidemia: con't pravachol  Depression: con't zoloft       Ecoli  In URINE  ( low colony count) also sensitive to Cefazolin  No UTI Sx      Code Status: Full  Surrogate Decision Maker:   DVT Prophylaxis: SCDs      Body mass index is 35.74 kg/(m^2). Subjective:     Chief Complaint / Reason for Physician Visit f/u for SEPSIS, UTI versus PM pocket infection  3/10 today I feel a lot better  3/9  im so tired coz im not sleeping well in hospital but pain is better  3/8  Pain in PM Pocket  Site, Left  3/7   No Im not in pain . How long do I have to get antibiotics  3/6  \"  I feel a lot better than when I came in. They say I catrina have to have my PM out? \". Discussed with RN events overnight.      Review of Systems:  Symptom Y/N Comments  Symptom Y/N Comments   Fever/Chills y   Chest Pain n    Poor Appetite n   Edema n    Cough n   Abdominal Pain n    Sputum n   Joint Pain     SOB/STRICKLAND n   Pruritis/Rash     Nausea/vomit n   Tolerating PT/OT     Diarrhea n   Tolerating Diet y    Constipation    Other       Could NOT obtain due to:      Objective:     VITALS:   Last 24hrs VS reviewed since prior progress note. Most recent are:  Patient Vitals for the past 24 hrs:   Temp Pulse Resp BP SpO2   03/10/18 1825 97.7 °F (36.5 °C) 75 16 141/67 97 %   03/10/18 1530 98.1 °F (36.7 °C) 79 17 123/64 96 %   03/10/18 1131 97.5 °F (36.4 °C) 76 16 112/72 97 %   03/10/18 0658 97.8 °F (36.6 °C) 75 17 132/71 94 %   03/10/18 0530 - 75 18 127/76 93 %   03/10/18 0523 97.7 °F (36.5 °C) 75 18 91/76 97 %   03/10/18 0300 - 75 16 - -   03/09/18 2247 98.2 °F (36.8 °C) 75 16 120/67 98 %       Intake/Output Summary (Last 24 hours) at 03/10/18 1938  Last data filed at 03/10/18 1827   Gross per 24 hour   Intake             1520 ml   Output              900 ml   Net              620 ml      y  PHYSICAL EXAM:   She does not look septic  General: WD, WN. Alert, cooperative, no acute distress    EENT:  EOMI. Anicteric sclerae. MMM  Resp:  CTA bilaterally, no wheezing or rales. No accessory muscle use  CV:  Regular  rhythm,  No edema, PM site is not erythematous, non tender, not warm,   GI:  Soft, Non distended, Non tender.  +Bowel sounds  Neurologic:  Alert and oriented X 3, normal speech,   Psych:   Good insight. Not anxious nor agitated  Skin:  No rashes.   No jaundice    Reviewed most current lab test results and cultures  YES  Reviewed most current radiology test results   YES  Review and summation of old records today    NO  Reviewed patient's current orders and MAR    YES  PMH/SH reviewed - no change compared to H&P  ________________________________________________________________________  Care Plan discussed with:    Comments   Patient y    Family  y 1 sister and 1 brother bedside   RN y    Care Manager     Consultant  y Multidiciplinary team rounds were held today with , nursing, pharmacist and clinical coordinator. Patient's plan of care was discussed; medications were reviewed and discharge planning was addressed. ________________________________________________________________________  Total NON critical care TIME:  25   Minutes    Total CRITICAL CARE TIME Spent:   Minutes non procedure based      Comments   >50% of visit spent in counseling and coordination of care Yes Reviewed Chart   ________________________________________________________________________  Giana Pa MD     Procedures: see electronic medical records for all procedures/Xrays and details which were not copied into this note but were reviewed prior to creation of Plan. LABS:  I reviewed today's most current labs and imaging studies. Pertinent labs include:  No results for input(s): WBC, HGB, HCT, PLT, HGBEXT, HCTEXT, PLTEXT, HGBEXT, HCTEXT, PLTEXT in the last 72 hours.   Recent Labs      03/08/18   0058   NA  135*   K  4.3   CL  107   CO2  18*   GLU  194*   BUN  29*   CREA  1.07*   CA  7.9*       Signed: Giana Pa MD

## 2018-03-12 ENCOUNTER — HOME HEALTH ADMISSION (OUTPATIENT)
Dept: HOME HEALTH SERVICES | Facility: HOME HEALTH | Age: 83
End: 2018-03-12
Payer: MEDICARE

## 2018-03-12 ENCOUNTER — TELEPHONE (OUTPATIENT)
Dept: CARDIOLOGY CLINIC | Age: 83
End: 2018-03-12

## 2018-03-12 ENCOUNTER — APPOINTMENT (OUTPATIENT)
Dept: INTERVENTIONAL RADIOLOGY/VASCULAR | Age: 83
DRG: 228 | End: 2018-03-12
Attending: INTERNAL MEDICINE
Payer: MEDICARE

## 2018-03-12 VITALS
TEMPERATURE: 98.1 F | OXYGEN SATURATION: 97 % | RESPIRATION RATE: 18 BRPM | WEIGHT: 191.1 LBS | HEART RATE: 74 BPM | HEIGHT: 61 IN | DIASTOLIC BLOOD PRESSURE: 128 MMHG | SYSTOLIC BLOOD PRESSURE: 153 MMHG | BODY MASS INDEX: 36.08 KG/M2

## 2018-03-12 LAB
ATRIAL RATE: 91 BPM
CALCULATED R AXIS, ECG10: -79 DEGREES
CALCULATED T AXIS, ECG11: 80 DEGREES
DIAGNOSIS, 93000: NORMAL
GLUCOSE BLD STRIP.AUTO-MCNC: 129 MG/DL (ref 65–100)
GLUCOSE BLD STRIP.AUTO-MCNC: 136 MG/DL (ref 65–100)
Q-T INTERVAL, ECG07: 466 MS
QRS DURATION, ECG06: 176 MS
QTC CALCULATION (BEZET), ECG08: 520 MS
SERVICE CMNT-IMP: ABNORMAL
SERVICE CMNT-IMP: ABNORMAL
VENTRICULAR RATE, ECG03: 75 BPM

## 2018-03-12 PROCEDURE — 74011000250 HC RX REV CODE- 250: Performed by: RADIOLOGY

## 2018-03-12 PROCEDURE — 77030018719 HC DRSG PTCH ANTIMIC J&J -A

## 2018-03-12 PROCEDURE — C1751 CATH, INF, PER/CENT/MIDLINE: HCPCS

## 2018-03-12 PROCEDURE — 74011000258 HC RX REV CODE- 258: Performed by: INTERNAL MEDICINE

## 2018-03-12 PROCEDURE — 93005 ELECTROCARDIOGRAM TRACING: CPT

## 2018-03-12 PROCEDURE — 76937 US GUIDE VASCULAR ACCESS: CPT

## 2018-03-12 PROCEDURE — 74011250637 HC RX REV CODE- 250/637: Performed by: INTERNAL MEDICINE

## 2018-03-12 PROCEDURE — 74011250636 HC RX REV CODE- 250/636: Performed by: INTERNAL MEDICINE

## 2018-03-12 PROCEDURE — 82962 GLUCOSE BLOOD TEST: CPT

## 2018-03-12 PROCEDURE — 02HV33Z INSERTION OF INFUSION DEVICE INTO SUPERIOR VENA CAVA, PERCUTANEOUS APPROACH: ICD-10-PCS | Performed by: RADIOLOGY

## 2018-03-12 RX ORDER — SODIUM CHLORIDE 0.9 % (FLUSH) 0.9 %
5-10 SYRINGE (ML) INJECTION EVERY 8 HOURS
Status: CANCELLED | OUTPATIENT
Start: 2018-03-12

## 2018-03-12 RX ORDER — SODIUM CHLORIDE 0.9 % (FLUSH) 0.9 %
5-10 SYRINGE (ML) INJECTION AS NEEDED
Status: CANCELLED | OUTPATIENT
Start: 2018-03-12

## 2018-03-12 RX ORDER — SODIUM CHLORIDE 0.9 % (FLUSH) 0.9 %
20 SYRINGE (ML) INJECTION AS NEEDED
Status: CANCELLED | OUTPATIENT
Start: 2018-03-12

## 2018-03-12 RX ORDER — SODIUM CHLORIDE 0.9 % (FLUSH) 0.9 %
10-40 SYRINGE (ML) INJECTION EVERY 24 HOURS
Status: CANCELLED | OUTPATIENT
Start: 2018-03-12

## 2018-03-12 RX ORDER — SODIUM CHLORIDE 0.9 % (FLUSH) 0.9 %
SYRINGE (ML) INJECTION
Status: DISCONTINUED
Start: 2018-03-12 | End: 2018-03-12 | Stop reason: HOSPADM

## 2018-03-12 RX ORDER — LIDOCAINE HYDROCHLORIDE 20 MG/ML
20 INJECTION, SOLUTION INFILTRATION; PERINEURAL ONCE
Status: COMPLETED | OUTPATIENT
Start: 2018-03-12 | End: 2018-03-12

## 2018-03-12 RX ADMIN — Medication 400 MG: at 09:02

## 2018-03-12 RX ADMIN — Medication 10 ML: at 04:25

## 2018-03-12 RX ADMIN — CEFAZOLIN SODIUM 1 G: 1 INJECTION, POWDER, FOR SOLUTION INTRAMUSCULAR; INTRAVENOUS at 04:26

## 2018-03-12 RX ADMIN — THERA TABS 1 TABLET: TAB at 09:02

## 2018-03-12 RX ADMIN — BENAZEPRIL HYDROCHLORIDE 10 MG: 10 TABLET, FILM COATED ORAL at 09:02

## 2018-03-12 RX ADMIN — VITAMIN D, TAB 1000IU (100/BT) 2000 UNITS: 25 TAB at 09:02

## 2018-03-12 RX ADMIN — SERTRALINE HYDROCHLORIDE 100 MG: 50 TABLET ORAL at 09:02

## 2018-03-12 RX ADMIN — ASPIRIN 81 MG: 81 TABLET, COATED ORAL at 09:02

## 2018-03-12 RX ADMIN — Medication 1 CAPSULE: at 09:02

## 2018-03-12 RX ADMIN — LEVOTHYROXINE SODIUM 50 MCG: 25 TABLET ORAL at 09:02

## 2018-03-12 RX ADMIN — LIDOCAINE HYDROCHLORIDE 200 MG: 20 INJECTION, SOLUTION INFILTRATION; PERINEURAL at 10:10

## 2018-03-12 RX ADMIN — PANTOPRAZOLE SODIUM 40 MG: 40 TABLET, DELAYED RELEASE ORAL at 09:02

## 2018-03-12 RX ADMIN — DILTIAZEM HYDROCHLORIDE 240 MG: 240 CAPSULE, COATED, EXTENDED RELEASE ORAL at 09:02

## 2018-03-12 RX ADMIN — CEFAZOLIN SODIUM 1 G: 1 INJECTION, POWDER, FOR SOLUTION INTRAMUSCULAR; INTRAVENOUS at 13:15

## 2018-03-12 RX ADMIN — Medication 10 ML: at 09:03

## 2018-03-12 NOTE — DISCHARGE INSTRUCTIONS
HOSPITALIST DISCHARGE INSTRUCTIONS    NAME: Nenita Bonilla   :  1935   MRN:  [id number]     Date/Time:  3/12/2018 11:59 AM    ADMIT DATE: 3/5/2018   DISCHARGE DATE: 3/12/2018         · It is important that you take the medication exactly as they are prescribed. · Keep your medication in the bottles provided by the pharmacist and keep a list of the medication names, dosages, and times to be taken in your wallet. · Do not take other medications without consulting your doctor. What to do at Home    Recommended diet:  Regular Diet    Recommended activity: Activity as tolerated      If you have questions regarding the hospital related prescriptions or hospital related issues please call [facility] at [phone]. You can always direct your questions to your primary care doctor if you are unable to reach your hospital physician; your PCP works as an extension of your hospital doctor just like your hospital doctor is an extension of your PCP for your time at the hospital [facility], [facility])    If you experience any of the following symptoms then please call your primary care physician or return to the emergency room if you cannot get hold of your doctor:    Fever, chills, nausea, vomiting, or persistent diarrhea  Worsening weakness or new problems with your speech or balance  Dark stools or visible blood in your stools  New Leg swelling or shortness of breath as these could be signs of a clot    Additional Instructions:    YOU HAVE an appointment  With Dr. Fabi Wheeler    On , 4 pm.    Please make sure to keep your follow up appointments      Bring these papers with you to your follow up appointments. The papers will help your doctors be sure to continue the care plan from the hospital.              Information obtained by :  I understand that if any problems occur once I am at home I am to contact my physician.     I understand and acknowledge receipt of the instructions indicated above.                                                                                                                                           Physician's or R.N.'s Signature                                                                  Date/Time                                                                                                                                              Patient or Representative Signature                                                  [address]  [phone]  ICD/PACEMAKER DISCHARGE INSTRUCTIONS      Admission Diagnoses:   Infection of pacemaker pocket [facility])    Discharge Diagnoses: Active Problems:    TIA (transient ischemic attack) (7/9/2014)      Type 2 diabetes mellitus without complication (HCC) (1/56/1585)      Paroxysmal atrial fibrillation ([facility] [address] (9/7/2016)      Overview: S/p Watchman      Infection of pacemaker pocket ([facility] [address] (3/6/2018)      Pacemaker (3/6/2018)      Overview: 3/7/18 explant of pacemaker          DISCHARGE INSTRUCTIONS FOR PATIENTS WITH PACEMAKERS    1. Carry you ID card for your ICD/Pacemaker with you at all times. This card will be given to you in the hospital or mailed to you. 2. Medic Alert Bracelets are available from your pharmacist to wear at all times. 3. Call for an appointment in 1 week  [phone] with Dr Karina Harris.   4. The previous pacer site is sealed and covered with a dressing. The bulge will decrease in size over the next few weeks. Please notify the doctor's office if you notice any of the following around your site:  A.  A bruise that does not go away  B. Soreness or yellow, green, or brown drainage from the site. C. Any swelling from the site. D. If you have a fever of 100 degrees or higher that lasts for a few days    INCISION CARE       1.  Leave dressing over your site until you see the doctor.    2.  Leave steri-strips over your site until they start to fall off.   3.   You may shower after as long as your incision isnt submerged or directly sprayed upon until well healed. 4.  For comfort, wear loose fitting clothing. 5.  Ice pack to affected shoulder for first 24 hours, wear your sling for 2 days. 6.  Report any signs of infection, fever, pain, swelling, redness, oozing, or heat at site especially if these symptoms increase after the first 3 to 4 days. If the dressing over your pacemaker comes loose or comes off, call our office at [address] [phone]) immediately. Also call if any signs of infection -redness or discharge, notify us. Home health is not to change dressing over explanted PM site or external PM site. ACTIVITY PRECAUTIONS     1. Avoid rough contact with the implant site. 2. No driving for 14 days. 3. Avoid lifting your arm over your head, carrying anything on the affected side, or lifting over 10 pounds for 30 days. For the first 2 days only bend your arm at the elbow. 4. Any extreme activity such as golf, weight lifting or exercise biking should be restricted for 60 days. 5. Do not carry objects by holding them against your implant site. 6.  No shooting rifles or any type of gun with the affected shoulder permanently. SPECIAL PRECAUTIONS     1. You should avoid all strong magnetic fields, such as arc welding, large transformers, large motors. Some ICD devices will beep if it detects a strong magnet. If this occurs, move out of the area. 2.  You may not have an MRI. 3.  Treatments or surgery that requires diathermy or electrocautery should be discussed with your doctor before scheduled. 4. Avoid radio frequency transmitters, including radar. 5. Advise dentist or other medical personnel you see that you have a pacemaker or ICD. 6.  Cell phones and microwave oven use is okay.   7.  If you plan to move or take a trip to a new area, the doctor's office will give you a name of a doctor to contact for any problems. ANTIBIOTIC THERAPY    Per ID.

## 2018-03-12 NOTE — PROGRESS NOTES
Cm met with patient and family in room. Received permission to continue with assessment. Patient name and  confirmed as pt identifiers. Demographics confirmed. Patient is  and lives in a single story home, 4 domitila, independent with stairs using railing and cane. ADL's/IADL's - Independent prior to admission. Has never  Driven. DME - cane    Family provides transportation to/from appointments. Preferred pharmacy - [facility] on [address]. HH - previous experience. Does not recall name of agency. Discharge planning. Patient is alert and oriented. Sitting in chair. Room Air. Picc line has been placed. Patient and family are willing to be taught and understand the plan is to discharge home on IV antibiotics. Care Management Interventions  PCP Verified by CM: Yes  Mode of Transport at Discharge:  Other (see comment) (family)  Transition of Care Consult (CM Consult): Discharge Planning  Current Support Network: Lives with Spouse  Confirm Follow Up Transport: Family  Plan discussed with Pt/Family/Caregiver: Yes  Discharge Location  Discharge Placement: Home with home health    Lorri Benítez RN   Ext [phone]

## 2018-03-12 NOTE — PROGRESS NOTES
Pic line report faxed to [facility]  [address], [address] ([address])

## 2018-03-12 NOTE — PROGRESS NOTES
Patient ambulated in hallway without difficulty. Dressing CDI. No complaints. Discharge instructions reviewed with patient; to be discharged to home with daughter. Site care instructions reviewed; site(s) CDI. Patient instructed on which medications to continue and which to start. Prescriptions given to patient. Medication info provided and reviewed with patient. Follow-up appointment information given; follow-up appointment to be made by patient. IV and tele box removed. Opportunity for questions provided; all questions answered. All belongings returned. Patient wheeled to front door via wheelchair by nurse; to be transported home by daughter.

## 2018-03-12 NOTE — TELEPHONE ENCOUNTER
from [facility] called regarding pt's temporaty pacemaker that was put in on March 8th. No follow up appointment at this time. Patient is not aware of when dr wants to see her. Please advise patient so she can get scheduled for her followup.     Thanks  Luis E Call

## 2018-03-12 NOTE — PROGRESS NOTES
[facility] will provide IV antibiotic. Patient reviewed out of pocket costs with patient. There is a $30/day per camila fee that insurance does not cover. There is a $20 fee for cost of drug for the first 7 days. [facility] accepted a down payment of $277 that was applied to a family member's credit card. This CM spoke with Wandy Mcgregor at HCP and requested a hardship financial application with HCP as well as a payment plan for the patient. Balance of bill is to be billed over 5-6 months. [employer] application will be included with delivery of IV medication. Patient will receive next dose of IV antibiotics here onsite. Juve Ortiz RN Liaison, HCP will meet with patient and family onsite. [facility] will coordinate delivery of medication at home.     Shavon Guzman RN CM  Ext [phone]

## 2018-03-12 NOTE — PROGRESS NOTES
Jesus Jacskon RN Liaison HCP has met with patient and family. Pt/Family received education regarding home IV infusion. This CM confirmed with Jesus Jackson RN that HCP will deliver product to patient's home in time for their next scheduled dose. Family will assist with hookup. [facility] will begin soc on 3/13/2018. Appointments noted on onestop. Pradeep Huffman RN informed CM tasks are complete.     Nella Carrizales RN CM  Ext [phone]

## 2018-03-12 NOTE — PROGRESS NOTES
Infectious Disease Progress Note       Subjective:     Ms Escobedo Kin she is feeling well overall. Tolerating IV abx, no new rash, diarrhea, GI upset, knee pain or back pain       ROS: 10 point ROS obtained and pertinent positives include above. All others negative.          Objective:    Vitals:   Patient Vitals for the past 24 hrs:   Temp Pulse Resp BP SpO2   03/12/18 1005 - 75 - - -   03/12/18 0948 - 75 - - -   03/12/18 0631 97.9 °F (36.6 °C) 75 14 151/74 99 %   03/12/18 0219 97.9 °F (36.6 °C) 75 18 135/67 98 %   03/11/18 2304 97.8 °F (36.6 °C) 75 14 144/69 100 %   03/11/18 1910 - 76 18 - -   03/11/18 1820 97.9 °F (36.6 °C) 75 16 143/73 98 %   03/11/18 1532 98.7 °F (37.1 °C) 76 17 134/66 98 %   03/11/18 1116 97.9 °F (36.6 °C) 76 16 128/86 97 %       Physical Exam:  Gen: No apparent distress  HEENT:  no thrush, no scleral icterus   CV: S1,2 heard, + temporary pacer noted R chest wall   , dressing over pervious pacer site on L   Lungs: Clear to auscultation bilaterally, no wheezing  Abdomen: soft, non tender, non distended  Skin: no rash   Psych: good affect, good eye contact, non tearful  Neuro: alert, oriented to self,  place, and situation, moves all extremities to commands, verbal  Musculoskeletal:  No joint edema, erythema or tenderness noted  Knees       Medications:    Current Facility-Administered Medications:     sodium chloride (NS) flush, , , ,     ceFAZolin (ANCEF) 1 g in 0.9% sodium chloride (MBP/ADV) 50 mL, 1 g, IntraVENous, Q8H, Ubaldo Hsieh MD, Last Rate: 100 mL/hr at 03/12/18 0426, 1 g at 03/12/18 0426    insulin lispro (HUMALOG) injection, , SubCUTAneous, TIDAC, Damian Lindsey MD, Stopped at 03/12/18 0730    glucose chewable tablet 16 g, 4 Tab, Oral, PRN, Damian Lindsey MD    dextrose (D50W) injection syrg 12.5-25 g, 12.5-25 g, IntraVENous, PRN, Damian Lindsey MD    glucagon (GLUCAGEN) injection 1 mg, 1 mg, IntraMUSCular, PRN, Damian Lindsey MD    lactobac ac& pc-s.therm-b.anim (FRAN Q/RISAQUAD), 1 Cap, Oral, DAILY, Cathy Trinidad MD, 1 Cap at 03/12/18 0902    fentaNYL citrate (PF) injection 12.5-50 mcg, 12.5-50 mcg, IntraVENous, Multiple, Lola Strong MD, 25 mcg at 03/10/18 0908    lidocaine (XYLOCAINE) 10 mg/mL (1 %) injection 1-40 mL, 1-40 mL, SubCUTAneous, Multiple, Jose Kirby MD, 10 mL at 03/07/18 0830    midazolam (VERSED) injection 1-5 mg, 1-5 mg, IntraVENous, Multiple, Jose Encarnacion MD    ADDaptor, , , ,     vancomycin (VANCOCIN) 1,000 mg injection, , , ,     0.9% sodium chloride (MBP/ADV) infusion, , , ,     sodium chloride (NS) flush, , , ,     sodium chloride (NS) flush 5-10 mL, 5-10 mL, IntraVENous, Q8H, Jose Encarnacion MD, 10 mL at 03/12/18 0425    sodium chloride (NS) flush 5-10 mL, 5-10 mL, IntraVENous, PRN, Jose Encarnacion MD    naloxone (NARCAN) injection 0.4 mg, 0.4 mg, IntraVENous, PRN, Lola Strong MD    aspirin delayed-release tablet 81 mg, 81 mg, Oral, DAILY, Maddison Canseco MD, 81 mg at 03/12/18 0902    benazepril (LOTENSIN) tablet 10 mg, 10 mg, Oral, DAILY, Maddison Canseco MD, 10 mg at 03/12/18 3675    cholecalciferol (VITAMIN D3) tablet 2,000 Units, 2,000 Units, Oral, DAILY, Maddison Canseco MD, 2,000 Units at 03/12/18 0902    dilTIAZem CD (CARDIZEM CD) capsule 240 mg, 240 mg, Oral, DAILY, Maddison Canseco MD, 240 mg at 03/12/18 0902    levothyroxine (SYNTHROID) tablet 50 mcg, 50 mcg, Oral, ACB, Maddison Canseco MD, 50 mcg at 03/12/18 0902    magnesium oxide (MAG-OX) tablet 400 mg, 400 mg, Oral, DAILY, Maddison Canseco MD, 400 mg at 03/12/18 0902    therapeutic multivitamin (THERAGRAN) tablet 1 Tab, 1 Tab, Oral, DAILY, Maddison Canseco MD, 1 Tab at 03/12/18 0902    pantoprazole (PROTONIX) tablet 40 mg, 40 mg, Oral, ACB, Maddison Canseco MD, 40 mg at 03/12/18 0902    pravastatin (PRAVACHOL) tablet 20 mg, 20 mg, Oral, QHS, Maddison Canseco MD, 20 mg at 03/11/18 2107    sertraline (ZOLOFT) tablet 100 mg, 100 mg, Oral, DAILY, Young Doom Peggy Jarrell MD, 100 mg at 03/12/18 0902    sodium chloride (NS) flush 5-10 mL, 5-10 mL, IntraVENous, Q8H, Sonya Wilson MD, 10 mL at 03/11/18 2108    sodium chloride (NS) flush 5-10 mL, 5-10 mL, IntraVENous, PRN, Sonya Wilson MD    acetaminophen (TYLENOL) tablet 650 mg, 650 mg, Oral, Q4H PRN, Sonya Wilson MD, 650 mg at 03/11/18 2106    [facility]ansetron [facility]) injection 4 mg, 4 mg, IntraVENous, Q4H PRN, Sonya Wilson MD, 4 mg at 03/06/18 1731    bisacodyl (DULCOLAX) tablet 5 mg, 5 mg, Oral, DAILY PRN, Sonya Wilson MD    nitroglycerin (NITROBID) 2 % ointment 1 Inch, 1 Inch, Topical, Q6H PRN, Sonya Wilson MD, 1 Inch at 03/06/18 5363    glucose chewable tablet 16 g, 4 Tab, Oral, PRN, Sonya Wilson MD    dextrose (D50W) injection syrg 12.5-25 g, 12.5-25 g, IntraVENous, PRN, Sonya Wilson MD    glucagon [facility]) injection 1 mg, 1 mg, IntraMUSCular, PRN, Soyna Wilson MD    morphine injection 1 mg, 1 mg, IntraVENous, Q4H PRN, Dana Blancas MD, 1 mg at 03/08/18 0051    sodium chloride (NS) flush 5-10 mL, 5-10 mL, IntraVENous, PRN, Enrico Ivy MD, 10 mL at 03/12/18 7955      Labs:  Recent Results (from the past 24 hour(s))   GLUCOSE, POC    Collection Time: 03/11/18 12:04 PM   Result Value Ref Range    Glucose (POC) 138 (H) 65 - 100 mg/dL    Performed by Shadia Horta, POC    Collection Time: 03/11/18  9:01 PM   Result Value Ref Range    Glucose (POC) 165 (H) 65 - 100 mg/dL    Performed by Edna Diaz    EKG, 12 LEAD, INITIAL    Collection Time: 03/12/18  1:12 AM   Result Value Ref Range    Ventricular Rate 75 BPM    Atrial Rate 91 BPM    QRS Duration 176 ms    Q-T Interval 466 ms    QTC Calculation (Bezet) 520 ms    Calculated R Axis -79 degrees    Calculated T Axis 80 degrees    Diagnosis       Electronic ventricular pacemaker  When compared with ECG of 10-MAR-2018 05:21,  No significant change was found     GLUCOSE, POC    Collection Time: 03/12/18  7:15 AM   Result Value Ref Range    Glucose (POC) 136 (H) 65 - 100 mg/dL    Performed by Brigido Viveros            Micro:     Blood cx 3/9 negative for 3 days so far       Blood cx + S aureus 3/7     Culture result:    Preliminary     Preliminary report of probable S. aureus (Negative for antigen detection) confirmation and sensitivities to follow. GROWING IN 2 OF 4 BOTTLES DRAWN   . ..L. ARM and R ARM SITE   . .. PLEASE REFER TO [id number] FOR FINAL ID AND SENSITIVITIES        Culture result:             Blood: 3/5/18  Component Value Ref Range & Units Status      Special Requests: NO SPECIAL REQUESTS   Final     Culture result:    Final     STAPHYLOCOCCUS AUREUS , ISOLATED FROM ALL 3 BOTTLES DRAWN. ..LAC AND RAC SITES (A)       Culture & Susceptibility        Antibiotic   Organism Organism Organism       Staphylococcus aureus       AMPICILLIN/SULBACTAM ($)   <=8/4   ug/mL   S Final         CEFAZOLIN ($)   <=4   ug/mL   S Final         CIPROFLOXACIN ($)   <=1   ug/mL   S Final         CLINDAMYCIN ($)   <=0.5   ug/mL   S Final         DAPTOMYCIN ($$$$$)   <=0.5   ug/mL   S Final         ERYTHROMYCIN ($$$$)   <=0.5   ug/mL   S Final         GENTAMICIN ($)   <=4   ug/mL   S Final         LINEZOLID ($$$$$)   2   ug/mL   S Final         OXACILLIN   <=0.25   ug/mL   S Final         RIFAMPIN ($$$$)   <=1   ug/mL   S Final         TETRACYCLINE   <=4   ug/mL   S Final         TRIMETH-SULFAMETHOXA   <=0.5/9.5   ug/mL   S Final         VANCOMYCIN ($)   1   ug/mL   S Final                        3/7/18 Blood culture       Component Results      Component Value Ref Range & Units Status     Special Requests: NO SPECIAL REQUESTS   Preliminary     Culture result: NO GROWTH AFTER 12 HOURS   Preliminary       Result History      CULTURE, BLOOD, PAIRED on 3/8/2018       Wound 3/7 \"Internal pacemaker lead from rt ventricle\"  Component Results      Component Value Ref Range & Units Status     Special Requests: NO SPECIAL REQUESTS   Preliminary     GRAM STAIN    Preliminary     GRAM STAIN NOT ROUTINELY PERFORMED ON THIS SPECIMEN     Culture result:    Preliminary     MODERATE STAPHYLOCOCCUS AUREUS (A)       Result History            Imaging:    JESSIE 3/7/18   SUMMARY:  Left ventricle: Systolic function was normal.    Left atrium: The atrium was moderately dilated. Left atrial appendage: watchman in VANDA with complete obstruction of flow    Right atrium: The atrium was mildly dilated. Mitral valve: There was mild regurgitation. Aortic valve: There was moderate to severe regurgitation. Tricuspid valve: There was mild regurgitation. Aorta, systemic arteries: There was moderate atheroma. Assessment / Plan    Ms Angelika Henderson is a [de-identified]year old lady with hx of Atrial fibrillation S/P ablation and pacemaker inserted (2/14/18), cervical diskectomy/fusion with biomechanical implants C4-5-6,  Bilateral total knee replacements admitted with fever and found to have high grade bacteremia (3/3 bottles on admission). She is noted to have gone to the ER on 2/28 with concerns of oozing/bleeding from the pacer area and noted to be oozing \" dark blood \" per ER notes. Notes indicate she had a 4 cm wound and \"Wound was cleansed with Betadine. Dermabond and Surgifoam was applied and Tegriderm was placed over the wound\".         1) MSSA  Bacteremia and pacer lead  infection   S/P Pacer removal 3/7, and insertion of temporary pacer   JESSIE 3/7 without valvular vegetations  Blood culture negative from 3/9/18       Recommendations  IV Cefazolin 2 gm Q 8 hours ( adjust if needed for renal function) for 4-6 weeks from 3/9/18   Noted PICC today , will need removal once IV abx completed   F/U wt me on 3/29 at 4.00 pm   Check weekly CBC wt diff, CMP and fax to [phone]  Gave her the option of Nafcillin infusion pump but she did not want to have a continuous infusion pump to carry all day with her   She and her daughter,  were all part of our discussion today and daughter says she will be helping her mother with IV abx daily ( tid dosing as well)       2) Bacteruria:  Asymptomatic, would suspect high fever from already known source bacteremia and pacer pocket infection   Would expect bacteruria in a elderly female patient    If any clinical deterioration, can evaluate for gram negative coverage , urine culture 50 000 GNR but she was asymptomatic from UTI standpoint on admission and this is asymptomatic bacteruria        3) TKR B/L  Asymptomatic   Bacteremia is risk for seeding     4) S/P Cervical diskectomy and fusion wt biomechanical implants C4-6  Asymptomatic at this time      5) DVT Px per primary team     D/W  today     Thank you for the opportunity to participate in the care of this patient. Please contact with questions or concerns.         Gennaro Rush,    10:56 AM

## 2018-03-12 NOTE — DISCHARGE SUMMARY
Hospitalist Discharge Summary     Patient ID:  Arpit Kyle  [id number]  [de-identified] y.o.  1935    PCP on record: Jerry Wolfe MD    Admit date: 3/5/2018  Discharge date and time: 3/12/2018      DISCHARGE DIAGNOSIS:    Sepsis due PM pocket infection in setting of afib s/p ablation and PM placement with Dr. Paloma Alexis 2/14/18, present on admission:   S/P  Pacemaker removal/ JESSIE  Shows NO vegetations  Heavy growth of Staph Aureus on initial BC 3/5 and  on [facility] 3/7  Non insulin dependent DM2 controlled without complication:  Hypothyroidism:    Hyperlipidemia:  Depression:     Code Status: Full  Surrogate Decision Maker:   DVT Prophylaxis: SCDs        Body mass index is 35.74 kg/(m^2). CONSULTATIONS:  IP CONSULT TO CARDIOLOGY  IP CONSULT TO INFECTIOUS DISEASES  IP CONSULT TO INTERVENTIONAL RADIOLOGY    Excerpted HPI from H&P of Jennifer Antonio MD:  Arpit Kyle is a [de-identified] y.o.  female who presents with above. Pt recently underwent afib ablation and PM placement on 2/14/18. She says that she has continued to have drainage from her pocket since that time. Drainage is mostly brown in color. She has been to the ER to have it re-glued, but it has continued to drain. She also called Dr. Brooks Bring office on 3/2 and received additional instructions for care. For the last 2 days, she has been experiencing shaking chills which were so severe that she finally came to the ER. She denies cough or URI sx. No CP other than tenderness around PM pocket site. She denies nausea or vomiting, but appetite has been poor. No stool changes. She has chronic urinary retention and has had botox injection for this, but denies urine changes. No new edema or focal weakness.     We were asked to admit for work up and evaluation of the above problems.      ______________________________________________________________________  DISCHARGE SUMMARY/HOSPITAL COURSE:  for full details see H&P, daily progress notes, labs, consult notes. Sepsis due to UTI vs PM pocket infection in setting of afib s/p ablation and PM placement with Dr. Tika Chavez 2/14/18, present on admission:   S/P  Pacemaker removal/ JESSIE  Shows NO vegetations  Heavy growth of Staph Aureus on initial BC 3/5 and  on [facility] 3/7  Change to Cefazolin 3/8 Repeat BC 3/9 To see if clears prior to placemtn of PICC  Last echo 0/2/25 PHPC systolic function normal. 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  Pt denies infectious sx of any type other than continued leakage from PM pocket. need longterm antibiotics  Appreciate Infectious Disease Consult. Vanco Change to Cefazolin, Discussed today  May have to [address] PICC placement on Monday when repeat [facility] are now clear  Await BC done yesterday , if clear, Picc line placement on Monday then discharge        Non insulin dependent DM2 controlled without complication:  - holding glucophage for now  - lispro sliding scale  con't diltiazem, lopressor  - con't ASA and plavix  Hypothyroidism:  con't synthroid  Hyperlipidemia: con't pravachol  Depression: con't zoloft         Ecoli  In URINE  ( low colony count) also sensitive to Cefazolin  No UTI Sx        Code Status: Full  Surrogate Decision Maker:   DVT Prophylaxis: SCDs        Body mass index is 35.74 kg/(m^2). _______________________________________________________________________  Patient seen and examined by me on discharge day. Pertinent Findings:  Gen:    Not in distress  Chest: Clear lungs  CVS:   Regular rhythm. No edema  Abd:  Soft, not distended, not tender  Neuro:  Alert, Ox3  _______________________________________________________________________  DISCHARGE MEDICATIONS:   Current Discharge Medication List      START taking these medications    Details   L. acidoph & paracasei- S therm- Bifido (FRAN-Q/RISAQUAD) 8 billion cell cap cap Take 1 Cap by mouth daily.   Qty: 60 Cap, Refills: 0      ceFAZolin 1 g, ADDaptor 1 Device 1 g by IntraVENous route every eight (8) hours for 42 days. Qty: 126 Dose, Refills: 0         CONTINUE these medications which have NOT CHANGED    Details   clopidogrel (PLAVIX) 75 mg tab Take 75 mg by mouth daily. Lancets (MICROLET LANCET) misc Check blood sugar twice daily. Qty: 1 Each, Refills: 11    Associated Diagnoses: Type 2 diabetes mellitus without complication, without long-term current use of insulin ([facility])      glucose blood VI test strips (CONTOUR NEXT STRIPS) strip Check blood sugar twice daily  Qty: 100 Strip, Refills: 1    Associated Diagnoses: Type 2 diabetes mellitus without complication, without long-term current use of insulin ([facility])      levothyroxine (SYNTHROID) 50 mcg tablet Take 1 Tab by mouth Daily (before breakfast). Qty: 90 Tab, Refills: 1      benazepril (LOTENSIN) 20 mg tablet Take 1 Tab by mouth daily. Qty: 90 Tab, Refills: 1      Cholecalciferol, Vitamin D3, (VITAMIN D3) 2,000 unit cap capsule Take 2,000 Units by mouth daily. omeprazole (PRILOSEC) 40 mg capsule Take 40 mg by mouth daily. acetaminophen (TYLENOL) 500 mg tablet Take 500 mg by mouth every four (4) hours as needed for Pain. DILT- mg XR capsule TAKE ONE CAPSULE BY MOUTH ONCE DAILY  Qty: 30 Cap, Refills: 11    Comments: Please consider 90 day supplies to promote better adherence      multivit-min-FA-lycopen-lutein (CENTRUM SILVER) 0.4-300-250 mg-mcg-mcg tab Take 1 Tab by mouth daily. aspirin delayed-release 81 mg tablet Take 81 mg by mouth daily. sertraline (ZOLOFT) 100 mg tablet Take 1 Tab by mouth daily. Qty: 135 Tab, Refills: 1      pravastatin (PRAVACHOL) 20 mg tablet TAKE ONE TABLET BY MOUTH NIGHTLY  Qty: 90 Tab, Refills: 0      ferrous sulfate (IRON) 325 mg (65 mg iron) tablet Take 325 mg by mouth every other day. magnesium 250 mg tab Take 250 mg by mouth daily.              My Recommended Diet, Activity, Wound Care, and follow-up labs are listed in the patient's Discharge Insturctions which I have personally completed and reviewed. ______________________________________________________________________    Risk of deterioration: Low    Condition at Discharge:  Stable  ______________________________________________________________________    Disposition  Home with family and home health services  ______________________________________________________________________    Care Plan discussed with:   Patient, Care Manager, Consultant, Shelton Triana    ______________________________________________________________________    Code Status: Full Code  ______________________________________________________________________      Follow up with:   PCP : Tawnya Barker MD  Follow-up Information     Follow up With Details Comments Alfredo Norman MD  Cardiology - Nurse from office will call with scheduled appointment.   [address]  [phone]      Wendy Doyle, DO On 3/29/2018 Infectious Disease - MD's office   at [place] [address]  [address] [facility]  [phone]      Tawnya Barker MD Go on 3/20/2018 For scheduled appointment at 1:30PM  [address] [place] [address]  [phone]                Total time in minutes spent coordinating this discharge (includes going over instructions, follow-up, prescriptions, and preparing report for sign off to her PCP) :  30  minutes    Signed:  Rocco Adkins MD

## 2018-03-12 NOTE — PROGRESS NOTES
CM met with patient to discuss home health preference. Patient has no preference. [facility] patient. Referral has been submitted to [facility].    1209 pm - [facility] will provide [facility] with [facility] on 3/13/2018    CM will continue to follow.     Leonie Wall RN CM  Ext [phone]

## 2018-03-12 NOTE — PROCEDURES
PROCEDURE:PICC placement. INDICATION:IV access. ANESTHESIA:local.  COMPLICATION:NONE. SPECIMENS REMOVED:none. BLOOD LOSS:NONE. /ASSISTANT:EDSON Avelar RECOMMENDATIONS:may use. CONSENT OBTAINED:YES.  NOTES:none.

## 2018-03-12 NOTE — PROGRESS NOTES
Cm acknowledged consult for home IV antibiotics. Referrals submitted to [facility]. Patient is off unit receiving picc line. CM will meet with patient to discuss [facility] agencies once she returns to the floor. 1008am - [facility] confirmed receipt of referral.  Reviewing insurance. 1114am - [facility] has informed CM that patient's insurance does not cover medication. Out of pocket cost to patient would be $37/day for 4-6 weeks.  $37/day for 42 days is $1554. CM will inform MD.        CM will continue to follow.     Don Siddiqui RN CM  Ext [phone]

## 2018-03-13 ENCOUNTER — HOME CARE VISIT (OUTPATIENT)
Dept: SCHEDULING | Facility: HOME HEALTH | Age: 83
End: 2018-03-13
Payer: MEDICARE

## 2018-03-13 ENCOUNTER — PATIENT OUTREACH (OUTPATIENT)
Dept: INTERNAL MEDICINE CLINIC | Age: 83
End: 2018-03-13

## 2018-03-13 ENCOUNTER — TELEPHONE (OUTPATIENT)
Dept: INTERNAL MEDICINE CLINIC | Age: 83
End: 2018-03-13

## 2018-03-13 VITALS
HEIGHT: 61 IN | HEART RATE: 75 BPM | TEMPERATURE: 97.9 F | SYSTOLIC BLOOD PRESSURE: 148 MMHG | DIASTOLIC BLOOD PRESSURE: 78 MMHG | OXYGEN SATURATION: 97 % | RESPIRATION RATE: 18 BRPM | BODY MASS INDEX: 33.23 KG/M2 | WEIGHT: 176 LBS

## 2018-03-13 LAB
BACTERIA SPEC CULT: ABNORMAL
BACTERIA SPEC CULT: ABNORMAL
SERVICE CMNT-IMP: ABNORMAL

## 2018-03-13 PROCEDURE — G0299 HHS/HOSPICE OF RN EA 15 MIN: HCPCS

## 2018-03-13 PROCEDURE — 3331090001 HH PPS REVENUE CREDIT

## 2018-03-13 PROCEDURE — 3331090002 HH PPS REVENUE DEBIT

## 2018-03-13 PROCEDURE — 400013 HH SOC

## 2018-03-13 NOTE — TELEPHONE ENCOUNTER
MD Bobby Gracia, LPN        Caller: Unspecified (Today, 10:30 AM)                     Please order PT     Please resume home bp and DM medications       Left message for Miranda with [facility] that per Dr. Durga Gilliland ok to order PT for patient. Advised to call the office if additional information is needed.

## 2018-03-13 NOTE — TELEPHONE ENCOUNTER
Miranda//[facility] needs a call back to get an order for Physical Therapy Eval & also discuss patient's elevated Blood sugar levels & also blood pressure levels. Please call.  Thank you

## 2018-03-13 NOTE — TELEPHONE ENCOUNTER
Spoke with Miranda with [facility]  Two pt identifiers confirmed. Benita Taylor states that patients blood pressure was 148/78 and her blood sugar 274. Benita Taylor states that patient had not taken AM medications  at the time that her BP and BS were checked. Benita Taylor states that patient was Instructed to take her medications and recheck her BS this afternoon. Benita Taylor states that patient was instructed to call the office if her BS was elevated above 200. Benita Taylor states that Chirag Lorenzo will be visiting patient for IV antibitocs for 4-6 weeks with 3 as needed visits. Benita Taylor states that patient also did not have her acidophilus medication, but a family member will be picking that up today. Benita Taylor would also like to get an order for a  PT consult, stating that patient is very unsteady. Benita Taylor states that patient is using cane in nondominant hand right now, but remains very unsteady.   I

## 2018-03-13 NOTE — PROGRESS NOTES
[address] - 3/13/2018- AWILDA    Toby Pope is a [de-identified] y.o. female   This patient was received as a referral from inpatient admission   Inpatient RRAT Score: 37  Patient's challenges to self management identified:  lack of knowledge about disease, support system, utilization of services    Medication Management:  good adherence, poor understanding    Summary of patients top three problems:     Problem 1: DM management - Pt with elevated BG. Was receiving insulin while in-pt. Discharged on only metformin. BG currently elevated. Prior to illness 12/2017 A1c - 6.3     Problem 2: Weakness / potential for fall - Pt is documented by [facility] as having very unsteady gait. Pt has had previous falls this past year with injury.  has recommended PT. Problem 3: Diagnosis of sepsis - Pt may not comprehend implications of diagnosis. Pt is reporting the strict instructions of cardiologist about the temporary pacemaker, and her daughter's care with infusions. Pt does not voice understanding of why such a level of care is demonstrated by her family and care team.    Patients motivational level on a scale of 0-10: 8  Advance Care Planning:   Patient was offered the opportunity to discuss advance care planning:  yes     Does patient have an Advance Directive:  yes   If no, did you provide information on [address] [place] [address], Referrals, and Durable Medical Equipment: walker, [facility]    Follow up appointments:  Dr. Mitch Phan - 3/20/18 - [id number]  Current Outpatient Prescriptions   Medication Sig    ceFAZolin 1 g, ADDaptor 1 Device 1 g by IntraVENous route every eight (8) hours for 42 days. (Patient taking differently: 2 g by IntraVENous route every eight (8) hours.)    clopidogrel (PLAVIX) 75 mg tab Take 75 mg by mouth daily.  levothyroxine (SYNTHROID) 50 mcg tablet Take 1 Tab by mouth Daily (before breakfast).  sertraline (ZOLOFT) 100 mg tablet Take 1 Tab by mouth daily.     benazepril (LOTENSIN) 20 mg tablet Take 1 Tab by mouth daily. (Patient taking differently: Take 10 mg by mouth daily.)    metFORMIN ER (GLUCOPHAGE XR) 750 mg tablet TAKE ONE TABLET BY MOUTH TWICE DAILY    pravastatin (PRAVACHOL) 20 mg tablet TAKE ONE TABLET BY MOUTH NIGHTLY    Cholecalciferol, Vitamin D3, (VITAMIN D3) 2,000 unit cap capsule Take 2,000 Units by mouth daily.  acetaminophen (TYLENOL) 500 mg tablet Take 500 mg by mouth every four (4) hours as needed for Pain.  DILT- mg XR capsule TAKE ONE CAPSULE BY MOUTH ONCE DAILY (Patient taking differently: TAKE TWO CAPSULES BY MOUTH ONCE DAILY)    ferrous sulfate (IRON) 325 mg (65 mg iron) tablet Take 325 mg by mouth every other day.  multivit-min-FA-lycopen-lutein (CENTRUM SILVER) 0.4-300-250 mg-mcg-mcg tab Take 1 Tab by mouth daily.  aspirin delayed-release 81 mg tablet Take 81 mg by mouth daily.  magnesium 250 mg tab Take 250 mg by mouth daily.  L. acidoph & paracasei- S therm- Bifido (FRAN-Q/RISAQUAD) 8 billion cell cap cap Take 1 Cap by mouth daily.  Lancets (MICROLET LANCET) misc Check blood sugar twice daily.  glucose blood VI test strips (CONTOUR NEXT STRIPS) strip Check blood sugar twice daily    omeprazole (PRILOSEC) 40 mg capsule Take 40 mg by mouth daily. No current facility-administered medications for this visit. Goals      Attends follow-up appointments as directed. 3/13/2018 - [facility]  Pt is scheduled for OV on 3/20/18 with Dr. Hilton Garcia. Pt cannot identify any barriers to attending OV as scheduled. P - Pt will attend OV as scheduled.  Supportive resources in place to maintain patient in the community (ie. Home Health, DME equipment, refer to, medication assistant plan, etc.)            3/13/2018 - [facility]    Pt lives with spouse, sister, and daughter. Pt's daughter performs the antibiotic administration 3 times per day. [facility] Have noted SN contact with MD to address elevated BG and BP.   Pt denied need for additional support. Pt did not voice awareness of recommendation for home PT for strengthening and stability. Noted pt has previously suffered GLFs with injuries in the last 6 months. P - Will consult with [facility] nurse to review current resources.  Understands red flags post discharge. 3/13/2018 - [facility]    Pt states she currently has a lot of \"swelling\" in her legs. Pt states she has been told this is fluid from her infection. Pt states she has not been instructed to perform a daily wt. Pt does not take diuretics. Pt asked to perform a daily wt and add information to her daily record of BP and BG. Pt states her BG this morning was over 250. Pt is taking only metformin. Pt states she was taking insulin in the hospital.  Pt is keeping a record of daily BP   [facility] began visits today  Noted contact with MD and orders. P - Pt will continue to keep daily readings of BG, BP, and wt. Pt will bring record to all OV. Patient verbalized understanding of all information discussed. Patient has this Nurse Navigators contact information for any further questions, concerns, or needs.

## 2018-03-14 ENCOUNTER — HOME CARE VISIT (OUTPATIENT)
Dept: SCHEDULING | Facility: HOME HEALTH | Age: 83
End: 2018-03-14
Payer: MEDICARE

## 2018-03-14 LAB
BACTERIA SPEC CULT: NORMAL
SERVICE CMNT-IMP: NORMAL

## 2018-03-14 PROCEDURE — 3331090001 HH PPS REVENUE CREDIT

## 2018-03-14 PROCEDURE — G0151 HHCP-SERV OF PT,EA 15 MIN: HCPCS

## 2018-03-14 PROCEDURE — 3331090002 HH PPS REVENUE DEBIT

## 2018-03-15 ENCOUNTER — HOME CARE VISIT (OUTPATIENT)
Dept: SCHEDULING | Facility: HOME HEALTH | Age: 83
End: 2018-03-15
Payer: MEDICARE

## 2018-03-15 PROCEDURE — G0300 HHS/HOSPICE OF LPN EA 15 MIN: HCPCS

## 2018-03-15 PROCEDURE — 3331090002 HH PPS REVENUE DEBIT

## 2018-03-15 PROCEDURE — 3331090001 HH PPS REVENUE CREDIT

## 2018-03-16 ENCOUNTER — HOME CARE VISIT (OUTPATIENT)
Dept: SCHEDULING | Facility: HOME HEALTH | Age: 83
End: 2018-03-16
Payer: MEDICARE

## 2018-03-16 VITALS
DIASTOLIC BLOOD PRESSURE: 70 MMHG | OXYGEN SATURATION: 98 % | HEART RATE: 75 BPM | SYSTOLIC BLOOD PRESSURE: 138 MMHG | TEMPERATURE: 97 F

## 2018-03-16 VITALS
TEMPERATURE: 98.2 F | HEART RATE: 74 BPM | OXYGEN SATURATION: 98 % | DIASTOLIC BLOOD PRESSURE: 78 MMHG | SYSTOLIC BLOOD PRESSURE: 140 MMHG

## 2018-03-16 PROCEDURE — 3331090002 HH PPS REVENUE DEBIT

## 2018-03-16 PROCEDURE — G0151 HHCP-SERV OF PT,EA 15 MIN: HCPCS

## 2018-03-16 PROCEDURE — 3331090001 HH PPS REVENUE CREDIT

## 2018-03-17 VITALS
HEART RATE: 75 BPM | DIASTOLIC BLOOD PRESSURE: 70 MMHG | TEMPERATURE: 97.5 F | SYSTOLIC BLOOD PRESSURE: 120 MMHG | OXYGEN SATURATION: 98 %

## 2018-03-17 PROCEDURE — 3331090001 HH PPS REVENUE CREDIT

## 2018-03-17 PROCEDURE — 3331090002 HH PPS REVENUE DEBIT

## 2018-03-18 PROCEDURE — 3331090001 HH PPS REVENUE CREDIT

## 2018-03-18 PROCEDURE — 3331090002 HH PPS REVENUE DEBIT

## 2018-03-19 ENCOUNTER — HOME CARE VISIT (OUTPATIENT)
Dept: SCHEDULING | Facility: HOME HEALTH | Age: 83
End: 2018-03-19
Payer: MEDICARE

## 2018-03-19 ENCOUNTER — TELEPHONE (OUTPATIENT)
Dept: FAMILY MEDICINE CLINIC | Age: 83
End: 2018-03-19

## 2018-03-19 PROCEDURE — 3331090002 HH PPS REVENUE DEBIT

## 2018-03-19 PROCEDURE — G0300 HHS/HOSPICE OF LPN EA 15 MIN: HCPCS

## 2018-03-19 PROCEDURE — 3331090001 HH PPS REVENUE CREDIT

## 2018-03-19 NOTE — TELEPHONE ENCOUNTER
Dana w/[facility]     Reports:  pt thinks the antibiotic is affecting her knee  Has some pain (5 out of 5) and it gives out now and then       Best number to reach her is [phone]

## 2018-03-19 NOTE — TELEPHONE ENCOUNTER
I called and spoke with patient. Pt states that she has some pain (5/10) when she walks sometimes and sometimes feels some weakness in her left knee. Pt denies any warmth or redness to knee. Pt denies any fevers, nausea, vomiting, or diarrhea. Pt was informed xray can be done if she is having knee pain. Pt will call back if she thinks she may need xray. She was instructed to call orthopedics if she has any redness, increased pain, or warmth to her knee. Pt verbalized understanding.

## 2018-03-20 ENCOUNTER — HOME CARE VISIT (OUTPATIENT)
Dept: SCHEDULING | Facility: HOME HEALTH | Age: 83
End: 2018-03-20
Payer: MEDICARE

## 2018-03-20 ENCOUNTER — HOSPITAL ENCOUNTER (OUTPATIENT)
Dept: LAB | Age: 83
Discharge: HOME | End: 2018-03-20
Payer: MEDICARE

## 2018-03-20 ENCOUNTER — OFFICE VISIT (OUTPATIENT)
Dept: INTERNAL MEDICINE CLINIC | Age: 83
End: 2018-03-20

## 2018-03-20 ENCOUNTER — OFFICE VISIT (OUTPATIENT)
Dept: CARDIOLOGY CLINIC | Age: 83
End: 2018-03-20

## 2018-03-20 VITALS
WEIGHT: 185.6 LBS | HEART RATE: 72 BPM | OXYGEN SATURATION: 95 % | DIASTOLIC BLOOD PRESSURE: 98 MMHG | SYSTOLIC BLOOD PRESSURE: 168 MMHG | BODY MASS INDEX: 35.04 KG/M2 | RESPIRATION RATE: 18 BRPM | HEIGHT: 61 IN

## 2018-03-20 VITALS
WEIGHT: 186 LBS | TEMPERATURE: 97.5 F | HEIGHT: 61 IN | RESPIRATION RATE: 16 BRPM | DIASTOLIC BLOOD PRESSURE: 84 MMHG | SYSTOLIC BLOOD PRESSURE: 156 MMHG | HEART RATE: 75 BPM | BODY MASS INDEX: 35.12 KG/M2 | OXYGEN SATURATION: 96 %

## 2018-03-20 VITALS
OXYGEN SATURATION: 96 % | HEART RATE: 78 BPM | DIASTOLIC BLOOD PRESSURE: 72 MMHG | SYSTOLIC BLOOD PRESSURE: 140 MMHG | TEMPERATURE: 98.3 F

## 2018-03-20 DIAGNOSIS — I10 ESSENTIAL HYPERTENSION: ICD-10-CM

## 2018-03-20 DIAGNOSIS — T82.7XXA INFECTION OF PACEMAKER POCKET, INITIAL ENCOUNTER (HCC): ICD-10-CM

## 2018-03-20 DIAGNOSIS — R78.81 BACTEREMIA: ICD-10-CM

## 2018-03-20 DIAGNOSIS — I44.2 CHB (COMPLETE HEART BLOCK) (HCC): ICD-10-CM

## 2018-03-20 DIAGNOSIS — G47.33 OSA (OBSTRUCTIVE SLEEP APNEA): ICD-10-CM

## 2018-03-20 DIAGNOSIS — R27.0 ATAXIA: ICD-10-CM

## 2018-03-20 DIAGNOSIS — E03.9 HYPOTHYROIDISM, ADULT: ICD-10-CM

## 2018-03-20 DIAGNOSIS — A41.9 SEPSIS AFFECTING SKIN: Primary | ICD-10-CM

## 2018-03-20 DIAGNOSIS — E66.9 OBESITY (BMI 30.0-34.9): ICD-10-CM

## 2018-03-20 DIAGNOSIS — F41.9 ANXIETY: ICD-10-CM

## 2018-03-20 DIAGNOSIS — I48.0 PAROXYSMAL ATRIAL FIBRILLATION (HCC): ICD-10-CM

## 2018-03-20 DIAGNOSIS — E11.21 TYPE 2 DIABETES MELLITUS WITH NEPHROPATHY (HCC): ICD-10-CM

## 2018-03-20 DIAGNOSIS — K21.9 GASTROESOPHAGEAL REFLUX DISEASE WITHOUT ESOPHAGITIS: ICD-10-CM

## 2018-03-20 DIAGNOSIS — I49.9 IRREGULAR HEARTBEAT: Primary | ICD-10-CM

## 2018-03-20 DIAGNOSIS — R60.0 LOCALIZED EDEMA: ICD-10-CM

## 2018-03-20 PROBLEM — E66.01 SEVERE OBESITY (BMI 35.0-39.9) WITH COMORBIDITY (HCC): Status: ACTIVE | Noted: 2018-03-20

## 2018-03-20 PROCEDURE — G0151 HHCP-SERV OF PT,EA 15 MIN: HCPCS

## 2018-03-20 PROCEDURE — 83036 HEMOGLOBIN GLYCOSYLATED A1C: CPT

## 2018-03-20 PROCEDURE — 36415 COLL VENOUS BLD VENIPUNCTURE: CPT

## 2018-03-20 PROCEDURE — 85027 COMPLETE CBC AUTOMATED: CPT

## 2018-03-20 PROCEDURE — 80048 BASIC METABOLIC PNL TOTAL CA: CPT

## 2018-03-20 PROCEDURE — 3331090002 HH PPS REVENUE DEBIT

## 2018-03-20 PROCEDURE — 3331090001 HH PPS REVENUE CREDIT

## 2018-03-20 RX ORDER — BENAZEPRIL HYDROCHLORIDE 40 MG/1
40 TABLET ORAL DAILY
COMMUNITY
End: 2018-03-20 | Stop reason: SDUPTHER

## 2018-03-20 RX ORDER — FUROSEMIDE 20 MG/1
20 TABLET ORAL DAILY
Qty: 14 TAB | Refills: 0 | Status: ON HOLD | OUTPATIENT
Start: 2018-03-20 | End: 2018-04-09

## 2018-03-20 RX ORDER — BENAZEPRIL HYDROCHLORIDE 40 MG/1
40 TABLET ORAL DAILY
Qty: 30 TAB | Refills: 3 | Status: SHIPPED | OUTPATIENT
Start: 2018-03-20 | End: 2018-06-27 | Stop reason: DRUGHIGH

## 2018-03-20 RX ORDER — BUSPIRONE HYDROCHLORIDE 5 MG/1
5 TABLET ORAL 2 TIMES DAILY
Qty: 60 TAB | Refills: 3 | Status: ON HOLD | OUTPATIENT
Start: 2018-03-20 | End: 2018-04-09 | Stop reason: DRUGHIGH

## 2018-03-20 NOTE — PROGRESS NOTES
HISTORY OF PRESENT ILLNESS  Tommy Sanchez is a [de-identified] y.o. female. HPI   Last here 12/15/17. Pt is here for LOU. Pt presents with her daughter and sister who provide some of the hx. Pt ambulates with a wheelchair.      Ms. Tommy Sanchez is a [de-identified]y.o. year old female, she is seen today for Transition of Care services following a hospital discharge for an infection of her pacemaker pocket 3/5/18-3/12/18. Our office Nurse Navigator performed an outreach to Ms. Lj Renae on 3/13/18 (within 2 business days of discharge) to complete medication reconciliation and a telephonic assessment of her condition.     Lov, pt was admitted to the hospital for a watchman procedure 12/13/17-12/14/17  Pt was admitted to the hospital for an AV node ablation and pacemaker placement by Dr. Michael Gonzales (cardio) on 2/14/18  Pt was admitted to the hospital for an infection of her pacemaker pocket 3/5/18-3/12/18     Reviewed labs 3/18: original cultures grew staph, repeat cultures were nl, cr 1.2 at one point, leukocytosis   Will get labs today     Reviewed CT chest 3/5/18: 1. Fluid collection is seen adjacent to the left chest ICD. Superimposed infection is not excluded. 2. Dilatation of the ascending aorta measuring 4.0 cm in diameter. 3. Dilatation of the main pulmonary artery suggesting pulmonary artery hypertension. 4. Several scattered bilateral lung nodules measuring up to 5 mm may be infectious or inflammatory but follow-up chest CT could be performed as clinically warranted. 5. Incompletely characterized left adrenal nodule measuring 1.6 cm.   Reviewed CXR 3/7/18: no pneumothorax   Advised her to ask infec dis about a repeat CT chest in 2-3 months     Reviewed consult from Dr. Dilan Marks (infec dis) 3/6/18:  Assessment / Plan:    Ms Lj Renae is a [de-identified]year old lady with hx of Atrial fibrillation S/P ablation and pacemaker inserted (2/14/18), cervical diskectomy/fusion with biomechanical implants C4-5-6,  Bilateral total knee replacements admitted with fever and found to have high grade bacteremia (3/3 bottles on admission). She is noted to have gone to the ER on 2/28 with concerns of oozing/bleeding from the pacer area and noted to be oozing \" dark blood \" per ER notes. Notes indicate she had a 4 cm wound and \"Wound was cleansed with Betadine. Dermabond and Surgifoam was applied and Tegriderm was placed over the wound\". She says that it has been oozing since then. Started getting high fever and chills making her concerned and came to the ER. She denied any cough, URI, nausea, vomiting, diarrhea or dysuria symptoms. She says that she has been taking \"sponge baths\" and not immersing herself in any water. Denied falls or trauma to chest wall area. Says that he has had  Fall in past and went to [address] [place] [address] and dx wt concussion but this was way before this year. Denied any knee pain,edema, erythema or cervical area pain. From chart review, T max 104.2 3/5/18, leukocytosis up to 16. Cr was at 1.28 on admission that is improving. Lactate up to 2.3 with repeat at 1. Blood cx + 3/3 for G+C clusters. CT chest done and showed fluid collection \"left chest ICD with adjacent fluid collection measuring up to 1.9 x  5.1 cm \". She has been started on Ceftriaxone and Vancomycin IV.    UA showed 5-10 WBC, 3+bacteria, negative nitrites and small leukocyte esterase. Urine culture has been sent as well.    1) G+C clusters bacteremia ( high grade 3/3 bottles) and pacer site infection    Recommendations  Continue IV Vancomycin with trough goal of 15-20 with renal monitoring  Check JESSIE  Noted plans for pacer extraction   JESSIE findings will help guide duration of antibiotics   Await Identification and susceptibilities of organism and de escalate antibiotics as able   Check blood cultures ( 2 sets) Q 24-48 hours until clearance obtained    2) Bacteruria:  Asymptomatic, would suspect high fever from already known source bacteremia and pacer pocket infection   On Rocephin. Noted cultures sent. Would expect bacteruria in a elderly female patient  Would suggest stopping Ceftriaxone  and if any clinical deterioration, can evaluate for gram negative coverage    3) TKR B/L  Asymptomatic   Bacteremia is risk for seeding   4) S/P Cervical diskectomy and fusion wt biomechanical implants C4-6  Asymptomatic at this time    5) DVT Px per primary team   Pt has an IV of cefazolin for 4-6 weeks  Next visit scheduled for 3/29/18     Reviewed consult from Dr. Freida Talamantes (cardio) 3/6/18: Barrington Meigs is an [de-identified]year old female, s/p Watchman, atrial fibrillation, PM with Av node ablation (2/14) admitted yesterday for chills, fever with infected PM pocket. Will make NPO after MN tonight. JESSIE will be done to evaluate valves to r/o endocarditis. bc she is av node ablated, she will require a temp pacer from the right IJ and IV antibiotics. I discussed the risks/benefits/alternatives of the procedure with the patient. Risks include (but are not limited to) bleeding, heart block, infection, cva/mi/tamponade/death. The patient understands and agrees to proceed. Thank you for this interesting consultation.   Next visit scheduled for 03/20/18     Reviewed discharge summary 3/12/18:  Sepsis due to UTI vs PM pocket infection in setting of afib s/p ablation and PM placement with Dr. Freida Talamantes 2/14/18, present on admission:   S/P  Pacemaker removal/ JESSIE  Shows NO vegetations  Heavy growth of Staph Aureus on initial BC 3/5 and  on BC 3/7  Change to Cefazolin 3/8 Repeat BC 3/9 To see if clears prior to placemtn of PICC  Last echo 4/9/72 LLKD systolic function normal. 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  Pt denies infectious sx of any type other than continued leakage from PM pocket.   need longterm antibiotics  Appreciate Infectious Disease Consult.  Vanco Change to Cefazolin, Discussed today  May have to Beatriz Yates Dr PICC placement on Monday when repeat [facility] are now clear  Await BC done yesterday , if clear, Picc line placement on Monday then discharge  Non insulin dependent DM2 controlled without complication:  - holding glucophage for now  - lispro sliding scale  con't diltiazem, lopressor  - con't ASA and plavix  Hypothyroidism:  con't synthroid  Hyperlipidemia: con't pravachol  Depression: con't zoloft  Ecoli  In URINE  ( low colony count) also sensitive to Cefazolin  No UTI Sx  Code Status: Full  Surrogate Decision Maker:   DVT Prophylaxis: SCDs  Body mass index is 35.74 kg/(m^2). [address] and PT come to her home 2x weekly    BP is 149/82, will repeat this today   BPs were 110-114/70 in the hospital  BPs are 145/72, 153/74, 163/71, 162/74, 140/72 at home  Continues on diltiazem 120mg and benazepril 20mg daily   Will increase benazepril to 40mg daily   Recall she has an element of [facility]    Pt c/o BLE swelling, more so on RLE x being discharged  Advised her to work on leg elevation  Ordered US RLE  Ordered lasix daily for 2 weeks     BS at home running around 170, 131, 115, 115, 133 in the AM fasting  Pt ran out of her metformin after coming home form the hospital   However, she is now back on metformin XR 750mg BID      Wt is up 10 lbs since last visit  Pt c/o lack of appetite  Pt has not been following her celiac sprue diet  Discussed her appetite improving with time  Discussed having a nutrition eval once she is feeling better     Pt follows with Dr. Karina Harris (cardio)  Reviewed notes 2/8/18: Ms. Kun Brush is here for follow up of AF. EKG earlier this week demonstrated AF at a rate of 125bpm at her follow up s/p 45 day JESSIE post Watchman implant. She was asymptomatic. Diltiazem was increased to 240mg and Lotensin decreased to 10mg daily. She is still in AF with rvr and is a candidate for an av node ablation and pacemaker. I discussed the risks/benefits/alternatives of the procedure with the patient.  Risks include (but are not limited to) bleeding, heart block, infection, cva/mi/tamponade/death. The patient understands and agrees to proceed. Thank you for this interesting consultation.      Pt follows with Dr. Edith Mendoza (cardio)  Last visit was 12/13/17 for a watchman procedure   Pt is no longer taking coumadin x lov  Pt is currently taking ASA and plavix daily    Pt follows with Dr. Ziyad Howell (neuro)  Reviewed noted 322/18: 51-year-old female seen in follow-up for cervical stenosis. She is also complaining now of balance issues and feeling pulled to the left. This is unchanged since I last saw her. We did an MRI of the brain that showed generalized atrophy. We also did an MRI of the cervical spine that showed that she was stable and her cervical stenosis has not progressed. At this point I agree with trying physical therapy and after patient completes this if she still having balance issues will repeat an MRI of the cervical spine.   1. MRI brain as above  2. Patient is status post watchman device and will be weaning off of Coumadin and progressing to aspirin under the direction of Dr. Paloma Alexis  3. MRI cervical spine as above  4. Continue to follow with Dr. Nancy Beebe the patient will hold on follow-up in this we need to repeat an MRI of the cervical spine  5. Agree with going back to [facility] physical therapy  6.   May need EMG/NCS if patient does not improve   Follow-up in 3 months  Next visit scheduled for 4/20/18     Pt follows with Dr. Nancy Beebe (neurosurg)  Pt has seen this physician recently - will get notes for review      Pt follows with Dr. Martin Owens (uro) for urinary incontinence  Last visit was 9/11/17 - will get notes for review  Pt has had botox in the past      Pt follows with Dr. Aravind Olsen (ortho)   Last visit was 6/17     Pt follows with Dr. Alexis Carreno (ENT)  Pt uses a neti pot once daily       Pt follows with Dr. Everardo Chong (GI)   Last visit was 10/17  Pt is now taking protonix 40mg daily x hospitalization   Pt is no longer taking any prilosec x hospitalization    Recall pt has celiac sprue and is somewhat compliant with her gluten-free diet    Her daughter states that the pt has had some diarrhea x hospitalization   Pt has been taking a daily probiotic x hospitalization       Continues on iron 325mg daily, which works well  Pt has a h/o mild anemia, which is stable     Continues on pravachol 20mg for cholesterol       Continues on 50mcg synthroid daily    Pt has been taking a decreased dose of zoloft 100mg daily for depression x hospitalization  However, she does not believes that this medication is working well  Per daughter and sister, she has been crying, having a panic attack q5-10 minutes and getting irritated   Anxious a lot  Will continue zoloft 100mg qAM  Will add on low dose buspar qAM  Will increase buspar to BID after 2 weeks      Pt is not using her CPAP for MALLORY   Pt has not had a sleep study as of yet  Advised her to schedule a sleep study 3/18     ACP on file. SDMs are her  Abdirizak Chavez) and sister Meagan Murillo).       PREVENTIVE:    Colonoscopy: 12/28/16, Dr. Sobia Mccartney, repeat 10 years  EGD: 12/16, Dr. Sobia Mccartney  Pap: 2017, Dr. Shelli Mcfarlane    Mammogram: 06/05/17, nl  Dexa: 11/20/17, nl   Tdap: 4/15    Pneumovax: around 2012 per the patient    Prevnar 13: 8/16  Zostavax: 2/29/16  Flu shot: 9/13/17  Ophthalmology: Dr. Emma Holguin, 3/2017, mild diabetic retinopathy in R eye   Foot exam: 6/7/17  Microalbumin: 9/17  A1c: 7/14 6.6, 10/14 6.6,4/15 6.8, 7/15 6.8, 11/15 6.9, 2/16 6.9, 5/16 6.5, 8/16 6.8, 6/17 6.9, 9/17 6.3, 10/17 6.7, 12/17 6.3  Lipids: 9/17 LDL 48    Patient Active Problem List    Diagnosis Date Noted    Infection of pacemaker pocket ([facility] 03/06/2018    Pacemaker 03/06/2018    Irregular heartbeat 12/18/2017    Type 2 diabetes mellitus with nephropathy ([facility] 12/15/2017    Celiac sprue 03/07/2017    Paroxysmal atrial fibrillation (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    ACP (advance care planning) 11/11/2015    Hypothyroidism, adult 07/31/2015    Type 2 diabetes mellitus without complication ([facility] 14/30/6406    Essential hypertension 04/27/2015    MALLORY (obstructive sleep apnea) 07/09/2014    Hyperlipidemia 07/09/2014    Sarcoid 07/09/2014    Stress bladder incontinence, female 07/09/2014    Obesity 07/09/2014    TIA (transient ischemic attack) 07/09/2014    AR (aortic regurgitation) 07/09/2014    Mitral valve insufficiency 07/09/2014    Cervical stenosis of spine 07/09/2014     Current Outpatient Prescriptions   Medication Sig Dispense Refill    pantoprazole (PROTONIX) 40 mg tablet Take 40 mg by mouth two (2) times a day. 1 tab 2x/ day 30 mins before breakfast and supper - stop omeprazole      HEPARIN SOD,PORCINE/0.9 % NACL (HEPARIN FLUSH IV) 3 mL by IntraVENous route every eight (8) hours. 10u/ml      0.9 % SODIUM CHLORIDE (NORMAL SALINE FLUSH INJECTION) 10 mL by IntraVENous route every eight (8) hours.  L. acidoph & paracasei- S therm- Bifido (FRAN-Q/RISAQUAD) 8 billion cell cap cap Take 1 Cap by mouth daily. 60 Cap 0    ceFAZolin 1 g, ADDaptor 1 Device 1 g by IntraVENous route every eight (8) hours for 42 days. (Patient taking differently: 2 g by IntraVENous route every eight (8) hours.) 126 Dose 0    clopidogrel (PLAVIX) 75 mg tab Take 75 mg by mouth daily.  Lancets (MICROLET LANCET) misc Check blood sugar twice daily. 1 Each 11    glucose blood VI test strips (CONTOUR NEXT STRIPS) strip Check blood sugar twice daily 100 Strip 1    levothyroxine (SYNTHROID) 50 mcg tablet Take 1 Tab by mouth Daily (before breakfast). 90 Tab 1    sertraline (ZOLOFT) 100 mg tablet Take 1 Tab by mouth daily. 135 Tab 1    benazepril (LOTENSIN) 20 mg tablet Take 1 Tab by mouth daily.  (Patient taking differently: Take 10 mg by mouth daily.) 90 Tab 1    metFORMIN ER (GLUCOPHAGE XR) 750 mg tablet TAKE ONE TABLET BY MOUTH TWICE DAILY 180 Tab 0    pravastatin (PRAVACHOL) 20 mg tablet TAKE ONE TABLET BY MOUTH NIGHTLY 90 Tab 0    Cholecalciferol, Vitamin D3, (VITAMIN D3) 2,000 unit cap capsule Take 2,000 Units by mouth daily.  acetaminophen (TYLENOL) 500 mg tablet Take 500 mg by mouth every four (4) hours as needed for Pain.  DILT- mg XR capsule TAKE ONE CAPSULE BY MOUTH ONCE DAILY (Patient taking differently: TAKE TWO CAPSULES BY MOUTH ONCE DAILY) 30 Cap 11    ferrous sulfate (IRON) 325 mg (65 mg iron) tablet Take 325 mg by mouth every other day.  multivit-min-FA-lycopen-lutein (CENTRUM SILVER) 0.4-300-250 mg-mcg-mcg tab Take 1 Tab by mouth daily.  aspirin delayed-release 81 mg tablet Take 81 mg by mouth daily.  magnesium 250 mg tab Take 250 mg by mouth daily.        Past Surgical History:   Procedure Laterality Date    CARDIAC SURG PROCEDURE UNLIST      cardioversion     CARDIAC SURG PROCEDURE UNLIST      watchman device    COLONOSCOPY N/A 12/28/2016    COLON[facility]COPY [facility] Viet Devries [facility]    HX CATARACT REMOVAL      both eyes    HX CHOLECYSTECTOMY      HX COLONOSCOPY  5/8/2013    Repeat in 5 years    HX CYST REMOVAL  10/15    on head     HX HYSTERECTOMY      HX ORTHOPAEDIC Bilateral     knee replacement to both knees    HX ORTHOPAEDIC      spacer btwn L4-5 and L5-6     HX TONSILLECTOMY      HX UROLOGICAL      botox in bladder      Lab Results  Component Value Date/Time   WBC 11.8 (H) 03/07/2018 05:20 AM   HGB 9.4 (L) 03/07/2018 05:20 AM   HCT 29.7 (L) 03/07/2018 05:20 AM   PLATELET 398 68/52/2271 05:20 AM   MCV 92.0 03/07/2018 05:20 AM     Lab Results  Component Value Date/Time   Cholesterol, total 149 09/08/2017 09:47 AM   HDL Cholesterol 82 09/08/2017 09:47 AM   LDL, calculated 48 09/08/2017 09:47 AM   LDL-C, External 77 10/18/2013 11:09 AM   Triglyceride 96 09/08/2017 09:47 AM   CHOL/HDL Ratio 1.9 09/08/2016 02:13 AM     Lab Results  Component Value Date/Time   GFR est non-AA >60 03/11/2018 02:54 AM   GFR est AA >60 03/11/2018 02:54 AM   Creatinine 0.89 03/11/2018 02:54 AM   BUN 21 (H) 03/11/2018 02:54 AM   Sodium 138 03/11/2018 02:54 AM   Potassium 4.1 03/11/2018 02:54 AM   Chloride 105 03/11/2018 02:54 AM   CO2 24 03/11/2018 02:54 AM   Magnesium 2.0 03/07/2018 05:20 AM   Phosphorus 3.3 09/07/2016 01:11 AM        Review of Systems   Respiratory: Negative for shortness of breath. Cardiovascular: Positive for leg swelling. Negative for chest pain. Gastrointestinal: Positive for diarrhea. Psychiatric/Behavioral: Positive for depression. The patient is nervous/anxious. Physical Exam   Constitutional: She is oriented to person, place, and time. She appears well-developed and well-nourished. No distress. HENT:   Head: Normocephalic and atraumatic. Mouth/Throat: Oropharynx is clear and moist. No oropharyngeal exudate. Eyes: Conjunctivae and EOM are normal. Right eye exhibits no discharge. Left eye exhibits no discharge. Neck: Normal range of motion. Neck supple. Cardiovascular: Normal rate, regular rhythm and normal heart sounds. Exam reveals no gallop and no friction rub. No murmur heard. Pulmonary/Chest: Effort normal and breath sounds normal. No respiratory distress. She has no wheezes. She has no rales. She exhibits no tenderness. Bandage over initial pacemaker site, pacemaker intact   Abdominal: Soft. She exhibits no distension. There is no tenderness. There is no rebound and no guarding. Musculoskeletal: She exhibits edema (2+ pitting on RLE, 1+ pitting on LLE). She exhibits no tenderness or deformity. Lymphadenopathy:     She has no cervical adenopathy. Neurological: She is alert and oriented to person, place, and time. Coordination abnormal.   Skin: Skin is warm and dry. No rash noted. She is not diaphoretic. No erythema. No pallor. Psychiatric: She has a normal mood and affect.  Her behavior is normal.       ASSESSMENT and PLAN    ICD-10-CM ICD-9-CM    1. Type 2 diabetes mellitus with nephropathy (HCC)    BS have become elevated initially upon her return home as she had been out of her metformin for several days, now that she is back on metformin her BS are back at goal, continue 750mg BID, no change to dose   S72.32 379.50 METABOLIC PANEL, BASIC     583.81 CBC W/O DIFF      HEMOGLOBIN A1C WITH EAG   2. Paroxysmal atrial fibrillation (HCC)    Pt is now s/p pacemaker placement, she has a watchman in place and is no longer on anticoagulation other than plavix and ASA, she had an ablation completed and is in nsr   Y83.0 302.93 METABOLIC PANEL, BASIC      CBC W/O DIFF      HEMOGLOBIN A1C WITH EAG   3. Infection of pacemaker pocket, initial encounter ([facility]    Pt is completing IV abx for 4-6 weeks, she has f/u scheduled with Dr. Arya Lemus, has a picc line and Home Health in place   T82. 7XXA 918.56 METABOLIC PANEL, BASIC      CBC W/O DIFF      HEMOGLOBIN A1C WITH EAG   4. Hypothyroidism, adult    Controled on synthroid 50mcg daily   M00.2 496.6 METABOLIC PANEL, BASIC      CBC W/O DIFF      HEMOGLOBIN A1C WITH EAG   5. Essential hypertension    Increase benazepril to 40mg daily, BP has been running high at home and here, continue dilt 120mg   L85 591.8 METABOLIC PANEL, BASIC      CBC W/O DIFF      HEMOGLOBIN A1C WITH EAG   6. MALLORY (obstructive sleep apnea)    Still needs eval and tx for this which we addressed again today, she is aware and has information for scheduling at home   E30.47 464.83 METABOLIC PANEL, BASIC      CBC W/O DIFF      HEMOGLOBIN A1C WITH EAG   7. Ataxia    Doing PT with Home Health   O93.2 908.3 METABOLIC PANEL, BASIC      CBC W/O DIFF      HEMOGLOBIN A1C WITH EAG   8. Obesity (BMI 30.0-34. 9)    Discussed nutrition eval once she is feeling better, she has celiac sprue and diabetes and would benefit from a nutrition consult to assist with diabetes and w/l as well   O00.9 364.93 METABOLIC PANEL, BASIC      CBC W/O DIFF      HEMOGLOBIN A1C WITH EAG   9. Localized edema    Will give lasix daily for the next 2 weeks to improve swelling in BLE, there is more edema in RLE than LLE, would need to evaluate further with US   R60.0 782.3 DUPLEX LOWER EXT VENOUS RIGHT   10. Gastroesophageal reflux disease without esophagitis    Now on protonix rather than prilosec    K21.9 530.81       11. Anxiety - pt with progressive anxiety x recent hospitalization, she is also quite depressed and tearful, continue zoloft at 100mg daily which is a lower dose, had been on 150, will add buspar 5mg starting once qhs and then increase to BID dosing, will f/u in 3 weeks     Scribed by [address], as dictated by Dr. Khadijah Michaud. Current diagnosis and concerns discussed with pt at length. Pt understands risks and benefits or current treatment plan and medications, and accepts the treatment and medication with any possible risks. Pt asks appropriate questions, which were answered. Pt was instructed to call with any concerns or problems. This note will not be viewable in [address]

## 2018-03-20 NOTE — MR AVS SNAPSHOT
[address] [facility] 
[phone] Patient: Jamar Tracy MRN: [id number] :1935 Visit Information Date & Time Provider Department Dept. Phone Encounter #  
 3/20/2018  1:30 PM Brandee Franz [address] [place] [address],[address] [phone] [id number] Follow-up Instructions Return in about 3 weeks (around 4/10/2018). Your Appointments 3/20/2018  3:15 PM  
ESTABLISHED PATIENT with [facility] [place]) Appt Note: Per Rosio/Cary P pt stated patch coming off from external pacemaker  [facility]  
[phone]  [facility]  
  
    
 3/22/2018  9:00 AM  
PROCEDURE with PACEMAKER, [facility] [place]) Appt Note: f/u new pm and av node ablation,jaa; pt cld to schdl hp fu/1wk p/ [address] [facility]  
[phone] [address] [facility]  
  
    
 3/22/2018  9:00 AM  
ESTABLISHED PATIENT with [facility] [place]) Appt Note: pt cld to schdl hp fu/1wk p/ [address]  
[phone]  
  
    
 3/29/2018  4:00 PM  
ROUTINE CARE with [facility] [place]) Appt Note: Per Dr. Hardik Sahu [address] [address]  
[facility]  
  
    
 2018 10:40 AM  
Follow Up with Nick Oliveira MD  
[address] [facility] [place]) Appt Note: balance disorder [address] [address] [address]  
[facility] [address] [address] [address] Upcoming Health Maintenance Date Due Pneumococcal 65+ High/Highest Risk (2 of 2 - PPSV23) 10/13/2016 EYE EXAM RETINAL OR DILATED Q1 3/20/2018 FOOT EXAM Q1 6/7/2018 HEMOGLOBIN A1C Q6M 6/11/2018 MICROALBUMIN Q1 9/8/2018 LIPID PANEL Q1 9/8/2018 MEDICARE YEARLY EXAM 12/16/2018 GLAUCOMA SCREENING Q2Y 3/20/2019 DTaP/Tdap/Td series (2 - Td) 4/27/2025 Allergies as of 3/20/2018  Review Complete On: 3/13/2018 By: Sarai Farmer RN Severity Noted Reaction Type Reactions Procardia Xl [Nifedipine]  07/09/2014    Other (comments)  
 weakness Current Immunizations  Reviewed on 6/7/2017 Name Date Influenza High Dose Vaccine PF 9/13/2017, 9/19/2015 Influenza Vaccine 10/10/2014 12:38 PM  
 Influenza Vaccine (Quad) PF 9/14/2016 Pneumococcal Conjugate (PCV-13) 8/18/2016 Tdap 4/27/2015 Zoster Vaccine, Live 2/29/2016 Not reviewed this visit You Were Diagnosed With   
  
 Codes Comments Type 2 diabetes mellitus with nephropathy ([facility] [address]    -  Primary ICD-10-CM: E11.21 
ICD-9-CM: 250.40, 583.81 Paroxysmal atrial fibrillation (HCC)     ICD-10-CM: I48.0 ICD-9-CM: 427.31 Infection of pacemaker pocket, initial encounter [facility])     ICD-10-CM: T82. 7XXA ICD-9-CM: 996.61 Hypothyroidism, adult     ICD-10-CM: E03.9 ICD-9-CM: 244.9 Essential hypertension     ICD-10-CM: I10 
ICD-9-CM: 401.9 MALLORY (obstructive sleep apnea)     ICD-10-CM: G47.33 
ICD-9-CM: 327.23 Ataxia     ICD-10-CM: R27.0 ICD-9-CM: 781.3 Obesity (BMI 30.0-34.9)     ICD-10-CM: R64.9 ICD-9-CM: 278.00 Localized edema     ICD-10-CM: R60.0 ICD-9-CM: 782.3 Gastroesophageal reflux disease without esophagitis     ICD-10-CM: K21.9 ICD-9-CM: 530.81 Vitals BP Pulse Temp Resp Height(growth percentile) Weight(growth percentile) 149/82 (BP 1 Location: Left arm, BP Patient Position: Sitting) 75 97.5 °F (36.4 °C) (Oral) 16 5' 1\" (1.549 m) 186 lb (84.4 kg) SpO2 BMI OB Status Smoking Status 96% 35.14 kg/m2 Hysterectomy Never Smoker Vitals History BMI and BSA Data Body Mass Index Body Surface Area  
 35.14 kg/m 2 1.91 m 2 Preferred Pharmacy Pharmacy Name Phone MELLISA Zarate, [address] [facility] [phone] Your Updated Medication List  
  
   
This list is accurate as of 3/20/18  2:09 PM.  Always use your most recent med list.  
  
  
  
  
 acetaminophen 500 mg tablet Commonly known as:  TYLENOL Take 500 mg by mouth every four (4) hours as needed for Pain. aspirin delayed-release 81 mg tablet Take 81 mg by mouth daily. benazepril 20 mg tablet Commonly known as:  LOTENSIN Take 1 Tab by mouth daily. busPIRone 5 mg tablet Commonly known as:  BUSPAR Take 1 Tab by mouth two (2) times a day. ceFAZolin 1 g, ADDaptor 1 Device 1 g by IntraVENous route every eight (8) hours for 42 days. CENTRUM SILVER 0.4-300-250 mg-mcg-mcg Tab Generic drug:  multivit-min-FA-lycopen-lutein Take 1 Tab by mouth daily. Cholecalciferol (Vitamin D3) 2,000 unit Cap capsule Commonly known as:  VITAMIN D3 Take 2,000 Units by mouth daily. DILT- mg XR capsule Generic drug:  dilTIAZem XR  
TAKE ONE CAPSULE BY MOUTH ONCE DAILY  
  
 furosemide 20 mg tablet Commonly known as:  LASIX Take 1 Tab by mouth daily. glucose blood VI test strips strip Commonly known as:  CONTOUR NEXT STRIPS Check blood sugar twice daily HEPARIN FLUSH IV  
3 mL by IntraVENous route every eight (8) hours. 10u/ml Iron 325 mg (65 mg iron) tablet Generic drug:  ferrous sulfate Take 325 mg by mouth every other day. L. acidoph & paracasei- S therm- Bifido 8 billion cell Cap cap Commonly known as:  FRAN-Q/RISAQUAD Take 1 Cap by mouth daily. Lancets Misc Commonly known as:  Liane Blank Check blood sugar twice daily. levothyroxine 50 mcg tablet Commonly known as:  SYNTHROID Take 1 Tab by mouth Daily (before breakfast). magnesium 250 mg Tab Take 250 mg by mouth daily. metFORMIN  mg tablet Commonly known as:  GLUCOPHAGE XR  
TAKE ONE TABLET BY MOUTH TWICE DAILY NORMAL SALINE FLUSH INJECTION 10 mL by IntraVENous route every eight (8) hours. pantoprazole 40 mg tablet Commonly known as:  PROTONIX Take 40 mg by mouth two (2) times a day. 1 tab 2x/ day 30 mins before breakfast and supper - stop omeprazole PLAVIX 75 mg Tab Generic drug:  clopidogrel Take 75 mg by mouth daily. pravastatin 20 mg tablet Commonly known as:  PRAVACHOL  
TAKE ONE TABLET BY MOUTH NIGHTLY  
  
 sertraline 100 mg tablet Commonly known as:  ZOLOFT Take 1 Tab by mouth daily. Prescriptions Sent to Pharmacy Refills  
 busPIRone (BUSPAR) 5 mg tablet 3 Sig: Take 1 Tab by mouth two (2) times a day. Class: Normal  
 Pharmacy: [address] Ph #: [phone] Route: Oral  
 furosemide (LASIX) 20 mg tablet 0 Sig: Take 1 Tab by mouth daily. Class: Normal  
 Pharmacy: [address] Ph #: [phone] Route: Oral  
  
We Performed the Following CBC W/O DIFF [94422 CPT(R)] HEMOGLOBIN A1C WITH EAG [44703 CPT(R)] METABOLIC PANEL, BASIC [89532 CPT(R)] Follow-up Instructions Return in about 3 weeks (around 4/10/2018). To-Do List   
 03/20/2018 Imaging:  DUPLEX LOWER EXT VENOUS RIGHT   
  
 03/21/2018 To Be Determined Appointment with Breanna Mckeon LPN at [place] [address]  
  
 03/23/2018 To Be Determined Appointment with Vicenta Quiroga PT at [place] [address]  
  
 03/26/2018 To Be Determined   Appointment with Vicenta Quiroga PT at [place] [address]  
  
 03/27/2018 To Be Determined Appointment with Jerzy Mercer LPN at [place] [address]  
  
 03/28/2018 To Be Determined Appointment with Subhash Butler PT at [place] [address]  
  
 03/29/2018 To Be Determined Appointment with Jerzy Mercer LPN at [place] [address]  
  
 04/03/2018 To Be Determined Appointment with Jerzy Mercer LPN at [place] [address]  
  
 04/05/2018 To Be Determined Appointment with Jerzy Mercer LPN at [place] [address]  
  
 04/10/2018 To Be Determined Appointment with Jerzy Mercer LPN at [place] [address]  
  
 04/12/2018 To Be Determined Appointment with Jerzy Mercer LPN at [place] [address]  
  
 04/17/2018 To Be Determined Appointment with Jerzy Mercer LPN at [place] [address]  
  
 04/19/2018 To Be Determined Appointment with Jerzy Mercer LPN at [place] [address]  
  
 04/24/2018 To Be Determined Appointment with Jerzy Mercer LPN at [place] [address]  
  
 04/26/2018 To Be Determined Appointment with Jerzy Mercer LPN at [place] [address]  
  
 05/01/2018 To Be Determined Appointment with Jerzy Mercer LPN at [place] [address]  
  
 05/03/2018 To Be Determined Appointment with Nellie Bills, ISELA at [place] [address] Patient Instructions Add buspar to evening can increase to 2 times per day after 2 weeks Continue zoloft 100mg daily Introducing [facility]! Dear Bruno Noriega: Thank you for requesting a [employer] account. Our records indicate that you already have an active MyChart account.   You can access your account anytime at [email or web address]. [employer] Did you know that you can access your hospital and ER discharge instructions at any time in [employer]? You can also review all of your test results from your hospital stay or ER visit. Additional Information If you have questions, please visit the Frequently Asked Questions section of the [employer] website at [email or web address] Remember, [employer] is NOT to be used for urgent needs. For medical emergencies, dial 911. Now available from your iPhone and Android! Please provide this summary of care documentation to your next provider. Your primary care clinician is listed as Anatoly Rowe. If you have any questions after today's visit, please call [phone].

## 2018-03-20 NOTE — PROGRESS NOTES
1. Have you been to the ER, urgent care clinic since your last visit? Hospitalized since your last visit? No    2. Have you seen or consulted any other health care providers outside of the [address] [place] [address] since your last visit? Include any pap smears or colon screening. No    Chief Complaint   Patient presents with    Irregular Heart Beat     Patch following off from temp pacemaker.

## 2018-03-20 NOTE — PROGRESS NOTES
Subjective:      Mariama Godinez is a [de-identified] y.o. female is here for follow up s/p , temp/perm pacemaker implanted via the neck and lead extraction/device removal from the left chest. Is here to have PM site re-dressed. The patient denies chest pain,  orthopnea, PND, LE edema, palpitations, syncope, presyncope or fatigue. Notes lower extremity edema and dyspnea with anxiety. Her bps have been running high and she admits to feeling anxious. Saw PCP today. Patient Active Problem List    Diagnosis Date Noted    Severe obesity (BMI 35.0-39. 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    ACP (advance care planning) 11/11/2015    Hypothyroidism, adult 07/31/2015    Type 2 diabetes mellitus without complication ([address] 03/85/3874    Essential hypertension 04/27/2015    MALLORY (obstructive sleep apnea) 07/09/2014    Hyperlipidemia 07/09/2014    Sarcoid 07/09/2014    Stress bladder incontinence, female 07/09/2014    Obesity 07/09/2014    TIA (transient ischemic attack) 07/09/2014    AR (aortic regurgitation) 07/09/2014    Mitral valve insufficiency 07/09/2014    Cervical stenosis of spine 07/09/2014      Tra Hussein MD  Past Medical History:   Diagnosis Date    Anemia     Arrhythmia     AFIB    Ataxia     Webb's esophagus     Cervical spinal stenosis     Coronary atherosclerosis of unspecified type of vessel, native or graft     Diabetes ([address]     Fatigue     GERD (gastroesophageal reflux disease)     Hyperlipidemia     Hypertension     Ill-defined condition     right torn rotater cuff- no surgery at this time    Liver disease pt is unaware    Osteoarthritis     Psychiatric disorder     anxiety and depression    Sarcoidosis     Sleep apnea     uses cpap    Stroke ([address]     TIA'S    Thyroid disease       Past Surgical History:   Procedure Laterality Date    CARDIAC SURG PROCEDURE UNLIST      cardioversion     CARDIAC SURG PROCEDURE UNLIST      watchman device    COLONOSCOPY N/A 12/28/2016    COLONOSCOPY performed by Laurie Chong MD at [facility] ENDOSCOPY    HX CATARACT REMOVAL      both eyes    HX CHOLECYSTECTOMY      HX COLONOSCOPY  5/8/2013    Repeat in 5 years    HX CYST REMOVAL  10/15    on head     HX HYSTERECTOMY      HX ORTHOPAEDIC Bilateral     knee replacement to both knees    HX ORTHOPAEDIC      spacer btwn L4-5 and L5-6     HX TONSILLECTOMY      HX UROLOGICAL      botox in bladder     Allergies   Allergen Reactions    Procardia Xl [Nifedipine] Other (comments)     weakness      Family History   Problem Relation Age of Onset    Other Mother      Fibromyalgia    Pacemaker Mother     Heart Disease Mother     Heart Failure Mother     COPD Mother     Heart Attack Father 61    Cancer Sister      Multiple Myeloma    Diabetes Brother     Obesity Brother     Pacemaker Brother     Heart Attack Brother      Bundle branch block    No Known Problems Son     Psychiatric Disorder Daughter      Multiple    negative for cardiac disease  Social History     Social History    Marital status:      Spouse name: N/A    Number of children: N/A    Years of education: N/A     Social History Main Topics    Smoking status: Never Smoker    Smokeless tobacco: Never Used    Alcohol use No    Drug use: No    Sexual activity: Yes     Partners: Male     Other Topics Concern    None     Social History Narrative     Current Outpatient Prescriptions   Medication Sig    busPIRone (BUSPAR) 5 mg tablet Take 1 Tab by mouth two (2) times a day.  furosemide (LASIX) 20 mg tablet Take 1 Tab by mouth daily.     benazepril (LOTENSIN) 40 mg tablet Take 40 mg by mouth daily.  pantoprazole (PROTONIX) 40 mg tablet Take 40 mg by mouth two (2) times a day. 1 tab 2x/ day 30 mins before breakfast and supper - stop omeprazole    HEPARIN SOD,PORCINE/0.9 % NACL (HEPARIN FLUSH IV) 3 mL by IntraVENous route every eight (8) hours. 10u/ml    ceFAZolin 1 g, ADDaptor 1 Device 1 g by IntraVENous route every eight (8) hours for 42 days. (Patient taking differently: 2 g by IntraVENous route every eight (8) hours.)    clopidogrel (PLAVIX) 75 mg tab Take 75 mg by mouth daily.  Lancets (MICROLET LANCET) misc Check blood sugar twice daily.  levothyroxine (SYNTHROID) 50 mcg tablet Take 1 Tab by mouth Daily (before breakfast).  sertraline (ZOLOFT) 100 mg tablet Take 1 Tab by mouth daily.  metFORMIN ER (GLUCOPHAGE XR) 750 mg tablet TAKE ONE TABLET BY MOUTH TWICE DAILY    pravastatin (PRAVACHOL) 20 mg tablet TAKE ONE TABLET BY MOUTH NIGHTLY    Cholecalciferol, Vitamin D3, (VITAMIN D3) 2,000 unit cap capsule Take 2,000 Units by mouth daily.  acetaminophen (TYLENOL) 500 mg tablet Take 500 mg by mouth every four (4) hours as needed for Pain.  DILT- mg XR capsule TAKE ONE CAPSULE BY MOUTH ONCE DAILY (Patient taking differently: TAKE TWO CAPSULES BY MOUTH ONCE DAILY)    ferrous sulfate (IRON) 325 mg (65 mg iron) tablet Take 325 mg by mouth every other day.  multivit-min-FA-lycopen-lutein (CENTRUM SILVER) 0.4-300-250 mg-mcg-mcg tab Take 1 Tab by mouth daily.  aspirin delayed-release 81 mg tablet Take 81 mg by mouth daily.  magnesium 250 mg tab Take 250 mg by mouth daily.  0.9 % SODIUM CHLORIDE (NORMAL SALINE FLUSH INJECTION) 10 mL by IntraVENous route every eight (8) hours.  L. acidoph & paracasei- S therm- Bifido (FRAN-Q/RISAQUAD) 8 billion cell cap cap Take 1 Cap by mouth daily.  (Patient taking differently: Take 1 Cap by mouth two (2) times a day.)    glucose blood VI test strips (CONTOUR NEXT STRIPS) strip Check blood sugar twice daily    benazepril (LOTENSIN) 20 mg tablet Take 1 Tab by mouth daily. (Patient taking differently: Take 40 mg by mouth daily.)     No current facility-administered medications for this visit. Vitals:    03/20/18 1508   BP: (!) 168/98   Pulse: 72   Resp: 18   SpO2: 95%   Weight: 185 lb 9.6 oz (84.2 kg)   Height: 5' 1\" (1.549 m)       I have reviewed the nurses notes, vitals, problem list, allergy list, medical history, family, social history and medications. Review of Symptoms:    General: Pt denies excessive weight gain or loss. Pt is able to conduct ADL's  HEENT: Denies blurred vision, headaches, epistaxis and difficulty swallowing. Respiratory: Denies shortness of breath, STRICKLAND, wheezing or stridor. Cardiovascular: Denies precordial pain, palpitations, edema or PND  Gastrointestinal: Denies poor appetite, indigestion, abdominal pain or blood in stool  Urinary: Denies dysuria, pyuria  Musculoskeletal: Denies pain or swelling from muscles or joints  Neurologic: Denies tremor, paresthesias, or sensory motor disturbance  Skin: Denies rash, itching or texture change. Psych: Denies depression      Physical Exam:      General: Well developed, in no acute distress. HEENT: Eyes - PERRL, no jvd  Heart:  Normal S1/S2 negative S3 or S4. Regular, no murmur, gallop or rub.   Respiratory: Clear bilaterally x 4, no wheezing or rales  Extremities:  No edema, normal cap refill, no cyanosis. Musculoskeletal: No clubbing  Neuro: A&Ox3, speech clear, gait stable. Skin: Skin color is normal. No rashes or lesions. Non diaphoretic  Vascular: 2+ pulses symmetric in all extremities    Cardiographics    Ekg: V paced.      Results for orders placed or performed during the hospital encounter of 03/05/18   EKG, 12 LEAD, INITIAL   Result Value Ref Range    Ventricular Rate 75 BPM    Atrial Rate 91 BPM    QRS Duration 176 ms    Q-T Interval 466 ms    QTC Calculation (Bezet) 520 ms    Calculated R Axis -79 degrees    Calculated T Axis 80 degrees    Diagnosis       Electronic ventricular pacemaker  When compared with ECG of 10-MAR-2018 05:21,  No significant change was found  No obvious pacer malfxn  No P waves seen   Confirmed by Toro Hewitt ([id number]) on 3/12/2018 4:02:15 PM           Lab Results   Component Value Date/Time    WBC 11.8 (H) 03/07/2018 05:20 AM    HGB 9.4 (L) 03/07/2018 05:20 AM    HCT 29.7 (L) 03/07/2018 05:20 AM    PLATELET 893 79/32/6433 05:20 AM    MCV 92.0 03/07/2018 05:20 AM      Lab Results   Component Value Date/Time    Sodium 138 03/11/2018 02:54 AM    Potassium 4.1 03/11/2018 02:54 AM    Chloride 105 03/11/2018 02:54 AM    CO2 24 03/11/2018 02:54 AM    Anion gap 9 03/11/2018 02:54 AM    Glucose 155 (H) 03/11/2018 02:54 AM    BUN 21 (H) 03/11/2018 02:54 AM    Creatinine 0.89 03/11/2018 02:54 AM    BUN/Creatinine ratio 24 (H) 03/11/2018 02:54 AM    GFR est AA >60 03/11/2018 02:54 AM    GFR est non-AA >60 03/11/2018 02:54 AM    Calcium 8.2 (L) 03/11/2018 02:54 AM    Bilirubin, total 1.4 (H) 03/05/2018 09:00 PM    AST (SGOT) 20 03/05/2018 09:00 PM    Alk. phosphatase 115 03/05/2018 09:00 PM    Protein, total 7.3 03/05/2018 09:00 PM    Albumin 3.2 (L) 03/05/2018 09:00 PM    Globulin 4.1 (H) 03/05/2018 09:00 PM    A-G Ratio 0.8 (L) 03/05/2018 09:00 PM    ALT (SGPT) 23 03/05/2018 09:00 PM      Lab Results   Component Value Date/Time    TSH 1.960 09/08/2017 09:47 AM        Assessment:     Assessment:        ICD-10-CM ICD-9-CM    1. Irregular heartbeat I49.9 427.9 AMB POC EKG ROUTINE W/ 12 LEADS, INTER & REP     Orders Placed This Encounter    AMB POC EKG ROUTINE W/ 12 LEADS, INTER & REP     Order Specific Question:   Reason for Exam:     Answer:   routine        Plan:     Ms Whitney Casanova is here s/p temp/perm pacemaker implanted via the neck and lead extraction/device removal from the left chest.  Her bps are high however her medications were adjusted today and she was started on Buspar for anxiety. Dressing removed from her left subclavian PM removal site. Steri strips are intact; dark ecchymosis lateral to incision. Left open to air. Her temp pacer at the right IJ site was redressed with tegaderm. She will cont on iv abx per ID and f/u in 2-4 weeks for reimplantation of pacemaker. Continue medical management for HTN and hyperlipidemia. Thank you for allowing me to participate in Demetrio Guajardo 's care. Amy Clive Mortimer, NP    Pt seen and examined. Agree with assessment and plan as documented above.     Quinton Jacobsen MD, Shila Man

## 2018-03-20 NOTE — LETTER
3/21/2018 9:36 AM 
 
Ms. Issa Holm [id number] Dear Issa Verma: 
 
Please find your most recent results below. Resulted Orders METABOLIC PANEL, BASIC Result Value Ref Range Glucose 109 (H) 65 - 99 mg/dL BUN 16 8 - 27 mg/dL Creatinine 0.88 0.57 - 1.00 mg/dL GFR est non-AA 62 >59 mL/min/1.73 GFR est AA 72 >59 mL/min/1.73  
 BUN/Creatinine ratio 18 12 - 28 Sodium 142 134 - 144 mmol/L Potassium 4.2 3.5 - 5.2 mmol/L Chloride 102 96 - 106 mmol/L  
 CO2 25 18 - 29 mmol/L Calcium 8.7 8.7 - 10.3 mg/dL Narrative Performed at:  [address]  [id number] : Carlee Jimenez MD, Phone:  [phone] CBC W/O DIFF Result Value Ref Range WBC 12.2 (H) 3.4 - 10.8 x10E3/uL  
 RBC 3.08 (L) 3.77 - 5.28 x10E6/uL HGB 8.7 (L) 11.1 - 15.9 g/dL HCT 27.9 (L) 34.0 - 46.6 % MCV 91 79 - 97 fL  
 MCH 28.2 26.6 - 33.0 pg  
 MCHC 31.2 (L) 31.5 - 35.7 g/dL  
 RDW 16.3 (H) 12.3 - 15.4 % PLATELET 416 975 - 956 x10E3/uL Narrative Performed at:  [address]  [id number] : Carlee Jimenez MD, Phone:  [phone] HEMOGLOBIN A1C WITH EAG Result Value Ref Range Hemoglobin A1c 6.6 (H) 4.8 - 5.6 % Comment:  
            Pre-diabetes: 5.7 - 6.4 Diabetes: >6.4 Glycemic control for adults with diabetes: <7.0 Estimated average glucose 143 mg/dL Narrative Performed at:  [address]  [id number] : Carlee Jimenez MD, Phone:  [phone] RECOMMENDATIONS: 
Infection markers improving Mild anemia --related to recent illness and hospitalization, will monitor Please call me if you have any questions: [phone] Sincerely, 
 
 
Nimesh Daniels MD

## 2018-03-21 VITALS
SYSTOLIC BLOOD PRESSURE: 160 MMHG | OXYGEN SATURATION: 97 % | TEMPERATURE: 97.7 F | DIASTOLIC BLOOD PRESSURE: 80 MMHG | HEART RATE: 75 BPM

## 2018-03-21 LAB
BUN SERPL-MCNC: 16 MG/DL (ref 8–27)
BUN/CREAT SERPL: 18 (ref 12–28)
CALCIUM SERPL-MCNC: 8.7 MG/DL (ref 8.7–10.3)
CHLORIDE SERPL-SCNC: 102 MMOL/L (ref 96–106)
CO2 SERPL-SCNC: 25 MMOL/L (ref 18–29)
CREAT SERPL-MCNC: 0.88 MG/DL (ref 0.57–1)
ERYTHROCYTE [DISTWIDTH] IN BLOOD BY AUTOMATED COUNT: 16.3 % (ref 12.3–15.4)
EST. AVERAGE GLUCOSE BLD GHB EST-MCNC: 143 MG/DL
GFR SERPLBLD CREATININE-BSD FMLA CKD-EPI: 62 ML/MIN/1.73
GFR SERPLBLD CREATININE-BSD FMLA CKD-EPI: 72 ML/MIN/1.73
GLUCOSE SERPL-MCNC: 109 MG/DL (ref 65–99)
HBA1C MFR BLD: 6.6 % (ref 4.8–5.6)
HCT VFR BLD AUTO: 27.9 % (ref 34–46.6)
HGB BLD-MCNC: 8.7 G/DL (ref 11.1–15.9)
MCH RBC QN AUTO: 28.2 PG (ref 26.6–33)
MCHC RBC AUTO-ENTMCNC: 31.2 G/DL (ref 31.5–35.7)
MCV RBC AUTO: 91 FL (ref 79–97)
PLATELET # BLD AUTO: 370 X10E3/UL (ref 150–379)
POTASSIUM SERPL-SCNC: 4.2 MMOL/L (ref 3.5–5.2)
RBC # BLD AUTO: 3.08 X10E6/UL (ref 3.77–5.28)
SODIUM SERPL-SCNC: 142 MMOL/L (ref 134–144)
WBC # BLD AUTO: 12.2 X10E3/UL (ref 3.4–10.8)

## 2018-03-21 PROCEDURE — 3331090002 HH PPS REVENUE DEBIT

## 2018-03-21 PROCEDURE — 3331090001 HH PPS REVENUE CREDIT

## 2018-03-21 NOTE — PROGRESS NOTES
Let her know infection markers improving  Mild anemia --related to recent illness and hospitalization, will monitor

## 2018-03-22 ENCOUNTER — HOME CARE VISIT (OUTPATIENT)
Dept: SCHEDULING | Facility: HOME HEALTH | Age: 83
End: 2018-03-22
Payer: MEDICARE

## 2018-03-22 PROCEDURE — 3331090002 HH PPS REVENUE DEBIT

## 2018-03-22 PROCEDURE — G0300 HHS/HOSPICE OF LPN EA 15 MIN: HCPCS

## 2018-03-22 PROCEDURE — 3331090001 HH PPS REVENUE CREDIT

## 2018-03-23 ENCOUNTER — HOME CARE VISIT (OUTPATIENT)
Dept: SCHEDULING | Facility: HOME HEALTH | Age: 83
End: 2018-03-23
Payer: MEDICARE

## 2018-03-23 VITALS
SYSTOLIC BLOOD PRESSURE: 138 MMHG | HEART RATE: 72 BPM | TEMPERATURE: 97.5 F | OXYGEN SATURATION: 97 % | DIASTOLIC BLOOD PRESSURE: 82 MMHG

## 2018-03-23 DIAGNOSIS — E11.9 TYPE 2 DIABETES MELLITUS WITHOUT COMPLICATION, WITHOUT LONG-TERM CURRENT USE OF INSULIN (HCC): ICD-10-CM

## 2018-03-23 PROCEDURE — G0151 HHCP-SERV OF PT,EA 15 MIN: HCPCS

## 2018-03-23 PROCEDURE — 3331090001 HH PPS REVENUE CREDIT

## 2018-03-23 PROCEDURE — 3331090002 HH PPS REVENUE DEBIT

## 2018-03-23 RX ORDER — PRAVASTATIN SODIUM 20 MG/1
TABLET ORAL
Qty: 90 TAB | Refills: 1 | Status: SHIPPED | OUTPATIENT
Start: 2018-03-23 | End: 2018-10-05 | Stop reason: SDUPTHER

## 2018-03-23 NOTE — TELEPHONE ENCOUNTER
PCP: Joselo Nunez MD    Last appt: 3/20/2018  Future Appointments  Date Time Provider [place]   3/26/2018 To Be Determined Skippy Punt, PT [address] [facility]   3/26/2018 12:00 PM [address] [facility]   3/27/2018 To Be Determined Jerzy Ali, LPN [address] [facility]   3/28/2018 To Be Determined Skippy Punt, PT [facility]   3/29/2018 To Be Determined Jerzy Ali, LPN [facility]   3/29/2018 4:00 PM Yazan Castañeda DO [address] [place]   4/3/2018 To Be Determined Jerzy Ali, LPN [facility]   4/5/2018 To Be Determined Jerzy Ali, LPN [facility]   4/10/2018 To Be Determined Jerzy Ali, LPN [facility]   4/10/2018 2:30 PM Joselo Nunez MD [facility] SCH[facility]   4/12/2018 To Be Determined Jerzy Ali, LPN [facility]   4/12/2018 10:45 AM Jose Alcazar MD [facility] SCHED   4/12/2018 10:45 AM PACEMAKER, RC[facility] SCHED   4/17/2018 To Be Determined Jerzy Ali, LPN [facility]   4/19/2018 To Be Determined Jerzy Ali, LPN [facility]   4/24/2018 To Be Determined Jerzy Ali, LPN [facility]   4/26/2018 To Be Determined Jerzy Ali, LPN [facility]   4/30/2018 10:40 AM Mala Mcwilliams MD [address] [place] [address]   5/1/2018 To Be Determined Jerzy Ali, LPN [facility]   5/3/2018 To Be Determined Nellie Bills, ISELA [facility]       Requested Prescriptions     Pending Prescriptions Disp Refills    pravastatin (PRAVACHOL) 20 mg tablet 90 Tab 0    glucose blood VI test strips (CONTOUR NEXT STRIPS) strip 100 Strip 1     Sig: Check blood sugar twice daily

## 2018-03-23 NOTE — TELEPHONE ENCOUNTER
Patient request refills on Rx\"Pravastatin 90-day\"and test strips. [address] [phone]         Keiko Whitley.  Best contact [phone]     Message received & copied from [facility]

## 2018-03-24 PROCEDURE — 3331090002 HH PPS REVENUE DEBIT

## 2018-03-24 PROCEDURE — 3331090001 HH PPS REVENUE CREDIT

## 2018-03-25 PROCEDURE — 3331090002 HH PPS REVENUE DEBIT

## 2018-03-25 PROCEDURE — 3331090001 HH PPS REVENUE CREDIT

## 2018-03-26 ENCOUNTER — HOSPITAL ENCOUNTER (OUTPATIENT)
Dept: ULTRASOUND IMAGING | Age: 83
Discharge: HOME | End: 2018-03-26
Attending: INTERNAL MEDICINE
Payer: MEDICARE

## 2018-03-26 ENCOUNTER — TELEPHONE (OUTPATIENT)
Dept: INTERNAL MEDICINE CLINIC | Age: 83
End: 2018-03-26

## 2018-03-26 ENCOUNTER — HOME CARE VISIT (OUTPATIENT)
Dept: SCHEDULING | Facility: HOME HEALTH | Age: 83
End: 2018-03-26
Payer: MEDICARE

## 2018-03-26 VITALS
HEART RATE: 75 BPM | TEMPERATURE: 97.9 F | SYSTOLIC BLOOD PRESSURE: 130 MMHG | OXYGEN SATURATION: 98 % | DIASTOLIC BLOOD PRESSURE: 78 MMHG

## 2018-03-26 DIAGNOSIS — R60.0 LOCALIZED EDEMA: ICD-10-CM

## 2018-03-26 PROCEDURE — 93971 EXTREMITY STUDY: CPT

## 2018-03-26 PROCEDURE — G0300 HHS/HOSPICE OF LPN EA 15 MIN: HCPCS

## 2018-03-26 PROCEDURE — 3331090002 HH PPS REVENUE DEBIT

## 2018-03-26 PROCEDURE — 3331090001 HH PPS REVENUE CREDIT

## 2018-03-26 NOTE — TELEPHONE ENCOUNTER
#[phone] pt states she can't get her dm supplies until Dr. Matthieu Ralgand fills out the [facility] form that she should have and sent back to [facility]. Can this be done today so she can get those medications/supplies     Please call pt only if there is a problem.

## 2018-03-27 VITALS
SYSTOLIC BLOOD PRESSURE: 150 MMHG | HEART RATE: 75 BPM | OXYGEN SATURATION: 95 % | TEMPERATURE: 97.4 F | DIASTOLIC BLOOD PRESSURE: 76 MMHG

## 2018-03-27 PROCEDURE — 3331090002 HH PPS REVENUE DEBIT

## 2018-03-27 PROCEDURE — 3331090001 HH PPS REVENUE CREDIT

## 2018-03-28 PROCEDURE — 3331090001 HH PPS REVENUE CREDIT

## 2018-03-28 PROCEDURE — 3331090002 HH PPS REVENUE DEBIT

## 2018-03-29 ENCOUNTER — OFFICE VISIT (OUTPATIENT)
Dept: FAMILY MEDICINE CLINIC | Age: 83
End: 2018-03-29

## 2018-03-29 ENCOUNTER — HOME CARE VISIT (OUTPATIENT)
Dept: SCHEDULING | Facility: HOME HEALTH | Age: 83
End: 2018-03-29
Payer: MEDICARE

## 2018-03-29 VITALS
OXYGEN SATURATION: 98 % | SYSTOLIC BLOOD PRESSURE: 130 MMHG | DIASTOLIC BLOOD PRESSURE: 82 MMHG | TEMPERATURE: 98.7 F | HEART RATE: 73 BPM

## 2018-03-29 VITALS
HEIGHT: 61 IN | OXYGEN SATURATION: 94 % | DIASTOLIC BLOOD PRESSURE: 78 MMHG | HEART RATE: 74 BPM | TEMPERATURE: 97.1 F | SYSTOLIC BLOOD PRESSURE: 142 MMHG

## 2018-03-29 DIAGNOSIS — R78.81 BACTEREMIA: Primary | ICD-10-CM

## 2018-03-29 DIAGNOSIS — T82.7XXD INFECTION AND INFLAMMATORY REACTION DUE TO CARDIAC DEVICE, IMPLANT, AND GRAFT, SUBSEQUENT ENCOUNTER: ICD-10-CM

## 2018-03-29 PROCEDURE — 3331090002 HH PPS REVENUE DEBIT

## 2018-03-29 PROCEDURE — 3331090001 HH PPS REVENUE CREDIT

## 2018-03-29 PROCEDURE — G0300 HHS/HOSPICE OF LPN EA 15 MIN: HCPCS

## 2018-03-30 PROCEDURE — 3331090001 HH PPS REVENUE CREDIT

## 2018-03-30 PROCEDURE — 3331090002 HH PPS REVENUE DEBIT

## 2018-03-31 PROCEDURE — 3331090002 HH PPS REVENUE DEBIT

## 2018-03-31 PROCEDURE — 3331090001 HH PPS REVENUE CREDIT

## 2018-04-01 PROCEDURE — 3331090001 HH PPS REVENUE CREDIT

## 2018-04-01 PROCEDURE — 3331090002 HH PPS REVENUE DEBIT

## 2018-04-02 ENCOUNTER — HOME CARE VISIT (OUTPATIENT)
Dept: SCHEDULING | Facility: HOME HEALTH | Age: 83
End: 2018-04-02
Payer: MEDICARE

## 2018-04-02 PROCEDURE — 3331090001 HH PPS REVENUE CREDIT

## 2018-04-02 PROCEDURE — 3331090002 HH PPS REVENUE DEBIT

## 2018-04-02 PROCEDURE — G0300 HHS/HOSPICE OF LPN EA 15 MIN: HCPCS

## 2018-04-03 VITALS
WEIGHT: 173.5 LBS | SYSTOLIC BLOOD PRESSURE: 120 MMHG | HEART RATE: 71 BPM | OXYGEN SATURATION: 97 % | DIASTOLIC BLOOD PRESSURE: 62 MMHG | BODY MASS INDEX: 32.78 KG/M2 | RESPIRATION RATE: 12 BRPM | TEMPERATURE: 98.7 F

## 2018-04-03 PROCEDURE — 3331090001 HH PPS REVENUE CREDIT

## 2018-04-03 PROCEDURE — 3331090002 HH PPS REVENUE DEBIT

## 2018-04-04 PROCEDURE — 3331090002 HH PPS REVENUE DEBIT

## 2018-04-04 PROCEDURE — 3331090001 HH PPS REVENUE CREDIT

## 2018-04-05 ENCOUNTER — HOME CARE VISIT (OUTPATIENT)
Dept: SCHEDULING | Facility: HOME HEALTH | Age: 83
End: 2018-04-05
Payer: MEDICARE

## 2018-04-05 ENCOUNTER — TELEPHONE (OUTPATIENT)
Dept: INTERNAL MEDICINE CLINIC | Age: 83
End: 2018-04-05

## 2018-04-05 PROCEDURE — 3331090001 HH PPS REVENUE CREDIT

## 2018-04-05 PROCEDURE — G0300 HHS/HOSPICE OF LPN EA 15 MIN: HCPCS

## 2018-04-05 PROCEDURE — 3331090002 HH PPS REVENUE DEBIT

## 2018-04-05 NOTE — TELEPHONE ENCOUNTER
Dana//Jaskaran Shaver  states she needs a call back to discuss patient's fall at visit today, 4/5/18 & also to discuss patient's Lasix. Please call.  Thank you

## 2018-04-06 ENCOUNTER — TELEPHONE (OUTPATIENT)
Dept: CARDIOLOGY CLINIC | Age: 83
End: 2018-04-06

## 2018-04-06 ENCOUNTER — TELEPHONE (OUTPATIENT)
Dept: INTERNAL MEDICINE CLINIC | Age: 83
End: 2018-04-06

## 2018-04-06 PROCEDURE — 3331090002 HH PPS REVENUE DEBIT

## 2018-04-06 PROCEDURE — 3331090001 HH PPS REVENUE CREDIT

## 2018-04-06 NOTE — TELEPHONE ENCOUNTER
Called, spoke to [facility]. [place] [facility] states pt fell w/ no injury. Pt ran out of lasix and may need refill-if Dr. Nessa Alarcon agrees. [facility] [facility] informed LPN RR will contact pt if Dr. Nessa Alarcon orders refill of lasix. Pt NOV 4/10/18. Jalil Linares states she will have pt watch weight.

## 2018-04-06 NOTE — TELEPHONE ENCOUNTER
Stop lasix, was only for 2 weeks, keep legs elevated, compression hose for swelling    Will need repeat bmp    As for anxiety, buspar 5mg bid was started lov    Can increase to 10mg bid to improve anxiety
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
## 2018-04-06 NOTE — TELEPHONE ENCOUNTER
Patient's  called and reports that patient \" took a bad fall' and he is concerned about the external pacemaker. Mr. Chidi Suazo notified that Dr. Mitch Berg is not currently in the office and advised him to take Mrs. Chidi Suazo to the ED. Mr. Chidi Suazo states that Dr. Mitch Berg told him to \" never go to the Ed\" and declines advice to go to the ED. Mr. Chidi Suazo transferred to [facility] to schedule appointment at a date/time that Dr. Mitch Berg is available.

## 2018-04-07 ENCOUNTER — HOSPITAL ENCOUNTER (INPATIENT)
Age: 83
LOS: 5 days | Discharge: HOME HEALTH | DRG: 242 | End: 2018-04-12
Attending: EMERGENCY MEDICINE | Admitting: INTERNAL MEDICINE
Payer: MEDICARE

## 2018-04-07 ENCOUNTER — APPOINTMENT (OUTPATIENT)
Dept: GENERAL RADIOLOGY | Age: 83
DRG: 242 | End: 2018-04-07
Attending: EMERGENCY MEDICINE
Payer: MEDICARE

## 2018-04-07 DIAGNOSIS — T82.7XXA INFECTION OF PACEMAKER POCKET, INITIAL ENCOUNTER (HCC): ICD-10-CM

## 2018-04-07 DIAGNOSIS — I50.9 ACUTE CONGESTIVE HEART FAILURE, UNSPECIFIED HEART FAILURE TYPE (HCC): Primary | ICD-10-CM

## 2018-04-07 PROBLEM — K92.2 GI BLEED: Status: ACTIVE | Noted: 2018-04-07

## 2018-04-07 PROBLEM — I50.33 ACUTE ON CHRONIC DIASTOLIC (CONGESTIVE) HEART FAILURE (HCC): Status: ACTIVE | Noted: 2018-04-07

## 2018-04-07 LAB
ALBUMIN SERPL-MCNC: 3.2 G/DL (ref 3.5–5)
ALBUMIN/GLOB SERPL: 0.7 {RATIO} (ref 1.1–2.2)
ALP SERPL-CCNC: 122 U/L (ref 45–117)
ALT SERPL-CCNC: <6 U/L (ref 12–78)
ANION GAP SERPL CALC-SCNC: 11 MMOL/L (ref 5–15)
APPEARANCE UR: CLEAR
AST SERPL-CCNC: 17 U/L (ref 15–37)
ATRIAL RATE: 75 BPM
BACTERIA URNS QL MICRO: NEGATIVE /HPF
BASOPHILS # BLD: 0 K/UL (ref 0–0.1)
BASOPHILS NFR BLD: 0 % (ref 0–1)
BILIRUB SERPL-MCNC: 1.3 MG/DL (ref 0.2–1)
BILIRUB UR QL: NEGATIVE
BNP SERPL-MCNC: ABNORMAL PG/ML (ref 0–450)
BUN SERPL-MCNC: 20 MG/DL (ref 6–20)
BUN/CREAT SERPL: 18 (ref 12–20)
CALCIUM SERPL-MCNC: 8.9 MG/DL (ref 8.5–10.1)
CALCULATED R AXIS, ECG10: -78 DEGREES
CALCULATED T AXIS, ECG11: 97 DEGREES
CHLORIDE SERPL-SCNC: 105 MMOL/L (ref 97–108)
CO2 SERPL-SCNC: 23 MMOL/L (ref 21–32)
COLOR UR: ABNORMAL
CREAT SERPL-MCNC: 1.13 MG/DL (ref 0.55–1.02)
DIAGNOSIS, 93000: NORMAL
DIFFERENTIAL METHOD BLD: ABNORMAL
EOSINOPHIL # BLD: 0.1 K/UL (ref 0–0.4)
EOSINOPHIL NFR BLD: 1 % (ref 0–7)
EPITH CASTS URNS QL MICRO: ABNORMAL /LPF
ERYTHROCYTE [DISTWIDTH] IN BLOOD BY AUTOMATED COUNT: 15.9 % (ref 11.5–14.5)
GLOBULIN SER CALC-MCNC: 4.4 G/DL (ref 2–4)
GLUCOSE BLD STRIP.AUTO-MCNC: 130 MG/DL (ref 65–100)
GLUCOSE SERPL-MCNC: 169 MG/DL (ref 65–100)
GLUCOSE UR STRIP.AUTO-MCNC: NEGATIVE MG/DL
HCT VFR BLD AUTO: 32.7 % (ref 35–47)
HGB BLD-MCNC: 10.3 G/DL (ref 11.5–16)
HGB UR QL STRIP: ABNORMAL
IMM GRANULOCYTES # BLD: 0.1 K/UL (ref 0–0.04)
IMM GRANULOCYTES NFR BLD AUTO: 1 % (ref 0–0.5)
KETONES UR QL STRIP.AUTO: NEGATIVE MG/DL
LEUKOCYTE ESTERASE UR QL STRIP.AUTO: NEGATIVE
LYMPHOCYTES # BLD: 1.1 K/UL (ref 0.8–3.5)
LYMPHOCYTES NFR BLD: 10 % (ref 12–49)
MCH RBC QN AUTO: 28.6 PG (ref 26–34)
MCHC RBC AUTO-ENTMCNC: 31.5 G/DL (ref 30–36.5)
MCV RBC AUTO: 90.8 FL (ref 80–99)
MONOCYTES # BLD: 1.2 K/UL (ref 0–1)
MONOCYTES NFR BLD: 10 % (ref 5–13)
NEUTS SEG # BLD: 9.4 K/UL (ref 1.8–8)
NEUTS SEG NFR BLD: 79 % (ref 32–75)
NITRITE UR QL STRIP.AUTO: NEGATIVE
NRBC # BLD: 0 K/UL (ref 0–0.01)
NRBC BLD-RTO: 0 PER 100 WBC
PH UR STRIP: 6.5 [PH] (ref 5–8)
PLATELET # BLD AUTO: 273 K/UL (ref 150–400)
PMV BLD AUTO: 10.8 FL (ref 8.9–12.9)
POTASSIUM SERPL-SCNC: 3.6 MMOL/L (ref 3.5–5.1)
PROT SERPL-MCNC: 7.6 G/DL (ref 6.4–8.2)
PROT UR STRIP-MCNC: 100 MG/DL
Q-T INTERVAL, ECG07: 498 MS
QRS DURATION, ECG06: 170 MS
QTC CALCULATION (BEZET), ECG08: 556 MS
RBC # BLD AUTO: 3.6 M/UL (ref 3.8–5.2)
RBC #/AREA URNS HPF: ABNORMAL /HPF (ref 0–5)
SERVICE CMNT-IMP: ABNORMAL
SODIUM SERPL-SCNC: 139 MMOL/L (ref 136–145)
SP GR UR REFRACTOMETRY: 1.01 (ref 1–1.03)
TROPONIN I SERPL-MCNC: <0.04 NG/ML
UA: UC IF INDICATED,UAUC: ABNORMAL
UROBILINOGEN UR QL STRIP.AUTO: 0.2 EU/DL (ref 0.2–1)
VENTRICULAR RATE, ECG03: 75 BPM
WBC # BLD AUTO: 12 K/UL (ref 3.6–11)
WBC URNS QL MICRO: ABNORMAL /HPF (ref 0–4)
YEAST URNS QL MICRO: PRESENT

## 2018-04-07 PROCEDURE — 74011000250 HC RX REV CODE- 250: Performed by: EMERGENCY MEDICINE

## 2018-04-07 PROCEDURE — 3331090001 HH PPS REVENUE CREDIT

## 2018-04-07 PROCEDURE — 83880 ASSAY OF NATRIURETIC PEPTIDE: CPT | Performed by: EMERGENCY MEDICINE

## 2018-04-07 PROCEDURE — 65660000000 HC RM CCU STEPDOWN

## 2018-04-07 PROCEDURE — 71046 X-RAY EXAM CHEST 2 VIEWS: CPT

## 2018-04-07 PROCEDURE — 96374 THER/PROPH/DIAG INJ IV PUSH: CPT

## 2018-04-07 PROCEDURE — 96375 TX/PRO/DX INJ NEW DRUG ADDON: CPT

## 2018-04-07 PROCEDURE — 81001 URINALYSIS AUTO W/SCOPE: CPT | Performed by: EMERGENCY MEDICINE

## 2018-04-07 PROCEDURE — 93005 ELECTROCARDIOGRAM TRACING: CPT

## 2018-04-07 PROCEDURE — 94761 N-INVAS EAR/PLS OXIMETRY MLT: CPT

## 2018-04-07 PROCEDURE — 80053 COMPREHEN METABOLIC PANEL: CPT | Performed by: EMERGENCY MEDICINE

## 2018-04-07 PROCEDURE — 84484 ASSAY OF TROPONIN QUANT: CPT | Performed by: EMERGENCY MEDICINE

## 2018-04-07 PROCEDURE — 94640 AIRWAY INHALATION TREATMENT: CPT

## 2018-04-07 PROCEDURE — 82962 GLUCOSE BLOOD TEST: CPT

## 2018-04-07 PROCEDURE — 3331090002 HH PPS REVENUE DEBIT

## 2018-04-07 PROCEDURE — 74011250636 HC RX REV CODE- 250/636: Performed by: EMERGENCY MEDICINE

## 2018-04-07 PROCEDURE — 36415 COLL VENOUS BLD VENIPUNCTURE: CPT | Performed by: EMERGENCY MEDICINE

## 2018-04-07 PROCEDURE — 74011250637 HC RX REV CODE- 250/637: Performed by: INTERNAL MEDICINE

## 2018-04-07 PROCEDURE — 85025 COMPLETE CBC W/AUTO DIFF WBC: CPT | Performed by: EMERGENCY MEDICINE

## 2018-04-07 PROCEDURE — 77030029684 HC NEB SM VOL KT MONA -A

## 2018-04-07 PROCEDURE — 99285 EMERGENCY DEPT VISIT HI MDM: CPT

## 2018-04-07 RX ORDER — PRAVASTATIN SODIUM 10 MG/1
20 TABLET ORAL
Status: DISCONTINUED | OUTPATIENT
Start: 2018-04-07 | End: 2018-04-12 | Stop reason: HOSPADM

## 2018-04-07 RX ORDER — SODIUM CHLORIDE 9 MG/ML
100 INJECTION, SOLUTION INTRAVENOUS CONTINUOUS
Status: DISCONTINUED | OUTPATIENT
Start: 2018-04-07 | End: 2018-04-07

## 2018-04-07 RX ORDER — LORAZEPAM 2 MG/ML
0.5 INJECTION INTRAMUSCULAR
Status: COMPLETED | OUTPATIENT
Start: 2018-04-07 | End: 2018-04-07

## 2018-04-07 RX ORDER — HYDRALAZINE HYDROCHLORIDE 20 MG/ML
20 INJECTION INTRAMUSCULAR; INTRAVENOUS
Status: DISCONTINUED | OUTPATIENT
Start: 2018-04-07 | End: 2018-04-12 | Stop reason: HOSPADM

## 2018-04-07 RX ORDER — ONDANSETRON 2 MG/ML
4 INJECTION INTRAMUSCULAR; INTRAVENOUS
Status: DISCONTINUED | OUTPATIENT
Start: 2018-04-07 | End: 2018-04-12 | Stop reason: HOSPADM

## 2018-04-07 RX ORDER — SODIUM CHLORIDE 0.9 % (FLUSH) 0.9 %
5-10 SYRINGE (ML) INJECTION EVERY 8 HOURS
Status: DISCONTINUED | OUTPATIENT
Start: 2018-04-07 | End: 2018-04-08 | Stop reason: SDUPTHER

## 2018-04-07 RX ORDER — MAGNESIUM SULFATE 100 %
4 CRYSTALS MISCELLANEOUS AS NEEDED
Status: DISCONTINUED | OUTPATIENT
Start: 2018-04-07 | End: 2018-04-12 | Stop reason: HOSPADM

## 2018-04-07 RX ORDER — IPRATROPIUM BROMIDE AND ALBUTEROL SULFATE 2.5; .5 MG/3ML; MG/3ML
3 SOLUTION RESPIRATORY (INHALATION) ONCE
Status: COMPLETED | OUTPATIENT
Start: 2018-04-07 | End: 2018-04-07

## 2018-04-07 RX ORDER — SERTRALINE HYDROCHLORIDE 50 MG/1
100 TABLET, FILM COATED ORAL DAILY
Status: DISCONTINUED | OUTPATIENT
Start: 2018-04-08 | End: 2018-04-12 | Stop reason: HOSPADM

## 2018-04-07 RX ORDER — ONDANSETRON 2 MG/ML
4 INJECTION INTRAMUSCULAR; INTRAVENOUS
Status: DISCONTINUED | OUTPATIENT
Start: 2018-04-07 | End: 2018-04-07 | Stop reason: SDUPTHER

## 2018-04-07 RX ORDER — FUROSEMIDE 10 MG/ML
40 INJECTION INTRAMUSCULAR; INTRAVENOUS 2 TIMES DAILY
Status: DISCONTINUED | OUTPATIENT
Start: 2018-04-08 | End: 2018-04-11

## 2018-04-07 RX ORDER — DEXTROSE 50 % IN WATER (D50W) INTRAVENOUS SYRINGE
12.5-25 AS NEEDED
Status: DISCONTINUED | OUTPATIENT
Start: 2018-04-07 | End: 2018-04-12 | Stop reason: HOSPADM

## 2018-04-07 RX ORDER — CLOPIDOGREL BISULFATE 75 MG/1
75 TABLET ORAL DAILY
Status: DISCONTINUED | OUTPATIENT
Start: 2018-04-08 | End: 2018-04-12 | Stop reason: HOSPADM

## 2018-04-07 RX ORDER — BUSPIRONE HYDROCHLORIDE 5 MG/1
5 TABLET ORAL 2 TIMES DAILY
Status: DISCONTINUED | OUTPATIENT
Start: 2018-04-07 | End: 2018-04-12 | Stop reason: HOSPADM

## 2018-04-07 RX ORDER — INSULIN LISPRO 100 [IU]/ML
INJECTION, SOLUTION INTRAVENOUS; SUBCUTANEOUS
Status: DISCONTINUED | OUTPATIENT
Start: 2018-04-07 | End: 2018-04-12 | Stop reason: HOSPADM

## 2018-04-07 RX ORDER — NALOXONE HYDROCHLORIDE 0.4 MG/ML
0.4 INJECTION, SOLUTION INTRAMUSCULAR; INTRAVENOUS; SUBCUTANEOUS AS NEEDED
Status: DISCONTINUED | OUTPATIENT
Start: 2018-04-07 | End: 2018-04-07 | Stop reason: SDUPTHER

## 2018-04-07 RX ORDER — PANTOPRAZOLE SODIUM 40 MG/1
40 TABLET, DELAYED RELEASE ORAL
Status: DISCONTINUED | OUTPATIENT
Start: 2018-04-08 | End: 2018-04-12 | Stop reason: HOSPADM

## 2018-04-07 RX ORDER — FUROSEMIDE 10 MG/ML
80 INJECTION INTRAMUSCULAR; INTRAVENOUS
Status: COMPLETED | OUTPATIENT
Start: 2018-04-07 | End: 2018-04-07

## 2018-04-07 RX ORDER — DILTIAZEM HYDROCHLORIDE 240 MG/1
240 CAPSULE, COATED, EXTENDED RELEASE ORAL DAILY
Status: DISCONTINUED | OUTPATIENT
Start: 2018-04-08 | End: 2018-04-11

## 2018-04-07 RX ORDER — SODIUM CHLORIDE 0.9 % (FLUSH) 0.9 %
5-10 SYRINGE (ML) INJECTION EVERY 8 HOURS
Status: DISCONTINUED | OUTPATIENT
Start: 2018-04-07 | End: 2018-04-12 | Stop reason: HOSPADM

## 2018-04-07 RX ORDER — ENOXAPARIN SODIUM 100 MG/ML
40 INJECTION SUBCUTANEOUS EVERY 24 HOURS
Status: DISCONTINUED | OUTPATIENT
Start: 2018-04-08 | End: 2018-04-10

## 2018-04-07 RX ORDER — SODIUM CHLORIDE 0.9 % (FLUSH) 0.9 %
5-10 SYRINGE (ML) INJECTION AS NEEDED
Status: DISCONTINUED | OUTPATIENT
Start: 2018-04-07 | End: 2018-04-12 | Stop reason: HOSPADM

## 2018-04-07 RX ORDER — OXYCODONE AND ACETAMINOPHEN 5; 325 MG/1; MG/1
1 TABLET ORAL
Status: DISCONTINUED | OUTPATIENT
Start: 2018-04-07 | End: 2018-04-12 | Stop reason: HOSPADM

## 2018-04-07 RX ORDER — ASPIRIN 81 MG/1
81 TABLET ORAL DAILY
Status: DISCONTINUED | OUTPATIENT
Start: 2018-04-08 | End: 2018-04-12 | Stop reason: HOSPADM

## 2018-04-07 RX ORDER — FACIAL-BODY WIPES
10 EACH TOPICAL DAILY PRN
Status: DISCONTINUED | OUTPATIENT
Start: 2018-04-07 | End: 2018-04-07 | Stop reason: SDUPTHER

## 2018-04-07 RX ORDER — BENAZEPRIL HYDROCHLORIDE 10 MG/1
40 TABLET ORAL DAILY
Status: DISCONTINUED | OUTPATIENT
Start: 2018-04-08 | End: 2018-04-08

## 2018-04-07 RX ORDER — NALOXONE HYDROCHLORIDE 0.4 MG/ML
0.4 INJECTION, SOLUTION INTRAMUSCULAR; INTRAVENOUS; SUBCUTANEOUS AS NEEDED
Status: DISCONTINUED | OUTPATIENT
Start: 2018-04-07 | End: 2018-04-12 | Stop reason: HOSPADM

## 2018-04-07 RX ORDER — LEVOTHYROXINE SODIUM 25 UG/1
50 TABLET ORAL
Status: DISCONTINUED | OUTPATIENT
Start: 2018-04-08 | End: 2018-04-12 | Stop reason: HOSPADM

## 2018-04-07 RX ORDER — SODIUM CHLORIDE 0.9 % (FLUSH) 0.9 %
5-10 SYRINGE (ML) INJECTION AS NEEDED
Status: DISCONTINUED | OUTPATIENT
Start: 2018-04-07 | End: 2018-04-08 | Stop reason: SDUPTHER

## 2018-04-07 RX ORDER — ACETAMINOPHEN 325 MG/1
650 TABLET ORAL
Status: DISCONTINUED | OUTPATIENT
Start: 2018-04-07 | End: 2018-04-07 | Stop reason: SDUPTHER

## 2018-04-07 RX ORDER — ACETAMINOPHEN 325 MG/1
650 TABLET ORAL
Status: DISCONTINUED | OUTPATIENT
Start: 2018-04-07 | End: 2018-04-12 | Stop reason: HOSPADM

## 2018-04-07 RX ORDER — FACIAL-BODY WIPES
10 EACH TOPICAL DAILY PRN
Status: DISCONTINUED | OUTPATIENT
Start: 2018-04-07 | End: 2018-04-12 | Stop reason: HOSPADM

## 2018-04-07 RX ADMIN — BUSPIRONE HYDROCHLORIDE 5 MG: 5 TABLET ORAL at 19:32

## 2018-04-07 RX ADMIN — LORAZEPAM 0.5 MG: 2 INJECTION INTRAMUSCULAR; INTRAVENOUS at 13:06

## 2018-04-07 RX ADMIN — PRAVASTATIN SODIUM 20 MG: 10 TABLET ORAL at 21:31

## 2018-04-07 RX ADMIN — Medication 10 ML: at 21:31

## 2018-04-07 RX ADMIN — IPRATROPIUM BROMIDE AND ALBUTEROL SULFATE 3 ML: .5; 3 SOLUTION RESPIRATORY (INHALATION) at 13:38

## 2018-04-07 RX ADMIN — FUROSEMIDE 80 MG: 10 INJECTION, SOLUTION INTRAMUSCULAR; INTRAVENOUS at 15:04

## 2018-04-07 NOTE — ED PROVIDER NOTES
EMERGENCY DEPARTMENT HISTORY AND PHYSICAL EXAM      Date: 4/7/2018  Patient Name: Donnie Mcgill    History of Presenting Illness     Chief Complaint   Patient presents with    Chest Pain     mid chest pain radiating down L arm x 3 days    Shortness of Breath     x 3 days, pt reports PICC line to R arm for IV antibiotics for sepsis from pacemaker       History Provided By: Patient    HPI: Donnie Mcgill, [de-identified] y.o. female with PMHx significant for sarcoidosis, DM, HTN, anemia, HLD, liver disease, and thyroid disease, presents ambulatory to the ED with cc of SOB x 4 days. Pt endorses associated mid-sternal chest pain that radiates down her left arm, dry cough, and nausea. Pt explains that she has been having worsening SOB over the past few days which has progressed into chest pain and a dry cough. Pt notes that her O2 stats were in the low 90s at home which prompted her to use a family member's O2 tank. Pt reports that she is currently being treated for sepsis secondary to an infected pacemaker which required her to have a PICC line placed. Pt states that she is currently being treated with cefazolin and fluids for the treatment of the infection. Pt reports that she was taken off of her Lasix 2 days ago, and she has no known allergies to antibiotics. Pt states that she is also currently being treated for anxiety. Pt notes that she is currently managed by her Cardiologist, Dr. Sterling Butler. Pt denies Hx of CHF, and she reports that she received her influenza vaccine for the season. Pt specifically denies fever, chills, or diaphoresis. PCP: Michelle Sharp MD   Cardiology: Sterling Butler MD    There are no other complaints, changes, or physical findings at this time.     Current Outpatient Prescriptions   Medication Sig Dispense Refill    pravastatin (PRAVACHOL) 20 mg tablet TAKE ONE TABLET BY MOUTH NIGHTLY 90 Tab 1    glucose blood VI test strips (CONTOUR NEXT STRIPS) strip Check blood sugar twice daily 100 Strip 1    busPIRone (BUSPAR) 5 mg tablet Take 1 Tab by mouth two (2) times a day. 60 Tab 3    furosemide (LASIX) 20 mg tablet Take 1 Tab by mouth daily. 14 Tab 0    benazepril (LOTENSIN) 40 mg tablet Take 1 Tab by mouth daily. (Patient taking differently: Take 1 Tab by mouth two (2) times a day.) 30 Tab 3    menthol (BIOFREEZE, MENTHOL,) 4 % gel Apply 1 Film to affected area as needed (pain). apply thin film over affected area (left lateral knee) not more than 4x/ day      pantoprazole (PROTONIX) 40 mg tablet Take 40 mg by mouth two (2) times a day. 1 tab 2x/ day 30 mins before breakfast and supper - stop omeprazole      HEPARIN SOD,PORCINE/0.9 % NACL (HEPARIN FLUSH IV) 3 mL by IntraVENous route every eight (8) hours. 10u/ml      0.9 % SODIUM CHLORIDE (NORMAL SALINE FLUSH INJECTION) 10 mL by IntraVENous route every eight (8) hours.  L. acidoph & paracasei- S therm- Bifido (FRAN-Q/RISAQUAD) 8 billion cell cap cap Take 1 Cap by mouth daily. (Patient not taking: Reported on 4/2/2018) 60 Cap 0    ceFAZolin 1 g, ADDaptor 1 Device 1 g by IntraVENous route every eight (8) hours for 42 days. (Patient taking differently: 2 g by IntraVENous route every eight (8) hours.) 126 Dose 0    clopidogrel (PLAVIX) 75 mg tab Take 75 mg by mouth daily.  Lancets (MICROLET LANCET) misc Check blood sugar twice daily. 1 Each 11    levothyroxine (SYNTHROID) 50 mcg tablet Take 1 Tab by mouth Daily (before breakfast). 90 Tab 1    sertraline (ZOLOFT) 100 mg tablet Take 1 Tab by mouth daily. 135 Tab 1    metFORMIN ER (GLUCOPHAGE XR) 750 mg tablet TAKE ONE TABLET BY MOUTH TWICE DAILY 180 Tab 0    Cholecalciferol, Vitamin D3, (VITAMIN D3) 2,000 unit cap capsule Take 2,000 Units by mouth daily.  acetaminophen (TYLENOL) 500 mg tablet Take 500 mg by mouth every four (4) hours as needed for Pain.       DILT- mg XR capsule TAKE ONE CAPSULE BY MOUTH ONCE DAILY (Patient taking differently: TAKE TWO CAPSULES BY MOUTH ONCE DAILY) 30 Cap 11    ferrous sulfate (IRON) 325 mg (65 mg iron) tablet Take 325 mg by mouth every other day.  multivit-min-FA-lycopen-lutein (CENTRUM SILVER) 0.4-300-250 mg-mcg-mcg tab Take 1 Tab by mouth daily.  aspirin delayed-release 81 mg tablet Take 81 mg by mouth daily.  magnesium 250 mg tab Take 250 mg by mouth daily.          Past History     Past Medical History:  Past Medical History:   Diagnosis Date    Anemia     Arrhythmia     AFIB    Ataxia     Webb's esophagus     Cervical spinal stenosis     Coronary atherosclerosis of unspecified type of vessel, native or graft     Diabetes (HCC)     Fatigue     GERD (gastroesophageal reflux disease)     Hyperlipidemia     Hypertension     Ill-defined condition     right torn rotater cuff- no surgery at this time    Liver disease     pt is unaware    Osteoarthritis     Psychiatric disorder     anxiety and depression    Sarcoidosis     Sleep apnea     uses cpap    Stroke ([facility] [address]     TIA'S    Thyroid disease        Past Surgical History:  Past Surgical History:   Procedure Laterality Date    CARDIAC SURG PROCEDURE UNLIST      cardioversion     CARDIAC SURG PROCEDURE UNLIST      watchman device    COLONOSCOPY N/A 12/28/2016    COL[facility]OSCOPY performed by [facility] at [facility] ENDOSCOPY    HX CATARACT REMOVAL      both eyes    HX CHOLECYSTECTOMY      HX COLONOSCOPY  5/8/2013    Repeat in 5 years    HX CYST REMOVAL  10/15    on head     HX HYSTERECTOMY      HX ORTHOPAEDIC Bilateral     knee replacement to both knees    HX ORTHOPAEDIC      spacer btwn L4-5 and L5-6     HX TONSILLECTOMY      HX UROLOGICAL      botox in bladder       Family History:  Family History   Problem Relation Age of Onset    Other Mother      Fibromyalgia    Pacemaker Mother     Heart Disease Mother     Heart Failure Mother     COPD Mother     Heart Attack Father 61    Cancer Sister      Multiple Myeloma    Diabetes Brother     Obesity Brother     Pacemaker Brother     Heart Attack Brother      Bundle branch block    No Known Problems Son     Psychiatric Disorder Daughter      Multiple       Social History:  Social History   Substance Use Topics    Smoking status: Never Smoker    Smokeless tobacco: Never Used    Alcohol use No       Allergies: Allergies   Allergen Reactions    Procardia Xl [Nifedipine] Other (comments)     weakness         Review of Systems   Review of Systems   Constitutional: Negative for activity change, chills and fever. HENT: Negative for congestion and sore throat. Eyes: Negative for pain and redness. Respiratory: Positive for cough, chest tightness and shortness of breath. Cardiovascular: Negative for chest pain and palpitations. Gastrointestinal: Positive for nausea. Negative for abdominal pain, diarrhea and vomiting. Genitourinary: Negative for dysuria, frequency and urgency. Musculoskeletal: Negative for back pain and neck pain. Skin: Negative for rash. Neurological: Negative for syncope, light-headedness and headaches. Psychiatric/Behavioral: Negative for confusion. All other systems reviewed and are negative. Physical Exam   Physical Exam   Constitutional: She is oriented to person, place, and time. She appears well-developed and well-nourished. No distress. HENT:   Head: Normocephalic. Nose: Nose normal.   Mouth/Throat: Oropharynx is clear and moist. No oropharyngeal exudate. Eyes: Conjunctivae are normal. Pupils are equal, round, and reactive to light. No scleral icterus. Neck: Normal range of motion. Neck supple. No JVD present. No tracheal deviation present. No thyromegaly present. Cardiovascular: Normal rate, regular rhythm and intact distal pulses. Exam reveals no gallop and no friction rub. No murmur heard. Pulmonary/Chest: Effort normal and breath sounds normal. No stridor. No respiratory distress. She has no wheezes. She has no rales.    External pacemaker right upper chest wall   Abdominal: Soft. Bowel sounds are normal. She exhibits no distension. There is no tenderness. There is no rebound and no guarding. Musculoskeletal: Normal range of motion. She exhibits no edema. PICC line right upper arm   Lymphadenopathy:     She has no cervical adenopathy. Neurological: She is alert and oriented to person, place, and time. No cranial nerve deficit. She exhibits normal muscle tone. Coordination normal.   Skin: Skin is warm and dry. No rash noted. She is not diaphoretic. No erythema. Psychiatric: She has a normal mood and affect. Her behavior is normal.   Nursing note and vitals reviewed. Diagnostic Study Results     Labs -     Recent Results (from the past 12 hour(s))   EKG, 12 LEAD, INITIAL    Collection Time: 04/07/18 12:33 PM   Result Value Ref Range    Ventricular Rate 75 BPM    Atrial Rate 75 BPM    QRS Duration 170 ms    Q-T Interval 498 ms    QTC Calculation (Bezet) 556 ms    Calculated R Axis -78 degrees    Calculated T Axis 97 degrees    Diagnosis       Electronic ventricular pacemaker  When compared with ECG of 12-MAR-2018 01:12,  No significant change was found     CBC WITH AUTOMATED DIFF    Collection Time: 04/07/18  1:00 PM   Result Value Ref Range    WBC 12.0 (H) 3.6 - 11.0 K/uL    RBC 3.60 (L) 3.80 - 5.20 M/uL    HGB 10.3 (L) 11.5 - 16.0 g/dL    HCT 32.7 (L) 35.0 - 47.0 %    MCV 90.8 80.0 - 99.0 FL    MCH 28.6 26.0 - 34.0 PG    MCHC 31.5 30.0 - 36.5 g/dL    RDW 15.9 (H) 11.5 - 14.5 %    PLATELET 659 495 - 722 K/uL    MPV 10.8 8.9 - 12.9 FL    NRBC 0.0 0  WBC    ABSOLUTE NRBC 0.00 0.00 - 0.01 K/uL    NEUTROPHILS 79 (H) 32 - 75 %    LYMPHOCYTES 10 (L) 12 - 49 %    MONOCYTES 10 5 - 13 %    EOSINOPHILS 1 0 - 7 %    BASOPHILS 0 0 - 1 %    IMMATURE GRANULOCYTES 1 (H) 0.0 - 0.5 %    ABS. NEUTROPHILS 9.4 (H) 1.8 - 8.0 K/UL    ABS. LYMPHOCYTES 1.1 0.8 - 3.5 K/UL    ABS. MONOCYTES 1.2 (H) 0.0 - 1.0 K/UL    ABS.  EOSINOPHILS 0.1 0.0 - 0.4 K/UL ABS. BASOPHILS 0.0 0.0 - 0.1 K/UL    ABS. IMM. GRANS. 0.1 (H) 0.00 - 0.04 K/UL    DF AUTOMATED     METABOLIC PANEL, COMPREHENSIVE    Collection Time: 04/07/18  1:00 PM   Result Value Ref Range    Sodium 139 136 - 145 mmol/L    Potassium 3.6 3.5 - 5.1 mmol/L    Chloride 105 97 - 108 mmol/L    CO2 23 21 - 32 mmol/L    Anion gap 11 5 - 15 mmol/L    Glucose 169 (H) 65 - 100 mg/dL    BUN 20 6 - 20 MG/DL    Creatinine 1.13 (H) 0.55 - 1.02 MG/DL    BUN/Creatinine ratio 18 12 - 20      GFR est AA 56 (L) >60 ml/min/1.73m2    GFR est non-AA 46 (L) >60 ml/min/1.73m2    Calcium 8.9 8.5 - 10.1 MG/DL    Bilirubin, total 1.3 (H) 0.2 - 1.0 MG/DL    ALT (SGPT) <6 (L) 12 - 78 U/L    AST (SGOT) 17 15 - 37 U/L    Alk. phosphatase 122 (H) 45 - 117 U/L    Protein, total 7.6 6.4 - 8.2 g/dL    Albumin 3.2 (L) 3.5 - 5.0 g/dL    Globulin 4.4 (H) 2.0 - 4.0 g/dL    A-G Ratio 0.7 (L) 1.1 - 2.2     TROPONIN I    Collection Time: 04/07/18  1:00 PM   Result Value Ref Range    Troponin-I, Qt. <0.04 <0.05 ng/mL   NT-PRO BNP    Collection Time: 04/07/18  1:00 PM   Result Value Ref Range    NT pro-BNP 11325 (H) 0 - 450 PG/ML       Radiologic Studies -     CXR Results  (Last 48 hours)               04/07/18 1400  XR CHEST PA LAT Final result    Impression:  IMPRESSION: Mild interstitial pulmonary edema. Narrative:  EXAM:  XR CHEST PA LAT       INDICATION:   Mid chest pain radiating down the left arm x3 days. Shortness of   breath. COMPARISON: Chest x-ray 3/7/2018. FINDINGS: PA and lateral radiographs of the chest demonstrate mild interstitial   prominence but otherwise clear lungs. The cardiac and mediastinal contours and   pulmonary vascularity are normal.  Atherosclerotic calcifications affect the   aortic arch. The chest wall structures and visualized upper abdomen show no   acute findings with incidental note of degenerative spine and shoulder changes   as well as diffuse osteopenia.  Pacemaker particularly the right upper chest wall   with intact appearing lead traversing expected and stable course. Lower cervical   hardware is visualized. Medical Decision Making   I am the first provider for this patient. I reviewed the vital signs, available nursing notes, past medical history, past surgical history, family history and social history. Vital Signs-Reviewed the patient's vital signs. Patient Vitals for the past 12 hrs:   Temp Pulse Resp BP SpO2   04/07/18 1245 - 83 22 (!) 155/103 (!) 89 %   04/07/18 1235 98.3 °F (36.8 °C) 77 20 (!) 162/100 96 %       Pulse Oximetry Analysis - 96% on RA    Cardiac Monitor:   Rate: 80 bpm  Rhythm: ventricular paced rhythm     ED EKG interpretation: 12:33  Rhythm: ventricular paced rhythm; and regular . Rate (approx.): 75; This EKG was interpreted by Belen Tucker MD,ED Provider. Records Reviewed: Nursing Notes, Old Medical Records, Previous Radiology Studies and Previous Laboratory Studies    Provider Notes (Medical Decision Making):   DDx: CHF, ACS, PNA    ED Course:   Initial assessment performed. The patients presenting problems have been discussed, and they are in agreement with the care plan formulated and outlined with them. I have encouraged them to ask questions as they arise throughout their visit. 12:40 PM  I have reviewed medical records in the EHR, pertinent to patients present condition/presenting problems. Belen Tucker MD    CONSULT NOTE:   2:46 PM  Belen Tucker MD spoke with Jenn Trent   Specialty: Hospitalist  Discussed pt's hx, disposition, and available diagnostic and imaging results. Reviewed care plans. Consultant will evaluate pt for admission. Written by Sadi Elise, ED Scribe, as dictated by Belen Tucker MD.    Critical Care Time:   0    Disposition:  PLAN:  1. Admit to Hospitalist    ADMIT NOTE:  2:46 PM  Patient is being admitted to the hospital by Dr. Glenda Rodriguez.   The results of their tests and reasons for their admission have been discussed with them and/or available family. They convey agreement and understanding for the need to be admitted and for their admission diagnosis. Consultation has been made with the inpatient physician specialist for hospitalization. Diagnosis     Clinical Impression:   1. Acute congestive heart failure, unspecified heart failure type [facility])        Attestations: This note is prepared by Jesus Barkley, acting as Scribe for Elvira Anderson MD.    Elvira Anderson MD: The scribe's documentation has been prepared under my direction and personally reviewed by me in its entirety. I confirm that the note above accurately reflects all work, treatment, procedures, and medical decision making performed by me.

## 2018-04-07 NOTE — PROGRESS NOTES
6:24 PM    TRANSFER - IN REPORT:    Verbal report received from Rafal Deluna (name) on Andie Grant  being received from ED (unit) for routine progression of care      Report consisted of patients Situation, Background, Assessment and   Recommendations(SBAR). Information from the following report(s) SBAR, ED Summary, Intake/Output, Recent Results and Cardiac Rhythm Paced was reviewed with the receiving nurse. Opportunity for questions and clarification was provided. Assessment completed upon patients arrival to unit and care assumed. Primary Nurse Romero Petersen and Hawa Green RN performed a dual skin assessment on this patient No impairment noted.  Patient has a big bruise on her right hip and some excoriation under the left breast.  Umer score is 19

## 2018-04-07 NOTE — IP AVS SNAPSHOT
3715 [facility] 280 [facility] 
[phone] Patient: Julius Simmons MRN: [id number] :[id number] About your hospitalization You were admitted on:  2018 You last received care in the:  [facility] 2 INTRVNTNL CARDIO You were discharged on:  2018 Why you were hospitalized Your primary diagnosis was:  Not on File Your diagnoses also included:  Acute On Chronic Diastolic (Congestive) Heart Failure (Hcc), Gi Bleed, Precordial Pain, Paroxysmal Atrial Fibrillation (Hcc), Pacemaker, Infection Of Pacemaker Pocket (Hcc) Follow-up Information Follow up With Details Comments Contact Info Li Rojas MD In 3 days  [address] [facility] [address] [facility] 
[phone] [address] [place] [address]  This is your post-acute home healthcare provider. If you do not hear from them within 24-48 hours, please give them a call. [address] [place] [address] 
[phone] [facility]  For additional CHF education, med management, resources, and support in the community. [address] [facility] Cheryn Jeans, MD In 2 weeks  [facility] 
[phone] Your Scheduled Appointments 2018 10:40 AM EDT Follow Up with Jumana Jackson MD  
[address] [facility] 365[address] [facility]) [address] [place] [address]  
[phone] Discharge Orders None A check abby indicates which time of day the medication should be taken. My Medications START taking these medications Instructions Each Dose to Equal  
 Morning Noon Evening Bedtime  
 furosemide 40 mg tablet Commonly known as:  LASIX Your last dose was: Your next dose is: Take 1 Tab by mouth two (2) times a day. 40 mg  
    
   
   
   
  
 metoprolol succinate 50 mg XL tablet Commonly known as:  TOPROL-XL Your last dose was: Your next dose is: Take 1 Tab by mouth daily. 50 mg  
    
   
   
   
  
 oxyCODONE-acetaminophen 5-325 mg per tablet Commonly known as:  PERCOCET Your last dose was: Your next dose is: Take 1 Tab by mouth every six (6) hours as needed. Max Daily Amount: 4 Tabs. 1 Tab CHANGE how you take these medications Instructions Each Dose to Equal  
 Morning Noon Evening Bedtime  
 busPIRone 5 mg tablet Commonly known as:  BUSPAR What changed:  Another medication with the same name was removed. Continue taking this medication, and follow the directions you see here. Your last dose was: Your next dose is: Take 5 mg by mouth daily. 5 mg CONTINUE taking these medications Instructions Each Dose to Equal  
 Morning Noon Evening Bedtime  
 acetaminophen 500 mg tablet Commonly known as:  TYLENOL Your last dose was: Your next dose is: Take 500 mg by mouth every four (4) hours as needed for Pain. 500 mg  
    
   
   
   
  
 aspirin delayed-release 81 mg tablet Your last dose was: Your next dose is: Take 81 mg by mouth daily. 81 mg  
    
   
   
   
  
 benazepril 40 mg tablet Commonly known as:  LOTENSIN Your last dose was: Your next dose is: Take 1 Tab by mouth daily. 40 mg CENTRUM SILVER 0.4-300-250 mg-mcg-mcg Tab Generic drug:  multivit-min-FA-lycopen-lutein Your last dose was: Your next dose is: Take 1 Tab by mouth daily. 1 Tab Cholecalciferol (Vitamin D3) 2,000 unit Cap capsule Commonly known as:  VITAMIN D3 Your last dose was: Your next dose is: Take 2,000 Units by mouth daily. 2000 Units Iron 325 mg (65 mg iron) tablet Generic drug:  ferrous sulfate Your last dose was: Your next dose is: Take 325 mg by mouth every other day. 325 mg  
    
   
   
   
  
 L. acidoph & paracasei- S therm- Bifido 8 billion cell Cap cap Commonly known as:  FRAN-Q/RISAQUAD Your last dose was: Your next dose is: Take 1 Cap by mouth daily. 1 Cap  
    
   
   
   
  
 levothyroxine 50 mcg tablet Commonly known as:  SYNTHROID Your last dose was: Your next dose is: Take 1 Tab by mouth Daily (before breakfast). 50 mcg  
    
   
   
   
  
 magnesium 250 mg Tab Your last dose was: Your next dose is: Take 250 mg by mouth every evening. 250 mg  
    
   
   
   
  
 metFORMIN  mg tablet Commonly known as:  GLUCOPHAGE XR Your last dose was: Your next dose is: TAKE ONE TABLET BY MOUTH TWICE DAILY NORMAL SALINE FLUSH INJECTION Your last dose was: Your next dose is:    
   
   
 10 mL by IntraVENous route every eight (8) hours. 10 mL  
    
   
   
   
  
 pantoprazole 40 mg tablet Commonly known as:  PROTONIX Your last dose was: Your next dose is: Take 40 mg by mouth two (2) times a day. 40 mg  
    
   
   
   
  
 PLAVIX 75 mg Tab Generic drug:  clopidogrel Your last dose was: Your next dose is: Take 75 mg by mouth daily. 75 mg  
    
   
   
   
  
 pravastatin 20 mg tablet Commonly known as:  PRAVACHOL Your last dose was: Your next dose is: TAKE ONE TABLET BY MOUTH NIGHTLY  
     
   
   
   
  
 sertraline 100 mg tablet Commonly known as:  ZOLOFT Your last dose was: Your next dose is: Take 1 Tab by mouth daily. 100 mg  
    
   
   
   
  
  
STOP taking these medications ceFAZolin 1 g, ADDaptor 1 Device DILT- mg XR capsule Generic drug:  dilTIAZem XR  
   
  
 HEPARIN FLUSH IV Where to Get Your Medications These medications were sent to [address] [facility] [address], [address] [place] [id number] Phone:  [phone]  
  furosemide 40 mg tablet Information on where to get these meds will be given to you by the nurse or doctor. ! Ask your nurse or doctor about these medications  
  metoprolol succinate 50 mg XL tablet  
 oxyCODONE-acetaminophen 5-325 mg per tablet Opioid Education Prescription Opioids: What You Need to Know: 
 
Prescription opioids can be used to help relieve moderate-to-severe pain and are often prescribed following a surgery or injury, or for certain health conditions. These medications can be an important part of treatment but also come with serious risks. Opioids are strong pain medicines. Examples include hydrocodone, oxycodone, fentanyl, and morphine. Heroin is an example of an illegal opioid. It is important to work with your health care provider to make sure you are getting the safest, most effective care. WHAT ARE THE RISKS AND SIDE EFFECTS OF OPIOID USE? Prescription opioids carry serious risks of addiction and overdose, especially with prolonged use. An opioid overdose, often marked by slow breathing, can cause sudden death. The use of prescription opioids can have a number of side effects as well, even when taken as directed. · Tolerance-meaning you might need to take more of a medication for the same pain relief · Physical dependence-meaning you have symptoms of withdrawal when the medication is stopped.   Withdrawal symptoms can include nausea, sweating, chills, diarrhea, stomach cramps, and muscle aches. Withdrawal can last up to several weeks, depending on which drug you took and how long you took it. · Increased sensitivity to pain · Constipation · Nausea, vomiting, and dry mouth · Sleepiness and dizziness · Confusion · Depression · Low levels of testosterone that can result in lower sex drive, energy, and strength · Itching and sweating RISKS ARE GREATER WITH:      
· History of drug misuse, substance use disorder, or overdose · Mental health conditions (such as depression or anxiety) · Sleep apnea · Older age (72 years or older) · Pregnancy Avoid alcohol while taking prescription opioids. Also, unless specifically advised by your health care provider, medications to avoid include: · Benzodiazepines (such as Xanax or Valium) · Muscle relaxants (such as Soma or Flexeril) · Hypnotics (such as Ambien or Lunesta) · Other prescription opioids KNOW YOUR OPTIONS Talk to your health care provider about ways to manage your pain that don't involve prescription opioids. Some of these options may actually work better and have fewer risks and side effects. Options may include: 
· Pain relievers such as acetaminophen, ibuprofen, and naproxen · Some medications that are also used for depression or seizures · Physical therapy and exercise · Counseling to help patients learn how to cope better with triggers of pain and stress. · Application of heat or cold compress · Massage therapy · Relaxation techniques Be Informed Make sure you know the name of your medication, how much and how often to take it, and its potential risks & side effects. IF YOU ARE PRESCRIBED OPIOIDS FOR PAIN: 
· Never take opioids in greater amounts or more often than prescribed. Remember the goal is not to be pain-free but to manage your pain at a tolerable level. · Follow up with your primary care provider to: · Work together to create a plan on how to manage your pain. · Talk about ways to help manage your pain that don't involve prescription opioids. · Talk about any and all concerns and side effects. · Help prevent misuse and abuse. · Never sell or share prescription opioids · Help prevent misuse and abuse. · Store prescription opioids in a secure place and out of reach of others (this may include visitors, children, friends, and family). · Safely dispose of unused/unwanted prescription opioids: Find your community drug take-back program or your pharmacy mail-back program, or flush them down the toilet, following guidance from the Food and Drug Administration (www.fda.gov/Drugs/ResourcesForYou). · Visit www.cdc.gov/drugoverdose to learn about the risks of opioid abuse and overdose. · If you believe you may be struggling with addiction, tell your health care provider and ask for guidance or call [address] [place], [place] [address] at 5-324-712-RCQO. Discharge Instructions None ACO Transitions of Care Introducing [employer] [address] offers a voluntary care coordination program to provide high quality service and care to [facility] fee-for-service beneficiaries. Sidney Aguilar was designed to help you enhance your health and well-being through the following services: ? Transitions of Care  support for individuals who are transitioning from one care setting to another (example: Hospital to home). ? Chronic and Complex Care Coordination  support for individuals and caregivers of those with serious or chronic illnesses or with more than one chronic (ongoing) condition and those who take a number of different medications.   
 
 
If you meet specific medical criteria, a [address] [place] [address] Rd may call you directly to coordinate your care with your primary care physician and your other care providers. For questions about the [facility] programs, please, contact your physicians office. For general questions or additional information about Accountable Care Organizations: 
Please visit www.medicare.gov/acos. html or call 1-800-MEDICARE (3-604.785.9606) TTY users should call 5-366.256.2071. Utterz Announcement We are excited to announce that we are making your provider's discharge notes available to you in Utterz. You will see these notes when they are completed and signed by the physician that discharged you from your recent hospital stay. If you have any questions or concerns about any information you see in Utterz, please call the Health Information Department where you were seen or reach out to your Primary Care Provider for more information about your plan of care. Introducing [facility]! Dear Brandon Cobb: Thank you for requesting a Utterz account. Our records indicate that you already have an active Utterz account. You can access your account anytime at [email or web address] Did you know that you can access your hospital and ER discharge instructions at any time in Utterz? You can also review all of your test results from your hospital stay or ER visit. Additional Information If you have questions, please visit the Frequently Asked Questions section of the Utterz website at [email or web address] Remember, Utterz is NOT to be used for urgent needs. For medical emergencies, dial 911. Now available from your iPhone and Android! Introducing Nick Styles As a [facility] patient, I wanted to make you aware of our electronic visit tool called Nick Styles. [facility] 24/7 allows you to connect within minutes with a medical provider 24 hours a day, seven days a week via a mobile device or tablet or logging into a secure website from your computer. You can access [employer] from anywhere in the [place]. A virtual visit might be right for you when you have a simple condition and feel like you just dont want to get out of bed, or cant get away from work for an appointment, when your regular [employer] provider is not available (evenings, weekends or holidays), or when youre out of town and need minor care. Electronic visits cost only $49 and if the [employer] 24/7 provider determines a prescription is needed to treat your condition, one can be electronically transmitted to a nearby pharmacy*. Please take a moment to enroll today if you have not already done so. The enrollment process is free and takes just a few minutes. To enroll, please download the [employer] 24/7 jeannine to your tablet or phone, or visit [email or web address] to enroll on your computer. And, as an [address] [place] [address] patient with a [employer] account, the results of your visits will be scanned into your electronic medical record and your primary care provider will be able to view the scanned results. We urge you to continue to see your regular [employer] provider for your ongoing medical care. And while your primary care provider may not be the one available when you seek a [employer] virtual visit, the peace of mind you get from getting a real diagnosis real time can be priceless. For more information on [employer], view our Frequently Asked Questions (FAQs) at [email or web address]. Sincerely, 
 
Kasia Bloom MD 
Chief Medical Officer [address] *:  certain medications cannot be prescribed via [employer] Unresulted Labs-Please follow up with your PCP about these lab tests Order Current Status EKG, 12 LEAD, INITIAL Preliminary result Providers Seen During Your Hospitalization Provider Specialty Primary office phone James Dye MD Emergency Medicine [phone] Shanelle Garay MD Hospitalist [phone] Julio César Galvan MD Internal Medicine [phone] Your Primary Care Physician (PCP) Primary Care Physician Office Phone Office Fax Jazlyn Melton [phone] You are allergic to the following Allergen Reactions Procardia Xl (Nifedipine) Other (comments)  
 weakness Recent Documentation Weight BMI OB Status Smoking Status 76.7 kg 31.95 kg/m2 Hysterectomy Never Smoker Emergency Contacts Name Discharge Info Relation Home Work Mobile [employer] CAREGIVER [3] Child [2] [phone] Manav Hammond CAREGIVER [3] Spouse [3] [phone] Jose SOLOMON  Spouse [3] [phone] Ximena Thompson DISCHARGE CAREGIVER [3] Sister [23] [phone] Patient Belongings The following personal items are in your possession at time of discharge: 
  Dental Appliances: Uppers, Lowers  Visual Aid: Glasses      Home Medications: None   Jewelry: Ring  Clothing: At bedside    Other Valuables: None Discharge Instructions Attachments/References HEART FAILURE ZONES: GENERAL INFO (ENGLISH) HEART FAILURE: AVOIDING TRIGGERS (ENGLISH) HEART FAILURE: SELF-CARE: GENERAL INFO (ENGLISH) Patient Handouts Learning About Heart Failure Zones What are heart failure zones? Heart failure zones give you an easy way to see changes in your heart failure symptoms. They also tell you when you need to get help. Check every day to see which zone you are in. Green zone. You are doing well. This is where you want to be. · Your weight is stable. This means it is not going up or down. · You breathe easily. · You are sleeping well. You are able to lie flat without shortness of breath. · You can do your usual activities. Yellow zone. Be careful. Your symptoms are changing. Call your doctor. · You have new or increased shortness of breath. · You are dizzy or lightheaded, or you feel like you may faint. · You have sudden weight gain, such as more than 2 to 3 pounds in a day or 5 pounds in a week. (Your doctor may suggest a different range of weight gain.) · You have increased swelling in your legs, ankles, or feet. · You are so tired or weak that you cannot do your usual activities. · You are not sleeping well. Shortness of breath wakes you up at night. You need extra pillows. Your doctor's name: ____________________________________________________________ Your doctor's contact information: _________________________________________________ Red zone. This is an emergency. Call 911. You have symptoms of sudden heart failure, such as: 
· You have severe trouble breathing. · You cough up pink, foamy mucus. · You have a new irregular or fast heartbeat. You have symptoms of a heart attack. These may include: · Chest pain or pressure, or a strange feeling in the chest. 
· Sweating. · Shortness of breath. · Nausea or vomiting. · Pain, pressure, or a strange feeling in the back, neck, jaw, or upper belly or in one or both shoulders or arms. · Lightheadedness or sudden weakness. · A fast or irregular heartbeat. If you have symptoms of a heart attack: After you call 911, the  may tell you to chew 1 adult-strength or 2 to 4 low-dose aspirin. Wait for an ambulance. Do not try to drive yourself. Follow-up care is a key part of your treatment and safety. Be sure to make and go to all appointments, and call your doctor if you are having problems. It's also a good idea to know your test results and keep a list of the medicines you take. Where can you learn more? Go to http://lynnette.info/. Enter T174 in the search box to learn more about \"Learning About Heart Failure Zones. \" Current as of: September 21, 2016 Content Version: 11.4 © 1103-4506 [employer]. Care instructions adapted under license by [employer] (which disclaims liability or warranty for this information). If you have questions about a medical condition or this instruction, always ask your healthcare professional. [address] any warranty or liability for your use of this information. Avoiding Triggers With Heart Failure: Care Instructions Your Care Instructions Triggers are anything that make your heart failure flare up. A flare-up is also called \"sudden heart failure\" or \"acute heart failure. \" When you have a flare-up, fluid builds up in your lungs, and you have problems breathing. You might need to go to the hospital. By watching for changes in your condition and avoiding triggers, you can prevent heart failure flare-ups. Follow-up care is a key part of your treatment and safety. Be sure to make and go to all appointments, and call your doctor if you are having problems. It's also a good idea to know your test results and keep a list of the medicines you take. How can you care for yourself at home? Watch for changes in your weight and condition · Weigh yourself without clothing at the same time each day. Record your weight. Call your doctor if you have sudden weight gain, such as more than 2 to 3 pounds in a day or 5 pounds in a week. (Your doctor may suggest a different range of weight gain.) A sudden weight gain may mean that your heart failure is getting worse. · Keep a daily record of your symptoms. Write down any changes in how you feel, such as new shortness of breath, cough, or problems eating. Also record if your ankles are more swollen than usual and if you feel more tired than usual. Note anything that you ate or did that could have triggered these changes. Limit sodium Sodium causes your body to hold on to extra water.  This may cause your heart failure symptoms to get worse. People get most of their sodium from processed foods. Fast food and restaurant meals also tend to be very high in sodium. · Your doctor may suggest that you limit sodium to 2,000 milligrams (mg) a day or less. That is less than 1 teaspoon of salt a day, including all the salt you eat in cooking or in packaged foods. · Read food labels on cans and food packages. They tell you how much sodium you get in one serving. Check the serving size. If you eat more than one serving, you are getting more sodium. · Be aware that sodium can come in forms other than salt, including monosodium glutamate (MSG), sodium citrate, and sodium bicarbonate (baking soda). MSG is often added to Asian food. You can sometimes ask for food without MSG or salt. · Slowly reducing salt will help you adjust to the taste. Take the salt shaker off the table. · Flavor your food with garlic, lemon juice, onion, vinegar, herbs, and spices instead of salt. Do not use soy sauce, steak sauce, onion salt, garlic salt, mustard, or ketchup on your food, unless it is labeled \"low-sodium\" or \"low-salt. \" 
· Make your own salad dressings, sauces, and ketchup without adding salt. · Use fresh or frozen ingredients, instead of canned ones, whenever you can. Choose low-sodium canned goods. · Eat less processed food and food from restaurants, including fast food. Exercise as directed Moderate, regular exercise is very good for your heart. It improves your blood flow and helps control your weight. But too much exercise can stress your heart and cause a heart failure flare-up. · Check with your doctor before you start an exercise program. 
· Walking is an easy way to get exercise. Start out slowly. Gradually increase the length and pace of your walk. Swimming, riding a bike, and using a treadmill are also good forms of exercise. · When you exercise, watch for signs that your heart is working too hard. You are pushing yourself too hard if you cannot talk while you are exercising. If you become short of breath or dizzy or have chest pain, stop, sit down, and rest. 
· Do not exercise when you do not feel well. Take medicines correctly · Take your medicines exactly as prescribed. Call your doctor if you think you are having a problem with your medicine. · Make a list of all the medicines you take. Include those prescribed to you by other doctors and any over-the-counter medicines, vitamins, or supplements you take. Take this list with you when you go to any doctor. · Take your medicines at the same time every day. It may help you to post a list of all the medicines you take every day and what time of day you take them. · Make taking your medicine as simple as you can. Plan times to take your medicines when you are doing other things, such as eating a meal or getting ready for bed. This will make it easier to remember to take your medicines. · Get organized. Use helpful tools, such as daily or weekly pill containers. When should you call for help? Call 911 if you have symptoms of sudden heart failure such as: 
? · You have severe trouble breathing. ? · You cough up pink, foamy mucus. ? · You have a new irregular or rapid heartbeat. ?Call your doctor now or seek immediate medical care if: 
? · You have new or increased shortness of breath. ? · You are dizzy or lightheaded, or you feel like you may faint. ? · You have sudden weight gain, such as more than 2 to 3 pounds in a day or 5 pounds in a week. (Your doctor may suggest a different range of weight gain.) ? · You have increased swelling in your legs, ankles, or feet. ? · You are suddenly so tired or weak that you cannot do your usual activities. ? Watch closely for changes in your health, and be sure to contact your doctor if you develop new symptoms. Where can you learn more? Go to http://mason-saw.info/. Enter M330 in the search box to learn more about \"Avoiding Triggers With Heart Failure: Care Instructions. \" Current as of: September 21, 2016 Content Version: 11.4 © 7988-4746 [employer]. Care instructions adapted under license by [employer] (which disclaims liability or warranty for this information). If you have questions about a medical condition or this instruction, always ask your healthcare professional. [address] any warranty or liability for your use of this information. Learning About Self-Care for Heart Failure What is self-care for heart failure? Heart failure usually gets worse over time. But there are many things you can do to feel better, stay healthy longer, and avoid the hospital. 
Self-care means managing your health by doing certain things every day, like weighing yourself. It's about knowing which symptoms to watch for so you can avoid getting worse. When you practice good self-care, you know when it's time to call your doctor and when your heart failure has turned into an emergency. The lists below can help. Top 5 self-care tips for every day 1. Take your medicines as prescribed. This gives them the best chance of helping you. 2. Watch for signs that you're getting worse. Weighing yourself every day is one of the best ways to do this. Weight gain may be a sign that your body is holding on to too much fluid. Weigh yourself at the same time each day, using the same scale, on a hard, flat surface. The best time is in the morning after you go to the bathroom and before you eat or drink anything. 3. Find out what your triggers are, and learn to avoid them. Triggers are things that make your heart failure worse, often suddenly. A trigger may be eating too much salt, missing a dose of your medicine, or exercising too hard. 4. Limit sodium.  This helps keep fluid from building up and may help you feel better. Your doctor may suggest that you limit sodium to 2,000 milligrams (mg) a day or less. That's less than 1 teaspoon. You can stay under this amount by limiting the salt you eat at home and by watching for \"hidden\" sodium when you eat out or shop for food. 5. Try to exercise throughout the week. Exercise makes your heart stronger and can help you avoid symptoms. Walking is a great way to get exercise. If your doctor says it's safe, start out with some short walks, and then slowly build up to longer ones. When should you act? Try to become familiar with signs that mean your heart failure is getting worse. If you need help, talk with your doctor about making a personal plan. Here are some things to watch for as you practice your daily self-care. Call your doctor if: 
· You have sudden weight gain, such as more than 2 to 3 pounds in a day or 5 pounds in a week. (Your doctor may suggest a different range of weight gain.) · You have new or worse swelling in your feet, ankles, or legs. · Your breathing gets worse. Activities that did not make you short of breath before are hard for you now. · Your breathing when you lie down is worse than usual, or you wake up at night needing to catch your breath. Be sure to make and go to all of your follow-up appointments. And it's always a good idea to call your doctor anytime you have a sudden change in symptoms. When is it an emergency? Sometimes the symptoms get worse very quickly. This is called sudden heart failure. It causes fluid to build up in your lungs. Sudden heart failure is an emergency. If you have any of these symptoms, you need care right away. Call 911 if: 
· You have severe shortness of breath. · You have an irregular or fast heartbeat. · You cough up foamy, pink mucus. What else can you do to stay healthy?  
There are other things you can do to take care of your body and your heart. These things will help you feel better. And they will also reduce your risk of heart attack and stroke. · Try to stay at a healthy weight. Eat a healthy diet with lots of fresh fruit, vegetables, and whole grains. · If you smoke, quit. · Limit the amount of alcohol you drink. · Keep high blood pressure and diabetes under control. If you need help, talk with your doctor. If your doctor has not set you up with a cardiac rehabilitation (rehab) program, talk to him or her about whether that is right for you. Cardiac rehab includes exercise, help with diet and lifestyle changes, and emotional support. Also let your doctor know if: 
· You're having trouble sleeping. Sleep is important to your well-being. It also helps your heart work the way it's supposed to. Your doctor can help you decide if you need treatment for sleep problems. · You're feeling sad or hopeless much of the time, or you are worried and anxious. Heart failure can be hard on your emotions. Treatment with counseling and medicine can help. And when you feel better, you're more likely to take care of yourself. Follow-up care is a key part of your treatment and safety. Be sure to make and go to all appointments, and call your doctor if you are having problems. It's also a good idea to know your test results and keep a list of the medicines you take. Where can you learn more? Go to http://mason-saw.info/. Enter Q302 in the search box to learn more about \"Learning About Self-Care for Heart Failure. \" Current as of: September 21, 2016 Content Version: 11.4 © 5704-4993 [employer]. Care instructions adapted under license by [employer] (which disclaims liability or warranty for this information). If you have questions about a medical condition or this instruction, always ask your healthcare professional. [address] any warranty or liability for your use of this information. Please provide this summary of care documentation to your next provider. Signatures-by signing, you are acknowledging that this After Visit Summary has been reviewed with you and you have received a copy. Patient Signature:  ____________________________________________________________ Date:  ____________________________________________________________  
  
[facility] Provider Signature:  ____________________________________________________________ Date:  ____________________________________________________________

## 2018-04-07 NOTE — ED NOTES
Received pt to exam room for c/o SOB x 3 days and chest pain. Pt being treated currently by home health for sepsis infection r/t pacemaker. Pt's new pacemaker is external and is covered w/ a sterile dressing. Pt also has PICC line to RUE.

## 2018-04-07 NOTE — ED NOTES
TRANSFER - OUT REPORT:    Verbal report given to Fallon RN(name) on Maikel Vicente  being transferred to [facility](unit) for routine progression of care       Report consisted of patients Situation, Background, Assessment and   Recommendations(SBAR). Information from the following report(s) SBAR, ED Summary, [facility] and Recent Results was reviewed with the receiving nurse. Lines:   PICC Double Lumen 96/17/54 Right;Basilic (Active)       Peripheral IV 04/07/18 Left Antecubital (Active)   Site Assessment Clean, dry, & intact 4/7/2018 12:50 PM   Phlebitis Assessment 0 4/7/2018 12:50 PM   Infiltration Assessment 0 4/7/2018 12:50 PM   Dressing Status Clean, dry, & intact 4/7/2018 12:50 PM   Hub Color/Line Status Pink;Capped;Flushed 4/7/2018 12:50 PM        Opportunity for questions and clarification was provided.

## 2018-04-07 NOTE — H&P
Hospitalist Admission Note    NAME:  Sheri Castellanos   :   1935   MRN:   [id number]     Date of admit: 2018    PCP: Joce Perez MD    Assessment/Plan:     Acute on chronic diastolic (congestive) heart failure ([facility] [address] (2018) POA  Left SSCP POA  ASA, plavix,  IV diuretics  Serial enzymes  Cardiology consult    MSSA bacteremia due to PM pocket infection POA  Afib s/p ablation and PM placement with Dr. Nahum Morales 18 POA  S/P pacemaker removal  Essential HTn  Continue cefazolin till stopped by Dr Joaquin Prince to see in AM  Continue home BP meds  con't ASA and plavix      DM2 controlled without complication:  Lispro sliding scale  con't diltiazem, lopressor    Hypothyroidism:  con't synthroid    Hyperlipidemia: con't pravachol    Depression: con't zoloft    Obesity POA Body mass index is 33.25 kg/(m^2). Given the patient's current clinical presentation, I have a high level of concern for decompensation if discharged from the emergency department. My assessment of this patient's clinical condition and my plan of care is as noted above. DVT prophylaxis with lovenox    Code status: full code  NOK:    History     CHIEF COMPLAINT: I have been SOB and having chest pain for 4 days    HISTORY OF PRESENT ILLNESS:  [de-identified] Y. Yanely Birch  Recently with pacemaker implant 2018  Recently admitted at [facility] 3/5 to 3/12/2017 with sepsis, fevers, MSSA bacteremia   JESSIE negative for vegetation   Presumed pacemaker pocket infection, generator removed, has an external generator in place   PICC line placed, on IV ANCef, followed by Dr Leonela Dyer   D/C to home  Still getting IV ancef at home  Increasing SOB past 4 days, worse with exertion, sleeping a lot in a recliner, no LE edema  No Cough or pleuritic CP  Positive SSCP left inframammary, radiating to neck x 3 days  Takes lasix at home, recently stopped  Came into ED    Past Medical History:   Diagnosis Date    Anemia     Arrhythmia     AFIB    Ataxia     Webb's esophagus     Cervical spinal stenosis     Coronary atherosclerosis of unspecified type of vessel, native or graft     Diabetes ([facility] [address]     Fatigue     GERD (gastroesophageal reflux disease)     Hyperlipidemia     Hypertension     Ill-defined condition     right torn rotater cuff- no surgery at this time    Liver disease     pt is unaware    Osteoarthritis     Psychiatric disorder     anxiety and depression    Sarcoidosis     Sleep apnea     uses cpap    Stroke ([facility] [address]     TIA'S    Thyroid disease         Past Surgical History:   Procedure Laterality Date    CARDIAC SURG PROCEDURE UNLIST      cardioversion     CARDIAC SURG PROCEDURE UNLIST      watchman device    COLONOSCOPY N/A 12/28/2016    COLONOSCOPY performed by Ector Nagy [facility] at [facility]    HX CATARACT REMOVAL      both eyes    HX CHOLECYSTECTOMY      HX COLONOSCOPY  5/8/2013    Repeat in 5 years    HX CYST REMOVAL  10/15    on head     HX HYSTERECTOMY      HX ORTHOPAEDIC Bilateral     knee replacement to both knees    HX ORTHOPAEDIC      spacer btwn L4-5 and L5-6     HX TONSILLECTOMY      HX UROLOGICAL      botox in bladder       Social History   Substance Use Topics    Smoking status: Never Smoker    Smokeless tobacco: Never Used    Alcohol use No        Family History   Problem Relation Age of Onset    Other Mother      Fibromyalgia    Pacemaker Mother     Heart Disease Mother     Heart Failure Mother     COPD Mother     Heart Attack Father 61    Cancer Sister      Multiple Myeloma    Diabetes Brother     Obesity Brother     Pacemaker Brother     Heart Attack Brother      Bundle branch block    No Known Problems Son     Psychiatric Disorder Daughter      Multiple        Allergies   Allergen Reactions    Procardia Xl [Nifedipine] Other (comments)     weakness        Prior to Admission medications    Medication Sig Start Date End Date Taking?  Authorizing Provider   pravastatin (PRAVACHOL) 20 mg tablet TAKE ONE TABLET BY MOUTH NIGHTLY 3/23/18   Donte Hendricks MD   glucose blood VI test strips (CONTOUR NEXT STRIPS) strip Check blood sugar twice daily 3/23/18   Donte Hendricks MD   busPIRone (BUSPAR) 5 mg tablet Take 1 Tab by mouth two (2) times a day. 3/20/18   Donte Hendricks MD   furosemide (LASIX) 20 mg tablet Take 1 Tab by mouth daily. 3/20/18   Donte Hendricks MD   benazepril (LOTENSIN) 40 mg tablet Take 1 Tab by mouth daily. Patient taking differently: Take 1 Tab by mouth two (2) times a day. 3/20/18   Donte Hendricks MD   menthol (BIOFREEZE, MENTHOL,) 4 % gel Apply 1 Film to affected area as needed (pain). apply thin film over affected area (left lateral knee) not more than 4x/ day    Historical Provider   pantoprazole (PROTONIX) 40 mg tablet Take 40 mg by mouth two (2) times a day. 1 tab 2x/ day 30 mins before breakfast and supper - stop omeprazole 3/16/18   Negin Segundo MD   HEPARIN SOD,PORCINE/0.9 % NACL (HEPARIN FLUSH IV) 3 mL by IntraVENous route every eight (8) hours. 10u/ml    Historical Provider   0.9 % SODIUM CHLORIDE (NORMAL SALINE FLUSH INJECTION) 10 mL by IntraVENous route every eight (8) hours. Historical Provider   JOAN acidoph & paracasei- S therm- Bifido (FRAN-Q/RISAQUAD) 8 billion cell cap cap Take 1 Cap by mouth daily. Patient not taking: Reported on 4/2/2018 3/13/18   Jules Mariee MD   ceFAZolin 1 g, ADDaptor 1 Device 1 g by IntraVENous route every eight (8) hours for 42 days. Patient taking differently: 2 g by IntraVENous route every eight (8) hours. 3/12/18 4/23/18  Jules Mariee MD   clopidogrel (PLAVIX) 75 mg tab Take 75 mg by mouth daily. Historical Provider   Lancets (MICROLET LANCET) misc Check blood sugar twice daily. 3/5/18   Donte Hendricks MD   levothyroxine (SYNTHROID) 50 mcg tablet Take 1 Tab by mouth Daily (before breakfast). 1/24/18   Donte Hendricks MD   sertraline (ZOLOFT) 100 mg tablet Take 1 Tab by mouth daily.  1/24/18 Kinjal Jiménez MD   metFORMIN ER (GLUCOPHAGE XR) 750 mg tablet TAKE ONE TABLET BY MOUTH TWICE DAILY 1/24/18   Kinjal Jiménez MD   Cholecalciferol, Vitamin D3, (VITAMIN D3) 2,000 unit cap capsule Take 2,000 Units by mouth daily. Historical Provider   acetaminophen (TYLENOL) 500 mg tablet Take 500 mg by mouth every four (4) hours as needed for Pain. Historical Provider   DILT- mg XR capsule TAKE ONE CAPSULE BY MOUTH ONCE DAILY  Patient taking differently: TAKE TWO CAPSULES BY MOUTH ONCE DAILY 10/5/17   Eugenio Beckwith NP   ferrous sulfate (IRON) 325 mg (65 mg iron) tablet Take 325 mg by mouth every other day. Historical Provider   multivit-min-FA-lycopen-lutein (CENTRUM SILVER) 0.4-300-250 mg-mcg-mcg tab Take 1 Tab by mouth daily. Historical Provider   aspirin delayed-release 81 mg tablet Take 81 mg by mouth daily. Historical Provider   magnesium 250 mg tab Take 250 mg by mouth daily.     Historical Provider       Review of symptoms:     POSITIVE= Bold  Negative = not bold  General:  fever, chills, sweats, generalized weakness, weight loss     loss of appetite   Eyes:    blurred vision, eye pain, double vision  ENT:    Coryza, sore throat, trouble swallowing  Respiratory:   cough, sputum, SOB  Cardiology:   chest pain, orthopnea, PND, edema  Gastrointestinal:  abdominal pain , N/V, diarrhea, constipation, melena or BRBPR  Genitourinary:  Urgency, dysuria, hematuria  Muskuloskeletal :  Joint redness, swelling or acute joint pain, myalgias  Hematology:  easy bruising, nose or gum bleeding  Dermatological: rash, ulceration  Endocrine:   Polyuria or polydipsia, heat or hold intolerance  Neurological:  Headache, focal motor or sensory changes     Speech difficulties, memory loss  Psychological: depression, agitation      Objective:   VITALS:    Patient Vitals for the past 24 hrs:   Temp Pulse Resp BP SpO2   04/07/18 1745 - 77 (!) 31 129/73 95 %   04/07/18 1730 - 80 27 149/84 94 %   04/07/18 1700 - 81 24 149/87 93 %   18 1600 - 75 23 164/70 95 %   18 1330 - 76 20 (!) 162/96 91 %   18 1245 - 83 22 (!) 155/103 (!) 89 %   18 1235 98.3 °F (36.8 °C) 77 20 (!) 162/100 96 %     Temp (24hrs), Av.3 °F (36.8 °C), Min:98.3 °F (36.8 °C), Max:98.3 °F (36.8 °C)      O2 Device: Room air    PHYSICAL EXAM:   General:    Alert, cooperative in no distress     HEENT: Normocephalic, atraumatic    PERRL, EOMI  Sclera no icterus    Nasal mucosa without masses or discharge  Hearing intact to voice    Oropharynx without erythema or exudate  No thrush  Pink MM  Neck:  No meningismus, trachea midline, no carotid bruits     Thyroid not enlarged, no nodules or tenderness  Lungs:   Few crackles at bases bilaterally. No wheezin    No accessory muscle use or retractions. Heart:   Regular rate and rhythm,  no murmur or gallop. No LE edema  Abdomen:   Soft, non-tender. Not distended. Bowel sounds normal.     No masses, No Hepatosplenomegaly, No Rebound or guarding  Lymph nodes: No cervical or inguinal OLGA  Musculoskeletal:  No Joint swelling, erythema, warmth.  No Cyanosis or clubbing  Skin:      No rashes   Neurologic: Alert and oriented X 3, follows commands     Cranial nerves 2 to 12 intact    Symmetric motor strength bilaterally       LAB DATA REVIEWED:    Recent Results (from the past 12 hour(s))   EKG, 12 LEAD, INITIAL    Collection Time: 18 12:33 PM   Result Value Ref Range    Ventricular Rate 75 BPM    Atrial Rate 75 BPM    QRS Duration 170 ms    Q-T Interval 498 ms    QTC Calculation (Bezet) 556 ms    Calculated R Axis -78 degrees    Calculated T Axis 97 degrees    Diagnosis       Electronic ventricular pacemaker  When compared with ECG of 12-MAR-2018 01:12,  No significant change was found     CBC WITH AUTOMATED DIFF    Collection Time: 18  1:00 PM   Result Value Ref Range    WBC 12.0 (H) 3.6 - 11.0 K/uL    RBC 3.60 (L) 3.80 - 5.20 M/uL    HGB 10.3 (L) 11.5 - 16.0 g/dL    HCT 32.7 (L) 35.0 - 47.0 %    MCV 90.8 80.0 - 99.0 FL    MCH 28.6 26.0 - 34.0 PG    MCHC 31.5 30.0 - 36.5 g/dL    RDW 15.9 (H) 11.5 - 14.5 %    PLATELET 225 046 - 286 K/uL    MPV 10.8 8.9 - 12.9 FL    NRBC 0.0 0  WBC    ABSOLUTE NRBC 0.00 0.00 - 0.01 K/uL    NEUTROPHILS 79 (H) 32 - 75 %    LYMPHOCYTES 10 (L) 12 - 49 %    MONOCYTES 10 5 - 13 %    EOSINOPHILS 1 0 - 7 %    BASOPHILS 0 0 - 1 %    IMMATURE GRANULOCYTES 1 (H) 0.0 - 0.5 %    ABS. NEUTROPHILS 9.4 (H) 1.8 - 8.0 K/UL    ABS. LYMPHOCYTES 1.1 0.8 - 3.5 K/UL    ABS. MONOCYTES 1.2 (H) 0.0 - 1.0 K/UL    ABS. EOSINOPHILS 0.1 0.0 - 0.4 K/UL    ABS. BASOPHILS 0.0 0.0 - 0.1 K/UL    ABS. IMM. GRANS. 0.1 (H) 0.00 - 0.04 K/UL    DF AUTOMATED     METABOLIC PANEL, COMPREHENSIVE    Collection Time: 04/07/18  1:00 PM   Result Value Ref Range    Sodium 139 136 - 145 mmol/L    Potassium 3.6 3.5 - 5.1 mmol/L    Chloride 105 97 - 108 mmol/L    CO2 23 21 - 32 mmol/L    Anion gap 11 5 - 15 mmol/L    Glucose 169 (H) 65 - 100 mg/dL    BUN 20 6 - 20 MG/DL    Creatinine 1.13 (H) 0.55 - 1.02 MG/DL    BUN/Creatinine ratio 18 12 - 20      GFR est AA 56 (L) >60 ml/min/1.73m2    GFR est non-AA 46 (L) >60 ml/min/1.73m2    Calcium 8.9 8.5 - 10.1 MG/DL    Bilirubin, total 1.3 (H) 0.2 - 1.0 MG/DL    ALT (SGPT) <6 (L) 12 - 78 U/L    AST (SGOT) 17 15 - 37 U/L    Alk.  phosphatase 122 (H) 45 - 117 U/L    Protein, total 7.6 6.4 - 8.2 g/dL    Albumin 3.2 (L) 3.5 - 5.0 g/dL    Globulin 4.4 (H) 2.0 - 4.0 g/dL    A-G Ratio 0.7 (L) 1.1 - 2.2     TROPONIN I    Collection Time: 04/07/18  1:00 PM   Result Value Ref Range    Troponin-I, Qt. <0.04 <0.05 ng/mL   NT-PRO BNP    Collection Time: 04/07/18  1:00 PM   Result Value Ref Range    NT pro-BNP 39049 (H) 0 - 450 PG/ML   URINALYSIS W/ REFLEX CULTURE    Collection Time: 04/07/18  4:46 PM   Result Value Ref Range    Color YELLOW/STRAW      Appearance CLEAR CLEAR      Specific gravity 1.011 1.003 - 1.030      pH (UA) 6.5 5.0 - 8.0      Protein 100 (A) NEG mg/dL    Glucose NEGATIVE  NEG mg/dL    Ketone NEGATIVE  NEG mg/dL    Bilirubin NEGATIVE  NEG      Blood TRACE (A) NEG      Urobilinogen 0.2 0.2 - 1.0 EU/dL    Nitrites NEGATIVE  NEG      Leukocyte Esterase NEGATIVE  NEG      WBC 0-4 0 - 4 /hpf    RBC 0-5 0 - 5 /hpf    Epithelial cells FEW FEW /lpf    Bacteria NEGATIVE  NEG /hpf    UA:UC IF INDICATED CULTURE NOT INDICATED BY UA RESULT CNI      Yeast PRESENT (A) NEG         EKG as read by me shows Vent paced rate 75    CXR read by radiology and reviewed by myself shows mild interstitial edema      I saw the patient personally, took a history and did a complete physical exam at the bedside. I performed complex decision making in coming up with a diagnostic and treatment plan for the patient. I reviewed the patient's past medical records, current laboratory and radiology results, and actual Xray films/EKG. I have also discussed this case with the involved ED physician.     Care Plan discussed with:    Patient, ED Doc    Risk of deterioration:  High    Total Time Coordinating Admission:   65  minutes    Total Critical Care Time:         Meryl Henry MD

## 2018-04-08 VITALS
HEART RATE: 75 BPM | OXYGEN SATURATION: 95 % | TEMPERATURE: 98 F | DIASTOLIC BLOOD PRESSURE: 82 MMHG | SYSTOLIC BLOOD PRESSURE: 140 MMHG

## 2018-04-08 LAB
ALBUMIN SERPL-MCNC: 3.1 G/DL (ref 3.5–5)
ALBUMIN/GLOB SERPL: 0.8 {RATIO} (ref 1.1–2.2)
ALP SERPL-CCNC: 108 U/L (ref 45–117)
ALT SERPL-CCNC: <6 U/L (ref 12–78)
ANION GAP SERPL CALC-SCNC: 10 MMOL/L (ref 5–15)
AST SERPL-CCNC: 15 U/L (ref 15–37)
BASOPHILS # BLD: 0.1 K/UL (ref 0–0.1)
BASOPHILS NFR BLD: 0 % (ref 0–1)
BILIRUB SERPL-MCNC: 1.1 MG/DL (ref 0.2–1)
BUN SERPL-MCNC: 20 MG/DL (ref 6–20)
BUN/CREAT SERPL: 20 (ref 12–20)
CALCIUM SERPL-MCNC: 8.7 MG/DL (ref 8.5–10.1)
CHLORIDE SERPL-SCNC: 104 MMOL/L (ref 97–108)
CO2 SERPL-SCNC: 26 MMOL/L (ref 21–32)
CREAT SERPL-MCNC: 1.01 MG/DL (ref 0.55–1.02)
DIFFERENTIAL METHOD BLD: ABNORMAL
EOSINOPHIL # BLD: 0.2 K/UL (ref 0–0.4)
EOSINOPHIL NFR BLD: 2 % (ref 0–7)
ERYTHROCYTE [DISTWIDTH] IN BLOOD BY AUTOMATED COUNT: 16 % (ref 11.5–14.5)
GLOBULIN SER CALC-MCNC: 4 G/DL (ref 2–4)
GLUCOSE BLD STRIP.AUTO-MCNC: 141 MG/DL (ref 65–100)
GLUCOSE BLD STRIP.AUTO-MCNC: 143 MG/DL (ref 65–100)
GLUCOSE BLD STRIP.AUTO-MCNC: 197 MG/DL (ref 65–100)
GLUCOSE BLD STRIP.AUTO-MCNC: 218 MG/DL (ref 65–100)
GLUCOSE SERPL-MCNC: 149 MG/DL (ref 65–100)
HCT VFR BLD AUTO: 30.9 % (ref 35–47)
HGB BLD-MCNC: 9.6 G/DL (ref 11.5–16)
IMM GRANULOCYTES # BLD: 0.1 K/UL (ref 0–0.04)
IMM GRANULOCYTES NFR BLD AUTO: 1 % (ref 0–0.5)
LYMPHOCYTES # BLD: 1.2 K/UL (ref 0.8–3.5)
LYMPHOCYTES NFR BLD: 11 % (ref 12–49)
MCH RBC QN AUTO: 28.6 PG (ref 26–34)
MCHC RBC AUTO-ENTMCNC: 31.1 G/DL (ref 30–36.5)
MCV RBC AUTO: 92 FL (ref 80–99)
MONOCYTES # BLD: 1.3 K/UL (ref 0–1)
MONOCYTES NFR BLD: 12 % (ref 5–13)
NEUTS SEG # BLD: 8.3 K/UL (ref 1.8–8)
NEUTS SEG NFR BLD: 74 % (ref 32–75)
NRBC # BLD: 0 K/UL (ref 0–0.01)
NRBC BLD-RTO: 0 PER 100 WBC
PLATELET # BLD AUTO: 226 K/UL (ref 150–400)
PMV BLD AUTO: 10.9 FL (ref 8.9–12.9)
POTASSIUM SERPL-SCNC: 3.1 MMOL/L (ref 3.5–5.1)
PROT SERPL-MCNC: 7.1 G/DL (ref 6.4–8.2)
RBC # BLD AUTO: 3.36 M/UL (ref 3.8–5.2)
SERVICE CMNT-IMP: ABNORMAL
SODIUM SERPL-SCNC: 140 MMOL/L (ref 136–145)
TROPONIN I SERPL-MCNC: <0.04 NG/ML
WBC # BLD AUTO: 11.2 K/UL (ref 3.6–11)

## 2018-04-08 PROCEDURE — 74011250637 HC RX REV CODE- 250/637: Performed by: INTERNAL MEDICINE

## 2018-04-08 PROCEDURE — 80053 COMPREHEN METABOLIC PANEL: CPT | Performed by: INTERNAL MEDICINE

## 2018-04-08 PROCEDURE — 36415 COLL VENOUS BLD VENIPUNCTURE: CPT | Performed by: INTERNAL MEDICINE

## 2018-04-08 PROCEDURE — 85025 COMPLETE CBC W/AUTO DIFF WBC: CPT | Performed by: INTERNAL MEDICINE

## 2018-04-08 PROCEDURE — 74011636637 HC RX REV CODE- 636/637: Performed by: INTERNAL MEDICINE

## 2018-04-08 PROCEDURE — 84484 ASSAY OF TROPONIN QUANT: CPT | Performed by: INTERNAL MEDICINE

## 2018-04-08 PROCEDURE — 3331090002 HH PPS REVENUE DEBIT

## 2018-04-08 PROCEDURE — 82962 GLUCOSE BLOOD TEST: CPT

## 2018-04-08 PROCEDURE — 74011250636 HC RX REV CODE- 250/636: Performed by: INTERNAL MEDICINE

## 2018-04-08 PROCEDURE — 65660000000 HC RM CCU STEPDOWN

## 2018-04-08 PROCEDURE — 3331090001 HH PPS REVENUE CREDIT

## 2018-04-08 RX ORDER — CEFAZOLIN SODIUM/WATER 2 G/20 ML
2 SYRINGE (ML) INTRAVENOUS EVERY 8 HOURS
Status: DISCONTINUED | OUTPATIENT
Start: 2018-04-08 | End: 2018-04-09

## 2018-04-08 RX ORDER — BENAZEPRIL HYDROCHLORIDE 10 MG/1
40 TABLET ORAL 2 TIMES DAILY
Status: DISCONTINUED | OUTPATIENT
Start: 2018-04-08 | End: 2018-04-12

## 2018-04-08 RX ORDER — POTASSIUM CHLORIDE 20 MEQ/1
40 TABLET, EXTENDED RELEASE ORAL
Status: COMPLETED | OUTPATIENT
Start: 2018-04-08 | End: 2018-04-08

## 2018-04-08 RX ADMIN — SERTRALINE HYDROCHLORIDE 100 MG: 50 TABLET ORAL at 09:55

## 2018-04-08 RX ADMIN — ENOXAPARIN SODIUM 40 MG: 40 INJECTION SUBCUTANEOUS at 09:54

## 2018-04-08 RX ADMIN — PRAVASTATIN SODIUM 20 MG: 10 TABLET ORAL at 21:33

## 2018-04-08 RX ADMIN — POTASSIUM CHLORIDE 40 MEQ: 20 TABLET, EXTENDED RELEASE ORAL at 11:54

## 2018-04-08 RX ADMIN — CLOPIDOGREL BISULFATE 75 MG: 75 TABLET ORAL at 09:55

## 2018-04-08 RX ADMIN — ASPIRIN 81 MG: 81 TABLET, COATED ORAL at 09:55

## 2018-04-08 RX ADMIN — PANTOPRAZOLE SODIUM 40 MG: 40 TABLET, DELAYED RELEASE ORAL at 09:55

## 2018-04-08 RX ADMIN — BUSPIRONE HYDROCHLORIDE 5 MG: 5 TABLET ORAL at 17:38

## 2018-04-08 RX ADMIN — Medication 10 ML: at 14:00

## 2018-04-08 RX ADMIN — INSULIN LISPRO 2 UNITS: 100 INJECTION, SOLUTION INTRAVENOUS; SUBCUTANEOUS at 12:18

## 2018-04-08 RX ADMIN — Medication 1 CAPSULE: at 11:54

## 2018-04-08 RX ADMIN — Medication 10 ML: at 06:00

## 2018-04-08 RX ADMIN — FUROSEMIDE 40 MG: 10 INJECTION, SOLUTION INTRAMUSCULAR; INTRAVENOUS at 17:38

## 2018-04-08 RX ADMIN — Medication 2 G: at 19:57

## 2018-04-08 RX ADMIN — INSULIN LISPRO 2 UNITS: 100 INJECTION, SOLUTION INTRAVENOUS; SUBCUTANEOUS at 21:33

## 2018-04-08 RX ADMIN — BUSPIRONE HYDROCHLORIDE 5 MG: 5 TABLET ORAL at 09:55

## 2018-04-08 RX ADMIN — LEVOTHYROXINE SODIUM 50 MCG: 50 TABLET ORAL at 09:55

## 2018-04-08 RX ADMIN — DILTIAZEM HYDROCHLORIDE 240 MG: 240 CAPSULE, COATED, EXTENDED RELEASE ORAL at 09:55

## 2018-04-08 RX ADMIN — Medication 2 G: at 12:19

## 2018-04-08 RX ADMIN — FUROSEMIDE 40 MG: 10 INJECTION, SOLUTION INTRAMUSCULAR; INTRAVENOUS at 09:54

## 2018-04-08 RX ADMIN — Medication 10 ML: at 21:33

## 2018-04-08 RX ADMIN — Medication 2 G: at 03:27

## 2018-04-08 RX ADMIN — BENAZEPRIL HYDROCHLORIDE 40 MG: 10 TABLET, FILM COATED ORAL at 17:38

## 2018-04-08 RX ADMIN — BENAZEPRIL HYDROCHLORIDE 40 MG: 10 TABLET, FILM COATED ORAL at 09:50

## 2018-04-08 NOTE — CONSULTS
[address] [place] [address] [facility]     Date of  Admission: 4/7/2018 12:26 PM     Admission type:Emergency    Consult for:  Consult by: Subjective:     Maria M Wagner is a [de-identified] y.o. female admitted for Acute on chronic diastolic (congestive) heart failure ([address]*. Patient complains of progressive dyspnea and chest tightness. Neg stress 2 yr ago. Never had a cath. Patient Active Problem List    Diagnosis Date Noted    Acute on chronic diastolic (congestive) heart failure ([address] 04/07/2018    GI bleed 04/07/2018    Severe obesity (BMI 35.0-39. 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    ACP (advance care planning) 11/11/2015    Hypothyroidism, adult 07/31/2015    Type 2 diabetes mellitus without complication ([address] 24/60/6352    Essential hypertension 04/27/2015    MALLORY (obstructive sleep apnea) 07/09/2014    Hyperlipidemia 07/09/2014    Sarcoid 07/09/2014    Stress bladder incontinence, female 07/09/2014    Obesity 07/09/2014    TIA (transient ischemic attack) 07/09/2014    AR (aortic regurgitation) 07/09/2014    Mitral valve insufficiency 07/09/2014    Cervical stenosis of spine 07/09/2014      Phylicia Agarwal MD  Past Medical History:   Diagnosis Date    Anemia     Arrhythmia     AFIB    Ataxia     Webb's esophagus     Cervical spinal stenosis     Coronary atherosclerosis of unspecified type of vessel, native or graft     Diabetes ([address]     Fatigue     GERD (gastroesophageal reflux disease)     Hyperlipidemia     Hypertension     Ill-defined condition     right torn rotater cuff- no surgery at this time    Liver disease     pt is unaware    Osteoarthritis     Psychiatric disorder     anxiety and depression    Sarcoidosis     Sleep apnea     uses cpap    Stroke ([address]     TIA'S    Thyroid disease       Social History     Social History    Marital status:      Spouse name: N/A    Number of children: N/A    Years of education: N/A     Social History Main Topics    Smoking status: Never Smoker    Smokeless tobacco: Never Used    Alcohol use No    Drug use: No    Sexual activity: Yes     Partners: Male     Other Topics Concern    None     Social History Narrative     Allergies   Allergen Reactions    Procardia Xl [Nifedipine] Other (comments)     weakness      Family History   Problem Relation Age of Onset    Other Mother      Fibromyalgia    Pacemaker Mother     Heart Disease Mother     Heart Failure Mother     COPD Mother     Heart Attack Father 61    Cancer Sister      Multiple Myeloma    Diabetes Brother     Obesity Brother     Pacemaker Brother     Heart Attack Brother      Bundle branch block    No Known Problems Son     Psychiatric Disorder Daughter      Multiple      Current Facility-Administered Medications   Medication Dose Route Frequency    benazepril (LOTENSIN) tablet 40 mg  40 mg Oral BID    ceFAZolin (ANCEF) 2 g/20 mL in sterile water IV syringe  2 g IntraVENous Q8H    lactobac ac& pc-s.therm-b.anim (FRAN Q/RISAQUAD)  1 Cap Oral DAILY    acetaminophen (TYLENOL) tablet 650 mg  650 mg Oral Q6H PRN    oxyCODONE-acetaminophen (PERCOCET) 5-325 mg per tablet 1 Tab  1 Tab Oral Q6H PRN    naloxone (NARCAN) injection 0.4 mg  0.4 mg IntraVENous PRN    ondansetron (ZOFRAN) injection 4 mg  4 mg IntraVENous Q4H PRN    bisacodyl (DULCOLAX) suppository 10 mg  10 mg Rectal DAILY PRN    enoxaparin (LOVENOX) injection 40 mg  40 mg SubCUTAneous Q24H    insulin lispro (HUMALOG) injection   SubCUTAneous AC&HS    glucose chewable tablet 16 g  4 Tab Oral PRN    dextrose (D50W) injection syrg 12.5-25 g  12.5-25 g IntraVENous PRN    glucagon (GLUCAGEN) injection 1 mg  1 mg IntraMUSCular PRN    furosemide (LASIX) injection 40 mg  40 mg IntraVENous BID    hydrALAZINE (APRESOLINE) 20 mg/mL injection 20 mg  20 mg IntraVENous Q6H PRN    pravastatin (PRAVACHOL) tablet 20 mg  20 mg Oral QHS    busPIRone (BUSPAR) tablet 5 mg  5 mg Oral BID    pantoprazole (PROTONIX) tablet 40 mg  40 mg Oral ACB    levothyroxine (SYNTHROID) tablet 50 mcg  50 mcg Oral ACB    clopidogrel (PLAVIX) tablet 75 mg  75 mg Oral DAILY    sertraline (ZOLOFT) tablet 100 mg  100 mg Oral DAILY    dilTIAZem CD (CARDIZEM CD) capsule 240 mg  240 mg Oral DAILY    aspirin delayed-release tablet 81 mg  81 mg Oral DAILY    sodium chloride (NS) flush 5-10 mL  5-10 mL IntraVENous Q8H    sodium chloride (NS) flush 5-10 mL  5-10 mL IntraVENous PRN        Review of Symptoms:   Constitutional: negative  Eyes: negative   Ears, nose, mouth, throat, and face: negative  Respiratory: negative   Cardiovascular: negative   Gastrointestinal: negative  Genitourinary:negative   Musculoskeletal:negative   Neurological: negative   Endocrine: negative          Objective:      Visit Vitals    /80 (BP 1 Location: Left arm, BP Patient Position: At rest)    Pulse 73    Temp 98.1 °F (36.7 °C)    Resp 20    Wt 170 lb 10.2 oz (77.4 kg)    SpO2 96%    BMI 32.24 kg/m2       Physical:   General:   Heart: RRR, no m/S3/JVD, no carotid bruits   Lungs: clear   Abdomen: Soft, +BS, NTND   Extremities: LE kelly +DP/PT, no edema   Neurologic: Grossly normal    Data Review:   Recent Labs      04/08/18   0328  04/07/18   1300   WBC  11.2*  12.0*   HGB  9.6*  10.3*   HCT  30.9*  32.7*   PLT  226  273     Recent Labs      04/08/18   0328  04/07/18   1300   NA  140  139   K  3.1*  3.6   CL  104  105   CO2  26  23   GLU  149*  169*   BUN  20  20   CREA  1.01  1.13*   CA  8.7  8.9 ALB  3.1*  3.2*   TBILI  1.1*  1.3*   SGOT  15  17   ALT  <6*  <6*       Recent Labs      04/08/18   0328  04/07/18   1300   TROIQ  <0.04  <0.04         Intake/Output Summary (Last 24 hours) at 04/08/18 1425  Last data filed at 04/08/18 8515   Gross per 24 hour   Intake              260 ml   Output             1750 ml   Net            -1490 ml        Cardiographics    Telemetry:   ECG:   Echocardiogram:   CXRAY:       Assessment:       Active Problems:    Precordial pain (9/7/2016)      Acute on chronic diastolic (congestive) heart failure ([facility] (4/7/2018)      GI bleed (4/7/2018)         Plan:     DHF responding to Rx. Uncertain about her CP; no sign of ischemia. Possibly tightness related to CHF, perhaps to old pacer site. Will touch base with Becky Leon about reimplantation timing.     Umair Adam MD

## 2018-04-08 NOTE — PROGRESS NOTES
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 Juan Rowe 18 POA  S/P pacemaker removal  Essential HTN  -per chart review from ID's note, abx duration is (3/9-18). Pt's family states they were told to complete abx on 18. I will discuss with Dr Latasha Pisano in the AM  -con't ASA/plavix/Diltiazem, BB  -domingo Q   -pt is scheduled for PPM placement next Thursday     DM2 controlled without complication  -SSI, hold metformin     Hypothyroidism  - con't synthroid     Hyperlipidemia  -con't pravachol    Depression  -con't zoloft     Obesity POA Body mass index is 32.24 kg/(m^2). Code status: Full  Prophylaxis: Lovenox  Recommended Disposition: Home w/Family     Subjective:     Chief Complaint / Reason for Physician Visit  Pt seen at bedside. No acute complaints. Stable on RA. Discussed with RN events overnight. Review of Systems:  Symptom Y/N Comments  Symptom Y/N Comments   Fever/Chills n   Chest Pain n    Poor Appetite    Edema     Cough    Abdominal Pain n    Sputum    Joint Pain     SOB/STRICKLAND n   Pruritis/Rash     Nausea/vomit    Tolerating PT/OT     Diarrhea    Tolerating Diet     Constipation    Other       Could NOT obtain due to:      Objective:     VITALS:   Last 24hrs VS reviewed since prior progress note.  Most recent are:  Patient Vitals for the past 24 hrs:   Temp Pulse Resp BP SpO2   18 0727 98 °F (36.7 °C) 76 20 112/84 94 %   18 0330 97.3 °F (36.3 °C) 74 22 165/87 97 %   18 2249 97.6 °F (36.4 °C) 75 22 134/69 99 %   18 2910 98 °F (36.7 °C) 75 22 127/77 96 %   04/07/18 1815 98.3 °F (36.8 °C) 75 22 161/68 94 %   04/07/18 1800 - 76 19 157/83 -   04/07/18 1745 - 77 (!) 31 129/73 95 %   04/07/18 1730 - 80 27 149/84 94 %   04/07/18 1700 - 81 24 149/87 93 %   04/07/18 1600 - 75 23 164/70 95 %   04/07/18 1330 - 76 20 (!) 162/96 91 %   04/07/18 1245 - 83 22 (!) 155/103 (!) 89 %   04/07/18 1235 98.3 °F (36.8 °C) 77 20 (!) 162/100 96 %       Intake/Output Summary (Last 24 hours) at 04/08/18 1002  Last data filed at 04/08/18 0608   Gross per 24 hour   Intake              260 ml   Output             1750 ml   Net            -1490 ml        PHYSICAL EXAM:  General: WD, WN. Alert, cooperative, no acute distress    EENT:  EOMI. Anicteric sclerae. MMM  Resp:  CTA bilaterally, no wheezing or rales. No accessory muscle use  CV:  Regular  rhythm,  No edema  GI:  Soft, Non distended, Non tender.  +BS  Neurologic:  Alert and oriented X 3, normal speech   Psych:   Not anxious nor agitated  Skin:  No rashes. No jaundice    Reviewed most current lab test results and cultures  YES  Reviewed most current radiology test results   YES  Review and summation of old records today    NO  Reviewed patient's current orders and MAR    YES  PMH/SH reviewed - no change compared to H&P  ________________________________________________________________________  Care Plan discussed with:    Comments   Patient x    Family  x Pt's /daughter   RN x    Care Manager     Consultant                        Multidiciplinary team rounds were held today with , nursing, pharmacist and clinical coordinator. Patient's plan of care was discussed; medications were reviewed and discharge planning was addressed.      ________________________________________________________________________  Total NON critical care TIME:  35   Minutes    Total CRITICAL CARE TIME Spent:   Minutes non procedure based      Comments   >50% of visit spent in counseling and coordination of care ________________________________________________________________________  Jan Hewitt MD     Procedures: see electronic medical records for all procedures/Xrays and details which were not copied into this note but were reviewed prior to creation of Plan. LABS:  I reviewed today's most current labs and imaging studies.   Pertinent labs include:  Recent Labs      04/08/18   0328  04/07/18   1300   WBC  11.2*  12.0*   HGB  9.6*  10.3*   HCT  30.9*  32.7*   PLT  226  273     Recent Labs      04/08/18   0328  04/07/18   1300   NA  140  139   K  3.1*  3.6   CL  104  105   CO2  26  23   GLU  149*  169*   BUN  20  20   CREA  1.01  1.13*   CA  8.7  8.9   ALB  3.1*  3.2*   TBILI  1.1*  1.3*   SGOT  15  17   ALT  <6*  <6*       Signed: Jan Hewitt MD

## 2018-04-09 ENCOUNTER — HOME CARE VISIT (OUTPATIENT)
Dept: HOME HEALTH SERVICES | Facility: HOME HEALTH | Age: 83
End: 2018-04-09
Payer: MEDICARE

## 2018-04-09 LAB
ANION GAP SERPL CALC-SCNC: 13 MMOL/L (ref 5–15)
BASOPHILS # BLD: 0 K/UL (ref 0–0.1)
BASOPHILS NFR BLD: 0 % (ref 0–1)
BUN SERPL-MCNC: 18 MG/DL (ref 6–20)
BUN/CREAT SERPL: 16 (ref 12–20)
CALCIUM SERPL-MCNC: 8.7 MG/DL (ref 8.5–10.1)
CHLORIDE SERPL-SCNC: 100 MMOL/L (ref 97–108)
CO2 SERPL-SCNC: 28 MMOL/L (ref 21–32)
CREAT SERPL-MCNC: 1.12 MG/DL (ref 0.55–1.02)
DIFFERENTIAL METHOD BLD: ABNORMAL
EOSINOPHIL # BLD: 0.3 K/UL (ref 0–0.4)
EOSINOPHIL NFR BLD: 3 % (ref 0–7)
ERYTHROCYTE [DISTWIDTH] IN BLOOD BY AUTOMATED COUNT: 15.9 % (ref 11.5–14.5)
GLUCOSE BLD STRIP.AUTO-MCNC: 140 MG/DL (ref 65–100)
GLUCOSE BLD STRIP.AUTO-MCNC: 151 MG/DL (ref 65–100)
GLUCOSE BLD STRIP.AUTO-MCNC: 159 MG/DL (ref 65–100)
GLUCOSE BLD STRIP.AUTO-MCNC: 174 MG/DL (ref 65–100)
GLUCOSE BLD STRIP.AUTO-MCNC: 220 MG/DL (ref 65–100)
GLUCOSE SERPL-MCNC: 127 MG/DL (ref 65–100)
HCT VFR BLD AUTO: 31.2 % (ref 35–47)
HGB BLD-MCNC: 9.8 G/DL (ref 11.5–16)
IMM GRANULOCYTES # BLD: 0 K/UL (ref 0–0.04)
IMM GRANULOCYTES NFR BLD AUTO: 0 % (ref 0–0.5)
LYMPHOCYTES # BLD: 1.6 K/UL (ref 0.8–3.5)
LYMPHOCYTES NFR BLD: 15 % (ref 12–49)
MAGNESIUM SERPL-MCNC: 1.7 MG/DL (ref 1.6–2.4)
MCH RBC QN AUTO: 28.8 PG (ref 26–34)
MCHC RBC AUTO-ENTMCNC: 31.4 G/DL (ref 30–36.5)
MCV RBC AUTO: 91.8 FL (ref 80–99)
MONOCYTES # BLD: 1.2 K/UL (ref 0–1)
MONOCYTES NFR BLD: 11 % (ref 5–13)
NEUTS SEG # BLD: 7.4 K/UL (ref 1.8–8)
NEUTS SEG NFR BLD: 70 % (ref 32–75)
NRBC # BLD: 0 K/UL (ref 0–0.01)
NRBC BLD-RTO: 0 PER 100 WBC
PLATELET # BLD AUTO: 253 K/UL (ref 150–400)
PMV BLD AUTO: 10.8 FL (ref 8.9–12.9)
POTASSIUM SERPL-SCNC: 3.1 MMOL/L (ref 3.5–5.1)
RBC # BLD AUTO: 3.4 M/UL (ref 3.8–5.2)
SERVICE CMNT-IMP: ABNORMAL
SODIUM SERPL-SCNC: 141 MMOL/L (ref 136–145)
WBC # BLD AUTO: 10.7 K/UL (ref 3.6–11)

## 2018-04-09 PROCEDURE — 97535 SELF CARE MNGMENT TRAINING: CPT | Performed by: OCCUPATIONAL THERAPIST

## 2018-04-09 PROCEDURE — G8979 MOBILITY GOAL STATUS: HCPCS | Performed by: PHYSICAL THERAPIST

## 2018-04-09 PROCEDURE — 85025 COMPLETE CBC W/AUTO DIFF WBC: CPT | Performed by: INTERNAL MEDICINE

## 2018-04-09 PROCEDURE — 74011250637 HC RX REV CODE- 250/637: Performed by: INTERNAL MEDICINE

## 2018-04-09 PROCEDURE — 82962 GLUCOSE BLOOD TEST: CPT

## 2018-04-09 PROCEDURE — G8988 SELF CARE GOAL STATUS: HCPCS | Performed by: OCCUPATIONAL THERAPIST

## 2018-04-09 PROCEDURE — 77030020847 HC STATLOK BARD -A

## 2018-04-09 PROCEDURE — 83735 ASSAY OF MAGNESIUM: CPT | Performed by: INTERNAL MEDICINE

## 2018-04-09 PROCEDURE — G8978 MOBILITY CURRENT STATUS: HCPCS | Performed by: PHYSICAL THERAPIST

## 2018-04-09 PROCEDURE — 74011250637 HC RX REV CODE- 250/637: Performed by: NURSE PRACTITIONER

## 2018-04-09 PROCEDURE — 3331090001 HH PPS REVENUE CREDIT

## 2018-04-09 PROCEDURE — G8987 SELF CARE CURRENT STATUS: HCPCS | Performed by: OCCUPATIONAL THERAPIST

## 2018-04-09 PROCEDURE — 74011000258 HC RX REV CODE- 258: Performed by: INTERNAL MEDICINE

## 2018-04-09 PROCEDURE — 97116 GAIT TRAINING THERAPY: CPT | Performed by: PHYSICAL THERAPIST

## 2018-04-09 PROCEDURE — G8989 SELF CARE D/C STATUS: HCPCS | Performed by: OCCUPATIONAL THERAPIST

## 2018-04-09 PROCEDURE — 74011250636 HC RX REV CODE- 250/636: Performed by: INTERNAL MEDICINE

## 2018-04-09 PROCEDURE — 36415 COLL VENOUS BLD VENIPUNCTURE: CPT | Performed by: INTERNAL MEDICINE

## 2018-04-09 PROCEDURE — 3331090002 HH PPS REVENUE DEBIT

## 2018-04-09 PROCEDURE — 80048 BASIC METABOLIC PNL TOTAL CA: CPT | Performed by: INTERNAL MEDICINE

## 2018-04-09 PROCEDURE — 97161 PT EVAL LOW COMPLEX 20 MIN: CPT | Performed by: PHYSICAL THERAPIST

## 2018-04-09 PROCEDURE — 65660000000 HC RM CCU STEPDOWN

## 2018-04-09 PROCEDURE — 97165 OT EVAL LOW COMPLEX 30 MIN: CPT | Performed by: OCCUPATIONAL THERAPIST

## 2018-04-09 PROCEDURE — 74011636637 HC RX REV CODE- 636/637: Performed by: INTERNAL MEDICINE

## 2018-04-09 RX ORDER — SODIUM CHLORIDE 0.9 % (FLUSH) 0.9 %
10 SYRINGE (ML) INJECTION EVERY 24 HOURS
Status: DISCONTINUED | OUTPATIENT
Start: 2018-04-09 | End: 2018-04-12 | Stop reason: HOSPADM

## 2018-04-09 RX ORDER — SODIUM CHLORIDE 0.9 % (FLUSH) 0.9 %
10-40 SYRINGE (ML) INJECTION EVERY 8 HOURS
Status: DISCONTINUED | OUTPATIENT
Start: 2018-04-09 | End: 2018-04-12 | Stop reason: HOSPADM

## 2018-04-09 RX ORDER — SODIUM CHLORIDE 0.9 % (FLUSH) 0.9 %
10-30 SYRINGE (ML) INJECTION AS NEEDED
Status: DISCONTINUED | OUTPATIENT
Start: 2018-04-09 | End: 2018-04-12 | Stop reason: HOSPADM

## 2018-04-09 RX ORDER — SODIUM CHLORIDE 0.9 % (FLUSH) 0.9 %
10 SYRINGE (ML) INJECTION AS NEEDED
Status: DISCONTINUED | OUTPATIENT
Start: 2018-04-09 | End: 2018-04-12 | Stop reason: HOSPADM

## 2018-04-09 RX ORDER — HEPARIN 100 UNIT/ML
300 SYRINGE INTRAVENOUS AS NEEDED
Status: DISCONTINUED | OUTPATIENT
Start: 2018-04-09 | End: 2018-04-12 | Stop reason: HOSPADM

## 2018-04-09 RX ORDER — MAGNESIUM SULFATE 1 G/100ML
1 INJECTION INTRAVENOUS ONCE
Status: COMPLETED | OUTPATIENT
Start: 2018-04-09 | End: 2018-04-11

## 2018-04-09 RX ORDER — SODIUM CHLORIDE 0.9 % (FLUSH) 0.9 %
20 SYRINGE (ML) INJECTION EVERY 24 HOURS
Status: DISCONTINUED | OUTPATIENT
Start: 2018-04-09 | End: 2018-04-12 | Stop reason: HOSPADM

## 2018-04-09 RX ORDER — BUSPIRONE HYDROCHLORIDE 5 MG/1
5 TABLET ORAL 2 TIMES DAILY
COMMUNITY
End: 2018-08-16 | Stop reason: SDUPTHER

## 2018-04-09 RX ORDER — POTASSIUM CHLORIDE 750 MG/1
40 TABLET, FILM COATED, EXTENDED RELEASE ORAL DAILY
Status: DISCONTINUED | OUTPATIENT
Start: 2018-04-09 | End: 2018-04-12 | Stop reason: HOSPADM

## 2018-04-09 RX ADMIN — BUSPIRONE HYDROCHLORIDE 5 MG: 5 TABLET ORAL at 17:10

## 2018-04-09 RX ADMIN — FUROSEMIDE 40 MG: 10 INJECTION, SOLUTION INTRAMUSCULAR; INTRAVENOUS at 17:10

## 2018-04-09 RX ADMIN — Medication 10 ML: at 06:00

## 2018-04-09 RX ADMIN — INSULIN LISPRO 2 UNITS: 100 INJECTION, SOLUTION INTRAVENOUS; SUBCUTANEOUS at 08:06

## 2018-04-09 RX ADMIN — SERTRALINE HYDROCHLORIDE 100 MG: 50 TABLET ORAL at 10:02

## 2018-04-09 RX ADMIN — BUSPIRONE HYDROCHLORIDE 5 MG: 5 TABLET ORAL at 10:02

## 2018-04-09 RX ADMIN — Medication 10 ML: at 15:00

## 2018-04-09 RX ADMIN — Medication 2 G: at 11:40

## 2018-04-09 RX ADMIN — Medication 1 CAPSULE: at 10:02

## 2018-04-09 RX ADMIN — ENOXAPARIN SODIUM 40 MG: 40 INJECTION SUBCUTANEOUS at 08:58

## 2018-04-09 RX ADMIN — ASPIRIN 81 MG: 81 TABLET, COATED ORAL at 10:02

## 2018-04-09 RX ADMIN — BENAZEPRIL HYDROCHLORIDE 40 MG: 10 TABLET, FILM COATED ORAL at 17:10

## 2018-04-09 RX ADMIN — Medication 10 ML: at 14:00

## 2018-04-09 RX ADMIN — CLOPIDOGREL BISULFATE 75 MG: 75 TABLET ORAL at 10:02

## 2018-04-09 RX ADMIN — FUROSEMIDE 40 MG: 10 INJECTION, SOLUTION INTRAMUSCULAR; INTRAVENOUS at 10:02

## 2018-04-09 RX ADMIN — PANTOPRAZOLE SODIUM 40 MG: 40 TABLET, DELAYED RELEASE ORAL at 08:03

## 2018-04-09 RX ADMIN — CEFAZOLIN SODIUM 1 G: 1 INJECTION, POWDER, FOR SOLUTION INTRAMUSCULAR; INTRAVENOUS at 22:11

## 2018-04-09 RX ADMIN — Medication 10 ML: at 21:07

## 2018-04-09 RX ADMIN — Medication 10 ML: at 21:08

## 2018-04-09 RX ADMIN — INSULIN LISPRO 3 UNITS: 100 INJECTION, SOLUTION INTRAVENOUS; SUBCUTANEOUS at 11:58

## 2018-04-09 RX ADMIN — DILTIAZEM HYDROCHLORIDE 240 MG: 240 CAPSULE, COATED, EXTENDED RELEASE ORAL at 10:02

## 2018-04-09 RX ADMIN — BENAZEPRIL HYDROCHLORIDE 40 MG: 10 TABLET, FILM COATED ORAL at 10:02

## 2018-04-09 RX ADMIN — LEVOTHYROXINE SODIUM 50 MCG: 50 TABLET ORAL at 08:04

## 2018-04-09 RX ADMIN — Medication 2 G: at 02:33

## 2018-04-09 RX ADMIN — PRAVASTATIN SODIUM 20 MG: 10 TABLET ORAL at 21:07

## 2018-04-09 RX ADMIN — MAGNESIUM SULFATE HEPTAHYDRATE 1 G: 1 INJECTION, SOLUTION INTRAVENOUS at 11:40

## 2018-04-09 RX ADMIN — POTASSIUM CHLORIDE 40 MEQ: 750 TABLET, EXTENDED RELEASE ORAL at 10:02

## 2018-04-09 NOTE — PROGRESS NOTES
Pharmacy Clarification of the Prior to Admission Medication Regimen Retrospective to the Admission Medication Reconciliation    The patient was interviewed regarding clarification of the prior to admission medication regimen and was questioned regarding use of any other inhalers, topical products, over the counter medications, herbal medications, vitamin products or ophthalmic/nasal/otic medication use. Information Obtained From: Patient, RX Query    Recommendations/Findings: The following amendments were made to the patient's active medication list on file at [facility]:     1) Additions:  NONE    2) Removals:    Furosemide (LASIX) 20 mg tablet (see pertinent findings below)   Menthol (BIOFREEZE, MENTHOL) 4% gel    3) Changes:   benazepril (LOTENSIN) 40 mg tablet (Old regimen: Take 1 tab by mouth BID /New regimen: Take 1 tab by mouth daily)   busPIRone (BUSPAR) 5 mg tablet (Old regimen: Take 1 tab by mouth BID /New regimen: Take 1 tab by mouth daily)   DILT- mg XR capsule (Old regimen: Take 2 caps by mouth daily /New regimen: Take 1 cap by mouth daily)   magnesium 250 mg tab (Old regimen: Take 1 tab by mouth daily /New regimen: Take 1 tab by mouth every evening)    4) Pertinent Pharmacy Findings:   Identified High Alert Medication Information  o Current IV antibiotics:   - Name: Cefazolin  - Dosing Instructions: 2 g IV q8h for 6 weeks (started on 3/13 at home)  - Indication: Sepsis infection secondary to pacemaker  - Last dose administered: 04/07/18 (at home)   Furosemide (LASIX) 20 mg tablet:  Patient was prescribed this medication for 14 days. Her last day of this medication was Friday, 04/06/18. PTA medication list was corrected to the following:     Prior to Admission Medications   Prescriptions Last Dose Informant Patient Reported? Taking?   0.9 % SODIUM CHLORIDE (NORMAL SALINE FLUSH INJECTION) 4/7/2018 at Unknown time Self Yes Yes   Sig: 10 mL by IntraVENous route every eight (8) hours. Cholecalciferol, Vitamin D3, (VITAMIN D3) 2,000 unit cap capsule 2018 at Unknown time Self Yes Yes   Sig: Take 2,000 Units by mouth daily. DILT- mg XR capsule 2018 at Unknown time Self No Yes   Sig: TAKE ONE CAPSULE BY MOUTH ONCE DAILY   HEPARIN SOD,PORCINE/0.9 % NACL (HEPARIN FLUSH IV) 2018 at Unknown time Self Yes Yes   Sig: 3 mL by IntraVENous route every eight (8) hours. 10u/ml   L. acidoph & paracasei- S therm- Bifido (FRAN-Q/RISAQUAD) 8 billion cell cap cap 2018 at Unknown time Self No Yes   Sig: Take 1 Cap by mouth daily. acetaminophen (TYLENOL) 500 mg tablet 3/7/2018 at Unknown time Self Yes Yes   Sig: Take 500 mg by mouth every four (4) hours as needed for Pain. aspirin delayed-release 81 mg tablet 2018 at Unknown time Self Yes Yes   Sig: Take 81 mg by mouth daily. benazepril (LOTENSIN) 40 mg tablet 2018 at Unknown time Self No Yes   Sig: Take 1 Tab by mouth daily. busPIRone (BUSPAR) 5 mg tablet 2018 Self Yes Yes   Sig: Take 5 mg by mouth daily. ceFAZolin 1 g, ADDaptor 1 Device 2018 at Unknown time Self No Yes   Si g by IntraVENous route every eight (8) hours for 42 days. Patient taking differently: 2 g by IntraVENous route every eight (8) hours. clopidogrel (PLAVIX) 75 mg tab 2018 at Unknown time Self Yes Yes   Sig: Take 75 mg by mouth daily. ferrous sulfate (IRON) 325 mg (65 mg iron) tablet 2018 at Unknown time Self Yes Yes   Sig: Take 325 mg by mouth every other day. levothyroxine (SYNTHROID) 50 mcg tablet 2018 at Unknown time Self No Yes   Sig: Take 1 Tab by mouth Daily (before breakfast). magnesium 250 mg tab 2018 at Unknown time Self Yes Yes   Sig: Take 250 mg by mouth every evening.    metFORMIN ER (GLUCOPHAGE XR) 750 mg tablet 2018 at Unknown time Self No Yes   Sig: TAKE ONE TABLET BY MOUTH TWICE DAILY   multivit-min-FA-lycopen-lutein (CENTRUM SILVER) 0.4-300-250 mg-mcg-mcg tab 2018 at Unknown time Self Yes Yes Sig: Take 1 Tab by mouth daily. pantoprazole (PROTONIX) 40 mg tablet 4/7/2018 at Unknown time Self Yes Yes   Sig: Take 40 mg by mouth two (2) times a day. pravastatin (PRAVACHOL) 20 mg tablet 4/6/2018 at Unknown time Self No Yes   Sig: TAKE ONE TABLET BY MOUTH NIGHTLY   sertraline (ZOLOFT) 100 mg tablet 4/6/2018 at Unknown time Self No Yes   Sig: Take 1 Tab by mouth daily.       Facility-Administered Medications: None          Thank you,  Ankur Dickson  PharmD Candidate [id number]

## 2018-04-09 NOTE — PROGRESS NOTES
[address] [place] [address]  [phone]      Cardiology Progress Note      4/9/2018 12:58 PM    Admit Date: 4/7/2018    Admit Diagnosis:   Acute on chronic diastolic (congestive) heart failure (HCC)*    Subjective:     Viky Arch     Visit Vitals    /76    Pulse 86    Temp 98.2 °F (36.8 °C)    Resp 22    Wt 169 lb 3.2 oz (76.7 kg)    SpO2 95%    BMI 31.97 kg/m2       Current Facility-Administered Medications   Medication Dose Route Frequency    potassium chloride SR (KLOR-CON 10) tablet 40 mEq  40 mEq Oral DAILY    ceFAZolin (ANCEF) 1 g in 0.9% sodium chloride (MBP/ADV) 50 mL  1 g IntraVENous Q12H    benazepril (LOTENSIN) tablet 40 mg  40 mg Oral BID    lactobac ac& pc-s.therm-b.anim (FRAN Q/RISAQUAD)  1 Cap Oral DAILY    acetaminophen (TYLENOL) tablet 650 mg  650 mg Oral Q6H PRN    oxyCODONE-acetaminophen (PERCOCET) 5-325 mg per tablet 1 Tab  1 Tab Oral Q6H PRN    naloxone (NARCAN) injection 0.4 mg  0.4 mg IntraVENous PRN    ondansetron (ZOFRAN) injection 4 mg  4 mg IntraVENous Q4H PRN    bisacodyl (DULCOLAX) suppository 10 mg  10 mg Rectal DAILY PRN    enoxaparin (LOVENOX) injection 40 mg  40 mg SubCUTAneous Q24H    insulin lispro (HUMALOG) injection   SubCUTAneous AC&HS    glucose chewable tablet 16 g  4 Tab Oral PRN    dextrose (D50W) injection syrg 12.5-25 g  12.5-25 g IntraVENous PRN    glucagon (GLUCAGEN) injection 1 mg  1 mg IntraMUSCular PRN    furosemide (LASIX) injection 40 mg  40 mg IntraVENous BID    hydrALAZINE (APRESOLINE) 20 mg/mL injection 20 mg  20 mg IntraVENous Q6H PRN    pravastatin (PRAVACHOL) tablet 20 mg  20 mg Oral QHS    busPIRone (BUSPAR) tablet 5 mg  5 mg Oral BID    pantoprazole (PROTONIX) tablet 40 mg  40 mg Oral ACB    levothyroxine (SYNTHROID) tablet 50 mcg  50 mcg Oral ACB    clopidogrel (PLAVIX) tablet 75 mg  75 mg Oral DAILY    sertraline (ZOLOFT) tablet 100 mg  100 mg Oral DAILY    dilTIAZem CD (CARDIZEM CD) capsule 240 mg  240 mg Oral DAILY    aspirin delayed-release tablet 81 mg  81 mg Oral DAILY    sodium chloride (NS) flush 5-10 mL  5-10 mL IntraVENous Q8H    sodium chloride (NS) flush 5-10 mL  5-10 mL IntraVENous PRN       Objective:      Physical Exam:  General Appearance:    Chest:   Clear  Cardiovascular:  Regular rate and rhythm, no murmur.   Abdomen:   Soft, non-tender, bowel sounds are active.   Extremities:   Skin:  Warm and dry.     Data Review:   Recent Labs      04/09/18   0234  04/08/18   0328  04/07/18   1300   WBC  10.7  11.2*  12.0*   HGB  9.8*  9.6*  10.3*   HCT  31.2*  30.9*  32.7*   PLT  253  226  273     Recent Labs      04/09/18   0234  04/08/18   0328  04/07/18   1300   NA  141  140  139   K  3.1*  3.1*  3.6   CL  100  104  105   CO2  28  26  23   GLU  127*  149*  169*   BUN  18  20  20   CREA  1.12*  1.01  1.13*   CA  8.7  8.7  8.9   MG  1.7   --    --    ALB   --   3.1*  3.2*   TBILI   --   1.1*  1.3*   SGOT   --   15  17   ALT   --   <6*  <6*       Recent Labs      04/08/18   0328  04/07/18   1300   TROIQ  <0.04  <0.04         Intake/Output Summary (Last 24 hours) at 04/09/18 1258  Last data filed at 04/09/18 1111   Gross per 24 hour   Intake              780 ml   Output             1850 ml   Net            -1070 ml        Telemetry:   EKG:  Cxray:    Assessment:     Active Problems:    Paroxysmal atrial fibrillation ([address] (9/7/2016)      Overview: S/p Watchman      Precordial pain (9/7/2016)      Infection of pacemaker pocket ([address] (3/6/2018)      Pacemaker (3/6/2018)      Overview: 3/7/18 explant of pacemaker      Acute on chronic diastolic (congestive) heart failure ([address] (4/7/2018)      GI bleed (4/7/2018)        Plan:     Diuresing well.   Dr Elena Hernandez to see re:  Pacer timing

## 2018-04-09 NOTE — PROGRESS NOTES
Problem: Falls - Risk of  Goal: *Absence of Falls  Document Janett Fall Risk and appropriate interventions in the flowsheet.    Outcome: Progressing Towards Goal  Fall Risk Interventions:  Mobility Interventions: Bed/chair exit alarm, Communicate number of staff needed for ambulation/transfer, Mechanical lift, OT consult for ADLs, Patient to call before getting OOB         Medication Interventions: Bed/chair exit alarm, Evaluate medications/consider consulting pharmacy, Patient to call before getting OOB, Teach patient to arise slowly    Elimination Interventions: Bed/chair exit alarm, Call light in reach, Elevated toilet seat, Patient to call for help with toileting needs, Toilet paper/wipes in reach, Toileting schedule/hourly rounds    History of Falls Interventions: Bed/chair exit alarm, Consult care management for discharge planning, Door open when patient unattended, Evaluate medications/consider consulting pharmacy, Investigate reason for fall, Room close to nurse's station

## 2018-04-09 NOTE — CONSULTS
[address] [place] [address]  [phone]        Date of  Admission: 4/7/2018 12:26 PM     Admission type:Emergency    Consult for: pacemaker   Consult by: Dr Jesus Rao NP     Subjective:     Yvette Calvert is a [de-identified] y.o. female admitted for Acute on chronic diastolic (congestive) heart failure (HCC)  GI bleed. She is s/p temp/perm pacemaker implanted via the neck and lead extraction/device removal from the left chest (3/18). Krystle Combe was wnl - no vegetations noted. Abx per ID. Patient denies chest pain, chest pressure/discomfort, dyspnea, orthopnea, lower extremity edema. Patient Active Problem List    Diagnosis Date Noted    Acute on chronic diastolic (congestive) heart failure ([address] 04/07/2018    GI bleed 04/07/2018    Severe obesity (BMI 35.0-39. 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    ACP (advance care planning) 11/11/2015    Hypothyroidism, adult 07/31/2015    Type 2 diabetes mellitus without complication ([address] 56/47/8114    Essential hypertension 04/27/2015    MALLORY (obstructive sleep apnea) 07/09/2014    Hyperlipidemia 07/09/2014    Sarcoid 07/09/2014    Stress bladder incontinence, female 07/09/2014    Obesity 07/09/2014    TIA (transient ischemic attack) 07/09/2014    AR (aortic regurgitation) 07/09/2014    Mitral valve insufficiency 07/09/2014    Cervical stenosis of spine 07/09/2014      Jean-Pierre Smith MD  Past Medical History:   Diagnosis Date    Anemia     Arrhythmia     AFIB    Ataxia     Webb's esophagus     Cervical spinal stenosis     Coronary atherosclerosis of unspecified type of vessel, native or graft     Diabetes [facility]     Fatigue     GERD (gastroesophageal reflux disease)     Hyperlipidemia     Hypertension     Ill-defined condition     right torn rotater cuff- no surgery at this time    Liver disease     pt is unaware    Osteoarthritis     Psychiatric disorder     anxiety and depression    Sarcoidosis     Sleep apnea     uses cpap    Stroke ([facility]     TIA'S    Thyroid disease       Social History     Social History    Marital status:      Spouse name: N/A    Number of children: N/A    Years of education: N/A     Social History Main Topics    Smoking status: Never Smoker    Smokeless tobacco: Never Used    Alcohol use No    Drug use: No    Sexual activity: Yes     Partners: Male     Other Topics Concern    None     Social History Narrative     Allergies   Allergen Reactions    Procardia Xl [Nifedipine] Other (comments)     weakness      Family History   Problem Relation Age of Onset    Other Mother      Fibromyalgia    Pacemaker Mother     Heart Disease Mother     Heart Failure Mother     COPD Mother     Heart Attack Father 61    Cancer Sister      Multiple Myeloma    Diabetes Brother     Obesity Brother     Pacemaker Brother     Heart Attack Brother      Bundle branch block    No Known Problems Son     Psychiatric Disorder Daughter      Multiple      Current Facility-Administered Medications   Medication Dose Route Frequency    potassium chloride SR (KLOR-CON 10) tablet 40 mEq  40 mEq Oral DAILY    benazepril (LOTENSIN) tablet 40 mg  40 mg Oral BID    ceFAZolin (ANCEF) 2 g/20 mL in sterile water IV syringe  2 g IntraVENous Q8H    lactobac ac& pc-s.therm-b.anim (FRAN Q/RISAQUAD)  1 Cap Oral DAILY    acetaminophen (TYLENOL) tablet 650 mg  650 mg Oral Q6H PRN    oxyCODONE-acetaminophen (PERCOCET) 5-325 mg per tablet 1 Tab  1 Tab Oral Q6H PRN    naloxone (NARCAN) injection 0.4 mg  0.4 mg IntraVENous PRN    ondansetron [facility]) injection 4 mg  4 mg IntraVENous Q4H PRN    bisacodyl (DULCOLAX) suppository 10 mg  10 mg Rectal DAILY PRN    enoxaparin (LOVENOX) injection 40 mg  40 mg SubCUTAneous Q24H    insulin lispro (HUMALOG) injection   SubCUTAneous AC&HS    glucose chewable tablet 16 g  4 Tab Oral PRN    dextrose (D50W) injection syrg 12.5-25 g  12.5-25 g IntraVENous PRN    glucagon (GLUCAGEN) injection 1 mg  1 mg IntraMUSCular PRN    furosemide (LASIX) injection 40 mg  40 mg IntraVENous BID    hydrALAZINE (APRESOLINE) 20 mg/mL injection 20 mg  20 mg IntraVENous Q6H PRN    pravastatin (PRAVACHOL) tablet 20 mg  20 mg Oral QHS    busPIRone (BUSPAR) tablet 5 mg  5 mg Oral BID    pantoprazole (PROTONIX) tablet 40 mg  40 mg Oral ACB    levothyroxine (SYNTHROID) tablet 50 mcg  50 mcg Oral ACB    clopidogrel (PLAVIX) tablet 75 mg  75 mg Oral DAILY    sertraline (ZOLOFT) tablet 100 mg  100 mg Oral DAILY    dilTIAZem CD (CARDIZEM CD) capsule 240 mg  240 mg Oral DAILY    aspirin delayed-release tablet 81 mg  81 mg Oral DAILY    sodium chloride (NS) flush 5-10 mL  5-10 mL IntraVENous Q8H    sodium chloride (NS) flush 5-10 mL  5-10 mL IntraVENous PRN         Review of Symptoms:  Constitutional: negative  Eyes: negative  Ears, nose, mouth, throat, and face: negative  Respiratory: negative  Cardiovascular: negative  Gastrointestinal: negative  Genitourinary:negative  Musculoskeletal:negative  Neurological: negative  Endocrine: negative     Subjective:      Visit Vitals    /90 (BP 1 Location: Left arm, BP Patient Position: Sitting)    Pulse 84    Temp 98.2 °F (36.8 °C)    Resp 20    Wt 169 lb 3.2 oz (76.7 kg)    SpO2 93%    BMI 31.97 kg/m2       Physical:  Heart: Regular, S1 WNL and S2 WNL.  No S3 or S4, no m/S3/JVD, no carotid bruits   Lungs: clear   Abdomen: Soft, +BS, NTND   Extremities: LE kelly +DP/PT, no edema   Neurologic: Grossly normal    Data Review:   Recent Labs      04/09/18   0234  04/08/18   4983 04/07/18   1300   WBC  10.7  11.2*  12.0*   HGB  9.8*  9.6*  10.3*   HCT  31.2*  30.9*  32.7*   PLT  253  226  273     Recent Labs      04/09/18   0234  04/08/18   0328  04/07/18   1300   NA  141  140  139   K  3.1*  3.1*  3.6   CL  100  104  105   CO2  28  26  23   GLU  127*  149*  169*   BUN  18  20  20   CREA  1.12*  1.01  1.13*   CA  8.7  8.7  8.9   MG  1.7   --    --    ALB   --   3.1*  3.2*   TBILI   --   1.1*  1.3*   SGOT   --   15  17   ALT   --   <6*  <6*       Recent Labs      04/08/18   0328  04/07/18   1300   TROIQ  <0.04  <0.04         Intake/Output Summary (Last 24 hours) at 04/09/18 1026  Last data filed at 04/09/18 0436   Gross per 24 hour   Intake              660 ml   Output             1700 ml   Net            -1040 ml        Cardiographics    Telemetry: paced        Assessment:           Active Problems:    Paroxysmal atrial fibrillation ([address] (9/7/2016)      Overview: S/p Watchman      Precordial pain (9/7/2016)      Infection of pacemaker pocket ([address] (3/6/2018)      Pacemaker (3/6/2018)      Overview: 3/7/18 explant of pacemaker      Acute on chronic diastolic (congestive) heart failure ([address] (4/7/2018)      GI bleed (4/7/2018)         Plan:     Rashida Clark is  s/p temp/perm pacemaker implanted via the neck and lead extraction/device removal from the left chest (3/18). Clyde Stockton was wnl - no vegetations noted. Abx per ID. Will get echo today. Will discuss with ID prior to re-implant of subclavian PM.  Echo today. She is a candidate for implantation of a permanent pacemaker from the R side and explantation of her temp/perm pacer from there R IJ. I discussed the risks/benefits/alternatives of the procedure with the patient. Risks include (but are not limited to) bleeding, heart block, infection, cva/mi/tamponade/death. The patient understands and agrees to proceed - will plan for wed am. Thank you for this interesting consultation. Thank you for this interesting consultation.     Rosio MENJIVAR Kaylin BROWN    Thank you for allowing me to participate in this patients care.     Phill Ovalle MD, Sanjeev Christine

## 2018-04-09 NOTE — HOME CARE
Please note that this patient was open to [address] [place] [address] at the time of hospital admission. If services will be needed at discharge, please order to resume them. Thank you.    Luisa Rodríguez RN  [address]

## 2018-04-09 NOTE — PROGRESS NOTES
Occupational Therapy EVALUATION/discharge  Patient: Luther Spencer [de-identified][de-identified] y.o. female)  Date: 4/9/2018  Primary Diagnosis: Acute on chronic diastolic (congestive) heart failure (HCC)  GI bleed        Precautions:  Fall    ASSESSMENT:   Based on the objective data described below, the patient presents at an overall Independent to Supervision level for all ADL. She is primarily limited by mild decreased dynamic balance and activity tolerance. VSS on room air throughout this session. Recommended using the rolling walker during ADL. Patient was receptive, but her daughter and  were insistent that she would not be able to use it in the house. Discussed fall risk/safety as a result, and they said \"I understand, but it just won't work in our house. \"  Patient is being followed by PT for balance/activity tolerance. Her safety and independence in functional tasks will improve as her mobility improves. Will complete the OT order and sign off. Further skilled acute occupational therapy is not indicated at this time. Discharge Recommendations: None  Further Equipment Recommendations for Discharge: None      SUBJECTIVE:   Patient stated Tell HIM that.     OBJECTIVE DATA SUMMARY:   HISTORY:   Past Medical History:   Diagnosis Date    Anemia     Arrhythmia     AFIB    Ataxia     Webb's esophagus     Cervical spinal stenosis     Coronary atherosclerosis of unspecified type of vessel, native or graft     Diabetes (HCC)     Fatigue     GERD (gastroesophageal reflux disease)     Hyperlipidemia     Hypertension     Ill-defined condition     right torn rotater cuff- no surgery at this time    Liver disease     pt is unaware    Osteoarthritis     Psychiatric disorder     anxiety and depression    Sarcoidosis     Sleep apnea     uses cpap    Stroke [facility]     TIA'S    Thyroid disease      Past Surgical History:   Procedure Laterality Date    CARDIAC SURG PROCEDURE UNLIST      cardioversion     CARDIAC SURG PROCEDURE UNLIST      watchman device    COLONOSCOPY N/A 12/28/2016    COLONOSCOPY performed by [facility]ngela Mon MD at [facility] ENDOSCOPY    HX CATARACT REMOVAL      both eyes    HX CHOLECYSTECTOMY      HX COLONOSCOPY  5/8/2013    Repeat in 5 years    HX CYST REMOVAL  10/15    on head     HX HYSTERECTOMY      HX ORTHOPAEDIC Bilateral     knee replacement to both knees    HX ORTHOPAEDIC      spacer btwn L4-5 and L5-6     HX TONSILLECTOMY      HX UROLOGICAL      botox in bladder       Prior Level of Function/Environment/Context: Modified independent to Independent with ADL and IADL; family is available to assist if needed. Expanded or extensive additional review of patient history:     Home Situation  Home Environment: Private residence  # Steps to Enter: 4  Rails to Enter: Yes  Hand Rails : Right  One/Two Story Residence: One story  Living Alone: No  Support Systems: Family member(s), Friends \ neighbors  Patient Expects to be Discharged to[de-identified] Private residence  Current DME Used/Available at Home: Cane, straight, Grab bars, Walker, rolling  Tub or Shower Type: Tub/Shower combination  [x]  Right hand dominant   []  Left hand dominant    EXAMINATION OF PERFORMANCE DEFICITS:  Cognitive/Behavioral Status:  Neurologic State: Alert  Orientation Level: Oriented X4  Cognition: Appropriate for age attention/concentration; Follows commands  Perception: Appears intact  Perseveration: No perseveration noted  Safety/Judgement: Awareness of environment; Insight into deficits;Home safety; Fall prevention    Hearing: Auditory  Auditory Impairment: None    Vision/Perceptual:    Not assessed; Pt reports no acute deficits/changes. Range of Motion:  AROM: Within functional limits                         Strength:  Strength: Within functional limits                Coordination:     Fine Motor Skills-Upper: Left Intact; Right Intact    Gross Motor Skills-Upper: Left Intact; Right Intact    Tone & Sensation:  Tone: Normal  Sensation: Intact                      Balance:  Sitting: Intact  Standing: Impaired  Standing - Static: Good  Standing - Dynamic : Fair    Functional Mobility and Transfers for ADLs:  Bed Mobility:  Supine to Sit: Modified independent  Scooting: Modified independent    Transfers:  Sit to Stand: Stand-by assistance  Stand to Sit: Supervision  Toilet Transfer : Stand-by assistance    ADL Assessment:  Feeding: Independent    Oral Facial Hygiene/Grooming: Independent    Bathing: Supervision    Upper Body Dressing: Modified independent    Lower Body Dressing: Supervision    Toileting: Supervision                ADL Intervention and task modifications:  Discussed safety and fall prevention during ADL. Recommended use of RW for functional mobility during self-care. Patient was receptive, but her family was not. Educated them on safety/fall risk associated with mild balance issues. Functional Measure:  Barthel Index:    Bathin  Bladder: 5  Bowels: 10  Groomin  Dressing: 10  Feeding: 10  Mobility: 10  Stairs: 5  Toilet Use: 5  Transfer (Bed to Chair and Back): 10  Total: 70       Barthel and G-code impairment scale:  Percentage of impairment CH  0% CI  1-19% CJ  20-39% CK  40-59% CL  60-79% CM  80-99% CN  100%   Barthel Score 0-100 100 99-80 79-60 59-40 20-39 1-19   0   Barthel Score 0-20 20 17-19 13-16 9-12 5-8 1-4 0      The Barthel ADL Index: Guidelines  1. The index should be used as a record of what a patient does, not as a record of what a patient could do. 2. The main aim is to establish degree of independence from any help, physical or verbal, however minor and for whatever reason. 3. The need for supervision renders the patient not independent. 4. A patient's performance should be established using the best available evidence. Asking the patient, friends/relatives and nurses are the usual sources, but direct observation and common sense are also important.  However direct testing is not needed. 5. Usually the patient's performance over the preceding 24-48 hours is important, but occasionally longer periods will be relevant. 6. Middle categories imply that the patient supplies over 50 per cent of the effort. 7. Use of aids to be independent is allowed. Zaki Mckeon., Barthel, D.W. (3925). Functional evaluation: the Barthel Index. [address] [facility] (14)2. Gaile Rubinstein michoacano SAKINA Munoz, Adrián Sanchez., Viky Canela., [place], [address] [place] [address] (1999). Measuring the change indisability after inpatient rehabilitation; comparison of the responsiveness of the Barthel Index and Functional [place] Measure. Journal of Neurology, Neurosurgery, and Psychiatry, 66(4), 926-630. HARVEY Fernandez, ROSSY Woods, & Sharon Gavin M.A. (2004.) Assessment of post-stroke quality of life in cost-effectiveness studies: The usefulness of the Barthel Index and the EuroQoL-5D. Quality of Life Research, 13, 205-20       G codes: In compliance with CMSs Claims Based Outcome Reporting, the following G-code set was chosen for this patient based on their primary functional limitation being treated: The outcome measure chosen to determine the severity of the functional limitation was the Barthel Index with a score of 70/100 which was correlated with the impairment scale. ?  Self Care:     - CURRENT STATUS: CJ - 20%-39% impaired, limited or restricted    - GOAL STATUS: CJ - 20%-39% impaired, limited or restricted    - D/C STATUS:  CJ - 20%-39% impaired, limited or restricted     Occupational Therapy Evaluation Charge Determination   History Examination Decision-Making   LOW Complexity : Brief history review  LOW Complexity : 1-3 performance deficits relating to physical, cognitive , or psychosocial skils that result in activity limitations and / or participation restrictions  LOW Complexity : No comorbidities that affect functional and no verbal or physical assistance needed to complete eval tasks       Based on the above components, the patient evaluation is determined to be of the following complexity level: LOW   Pain:  Pain Scale 1: Numeric (0 - 10)  Pain Intensity 1: 0              Activity Tolerance:   Good  Please refer to the flowsheet for vital signs taken during this treatment. After treatment:   [x]  Patient left in no apparent distress sitting up in chair  []  Patient left in no apparent distress in bed  [x]  Call bell left within reach  [x]  Nursing notified  [x]  Caregiver present  []  Bed alarm activated    COMMUNICATION/EDUCATION:   Communication/Collaboration:  [x]      Home safety education was provided and the patient/caregiver indicated understanding. [x]      Patient/family have participated as able and agree with findings and recommendations. []      Patient is unable to participate in plan of care at this time.   Findings and recommendations were discussed with: Physical Therapist and Registered Nurse    SUZETTE Guzman/DANIELA

## 2018-04-09 NOTE — PROGRESS NOTES
Pt is an [de-identified]  female admitted on 4/7/18 for acute on chronic diastolic CHF, GI bleed. Pt is a readmission who discharged on 3/12/18 following a pacemaker pocket infection. Pt lives in a [place] house ([address]) with sister at this time. Pt's daughters live in the area and provide support, spouse is currently staying with other family to caregive at this time. Pt is independent in ADLs/IADLs not to include driving. Pt is currently active with [facility] services, will likely resume at discharge, no reported history of SNF, acute inpatient rehab. Pt has a cane at home and was receiving IV ABX after last admission through [employer] (had some co-payments, provided financial assistance jeannine from previous CM). Pt to discharge home by private vehicle with family. Pt's family to provide transportation to follow-up care appointments. Pt's preferred Rx is [employer] ([address] [place] [address]). CM met with pt to verify demographic info and complete initial assessment, dc planning. Pt is alert and oriented x 4. Pt's daughters are bedside at this time. Pt sees Dr. Kim Rosas (PCP), Dr. Sai Palomo (Cardio), and Dr. Leobardo Desir (Cardio) OP. Pt reports she has a f/u appointment scheduled with Dr. Leobardo Desir on Thursday, may need to reschedule. Pt's daughter reported that they had applied for Medicaid for pt and are waiting to hear back from [address] CM will continue to follow-up to ensure additional CM needs are met. Reason for Readmission:  Acute on chronic diastolic CHF, GI bleed       RRAT Score and Risk Level:  41 - High      Level of Readmission: 2       Care Conference scheduled: 4/9/18 - MD, Cardiology, pt, family       Resources/supports as identified by patient/family: Extended, supportive family in area       Challenges facing patient:       Finances None at this time - during previous admission, had some co-pays for IV infusion therapy at home. Provided financial assistance application.  Are hoping pt's Medicaid will get approved and can back-date some of the payments owed. Transportation  None at this time - uses family members      Support system  Appropriate      Living arrangements Appropriate - lives with sister, other family stays with her as needed       Self-care/ADLs/Cognition  Independent         Connection to healthcare providers/adherence to healthcare plan   Appropriate - maintains follow-up appointments, Cardiology recommendations. Health literacy  Appropriate                     Prescription concerns  None at this time      Current Advanced Care Plan:  Full Code - ACP on file at this time         Plan for utilizing home health: Likely will continue to use [facility] services at discharge             Plan while patient is hospitalized: Diuresis, decide if pacemaker to be placed again          Expected Date of Discharge: 4/11/18 vs 4/12/18          Plan for communication with patient post discharge (who, when, how):  Communicate with pt and family directly by telephone. Likelihood of additional readmission:   Moderate pending management of cardiac symptoms   Transition of Care Plan:    Based on readmission, the patient's previous Plan of Care has been evaluated and/or modified. The current Transition of Care Plan is: treatment plan is still being developed at this time, pt likely will continue [facility] services at discharge, will determine if higher level of care is needed. Care Management Interventions  PCP Verified by CM: Yes  Palliative Care Criteria Met (RRAT>21 & CHF Dx)?: Yes  Palliative Consult Recommended?:  (CM to discuss with MD)  Mode of Transport at Discharge:  Other (see comment) (By private vehicle with family)  Transition of Care Consult (CM Consult): Discharge Planning  Discharge Durable Medical Equipment: No (Cane at home)  Health Maintenance Reviewed: Yes  Physical Therapy Consult: Yes  Occupational Therapy Consult: Yes  Speech Therapy Consult: No  Current Support Network: Own Home, Family Lives [place], Other (One-story house ([address]) with sister at this time; spouse caregiving and staying with other family at this time; daughter in area)  Confirm Follow Up Transport: Family  Plan discussed with Pt/Family/Caregiver: Yes  Discharge Location  Discharge Placement:  (TBD)    ISABEL Lamas Supervisee in Social Work, [employer]  [phone]

## 2018-04-09 NOTE — PROGRESS NOTES
Hospitalist Progress Note    NAME: Portia Scruggs   :  1935   MRN:  [id number]       Assessment / Plan:  Acute on chronic diastolic (congestive) heart failure ([address] (2018) POA  Left SSCP POA  -CXR showed interstitial edema  -trop neg, EKG neg for acute ischemia, VPaced  -cont' IV lasix, ASA, plavix    Hypokalemia  -K 3.1, K 1.7, replete both    MSSA bacteremia due to PM pocket infection POA  Afib s/p ablation and PM placement with Dr. Fernie Cates 18 POA  S/P pacemaker removal  Essential HTN  -con't ASA/plavix/Diltiazem, BB  -cont' IV cefazolin as per ID, domingo q. I discussed with Dr Lynda Cooper this morning in regards to her abx duration. ID agrees to keep on current abx until re-implant of pacemaker    -cardiology following     DM2 controlled without complication  -SSI, hold metformin     Hypothyroidism  - con't synthroid     Hyperlipidemia  -con't pravachol    Depression  -con't zoloft     Obesity POA Body mass index is 31.97 kg/(m^2). Code status: Full  Prophylaxis: Lovenox  Recommended Disposition: Home w/Family     Subjective:     Chief Complaint / Reason for Physician Visit  Pt seen at bedside. No acute changes overnight. Pt has no complaints. Discussed with RN events overnight. Review of Systems:  Symptom Y/N Comments  Symptom Y/N Comments   Fever/Chills n   Chest Pain n    Poor Appetite    Edema     Cough    Abdominal Pain n    Sputum    Joint Pain     SOB/STRICKLAND n   Pruritis/Rash     Nausea/vomit    Tolerating PT/OT     Diarrhea    Tolerating Diet     Constipation    Other       Could NOT obtain due to:      Objective:     VITALS:   Last 24hrs VS reviewed since prior progress note.  Most recent are:  Patient Vitals for the past 24 hrs:   Temp Pulse Resp BP SpO2   18 0717 98.2 °F (36.8 °C) 84 20 124/90 93 %   18 0345 98 °F (36.7 °C) 75 20 168/76 97 %   18 2320 98 °F (36.7 °C) 76 20 (!) 108/91 98 %   18 1925 98.4 °F (36.9 °C) 75 20 134/73 96 %   18 1524 98.2 °F (36.8 °C) 75 22 148/72 96 %   04/08/18 1049 98.1 °F (36.7 °C) 73 20 153/80 96 %       Intake/Output Summary (Last 24 hours) at 04/09/18 1032  Last data filed at 04/09/18 0436   Gross per 24 hour   Intake              660 ml   Output             1700 ml   Net            -1040 ml        PHYSICAL EXAM:  General: WD, WN. Alert, cooperative, no acute distress    EENT:  EOMI. Anicteric sclerae. MMM  Resp:  CTA bilaterally, no wheezing or rales. No accessory muscle use  CV:  Regular  rhythm,  No edema  GI:  Soft, Non distended, Non tender.  +BS  Neurologic:  Alert and oriented X 3, normal speech   Psych:   Not anxious nor agitated  Skin:  No rashes. No jaundice    Reviewed most current lab test results and cultures  YES  Reviewed most current radiology test results   YES  Review and summation of old records today    NO  Reviewed patient's current orders and MAR    YES  PMH/SH reviewed - no change compared to H&P  ________________________________________________________________________  Care Plan discussed with:    Comments   Patient x    Family  x Pt's /daughter   RN x    Care Manager x    Consultant  x Cardiology/ID                     Multidiciplinary team rounds were held today with , nursing, pharmacist and clinical coordinator. Patient's plan of care was discussed; medications were reviewed and discharge planning was addressed. ________________________________________________________________________  Total NON critical care TIME:  35   Minutes    Total CRITICAL CARE TIME Spent:   Minutes non procedure based      Comments   >50% of visit spent in counseling and coordination of care     ________________________________________________________________________  Reny Lawrence MD     Procedures: see electronic medical records for all procedures/Xrays and details which were not copied into this note but were reviewed prior to creation of Plan.       LABS:  I reviewed today's most current labs and imaging studies.   Pertinent labs include:  Recent Labs      04/09/18   0234  04/08/18   0328  04/07/18   1300   WBC  10.7  11.2*  12.0*   HGB  9.8*  9.6*  10.3*   HCT  31.2*  30.9*  32.7*   PLT  253  226  273     Recent Labs      04/09/18   0234  04/08/18   0328  04/07/18   1300   NA  141  140  139   K  3.1*  3.1*  3.6   CL  100  104  105   CO2  28  26  23   GLU  127*  149*  169*   BUN  18  20  20   CREA  1.12*  1.01  1.13*   CA  8.7  8.7  8.9   MG  1.7   --    --    ALB   --   3.1*  3.2*   TBILI   --   1.1*  1.3*   SGOT   --   15  17   ALT   --   <6*  <6*       Signed: Ewa Rodriguez MD

## 2018-04-09 NOTE — PROGRESS NOTES
Bedside shift change report given to Sailaja Jarrett RN (oncoming nurse). Report included the following information SBAR, Kardex, Intake/Output, MAR and Recent Results. SHIFT SUMMARY:            [address] NURSING NOTE   Admission Date 4/7/2018   Admission Diagnosis Acute on chronic diastolic (congestive) heart failure (HCC)  GI bleed   Consults IP CONSULT TO CARDIOLOGY      Cardiac Monitoring [x] Yes [] No      Purposeful Hourly Rounding [x] Yes    Janett Score Total Score: 4   Janett score 3 or > [x] Bed Alarm [] Avasys [] 1:1 sitter [] Patient refused (Signed refusal form in chart)   Umer Score Umer Score: 18   Umer score 14 or < [] PMT consult [] Wound Care consult    []  Specialty bed  [] Nutrition consult      Influenza Vaccine Received Flu Vaccine for Current Season (usually Sept-March): Yes           Oxygen needs? [x] Room air Oxygen @  []1L    []2L    []3L   []4L    []5L   []6L via  NC   Chronic home O2 use? [] Yes [x] No  Perform O2 challenge test and document in progress note using smartphrase (.Homeoxygen)      Last bowel movement Last Bowel Movement Date: 04/08/18      Urinary Catheter             LDAs         PICC Double Lumen 91/72/50 Right;Basilic (Active)   Central Line Being Utilized Yes 4/9/2018  7:36 PM   Criteria for Appropriate Use Long term IV/antibiotic administration 4/9/2018  7:36 PM   Site Assessment Clean, dry, & intact 4/9/2018  7:36 PM   Phlebitis Assessment 0 4/9/2018  7:36 PM   Infiltration Assessment 0 4/9/2018  7:36 PM   Date of Last Dressing Change 04/09/18 4/9/2018  7:36 PM   Dressing Status Clean, dry, & intact 4/9/2018  7:36 PM   Action Taken Dressing changed 4/9/2018  2:56 PM   External Catheter Length (cm) 0 centimeters 4/9/2018  2:56 PM   Dressing Type Disk with Chlorhexadine gluconate (CHG); Transparent 4/9/2018  7:36 PM   Hub Color/Line Status Purple;Capped 4/9/2018  7:36 PM   Positive Blood Return (Site #1) Yes 4/9/2018  3:54 PM   Hub Color/Line Status Red;Capped 4/9/2018 7:36 PM   Positive Blood Return (Site #2) Yes 4/9/2018  3:54 PM   Alcohol Cap Used Yes 4/9/2018  7:36 PM          Peripheral IV 04/07/18 Left Antecubital (Active)   Site Assessment Clean, dry, & intact 4/9/2018  7:36 PM   Phlebitis Assessment 0 4/9/2018  7:36 PM   Infiltration Assessment 0 4/9/2018  7:36 PM   Dressing Status Clean, dry, & intact 4/9/2018  7:36 PM   Dressing Type Transparent 4/9/2018  7:36 PM   Hub Color/Line Status Pink;Capped 4/9/2018  7:36 PM                         Readmission Risk Assessment Tool Score High Risk            41       Total Score        4 IP Visits Last 12 Months (1-3=4, 4=9, >4=11)    5 Pt. Coverage (Medicare=5 , Medicaid, or Self-Pay=4)    32 Charlson Comorbidity Score (Age + Comorbid Conditions)        Criteria that do not apply:    Has Seen PCP in Last 6 Months (Yes=3, No=0)    . Living with Significant Other. Assisted Living. LTAC. SNF.  or   Rehab    Patient Length of Stay (>5 days = 3)       Expected Length of Stay 3d 12h   Actual Length of Stay 2

## 2018-04-09 NOTE — PROGRESS NOTES
Pharmacy Automatic Renal Dosing Protocol - Antimicrobials    Indication for Antimicrobials: MSSA bacteremia due to PM pocket infection     Current Regimen of Each Antimicrobial:  Cefazolin 2 g IV every 8 hours (Start Date 3/9; Day # 31)    Previous Antimicrobial Therapy:  None    Significant Cultures:   3/7 Blood: MSSA in 2/4 bottles- final    Paralysis, amputations, malnutrition: None documented     Labs:  Recent Labs      18   0234  18   0328  18   1300   CREA  1.12*  1.01  1.13*   BUN  18  20  20   WBC  10.7  11.2*  12.0*     Temp (24hrs), Av.2 °F (36.8 °C), Min:98 °F (36.7 °C), Max:98.4 °F (36.9 °C)      Creatinine Clearance (mL/min) or Dialysis: 30 mL/min       Impression/Plan:   · Will change to 1 g IV every 12 hours for CrCl 11-34 mL/min per renal dosing protocol  · Antimicrobial stop date until re-implant of pacemaker per Dr. Serena Shetty.      Pharmacy will follow daily and adjust medications as appropriate for renal function and/or serum levels. Thank you,  Sienna Batista, PHARMD          Recommended duration of therapy  [email or web address]. docx    Renal Dosing  [email or web address]/Renal%[email or web address]. pdf

## 2018-04-09 NOTE — PROGRESS NOTES
0700: Received bedside report from Jimbo Abel, off going nurse. Assumed care of patient. 1800: Consent for pacemaker removal/implantation procedure on 4/11 obtained. 1900: Bedside shift change report given to , RN (oncoming nurse). Report included the following information SBAR, Kardex, Intake/Output, MAR and Cardiac Rhythm Paced. SHIFT SUMMARY:            [address] NURSING NOTE   Admission Date 4/7/2018   Admission Diagnosis Acute on chronic diastolic (congestive) heart failure (HCC)  GI bleed   Consults IP CONSULT TO CARDIOLOGY      Cardiac Monitoring [x] Yes [] No      Purposeful Hourly Rounding [x] Yes    Janett Score Total Score: 4   Janett score 3 or > [x] Bed Alarm [] Avasys [] 1:1 sitter [] Patient refused (Signed refusal form in chart)   Umer Score Umer Score: 18   Umer score 14 or < [] PMT consult [] Wound Care consult    []  Specialty bed  [] Nutrition consult      Influenza Vaccine Received Flu Vaccine for Current Season (usually Sept-March): Yes           Oxygen needs? [x] Room air Oxygen @  []1L    []2L    []3L   []4L    []5L   []6L via  NC   Chronic home O2 use? [] Yes [] No  Perform O2 challenge test and document in progress note using smartphrase (.Homeoxygen)      Last bowel movement Last Bowel Movement Date: 04/08/18      Urinary Catheter             LDAs         PICC Double Lumen 01/85/05 Right;Basilic (Active)   Central Line Being Utilized Yes 4/9/2018  3:54 PM   Criteria for Appropriate Use Long term IV/antibiotic administration 4/9/2018  3:54 PM   Site Assessment Clean, dry, & intact 4/9/2018  3:54 PM   Phlebitis Assessment 0 4/9/2018  3:54 PM   Infiltration Assessment 0 4/9/2018  3:54 PM   Date of Last Dressing Change 04/09/18 4/9/2018  3:54 PM   Dressing Status Clean, dry, & intact 4/9/2018  3:54 PM   Action Taken Dressing changed 4/9/2018  2:56 PM   External Catheter Length (cm) 0 centimeters 4/9/2018  2:56 PM   Dressing Type Disk with Chlorhexadine gluconate (CHG); Transparent 4/9/2018  2:56 PM   Hub Color/Line Status Purple;Flushed;Patent;Capped 4/9/2018  3:54 PM   Positive Blood Return (Site #1) Yes 4/9/2018  3:54 PM   Hub Color/Line Status Red;Flushed;Patent;Capped 4/9/2018  3:54 PM   Positive Blood Return (Site #2) Yes 4/9/2018  3:54 PM   Alcohol Cap Used Yes 4/9/2018  3:54 PM          Peripheral IV 04/07/18 Left Antecubital (Active)   Site Assessment Clean, dry, & intact 4/9/2018  3:54 PM   Phlebitis Assessment 0 4/9/2018  3:54 PM   Infiltration Assessment 0 4/9/2018  3:54 PM   Dressing Status Clean, dry, & intact 4/9/2018  3:54 PM   Dressing Type Transparent 4/9/2018  3:54 PM   Hub Color/Line Status Pink;Flushed;Patent 4/9/2018  3:54 PM                         Readmission Risk Assessment Tool Score High Risk            41       Total Score        4 IP Visits Last 12 Months (1-3=4, 4=9, >4=11)    5 Pt. Coverage (Medicare=5 , Medicaid, or Self-Pay=4)    32 Charlson Comorbidity Score (Age + Comorbid Conditions)        Criteria that do not apply:    Has Seen PCP in Last 6 Months (Yes=3, No=0)    . Living with Significant Other. Assisted Living. LTAC. SNF.  or   Rehab    Patient Length of Stay (>5 days = 3)       Expected Length of Stay 3d 12h   Actual Length of Stay 2

## 2018-04-10 LAB
ANION GAP SERPL CALC-SCNC: 8 MMOL/L (ref 5–15)
BUN SERPL-MCNC: 22 MG/DL (ref 6–20)
BUN/CREAT SERPL: 19 (ref 12–20)
CALCIUM SERPL-MCNC: 8.6 MG/DL (ref 8.5–10.1)
CHLORIDE SERPL-SCNC: 100 MMOL/L (ref 97–108)
CO2 SERPL-SCNC: 30 MMOL/L (ref 21–32)
CREAT SERPL-MCNC: 1.14 MG/DL (ref 0.55–1.02)
GLUCOSE BLD STRIP.AUTO-MCNC: 146 MG/DL (ref 65–100)
GLUCOSE BLD STRIP.AUTO-MCNC: 147 MG/DL (ref 65–100)
GLUCOSE BLD STRIP.AUTO-MCNC: 147 MG/DL (ref 65–100)
GLUCOSE BLD STRIP.AUTO-MCNC: 156 MG/DL (ref 65–100)
GLUCOSE BLD STRIP.AUTO-MCNC: 205 MG/DL (ref 65–100)
GLUCOSE SERPL-MCNC: 160 MG/DL (ref 65–100)
POTASSIUM SERPL-SCNC: 3.6 MMOL/L (ref 3.5–5.1)
SERVICE CMNT-IMP: ABNORMAL
SODIUM SERPL-SCNC: 138 MMOL/L (ref 136–145)

## 2018-04-10 PROCEDURE — 36415 COLL VENOUS BLD VENIPUNCTURE: CPT | Performed by: INTERNAL MEDICINE

## 2018-04-10 PROCEDURE — 97110 THERAPEUTIC EXERCISES: CPT

## 2018-04-10 PROCEDURE — 74011636637 HC RX REV CODE- 636/637: Performed by: INTERNAL MEDICINE

## 2018-04-10 PROCEDURE — 74011000258 HC RX REV CODE- 258: Performed by: INTERNAL MEDICINE

## 2018-04-10 PROCEDURE — 74011250637 HC RX REV CODE- 250/637: Performed by: INTERNAL MEDICINE

## 2018-04-10 PROCEDURE — 3331090001 HH PPS REVENUE CREDIT

## 2018-04-10 PROCEDURE — 74011250636 HC RX REV CODE- 250/636: Performed by: INTERNAL MEDICINE

## 2018-04-10 PROCEDURE — 3331090002 HH PPS REVENUE DEBIT

## 2018-04-10 PROCEDURE — 97116 GAIT TRAINING THERAPY: CPT

## 2018-04-10 PROCEDURE — 74011250637 HC RX REV CODE- 250/637: Performed by: NURSE PRACTITIONER

## 2018-04-10 PROCEDURE — 65660000000 HC RM CCU STEPDOWN

## 2018-04-10 PROCEDURE — 82962 GLUCOSE BLOOD TEST: CPT

## 2018-04-10 PROCEDURE — 80048 BASIC METABOLIC PNL TOTAL CA: CPT | Performed by: INTERNAL MEDICINE

## 2018-04-10 PROCEDURE — 93306 TTE W/DOPPLER COMPLETE: CPT

## 2018-04-10 RX ORDER — ENOXAPARIN SODIUM 100 MG/ML
40 INJECTION SUBCUTANEOUS EVERY 24 HOURS
Status: DISCONTINUED | OUTPATIENT
Start: 2018-04-11 | End: 2018-04-12 | Stop reason: HOSPADM

## 2018-04-10 RX ADMIN — Medication 10 ML: at 22:26

## 2018-04-10 RX ADMIN — ENOXAPARIN SODIUM 40 MG: 40 INJECTION SUBCUTANEOUS at 09:46

## 2018-04-10 RX ADMIN — Medication 20 ML: at 15:00

## 2018-04-10 RX ADMIN — BENAZEPRIL HYDROCHLORIDE 40 MG: 10 TABLET, FILM COATED ORAL at 09:36

## 2018-04-10 RX ADMIN — CEFAZOLIN SODIUM 1 G: 1 INJECTION, POWDER, FOR SOLUTION INTRAMUSCULAR; INTRAVENOUS at 22:49

## 2018-04-10 RX ADMIN — PRAVASTATIN SODIUM 20 MG: 10 TABLET ORAL at 22:25

## 2018-04-10 RX ADMIN — CLOPIDOGREL BISULFATE 75 MG: 75 TABLET ORAL at 09:39

## 2018-04-10 RX ADMIN — SERTRALINE HYDROCHLORIDE 100 MG: 50 TABLET ORAL at 09:35

## 2018-04-10 RX ADMIN — BENAZEPRIL HYDROCHLORIDE 40 MG: 10 TABLET, FILM COATED ORAL at 17:50

## 2018-04-10 RX ADMIN — DILTIAZEM HYDROCHLORIDE 240 MG: 240 CAPSULE, COATED, EXTENDED RELEASE ORAL at 09:38

## 2018-04-10 RX ADMIN — ASPIRIN 81 MG: 81 TABLET, COATED ORAL at 09:40

## 2018-04-10 RX ADMIN — PANTOPRAZOLE SODIUM 40 MG: 40 TABLET, DELAYED RELEASE ORAL at 07:58

## 2018-04-10 RX ADMIN — Medication 10 ML: at 02:07

## 2018-04-10 RX ADMIN — Medication 10 ML: at 15:00

## 2018-04-10 RX ADMIN — FUROSEMIDE 40 MG: 10 INJECTION, SOLUTION INTRAMUSCULAR; INTRAVENOUS at 09:45

## 2018-04-10 RX ADMIN — Medication 1 CAPSULE: at 09:37

## 2018-04-10 RX ADMIN — LEVOTHYROXINE SODIUM 50 MCG: 50 TABLET ORAL at 07:58

## 2018-04-10 RX ADMIN — CEFAZOLIN SODIUM 1 G: 1 INJECTION, POWDER, FOR SOLUTION INTRAMUSCULAR; INTRAVENOUS at 11:20

## 2018-04-10 RX ADMIN — BUSPIRONE HYDROCHLORIDE 5 MG: 5 TABLET ORAL at 17:50

## 2018-04-10 RX ADMIN — INSULIN LISPRO 2 UNITS: 100 INJECTION, SOLUTION INTRAVENOUS; SUBCUTANEOUS at 22:00

## 2018-04-10 RX ADMIN — FUROSEMIDE 40 MG: 10 INJECTION, SOLUTION INTRAMUSCULAR; INTRAVENOUS at 17:49

## 2018-04-10 RX ADMIN — BUSPIRONE HYDROCHLORIDE 5 MG: 5 TABLET ORAL at 09:39

## 2018-04-10 RX ADMIN — Medication 10 ML: at 09:47

## 2018-04-10 RX ADMIN — POTASSIUM CHLORIDE 40 MEQ: 750 TABLET, EXTENDED RELEASE ORAL at 09:37

## 2018-04-10 NOTE — CARDIO/PULMONARY
Cardiac Rehab:     Pt is on the CHF bundle list.    Echo pending   Heart Failure education folder, with Low Sodium brochure, given to [employer]. Educated using teach back method. Discussed diagnosis definition and assessed patient understanding. Reviewed importance of daily weight monitoring and Low Sodium diet (1447-4425 mg. daily). Pt correctly identified name and purpose of the diuretic. Also correctly identified weight parameters to report to MD. States she owns a functioning scale to track daily weights. Encouraged activity and rest periods within symptom limitations and as ordered by physician. Reports having a stationary bike at home to use for regular exercise. Reviewed furosemide, purpose of medication, potential side effects, compliance, and what to do if dose is missed. Discussed importance of reporting signs and symptoms of exacerbation, and when to report them to the doctor, to prevent re-hospitalization. [employer] was encouraged to keep all appointments with doctor. Smoking history assessed. Patient is a non smoker. [employer] verbalized understating.

## 2018-04-10 NOTE — PROGRESS NOTES
Cardiopulmonary Care Interdisciplinary rounds were held today to discuss patient plan of care and outcomes. The following members were present: NP/Physician, Pharmacy, Nursing and Case Management.     Expected Length of Stay:  3d 12h    Plan of Care: Continue current treatment plan  Pacemaker tomorrow

## 2018-04-10 NOTE — PROGRESS NOTES
Pt to have pacemaker re-implanted tomorrow morning by Cardiology. CM notified NN Maria Teresa Peres). Pt to resume [facility] PT and SN at discharge. Resumption orders placed, CM notified [facility] Liaison Duran President) and left confidential VM.      Krysta Sánchez, MSW Supervisee in Social Work, [employer]  [phone]

## 2018-04-10 NOTE — PROGRESS NOTES
Hospitalist Progress Note    NAME: Luis Ornelas   :  1935   MRN:  [id number]       Assessment / Plan:  MSSA bacteremia due to PM pocket infection POA  Afib s/p ablation and PM placement with Dr. Ami Diaz 18 POA  S/P pacemaker removal  Essential HTN  -con't ASA/plavix/Diltiazem, BB  -cont' IV cefazolin as per ID, domingo q. I discussed with Dr Cristin Oropeza this morning in regards to her abx duration. ID agrees to keep on current abx until re-implant of pacemaker    -cardiology following    Acute on chronic diastolic (congestive) heart failure ([facility] [address] (2018) POA  Left SSCP POA  -CXR showed interstitial edema  -trop neg, EKG neg for acute ischemia, VPaced  -cont' IV lasix, ASA, plavix    Hypokalemia, replete prn    DM2 controlled without complication  -SSI, hold metformin     Hypothyroidism  - con't synthroid     Hyperlipidemia  -con't pravachol    Depression  -con't zoloft     Obesity POA Body mass index is 31.81 kg/(m^2). Code status: Full  Prophylaxis: Lovenox  Recommended Disposition: Home w/Family     Subjective:     Chief Complaint / Reason for Physician Visit  Pt seen at bedside. No complaints. Discussed with RN events overnight. Review of Systems:  Symptom Y/N Comments  Symptom Y/N Comments   Fever/Chills n   Chest Pain n    Poor Appetite    Edema     Cough    Abdominal Pain n    Sputum    Joint Pain     SOB/STRICKLAND n   Pruritis/Rash     Nausea/vomit    Tolerating PT/OT     Diarrhea    Tolerating Diet     Constipation    Other       Could NOT obtain due to:      Objective:     VITALS:   Last 24hrs VS reviewed since prior progress note.  Most recent are:  Patient Vitals for the past 24 hrs:   Temp Pulse Resp BP SpO2   04/10/18 0750 96.5 °F (35.8 °C) 73 18 (!) 145/31 96 %   04/10/18 0331 98.2 °F (36.8 °C) 72 18 128/70 97 %   18 2306 98 °F (36.7 °C) 75 18 132/88 98 %   18 1954 97.3 °F (36.3 °C) 74 20 141/89 100 %   18 1603 97.5 °F (36.4 °C) 78 20 123/69 100 %   18 1050 98.2 °F (36.8 °C) 86 22 129/76 95 %       Intake/Output Summary (Last 24 hours) at 04/10/18 0948  Last data filed at 04/10/18 0901   Gross per 24 hour   Intake             1220 ml   Output             1850 ml   Net             -630 ml        PHYSICAL EXAM:  General: WD, WN. Alert, cooperative, no acute distress    EENT:  EOMI. Anicteric sclerae. MMM  Resp:  CTA bilaterally, no wheezing or rales. No accessory muscle use  CV:  Regular  rhythm,  No edema  GI:  Soft, Non distended, Non tender.  +BS  Neurologic:  Alert and oriented X 3, normal speech   Psych:   Not anxious nor agitated  Skin:  No rashes. No jaundice    Reviewed most current lab test results and cultures  YES  Reviewed most current radiology test results   YES  Review and summation of old records today    NO  Reviewed patient's current orders and MAR    YES  PMH/SH reviewed - no change compared to H&P  ________________________________________________________________________  Care Plan discussed with:    Comments   Patient x    Family  x Pt's /daughter   RN x    Care Manager x    Consultant  x Cardiology/ID                     Multidiciplinary team rounds were held today with , nursing, pharmacist and clinical coordinator. Patient's plan of care was discussed; medications were reviewed and discharge planning was addressed. ________________________________________________________________________  Total NON critical care TIME:  35   Minutes    Total CRITICAL CARE TIME Spent:   Minutes non procedure based      Comments   >50% of visit spent in counseling and coordination of care     ________________________________________________________________________  Joby Arellano MD     Procedures: see electronic medical records for all procedures/Xrays and details which were not copied into this note but were reviewed prior to creation of Plan. LABS:  I reviewed today's most current labs and imaging studies.   Pertinent labs include:  Recent Labs      04/09/18   0234 04/08/18   0328  04/07/18   1300   WBC  10.7  11.2*  12.0*   HGB  9.8*  9.6*  10.3*   HCT  31.2*  30.9*  32.7*   PLT  253  226  273     Recent Labs      04/10/18   0209  04/09/18 0234 04/08/18 0328 04/07/18   1300   NA  138  141  140  139   K  3.6  3.1*  3.1*  3.6   CL  100  100  104  105   CO2  30  28  26  23   GLU  160*  127*  149*  169*   BUN  22*  18  20  20   CREA  1.14*  1.12*  1.01  1.13*   CA  8.6  8.7  8.7  8.9   MG   --   1.7   --    --    ALB   --    --   3.1*  3.2*   TBILI   --    --   1.1*  1.3*   SGOT   --    --   15  17   ALT   --    --   <6*  <6*       Signed: Karmen Nieto MD

## 2018-04-10 NOTE — PROGRESS NOTES
Problem: Mobility Impaired (Adult and Pediatric)  Goal: *Acute Goals and Plan of Care (Insert Text)  Physical Therapy Goals  Initiated 4/9/2018  1. Patient will move from supine to sit and sit to supine , scoot up and down and roll side to side in bed with independence within 7 day(s). 2.  Patient will transfer from bed to chair and chair to bed with modified independence using the least restrictive device within 7 day(s). 3.  Patient will perform sit to stand with modified independence within 7 day(s). 4.  Patient will ambulate with modified independence for 200 feet with the least restrictive device within 7 day(s). 5.  Patient will ascend/descend 4 stairs with 1 handrail(s) with supervision/set-up within 7 day(s). physical Therapy TREATMENT  Patient: Roberto Whelan (89 y.o. female)  Date: 4/10/2018  Diagnosis: Acute on chronic diastolic (congestive) heart failure ([address] GI bleed        Precautions: Fall  Chart, physical therapy assessment, plan of care and goals were reviewed. ASSESSMENT: pt tolerated tx very well, very motivated, no LOB or SOB, did well with bed mob and transfers, does well with ther-ex, vc's for safety and proper RW use. Progression toward goals:  [x]    Improving appropriately and progressing toward goals  []    Improving slowly and progressing toward goals  []    Not making progress toward goals and plan of care will be adjusted     PLAN:  Patient continues to benefit from skilled intervention to address the above impairments. Continue treatment per established plan of care.   Discharge Recommendations:  Home Health  Further Equipment Recommendations for Discharge:  rolling walker     OBJECTIVE DATA SUMMARY:     Critical Behavior:  Neurologic State: Alert  Orientation Level: Oriented X4  Cognition: Appropriate decision making  Safety/Judgement: Awareness of environment, Insight into deficits, Home safety, Fall prevention     Functional Mobility Training:  Bed Mobility:  Rolling: Supervision  Supine to Sit: Supervision  Scooting: Supervision  Level of Assistance: Supervision  Interventions: Verbal cues     Transfers:  Sit to Stand: Supervision  Stand to Sit: Supervision  Bed to Chair: Stand-by assistance  Interventions: Verbal cues  Level of Assistance: Stand-by assistance     Balance:  Sitting: Intact; Without support  Standing: Intact; With support  Standing - Static: Good;Constant support  Standing - Dynamic : Good     Ambulation/Gait Training:  Distance (ft): 200 Feet (ft)  Assistive Device: Gait belt;Walker, rolling  Ambulation - Level of Assistance: Stand-by assistance  Gait Abnormalities: Decreased step clearance  Right Side Weight Bearing: Full  Left Side Weight Bearing: Full  Base of Support: Widened  Speed/Kayla: Pace decreased (<100 feet/min)  Step Length: Left shortened;Right shortened    Therapeutic Exercises:   sitting  EXERCISE   Sets   Reps   Active Active Assist   Passive   Comments   Ankle pumps 1 10 [x] [] [] bilat   Heel raises 1 10 [x] [] [] \"   Toe tap 1 10 [x] [] [] \"   Knee ext 1 10 [x] [] [] \"   Hip flex 1 10 [x] [] [] \"   Abd & Add 1 10 X   \"     Pain:  Pain Scale 1: Numeric (0 - 10)  Pain Intensity 1: 0    Activity Tolerance:good     After treatment:   [x]    Patient left in no apparent distress sitting up in chair  []    Patient left in no apparent distress in bed  [x]    Call bell left within reach  [x]    Nursing notified  []    Caregiver present  []    Bed alarm activated    COMMUNICATION/COLLABORATION:   The patients plan of care was discussed with: Registered Nurse    Rob Borja PTA   Time Calculation: 25 mins

## 2018-04-10 NOTE — PROGRESS NOTES
Cardiac Electrophysiology Progress Note            [address] [address]  [phone]    4/10/2018 8:30 AM    Admit Date: 4/7/2018    Admit Diagnosis: Acute on chronic diastolic (congestive) heart failure (HCC)  GI bleed    Subjective:     Jordi Dean is s/p temp/perm pacemaker implanted via the neck and lead extraction/device removal from the left chest (3/18). She currently denies chest pain, irregular heart beats, orthopnea, paroxysmal nocturnal dyspnea, lower extremity edema. States she feels better than she has in over a week.      Visit Vitals    BP (!) 145/31 (BP 1 Location: Left arm, BP Patient Position: At rest)    Pulse 73    Temp 96.5 °F (35.8 °C)    Resp 18    Wt 168 lb 6 oz (76.4 kg)    SpO2 96%    BMI 31.81 kg/m2     Current Facility-Administered Medications   Medication Dose Route Frequency    potassium chloride SR (KLOR-CON 10) tablet 40 mEq  40 mEq Oral DAILY    ceFAZolin (ANCEF) 1 g in 0.9% sodium chloride (MBP/ADV) 50 mL  1 g IntraVENous Q12H    sodium chloride (NS) flush 10-30 mL  10-30 mL InterCATHeter PRN    sodium chloride (NS) flush 10 mL  10 mL InterCATHeter Q24H    sodium chloride (NS) flush 10 mL  10 mL InterCATHeter PRN    sodium chloride (NS) flush 10-40 mL  10-40 mL InterCATHeter Q8H    sodium chloride (NS) flush 20 mL  20 mL InterCATHeter Q24H    heparin (porcine) pf 300 Units  300 Units InterCATHeter PRN    benazepril (LOTENSIN) tablet 40 mg  40 mg Oral BID    lactobac ac& pc-s.therm-b.anim (FRAN Q/RISAQUAD)  1 Cap Oral DAILY    acetaminophen (TYLENOL) tablet 650 mg  650 mg Oral Q6H PRN    oxyCODONE-acetaminophen (PERCOCET) 5-325 mg per tablet 1 Tab  1 Tab Oral Q6H PRN    naloxone (NARCAN) injection 0.4 mg  0.4 mg IntraVENous PRN    ondansetron (ZOFRAN) injection 4 mg  4 mg IntraVENous Q4H PRN    bisacodyl (DULCOLAX) suppository 10 mg  10 mg Rectal DAILY PRN    enoxaparin (LOVENOX) injection 40 mg  40 mg SubCUTAneous Q24H    insulin lispro (HUMALOG) injection   SubCUTAneous AC&HS    glucose chewable tablet 16 g  4 Tab Oral PRN    dextrose (D50W) injection syrg 12.5-25 g  12.5-25 g IntraVENous PRN    glucagon (GLUCAGEN) injection 1 mg  1 mg IntraMUSCular PRN    furosemide (LASIX) injection 40 mg  40 mg IntraVENous BID    hydrALAZINE (APRESOLINE) 20 mg/mL injection 20 mg  20 mg IntraVENous Q6H PRN    pravastatin (PRAVACHOL) tablet 20 mg  20 mg Oral QHS    busPIRone (BUSPAR) tablet 5 mg  5 mg Oral BID    pantoprazole (PROTONIX) tablet 40 mg  40 mg Oral ACB    levothyroxine (SYNTHROID) tablet 50 mcg  50 mcg Oral ACB    clopidogrel (PLAVIX) tablet 75 mg  75 mg Oral DAILY    sertraline (ZOLOFT) tablet 100 mg  100 mg Oral DAILY    dilTIAZem CD (CARDIZEM CD) capsule 240 mg  240 mg Oral DAILY    aspirin delayed-release tablet 81 mg  81 mg Oral DAILY    sodium chloride (NS) flush 5-10 mL  5-10 mL IntraVENous Q8H    sodium chloride (NS) flush 5-10 mL  5-10 mL IntraVENous PRN         Objective:      Visit Vitals    BP (!) 145/31 (BP 1 Location: Left arm, BP Patient Position: At rest)    Pulse 73    Temp 96.5 °F (35.8 °C)    Resp 18    Wt 168 lb 6 oz (76.4 kg)    SpO2 96%    BMI 31.81 kg/m2       Physical Exam:  Abdomen: soft, non-tender  Extremities: extremities normal  Heart: regular rate and rhythm  Lungs: clear to auscultation bilaterally  Pulses: 2+ and symmetric    Data Review:   Labs:    Recent Labs      04/09/18   0234  04/08/18   0328  04/07/18   1300   WBC  10.7  11.2*  12.0*   HGB  9.8*  9.6*  10.3*   HCT  31.2*  30.9*  32.7*   PLT  253  226  273     Recent Labs      04/10/18   0209  04/09/18   0234  04/08/18   0328  04/07/18   1300   NA  138  141  140  139   K  3.6  3.1*  3.1*  3.6   CL  100  100  104  105   CO2  30  28  26  23   GLU  160*  127*  149*  169*   BUN  22*  18  20  20   CREA  1.14*  1.12*  1.01  1.13*   CA  8.6  8.7  8.7  8.9   MG   --   1.7   --    --    ALB   --    --   3.1*  3.2*   TBILI   --    -- 1. 1*  1.3*   SGOT   --    --   15  17   ALT   --    --   <6*  <6*       Recent Labs      04/08/18   0328  04/07/18   1300   TROIQ  <0.04  <0.04         Intake/Output Summary (Last 24 hours) at 04/10/18 0830  Last data filed at 04/10/18 0801   Gross per 24 hour   Intake              980 ml   Output             1850 ml   Net             -870 ml        Telemetry: V paced    Assessment:     Active Problems:    Paroxysmal atrial fibrillation ([address] (9/7/2016)      Overview: S/p Watchman      Precordial pain (9/7/2016)      Infection of pacemaker pocket ([address] (3/6/2018)      Pacemaker (3/6/2018)      Overview: 3/7/18 explant of pacemaker      Acute on chronic diastolic (congestive) heart failure ([address] (4/7/2018)      GI bleed (4/7/2018)        Plan:     Rachel Blair is  s/p temp/perm pacemaker implanted via the neck and lead extraction/device removal from the left chest (3/18). Jasmeet Clark was wnl - no vegetations noted. Abx per ID. Will discuss with ID prior to re-implant of subclavian PM.  Echo today. She is a candidate for implantation of a permanent pacemaker from the R side and explantation of her temp/perm pacer from there R IJ. I discussed the risks/benefits/alternatives of the procedure with the patient. Risks include (but are not limited to) bleeding, heart block, infection, cva/mi/tamponade/death. The patient understands and agrees to proceed - will plan for tomorrow am.   Thank you for this interesting consultation.     Rosio Pizarro, ANP    Rajiv Camargo MD, [facility]  4/10/2018  8:30 AM

## 2018-04-11 ENCOUNTER — APPOINTMENT (OUTPATIENT)
Dept: GENERAL RADIOLOGY | Age: 83
DRG: 242 | End: 2018-04-11
Attending: INTERNAL MEDICINE
Payer: MEDICARE

## 2018-04-11 LAB
ANION GAP SERPL CALC-SCNC: 7 MMOL/L (ref 5–15)
BASOPHILS # BLD: 0 K/UL (ref 0–0.1)
BASOPHILS NFR BLD: 0 % (ref 0–1)
BUN SERPL-MCNC: 30 MG/DL (ref 6–20)
BUN/CREAT SERPL: 23 (ref 12–20)
CALCIUM SERPL-MCNC: 8.5 MG/DL (ref 8.5–10.1)
CHLORIDE SERPL-SCNC: 100 MMOL/L (ref 97–108)
CO2 SERPL-SCNC: 30 MMOL/L (ref 21–32)
CREAT SERPL-MCNC: 1.3 MG/DL (ref 0.55–1.02)
DIFFERENTIAL METHOD BLD: ABNORMAL
EOSINOPHIL # BLD: 0.5 K/UL (ref 0–0.4)
EOSINOPHIL NFR BLD: 4 % (ref 0–7)
ERYTHROCYTE [DISTWIDTH] IN BLOOD BY AUTOMATED COUNT: 15.5 % (ref 11.5–14.5)
GLUCOSE BLD STRIP.AUTO-MCNC: 149 MG/DL (ref 65–100)
GLUCOSE BLD STRIP.AUTO-MCNC: 153 MG/DL (ref 65–100)
GLUCOSE BLD STRIP.AUTO-MCNC: 153 MG/DL (ref 65–100)
GLUCOSE BLD STRIP.AUTO-MCNC: 169 MG/DL (ref 65–100)
GLUCOSE BLD STRIP.AUTO-MCNC: 206 MG/DL (ref 65–100)
GLUCOSE SERPL-MCNC: 184 MG/DL (ref 65–100)
HCT VFR BLD AUTO: 32.2 % (ref 35–47)
HGB BLD-MCNC: 10.1 G/DL (ref 11.5–16)
IMM GRANULOCYTES # BLD: 0 K/UL (ref 0–0.04)
IMM GRANULOCYTES NFR BLD AUTO: 0 % (ref 0–0.5)
LYMPHOCYTES # BLD: 2 K/UL (ref 0.8–3.5)
LYMPHOCYTES NFR BLD: 17 % (ref 12–49)
MCH RBC QN AUTO: 28.4 PG (ref 26–34)
MCHC RBC AUTO-ENTMCNC: 31.4 G/DL (ref 30–36.5)
MCV RBC AUTO: 90.4 FL (ref 80–99)
MONOCYTES # BLD: 1.3 K/UL (ref 0–1)
MONOCYTES NFR BLD: 11 % (ref 5–13)
NEUTS SEG # BLD: 7.9 K/UL (ref 1.8–8)
NEUTS SEG NFR BLD: 67 % (ref 32–75)
NRBC # BLD: 0 K/UL (ref 0–0.01)
NRBC BLD-RTO: 0 PER 100 WBC
PLATELET # BLD AUTO: 299 K/UL (ref 150–400)
PMV BLD AUTO: 10.4 FL (ref 8.9–12.9)
POTASSIUM SERPL-SCNC: 3.7 MMOL/L (ref 3.5–5.1)
RBC # BLD AUTO: 3.56 M/UL (ref 3.8–5.2)
SERVICE CMNT-IMP: ABNORMAL
SODIUM SERPL-SCNC: 137 MMOL/L (ref 136–145)
WBC # BLD AUTO: 11.7 K/UL (ref 3.6–11)

## 2018-04-11 PROCEDURE — 33208 INSRT HEART PM ATRIAL & VENT: CPT

## 2018-04-11 PROCEDURE — 85025 COMPLETE CBC W/AUTO DIFF WBC: CPT | Performed by: INTERNAL MEDICINE

## 2018-04-11 PROCEDURE — 33233 REMOVAL OF PM GENERATOR: CPT

## 2018-04-11 PROCEDURE — 3331090002 HH PPS REVENUE DEBIT

## 2018-04-11 PROCEDURE — 77030038269 HC DRN EXT URIN PURWCK BARD -A

## 2018-04-11 PROCEDURE — 0JPT0PZ REMOVAL OF CARDIAC RHYTHM RELATED DEVICE FROM TRUNK SUBCUTANEOUS TISSUE AND FASCIA, OPEN APPROACH: ICD-10-PCS | Performed by: INTERNAL MEDICINE

## 2018-04-11 PROCEDURE — 74011250637 HC RX REV CODE- 250/637: Performed by: INTERNAL MEDICINE

## 2018-04-11 PROCEDURE — 77030011640 HC PAD GRND REM COVD -A

## 2018-04-11 PROCEDURE — 74011250636 HC RX REV CODE- 250/636: Performed by: INTERNAL MEDICINE

## 2018-04-11 PROCEDURE — 74011250636 HC RX REV CODE- 250/636

## 2018-04-11 PROCEDURE — C1785 PMKR, DUAL, RATE-RESP: HCPCS

## 2018-04-11 PROCEDURE — 77030031139 HC SUT VCRL2 J&J -A

## 2018-04-11 PROCEDURE — A4565 SLINGS: HCPCS

## 2018-04-11 PROCEDURE — 02PA3MZ REMOVAL OF CARDIAC LEAD FROM HEART, PERCUTANEOUS APPROACH: ICD-10-PCS | Performed by: INTERNAL MEDICINE

## 2018-04-11 PROCEDURE — 99153 MOD SED SAME PHYS/QHP EA: CPT

## 2018-04-11 PROCEDURE — 80048 BASIC METABOLIC PNL TOTAL CA: CPT | Performed by: INTERNAL MEDICINE

## 2018-04-11 PROCEDURE — 74011000250 HC RX REV CODE- 250

## 2018-04-11 PROCEDURE — 97116 GAIT TRAINING THERAPY: CPT

## 2018-04-11 PROCEDURE — 33234 REMOVAL OF PACEMAKER SYSTEM: CPT

## 2018-04-11 PROCEDURE — C1898 LEAD, PMKR, OTHER THAN TRANS: HCPCS

## 2018-04-11 PROCEDURE — 77030002996 HC SUT SLK J&J -A

## 2018-04-11 PROCEDURE — 99152 MOD SED SAME PHYS/QHP 5/>YRS: CPT

## 2018-04-11 PROCEDURE — C1892 INTRO/SHEATH,FIXED,PEEL-AWAY: HCPCS

## 2018-04-11 PROCEDURE — 36415 COLL VENOUS BLD VENIPUNCTURE: CPT | Performed by: INTERNAL MEDICINE

## 2018-04-11 PROCEDURE — 82962 GLUCOSE BLOOD TEST: CPT

## 2018-04-11 PROCEDURE — 74011250637 HC RX REV CODE- 250/637: Performed by: NURSE PRACTITIONER

## 2018-04-11 PROCEDURE — 3331090001 HH PPS REVENUE CREDIT

## 2018-04-11 PROCEDURE — 71045 X-RAY EXAM CHEST 1 VIEW: CPT

## 2018-04-11 PROCEDURE — 74011000258 HC RX REV CODE- 258: Performed by: INTERNAL MEDICINE

## 2018-04-11 PROCEDURE — 65660000000 HC RM CCU STEPDOWN

## 2018-04-11 PROCEDURE — 77030018729 HC ELECTRD DEFIB PAD CARD -B

## 2018-04-11 PROCEDURE — C1894 INTRO/SHEATH, NON-LASER: HCPCS

## 2018-04-11 PROCEDURE — 02H63JZ INSERTION OF PACEMAKER LEAD INTO RIGHT ATRIUM, PERCUTANEOUS APPROACH: ICD-10-PCS | Performed by: INTERNAL MEDICINE

## 2018-04-11 PROCEDURE — 77030018836 HC SOL IRR NACL ICUM -A

## 2018-04-11 PROCEDURE — 74011636637 HC RX REV CODE- 636/637: Performed by: INTERNAL MEDICINE

## 2018-04-11 PROCEDURE — 0JH606Z INSERTION OF PACEMAKER, DUAL CHAMBER INTO CHEST SUBCUTANEOUS TISSUE AND FASCIA, OPEN APPROACH: ICD-10-PCS | Performed by: INTERNAL MEDICINE

## 2018-04-11 PROCEDURE — 02HK3JZ INSERTION OF PACEMAKER LEAD INTO RIGHT VENTRICLE, PERCUTANEOUS APPROACH: ICD-10-PCS | Performed by: INTERNAL MEDICINE

## 2018-04-11 RX ORDER — FUROSEMIDE 40 MG/1
40 TABLET ORAL
Status: DISCONTINUED | OUTPATIENT
Start: 2018-04-12 | End: 2018-04-12

## 2018-04-11 RX ORDER — FENTANYL CITRATE 50 UG/ML
12.5-5 INJECTION, SOLUTION INTRAMUSCULAR; INTRAVENOUS
Status: DISCONTINUED | OUTPATIENT
Start: 2018-04-11 | End: 2018-04-11 | Stop reason: HOSPADM

## 2018-04-11 RX ORDER — FUROSEMIDE 40 MG/1
40 TABLET ORAL
Status: DISCONTINUED | OUTPATIENT
Start: 2018-04-11 | End: 2018-04-11

## 2018-04-11 RX ORDER — VANCOMYCIN HYDROCHLORIDE 1 G/20ML
INJECTION, POWDER, LYOPHILIZED, FOR SOLUTION INTRAVENOUS
Status: DISCONTINUED
Start: 2018-04-11 | End: 2018-04-12 | Stop reason: HOSPADM

## 2018-04-11 RX ORDER — HEPARIN SODIUM 200 [USP'U]/100ML
INJECTION, SOLUTION INTRAVENOUS
Status: COMPLETED
Start: 2018-04-11 | End: 2018-04-11

## 2018-04-11 RX ORDER — LIDOCAINE HYDROCHLORIDE 10 MG/ML
1-40 INJECTION INFILTRATION; PERINEURAL
Status: DISCONTINUED | OUTPATIENT
Start: 2018-04-11 | End: 2018-04-11 | Stop reason: HOSPADM

## 2018-04-11 RX ORDER — OXYCODONE AND ACETAMINOPHEN 5; 325 MG/1; MG/1
1 TABLET ORAL
Qty: 10 TAB | Refills: 0 | Status: SHIPPED | OUTPATIENT
Start: 2018-04-11 | End: 2018-04-18

## 2018-04-11 RX ORDER — HEPARIN SODIUM 200 [USP'U]/100ML
500 INJECTION, SOLUTION INTRAVENOUS ONCE
Status: COMPLETED | OUTPATIENT
Start: 2018-04-11 | End: 2018-04-11

## 2018-04-11 RX ORDER — METOPROLOL SUCCINATE 50 MG/1
50 TABLET, EXTENDED RELEASE ORAL DAILY
Qty: 30 TAB | Refills: 0 | Status: SHIPPED | OUTPATIENT
Start: 2018-04-12 | End: 2018-04-12

## 2018-04-11 RX ORDER — FUROSEMIDE 40 MG/1
40 TABLET ORAL 2 TIMES DAILY
Qty: 60 TAB | Refills: 0 | Status: SHIPPED | OUTPATIENT
Start: 2018-04-11 | End: 2018-05-16 | Stop reason: SDUPTHER

## 2018-04-11 RX ORDER — MIDAZOLAM HYDROCHLORIDE 1 MG/ML
1-5 INJECTION, SOLUTION INTRAMUSCULAR; INTRAVENOUS
Status: DISCONTINUED | OUTPATIENT
Start: 2018-04-11 | End: 2018-04-11 | Stop reason: HOSPADM

## 2018-04-11 RX ORDER — LIDOCAINE HYDROCHLORIDE 10 MG/ML
INJECTION INFILTRATION; PERINEURAL
Status: COMPLETED
Start: 2018-04-11 | End: 2018-04-11

## 2018-04-11 RX ORDER — METOPROLOL SUCCINATE 50 MG/1
50 TABLET, EXTENDED RELEASE ORAL DAILY
Status: DISCONTINUED | OUTPATIENT
Start: 2018-04-12 | End: 2018-04-12 | Stop reason: HOSPADM

## 2018-04-11 RX ORDER — BACITRACIN 50000 [IU]/1
50000 INJECTION, POWDER, FOR SOLUTION INTRAMUSCULAR ONCE
Status: COMPLETED | OUTPATIENT
Start: 2018-04-11 | End: 2018-04-11

## 2018-04-11 RX ORDER — SODIUM CHLORIDE 0.9 % (FLUSH) 0.9 %
5-10 SYRINGE (ML) INJECTION AS NEEDED
Status: DISCONTINUED | OUTPATIENT
Start: 2018-04-11 | End: 2018-04-12 | Stop reason: HOSPADM

## 2018-04-11 RX ORDER — MIDAZOLAM HYDROCHLORIDE 1 MG/ML
INJECTION, SOLUTION INTRAMUSCULAR; INTRAVENOUS
Status: COMPLETED
Start: 2018-04-11 | End: 2018-04-11

## 2018-04-11 RX ORDER — SODIUM CHLORIDE 0.9 % (FLUSH) 0.9 %
5-10 SYRINGE (ML) INJECTION EVERY 8 HOURS
Status: DISCONTINUED | OUTPATIENT
Start: 2018-04-11 | End: 2018-04-12 | Stop reason: HOSPADM

## 2018-04-11 RX ORDER — FENTANYL CITRATE 50 UG/ML
INJECTION, SOLUTION INTRAMUSCULAR; INTRAVENOUS
Status: COMPLETED
Start: 2018-04-11 | End: 2018-04-11

## 2018-04-11 RX ORDER — NALOXONE HYDROCHLORIDE 0.4 MG/ML
0.4 INJECTION, SOLUTION INTRAMUSCULAR; INTRAVENOUS; SUBCUTANEOUS AS NEEDED
Status: DISCONTINUED | OUTPATIENT
Start: 2018-04-11 | End: 2018-04-12 | Stop reason: HOSPADM

## 2018-04-11 RX ORDER — BACITRACIN 50000 [IU]/1
INJECTION, POWDER, FOR SOLUTION INTRAMUSCULAR
Status: COMPLETED
Start: 2018-04-11 | End: 2018-04-11

## 2018-04-11 RX ORDER — SODIUM CHLORIDE 900 MG/100ML
INJECTION INTRAVENOUS
Status: DISCONTINUED
Start: 2018-04-11 | End: 2018-04-12 | Stop reason: HOSPADM

## 2018-04-11 RX ADMIN — FENTANYL CITRATE 50 MCG: 50 INJECTION, SOLUTION INTRAMUSCULAR; INTRAVENOUS at 08:10

## 2018-04-11 RX ADMIN — Medication 1 CAPSULE: at 11:45

## 2018-04-11 RX ADMIN — HEPARIN SODIUM 1000 UNITS: 200 INJECTION, SOLUTION INTRAVENOUS at 08:39

## 2018-04-11 RX ADMIN — CLOPIDOGREL BISULFATE 75 MG: 75 TABLET ORAL at 11:45

## 2018-04-11 RX ADMIN — LIDOCAINE HYDROCHLORIDE 30 ML: 10 INJECTION INFILTRATION; PERINEURAL at 08:29

## 2018-04-11 RX ADMIN — BENAZEPRIL HYDROCHLORIDE 40 MG: 10 TABLET, FILM COATED ORAL at 18:29

## 2018-04-11 RX ADMIN — ENOXAPARIN SODIUM 40 MG: 40 INJECTION SUBCUTANEOUS at 21:53

## 2018-04-11 RX ADMIN — BUSPIRONE HYDROCHLORIDE 5 MG: 5 TABLET ORAL at 11:45

## 2018-04-11 RX ADMIN — MIDAZOLAM HYDROCHLORIDE 1 MG: 1 INJECTION, SOLUTION INTRAMUSCULAR; INTRAVENOUS at 08:42

## 2018-04-11 RX ADMIN — MIDAZOLAM HYDROCHLORIDE 2 MG: 1 INJECTION INTRAMUSCULAR; INTRAVENOUS at 08:00

## 2018-04-11 RX ADMIN — DILTIAZEM HYDROCHLORIDE 240 MG: 240 CAPSULE, COATED, EXTENDED RELEASE ORAL at 11:45

## 2018-04-11 RX ADMIN — PANTOPRAZOLE SODIUM 40 MG: 40 TABLET, DELAYED RELEASE ORAL at 11:50

## 2018-04-11 RX ADMIN — ACETAMINOPHEN 650 MG: 325 TABLET ORAL at 11:26

## 2018-04-11 RX ADMIN — Medication 10 ML: at 01:28

## 2018-04-11 RX ADMIN — FENTANYL CITRATE 50 MCG: 50 INJECTION, SOLUTION INTRAMUSCULAR; INTRAVENOUS at 08:25

## 2018-04-11 RX ADMIN — VANCOMYCIN HYDROCHLORIDE 1000 MG: 1 INJECTION, POWDER, LYOPHILIZED, FOR SOLUTION INTRAVENOUS at 21:51

## 2018-04-11 RX ADMIN — BENAZEPRIL HYDROCHLORIDE 40 MG: 10 TABLET, FILM COATED ORAL at 11:44

## 2018-04-11 RX ADMIN — INSULIN LISPRO 2 UNITS: 100 INJECTION, SOLUTION INTRAVENOUS; SUBCUTANEOUS at 18:29

## 2018-04-11 RX ADMIN — BACITRACIN 50000 UNITS: 50000 INJECTION, POWDER, FOR SOLUTION INTRAMUSCULAR at 08:50

## 2018-04-11 RX ADMIN — ASPIRIN 81 MG: 81 TABLET, COATED ORAL at 11:45

## 2018-04-11 RX ADMIN — Medication 10 ML: at 21:56

## 2018-04-11 RX ADMIN — BUSPIRONE HYDROCHLORIDE 5 MG: 5 TABLET ORAL at 18:29

## 2018-04-11 RX ADMIN — Medication 10 ML: at 13:06

## 2018-04-11 RX ADMIN — MIDAZOLAM HYDROCHLORIDE 2 MG: 1 INJECTION INTRAMUSCULAR; INTRAVENOUS at 08:15

## 2018-04-11 RX ADMIN — SERTRALINE HYDROCHLORIDE 100 MG: 50 TABLET ORAL at 11:44

## 2018-04-11 RX ADMIN — MIDAZOLAM HYDROCHLORIDE 2 MG: 1 INJECTION, SOLUTION INTRAMUSCULAR; INTRAVENOUS at 08:35

## 2018-04-11 RX ADMIN — PRAVASTATIN SODIUM 20 MG: 10 TABLET ORAL at 21:52

## 2018-04-11 RX ADMIN — INSULIN LISPRO 2 UNITS: 100 INJECTION, SOLUTION INTRAVENOUS; SUBCUTANEOUS at 11:45

## 2018-04-11 RX ADMIN — BACITRACIN 50000 UNITS: 5000 INJECTION, POWDER, FOR SOLUTION INTRAMUSCULAR at 08:50

## 2018-04-11 RX ADMIN — CEFAZOLIN SODIUM 1 G: 1 INJECTION, POWDER, FOR SOLUTION INTRAMUSCULAR; INTRAVENOUS at 13:05

## 2018-04-11 RX ADMIN — MIDAZOLAM HYDROCHLORIDE 2 MG: 1 INJECTION, SOLUTION INTRAMUSCULAR; INTRAVENOUS at 08:00

## 2018-04-11 RX ADMIN — VANCOMYCIN HYDROCHLORIDE 1000 MG: 1 INJECTION, POWDER, LYOPHILIZED, FOR SOLUTION INTRAVENOUS at 08:02

## 2018-04-11 RX ADMIN — LIDOCAINE HYDROCHLORIDE 30 ML: 10 INJECTION, SOLUTION INFILTRATION; PERINEURAL at 08:29

## 2018-04-11 RX ADMIN — POTASSIUM CHLORIDE 40 MEQ: 750 TABLET, EXTENDED RELEASE ORAL at 11:45

## 2018-04-11 RX ADMIN — MIDAZOLAM HYDROCHLORIDE 2 MG: 1 INJECTION, SOLUTION INTRAMUSCULAR; INTRAVENOUS at 08:15

## 2018-04-11 NOTE — PROGRESS NOTES
TRANSFER - IN REPORT:    Verbal report received from [facility] on Madiha Crum  being received from EP for routine progression of care. Report consisted of patients Situation, Background, Assessment and Recommendations(SBAR). Information from the following report(s) Procedure Summary and MAR was reviewed with the receiving clinician. Opportunity for questions and clarification was provided. Assessment completed upon patients arrival to [address] [place] [address] and care assumed. Cardiac Cath Lab Recovery Arrival Note:    Madiha Crum arrived to [facility] recovery area. Patient procedure= PPI. Patient on cardiac monitor, non-invasive blood pressure, SPO2 monitor. On O2 @ 2 lpm via nc. IV  of vancomycin on pump at 125 ml/hr. Patient status doing well with some problems : hematoma at site. Patient is A&Ox 3. Patient reports no c/o. PROCEDURE SITE CHECK:    Procedure site:with a small amount of bleeding and small hematoma, no pain/discomfort reported at procedure site. No change in patient status. Continue to monitor patient and status.

## 2018-04-11 NOTE — ROUTINE PROCESS
Patient ambulated in green without signs of distress. Pressure dressing removed from site, no hematoma present. Outlined bleeding on bandage. Will continue to monitor patient.

## 2018-04-11 NOTE — PROGRESS NOTES
Pt to CCL recovery with hematoma at implant site. Manual pressure held x ten minutes and pressure dressing placed. NP aware. Will cont to monitor.

## 2018-04-11 NOTE — ROUTINE PROCESS
Bedside and Verbal shift change report given to Howie Mascorro (oncoming nurse) by Will RN (offgoing nurse). Report included the following information SBAR, Kardex, Intake/Output and MAR.

## 2018-04-11 NOTE — PROGRESS NOTES
Cardiology Progress Note            [address]  [phone]    4/11/2018 2:02 PM    Admit Date: 4/7/2018    Admit Diagnosis: Acute on chronic diastolic (congestive) heart failure ([address]*    Subjective:     Molly Ornelas   denies chest pain.     Visit Vitals    /69    Pulse 74    Temp 98.5 °F (36.9 °C)    Resp 16    Wt 167 lb 6 oz (75.9 kg)    SpO2 96%    BMI 31.63 kg/m2     Current Facility-Administered Medications   Medication Dose Route Frequency    ADDaptor        vancomycin (VANCOCIN) 1,000 mg injection        0.9% sodium chloride (MBP/ADV) infusion        sodium chloride (NS) flush 5-10 mL  5-10 mL IntraVENous Q8H    sodium chloride (NS) flush 5-10 mL  5-10 mL IntraVENous PRN    naloxone (NARCAN) injection 0.4 mg  0.4 mg IntraVENous PRN    vancomycin (VANCOCIN) 1,000 mg in 0.9% sodium chloride (MBP/ADV) 250 mL  1 g IntraVENous ONCE    [START ON 4/12/2018] furosemide (LASIX) tablet 40 mg  40 mg Oral ACB&D    [START ON 4/12/2018] metoprolol succinate (TOPROL-XL) XL tablet 50 mg  50 mg Oral DAILY    enoxaparin (LOVENOX) injection 40 mg  40 mg SubCUTAneous Q24H    potassium chloride SR (KLOR-CON 10) tablet 40 mEq  40 mEq Oral DAILY    ceFAZolin (ANCEF) 1 g in 0.9% sodium chloride (MBP/ADV) 50 mL  1 g IntraVENous Q12H    sodium chloride (NS) flush 10-30 mL  10-30 mL InterCATHeter PRN    sodium chloride (NS) flush 10 mL  10 mL InterCATHeter Q24H    sodium chloride (NS) flush 10 mL  10 mL InterCATHeter PRN    sodium chloride (NS) flush 10-40 mL  10-40 mL InterCATHeter Q8H    sodium chloride (NS) flush 20 mL  20 mL InterCATHeter Q24H    heparin (porcine) pf 300 Units  300 Units InterCATHeter PRN    benazepril (LOTENSIN) tablet 40 mg  40 mg Oral BID    lactobac ac& pc-s.therm-b.anim (FRAN Q/RISAQUAD)  1 Cap Oral DAILY    acetaminophen (TYLENOL) tablet 650 mg  650 mg Oral Q6H PRN    oxyCODONE-acetaminophen (PERCOCET) 5-325 mg per tablet 1 Tab  1 Tab Oral Q6H PRN    naloxone (NARCAN) injection 0.4 mg  0.4 mg IntraVENous PRN    ondansetron (ZOFRAN) injection 4 mg  4 mg IntraVENous Q4H PRN    bisacodyl (DULCOLAX) suppository 10 mg  10 mg Rectal DAILY PRN    insulin lispro (HUMALOG) injection   SubCUTAneous AC&HS    glucose chewable tablet 16 g  4 Tab Oral PRN    dextrose (D50W) injection syrg 12.5-25 g  12.5-25 g IntraVENous PRN    glucagon (GLUCAGEN) injection 1 mg  1 mg IntraMUSCular PRN    hydrALAZINE (APRESOLINE) 20 mg/mL injection 20 mg  20 mg IntraVENous Q6H PRN    pravastatin (PRAVACHOL) tablet 20 mg  20 mg Oral QHS    busPIRone (BUSPAR) tablet 5 mg  5 mg Oral BID    pantoprazole (PROTONIX) tablet 40 mg  40 mg Oral ACB    levothyroxine (SYNTHROID) tablet 50 mcg  50 mcg Oral ACB    clopidogrel (PLAVIX) tablet 75 mg  75 mg Oral DAILY    sertraline (ZOLOFT) tablet 100 mg  100 mg Oral DAILY    aspirin delayed-release tablet 81 mg  81 mg Oral DAILY    sodium chloride (NS) flush 5-10 mL  5-10 mL IntraVENous Q8H    sodium chloride (NS) flush 5-10 mL  5-10 mL IntraVENous PRN         Objective:      Visit Vitals    /69    Pulse 74    Temp 98.5 °F (36.9 °C)    Resp 16    Wt 167 lb 6 oz (75.9 kg)    SpO2 96%    BMI 31.63 kg/m2       Physical Exam:  Abdomen: soft, non-tender  Extremities: extremities normal  Heart: regular rate and rhythm  Lungs: clear to auscultation bilaterally  Pulses: 2+ and symmetric    Data Review:   Labs:    Recent Labs      04/11/18 0102 04/09/18   0234   WBC  11.7*  10.7   HGB  10.1*  9.8*   HCT  32.2*  31.2*   PLT  299  253     Recent Labs      04/11/18   0102  04/10/18   0209  04/09/18   0234   NA  137  138  141   K  3.7  3.6  3.1*   CL  100  100  100   CO2  30  30  28   GLU  184*  160*  127*   BUN  30*  22*  18   CREA  1.30*  1.14*  1.12*   CA  8.5  8.6  8.7   MG   --    --   1.7       No results for input(s): TROIQ, CPK, CKMB in the last 72 hours.       Intake/Output Summary (Last 24 hours) at 04/11/18 1402  Last data filed at 04/11/18 0315   Gross per 24 hour   Intake              490 ml   Output             1100 ml   Net             -610 ml        Telemetry: sinus with v pacing    Echo - lvef 35%    Assessment:     Active Problems:    Paroxysmal atrial fibrillation ([address] (9/7/2016)      Overview: S/p Watchman      Precordial pain (9/7/2016)      Infection of pacemaker pocket ([address] (3/6/2018)      Pacemaker (3/6/2018)      Overview: 3/7/18 explant of pacemaker      Acute on chronic diastolic (congestive) heart failure ([address] (4/7/2018)      GI bleed (4/7/2018)        Plan:     Erasto Norman is sp reimplantation of dc ppm from the right side. lvef is 35% - hopefully, loss of av synchrony when she was only v pacing. Will dc cardizem and start toprol. Cont ace-inh. Will reeval lvef in 3 months with echo.  One dose of vanco in the am. Hopefully, dc in the am    Aissatou Goncalves MD, [facility]    4/11/2018

## 2018-04-11 NOTE — ROUTINE PROCESS
Primary Nurse Tana Everett RN and ISELA Caruso performed a dual skin assessment on this patient Impairment noted- see wound doc flow sheet  Umer score is 21  Bruise on right side (lateral ribs).   Scar on left chest

## 2018-04-11 NOTE — PROGRESS NOTES
Problem: Mobility Impaired (Adult and Pediatric)  Goal: *Acute Goals and Plan of Care (Insert Text)  Physical Therapy Goals  Initiated 4/9/2018  1. Patient will move from supine to sit and sit to supine , scoot up and down and roll side to side in bed with independence within 7 day(s). 2.  Patient will transfer from bed to chair and chair to bed with modified independence using the least restrictive device within 7 day(s). 3.  Patient will perform sit to stand with modified independence within 7 day(s). 4.  Patient will ambulate with modified independence for 200 feet with the least restrictive device within 7 day(s). 5.  Patient will ascend/descend 4 stairs with 1 handrail(s) with supervision/set-up within 7 day(s). physical Therapy TREATMENT  Patient: Rachel Blair (49 y.o. female)  Date: 4/11/2018  Diagnosis: Acute on chronic diastolic (congestive) heart failure ([facility] GI bleed        Precautions: Fall  Chart, physical therapy assessment, plan of care and goals were reviewed. ASSESSMENT: pt tolerated tx very well, no LOB or SOB, super motivated, does well with transfers and ther-ex, vc's for safety and proper RW use. Progression toward goals:  [x]    Improving appropriately and progressing toward goals  []    Improving slowly and progressing toward goals  []    Not making progress toward goals and plan of care will be adjusted     PLAN:  Patient continues to benefit from skilled intervention to address the above impairments. Continue treatment per established plan of care.   Discharge Recommendations:  Home Health  Further Equipment Recommendations for Discharge:  straight cane     OBJECTIVE DATA SUMMARY:     Critical Behavior:  Neurologic State: Alert  Orientation Level: Oriented X4  Cognition: Appropriate decision making  Safety/Judgement: Awareness of environment, Insight into deficits, Home safety, Fall prevention     Functional Mobility Training:  Bed Mobility:pt sitting in chair on arrival    Transfers:  Sit to Stand: Supervision  Stand to Sit: Supervision  Interventions: Verbal cues  Level of Assistance: Supervision     Balance:  Sitting: Intact; Without support  Standing: Intact; With support  Standing - Static: Good;Constant support  Standing - Dynamic : Good     Ambulation/Gait Training:  Distance (ft): 200 Feet (ft)  Assistive Device: Gait belt;Cane, straight  Ambulation - Level of Assistance: Stand-by assistance;Contact guard assistance  Gait Abnormalities: Decreased step clearance  Right Side Weight Bearing: Full  Left Side Weight Bearing: Full  Base of Support: Widened  Speed/Kayla: Pace decreased (<100 feet/min)  Step Length: Left shortened;Right shortened    Pain:  Pain Scale 1: Numeric (0 - 10)  Pain Intensity 1: 0    Activity Tolerance: good    After treatment:   [x]    Patient left in no apparent distress sitting up in chair  []    Patient left in no apparent distress in bed  [x]    Call bell left within reach  [x]    Nursing notified  []    Caregiver present  []    Bed alarm activated    COMMUNICATION/COLLABORATION:   The patients plan of care was discussed with: Registered Nurse    Christin Elizabeth PTA   Time Calculation: 20 mins

## 2018-04-11 NOTE — PROGRESS NOTES
TRANSFER - OUT REPORT:    Verbal report given to Olaf RN(name) on Annalisa Sensor  being transferred to IVCU(unit) for routine progression of care       Report consisted of patients Situation, Background, Assessment and   Recommendations(SBAR). Information from the following report(s) Procedure Summary, Intake/Output and MAR was reviewed with the receiving nurse. Lines:   Peripheral IV 04/07/18 Left Antecubital (Active)   Site Assessment Clean, dry, & intact 4/11/2018 10:11 AM   Phlebitis Assessment 0 4/11/2018 10:11 AM   Infiltration Assessment 0 4/11/2018 10:11 AM   Dressing Status Clean, dry, & intact 4/11/2018 10:11 AM   Dressing Type Tape;Transparent 4/11/2018 10:11 AM   Hub Color/Line Status Pink;Patent;Capped 4/11/2018 10:11 AM   Alcohol Cap Used Yes 4/11/2018 10:11 AM        Opportunity for questions and clarification was provided.       Patient transported with:   Registered Nurse

## 2018-04-11 NOTE — PROGRESS NOTES
Ky Ritter, PEYMAN notified of patient's readmission and current care manager./Ceci Crowley Manager of Care Management

## 2018-04-11 NOTE — PROGRESS NOTES
Bedside shift change report given to [facility] [place], RN (oncoming nurse). Report included the following information SBAR, Kardex, Intake/Output, MAR and Recent Results. SHIFT SUMMARY:            [address] NURSING NOTE   Admission Date 4/7/2018   Admission Diagnosis Acute on chronic diastolic (congestive) heart failure (HCC)  GI bleed   Consults IP CONSULT TO CARDIOLOGY      Cardiac Monitoring [x] Yes [] No      Purposeful Hourly Rounding [] Yes    Janett Score Total Score: 2   Janett score 3 or > [] Bed Alarm [] Avasys [] 1:1 sitter [] Patient refused (Signed refusal form in chart)   Umer Score Umer Score: 21   Umer score 14 or < [] PMT consult [] Wound Care consult    []  Specialty bed  [] Nutrition consult      Influenza Vaccine Received Flu Vaccine for Current Season (usually Sept-March): Yes           Oxygen needs? [x] Room air Oxygen @  []1L    []2L    []3L   []4L    []5L   []6L via  NC   Chronic home O2 use? [] Yes [] No  Perform O2 challenge test and document in progress note using smartphrase (.Homeoxygen)      Last bowel movement Last Bowel Movement Date: 04/08/18      Urinary Catheter             LDAs         PICC Double Lumen 29/15/39 Right;Basilic (Active)   Central Line Being Utilized Yes 4/10/2018 11:01 PM   Criteria for Appropriate Use Long term IV/antibiotic administration 4/10/2018 11:01 PM   Site Assessment Clean, dry, & intact 4/10/2018 11:01 PM   Phlebitis Assessment 0 4/10/2018 11:01 PM   Infiltration Assessment 0 4/10/2018 11:01 PM   Date of Last Dressing Change 04/09/18 4/10/2018 11:01 PM   Dressing Status Clean, dry, & intact 4/10/2018 11:01 PM   Action Taken Dressing changed 4/9/2018  2:56 PM   External Catheter Length (cm) 0 centimeters 4/9/2018  2:56 PM   Dressing Type Disk with Chlorhexadine gluconate (CHG); Transparent 4/10/2018 11:01 PM   Hub Color/Line Status Purple;Capped 4/10/2018 11:01 PM   Positive Blood Return (Site #1) Yes 4/10/2018  7:30 PM   Hub Color/Line Status Red;Capped 4/10/2018 11:01 PM   Positive Blood Return (Site #2) Yes 4/10/2018  7:30 PM   Alcohol Cap Used Yes 4/10/2018 11:01 PM          Peripheral IV 04/07/18 Left Antecubital (Active)   Site Assessment Clean, dry, & intact 4/10/2018 11:01 PM   Phlebitis Assessment 0 4/10/2018 11:01 PM   Infiltration Assessment 0 4/10/2018 11:01 PM   Dressing Status Clean, dry, & intact 4/10/2018 11:01 PM   Dressing Type Transparent 4/10/2018 11:01 PM   Hub Color/Line Status Pink;Capped 4/10/2018 11:01 PM                         Readmission Risk Assessment Tool Score High Risk            41       Total Score        4 IP Visits Last 12 Months (1-3=4, 4=9, >4=11)    5 Pt. Coverage (Medicare=5 , Medicaid, or Self-Pay=4)    32 Charlson Comorbidity Score (Age + Comorbid Conditions)        Criteria that do not apply:    Has Seen PCP in Last 6 Months (Yes=3, No=0)    . Living with Significant Other. Assisted Living. LTAC. SNF.  or   Rehab    Patient Length of Stay (>5 days = 3)       Expected Length of Stay 3d 12h   Actual Length of Stay 3

## 2018-04-11 NOTE — PROGRESS NOTES
Bedside shift change report given to [facility], RN (oncoming nurse). Report included the following information SBAR, Kardex, Intake/Output, MAR and Recent Results. SHIFT SUMMARY:            [address] NURSING NOTE   Admission Date 4/7/2018   Admission Diagnosis Acute on chronic diastolic (congestive) heart failure (HCC)  GI bleed   Consults IP CONSULT TO CARDIOLOGY      Cardiac Monitoring [x] Yes [] No      Purposeful Hourly Rounding [x] Yes    Janett Score Total Score: 2   Janett score 3 or > [x] Bed Alarm [] Avasys [] 1:1 sitter [] Patient refused (Signed refusal form in chart)   Umer Score Umer Score: 21   Umer score 14 or < [] PMT consult [] Wound Care consult    []  Specialty bed  [] Nutrition consult      Influenza Vaccine Received Flu Vaccine for Current Season (usually Sept-March): Yes           Oxygen needs? [x] Room air Oxygen @  []1L    []2L    []3L   []4L    []5L   []6L via  NC   Chronic home O2 use? [] Yes [x] No  Perform O2 challenge test and document in progress note using smartphrase (.Homeoxygen)      Last bowel movement Last Bowel Movement Date: 04/08/18      Urinary Catheter             LDAs         PICC Double Lumen 92/45/10 Right;Basilic (Active)   Central Line Being Utilized Yes 4/10/2018 11:01 PM   Criteria for Appropriate Use Long term IV/antibiotic administration 4/10/2018 11:01 PM   Site Assessment Clean, dry, & intact 4/10/2018 11:01 PM   Phlebitis Assessment 0 4/10/2018 11:01 PM   Infiltration Assessment 0 4/10/2018 11:01 PM   Date of Last Dressing Change 04/09/18 4/10/2018 11:01 PM   Dressing Status Clean, dry, & intact 4/10/2018 11:01 PM   Action Taken Dressing changed 4/9/2018  2:56 PM   External Catheter Length (cm) 0 centimeters 4/9/2018  2:56 PM   Dressing Type Disk with Chlorhexadine gluconate (CHG); Transparent 4/10/2018 11:01 PM   Hub Color/Line Status Purple;Capped 4/10/2018 11:01 PM   Positive Blood Return (Site #1) Yes 4/10/2018  7:30 PM   Hub Color/Line Status Red;Capped 4/10/2018 11:01 PM   Positive Blood Return (Site #2) Yes 4/10/2018  7:30 PM   Alcohol Cap Used Yes 4/10/2018 11:01 PM          Peripheral IV 04/07/18 Left Antecubital (Active)   Site Assessment Clean, dry, & intact 4/10/2018 11:01 PM   Phlebitis Assessment 0 4/10/2018 11:01 PM   Infiltration Assessment 0 4/10/2018 11:01 PM   Dressing Status Clean, dry, & intact 4/10/2018 11:01 PM   Dressing Type Transparent 4/10/2018 11:01 PM   Hub Color/Line Status Pink;Capped 4/10/2018 11:01 PM                         Readmission Risk Assessment Tool Score High Risk            41       Total Score        4 IP Visits Last 12 Months (1-3=4, 4=9, >4=11)    5 Pt. Coverage (Medicare=5 , Medicaid, or Self-Pay=4)    32 Charlson Comorbidity Score (Age + Comorbid Conditions)        Criteria that do not apply:    Has Seen PCP in Last 6 Months (Yes=3, No=0)    . Living with Significant Other. Assisted Living. LTAC. SNF.  or   Rehab    Patient Length of Stay (>5 days = 3)       Expected Length of Stay 3d 12h   Actual Length of Stay 3

## 2018-04-11 NOTE — PROGRESS NOTES
Hospitalist Progress Note    NAME: Erasto Norman   :  1935   MRN:  [id number]       Assessment / Plan:  MSSA bacteremia due to PM pocket infection POA  Afib s/p ablation and PM placement with Dr. Kannan obrien 18 POA  S/P pacemaker removal  Essential HTN  -con't ASA/plavix/Diltiazem, BB  -s/p PPM on R side of chest  -cont' IV cefazolin as per ID, domingo q. Can stop abx on discharge. I discussed with Dr Yuliet Huber in regards to abx duration. Her note states end date as on 18, however given reimplantation of pacemaker, abx can be discontinued after reimplantation of PPM.  -cardiology following    Acute on chronic diastolic (congestive) heart failure ([facility] (2018) POA  Left SSCP POA  -CXR showed interstitial edema  -trop neg, EKG neg for acute ischemia, VPaced  -change to PO lasix. Cont' ASA, plavix  -monitor renal fxn    Hypokalemia, replete prn    DM2 controlled without complication  -SSI, hold metformin     Hypothyroidism  - con't synthroid     Hyperlipidemia  -con't pravachol    Depression  -con't zoloft     Obesity POA Body mass index is 31.63 kg/(m^2). Code status: Full  Prophylaxis: Lovenox  Recommended Disposition: Home w/Family     Subjective:     Chief Complaint / Reason for Physician Visit  Pt seen at bedside post procedure. She remains stable. Very pleasant. No complaints. Discussed with RN events overnight. Review of Systems:  Symptom Y/N Comments  Symptom Y/N Comments   Fever/Chills n   Chest Pain n    Poor Appetite    Edema     Cough    Abdominal Pain n    Sputum    Joint Pain     SOB/STRICKLAND n   Pruritis/Rash     Nausea/vomit    Tolerating PT/OT     Diarrhea    Tolerating Diet     Constipation    Other       Could NOT obtain due to:      Objective:     VITALS:   Last 24hrs VS reviewed since prior progress note.  Most recent are:  Patient Vitals for the past 24 hrs:   Temp Pulse Resp BP SpO2   18 1008 98.5 °F (36.9 °C) 84 16 (!) 136/100 97 %   18 0945 - 70 15 187/64 97 %   04/11/18 0930 - 71 16 178/79 97 %   04/11/18 0916 - 73 15 181/75 95 %   04/11/18 0734 - - - - 97 %   04/11/18 0730 98.2 °F (36.8 °C) 74 18 139/53 94 %   04/11/18 0323 97.6 °F (36.4 °C) 75 18 146/71 99 %   04/10/18 2338 98.3 °F (36.8 °C) 76 18 127/73 99 %   04/10/18 2023 97.9 °F (36.6 °C) 74 18 132/66 100 %   04/10/18 1444 97.7 °F (36.5 °C) 74 18 150/64 98 %   04/10/18 1131 97.6 °F (36.4 °C) 73 18 140/74 99 %       Intake/Output Summary (Last 24 hours) at 04/11/18 1014  Last data filed at 04/11/18 0315   Gross per 24 hour   Intake              490 ml   Output             1100 ml   Net             -610 ml        PHYSICAL EXAM:  General: WD, WN. Alert, cooperative, no acute distress    EENT:  EOMI. Anicteric sclerae. MMM  Resp:  CTA bilaterally, no wheezing or rales. No accessory muscle use  CV:  Regular  rhythm,  No edema  GI:  Soft, Non distended, Non tender.  +BS  Neurologic:  Alert and oriented X 3, normal speech   Psych:   Not anxious nor agitated  Skin:  No rashes. No jaundice    Reviewed most current lab test results and cultures  YES  Reviewed most current radiology test results   YES  Review and summation of old records today    NO  Reviewed patient's current orders and MAR    YES  PMH/SH reviewed - no change compared to H&P  ________________________________________________________________________  Care Plan discussed with:    Comments   Patient x    Family  x Pt's /daughter   RN x    Care Manager x    Consultant                        Multidiciplinary team rounds were held today with , nursing, pharmacist and clinical coordinator. Patient's plan of care was discussed; medications were reviewed and discharge planning was addressed.      ________________________________________________________________________  Total NON critical care TIME:  35   Minutes    Total CRITICAL CARE TIME Spent:   Minutes non procedure based      Comments   >50% of visit spent in counseling and coordination of care     ________________________________________________________________________  Ernestina Mckenna MD     Procedures: see electronic medical records for all procedures/Xrays and details which were not copied into this note but were reviewed prior to creation of Plan. LABS:  I reviewed today's most current labs and imaging studies.   Pertinent labs include:  Recent Labs      04/11/18 0102 04/09/18   0234   WBC  11.7*  10.7   HGB  10.1*  9.8*   HCT  32.2*  31.2*   PLT  299  253     Recent Labs      04/11/18   0102  04/10/18   0209  04/09/18   0234   NA  137  138  141   K  3.7  3.6  3.1*   CL  100  100  100   CO2  30  30  28   GLU  184*  160*  127*   BUN  30*  22*  18   CREA  1.30*  1.14*  1.12*   CA  8.5  8.6  8.7   MG   --    --   1.7       Signed: Ernestina Mckenna MD

## 2018-04-11 NOTE — DISCHARGE SUMMARY
Discharge Summary      Name: Angélica Moreno  [id number]  YOB: 1935 (Age: [de-identified] y.o.)   Date of Admission: 4/7/2018  Date of Discharge: 4/12/2018  Attending Physician: Sofiya Haile MD    Discharge Diagnosis:   MSSA bacteremia due to PM pocket infection POA, resolved  Afib s/p ablation and PM placement with Dr. Alicia Lakhani 2/14/18 POA  S/P pacemaker removal  Essential HTN  Acute on chronic diastolic (congestive) heart failure ([address] (4/7/2018) POA  Left SSCP POA   Hypokalemia   DM2 controlled without complication  Hypothyroidism    Hyperlipidemia  Depression    Obesity     Consultations:  IP CONSULT TO CARDIOLOGY    Procedures:   PPM placement 04//11/18  Brief Admission History/Reason for Admission Per Cassandra Burkitt, MD:   [de-identified] Y. Daysi Lunch  Recently with pacemaker implant 2/14/2018  Recently [address] 3/12/2017 with sepsis, fevers, MSSA bacteremia JESSIE negative for vegetation. Presumed pacemaker pocket infection, generator removed, has an external generator in place. PICC line placed, on IV ANCef, followed by Dr Summer Baker.  D/C to home. Still getting IV ancef at home  Increasing SOB past 4 days, worse with exertion, sleeping a lot in a recliner, no LE edema  No Cough or pleuritic CP  Positive SSCP left inframammary, radiating to neck x 3 days  Takes lasix at home, recently stopped  Came into ED     Brief Hospital Course by Main Problems:   MSSA bacteremia due to PM pocket infection POA  Afib s/p ablation and PM placement with Dr. Alicia Lakhani 2/14/18 POA  S/P pacemaker removal  Essential HTN  s/p reimplantation of dc PPM on R side of chest on 4/11/18. Pt was continued on IV cefazolin. PICC line removed and abx discontinued (ID's note rec completion date was on 4/6). ID was ok to cont' IV cefazolin while inpt, but recommended PICC removal upon discharge with discontinuation of abx.   I spoke to Dr Summer Baker and confirmed abx duration.      Acute on chronic diastolic (congestive) heart failure ([facility] (4/7/2018) POA  CXR showed interstitial edema. Trop neg, EKG neg for acute ischemia. Echo showed EF 35%. Pt diuresed with IV lasix, transitioned to PO lasix. She remains stable on RA. Pt to cont' ASA, plavix. Diltiazem discontinued, BB started.     Hypokalemia, replete prn     DM2 controlled without complication,  resume metformin.      Hypothyroidism, con't synthroid.      Hyperlipidemia, con't pravachol. Depression, con't zoloft      Obesity POA Body mass index is 31.63 kg/(m^2). Discharge Exam:  Patient seen and examined by me on discharge day. Pertinent Findings:  Visit Vitals    BP (!) 118/105 (BP 1 Location: Left arm, BP Patient Position: At rest)    Pulse 80    Temp 97.9 °F (36.6 °C)    Resp 18    Wt 76.7 kg (169 lb 1.5 oz)    SpO2 97%    BMI 31.95 kg/m2     Gen:    Not in distress  Chest: Clear lungs  CVS:   Regular rhythm. No edema  Abd:  Soft, not distended, not tender    Discharge/Recent Laboratory Results:  Recent Labs      04/11/18   0102   NA  137   K  3.7   CL  100   CO2  30   BUN  30*   GLU  184*   CA  8.5     Recent Labs      04/11/18   0102   HGB  10.1*   HCT  32.2*   WBC  11.7*   PLT  299       Discharge Medications:  Current Discharge Medication List      START taking these medications    Details   metoprolol succinate (TOPROL-XL) 50 mg XL tablet Take 1 Tab by mouth daily. Qty: 30 Tab, Refills: 0      furosemide (LASIX) 40 mg tablet Take 1 Tab by mouth two (2) times a day. Qty: 60 Tab, Refills: 0      oxyCODONE-acetaminophen (PERCOCET) 5-325 mg per tablet Take 1 Tab by mouth every six (6) hours as needed. Max Daily Amount: 4 Tabs. Qty: 10 Tab, Refills: 0    Associated Diagnoses: Infection of pacemaker pocket, initial encounter ([facility]         CONTINUE these medications which have NOT CHANGED    Details   busPIRone (BUSPAR) 5 mg tablet Take 5 mg by mouth daily.       pravastatin (PRAVACHOL) 20 mg tablet TAKE ONE TABLET BY MOUTH NIGHTLY  Qty: 90 Tab, Refills: 1      benazepril (LOTENSIN) 40 mg tablet Take 1 Tab by mouth daily. Qty: 30 Tab, Refills: 3      pantoprazole (PROTONIX) 40 mg tablet Take 40 mg by mouth two (2) times a day. 0.9 % SODIUM CHLORIDE (NORMAL SALINE FLUSH INJECTION) 10 mL by IntraVENous route every eight (8) hours. L. acidoph & paracasei- S therm- Bifido (FRAN-Q/RISAQUAD) 8 billion cell cap cap Take 1 Cap by mouth daily. Qty: 60 Cap, Refills: 0      clopidogrel (PLAVIX) 75 mg tab Take 75 mg by mouth daily. levothyroxine (SYNTHROID) 50 mcg tablet Take 1 Tab by mouth Daily (before breakfast). Qty: 90 Tab, Refills: 1      sertraline (ZOLOFT) 100 mg tablet Take 1 Tab by mouth daily. Qty: 135 Tab, Refills: 1      metFORMIN ER (GLUCOPHAGE XR) 750 mg tablet TAKE ONE TABLET BY MOUTH TWICE DAILY  Qty: 180 Tab, Refills: 0      Cholecalciferol, Vitamin D3, (VITAMIN D3) 2,000 unit cap capsule Take 2,000 Units by mouth daily. acetaminophen (TYLENOL) 500 mg tablet Take 500 mg by mouth every four (4) hours as needed for Pain. ferrous sulfate (IRON) 325 mg (65 mg iron) tablet Take 325 mg by mouth every other day. multivit-min-FA-lycopen-lutein (CENTRUM SILVER) 0.4-300-250 mg-mcg-mcg tab Take 1 Tab by mouth daily. aspirin delayed-release 81 mg tablet Take 81 mg by mouth daily. magnesium 250 mg tab Take 250 mg by mouth every evening.          STOP taking these medications       HEPARIN SOD,PORCINE/0.9 % NACL (HEPARIN FLUSH IV) Comments:   Reason for Stopping:         ceFAZolin 1 g, ADDaptor 1 Device Comments:   Reason for Stopping:         DILT- mg XR capsule Comments:   Reason for Stopping:               DISPOSITION:    Home with Family:    Home with HH/PT/OT/RN: x   SNF/LTC:    HORACIO:    OTHER:          Follow up with:   PCP : Annabelle Fox MD  Follow-up Information     Follow up With Details 3662 Ivana Ulrich MD In 3 days  [address] [facility] [address]  This is your post-acute home healthcare provider. If you do not hear from them within 24-48 hours, please give them a call. [address] [facility]  [phone]    [facility]  For additional CHF education, med management, resources, and support in the community.   Mai Grewal MD In 2 weeks  [address] [facility]  [phone]            Code: Full  Diet: diabetic/cardiac diet    Total time in minutes spent coordinating this discharge (includes going over instructions, follow-up, prescriptions, and preparing report for sign off to her PCP) : 35  minutes

## 2018-04-11 NOTE — PROCEDURES
[place] [address]  [phone]    Indications and Pre-Procedure Diagnosis:  Andie Grant is a [de-identified] y.o. female with chb, hx of bacteremia and temp/perm pacemaker is referred for lead extraction/device removal of single lead temp/perm pacemaker and implant of dual chamber pacemaker. Post Procedure Diagnosis:    CHB  Bacteremia  AF    Lead extraction/device removal and Pacemaker Implant Procedure and Findings:  Informed consent was obtained and the patient was premedicated with vancomycin. The procedure was performed under local anesthesia. Continuous pulse oximetry and cuff pressure were monitored. During the procedure, the patient received Versed and Fentanyl for sedation per nursing personnel. The right PICC line was removed and pressure was held until hemostasis was achieved. The patient was cardioverted to sinus rhythm with a 300J biphasic synchronized shock. The right deltopectoral area was prepped and draped in the usual sterile fashion and was liberally infiltrated with 1% lidocaine. An incision was made over the right subpectoral area and a generator pocket was manually dissected. Access was achieved in the right axillary vein under fluoroscopic guidance and using the seldinger technique. Through the right axillary vein, pacing leads were positioned in appropriate regions in the right heart chambers where satisfactory pacing and sensing parameters were measured. Stability of the leads was assessed with deep breathing and there was no diaphragmatic pacing at 10V output. The leads were anchored using the sleeves and a pulse generator pocket fashioned using blunt dissection. The leads were then connected to the pulse generator. The pulse generator pocket was then liberally infiltrated with bacitracin solution, and the device implanted with a single silk fixation suture in the header to prevent migration. The wound was closed in layers using continuous 2-0 Vicryl and 4-0 Vicryl ending with a sub-cuticular closure. A bio-occlusive dressing were applied to the skin. The dressing was removed from the temp/perm pacemaker site at the right IJ site. The suture sleeve was cut. The lead was disconnected from the pacemaker pulse generator and the suture sleeve was cut. The temp/perm pulse generator was removed from the body. A stylet was advanced to the tip of the right ventricular lead and the screw was retracted. The lead was manually extracted with gentle traction under fluoroscopic guidance without issue. Manual pressure was held until hemostasis was achieved. A bio-occlusive dressing was applied to the skin. Fluoroscopy and total procedure times were 2.5 and 35 minutes respectively. Estimated blood loss <10 ml. Sharp count: correct. Specimen(s) collected: none. The following procedure related complication occurred: none. The following problems were encountered: none. Findings: successful lead extraction/device removal and implant of dual pacemaker placement. Device Data Measurements:  Lead Sensing (mV) Threshold (V)Pulse Width (ms) Impedance (Ohms)  RA 1.3  1.3  0.5   697  RV 4.3  0.5  0.5   939      Final Programmed Parameters  Bradycardia pacing rate  70 bpm  Pacing Mode    DDDR  Pacing Output    3.5 V@ 0.5 ms    Supplies Summary available in the chart  [employer]    Thank you for allowing me to participate in this patients care.     Anju Jeong MD, Angelina Teixeira

## 2018-04-12 ENCOUNTER — TELEPHONE (OUTPATIENT)
Dept: INTERNAL MEDICINE CLINIC | Age: 83
End: 2018-04-12

## 2018-04-12 VITALS
SYSTOLIC BLOOD PRESSURE: 118 MMHG | RESPIRATION RATE: 18 BRPM | OXYGEN SATURATION: 97 % | WEIGHT: 169.09 LBS | DIASTOLIC BLOOD PRESSURE: 105 MMHG | TEMPERATURE: 97.9 F | HEART RATE: 80 BPM | BODY MASS INDEX: 31.95 KG/M2

## 2018-04-12 LAB
ATRIAL RATE: 87 BPM
CALCULATED R AXIS, ECG10: -51 DEGREES
CALCULATED T AXIS, ECG11: 81 DEGREES
DIAGNOSIS, 93000: NORMAL
GLUCOSE BLD STRIP.AUTO-MCNC: 141 MG/DL (ref 65–100)
Q-T INTERVAL, ECG07: 474 MS
QRS DURATION, ECG06: 162 MS
QTC CALCULATION (BEZET), ECG08: 560 MS
SERVICE CMNT-IMP: ABNORMAL
VENTRICULAR RATE, ECG03: 84 BPM

## 2018-04-12 PROCEDURE — 82962 GLUCOSE BLOOD TEST: CPT

## 2018-04-12 PROCEDURE — 74011250637 HC RX REV CODE- 250/637: Performed by: INTERNAL MEDICINE

## 2018-04-12 PROCEDURE — 3331090001 HH PPS REVENUE CREDIT

## 2018-04-12 PROCEDURE — 74011250637 HC RX REV CODE- 250/637: Performed by: NURSE PRACTITIONER

## 2018-04-12 PROCEDURE — 93005 ELECTROCARDIOGRAM TRACING: CPT

## 2018-04-12 PROCEDURE — 3331090002 HH PPS REVENUE DEBIT

## 2018-04-12 RX ORDER — CEPHALEXIN 250 MG/1
500 CAPSULE ORAL 3 TIMES DAILY
Status: DISCONTINUED | OUTPATIENT
Start: 2018-04-12 | End: 2018-04-12 | Stop reason: HOSPADM

## 2018-04-12 RX ORDER — METOPROLOL SUCCINATE 50 MG/1
50 TABLET, EXTENDED RELEASE ORAL DAILY
Qty: 30 TAB | Refills: 0 | Status: SHIPPED | OUTPATIENT
Start: 2018-04-12 | End: 2018-10-03 | Stop reason: SDUPTHER

## 2018-04-12 RX ADMIN — METOPROLOL SUCCINATE 50 MG: 50 TABLET, EXTENDED RELEASE ORAL at 08:29

## 2018-04-12 RX ADMIN — ACETAMINOPHEN 650 MG: 325 TABLET ORAL at 05:15

## 2018-04-12 RX ADMIN — BUSPIRONE HYDROCHLORIDE 5 MG: 5 TABLET ORAL at 08:29

## 2018-04-12 RX ADMIN — ASPIRIN 81 MG: 81 TABLET, COATED ORAL at 08:29

## 2018-04-12 RX ADMIN — SERTRALINE HYDROCHLORIDE 100 MG: 50 TABLET ORAL at 08:29

## 2018-04-12 RX ADMIN — CLOPIDOGREL BISULFATE 75 MG: 75 TABLET ORAL at 08:29

## 2018-04-12 RX ADMIN — POTASSIUM CHLORIDE 40 MEQ: 750 TABLET, EXTENDED RELEASE ORAL at 08:29

## 2018-04-12 RX ADMIN — Medication 10 ML: at 08:29

## 2018-04-12 RX ADMIN — PANTOPRAZOLE SODIUM 40 MG: 40 TABLET, DELAYED RELEASE ORAL at 08:29

## 2018-04-12 RX ADMIN — Medication 1 CAPSULE: at 08:29

## 2018-04-12 RX ADMIN — LEVOTHYROXINE SODIUM 50 MCG: 50 TABLET ORAL at 08:29

## 2018-04-12 RX ADMIN — Medication 10 ML: at 04:57

## 2018-04-12 RX ADMIN — CEPHALEXIN 500 MG: 250 CAPSULE ORAL at 11:10

## 2018-04-12 NOTE — PROGRESS NOTES
Patient ambulated in hallway without difficulty. Dressing CDI. No complaints. Discharge instructions reviewed with patient; to be discharged to home with daughter. Site care instructions reviewed; site(s) CDI. Patient instructed on which medications to continue, which to start, and which to stop. Prescriptions given to patient. Medication info provided and reviewed with patient. Follow-up appointment information given; follow-up appointment to be made by patient. IV and tele box removed. Opportunity for questions provided; all questions answered. All belongings returned. Patient wheeled to front door via wheelchair by volunteer; to be transported home by daughter.

## 2018-04-12 NOTE — ROUTINE PROCESS
Cardiology f/u appt scheduled with Dr. Zainab Barbour on 4/26/18 at 10:00AM and PCP LOU appt scheduled with Dr. Tosha Lucero on 4/18/18 at 2:00PM . Appt added to [address], [address] Lila Reynoso Specialist

## 2018-04-12 NOTE — TELEPHONE ENCOUNTER
Pt was d/c [address] for CHF and needs a f/up appt with Dr. Sánchezjaguar Wisdom, daughter Cody Smyth contact [phone]. April Higuera would like Ofe Sarmiento to give her a call back.          Message received & copied from [facility] [place] [address]

## 2018-04-12 NOTE — PROGRESS NOTES
Cardiology Progress Note            [address] [address]  [phone]    4/12/2018 9:16 AM    Admit Date: 4/7/2018    Admit Diagnosis: Acute on chronic diastolic (congestive) heart failure ([address]*    Subjective:     Remus    denies chest pain.     Visit Vitals    BP (!) 118/105 (BP 1 Location: Left arm, BP Patient Position: At rest)    Pulse 80    Temp 97.9 °F (36.6 °C)    Resp 18    Wt 169 lb 1.5 oz (76.7 kg)    SpO2 97%    BMI 31.95 kg/m2     Current Facility-Administered Medications   Medication Dose Route Frequency    ADDaptor        vancomycin (VANCOCIN) 1,000 mg injection        0.9% sodium chloride (MBP/ADV) infusion        sodium chloride (NS) flush 5-10 mL  5-10 mL IntraVENous Q8H    sodium chloride (NS) flush 5-10 mL  5-10 mL IntraVENous PRN    naloxone (NARCAN) injection 0.4 mg  0.4 mg IntraVENous PRN    metoprolol succinate (TOPROL-XL) XL tablet 50 mg  50 mg Oral DAILY    enoxaparin (LOVENOX) injection 40 mg  40 mg SubCUTAneous Q24H    potassium chloride SR (KLOR-CON 10) tablet 40 mEq  40 mEq Oral DAILY    sodium chloride (NS) flush 10-30 mL  10-30 mL InterCATHeter PRN    sodium chloride (NS) flush 10 mL  10 mL InterCATHeter Q24H    sodium chloride (NS) flush 10 mL  10 mL InterCATHeter PRN    sodium chloride (NS) flush 10-40 mL  10-40 mL InterCATHeter Q8H    sodium chloride (NS) flush 20 mL  20 mL InterCATHeter Q24H    heparin (porcine) pf 300 Units  300 Units InterCATHeter PRN    lactobac ac& pc-s.therm-b.anim (FRAN Q/RISAQUAD)  1 Cap Oral DAILY    acetaminophen (TYLENOL) tablet 650 mg  650 mg Oral Q6H PRN    oxyCODONE-acetaminophen (PERCOCET) 5-325 mg per tablet 1 Tab  1 Tab Oral Q6H PRN    naloxone (NARCAN) injection 0.4 mg  0.4 mg IntraVENous PRN    ondansetron (ZOFRAN) injection 4 mg  4 mg IntraVENous Q4H PRN    bisacodyl (DULCOLAX) suppository 10 mg  10 mg Rectal DAILY PRN    insulin lispro (HUMALOG) injection   SubCUTAneous AC&HS  glucose chewable tablet 16 g  4 Tab Oral PRN    dextrose (D50W) injection syrg 12.5-25 g  12.5-25 g IntraVENous PRN    glucagon (GLUCAGEN) injection 1 mg  1 mg IntraMUSCular PRN    hydrALAZINE (APRESOLINE) 20 mg/mL injection 20 mg  20 mg IntraVENous Q6H PRN    pravastatin (PRAVACHOL) tablet 20 mg  20 mg Oral QHS    busPIRone (BUSPAR) tablet 5 mg  5 mg Oral BID    pantoprazole (PROTONIX) tablet 40 mg  40 mg Oral ACB    levothyroxine (SYNTHROID) tablet 50 mcg  50 mcg Oral ACB    clopidogrel (PLAVIX) tablet 75 mg  75 mg Oral DAILY    sertraline (ZOLOFT) tablet 100 mg  100 mg Oral DAILY    aspirin delayed-release tablet 81 mg  81 mg Oral DAILY    sodium chloride (NS) flush 5-10 mL  5-10 mL IntraVENous Q8H    sodium chloride (NS) flush 5-10 mL  5-10 mL IntraVENous PRN         Objective:      Visit Vitals    BP (!) 118/105 (BP 1 Location: Left arm, BP Patient Position: At rest)    Pulse 80    Temp 97.9 °F (36.6 °C)    Resp 18    Wt 169 lb 1.5 oz (76.7 kg)    SpO2 97%    BMI 31.95 kg/m2       Physical Exam:  Abdomen: soft, non-tender  Extremities: extremities normal  Heart: regular rate and rhythm  Lungs: clear to auscultation bilaterally  Pulses: 2+ and symmetric    Data Review:   Labs:    Recent Labs      04/11/18   0102   WBC  11.7*   HGB  10.1*   HCT  32.2*   PLT  299     Recent Labs      04/11/18   0102  04/10/18   0209   NA  137  138   K  3.7  3.6   CL  100  100   CO2  30  30   GLU  184*  160*   BUN  30*  22*   CREA  1.30*  1.14*   CA  8.5  8.6       No results for input(s): TROIQ, CPK, CKMB in the last 72 hours.     No intake or output data in the 24 hours ending 04/12/18 0916     Telemetry: v paced    Interrogation - wnl    Assessment:     Active Problems:    Paroxysmal atrial fibrillation ([facility] (9/7/2016)      Overview: S/p Watchman      Precordial pain (9/7/2016)      Infection of pacemaker pocket ([facility] (3/6/2018)      Pacemaker (3/6/2018)      Overview: 3/7/18 explant of pacemaker Acute on chronic diastolic (congestive) heart failure ([facility] (4/7/2018)      GI bleed (4/7/2018)        Plan:     Maikel Vicente is in sinus with chb, v pacing. Cont bb for cardiomyopathy. cxr and interrogation wnl. Will have her take 10 days of keflex as an outpatient. Ok for [employer] from ep standpoint.     91 Carol Carver MD, [facility]    4/12/2018

## 2018-04-12 NOTE — PROGRESS NOTES
Pt to discharge home today by private vehicle with family at 11:00AM. Pt to use family support for follow-up care appointments. Pt to resume [facility] PT/OT, SN services at discharge. Referral sent to [facility]. PCP and Cardiology f/u appointment scheduled. All information entered into pt AVS. NN notified of discharge. Pt has no additional CM needs at this time. Floor nurse notified. Care Management Interventions  PCP Verified by CM: Yes  Palliative Care Criteria Met (RRAT>21 & CHF Dx)?: Yes  Palliative Consult Recommended?: No  Reason Palliative Care Not Recommended?: Provider preference to wait  Mode of Transport at Discharge:  Other (see comment) (By private vehicle with family)  Hospital Transport Time of Discharge: 1100  Transition of Care Consult (CM Consult): [address]: Yes  Discharge Durable Medical Equipment: No (Cane at home)  Health Maintenance Reviewed: Yes  Physical Therapy Consult: Yes  Occupational Therapy Consult: Yes  Speech Therapy Consult: No  Current Support Network: Own Home, Family Lives [place], [facility] (One-story house ([address]) with sister at this time; spouse caregiving and staying with other family at this time; daughter in area)  Confirm Follow Up Transport: Family  Plan discussed with Pt/Family/Caregiver: Yes  Freedom of Choice Offered: Yes  Discharge Location  Discharge Placement: Home with home health [facility] PT/OT, SN)    Rose Boeck, MSW Supervisee in Social Work, [address] [place] [address]  [phone]

## 2018-04-13 ENCOUNTER — PATIENT OUTREACH (OUTPATIENT)
Dept: INTERNAL MEDICINE CLINIC | Age: 83
End: 2018-04-13

## 2018-04-13 VITALS — BODY MASS INDEX: 31.84 KG/M2 | WEIGHT: 168.5 LBS

## 2018-04-13 PROCEDURE — 3331090002 HH PPS REVENUE DEBIT

## 2018-04-13 PROCEDURE — 3331090001 HH PPS REVENUE CREDIT

## 2018-04-13 NOTE — PROGRESS NOTES
Hospital Discharge Follow-Up      Date/Time:  2018 12:55 PM    Patient was admitted to [facility] on 18 and discharged on 18 for MSSA bacteremia due to PM pocket infection, acute on chronic diastolic CHF. The physician discharge summary was available at the time of outreach. Patient was contacted within 1 business days of discharge. Top Challenges reviewed with the provider   Urinary Incontinence - increase in episode frequency post-discharge, currently taking Lasix 20 mg twice daily; request for [address] with Provider within 3 days post-discharge; Referral to [facility]. [facility] visit scheduled for 18 between 3p-4p. No pending lab results from hospital admission noted. Inpatient RRAT score: 39  Was this a readmission? Yes  Patient stated reason for the readmission: \"She was released from the hospital on  with sepsis and we were giving her antibiotics intravenously, but the reason she went back in was shortness of breath. The pain in her chest and the shortness of breath. \"    Method of communication with provider :chart routing    Nurse Navigator (NN) contacted the patient by telephone to perform post hospital discharge assessment. Verified name and  with patient as identifiers. Provided introduction to self, and explanation of the Nurse Navigator role. Patient requests that NN speak to her Daughter, Victoria Lugo, for completion of NN initial LOU outreach today. Reviewed discharge instructions and red flags with  caregiver who verbalized understanding. Caregiver given an opportunity to ask questions and does not have any further questions or concerns at this time. The caregiver agrees to contact the PCP office for questions related to their healthcare. NN provided contact information for future reference. Summary of patients top problems:  1. MSSA bacteremia due to PM pocket infection  2.  Acute on chronic diastolic chf  3. Urinary incontinence - diagnosis of stress bladder incontinence. Home Health orders at discharge: PT, OT, [facility] 114: [facility] - resumption of care  Date of initial visit: Confirmed with [facility] today that home health orders were received; SN resumption of care visit scheduled for 4/14/18. Durable Medical Equipment ordered/company: None  Durable Medical Equipment received: N/A    Barriers to care? Financial; temporary barrier due to recent fraudulent charges to patient's bank account. Advance Care Planning:   Does patient have an Advance Directive:  reviewed and current     HIPAA: 10/11/17 - Catherine Parry    Medication:     New Medications at Discharge: toprol-xl, furosemide, percocet  Changed Medications at Discharge: none  Discontinued Medications at Discharge: heparin flush IV, cefazolin, dilt-xr    Medication reconciliation was performed with daughter, who verbalizes understanding of administration of home medications. There were no barriers to obtaining medications identified at this time. Referral to Pharm D needed: no       Med Rec during this call  Current Outpatient Prescriptions   Medication Sig    metoprolol succinate (TOPROL-XL) 50 mg XL tablet Take 1 Tab by mouth daily.  furosemide (LASIX) 40 mg tablet Take 1 Tab by mouth two (2) times a day.  oxyCODONE-acetaminophen (PERCOCET) 5-325 mg per tablet Take 1 Tab by mouth every six (6) hours as needed. Max Daily Amount: 4 Tabs.  busPIRone (BUSPAR) 5 mg tablet Take 5 mg by mouth two (2) times a day.  pravastatin (PRAVACHOL) 20 mg tablet TAKE ONE TABLET BY MOUTH NIGHTLY    benazepril (LOTENSIN) 40 mg tablet Take 1 Tab by mouth daily.  pantoprazole (PROTONIX) 40 mg tablet Take 40 mg by mouth two (2) times a day.  L. acidoph & paracasei- S therm- Bifido (FRAN-Q/RISAQUAD) 8 billion cell cap cap Take 1 Cap by mouth daily.     clopidogrel (PLAVIX) 75 mg tab Take 75 mg by mouth daily.  levothyroxine (SYNTHROID) 50 mcg tablet Take 1 Tab by mouth Daily (before breakfast).  sertraline (ZOLOFT) 100 mg tablet Take 1 Tab by mouth daily.  metFORMIN ER (GLUCOPHAGE XR) 750 mg tablet TAKE ONE TABLET BY MOUTH TWICE DAILY    Cholecalciferol, Vitamin D3, (VITAMIN D3) 2,000 unit cap capsule Take 2,000 Units by mouth daily.  acetaminophen (TYLENOL) 500 mg tablet Take 500 mg by mouth every four (4) hours as needed for Pain.  ferrous sulfate (IRON) 325 mg (65 mg iron) tablet Take 325 mg by mouth every other day.  multivit-min-FA-lycopen-lutein (CENTRUM SILVER) 0.4-300-250 mg-mcg-mcg tab Take 1 Tab by mouth daily.  aspirin delayed-release 81 mg tablet Take 81 mg by mouth daily.  magnesium 250 mg tab Take 250 mg by mouth every evening. No current facility-administered medications for this visit. Medications Discontinued During This Encounter   Medication Reason    0.9 % SODIUM CHLORIDE (NORMAL SALINE FLUSH INJECTION) Therapy Completed         Normal Saline Flush discontinued because Daughter reports PICC removed 4/12/18 prior to hospital discharge. Med Rec updated. PCP/Specialist follow up: Future Appointments  Date Time Provider Paul Rangeli   4/18/2018 2:00 PM Cathy Theodore MD [address]   4/26/2018 10:00 AM Jose Pena MD [address]   4/30/2018 10:40 AM Matthew Tillman  [facility]          Goals        Heart Failure     Reduce risk of CHF exacerbations and complications.             4/13/2018  - followed by Dr. 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 \"  - daughter is familiar with/knowledgeable regarding HF zones and is able to accurately describe signs and symptoms associated with each HF zone to this NN    - encouraged continued adherence with patient daily weights and maintenance of daily weight record  - encouraged daily assessment of patient HF zone  - reinforced patient/caregiver action plan associated with each HF zone  - advised outreach to Cardiologist to notify of onset of new/worsened symptoms and/or weight gain of 3 pounds in 24 hours and/or 5 pounds in one week         Post Hospitalization     Attends follow-up appointments as directed. 3/13/2018 - AFD  Pt is scheduled for OV on 3/20/18 with Dr. Anamika William. Pt cannot identify any barriers to attending OV as scheduled. P - Pt will attend OV as scheduled. 4/13/2018  - readmitted to [id number] [place], [place] [address]y 4/7/18-4/12/18  - LOU appointment previously scheduled with Dr. Anamika William for 4/18/18  - has office visit scheduled with Dr. Matilda Quintanilla (Cardiolgoy) for 4/26/18  - patient/daughter agreeable to [facility] visit for HFU within 3 days post-discharge  - [facility] visit scheduled for today between 3p - 4p for HFU within 3 days post-discharge       Prevent complications post hospitalization. 4/13/2018  - Daughter reports exacerbation of episodes of urinary incontinence post-discharge; attributes this to new order for Lasix 40 mg two times a day at hospital discharge  - reports that patient is unable to ambulate to the bathroom fast enough  - daughter inquiring about Ambulatory Supply order for Depends/Incontinence Briefs; NN will request LPN  assistance with this request       Supportive resources in place to maintain patient in the community (ie.  Home Health, DME equipment, refer to, medication assistant plan, etc.)            3/13/2018 - AFD    Pt lives with spouse, sister, and daughter. Pt's daughter performs the antibiotic administration 3 times per day. HHSN began visits today. Have noted SN contact with MD to address elevated BG and BP. Pt denied need for additional support. Pt did not voice awareness of recommendation for home PT for strengthening and stability. Noted pt has previously suffered GLFs with injuries in the last 6 months. P - Will consult with [facility] nurse to review current resources. 4/13/2018  - readmitted to [facility] 4/7/18-4/12/18  - discharged to home with resumption of home health through [facility] for SN, PT, OT  - home health SN resumption of care visit scheduled for 4/13/18  - using cane with ambulation  - DME: cane, walker, wheelchair  - reports patient has previously used Cpap, however has not used Cpap within the past 3.5 years and is in need of a new referral to sleep medicine for sleep study  - living situation: one-level house with Spouse, Daughter, [facility]: Daughter is actively engaged and involved in patient's care; Daughter is unemployed and does not work outside of the home. Spouse is able to provide patient assistance with ADLs, however Daughter manages medical care for patient and spouse. Patient has 24/7 supervision/care by family members (spouse, daughter, aunt). Daughter is primary caregiver to patient and spouse. - minimal assistance with bathing/dressing; Daughter is assisting patient with dressing upper body post-op  - Medication Management: Daughter provides minimal assistance with medication management post-discharge; reports utilization of weekly pill box  - denies transportation concerns  - Daughter denies any financial barriers to obtaining patient medications at this time; reports patient's debit card was skimmed and has been overdrawn, however they have filed disputes with the patient's financial institution for the charges.   Aunt rodger patient money in order to purchase medications post-discharge due to temporary financial strain secondary to fraudulent charges  - reports Medicaid Application was completed and submitted at the end of March; eligibility decision pending  - [email or web address] and discussed appropriate utilization of [address]  - will request LPN  assistance with obtaining order from PCP for Incontinence Supplies as well as submitting order to DME provider  - denies any additional questions, concerns, needs at this time       Understands red flags post discharge. 3/13/2018 - AFD    Pt states she currently has a lot of \"swelling\" in her legs. Pt states she has been told this is fluid from her infection. Pt states she has not been instructed to perform a daily wt. Pt does not take diuretics. Pt asked to perform a daily wt and add information to her daily record of BP and BG. Pt states her BG this morning was over 250. Pt is taking only metformin. Pt states she was taking insulin in the hospital.  Pt is keeping a record of daily BP   HHSN began visits today  Noted contact with MD and orders. P - Pt will continue to keep daily readings of BG, BP, and wt. Pt will bring record to all OV.    4/13/2018   - daughter denies presence of and/or patient c/o fever, chills, dizziness, shortness of breath at rest, dyspnea on exertion, nausea, vomiting, edema, lower extremity edema  - Daughter reports \"just a dry cough that they noticed in the hospital, she's not bringing anything up. \" Denies worsening of cough post-discharge. - reports presence of pain post-discharge that is associated with \"bruising underneath her right arm\". Reports adequate pain relief with prn Tylenol; denies increase in pain level post-discharge.

## 2018-04-14 ENCOUNTER — HOME CARE VISIT (OUTPATIENT)
Dept: SCHEDULING | Facility: HOME HEALTH | Age: 83
End: 2018-04-14
Payer: MEDICARE

## 2018-04-14 PROCEDURE — 3331090002 HH PPS REVENUE DEBIT

## 2018-04-14 PROCEDURE — G0493 RN CARE EA 15 MIN HH/HOSPICE: HCPCS

## 2018-04-14 PROCEDURE — 3331090001 HH PPS REVENUE CREDIT

## 2018-04-15 PROCEDURE — 3331090002 HH PPS REVENUE DEBIT

## 2018-04-15 PROCEDURE — 3331090001 HH PPS REVENUE CREDIT

## 2018-04-16 ENCOUNTER — TELEPHONE (OUTPATIENT)
Dept: INTERNAL MEDICINE CLINIC | Age: 83
End: 2018-04-16

## 2018-04-16 ENCOUNTER — HOME CARE VISIT (OUTPATIENT)
Dept: SCHEDULING | Facility: HOME HEALTH | Age: 83
End: 2018-04-16
Payer: MEDICARE

## 2018-04-16 ENCOUNTER — PATIENT OUTREACH (OUTPATIENT)
Dept: INTERNAL MEDICINE CLINIC | Age: 83
End: 2018-04-16

## 2018-04-16 VITALS
TEMPERATURE: 98.8 F | DIASTOLIC BLOOD PRESSURE: 68 MMHG | SYSTOLIC BLOOD PRESSURE: 126 MMHG | HEART RATE: 72 BPM | OXYGEN SATURATION: 97 % | RESPIRATION RATE: 18 BRPM

## 2018-04-16 DIAGNOSIS — N39.3 STRESS BLADDER INCONTINENCE, FEMALE: Primary | ICD-10-CM

## 2018-04-16 PROCEDURE — 3331090001 HH PPS REVENUE CREDIT

## 2018-04-16 PROCEDURE — 3331090002 HH PPS REVENUE DEBIT

## 2018-04-16 PROCEDURE — G0151 HHCP-SERV OF PT,EA 15 MIN: HCPCS

## 2018-04-16 NOTE — PROGRESS NOTES
Mel Blake, incontinence supplies ordered and faxed along w/ pt demo, and screen shot to [address] [place] [address] at [phone] w/ confirmation received.

## 2018-04-16 NOTE — TELEPHONE ENCOUNTER
[employer], a PA with [email or web address] ,called to advise patient was evaluated at home and took vitals point of care chem 8. Slightly elevated glucose level. Back to baseline following surgery. Phone number for Laura Salcedo is [phone].        Message received & copied from [facility] after closing on 4/13/18

## 2018-04-16 NOTE — PROGRESS NOTES
Goals Addressed             Most Recent       Post Hospitalization     Attends follow-up appointments as directed. On track (4/16/2018)             3/13/2018 - AFD  Pt is scheduled for OV on 3/20/18 with Dr. Fernando Woods. Pt cannot identify any barriers to attending OV as scheduled. P - Pt will attend OV as scheduled. 4/13/2018  - readmitted to [facility] 4/7/18-4/12/18  - LOU appointment previously scheduled with Dr. Fernando Woods for 4/18/18  - has office visit scheduled with Dr. Sameer Carson (Cardiolgoy) for 4/26/18  - patient/daughter agreeable to [facility] visit for HFU within 3 days post-discharge  - [facility] visit scheduled for today between 3p - 4p for HFU within 3 days post-discharge    4/16/2018  - confirmed that requested [facility] visit for hospital follow-up was completed on 4/13/18; [facility] note to be faxed to PCP office       Prevent complications post hospitalization. On track (4/16/2018)             4/13/2018  - Daughter reports exacerbation of episodes of urinary incontinence post-discharge; attributes this to new order for Lasix 40 mg two times a day at hospital discharge  - reports that patient is unable to ambulate to the bathroom fast enough  - daughter inquiring about Ambulatory Supply order for Depends/Incontinence Briefs; NN will request LPN  assistance with this request      4/16/2018  - confirmed that requested [facility] visit for hospital follow-up was completed on 4/13/18 (Completed on Post-Discharge Day 2); [facility] note to be faxed to PCP office.  Supportive resources in place to maintain patient in the community (ie. Home Health, DME equipment, refer to, medication assistant plan, etc.)   On track (4/16/2018)             3/13/2018 - AFD    Pt lives with spouse, sister, and daughter. Pt's daughter performs the antibiotic administration 3 times per day. HHSN began visits today. Have noted SN contact with MD to address elevated BG and BP.   Pt denied need for additional support. Pt did not voice awareness of recommendation for home PT for strengthening and stability. Noted pt has previously suffered GLFs with injuries in the last 6 months. P - Will consult with [facility] nurse to review current resources. 4/13/2018  - readmitted to ED [facility] 4/7/18-4/12/18  - discharged to home with resumption of home health through [facility] for SN, PT, OT  - home health SN resumption of care visit scheduled for 4/13/18  - using cane with ambulation  - DME: cane, walker, wheelchair  - reports patient has previously used Cpap, however has not used Cpap within the past 3.5 years and is in need of a new referral to sleep medicine for sleep study  - living situation: one-level house with Spouse, Daughter, [address]: Daughter is actively engaged and involved in patient's care; Daughter is unemployed and does not work outside of the home. Spouse is able to provide patient assistance with ADLs, however Daughter manages medical care for patient and spouse. Patient has 24/7 supervision/care by family members (spouse, daughter, aunt). Daughter is primary caregiver to patient and spouse. - minimal assistance with bathing/dressing; Daughter is assisting patient with dressing upper body post-op  - Medication Management: Daughter provides minimal assistance with medication management post-discharge; reports utilization of weekly pill box  - denies transportation concerns  - Daughter denies any financial barriers to obtaining patient medications at this time; reports patient's debit card was skimmed and has been overdrawn, however they have filed disputes with the patient's financial institution for the charges.   Aunt lent patient money in order to purchase medications post-discharge due to temporary financial strain secondary to fraudulent charges  - reports Medicaid Application was completed and submitted at the end of March; eligibility decision pending  - provided contact information for [facility] and discussed appropriate utilization of [address]  - will request LPN  assistance with obtaining order from PCP for Incontinence Supplies as well as submitting order to DME provider  - denies any additional questions, concerns, needs at this time    4/16/2018  - [address] SN resumption of care visit completed 4/14/18; next scheduled visit 4/17/18  - Home Health PT scheduled for 4/16/18  - [address] OT scheduled for 4/16/18              Future Appointments  Date Time Provider Department Center   4/17/2018 To Be Determined Leeann Essex, RN [facility]   4/18/2018 2:00 PM Mag Mckenna MD [address]    4/20/2018 To Be Determined Nicci Fernandez LPN [address] [facility]   4/24/2018 To Be Determined Nicci Fernandez  [facility]   4/26/2018 10:00 AM Jose [facility]atorre MD [facility]   4/27/2018 To Be Determined Leeann Essex, RN [facility]   4/30/2018 10:40 AM Loco Schmidt  [facility]   5/2/2018 To Be Determined Leeann Essex, RN [facility] [address]   5/9/2018 To Be Determined Leeann Essex, RN [address] [facility]   5/9/2018 To Be Determined Leeann Essex, RN [facility]            Last Appointment My Department:  3/20/2018

## 2018-04-17 ENCOUNTER — HOME CARE VISIT (OUTPATIENT)
Dept: SCHEDULING | Facility: HOME HEALTH | Age: 83
End: 2018-04-17
Payer: MEDICARE

## 2018-04-17 VITALS
OXYGEN SATURATION: 97 % | HEART RATE: 70 BPM | RESPIRATION RATE: 18 BRPM | TEMPERATURE: 98 F | SYSTOLIC BLOOD PRESSURE: 120 MMHG | DIASTOLIC BLOOD PRESSURE: 78 MMHG

## 2018-04-17 PROCEDURE — 3331090002 HH PPS REVENUE DEBIT

## 2018-04-17 PROCEDURE — 3331090001 HH PPS REVENUE CREDIT

## 2018-04-17 PROCEDURE — G0300 HHS/HOSPICE OF LPN EA 15 MIN: HCPCS

## 2018-04-18 ENCOUNTER — PATIENT OUTREACH (OUTPATIENT)
Dept: INTERNAL MEDICINE CLINIC | Age: 83
End: 2018-04-18

## 2018-04-18 ENCOUNTER — OFFICE VISIT (OUTPATIENT)
Dept: INTERNAL MEDICINE CLINIC | Age: 83
End: 2018-04-18

## 2018-04-18 ENCOUNTER — HOSPITAL ENCOUNTER (OUTPATIENT)
Dept: LAB | Age: 83
Discharge: HOME | End: 2018-04-18
Payer: MEDICARE

## 2018-04-18 VITALS
BODY MASS INDEX: 30.78 KG/M2 | SYSTOLIC BLOOD PRESSURE: 103 MMHG | WEIGHT: 163 LBS | RESPIRATION RATE: 16 BRPM | DIASTOLIC BLOOD PRESSURE: 63 MMHG | OXYGEN SATURATION: 97 % | TEMPERATURE: 97.9 F | HEART RATE: 74 BPM | HEIGHT: 61 IN

## 2018-04-18 VITALS
TEMPERATURE: 96.6 F | OXYGEN SATURATION: 96 % | SYSTOLIC BLOOD PRESSURE: 118 MMHG | DIASTOLIC BLOOD PRESSURE: 60 MMHG | HEART RATE: 71 BPM

## 2018-04-18 DIAGNOSIS — R06.02 SOB (SHORTNESS OF BREATH): ICD-10-CM

## 2018-04-18 DIAGNOSIS — R60.0 LOCALIZED EDEMA: ICD-10-CM

## 2018-04-18 DIAGNOSIS — N39.3 STRESS BLADDER INCONTINENCE, FEMALE: ICD-10-CM

## 2018-04-18 DIAGNOSIS — E78.2 MIXED HYPERLIPIDEMIA: ICD-10-CM

## 2018-04-18 DIAGNOSIS — E66.9 OBESITY (BMI 30.0-34.9): ICD-10-CM

## 2018-04-18 DIAGNOSIS — I50.33 ACUTE ON CHRONIC DIASTOLIC (CONGESTIVE) HEART FAILURE (HCC): Primary | ICD-10-CM

## 2018-04-18 DIAGNOSIS — R27.0 ATAXIA: ICD-10-CM

## 2018-04-18 DIAGNOSIS — E03.9 HYPOTHYROIDISM, ADULT: ICD-10-CM

## 2018-04-18 DIAGNOSIS — E11.9 TYPE 2 DIABETES MELLITUS WITHOUT COMPLICATION, WITHOUT LONG-TERM CURRENT USE OF INSULIN (HCC): ICD-10-CM

## 2018-04-18 DIAGNOSIS — I48.0 PAROXYSMAL ATRIAL FIBRILLATION (HCC): ICD-10-CM

## 2018-04-18 DIAGNOSIS — K21.9 GASTROESOPHAGEAL REFLUX DISEASE WITHOUT ESOPHAGITIS: ICD-10-CM

## 2018-04-18 DIAGNOSIS — F41.9 ANXIETY: ICD-10-CM

## 2018-04-18 DIAGNOSIS — I10 ESSENTIAL HYPERTENSION: ICD-10-CM

## 2018-04-18 PROCEDURE — 3331090002 HH PPS REVENUE DEBIT

## 2018-04-18 PROCEDURE — 85027 COMPLETE CBC AUTOMATED: CPT

## 2018-04-18 PROCEDURE — 36415 COLL VENOUS BLD VENIPUNCTURE: CPT

## 2018-04-18 PROCEDURE — 3331090001 HH PPS REVENUE CREDIT

## 2018-04-18 PROCEDURE — 80053 COMPREHEN METABOLIC PANEL: CPT

## 2018-04-18 NOTE — PATIENT INSTRUCTIONS
If top number stays less than 110 will decrease benazepril in half to 20mg     buspar is 2 times per day     Labs daily

## 2018-04-18 NOTE — PROGRESS NOTES
Goals Addressed             Most Recent       Post Hospitalization     Supportive resources in place to maintain patient in the community (ie. Home Health, DME equipment, refer to, medication assistant plan, etc.)   On track (4/16/2018)             3/13/2018 - AFD    Pt lives with spouse, sister, and daughter. Pt's daughter performs the antibiotic administration 3 times per day. HHSN began visits today. Have noted SN contact with MD to address elevated BG and BP. Pt denied need for additional support. Pt did not voice awareness of recommendation for home PT for strengthening and stability. Noted pt has previously suffered GLFs with injuries in the last 6 months. P - Will consult with [facility] nurse to review current resources. 4/13/2018  - readmitted to [facility] 4/7/18-4/12/18  - discharged to home with resumption of home health through [facility] for SN, PT, OT  - home health SN resumption of care visit scheduled for 4/13/18  - using cane with ambulation  - DME: cane, walker, wheelchair  - reports patient has previously used Cpap, however has not used Cpap within the past 3.5 years and is in need of a new referral to sleep medicine for sleep study  - living situation: one-level house with Spouse, Daughter, [address]: Daughter is actively engaged and involved in patient's care; Daughter is unemployed and does not work outside of the home. Spouse is able to provide patient assistance with ADLs, however Daughter manages medical care for patient and spouse. Patient has 24/7 supervision/care by family members (spouse, daughter, aunt). Daughter is primary caregiver to patient and spouse.   - minimal assistance with bathing/dressing; Daughter is assisting patient with dressing upper body post-op  - Medication Management: Daughter provides minimal assistance with medication management post-discharge; reports utilization of weekly pill box  - denies transportation concerns  - Daughter denies any financial barriers to obtaining patient medications at this time; reports patient's debit card was skimmed and has been overdrawn, however they have filed disputes with the patient's financial institution for the charges. Aunt lent patient money in order to purchase medications post-discharge due to temporary financial strain secondary to fraudulent charges  - reports Medicaid Application was completed and submitted at the end of March; eligibility decision pending  - provided contact information for [facility] and discussed appropriate utilization of [address]  - will request LPN  assistance with obtaining order from PCP for Incontinence Supplies as well as submitting order to DME provider  - denies any additional questions, concerns, needs at this time    4/16/2018  - [address] SN resumption of care visit completed 4/14/18; next scheduled visit 4/17/18  - [address] PT scheduled for 4/16/18  - [address] OT scheduled for 4/16/18 4/18/2018  - most recent home health SN visit 4/17/18; next scheduled visit 4/20/18  - per home health PT initial evaluation documentation, patient/family requested no additional PT visits due to patient reportedly functioning at her baseline mobility; also requested no OT evaluation  - per LPN , order for incontinence briefs was submitted to [address] [place] [address] on 4/16/18  - Incontinence Supplies not covered by Medicare and patient does not have Medicaid at this time; [address] [place] [address] unable to fulfill order request  - NN outreach to [address] to inquire about cost of incontinence briefs  $9.90/package (20 briefs)  $39/case (80 briefs)  May have some briefs available for $29/case; reports that if patient calls ahead then he will check the warehouse to see if this cheaper option is in stock.   Hours - M-F 830 am-530 pm Sat 9 am-3 pm                  Future Appointments  Date Time Provider Paul Davis   4/18/2018 2:00 PM Jean-Pierre Smith MD [facility] SCHED   4/20/2018 To Be Determined Darek Keen LPN [facility] [address]   4/24/2018 To Be Determined Darek Keen LPN [address]   4/26/2018 10:00 AM Jose Jamil MD [facility] [address]   4/27/2018 To Be Determined Garfield Owens RN [facility] [address]   4/30/2018 10:40 AM Jumana Jackson  [address]   5/2/2018 To Be Determined Garfield Owens RN [facility] [address]   5/9/2018 To Be Determined Garfield Owens RN [address]   5/9/2018 To Be Determined Garfield Owens RN [address]            Last Appointment My Department:  3/20/2018

## 2018-04-18 NOTE — MR AVS SNAPSHOT
[address]oi [address] [facility] 
[phone] Patient: Andie Grant MRN: [id number] :1935 Visit Information Date & Time Provider Department Dept. Phone Encounter #  
 2018  2:00 PM Cuco Michaud, [address] [place] [address],[address] [phone] [id number] Follow-up Instructions Return in about 3 weeks (around 2018). Your Appointments 2018 10:00 AM  
ESTABLISHED PATIENT with Dick Zazueta MD  
[address] [facility] [address] [facility]) Appt Note: 2 week hospital follow up [address] [facility]  
[phone] [address] [facility]  
  
    
 2018 10:40 AM  
Follow Up with Constance Camacho MD  
[address] [facility] [address] [facility]) Appt Note: balance disorder [address] [facility] [address] [address]  
[facility] [address] [address] Upcoming Health Maintenance Date Due Pneumococcal 65+ High/Highest Risk (2 of 2 - PPSV23) 10/13/2016 EYE EXAM RETINAL OR DILATED Q1 3/20/2018 FOOT EXAM Q1 2018 MICROALBUMIN Q1 2018 LIPID PANEL Q1 2018 HEMOGLOBIN A1C Q6M 2018 MEDICARE YEARLY EXAM 2018 GLAUCOMA SCREENING Q2Y 3/20/2019 DTaP/Tdap/Td series (2 - Td) 2025 Allergies as of 2018  Review Complete On: 2018 By: Cuco Michaud MD  
  
 Severity Noted Reaction Type Reactions Procardia Xl [Nifedipine]  2014    Other (comments)  
 weakness Current Immunizations  Reviewed on 2017 Name Date Influenza High Dose Vaccine PF 2017, 2015 Influenza Vaccine 10/10/2014 12:38 PM  
 Influenza Vaccine (Quad) PF 2016 Pneumococcal Conjugate (PCV-13) 2016 Tdap 2015 Zoster Vaccine, Live 2016 Not reviewed this visit You Were Diagnosed With   
  
 Codes Comments Acute on chronic diastolic (congestive) heart failure (HCC)    -  Primary ICD-10-CM: I50.33 ICD-9-CM: 428.33, 428.0 Type 2 diabetes mellitus without complication, without long-term current use of insulin (HCC)     ICD-10-CM: E11.9 ICD-9-CM: 250.00 SOB (shortness of breath)     ICD-10-CM: R06.02 
ICD-9-CM: 786.05 Localized edema     ICD-10-CM: R60.0 ICD-9-CM: 782.3 Mixed hyperlipidemia     ICD-10-CM: E78.2 ICD-9-CM: 272.2 Paroxysmal atrial fibrillation (HCC)     ICD-10-CM: I48.0 ICD-9-CM: 427.31 Ataxia     ICD-10-CM: R27.0 ICD-9-CM: 781.3 Essential hypertension     ICD-10-CM: I10 
ICD-9-CM: 401.9 Hypothyroidism, adult     ICD-10-CM: E03.9 ICD-9-CM: 244.9 Anxiety     ICD-10-CM: F41.9 ICD-9-CM: 300.00 Stress bladder incontinence, female     ICD-10-CM: N39.3 ICD-9-CM: 625.6 Gastroesophageal reflux disease without esophagitis     ICD-10-CM: K21.9 ICD-9-CM: 530.81 Obesity (BMI 30.0-34.9)     ICD-10-CM: O62.9 ICD-9-CM: 278.00 Vitals BP Pulse Temp Resp Height(growth percentile) Weight(growth percentile) 103/63 (BP 1 Location: Left arm, BP Patient Position: Sitting) 74 97.9 °F (36.6 °C) (Oral) 16 5' 1\" (1.549 m) 163 lb (73.9 kg) SpO2 BMI OB Status Smoking Status 97% 30.8 kg/m2 Hysterectomy Never Smoker Vitals History BMI and BSA Data Body Mass Index Body Surface Area  
 30.8 kg/m 2 1.78 m 2 Preferred Pharmacy Pharmacy Name Phone [facility] [id number], [address] [place] [address] ROAD [phone] Your Updated Medication List  
  
   
This list is accurate as of 4/18/18  2:39 PM.  Always use your most recent med list.  
  
  
  
  
 acetaminophen 500 mg tablet Commonly known as:  TYLENOL Take 500 mg by mouth every four (4) hours as needed for Pain. aspirin delayed-release 81 mg tablet Take 81 mg by mouth daily. benazepril 40 mg tablet Commonly known as:  LOTENSIN Take 1 Tab by mouth daily. busPIRone 5 mg tablet Commonly known as:  BUSPAR Take 5 mg by mouth two (2) times a day. CENTRUM SILVER 0.4-300-250 mg-mcg-mcg Tab Generic drug:  multivit-min-FA-lycopen-lutein Take 1 Tab by mouth daily. Cholecalciferol (Vitamin D3) 2,000 unit Cap capsule Commonly known as:  VITAMIN D3 Take 2,000 Units by mouth daily. furosemide 40 mg tablet Commonly known as:  LASIX Take 1 Tab by mouth two (2) times a day. Iron 325 mg (65 mg iron) tablet Generic drug:  ferrous sulfate Take 325 mg by mouth every other day. L. acidoph & paracasei- S therm- Bifido 8 billion cell Cap cap Commonly known as:  FRAN-Q/RISAQUAD Take 1 Cap by mouth daily. levothyroxine 50 mcg tablet Commonly known as:  SYNTHROID Take 1 Tab by mouth Daily (before breakfast). magnesium 250 mg Tab Take 250 mg by mouth every evening. metFORMIN  mg tablet Commonly known as:  GLUCOPHAGE XR  
TAKE ONE TABLET BY MOUTH TWICE DAILY  
  
 metoprolol succinate 50 mg XL tablet Commonly known as:  TOPROL-XL Take 1 Tab by mouth daily. pantoprazole 40 mg tablet Commonly known as:  PROTONIX Take 40 mg by mouth two (2) times a day. PLAVIX 75 mg Tab Generic drug:  clopidogrel Take 75 mg by mouth daily. pravastatin 20 mg tablet Commonly known as:  PRAVACHOL  
TAKE ONE TABLET BY MOUTH NIGHTLY  
  
 sertraline 100 mg tablet Commonly known as:  ZOLOFT Take 1 Tab by mouth daily. We Performed the Following CBC W/O DIFF [97982 CPT(R)] METABOLIC PANEL, COMPREHENSIVE [03250 CPT(R)] Follow-up Instructions Return in about 3 weeks (around 5/9/2018). To-Do List   
 04/20/2018 To Be Determined Appointment with Rock Cuauhtemoc LPN at [place] [address]  
  
 04/24/2018 To Be Determined Appointment with Alison Magana LPN at [place] [address]  
  
 04/27/2018 To Be Determined Appointment with Bernis Spatz, RN at [place] [address]  
  
 05/02/2018 To Be Determined Appointment with Bernis Spatz, RN at [place] [address]  
  
 05/09/2018 To Be Determined Appointment with Bernis Spatz, RN at [place] [address]  
  
 05/09/2018 To Be Determined Appointment with Bernis Spatz, RN at [place] [address] Patient Instructions If top number stays less than 110 will decrease benazepril in half to 20mg  
 
buspar is 2 times per day Labs daily Introducing [facility]! Dear Casi Horowitz: Thank you for requesting a [employer] account. Our records indicate that you already have an active [employer] account. You can access your account anytime at [email or web address] Did you know that you can access your hospital and ER discharge instructions at any time in [employer]? You can also review all of your test results from your hospital stay or ER visit. Additional Information If you have questions, please visit the Frequently Asked Questions section of the [employer] website at [email or web address] Remember, [employer] is NOT to be used for urgent needs. For medical emergencies, dial 911. Now available from your iPhone and Android! Please provide this summary of care documentation to your next provider. Your primary care clinician is listed as Shy Liz. If you have any questions after today's visit, please call [phone].

## 2018-04-18 NOTE — PROGRESS NOTES
HISTORY OF PRESENT ILLNESS  Julius Simmons is a [de-identified] y.o. female. HPI   Last here 3/20/18. Pt is here for LOU. Pt presents with her  who provides some of the hx. Pt ambulates with a cane. Ms. Julius Simmons is a [de-identified]y.o. year old female, she is seen today for Transition of Care services following a hospital discharge for acute on chronic CHF 4/7/18-4/12/18. Our office Nurse Navigator performed an outreach to Ms. Faby Dawson on 4/13/18, 4/16/18 and 4/18/18 (within 2 business days of discharge) to complete medication reconciliation and a telephonic assessment of her condition. Lov, pt was admitted to the hospital for an AV node ablation and pacemaker placement by Dr. Win Carias (cardio) on 2/14/18  Lov pt was admitted to the hospital for an infection of her pacemaker pocket 3/5/18-3/12/18      Pt was admitted to the hospital for acute on chronic CHF 4/7/18-4/12/18    Reviewed labs 3/18 and 4/18: cr 1.3  Will get labs today     Reviewed CXR 4/7/18: Mild interstitial pulmonary edema. Reviewed XR chest port 4/7/18: No pneumothorax. Will repeat CXR in 3 weeks    Reviewed consult from Dr. Shwetha Caban (cardio) 4/8/18: DHF responding to Rx. Uncertain about her CP; no sign of ischemia. Possibly tightness related to CHF, perhaps to old pacer site. Will touch base with 'Dr Win Carias about reimplantation timing. Reviewed consult from Dr. Win Carias (cardio) 4/9/18: Julius Simmons is  s/p temp/perm pacemaker implanted via the neck and lead extraction/device removal from the left chest (3/18). Thresea Olp was wnl - no vegetations noted. Abx per ID. Will get echo today. Will discuss with ID prior to re-implant of subclavian PM.  Echo today. She is a candidate for implantation of a permanent pacemaker from the R side and explantation of her temp/perm pacer from there R IJ. I discussed the risks/benefits/alternatives of the procedure with the patient.  Risks include (but are not limited to) bleeding, heart block, infection, cva/mi/tamponade/death. The patient understands and agrees to proceed - will plan for wed am. Thank you for this interesting consultation. Reviewed discharge summary 4/12/18:  MSSA bacteremia due to PM pocket infection POA  Afib s/p ablation and PM placement with Dr. Milan Garcia 2/14/18 POA  S/P pacemaker removal  Essential HTN  s/p reimplantation of dc PPM on R side of chest on 4/11/18. Pt was continued on IV cefazolin. PICC line removed and abx discontinued (ID's note rec completion date was on 4/6). ID was ok to cont' IV cefazolin while inpt, but recommended PICC removal upon discharge with discontinuation of abx. I spoke to Dr Ximena Wellington and confirmed abx duration. Acute on chronic diastolic (congestive) heart failure ([facility] [address] (4/7/2018) POA  CXR showed interstitial edema. Trop neg, EKG neg for acute ischemia. Echo showed EF 35%. Pt diuresed with IV lasix, transitioned to PO lasix. She remains stable on RA. Pt to cont' ASA, plavix. Diltiazem discontinued, BB started. Hypokalemia, replete prn    DM2 controlled without complication,  resume metformin. Hypothyroidism, con't synthroid. Hyperlipidemia, con't pravachol. Depression, con't zoloft    Obesity POA Body mass index is 31.63 kg/(m^2).      Pt has been using tylenol, not percocet for pain    Pt is no longer completing PT, as she is doing well    Pt has Home health at the moment  Next visit scheduled for 4/20/18    Pt was visited by Hi Sibley    Reviewed notes: buspar 5mg once daily, pacemaker x2, feeling SOB after she got home, hadn't had lasix as of yet, /68, no change to meds  Reviewed labs: cr 1.0, K+ nl    BP is 103/63  BPs are 120/80, 133/93  Pt denies having orthostatic sx  Lov, I increased her benazepril to 40mg daily - reports compliance   She was started on toprol XL 50mg during her hospitalization   Her dilt was stopped during her hospitalization   If her SBP stays <110, will cut benazepril in half  Recall she has an element of Hi Archuleta    Lov, pt c/o BLE swelling, more so on RLE x being discharged  Reviewed US RLE 3/26/18: NO DVT OF THE RIGHT LEG. She is now taking lasix 40mg BID with improvement to swelling and breathing  She denies having any CP or SOB  She states that she goes to the restroom frequently as a result    Advised her to work on times bathroom breaks       BS at home running around 120s, 130s, 140s in the AM fasting  Continues on metformin XR 750mg BID      Wt is down 23 lbs since last visit  W/l is d/t fluid removal during her hospitalization   She also watches what she eats (2000 calories/day)    Pt saw Dr. Armando Tran (infec dis)   Reviewed notes 3/29/18: Ms Marie Whelan is a [de-identified]year old lady with hx of Atrial fibrillation S/P ablation and pacemaker inserted (2/14/18), cervical diskectomy/fusion with biomechanical implants C4-5-6,  Bilateral total knee replacements admitted with fever and found to have high grade bacteremia (3/3 bottles on admission). She is noted to have gone to the ER on 2/28 with concerns of oozing/bleeding from the pacer area and noted to be oozing \" dark blood \" per ER notes. Notes indicate she had a 4 cm wound and \"Wound was cleansed with Betadine. Dermabond and Surgifoam was applied and Tegriderm was placed over the wound\". 1) MSSA  Bacteremia and pacer lead  infection   S/P Pacer removal 3/7, and insertion of temporary pacer   JESSIE 3/7 without valvular vegetations  Blood culture negative from 3/9/18      Recommendations  IV Cefazolin 2 gm Q 8 hours ( adjust if needed for renal function) for 4-6 weeks from 3/9/18 unless any adverse effects.     Did not have valvular vegetations on JESSIE and therefore will recommend at least 4 weeks IV abx from 3/9- 4/6/18.   will need removal of PICC once IV abx completed   Check weekly CBC wt diff, CMP and fax to [phone]     2) TKR B/L  Asymptomatic   Bacteremia is risk for seeding    4) S/P Cervical diskectomy and fusion wt biomechanical implants C4-6  Asymptomatic at this time     5) US negative for DVT RLE      Pt follows with Dr. Tevin Pizano (cardio)  Reviewed notes 3/20/18: Ms Primo Lilly is here s/p temp/perm pacemaker implanted via the neck and lead extraction/device removal from the left chest.  Her bps are high however her medications were adjusted today and she was started on Buspar for anxiety. Dressing removed from her left subclavian PM removal site. Steri strips are intact; dark ecchymosis lateral to incision. Left open to air. Her temp pacer at the right IJ site was redressed with tegaderm. She will cont on iv abx per ID and f/u in 2-4 weeks for reimplantation of pacemaker. Continue medical management for HTN and hyperlipidemia.   Next visit scheduled for 4/26/18    Pt follows with Dr. Petey Lovett (cardio)  Last visit was 12/13/17 for a watchman procedure   Continues on ASA and plavix daily  She is still no longer taking coumadin     Pt follows with Dr. Brennen Tobias (neuro)  Last visit was 1/22/18  Next visit scheduled for 4/30/18      Pt follows with Dr. Saleem Hale (neurosurg)  Pt has seen this physician - will get notes for review      Pt follows with Dr. Richa Ferguson ([place] [facility]) for urinary incontinence  Last visit was 9/11/17 - will get notes for review  Pt has had botox in the past      Pt follows with Dr. Carolann Pardo (ortho)   Last visit was 6/17      Pt follows with Dr. Vanessa Bolden (ENT)  Pt uses a neti pot once daily       Pt follows with Dr. Eliseo Dumont (GI)   Last visit was 10/17  Continues on protonix 40mg daily   She is unsure if she needs this med, as she has not had any heartburn  Discussed continuing on this med d/t hospitalizations   Recall pt has celiac sprue and is somewhat compliant with her gluten-free diet      Pt has been taking a daily probiotic       Continues on iron 325mg daily, which works well  Recall pt has a h/o mild anemia, which is stable      Continues on pravachol 20mg for cholesterol       Continues on 50mcg synthroid daily     Lov, pt was crying, having a panic attack q5-10 minutes and getting irritated   Lov, I added on a low dose buspar qAM and told her that she could increase this to BID after 2 weeks  Pt is currently taking buspar 5mg BID for anxiety, which works well, happy with dose  --big improvement  Per , pt is no longer arguing or overly anxious   Continues on zoloft 100mg qAM for depressoin, which works well, happy with dose       Pt is not using her CPAP for MALLORY   Pt has not had a sleep study as of yet  Advised her to schedule a sleep study lov      ACP on file. SDMs are her  Maciel Elkins) and sister Dustin Noriega). PREVENTIVE:    Colonoscopy: 12/28/16, Dr. Tacso Field, repeat 10 years  EGD: 12/16, Dr. Tacos Filed  Pap: 2017, Dr. José Stagger: 06/05/17, nl  Dexa: 11/20/17, nl   Tdap: 4/15    Pneumovax: around 2012 per the patient    Prevnar 13: 8/16  Zostavax: 2/29/16  Flu shot: 9/13/17  Ophthalmology: Dr. Pattie Martinez, 3/2017, mild diabetic retinopathy in R eye   Foot exam: 6/7/17  Microalbumin: 9/17  A1c: 7/14 6.6, 10/14 6.6,4/15 6.8, 7/15 6.8, 11/15 6.9, 2/16 6.9, 5/16 6.5, 8/16 6.8, 6/17 6.9, 9/17 6.3, 10/17 6.7, 12/17 6.3, 3/18 6.6  Lipids: 9/17 LDL 48    Patient Active Problem List    Diagnosis Date Noted    Acute on chronic diastolic (congestive) heart failure ([address] 04/07/2018    GI bleed 04/07/2018    Severe obesity (BMI 35.0-39. 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    ACP (advance care planning) 11/11/2015    Hypothyroidism, adult 07/31/2015    Type 2 diabetes mellitus without complication ([facility] 67/61/8362    Essential hypertension 04/27/2015    MALLORY (obstructive sleep apnea) 07/09/2014    Hyperlipidemia 07/09/2014    Sarcoid 07/09/2014    Stress bladder incontinence, female 07/09/2014    Obesity 07/09/2014    TIA (transient ischemic attack) 07/09/2014    AR (aortic regurgitation) 07/09/2014    Mitral valve insufficiency 07/09/2014    Cervical stenosis of spine 07/09/2014     Current Outpatient Prescriptions   Medication Sig Dispense Refill    oxyCODONE-acetaminophen (PERCOCET) 5-325 mg per tablet Take 1 Tab by mouth every six (6) hours as needed for Pain.  metoprolol succinate (TOPROL-XL) 50 mg XL tablet Take 1 Tab by mouth daily. 30 Tab 0    furosemide (LASIX) 40 mg tablet Take 1 Tab by mouth two (2) times a day. 60 Tab 0    oxyCODONE-acetaminophen (PERCOCET) 5-325 mg per tablet Take 1 Tab by mouth every six (6) hours as needed. Max Daily Amount: 4 Tabs. (Patient taking differently: Take 1 Tab by mouth every six (6) hours as needed for Pain.) 10 Tab 0    busPIRone (BUSPAR) 5 mg tablet Take 5 mg by mouth two (2) times a day.  pravastatin (PRAVACHOL) 20 mg tablet TAKE ONE TABLET BY MOUTH NIGHTLY 90 Tab 1    benazepril (LOTENSIN) 40 mg tablet Take 1 Tab by mouth daily. 30 Tab 3    pantoprazole (PROTONIX) 40 mg tablet Take 40 mg by mouth two (2) times a day.  L. acidoph & paracasei- S therm- Bifido (FRAN-Q/RISAQUAD) 8 billion cell cap cap Take 1 Cap by mouth daily. 60 Cap 0    clopidogrel (PLAVIX) 75 mg tab Take 75 mg by mouth daily.  levothyroxine (SYNTHROID) 50 mcg tablet Take 1 Tab by mouth Daily (before breakfast). 90 Tab 1    sertraline (ZOLOFT) 100 mg tablet Take 1 Tab by mouth daily. 135 Tab 1    metFORMIN ER (GLUCOPHAGE XR) 750 mg tablet TAKE ONE TABLET BY MOUTH TWICE DAILY 180 Tab 0    Cholecalciferol, Vitamin D3, (VITAMIN D3) 2,000 unit cap capsule Take 2,000 Units by mouth daily.  acetaminophen (TYLENOL) 500 mg tablet Take 500 mg by mouth every four (4) hours as needed for Pain.       ferrous sulfate (IRON) 325 mg (65 mg iron) tablet Take 325 mg by mouth every other day.  multivit-min-FA-lycopen-lutein (CENTRUM SILVER) 0.4-300-250 mg-mcg-mcg tab Take 1 Tab by mouth daily.  aspirin delayed-release 81 mg tablet Take 81 mg by mouth daily.  magnesium 250 mg tab Take 250 mg by mouth every evening.        Past Surgical History:   Procedure Laterality Date    CARDIAC SURG PROCEDURE UNLIST      cardioversion     CARDIAC SURG PROCEDURE UNLIST      watchman device    COLONOSCOPY N/A 12/28/2016    COLONOSCOPY performed [facility] Margarette Nevarez [facility] ENDOSCOPY    HX CATARACT REMOVAL      both eyes    HX CHOLECYSTECTOMY      HX COLONOSCOPY  5/8/2013    Repeat in 5 years    HX CYST REMOVAL  10/15    on head     HX HYSTERECTOMY      HX ORTHOPAEDIC Bilateral     knee replacement to both knees    HX ORTHOPAEDIC      spacer btwn L4-5 and L5-6     HX PACEMAKER PLACEMENT      HX TONSILLECTOMY      HX UROLOGICAL      botox in bladder      Lab Results  Component Value Date/Time   WBC 11.7 (H) 04/11/2018 01:02 AM   HGB 10.1 (L) 04/11/2018 01:02 AM   HCT 32.2 (L) 04/11/2018 01:02 AM   PLATELET 048 36/19/3706 01:02 AM   MCV 90.4 04/11/2018 01:02 AM     Lab Results  Component Value Date/Time   Cholesterol, total 149 09/08/2017 09:47 AM   HDL Cholesterol 82 09/08/2017 09:47 AM   LDL, calculated 48 09/08/2017 09:47 AM   LDL-C, External 77 10/18/2013 11:09 AM   Triglyceride 96 09/08/2017 09:47 AM   CHOL/HDL Ratio 1.9 09/08/2016 02:13 AM     Lab Results  Component Value Date/Time   GFR est non-AA 39 (L) 04/11/2018 01:02 AM   GFR est AA 48 (L) 04/11/2018 01:02 AM   Creatinine 1.30 (H) 04/11/2018 01:02 AM   BUN 30 (H) 04/11/2018 01:02 AM   Sodium 137 04/11/2018 01:02 AM   Potassium 3.7 04/11/2018 01:02 AM   Chloride 100 04/11/2018 01:02 AM   CO2 30 04/11/2018 01:02 AM   Magnesium 1.7 04/09/2018 02:34 AM   Phosphorus 3.3 09/07/2016 01:11 AM        Review of Systems   Respiratory: Negative for shortness of breath. Cardiovascular: Negative for chest pain. Gastrointestinal: Negative for constipation and heartburn. Genitourinary: Positive for frequency. Psychiatric/Behavioral: Negative for depression. The patient is not nervous/anxious. Physical Exam   Constitutional: She is oriented to person, place, and time. She appears well-developed and well-nourished. No distress. HENT:   Head: Normocephalic and atraumatic. Mouth/Throat: Oropharynx is clear and moist. No oropharyngeal exudate. Dry mucus membranes   Eyes: Conjunctivae and EOM are normal. Right eye exhibits no discharge. Left eye exhibits no discharge. Neck: Normal range of motion. Neck supple. Cardiovascular: Normal rate, regular rhythm and normal heart sounds. Exam reveals no gallop and no friction rub. No murmur heard. Pulmonary/Chest: Effort normal and breath sounds normal. No respiratory distress. She has no wheezes. She has no rales. She exhibits no tenderness. Bandage site dry, clean and intact, L incision site well-healed    Abdominal: Soft. She exhibits no distension and no mass. There is no tenderness. There is no rebound and no guarding. Musculoskeletal: She exhibits no edema, tenderness or deformity. Lymphadenopathy:     She has no cervical adenopathy. Neurological: She is alert and oriented to person, place, and time. Coordination abnormal.   Skin: Skin is warm and dry. No rash noted. She is not diaphoretic. No erythema. No pallor. Ecchymosis over R breast and R flank   Psychiatric: She has a normal mood and affect. Her behavior is normal.       ASSESSMENT and PLAN high complex med decision making    ICD-10-CM ICD-9-CM    1.  Acute on chronic diastolic (congestive) heart failure (HCC)    New dx during her recent hospitalization, was diuresed and sx significantly improved, she no longer has edema, she is no longer SOB, she is now on lasix BID, she has f/u with cardio pending, discussed would likely decrease lasix to once daily dosing at f/u, will check BMP to ensure K+ and cr stable   I32.63 414.73 METABOLIC PANEL, COMPREHENSIVE     428.0 CBC W/O DIFF   2. Type 2 diabetes mellitus without complication, without long-term current use of insulin (HCC)    Improving now that she is back at home and back on metformin 750mg BID, readings at goal, no change to dose today   V88.6 674.47 METABOLIC PANEL, COMPREHENSIVE      CBC W/O DIFF   3. SOB (shortness of breath)    See above, primarily resolved    K33.29 894.55 METABOLIC PANEL, COMPREHENSIVE      CBC W/O DIFF   4. Localized edema    See above    G41.2 991.0 METABOLIC PANEL, COMPREHENSIVE      CBC W/O DIFF   5. Mixed hyperlipidemia    Controled on pravachol in 9/17, continue    N04.8 593.2 METABOLIC PANEL, COMPREHENSIVE      CBC W/O DIFF   6. Paroxysmal atrial fibrillation (HCC)    S/p ablation and watchman procedure, has a pacemaker in place, which was complicated by infection, pacemaker has been replaced d/t this, she is no longer on dilt, she is now on toprol x recent hospitalization, she remains on plavix and ASA for anticoagulation    F84.1 002.32 METABOLIC PANEL, COMPREHENSIVE      CBC W/O DIFF   7. Ataxia    Has had Home Health and PT, balance is improving, PT has been discontinued    C93.9 449.5 METABOLIC PANEL, COMPREHENSIVE      CBC W/O DIFF   8. Essential hypertension    BP is very well-controled on benazepril 40mg, no longer on dilt, but is on toprol XL 50mg, BP running lower today than it has at home, discussed BP parameters, if SBP continue to kelly <110 will decrease benazepril dose to 20mg, pt discussed understanding to plan and will contact clinic with any questions    H61 947.5 METABOLIC PANEL, COMPREHENSIVE      CBC W/O DIFF   9. Hypothyroidism, adult    Continues on synthroid 50mcg, controled    T05.2 892.3 METABOLIC PANEL, COMPREHENSIVE      CBC W/O DIFF   10.  Anxiety    Much improved x lov, zoloft is controlling her depression, buspar 5mg BID is working well to control her anxiety, continue, quite happy with this med   O65.5 557.18 METABOLIC PANEL, COMPREHENSIVE      CBC W/O DIFF   11. Stress bladder incontinence, female    Follows with Dr. Cristian Oocnnell for this, has had more frequent urination d/t lasix, will need to focus on more timed bathroom breaks    Q38.4 783.2 METABOLIC PANEL, COMPREHENSIVE      CBC W/O DIFF   12. Gastroesophageal reflux disease without esophagitis    Continues on protonix, had GIB with her past hospitalization, continue protonix for now for GI protective effects, denies any sx of reflux at this time   K21.9 530.81    13. Obesity (BMI 30.0-34. 9)    Wt down ~10-20 lbs x fluid diuresis, continue to work on diet as well   E66.9 278.00         Scribed by Renetta Cutler of [address] [place] [address] [address], as dictated by Dr. Francisco Corral. Current diagnosis and concerns discussed with pt at length. Pt understands risks and benefits or current treatment plan and medications, and accepts the treatment and medication with any possible risks. Pt asks appropriate questions, which were answered. Pt was instructed to call with any concerns or problems. This note will not be viewable in [address]

## 2018-04-19 ENCOUNTER — OFFICE VISIT (OUTPATIENT)
Dept: CARDIOLOGY CLINIC | Age: 83
End: 2018-04-19

## 2018-04-19 VITALS
BODY MASS INDEX: 30.96 KG/M2 | HEART RATE: 70 BPM | OXYGEN SATURATION: 97 % | SYSTOLIC BLOOD PRESSURE: 112 MMHG | RESPIRATION RATE: 16 BRPM | WEIGHT: 164 LBS | HEIGHT: 61 IN | DIASTOLIC BLOOD PRESSURE: 60 MMHG

## 2018-04-19 DIAGNOSIS — I48.0 PAROXYSMAL ATRIAL FIBRILLATION (HCC): Primary | ICD-10-CM

## 2018-04-19 DIAGNOSIS — Z95.0 PACEMAKER: ICD-10-CM

## 2018-04-19 DIAGNOSIS — Z51.89 VISIT FOR WOUND CHECK: ICD-10-CM

## 2018-04-19 LAB
ALBUMIN SERPL-MCNC: 3.9 G/DL (ref 3.5–4.7)
ALBUMIN/GLOB SERPL: 1.2 {RATIO} (ref 1.2–2.2)
ALP SERPL-CCNC: 101 IU/L (ref 39–117)
ALT SERPL-CCNC: 8 IU/L (ref 0–32)
AST SERPL-CCNC: 22 IU/L (ref 0–40)
BILIRUB SERPL-MCNC: 0.6 MG/DL (ref 0–1.2)
BUN SERPL-MCNC: 34 MG/DL (ref 8–27)
BUN/CREAT SERPL: 30 (ref 12–28)
CALCIUM SERPL-MCNC: 9.5 MG/DL (ref 8.7–10.3)
CHLORIDE SERPL-SCNC: 97 MMOL/L (ref 96–106)
CO2 SERPL-SCNC: 25 MMOL/L (ref 18–29)
CREAT SERPL-MCNC: 1.13 MG/DL (ref 0.57–1)
ERYTHROCYTE [DISTWIDTH] IN BLOOD BY AUTOMATED COUNT: 15 % (ref 12.3–15.4)
GFR SERPLBLD CREATININE-BSD FMLA CKD-EPI: 46 ML/MIN/1.73
GFR SERPLBLD CREATININE-BSD FMLA CKD-EPI: 53 ML/MIN/1.73
GLOBULIN SER CALC-MCNC: 3.2 G/DL (ref 1.5–4.5)
GLUCOSE SERPL-MCNC: 103 MG/DL (ref 65–99)
HCT VFR BLD AUTO: 37.5 % (ref 34–46.6)
HGB BLD-MCNC: 11.8 G/DL (ref 11.1–15.9)
MCH RBC QN AUTO: 27.8 PG (ref 26.6–33)
MCHC RBC AUTO-ENTMCNC: 31.5 G/DL (ref 31.5–35.7)
MCV RBC AUTO: 88 FL (ref 79–97)
PLATELET # BLD AUTO: 429 X10E3/UL (ref 150–379)
POTASSIUM SERPL-SCNC: 4.5 MMOL/L (ref 3.5–5.2)
PROT SERPL-MCNC: 7.1 G/DL (ref 6–8.5)
RBC # BLD AUTO: 4.24 X10E6/UL (ref 3.77–5.28)
SODIUM SERPL-SCNC: 140 MMOL/L (ref 134–144)
WBC # BLD AUTO: 9.9 X10E3/UL (ref 3.4–10.8)

## 2018-04-19 PROCEDURE — 3331090001 HH PPS REVENUE CREDIT

## 2018-04-19 PROCEDURE — 3331090002 HH PPS REVENUE DEBIT

## 2018-04-19 RX ORDER — BLOOD SUGAR DIAGNOSTIC
STRIP MISCELLANEOUS
Refills: 0 | COMMUNITY
Start: 2018-04-02 | End: 2018-09-26

## 2018-04-19 RX ORDER — LANCETS
EACH MISCELLANEOUS
Refills: 1 | COMMUNITY
Start: 2018-03-30 | End: 2018-10-19 | Stop reason: SDUPTHER

## 2018-04-19 RX ORDER — DILTIAZEM HYDROCHLORIDE 120 MG/1
120 CAPSULE, EXTENDED RELEASE ORAL DAILY
Refills: 0 | COMMUNITY
Start: 2018-03-04 | End: 2018-05-09

## 2018-04-19 RX ORDER — FUROSEMIDE 20 MG/1
TABLET ORAL
Refills: 0 | COMMUNITY
Start: 2018-03-20 | End: 2018-05-09

## 2018-04-19 RX ORDER — BENAZEPRIL HYDROCHLORIDE 20 MG/1
TABLET ORAL
Refills: 0 | COMMUNITY
Start: 2018-01-24 | End: 2018-05-09

## 2018-04-19 RX ORDER — CEFAZOLIN SODIUM 10 G/1
INJECTION, POWDER, FOR SOLUTION INTRAVENOUS
COMMUNITY
Start: 2018-03-29 | End: 2018-04-19 | Stop reason: ALTCHOICE

## 2018-04-19 RX ORDER — OXYCODONE AND ACETAMINOPHEN 5; 325 MG/1; MG/1
TABLET ORAL
Refills: 0 | COMMUNITY
Start: 2018-04-12 | End: 2018-05-09

## 2018-04-19 NOTE — PROGRESS NOTES
1. Have you been to the ER, urgent care clinic since your last visit? Hospitalized since your last visit? Yes, 4/7/18 for heart failure at [facility]     2. Have you seen or consulted any other health care providers outside of the [address] [place] [address] since your last visit? Include any pap smears or colon screening.   No    Chief Complaint   Patient presents with    Other     bandage check

## 2018-04-19 NOTE — PROGRESS NOTES
Lianet Morales  1935  Carmen Abdi MD          Subjective:    Patient is here for 1 week site checks with concerns that her pacemaker dressing was exposing the incision site. The patient denies chest pain or shortness of breath. Patient Active Problem List    Diagnosis Date Noted    Acute on chronic diastolic (congestive) heart failure ([address] 04/07/2018    GI bleed 04/07/2018    Severe obesity (BMI 35.0-39. 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    ACP (advance care planning) 11/11/2015    Hypothyroidism, adult 07/31/2015    Type 2 diabetes mellitus without complication ([address] 23/91/4785    Essential hypertension 04/27/2015    MALLORY (obstructive sleep apnea) 07/09/2014    Hyperlipidemia 07/09/2014    Sarcoid 07/09/2014    Stress bladder incontinence, female 07/09/2014    Obesity 07/09/2014    TIA (transient ischemic attack) 07/09/2014    AR (aortic regurgitation) 07/09/2014    Mitral valve insufficiency 07/09/2014    Cervical stenosis of spine 07/09/2014      Past Medical History:   Diagnosis Date    Anemia     Arrhythmia     AFIB    Ataxia     Webb's esophagus     Cervical spinal stenosis     Coronary atherosclerosis of unspecified type of vessel, native or graft     Diabetes ([address]     Fatigue     GERD (gastroesophageal reflux disease)     Hyperlipidemia     Hypertension     Ill-defined condition     right torn rotater cuff- no surgery at this time    Liver disease     pt is unaware    Osteoarthritis     Psychiatric disorder     anxiety and depression    Sarcoidosis     Sleep apnea     uses cpap    Stroke [facility])     TIA'S    Thyroid disease       Past Surgical History:   Procedure Laterality Date    CARDIAC SURG PROCEDURE UNLIST      cardioversion     CARDIAC SURG PROCEDURE UNLIST      watchman device    COLONOSCOPY N/A 12/28/2016    COLONOSCOPY performed by Cliff Barajas MD at [facility] ENDOSCOPY    HX CATARACT REMOVAL      both eyes    HX CHOLECYSTECTOMY      HX COLONOSCOPY  5/8/2013    Repeat in 5 years    HX CYST REMOVAL  10/15    on head     HX HYSTERECTOMY      HX ORTHOPAEDIC Bilateral     knee replacement to both knees    HX ORTHOPAEDIC      spacer btwn L4-5 and L5-6     HX PACEMAKER PLACEMENT      HX TONSILLECTOMY      HX UROLOGICAL      botox in bladder     Allergies   Allergen Reactions    Procardia Xl [Nifedipine] Other (comments)     weakness      Family History   Problem Relation Age of Onset    Other Mother      Fibromyalgia    Pacemaker Mother     Heart Disease Mother     Heart Failure Mother     COPD Mother     Heart Attack Father 61    Cancer Sister      Multiple Myeloma    Diabetes Brother     Obesity Brother     Pacemaker Brother     Heart Attack Brother      Bundle branch block    No Known Problems Son     Psychiatric Disorder Daughter      Multiple      Current Outpatient Prescriptions   Medication Sig    benazepril (LOTENSIN) 20 mg tablet take 1 tablet by mouth once daily    CONTOUR NEXT STRIPS strip TEST twice a day    MICROLET LANCET misc CHECK BLOOD SUGAR TWICE A DAY    metoprolol succinate (TOPROL-XL) 50 mg XL tablet Take 1 Tab by mouth daily.  furosemide (LASIX) 40 mg tablet Take 1 Tab by mouth two (2) times a day.  busPIRone (BUSPAR) 5 mg tablet Take 5 mg by mouth two (2) times a day.  pravastatin (PRAVACHOL) 20 mg tablet TAKE ONE TABLET BY MOUTH NIGHTLY    benazepril (LOTENSIN) 40 mg tablet Take 1 Tab by mouth daily.  pantoprazole (PROTONIX) 40 mg tablet Take 40 mg by mouth two (2) times a day.     L. acidoph & paracasei- S therm- Bifido (FRAN-Q/RISAQUAD) 8 billion cell cap cap Take 1 Cap by mouth daily.  clopidogrel (PLAVIX) 75 mg tab Take 75 mg by mouth daily.  levothyroxine (SYNTHROID) 50 mcg tablet Take 1 Tab by mouth Daily (before breakfast).  sertraline (ZOLOFT) 100 mg tablet Take 1 Tab by mouth daily.  metFORMIN ER (GLUCOPHAGE XR) 750 mg tablet TAKE ONE TABLET BY MOUTH TWICE DAILY    Cholecalciferol, Vitamin D3, (VITAMIN D3) 2,000 unit cap capsule Take 2,000 Units by mouth daily.  acetaminophen (TYLENOL) 500 mg tablet Take 500 mg by mouth every four (4) hours as needed for Pain.  ferrous sulfate (IRON) 325 mg (65 mg iron) tablet Take 325 mg by mouth every other day.  multivit-min-FA-lycopen-lutein (CENTRUM SILVER) 0.4-300-250 mg-mcg-mcg tab Take 1 Tab by mouth daily.  aspirin delayed-release 81 mg tablet Take 81 mg by mouth daily.  magnesium 250 mg tab Take 250 mg by mouth every evening.  furosemide (LASIX) 20 mg tablet take 1 tablet by mouth once daily    DILT- mg XR capsule     oxyCODONE-acetaminophen (PERCOCET) 5-325 mg per tablet take 1 tablet by mouth every 6 hours if needed MAX DAILY AMOUNT IS 4 TABS     No current facility-administered medications for this visit. Review of Systems:    General: Pt denies excessive weight gain or loss. Pt is able to conduct ADL's  Respiratory: Denies shortness of breath, STRICKLAND, wheezing or stridor. Cardiovascular: Denies precordial pain, palpitations, edema or PND        Physical ExamPhysical Exam:      General: Well developed, in no acute distress. .  Chest: left subclavian pacer site approximated well, steri-strips intact  Neuro: A&Ox3, speech clear, gait stable. Assessment:   Assessment:     ICD-10-CM ICD-9-CM    1. Paroxysmal atrial fibrillation (HCC) I48.0 427.31 CANCELED: AMB POC EKG ROUTINE W/ 12 LEADS, INTER & REP   2. Pacemaker Z95.0 V45.01    3.  Visit for wound check Z51.89 V58.89         Plan:     Patient feels well following pacemaker implantation. Her dressing is well sealed along all edges, but outer right portion is peeling off of the skin. Applied tegaderm for reinforcement. Reminded patient not to get wet until seen for follow up next week. No erythema or heat. Follow up as planned.      Claudeen Coach, NP

## 2018-04-20 ENCOUNTER — HOME CARE VISIT (OUTPATIENT)
Dept: SCHEDULING | Facility: HOME HEALTH | Age: 83
End: 2018-04-20
Payer: MEDICARE

## 2018-04-20 ENCOUNTER — PATIENT OUTREACH (OUTPATIENT)
Dept: INTERNAL MEDICINE CLINIC | Age: 83
End: 2018-04-20

## 2018-04-20 VITALS — WEIGHT: 162 LBS | BODY MASS INDEX: 30.61 KG/M2

## 2018-04-20 PROCEDURE — 3331090001 HH PPS REVENUE CREDIT

## 2018-04-20 PROCEDURE — 3331090002 HH PPS REVENUE DEBIT

## 2018-04-20 NOTE — PROGRESS NOTES
NN completed patient outreach today in order to perform HSO follow-up; per patient request, NN spoke with Daughter, Marta Vinson. Two patient identifiers verified. Goals Addressed             Most Recent       Heart Failure     Reduce risk of CHF exacerbations and complications. On track (4/20/2018)             4/13/2018  - followed by Dr. Kostas Berry in Outpatient Setting  - diagnosis of acute on chronic diastolic CHF  - 5/17/9 - s/p lead extraction/device removal of single lead temp/perm pacemaker and implant of dual chamber pacemaker  - 4/10/18 - Echocardiogram - EF estimated to be 35%  - Daughter reports patient adherence with daily weights  - flowsheet updated today with reported patient daily weight  - daughter denies patient weight gain post-discharge  - patient and daughter currently identify patient as being in the Green HF zone  - Daughter states, \"She doesn't have any trouble breathing at all, there's no swelling anywhere. \"  - daughter is familiar with/knowledgeable regarding HF zones and is able to accurately describe signs and symptoms associated with each HF zone to this NN    - encouraged continued adherence with patient daily weights and maintenance of daily weight record  - encouraged daily assessment of patient HF zone  - reinforced patient/caregiver action plan associated with each HF zone  - advised outreach to Cardiologist to notify of onset of new/worsened symptoms and/or weight gain of 3 pounds in 24 hours and/or 5 pounds in one week    4/20/2018   - Daughter currently identifies patient as being in the Green HF Zone  - adherent with daily weights/daily weight record  - flowsheet updated today with patient daily weights as reported by Daughter  - no patient weight gain of 3 pounds in 24 hours and/or 5 pounds in one week noted since previous NN outreach encounter    - positive reinforcement provided for adherence with self-management  - encouraged continued adherence with patient daily weights and maintenance of daily weight record  - encouraged daily assessment of patient HF zone  - reinforced patient/caregiver action plan associated with each HF zone  - advised outreach to Cardiologist to notify of onset of new/worsened symptoms and/or weight gain of 3 pounds in 24 hours and/or 5 pounds in one week         Post Hospitalization     Attends follow-up appointments as directed. On track (4/20/2018)             3/13/2018 - AFD  Pt is scheduled for OV on 3/20/18 with Dr. Malachi Mendieta. Pt cannot identify any barriers to attending OV as scheduled. P - Pt will attend OV as scheduled. 4/13/2018  [id number]  - [facility] appointment previously scheduled with Dr. Malachi Mendieta for 4/18/18  - has office visit scheduled with Dr. Ileana Avalos (Cardiolgoy) for 4/26/18  - patient/daughter agreeable to [facility] visit for HFU within 3 days post-discharge  - [facility] visit scheduled for today between 3p - 4p for HFU within 3 days post-discharge    4/16/2018  - confirmed that requested [facility] visit for hospital follow-up was completed on 4/13/18; [facility] note to be faxed to PCP office    4/20/2018  - attended [facility] appointment with Dr. Malachi Mendieta for 4/18/18 (post-discharge day 7); advised to f/u in 3 weeks  - attended office visit with Cardiology NP ([facility]) on 4/19/18 for one week pacemaker site check  - has office visit scheduled with Dr. Ileana Avalos (Cardiology) for 4/26/18       Prevent complications post hospitalization.    On track (4/20/2018)             4/13/2018  - Daughter reports exacerbation of episodes of urinary incontinence post-discharge; attributes this to new order for Lasix 40 mg two times a day at hospital discharge  - reports that patient is unable to ambulate to the bathroom fast enough  - daughter inquiring about Ambulatory Supply order for Depends/Incontinence Briefs; NN will request LPN  assistance with this request      4/16/2018  - confirmed that requested [facility] visit for hospital follow-up was completed on 4/13/18 (Completed on Post-Discharge Day 2); [facility] note to be faxed to PCP office. 4/20/2018   - patient states, \"I'm feeling good, I'm ready to go, go, go. I'm doing much better, I'm not having any trouble breathing and I'm walking as much as I can. \"  - provided Daughter with contact information for [facility] as well as cheapest options for incontinence briefs available for purchase at [address]  - daughter will be contacting [address]  on Monday regarding Incontinence Briefs         Supportive resources in place to maintain patient in the community (ie. Home Health, DME equipment, refer to, medication assistant plan, etc.)   On track (4/20/2018)             3/13/2018 - AFD    Pt lives with spouse, sister, and daughter. Pt's daughter performs the antibiotic administration 3 times per day[facility] Have noted SN contact with MD to address elevated BG and BP. Pt denied need for additional support. Pt did not voice awareness of recommendation for home PT for strengthening and stability. Noted pt has previously suffered GLFs with injuries in the last 6 months. P - Will consult with [facility] nurse to review current resources. 4/13/2018  - readmitted to [facility] 4/7/18-4/12/18  - discharged to home with resumption of home health through [facility] for SN, PT, OT  - home health SN resumption of care visit scheduled for 4/13/18  - using cane with ambulation  - DME: cane, walker, wheelchair  - reports patient has previously used Cpap, however has not used Cpap within the past 3.5 years and is in need of a new referral to sleep medicine for sleep study  - living situation: one-level house with Spouse, Daughter, [facility]: Daughter is actively engaged and involved in patient's care; Daughter is unemployed and does not work outside of the home.  Spouse is able to provide patient assistance with ADLs, however Daughter manages medical care for patient and spouse. Patient has 24/7 supervision/care by family members (spouse, daughter, aunt). Daughter is primary caregiver to patient and spouse. - minimal assistance with bathing/dressing; Daughter is assisting patient with dressing upper body post-op  - Medication Management: Daughter provides minimal assistance with medication management post-discharge; reports utilization of weekly pill box  - denies transportation concerns  - Daughter denies any financial barriers to obtaining patient medications at this time; reports patient's debit card was skimmed and has been overdrawn, however they have filed disputes with the patient's financial institution for the charges.   Aunt lent patient money in order to purchase medications post-discharge due to temporary financial strain secondary to fraudulent charges  - reports Medicaid Application was completed and submitted at the end of March; eligibility decision pending  - provided contact information for [employer] [facility] and discussed appropriate utilization of [address]  - will request LPN  assistance with obtaining order from PCP for Incontinence Supplies as well as submitting order to DME provider  - denies any additional questions, concerns, needs at this time    4/16/2018  - [address] [facility] SN resumption of care visit completed 4/14/18; next scheduled visit 4/17/18  - [address] PT scheduled for 4/16/18  - [address] OT scheduled for 4/16/18 4/18/2018  - most recent home health SN visit 4/17/18; next scheduled visit 4/20/18  - per home health PT initial evaluation documentation, patient/family requested no additional PT visits due to patient reportedly functioning at her baseline mobility; also requested no OT evaluation  - per LPN , order for incontinence briefs was submitted to [address] [place] [address] on 4/16/18  - Incontinence Supplies not covered by Medicare and patient does not have Medicaid at this time; [facility] unable to fulfill order request  - NN outreach to [address] to inquire about cost of incontinence briefs  $9.90/package (20 briefs)  $39/case (80 briefs)  May have some briefs available for $29/case; reports that if patient calls ahead then he will check the warehouse to see if this cheaper option is in stock. Hours - M-F 830 am-530 pm Sat 9 am-3 pm    4/20/2018   - during office visit with PCP on 4/18/18, daughter was provided with the above information regarding [facility] and cheapest options for incontinence briefs available for purchase at [address]  - daughter reports that she will be contacting [address] on Monday regarding Incontinence Briefs  - daughter denies any additional questions/concerns at this time       Understands red flags post discharge. On track (4/20/2018)             3/13/2018 - AFD    Pt states she currently has a lot of \"swelling\" in her legs. Pt states she has been told this is fluid from her infection. Pt states she has not been instructed to perform a daily wt. Pt does not take diuretics. Pt asked to perform a daily wt and add information to her daily record of BP and BG. Pt states her BG this morning was over 250. Pt is taking only metformin. Pt states she was taking insulin in the hospital.  Pt is keeping a record of daily BP   [facility] began visits today  Noted contact with MD and orders. P - Pt will continue to keep daily readings of BG, BP, and wt. Pt will bring record to all OV.    4/13/2018   - daughter denies presence of and/or patient c/o fever, chills, dizziness, shortness of breath at rest, dyspnea on exertion, nausea, vomiting, edema, lower extremity edema  - Daughter reports \"just a dry cough that they noticed in the hospital, she's not bringing anything up. \" Denies worsening of cough post-discharge.   - reports presence of pain post-discharge that is associated with \"bruising underneath her right arm\". Reports adequate pain relief with prn Tylenol; denies increase in pain level post-discharge.     4/20/2018   - daughter denies presence of and/or patient c/o fever, chills, dizziness, shortness of breath at rest, dyspnea on exertion, nausea, vomiting, edema, lower extremity edema                  Future Appointments  Date Time Provider Paul Davis   4/24/2018 To Be Determined Treva Rose  [facility]   4/26/2018 10:00 AM Jose Novak MD [facility] [address]   4/27/2018 To Be Determined Skye Pedraza RN [address] [facility]   4/30/2018 10:40 AM Mirtha Patrick  [facility]   5/2/2018 To Be Determined Skye Pedraza RN [place] [address] [facility]   5/9/2018 To Be Determined Skye Pedraza RN [place] [address] [facility]   5/9/2018 To Be Determined Skye Pedraza RN [facility]   5/9/2018 12:15 PM [facility]            Last Appointment My Department:  4/18/2018

## 2018-04-21 ENCOUNTER — HOSPITAL ENCOUNTER (EMERGENCY)
Age: 83
Discharge: HOME | End: 2018-04-21
Attending: EMERGENCY MEDICINE
Payer: MEDICARE

## 2018-04-21 ENCOUNTER — APPOINTMENT (OUTPATIENT)
Dept: GENERAL RADIOLOGY | Age: 83
End: 2018-04-21
Attending: EMERGENCY MEDICINE
Payer: MEDICARE

## 2018-04-21 VITALS
TEMPERATURE: 98.4 F | HEART RATE: 68 BPM | OXYGEN SATURATION: 100 % | SYSTOLIC BLOOD PRESSURE: 127 MMHG | WEIGHT: 164.46 LBS | HEIGHT: 61 IN | BODY MASS INDEX: 31.05 KG/M2 | RESPIRATION RATE: 16 BRPM | DIASTOLIC BLOOD PRESSURE: 67 MMHG
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
## 2018-04-21 DIAGNOSIS — S62.646A CLOSED NONDISPLACED FRACTURE OF PROXIMAL PHALANX OF RIGHT LITTLE FINGER, INITIAL ENCOUNTER: Primary | ICD-10-CM

## 2018-04-21 PROCEDURE — 75810000053 HC SPLINT APPLICATION

## 2018-04-21 PROCEDURE — 74011250637 HC RX REV CODE- 250/637: Performed by: EMERGENCY MEDICINE

## 2018-04-21 PROCEDURE — 3331090001 HH PPS REVENUE CREDIT

## 2018-04-21 PROCEDURE — 99283 EMERGENCY DEPT VISIT LOW MDM: CPT

## 2018-04-21 PROCEDURE — 3331090002 HH PPS REVENUE DEBIT

## 2018-04-21 PROCEDURE — 73130 X-RAY EXAM OF HAND: CPT

## 2018-04-21 RX ORDER — OXYCODONE HYDROCHLORIDE 5 MG/1
5 TABLET ORAL
Qty: 10 TAB | Refills: 0 | Status: SHIPPED | OUTPATIENT
Start: 2018-04-21 | End: 2018-05-09

## 2018-04-21 RX ORDER — ACETAMINOPHEN 500 MG
1000 TABLET ORAL
Status: COMPLETED | OUTPATIENT
Start: 2018-04-21 | End: 2018-04-21

## 2018-04-21 RX ADMIN — ACETAMINOPHEN 1000 MG: 500 TABLET, FILM COATED ORAL at 13:18

## 2018-04-21 NOTE — DISCHARGE INSTRUCTIONS
Finger Fracture: Care Instructions  Your Care Instructions    Breaks in the bones of the finger usually heal well in about 3 to 4 weeks. The pain and swelling from a broken finger can last for weeks. But it should steadily improve, starting a few days after you break it. It is very important that you wear and take care of the cast or splint exactly as your doctor tells you to so that your finger heals properly and does not end up crooked. Wearing a splint may interfere with your normal activities. Ask for help with daily tasks if you need it. You heal best when you take good care of yourself. Eat a variety of healthy foods, and don't smoke. Follow-up care is a key part of your treatment and safety. Be sure to make and go to all appointments, and call your doctor if you are having problems. It's also a good idea to know your test results and keep a list of the medicines you take. How can you care for yourself at home? · If your doctor put a splint on your finger, wear the splint exactly as directed. Do not remove it until your doctor says that you can. · Keep your hand raised above the level of your heart as much as you can. This will help reduce swelling. · Put ice or a cold pack on your finger for 10 to 20 minutes at a time. Try to do this every 1 to 2 hours for the next 3 days (when you are awake) or until the swelling goes down. Put a thin cloth between the ice and your skin. Keep the splint dry. · Be safe with medicines. Take pain medicines exactly as directed. ¨ If the doctor gave you a prescription medicine for pain, take it as prescribed. ¨ If you are not taking a prescription pain medicine, ask your doctor if you can take an over-the-counter medicine. When should you call for help? Call 911 anytime you think you may need emergency care. For example, call if:  ? · Your finger is cool or pale or changes color.    ?Call your doctor now or seek immediate medical care if:  ? · Your pain gets much worse.   ? · You have tingling, weakness, or numbness in your finger. ? · You have signs of infection, such as:  ¨ Increased pain, swelling, warmth, or redness. ¨ Red streaks leading from the area. ¨ Pus draining from the area. ¨ Swollen lymph nodes in your neck, armpits, or groin. ¨ A fever. ? Watch closely for changes in your health, and be sure to contact your doctor if:  ? · Your finger is not steadily improving. Where can you learn more? Go to http://mason-saw.info/. Enter T634 in the search box to learn more about \"Finger Fracture: Care Instructions. \"  Current as of: March 21, 2017  Content Version: 11.4  © 3296-2866 [employer] Care instructions adapted under license by [employer] (which disclaims liability or warranty for this information). If you have questions about a medical condition or this instruction, always ask your healthcare professional. [place] [address] any warranty or liability for your use of this information.

## 2018-04-21 NOTE — ED NOTES
Pt resting comfortably on stretcher,  at bedside, call bell within reach. Pt states her pain is \"a little\" better. Will continue to monitor.

## 2018-04-21 NOTE — ED NOTES
Pt arrived via wheelchair from triage with spouse to room 36 with cc right hand injury. Per pt she was taking her socks off and was standing on one foot when she fell over and landed on her right hand. Pt denies LOC or hitting head. Pt right hand is swollen, bruised on anterior and posterior, pt reports 6/10 pain. Pt in no acute distress. VSS.

## 2018-04-21 NOTE — ED NOTES
Dr. Concepcion Richardson has reviewed discharge instructions with the patient and spouse. The patient and spouse verbalized understanding. Pt ambulatory home with spouse, discharge papers in hand.

## 2018-04-21 NOTE — ED PROVIDER NOTES
EMERGENCY DEPARTMENT HISTORY AND PHYSICAL EXAM      Date: 4/21/2018  Patient Name: Roberto Whelan    History of Presenting Illness     Chief Complaint   Patient presents with   [place] Fall     pt reports fall last night, pt reports she is unsure of what made her fall, pt reports she fell on her R hand, pt denies hitting her head and denies LOC       History Provided By: Patient    HPI: Roberto Whelan, [de-identified] y.o. female with PMHx significant for DM / HTN / Marolyn Corral / Anemia / OA / GERD / TIA / Arrhythmia / Sarcoidosis, presents ambulatory to the ED with cc of moderate aching right hand pain s/p GLF x yesterday evening. Pt states that yesterday night she was taking her socks off while standing on one foot when she fell over and landed on her right hand. She denies any LOC or head trauma. Pt reports that she did not hit or injure anything else other than her hand. She applied ice to the hand yesterday night. Pt notes that she is on Plavix. She denies any fever, chest pain, SOB, nausea, vomiting or diarrhea. PCP: Enrrique Garcia MD     Social Hx: - etOH, - tobacco, - elicit drugs    There are no other complaints, changes, or physical findings at this time. Current Outpatient Prescriptions   Medication Sig Dispense Refill    oxyCODONE IR (ROXICODONE) 5 mg immediate release tablet Take 1 Tab by mouth every four (4) hours as needed for Pain. Max Daily Amount: 30 mg. 10 Tab 0    furosemide (LASIX) 20 mg tablet take 1 tablet by mouth once daily  0    benazepril (LOTENSIN) 20 mg tablet take 1 tablet by mouth once daily  0    DILT- mg XR capsule   0    CONTOUR NEXT STRIPS strip TEST twice a day  0    MICROLET LANCET misc CHECK BLOOD SUGAR TWICE A DAY  1    oxyCODONE-acetaminophen (PERCOCET) 5-325 mg per tablet take 1 tablet by mouth every 6 hours if needed MAX DAILY AMOUNT IS 4 TABS  0    metoprolol succinate (TOPROL-XL) 50 mg XL tablet Take 1 Tab by mouth daily.  30 Tab 0    furosemide (LASIX) 40 mg tablet Take 1 Tab by mouth two (2) times a day. 60 Tab 0    busPIRone (BUSPAR) 5 mg tablet Take 5 mg by mouth two (2) times a day.  pravastatin (PRAVACHOL) 20 mg tablet TAKE ONE TABLET BY MOUTH NIGHTLY 90 Tab 1    benazepril (LOTENSIN) 40 mg tablet Take 1 Tab by mouth daily. 30 Tab 3    pantoprazole (PROTONIX) 40 mg tablet Take 40 mg by mouth two (2) times a day.  L. acidoph & paracasei- S therm- Bifido (FRAN-Q/RISAQUAD) 8 billion cell cap cap Take 1 Cap by mouth daily. 60 Cap 0    clopidogrel (PLAVIX) 75 mg tab Take 75 mg by mouth daily.  levothyroxine (SYNTHROID) 50 mcg tablet Take 1 Tab by mouth Daily (before breakfast). 90 Tab 1    sertraline (ZOLOFT) 100 mg tablet Take 1 Tab by mouth daily. 135 Tab 1    metFORMIN ER (GLUCOPHAGE XR) 750 mg tablet TAKE ONE TABLET BY MOUTH TWICE DAILY 180 Tab 0    Cholecalciferol, Vitamin D3, (VITAMIN D3) 2,000 unit cap capsule Take 2,000 Units by mouth daily.  acetaminophen (TYLENOL) 500 mg tablet Take 500 mg by mouth every four (4) hours as needed for Pain.  ferrous sulfate (IRON) 325 mg (65 mg iron) tablet Take 325 mg by mouth every other day.  multivit-min-FA-lycopen-lutein (CENTRUM SILVER) 0.4-300-250 mg-mcg-mcg tab Take 1 Tab by mouth daily.  aspirin delayed-release 81 mg tablet Take 81 mg by mouth daily.  magnesium 250 mg tab Take 250 mg by mouth every evening.          Past History     Past Medical History:  Past Medical History:   Diagnosis Date    Anemia     Arrhythmia     AFIB    Ataxia     Webb's esophagus     Cervical spinal stenosis     Coronary atherosclerosis of unspecified type of vessel, native or graft     Diabetes (HCC)     Fatigue     GERD (gastroesophageal reflux disease)     Hyperlipidemia     Hypertension     Ill-defined condition     right torn rotater cuff- no surgery at this time    Liver disease     pt is unaware    Osteoarthritis     Psychiatric disorder     anxiety and depression    Sarcoidosis     Sleep apnea     uses cpap    Stroke [facility])     TIA'S    Thyroid disease        Past Surgical History:  Past Surgical History:   Procedure Laterality Date    CARDIAC SURG PROCEDURE UNLIST      cardioversion     CARDIAC SURG PROCEDURE UNLIST      watchman device    COLONOSCOPY N/A 12/28/2016    COLONOSCOPY performed by Sixto Doll MD at [facility] ENDOSCOPY    HX CATARACT REMOVAL      both eyes    HX CHOLECYSTECTOMY      HX COLONOSCOPY  5/8/2013    Repeat in 5 years    HX CYST REMOVAL  10/15    on head     HX HYSTERECTOMY      HX ORTHOPAEDIC Bilateral     knee replacement to both knees    HX ORTHOPAEDIC      spacer btwn L4-5 and L5-6     HX PACEMAKER PLACEMENT      HX TONSILLECTOMY      HX UROLOGICAL      botox in bladder       Family History:  Family History   Problem Relation Age of Onset    Other Mother      Fibromyalgia    Pacemaker Mother     Heart Disease Mother     Heart Failure Mother     COPD Mother     Heart Attack Father 61    Cancer Sister      Multiple Myeloma    Diabetes Brother     Obesity Brother     Pacemaker Brother     Heart Attack Brother      Bundle branch block    No Known Problems Son     Psychiatric Disorder Daughter      Multiple       Social History:  Social History   Substance Use Topics    Smoking status: Never Smoker    Smokeless tobacco: Never Used    Alcohol use No       Allergies: Allergies   Allergen Reactions    Procardia Xl [Nifedipine] Other (comments)     weakness         Review of Systems   Review of Systems   Constitutional: Negative. Negative for appetite change, chills, fatigue and fever. HENT: Negative. Negative for congestion, rhinorrhea, sinus pressure and sore throat. Eyes: Negative. Respiratory: Negative. Negative for cough, choking, chest tightness, shortness of breath and wheezing. Cardiovascular: Negative. Negative for chest pain, palpitations and leg swelling.    Gastrointestinal: Negative for abdominal pain, constipation, diarrhea, nausea and vomiting. Endocrine: Negative. Genitourinary: Negative. Negative for difficulty urinating, dysuria, flank pain and urgency. Musculoskeletal:        Positive for right hand pain   Skin: Negative. Neurological: Negative. Negative for dizziness, speech difficulty, weakness, light-headedness, numbness and headaches. Psychiatric/Behavioral: Negative. All other systems reviewed and are negative. Physical Exam   Physical Exam   Constitutional: She is oriented to person, place, and time. She appears well-developed and well-nourished. No distress. HENT:   Head: Normocephalic and atraumatic. Mouth/Throat: Oropharynx is clear and moist. No oropharyngeal exudate. Eyes: Conjunctivae and EOM are normal. Pupils are equal, round, and reactive to light. Neck: Normal range of motion. Neck supple. No JVD present. No tracheal deviation present. Cardiovascular: Normal rate, regular rhythm, normal heart sounds and intact distal pulses. No murmur heard. Pulmonary/Chest: Effort normal and breath sounds normal. No stridor. No respiratory distress. She has no wheezes. She has no rales. She exhibits no tenderness. Surgical dressing in place right upper chest from recent pacemaker placement, no surrounding erythema, no bleeding   Abdominal: Soft. She exhibits no distension. There is no tenderness. There is no rebound and no guarding. Musculoskeletal: Normal range of motion. She exhibits no edema or tenderness. Right hand swelling with diffuse ecchymosis and swelling, no obvious deformity, + ttp over the ulnar aspect of the hand and 5th digit, with decrease ROM due to pain and swelling   Neurological: She is alert and oriented to person, place, and time. No cranial nerve deficit. No gross motor or sensory deficits    Skin: Skin is warm and dry. She is not diaphoretic. Psychiatric: She has a normal mood and affect.  Her behavior is normal.   Nursing note and vitals reviewed. Diagnostic Study Results     Labs -   No results found for this or any previous visit (from the past 12 hour(s)). Radiologic Studies -   XR HAND RT MIN 3 V   Final Result   EXAM:  XR HAND RT MIN 3 V     INDICATION:  hand swelling/bruising/pain.     COMPARISON: None.     FINDINGS: Three views of the right hand demonstrate an acute slightly impacted  and nondisplaced interarticular fracture of the ulnar base of the fifth proximal  phalange with no other fracture or other acute osseous or articular abnormality. The soft tissues are within normal limits. There is mild osteoarthritic  pattern change. A well-corticated ossicle is seen at the ulnar process.     IMPRESSION  IMPRESSION: Acute  slightly impacted and nondisplaced interarticular fracture of  the ulnar base of the fifth proximal phalange. Medical Decision Making   I am the first provider for this patient. I reviewed the vital signs, available nursing notes, past medical history, past surgical history, family history and social history. Vital Signs-Reviewed the patient's vital signs. Patient Vitals for the past 12 hrs:   Temp Pulse Resp BP SpO2   04/21/18 1256 98.4 °F (36.9 °C) 68 16 127/67 100 %     Records Reviewed: Nursing Notes, Old Medical Records and Previous Radiology Studies    Provider Notes (Medical Decision Making):   DDx: Fracture, Sprain, Strain, Hematoma. ED Course:   Initial assessment performed. The patients presenting problems have been discussed, and they are in agreement with the care plan formulated and outlined with them. I have encouraged them to ask questions as they arise throughout their visit. Ice pack applied, pt did not want anything stronger than tylenol for pain, Kaitlyn Burleson DO    Procedure Note - Splint Placement:  3:57 PM  Performed by: Sravani Neal DO  Neurovascularly intact prior to tx.  An Orthoglass ulnar gutter splint was placed on pt's right hand over the 4th and 5th fingers. Joint was placed in neutral. Neurovascularly intact after tx. The procedure took 1-15 minutes, and pt tolerated well. Critical Care Time:   0 minutes    Disposition:  DISCHARGE NOTE:  4:09 PM  The patient's results have been reviewed with family and/or caregiver. They verbally convey their understanding and agreement of the patient's signs, symptoms, diagnosis, treatment, and prognosis. They additionally agree to follow up as recommended in the discharge instructions or to return to the Emergency Room should the patient's condition change prior to their follow-up appointment. The family and/or caregiver verbally agrees with the care-plan and all of their questions have been answered. The discharge instructions have also been provided to the them along with educational information regarding the patient's diagnosis and a list of reasons why the patient would want to return to the ER prior to their follow-up appointment should their condition change. Written by Annette Dubose. Luanne Hernández ED Scribe, as dictated by Army Gio DO. PLAN:  1. Discharge Medication List as of 4/21/2018  4:00 PM      START taking these medications    Details   oxyCODONE IR (ROXICODONE) 5 mg immediate release tablet Take 1 Tab by mouth every four (4) hours as needed for Pain.  Max Daily Amount: 30 mg., Print, Disp-10 Tab, R-0         CONTINUE these medications which have NOT CHANGED    Details   !! furosemide (LASIX) 20 mg tablet take 1 tablet by mouth once daily, Historical Med, R-0      !! benazepril (LOTENSIN) 20 mg tablet take 1 tablet by mouth once daily, Historical Med, R-0      DILT- mg XR capsule Historical Med, R-0, LAVELL      CONTOUR NEXT STRIPS strip TEST twice a day, Historical Med, R-0, LAVELL      MICROLET LANCET misc CHECK BLOOD SUGAR TWICE A DAY, Historical Med, R-1, LAVELL      oxyCODONE-acetaminophen (PERCOCET) 5-325 mg per tablet take 1 tablet by mouth every 6 hours if needed MAX DAILY AMOUNT IS 4 TABS, Historical Med, R-0      metoprolol succinate (TOPROL-XL) 50 mg XL tablet Take 1 Tab by mouth daily. , Print, Disp-30 Tab, R-0      !! furosemide (LASIX) 40 mg tablet Take 1 Tab by mouth two (2) times a day., Normal, Disp-60 Tab, R-0      busPIRone (BUSPAR) 5 mg tablet Take 5 mg by mouth two (2) times a day., Historical Med      pravastatin (PRAVACHOL) 20 mg tablet TAKE ONE TABLET BY MOUTH NIGHTLY, Normal, Disp-90 Tab, R-1      !! benazepril (LOTENSIN) 40 mg tablet Take 1 Tab by mouth daily. , Normal, Disp-30 Tab, R-3      pantoprazole (PROTONIX) 40 mg tablet Take 40 mg by mouth two (2) times a day., Historical Med      L. acidoph & paracasei- S therm- Bifido (FRAN-Q/RISAQUAD) 8 billion cell cap cap Take 1 Cap by mouth daily. , Print, Disp-60 Cap, R-0      clopidogrel (PLAVIX) 75 mg tab Take 75 mg by mouth daily. , Historical Med      levothyroxine (SYNTHROID) 50 mcg tablet Take 1 Tab by mouth Daily (before breakfast). , Normal, Disp-90 Tab, R-1      sertraline (ZOLOFT) 100 mg tablet Take 1 Tab by mouth daily. , Normal, Disp-135 Tab, R-1      metFORMIN ER (GLUCOPHAGE XR) 750 mg tablet TAKE ONE TABLET BY MOUTH TWICE DAILY, Normal, Disp-180 Tab, R-0      Cholecalciferol, Vitamin D3, (VITAMIN D3) 2,000 unit cap capsule Take 2,000 Units by mouth daily. , Historical Med      acetaminophen (TYLENOL) 500 mg tablet Take 500 mg by mouth every four (4) hours as needed for Pain., Historical Med      ferrous sulfate (IRON) 325 mg (65 mg iron) tablet Take 325 mg by mouth every other day., Historical Med      multivit-min-FA-lycopen-lutein (CENTRUM SILVER) 0.4-300-250 mg-mcg-mcg tab Take 1 Tab by mouth daily. , Historical Med      aspirin delayed-release 81 mg tablet Take 81 mg by mouth daily. , Historical Med      magnesium 250 mg tab Take 250 mg by mouth every evening., Historical Med       !! - Potential duplicate medications found. Please discuss with provider.         2.   Follow-up Information     Follow up With Details Comments [address], [facility] [address]  [address]  [facility]  [facility] [address] [facility] [address]  [address]  [facility]  [phone]          Return to ED if worse     Diagnosis     Clinical Impression:   1. Closed nondisplaced fracture of proximal phalanx of right little finger, initial encounter    2. Fall, initial encounter        Attestations: This note is prepared by Manuel Tapia, acting as Scribe for Gregory Parish, [facility], DO: The scribe's documentation has been prepared under my direction and personally reviewed by me in its entirety. I confirm that the note above accurately reflects all work, treatment, procedures, and medical decision making performed by me.

## 2018-04-22 PROCEDURE — 3331090001 HH PPS REVENUE CREDIT

## 2018-04-22 PROCEDURE — 3331090002 HH PPS REVENUE DEBIT

## 2018-04-23 VITALS
RESPIRATION RATE: 12 BRPM | SYSTOLIC BLOOD PRESSURE: 122 MMHG | TEMPERATURE: 98.4 F | WEIGHT: 162 LBS | HEART RATE: 68 BPM | BODY MASS INDEX: 30.61 KG/M2 | DIASTOLIC BLOOD PRESSURE: 70 MMHG | OXYGEN SATURATION: 98 %

## 2018-04-23 PROCEDURE — 3331090002 HH PPS REVENUE DEBIT

## 2018-04-23 PROCEDURE — 3331090001 HH PPS REVENUE CREDIT

## 2018-04-24 ENCOUNTER — HOME CARE VISIT (OUTPATIENT)
Dept: HOME HEALTH SERVICES | Facility: HOME HEALTH | Age: 83
End: 2018-04-24
Payer: MEDICARE

## 2018-04-24 ENCOUNTER — PATIENT OUTREACH (OUTPATIENT)
Dept: INTERNAL MEDICINE CLINIC | Age: 83
End: 2018-04-24

## 2018-04-24 PROCEDURE — 3331090002 HH PPS REVENUE DEBIT

## 2018-04-24 PROCEDURE — 3331090001 HH PPS REVENUE CREDIT

## 2018-04-24 NOTE — PROGRESS NOTES
NN attempted patient outreach today in order to perform HSO follow-up; lvm requesting a return phone call to this NN. Goals Addressed             Most Recent       Post Hospitalization     Attends follow-up appointments as directed. On track (4/20/2018)             3/13/2018 - AFD  Pt is scheduled for OV on 3/20/18 with Dr. Mary Perry. Pt cannot identify any barriers to attending OV as scheduled. P - Pt will attend OV as scheduled. 4/13/2018  - readmitted to [facility] 4/7/18-4/12/18  - L[facility] appointment previously scheduled with Dr. Mary Perry for 4/18/18  - has office visit scheduled with Dr. Jason Moreno (Cardiolgoy) for 4/26/18  - patient/daughter agreeable to [facility] visit for HFU within 3 days post-discharge  - [facility] visit scheduled for today between 3p - 4p for HFU within 3 days post-discharge    4/16/2018  - confirmed that requested [facility] visit for hospital follow-up was completed on 4/13/18; [facility] note to be faxed to PCP office    4/20/2018  - attended [facility] appointment with Dr. Mary Perry for 4/18/18 (post-discharge day 7); advised to f/u in 3 weeks  - attended office visit with Cardiology NP ([facility]) on 4/19/18 for one week pacemaker site check  - has office visit scheduled with Dr. Jason Moreno (Cardiology) for 4/26/18 4/24/2018  - [facility] ED Visit 4/21/18; advised to f/u with PCP and Dr. Randi Gee  - confirmed with Darrell Jimenez today that patient attended office visit with Dr. Marisol Guajardo on today (4/24/18) for ED follow-up; next scheduled office visit 5/8/18 at 9:40 am with Dr. Mikal Lancaster Prevent complications post hospitalization.    On track (4/20/2018)             4/13/2018  - Daughter reports exacerbation of episodes of urinary incontinence post-discharge; attributes this to new order for Lasix 40 mg two times a day at hospital discharge  - reports that patient is unable to ambulate to the bathroom fast enough  - daughter inquiring about Ambulatory Supply order for Depends/Incontinence Briefs; NN will request LPN  assistance with this request      4/16/2018  - confirmed that requested [facility] visit for hospital follow-up was completed on 4/13/18 (Completed on Post-Discharge Day 2); [facility] note to be faxed to PCP office. 4/20/2018   - patient states, \"I'm feeling good, I'm ready to go, go, go. I'm doing much better, I'm not having any trouble breathing and I'm walking as much as I can. \"  - provided Daughter with contact information for [facility] as well as cheapest options for incontinence briefs available for purchase at [address]  - daughter will be contacting [address] on Monday regarding Incontinence Briefs    4/24/2018  - ED [facility] ED Visit 4/21/18 with c/o fall; diagnosis of closed non-displaced fracture of proximal phalanx of right little finger, fall. Per ED documentation, splint applied to right hand over 4th and 5th fingers; patient discharged to home with new Rx for Oxycodone and advised to f/u with PCP and Dr. Ramsay.  Supportive resources in place to maintain patient in the community (ie. Home Health, DME equipment, refer to, medication assistant plan, etc.)   On track (4/20/2018)             3/13/2018 - AFD    Pt lives with spouse, sister, and daughter. Pt's daughter performs the antibiotic administration 3 times per day[facility] Have noted SN contact with MD to address elevated BG and BP. Pt denied need for additional support. Pt did not voice awareness of recommendation for home PT for strengthening and stability. Noted pt has previously suffered GLFs with injuries in the last 6 months. P - Will consult with [facility] nurse to review current resources.     4/13/2018  - readmitted to ED [facility] 4/7/18-4/12/18  - discharged to home with resumption of home health through [facility] for SN, PT, OT  - home health SN resumption of care visit scheduled for 4/13/18  - using cane with ambulation  - DME: cane, walker, wheelchair  - reports patient has previously used Cpap, however has not used Cpap within the past 3.5 years and is in need of a new referral to sleep medicine for sleep study  - living situation: one-level house with Spouse, Daughter, Rodney Whitmore  - Support System: Daughter is actively engaged and involved in patient's care; Daughter is unemployed and does not work outside of the home. Spouse is able to provide patient assistance with ADLs, however Daughter manages medical care for patient and spouse. Patient has 24/7 supervision/care by family members (spouse, daughter, aunt). Daughter is primary caregiver to patient and spouse. - minimal assistance with bathing/dressing; Daughter is assisting patient with dressing upper body post-op  - Medication Management: Daughter provides minimal assistance with medication management post-discharge; reports utilization of weekly pill box  - denies transportation concerns  - Daughter denies any financial barriers to obtaining patient medications at this time; reports patient's debit card was skimmed and has been overdrawn, however they have filed disputes with the patient's financial institution for the charges.   Aunt lent patient money in order to purchase medications post-discharge due to temporary financial strain secondary to fraudulent charges  - reports Medicaid Application was completed and submitted at the end of March; eligibility decision pending  - [facility] and discussed appropriate utilization of [address]  - will request LPN  assistance with obtaining order from PCP for Incontinence Supplies as well as submitting order to DME provider  - denies any additional questions, concerns, needs at this time    4/16/2018  - [address] [facility] [address] [facility] SN resumption of care visit completed 4/14/18; next scheduled visit 4/17/18  - [address] PT scheduled for 4/16/18  - [address] OT scheduled for 4/16/18 4/18/2018  - most recent home health SN visit 4/17/18; next scheduled visit 4/20/18  - per home health PT initial evaluation documentation, patient/family requested no additional PT visits due to patient reportedly functioning at her baseline mobility; also requested no OT evaluation  - per LPN , order for incontinence briefs was submitted to [facility] on 4/16/18  - Incontinence Supplies not covered by Medicare and patient does not have Medicaid at this time; [facility] unable to fulfill order request  - NN outreach to [address] to inquire about cost of incontinence briefs  $9.90/package (20 briefs)  $39/case (80 briefs)  May have some briefs available for $29/case; reports that if patient calls ahead then he will check the warehouse to see if this cheaper option is in stock. Hours - M-F 830 am-530 pm Sat 9 am-3 pm    4/20/2018   - during office visit with PCP on 4/18/18, daughter was provided with the above information regarding [facility] and cheapest options for incontinence briefs available for purchase at [address] [facility] [address]   - daughter reports that she will be contacting [address] on Monday regarding Incontinence Briefs  - daughter denies any additional questions/concerns at this time    4/24/2018  - per home health documentation encounter 4/24/18, home health SN visit scheduled for 4/25/18 due to patient having scheduled office visit with Ortho today.                   Future Appointments  Date Time Provider Paul Davis   4/25/2018 12:00 PM Alberta Clemens LPN [facility]   4/26/2018 10:00 AM Jose Ko MD [facility]   4/27/2018 To Be Determined Alberta Clemens LPN [facility]   4/30/2018 10:40 AM Nasim Ham MD [address] [place] [address]   5/2/2018 To Be Determined Donald Villarreal RN [facility] [address]   5/9/2018 To Be Determined Donald Villarreal RN [facility]   5/9/2018 To Be Determined Donald Villarreal RN [facility] [address] [place] [address] 5/9/2018 12:15 PM MD Shana Root 87            Last Appointment My Department:  4/18/2018

## 2018-04-25 ENCOUNTER — PATIENT OUTREACH (OUTPATIENT)
Dept: INTERNAL MEDICINE CLINIC | Age: 83
End: 2018-04-25

## 2018-04-25 VITALS — BODY MASS INDEX: 30.61 KG/M2 | WEIGHT: 162 LBS

## 2018-04-25 DIAGNOSIS — I50.33 DIASTOLIC CHF, ACUTE ON CHRONIC (HCC): Primary | ICD-10-CM

## 2018-04-25 DIAGNOSIS — E11.9 DIABETES MELLITUS WITHOUT COMPLICATION (HCC): ICD-10-CM

## 2018-04-25 PROCEDURE — 3331090001 HH PPS REVENUE CREDIT

## 2018-04-25 PROCEDURE — 3331090002 HH PPS REVENUE DEBIT

## 2018-04-25 NOTE — PROGRESS NOTES
NN completed patient outreach today in order to perform HSO follow-up; two patient identifiers verified. Goals Addressed             Most Recent       Heart Failure     Reduce risk of CHF exacerbations and complications. On track (4/25/2018)             4/13/2018  - followed by Dr. Alexandra Chance in Outpatient Setting  - diagnosis of acute on chronic diastolic CHF  - 0/96/0 - s/p lead extraction/device removal of single lead temp/perm pacemaker and implant of dual chamber pacemaker  - 4/10/18 - Echocardiogram - EF estimated to be 35%  - Daughter reports patient adherence with daily weights  - flowsheet updated today with reported patient daily weight  - daughter denies patient weight gain post-discharge  - patient and daughter currently identify patient as being in the Green HF zone  - Daughter states, \"She doesn't have any trouble breathing at all, there's no swelling anywhere. \"  - daughter is familiar with/knowledgeable regarding HF zones and is able to accurately describe signs and symptoms associated with each HF zone to this NN    - encouraged continued adherence with patient daily weights and maintenance of daily weight record  - encouraged daily assessment of patient HF zone  - reinforced patient/caregiver action plan associated with each HF zone  - advised outreach to Cardiologist to notify of onset of new/worsened symptoms and/or weight gain of 3 pounds in 24 hours and/or 5 pounds in one week    4/20/2018   - Daughter currently identifies patient as being in the Green HF Zone  - adherent with daily weights/daily weight record  - flowsheet updated today with patient daily weights as reported by Daughter  - no patient weight gain of 3 pounds in 24 hours and/or 5 pounds in one week noted since previous NN outreach encounter    - positive reinforcement provided for adherence with self-management  - encouraged continued adherence with patient daily weights and maintenance of daily weight record  - encouraged daily assessment of patient HF zone  - reinforced patient/caregiver action plan associated with each HF zone  - advised outreach to Cardiologist to notify of onset of new/worsened symptoms and/or weight gain of 3 pounds in 24 hours and/or 5 pounds in one week    4/25/2018  - adherent with daily weights/maintenance of daily weight record  - flowsheet updated today with patient reported daily weights  - patient reported daily weights reviewed by this NN; no weight gain of 3 pounds in 24 hours and/or 5 pounds in one week noted  - currently identifies herself as being in the Green HF Zone  - reports daily self-assessment of HF zone and states that she records this with her daily weight  - reports following a low-sodium diet at home; denies use of table salt when seasoning food  - encouraged continued adherence with patient daily weights/maintenance of daily weight record, daily self-assessment of HF zone  - advised outreach to Cardiologist to notify of onset of new/worsened symptoms and/or weight gain of 3 pounds in 24 hours and/or 5 pounds in one week         Post Hospitalization     Attends follow-up appointments as directed. On track (4/25/2018)             3/13/2018 - AFD  Pt is scheduled for OV on 3/20/18 with Dr. Yvonne Lima. Pt cannot identify any barriers to attending OV as scheduled. P - Pt will attend OV as scheduled. 4/13/2018  - readmitted to [facility] 4/7/18-4/12/18  - [facility] appointment previously scheduled with Dr. Yvonne Lima for 4/18/18  - has office visit scheduled with Dr. Fahad Marie (Cardiolgoy) for 4/26/18  - patient/daughter agreeable to [facility] visit for HFU within 3 days post-discharge  - [facility] visit scheduled for today between 3p - 4p for HFU within 3 days post-discharge    4/16/2018  - confirmed that requested [facility] visit for hospital follow-up was completed on 4/13/18;  [facility] note to be faxed to PCP office    4/20/2018  - attended [facility] appointment with Dr. Yvonne Lima for 4/18/18 (post-discharge day 7); advised to f/u in 3 weeks  - attended office visit with Cardiology NP ([facility]) on 4/19/18 for one week pacemaker site check  - has office visit scheduled with Dr. Juan Rowe (Cardiology) for 4/26/18 4/24/2018  - [facility] ED Visit 4/21/18; advised to f/u with PCP and Dr. Yonis Solano  - confirmed with Libby Watson today that patient attended office visit with Dr. Jacob Lantigua on today (4/24/18) for ED follow-up; next scheduled office visit 5/8/18 at 9:40 am with Dr. Jacob Lantigua    4/25/2018  - has office visit scheduled with Dr. Juan Rowe (Cardiology) for 4/26/18       Prevent complications post hospitalization. On track (4/25/2018)             4/13/2018  - Daughter reports exacerbation of episodes of urinary incontinence post-discharge; attributes this to new order for Lasix 40 mg two times a day at hospital discharge  - reports that patient is unable to ambulate to the bathroom fast enough  - daughter inquiring about Ambulatory Supply order for Depends/Incontinence Briefs; NN will request LPN  assistance with this request      4/16/2018  - confirmed that requested [facility] visit for hospital follow-up was completed on 4/13/18 (Completed on Post-Discharge Day 2); [facility] note to be faxed to PCP office. 4/20/2018   - patient states, \"I'm feeling good, I'm ready to go, go, go. I'm doing much better, I'm not having any trouble breathing and I'm walking as much as I can. \"  - provided Daughter with contact information for [employer] as well as cheapest options for incontinence briefs available for purchase at [address]   - daughter will be contacting [address] on Monday regarding Incontinence Briefs    4/24/2018  - [facility] ED Visit 4/21/18 with c/o fall; diagnosis of closed non-displaced fracture of proximal phalanx of right little finger, fall.  Per ED documentation, splint applied to right hand over 4th and 5th fingers; patient discharged to home with new Rx for Oxycodone and advised to f/u with PCP and Dr. Ramsay. 4/25/2018   - CMP, CBC ordered/collected at [facility] appointment with Dr. Yuri Martin on 4/18/18  - Reports improvement in urinary frequency; states, \"I'm not running in as much as I was. \"  - reports history of botox injection approximately one year ago for urinary frequency   - patient stated reason for fall which prompted ED [facility] ED visit on 4/21/18 - reports that she fell in her bedroom while getting ready for bed; states, \"I lost my balance and went to reach for the bed and missed it. \"       Supportive resources in place to maintain patient in the community (ie. Home Health, DME equipment, refer to, medication assistant plan, etc.)   On track (4/25/2018)             3/13/2018 - AFD    Pt lives with spouse, sister, and daughter. Pt's daughter performs the antibiotic administration 3 times per day. HHSN began visits today. Have noted SN contact with MD to address elevated BG and BP. Pt denied need for additional support. Pt did not voice awareness of recommendation for home PT for strengthening and stability. Noted pt has previously suffered GLFs with injuries in the last 6 months. P - Will consult with [facility] nurse to review current resources. 4/13/2018  - readmitted to ED [facility] 4/7/18-4/12/18  - discharged to home with resumption of home health through [facility] for SN, PT, OT  - home health SN resumption of care visit scheduled for 4/13/18  - using cane with ambulation  - DME: cane, walker, wheelchair  - reports patient has previously used Cpap, however has not used Cpap within the past 3.5 years and is in need of a new referral to sleep medicine for sleep study  - living situation: one-level house with Spouse, Daughter, [address]: Daughter is actively engaged and involved in patient's care; Daughter is unemployed and does not work outside of the home.  Spouse is able to provide patient assistance with ADLs, however Daughter manages medical care for patient and spouse. Patient has 24/7 supervision/care by family members (spouse, daughter, aunt). Daughter is primary caregiver to patient and spouse. - minimal assistance with bathing/dressing; Daughter is assisting patient with dressing upper body post-op  - Medication Management: Daughter provides minimal assistance with medication management post-discharge; reports utilization of weekly pill box  - denies transportation concerns  - Daughter denies any financial barriers to obtaining patient medications at this time; reports patient's debit card was skimmed and has been overdrawn, however they have filed disputes with the patient's financial institution for the charges.   Aunt lent patient money in order to purchase medications post-discharge due to temporary financial strain secondary to fraudulent charges  - reports Medicaid Application was completed and submitted at the end of March; eligibility decision pending  - [facility] and discussed appropriate utilization of [address]  - will request LPN  assistance with obtaining order from PCP for Incontinence Supplies as well as submitting order to DME provider  - denies any additional questions, concerns, needs at this time    4/16/2018  - [facility] SN resumption of care visit completed 4/14/18; next scheduled visit 4/17/18  - [address] [facility] PT scheduled for 4/16/18  - [address] [facility] OT scheduled for 4/16/18 4/18/2018  - most recent home health SN visit 4/17/18; next scheduled visit 4/20/18  - per home health PT initial evaluation documentation, patient/family requested no additional PT visits due to patient reportedly functioning at her baseline mobility; also requested no OT evaluation  - per LPN , order for incontinence briefs was submitted to [address] [place] [address] on 4/16/18  - Incontinence Supplies not covered by Medicare and patient does not have Medicaid at this time; [address] [place] [address] unable to fulfill order request  - NN outreach to [address] to inquire about cost of incontinence briefs  $9.90/package (20 briefs)  $39/case (80 briefs)  May have some briefs available for $29/case; reports that if patient calls ahead then he will check the warehouse to see if this cheaper option is in stock. Hours - M-F 830 am-530 pm Sat 9 am-3 pm    4/20/2018   - during office visit with PCP on 4/18/18, daughter was provided with the above information regarding [employer] and cheapest options for incontinence briefs available for purchase at [address]  - daughter reports that she will be contacting [address]  on Monday regarding Incontinence Briefs  - daughter denies any additional questions/concerns at this time    4/24/2018  - per home health documentation encounter 4/24/18, home health SN visit scheduled for 4/25/18 due to patient having scheduled office visit with Ortho today. 4/25/2018   - fall risk  - patient previously declined home health PT and OT services due to feeling that she had returned to her baseline mobility/function  - discussed home health PT and OT services with patient today  - patient agreeable to home health PT and OT services; will request order from PCP for re-evaluation  - reports use of cane with ambulation  - denies falls since ED visit at [facility] [facility] on 4/21/18  - reports that she will contact [address] regarding Incontinence briefs  - denies any additional questions, concerns, needs at this time  - method of communication with Provider: Dr. Yuri Martin - telephone  - verbal order per Dr. Yuri Martin, Referral to home health PT and OT to evaluate and treat  - NN outreach to [facility]; s/w Reece Ford, Intake Dept - verbal order for home health PT and OT to evaluate and treat given per Dr. Chicho Jama Understands red flags post discharge.    On track (4/25/2018)             3/13/2018 - AFD    Pt states she currently has a lot of \"swelling\" in her legs. Pt states she has been told this is fluid from her infection. Pt states she has not been instructed to perform a daily wt. Pt does not take diuretics. Pt asked to perform a daily wt and add information to her daily record of BP and BG. Pt states her BG this morning was over 250. Pt is taking only metformin. Pt states she was taking insulin in the hospital.  Pt is keeping a record of daily BP   HHSN began visits today  Noted contact with MD and orders. P - Pt will continue to keep daily readings of BG, BP, and wt. Pt will bring record to all OV.    4/13/2018   - daughter denies presence of and/or patient c/o fever, chills, dizziness, shortness of breath at rest, dyspnea on exertion, nausea, vomiting, edema, lower extremity edema  - Daughter reports \"just a dry cough that they noticed in the hospital, she's not bringing anything up. \" Denies worsening of cough post-discharge. - reports presence of pain post-discharge that is associated with \"bruising underneath her right arm\". Reports adequate pain relief with prn Tylenol; denies increase in pain level post-discharge. 4/20/2018   - daughter denies presence of and/or patient c/o fever, chills, dizziness, shortness of breath at rest, dyspnea on exertion, nausea, vomiting, edema, lower extremity edema    4/25/2018  - daughter denies presence of and/or patient c/o fever, chills, dizziness, shortness of breath at rest, dyspnea on exertion, nausea, vomiting, edema, lower extremity edema  - reports \"a dry cough once in a while, but it's not a cold or anything. \" Patient states, \"I've had a dry cough for a while now, even before I was diagnosed with the congestive heart failure. \" Denies worsening of cough post-discharge.                 Future Appointments  Date Time Provider [place]   4/26/2018 10:00 AM Jose Mcneil MD [facility] SCHED   4/27/2018 To Be Determined Leonardo Mcpherson LPN [address] [place] [address]   4/30/2018 10:40 AM Ariana CULLEN MD [place] [facility]   5/2/2018 To Be Determined Trish Carrasquillo RN [facility] [place] [address] [facility]   5/9/2018 To Be Determined Trish Carrasquillo RN [place]   5/9/2018 To Be Determined Trish Carrasquillo RN [facility]   5/9/2018 12:15 PM MD Shana Rodgers 87          Last Appointment My Department:  4/18/2018

## 2018-04-25 NOTE — PROGRESS NOTES
Verbal order per Dr. Liu Ped, Referral to home health PT and OT to evaluate and treat. Patient currently open to home health Skilled Nursing.

## 2018-04-26 ENCOUNTER — OFFICE VISIT (OUTPATIENT)
Dept: CARDIOLOGY CLINIC | Age: 83
End: 2018-04-26

## 2018-04-26 ENCOUNTER — CLINICAL SUPPORT (OUTPATIENT)
Dept: CARDIOLOGY CLINIC | Age: 83
End: 2018-04-26

## 2018-04-26 VITALS
DIASTOLIC BLOOD PRESSURE: 64 MMHG | SYSTOLIC BLOOD PRESSURE: 100 MMHG | HEIGHT: 61 IN | HEART RATE: 70 BPM | OXYGEN SATURATION: 96 % | BODY MASS INDEX: 31.23 KG/M2 | WEIGHT: 165.4 LBS | RESPIRATION RATE: 18 BRPM

## 2018-04-26 DIAGNOSIS — I10 ESSENTIAL HYPERTENSION: ICD-10-CM

## 2018-04-26 DIAGNOSIS — Z95.0 PACEMAKER: Primary | ICD-10-CM

## 2018-04-26 DIAGNOSIS — I49.9 IRREGULAR HEARTBEAT: Primary | ICD-10-CM

## 2018-04-26 DIAGNOSIS — I48.0 PAROXYSMAL ATRIAL FIBRILLATION (HCC): ICD-10-CM

## 2018-04-26 DIAGNOSIS — I48.19 PERSISTENT ATRIAL FIBRILLATION (HCC): ICD-10-CM

## 2018-04-26 DIAGNOSIS — I49.5 SSS (SICK SINUS SYNDROME) (HCC): ICD-10-CM

## 2018-04-26 DIAGNOSIS — I44.2 CHB (COMPLETE HEART BLOCK) (HCC): ICD-10-CM

## 2018-04-26 PROCEDURE — 3331090002 HH PPS REVENUE DEBIT

## 2018-04-26 PROCEDURE — 3331090001 HH PPS REVENUE CREDIT

## 2018-04-26 NOTE — PROGRESS NOTES
Subjective:      Kanchan Hammond is a [de-identified] y.o. female is here for device follow up. The patient denies chest pain/ shortness of breath, orthopnea, PND, LE edema, palpitations, syncope, presyncope or fatigue. Patient Active Problem List    Diagnosis Date Noted    Acute on chronic diastolic (congestive) heart failure ([address] 04/07/2018    GI bleed 04/07/2018    Severe obesity (BMI 35.0-39. 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    ACP (advance care planning) 11/11/2015    Hypothyroidism, adult 07/31/2015    Type 2 diabetes mellitus without complication ([address] 61/42/3867    Essential hypertension 04/27/2015    MALLORY (obstructive sleep apnea) 07/09/2014    Hyperlipidemia 07/09/2014    Sarcoid 07/09/2014    Stress bladder incontinence, female 07/09/2014    Obesity 07/09/2014    TIA (transient ischemic attack) 07/09/2014    AR (aortic regurgitation) 07/09/2014    Mitral valve insufficiency 07/09/2014    Cervical stenosis of spine 07/09/2014      Josephine Jimenez MD  Past Medical History:   Diagnosis Date    Anemia     Arrhythmia     AFIB    Ataxia     Webb's esophagus     Cervical spinal stenosis     Coronary atherosclerosis of unspecified type of vessel, native or graft     Diabetes ([address]     Fatigue     GERD (gastroesophageal reflux disease)     Hyperlipidemia     Hypertension     Ill-defined condition     right torn rotater cuff- no surgery at this time    Liver disease     pt is unaware    Osteoarthritis     Psychiatric disorder     anxiety and depression    Sarcoidosis     Sleep apnea     uses cpap    Stroke ([facility]     TIA'S    Thyroid disease       Past Surgical History:   Procedure Laterality Date    CARDIAC SURG PROCEDURE UNLIST      cardioversion     CARDIAC SURG PROCEDURE UNLIST      watchman device    COLONOSCOPY N/A 12/28/2016    COLONOSCOPY performed [facility] Thor[facility] at [facility] ENDOSCOPY    HX CATARACT REMOVAL      both eyes    HX CHOLECYSTECTOMY      HX COLONOSCOPY  5/8/2013    Repeat in 5 years    HX CYST REMOVAL  10/15    on head     HX HYSTERECTOMY      HX ORTHOPAEDIC Bilateral     knee replacement to both knees    HX ORTHOPAEDIC      spacer btwn L4-5 and L5-6     HX PACEMAKER PLACEMENT      HX TONSILLECTOMY      HX UROLOGICAL      botox in bladder     Allergies   Allergen Reactions    Procardia Xl [Nifedipine] Other (comments)     weakness      Family History   Problem Relation Age of Onset    Other Mother      Fibromyalgia    Pacemaker Mother     Heart Disease Mother     Heart Failure Mother     COPD Mother     Heart Attack Father 61    Cancer Sister      Multiple Myeloma    Diabetes Brother     Obesity Brother     Pacemaker Brother     Heart Attack Brother      Bundle branch block    No Known Problems Son     Psychiatric Disorder Daughter      Multiple    negative for cardiac disease  Social History     Social History    Marital status:      Spouse name: N/A    Number of children: N/A    Years of education: N/A     Social History Main Topics    Smoking status: Never Smoker    Smokeless tobacco: Never Used    Alcohol use No    Drug use: No    Sexual activity: Yes     Partners: Male     Other Topics Concern    None     Social History Narrative     Current Outpatient Prescriptions   Medication Sig    benazepril (LOTENSIN) 20 mg tablet take 1 tablet by mouth once daily    CONTOUR NEXT STRIPS strip TEST twice a day    MICROLET LANCET misc CHECK BLOOD SUGAR TWICE A DAY    furosemide (LASIX) 40 mg tablet Take 1 Tab by mouth two (2) times a day.     busPIRone (BUSPAR) 5 mg tablet Take 5 mg by mouth two (2) times a day.  pravastatin (PRAVACHOL) 20 mg tablet TAKE ONE TABLET BY MOUTH NIGHTLY    benazepril (LOTENSIN) 40 mg tablet Take 1 Tab by mouth daily.  L. acidoph & paracasei- S therm- Bifido (FRAN-Q/RISAQUAD) 8 billion cell cap cap Take 1 Cap by mouth daily.  clopidogrel (PLAVIX) 75 mg tab Take 75 mg by mouth daily.  levothyroxine (SYNTHROID) 50 mcg tablet Take 1 Tab by mouth Daily (before breakfast).  sertraline (ZOLOFT) 100 mg tablet Take 1 Tab by mouth daily.  metFORMIN ER (GLUCOPHAGE XR) 750 mg tablet TAKE ONE TABLET BY MOUTH TWICE DAILY    Cholecalciferol, Vitamin D3, (VITAMIN D3) 2,000 unit cap capsule Take 2,000 Units by mouth daily.  acetaminophen (TYLENOL) 500 mg tablet Take 500 mg by mouth every four (4) hours as needed for Pain.  ferrous sulfate (IRON) 325 mg (65 mg iron) tablet Take 325 mg by mouth every other day.  multivit-min-FA-lycopen-lutein (CENTRUM SILVER) 0.4-300-250 mg-mcg-mcg tab Take 1 Tab by mouth daily.  aspirin delayed-release 81 mg tablet Take 81 mg by mouth daily.  magnesium 250 mg tab Take 250 mg by mouth every evening.  oxyCODONE IR (ROXICODONE) 5 mg immediate release tablet Take 1 Tab by mouth every four (4) hours as needed for Pain. Max Daily Amount: 30 mg.    furosemide (LASIX) 20 mg tablet take 1 tablet by mouth once daily    DILT- mg XR capsule Take 120 mg by mouth daily.  oxyCODONE-acetaminophen (PERCOCET) 5-325 mg per tablet take 1 tablet by mouth every 6 hours if needed MAX DAILY AMOUNT IS 4 TABS    metoprolol succinate (TOPROL-XL) 50 mg XL tablet Take 1 Tab by mouth daily.  pantoprazole (PROTONIX) 40 mg tablet Take 40 mg by mouth two (2) times a day. No current facility-administered medications for this visit.        Vitals:    04/26/18 1001   BP: 100/64   Pulse: 70   Resp: 18   SpO2: 96%   Weight: 165 lb 6.4 oz (75 kg)   Height: 5' 1\" (1.549 m)       I have reviewed the nurses notes, vitals, problem list, allergy list, medical history, family, social history and medications. Review of Symptoms:    General: Pt denies excessive weight gain or loss. Pt is able to conduct ADL's  HEENT: Denies blurred vision, headaches, epistaxis and difficulty swallowing. Respiratory: Denies shortness of breath, [place]ICK[place], wheezing or stridor. Cardiovascular: Denies precordial pain, palpitations, edema or PND  Gastrointestinal: Denies poor appetite, indigestion, abdominal pain or blood in stool  Urinary: Denies dysuria, pyuria  Musculoskeletal: Denies pain or swelling from muscles or joints  Neurologic: Denies tremor, paresthesias, or sensory motor disturbance  Skin: Denies rash, itching or texture change. Psych: Denies depression      Physical Exam:      General: Well developed, in no acute distress. HEENT: Eyes - PERRL, no jvd  Heart:  Normal S1/S2 negative S3 or S4. Regular, no murmur, gallop or rub.   Respiratory: Clear bilaterally x 4, no wheezing or rales  Extremities:  No edema, normal cap refill, no cyanosis. Musculoskeletal: No clubbing  Neuro: A&Ox3, speech clear, gait stable. Skin: Skin color is normal. No rashes or lesions. Non diaphoretic  Vascular: 2+ pulses symmetric in all extremities    Cardiographics    Ekg: AV paced.      Pacer - A paced 60%, v paced 100%    Results for orders placed or performed during the hospital encounter of 04/07/18   EKG, 12 LEAD, INITIAL   Result Value Ref Range    Ventricular Rate 84 BPM    Atrial Rate 87 BPM    QRS Duration 162 ms    Q-T Interval 474 ms    QTC Calculation (Bezet) 560 ms    Calculated R Axis -51 degrees    Calculated T Axis 81 degrees    Diagnosis       Poor data quality  Confirmed by Mikala Graham ([id number]) on 4/12/2018 11:06:06 AM           Lab Results   Component Value Date/Time    WBC 9.9 04/18/2018 02:52 PM    HGB 11.8 04/18/2018 02:52 PM    HCT 37.5 04/18/2018 02:52 PM    PLATELET 068 (H) 56/37/9713 02:52 PM    MCV 88 04/18/2018 02:52 PM      Lab Results   Component Value Date/Time    Sodium 140 04/18/2018 02:52 PM    Potassium 4.5 04/18/2018 02:52 PM    Chloride 97 04/18/2018 02:52 PM    CO2 25 04/18/2018 02:52 PM    Anion gap 7 04/11/2018 01:02 AM    Glucose 103 (H) 04/18/2018 02:52 PM    BUN 34 (H) 04/18/2018 02:52 PM    Creatinine 1.13 (H) 04/18/2018 02:52 PM    BUN/Creatinine ratio 30 (H) 04/18/2018 02:52 PM    GFR est AA 53 (L) 04/18/2018 02:52 PM    GFR est non-AA 46 (L) 04/18/2018 02:52 PM    Calcium 9.5 04/18/2018 02:52 PM    Bilirubin, total 0.6 04/18/2018 02:52 PM    AST (SGOT) 22 04/18/2018 02:52 PM    Alk. phosphatase 101 04/18/2018 02:52 PM    Protein, total 7.1 04/18/2018 02:52 PM    Albumin 3.9 04/18/2018 02:52 PM    Globulin 4.0 04/08/2018 03:28 AM    A-G Ratio 1.2 04/18/2018 02:52 PM    ALT (SGPT) 8 04/18/2018 02:52 PM      Lab Results   Component Value Date/Time    TSH 1.960 09/08/2017 09:47 AM        Assessment:     Assessment:        ICD-10-CM ICD-9-CM    1. Irregular heartbeat I49.9 427.9 AMB POC EKG ROUTINE W/ 12 LEADS, INTER & REP   2. CHB (complete heart block) (HCC) I44.2 426.0    3. SSS (sick sinus syndrome) (HCC) I49.5 427.81    4. Essential hypertension I10 401.9    5. Persistent atrial fibrillation (HCC) I48.1 427.31      Orders Placed This Encounter    AMB POC EKG ROUTINE W/ 12 LEADS, INTER & REP     Order Specific Question:   Reason for Exam:     Answer:   routine        Plan:     Donnie Mcgill is in sinus rhythm with chb, vpacing. She is A pacing (60%) for sick sinus syndrome. Cont beta blocker and ace -inh for cardiomyopathy. Will reassess his LVEF in 3 months with an echo. Continue medical management for cardiomyopathy, sss and chb. Thank you for allowing me to participate in Donnie Mcgill 's care.       AVERY Salvador MD, [facility], [facility]

## 2018-04-26 NOTE — PROGRESS NOTES
1. Have you been to the ER, urgent care clinic since your last visit? Hospitalized since your last visit? ED [facility] ER April 2018, fell, broke finger. 2. Have you seen or consulted any other health care providers outside of the [facility] since your last visit? Include any pap smears or colon screening. No    Chief Complaint   Patient presents with    Irregular Heart Beat     2 wk HF appt. Denied cardiac symptoms.

## 2018-04-27 ENCOUNTER — HOME CARE VISIT (OUTPATIENT)
Dept: SCHEDULING | Facility: HOME HEALTH | Age: 83
End: 2018-04-27
Payer: MEDICARE

## 2018-04-27 PROCEDURE — G0300 HHS/HOSPICE OF LPN EA 15 MIN: HCPCS

## 2018-04-27 PROCEDURE — 3331090002 HH PPS REVENUE DEBIT

## 2018-04-27 PROCEDURE — 3331090001 HH PPS REVENUE CREDIT

## 2018-04-28 PROCEDURE — 3331090002 HH PPS REVENUE DEBIT

## 2018-04-28 PROCEDURE — 3331090001 HH PPS REVENUE CREDIT

## 2018-04-29 PROCEDURE — 3331090001 HH PPS REVENUE CREDIT

## 2018-04-29 PROCEDURE — 3331090002 HH PPS REVENUE DEBIT

## 2018-04-29 RX ORDER — METFORMIN HYDROCHLORIDE 750 MG/1
TABLET, EXTENDED RELEASE ORAL
Qty: 180 TAB | Refills: 0 | Status: SHIPPED | OUTPATIENT
Start: 2018-04-29 | End: 2018-07-31 | Stop reason: SDUPTHER

## 2018-04-30 PROCEDURE — 3331090001 HH PPS REVENUE CREDIT

## 2018-04-30 PROCEDURE — 3331090002 HH PPS REVENUE DEBIT

## 2018-05-01 ENCOUNTER — PATIENT OUTREACH (OUTPATIENT)
Dept: INTERNAL MEDICINE CLINIC | Age: 83
End: 2018-05-01

## 2018-05-01 ENCOUNTER — HOME CARE VISIT (OUTPATIENT)
Dept: SCHEDULING | Facility: HOME HEALTH | Age: 83
End: 2018-05-01
Payer: MEDICARE

## 2018-05-01 PROCEDURE — 3331090001 HH PPS REVENUE CREDIT

## 2018-05-01 PROCEDURE — G0152 HHCP-SERV OF OT,EA 15 MIN: HCPCS

## 2018-05-01 PROCEDURE — 3331090002 HH PPS REVENUE DEBIT

## 2018-05-01 NOTE — PROGRESS NOTES
NN completed patient outreach today in order to perform HSO follow-up; two patient identifiers verified. Patient reports that home health Occupational Therapist is currently present at her residence; requests to return NN phone call at a later time.       Goals Addressed             Most Recent       Post Hospitalization     Attends follow-up appointments as directed. On track (5/1/2018)             3/13/2018 - AFD  Pt is scheduled for OV on 3/20/18 with Dr. Nessa Alarcon. Pt cannot identify any barriers to attending OV as scheduled. P - Pt will attend OV as scheduled. 4/13/2018  - [facility]  - LOU appointment previously scheduled with Dr. Nessa Alarcon for 4/18/18  - has office visit scheduled with Dr. Juan Rowe (Cardiolgoy) for 4/26/18  - patient/daughter agreeable to [facility] visit for HFU within 3 days post-discharge  - [facility] visit scheduled for today between 3p - 4p for HFU within 3 days post-discharge    4/16/2018  - confirmed that requested [facility] visit for hospital follow-up was completed on 4/13/18;  [facility] note to be faxed to PCP office    4/20/2018  - attended [facility] appointment with Dr. Nessa Alarcon for 4/18/18 (post-discharge day 7); advised to f/u in 3 weeks  - attended office visit with Cardiology NP (FRIEDA) on 4/19/18 for one week pacemaker site check  - has office visit scheduled with Dr. Juan Rowe (Cardiology) for 4/26/18 4/24/2018  - [facility] 4/21/18; advised to f/u with PCP and Dr. Yonis Solano  - confirmed with Libby Watson today that patient attended office visit with Dr. Jacob Lantigua on today (4/24/18) for ED follow-up; next scheduled office visit 5/8/18 at 9:40 am with Dr. Jacob Lantigua    4/25/2018  - has office visit scheduled with Dr. Juan Rowe (Cardiology) for 4/26/18 5/1/2018  - attended scheduled office visit with Dr. Juan Rowe (Cardiology) on 4/26/18; advised to f/u in 3 months  - In-Office Pacemaker Device Check completed by FRIEDA on 4/26/18; next device check due in 3 months       Supportive resources in place to maintain patient in the community (ie. Home Health, DME equipment, refer to, medication assistant plan, etc.)   On track (5/1/2018)             3/13/2018 - AFD    Pt lives with spouse, sister, and daughter. Pt's daughter performs the antibiotic administration 3 times per day. HHSN began visits today. Have noted SN contact with MD to address elevated BG and BP. Pt denied need for additional support. Pt did not voice awareness of recommendation for home PT for strengthening and stability. Noted pt has previously suffered GLFs with injuries in the last 6 months. P - Will consult with [facility] nurse to review current resources. 4/13/2018  - readmitted to [facility] 4/7/18-4/12/18  - discharged to home with resumption of home health through [facility] for SN, PT, OT  - home health SN resumption of care visit scheduled for 4/13/18  - using cane with ambulation  - DME: cane, walker, wheelchair  - reports patient has previously used Cpap, however has not used Cpap within the past 3.5 years and is in need of a new referral to sleep medicine for sleep study  - living situation: one-level house with Spouse, Daughter, [facility]: Daughter is actively engaged and involved in patient's care; Daughter is unemployed and does not work outside of the home. Spouse is able to provide patient assistance with ADLs, however Daughter manages medical care for patient and spouse. Patient has 24/7 supervision/care by family members (spouse, daughter, aunt). Daughter is primary caregiver to patient and spouse.   - minimal assistance with bathing/dressing; Daughter is assisting patient with dressing upper body post-op  - Medication Management: Daughter provides minimal assistance with medication management post-discharge; reports utilization of weekly pill box  - denies transportation concerns  - Daughter denies any financial barriers to obtaining patient medications at this time; reports patient's debit card was skimmed and has been overdrawn, however they have filed disputes with the patient's financial institution for the charges. Aunt lent patient money in order to purchase medications post-discharge due to temporary financial strain secondary to fraudulent charges  - reports Medicaid Application was completed and submitted at the end of March; eligibility decision pending  - provided contact information for [facility] and discussed appropriate utilization of [address]  - will request LPN  assistance with obtaining order from PCP for Incontinence Supplies as well as submitting order to DME provider  - denies any additional questions, concerns, needs at this time    4/16/2018  - [address] [facility] SN resumption of care visit completed 4/14/18; next scheduled visit 4/17/18  - [address] PT scheduled for 4/16/18  - [address] OT scheduled for 4/16/18 4/18/2018  - most recent home health SN visit 4/17/18; next scheduled visit 4/20/18  - per home health PT initial evaluation documentation, patient/family requested no additional PT visits due to patient reportedly functioning at her baseline mobility; also requested no OT evaluation  - per LPN , order for incontinence briefs was submitted to [facility] on 4/16/18  - Incontinence Supplies not covered by Medicare and patient does not have Medicaid at this time; [address] [place] [address] unable to fulfill order request  - NN outreach to [address] to inquire about cost of incontinence briefs  $9.90/package (20 briefs)  $39/case (80 briefs)  May have some briefs available for $29/case; reports that if patient calls ahead then he will check the warehouse to see if this cheaper option is in stock.   Hours - M-F 830 am-530 pm Sat 9 am-3 pm    4/20/2018   - during office visit with PCP on 4/18/18, daughter was provided with the above information regarding [employer] and cheapest options for incontinence briefs available for purchase at [facility]  - daughter reports that she will be contacting [facility] on Monday regarding Incontinence Briefs  - daughter denies any additional questions/concerns at this time    4/24/2018  - per home health documentation encounter 4/24/18, home health SN visit scheduled for 4/25/18 due to patient having scheduled office visit with Ortho today.     4/25/2018   - fall risk  - patient previously declined home health PT and OT services due to feeling that she had returned to her baseline mobility/function  - discussed home health PT and OT services with patient today  - patient agreeable to home health PT and OT services; will request order from PCP for re-evaluation  - reports use of cane with ambulation  - denies falls since ED visit at [facility] on 4/21/18  - reports that she will contact [facility] regarding Incontinence briefs  - denies any additional questions, concerns, needs at this time  - method of communication with Provider: Dr. Iris Donald - telephone  - verbal order per Dr. Iris Donald, Referral to home health PT and OT to evaluate and treat  - NN outreach to [facility]; s/w Harman Fernandez, Intake Dept - verbal order for home health PT and OT to evaluate and treat given per Dr. Iris Donald    5/1/2018  - most recent home health SN visit 4/27/18; next scheduled visit 5/2/18  - home health OT initial evaluation scheduled for 5/1/18  - home health PT initial evaluation scheduled for 5/2/18                Future Appointments  Date Time Provider Department Center   5/2/2018 To Be Determined Tamela Harvey PT [facility]   5/2/2018 To Be Determined Gallito Foley LPN [facility] [address]   5/9/2018 To Be Determined Charon Lennox, RN [facility]   5/9/2018 To Be Determined Charon Lennox, RN [facility]   5/9/2018 12:15 PM Niesha [address]   7/2/2018 2:40 PM Timothy Sams MD [address] [place] [address]   7/26/2018 10:00 AM Aissatou Goncalves MD [facility] SCHED   7/26/2018 10:00 AM PACEMAKER, [facility] [address] [facility]            Last Appointment My Department:  4/18/2018

## 2018-05-02 ENCOUNTER — HOME CARE VISIT (OUTPATIENT)
Dept: SCHEDULING | Facility: HOME HEALTH | Age: 83
End: 2018-05-02
Payer: MEDICARE

## 2018-05-02 ENCOUNTER — PATIENT OUTREACH (OUTPATIENT)
Dept: INTERNAL MEDICINE CLINIC | Age: 83
End: 2018-05-02

## 2018-05-02 PROCEDURE — 3331090001 HH PPS REVENUE CREDIT

## 2018-05-02 PROCEDURE — G0151 HHCP-SERV OF PT,EA 15 MIN: HCPCS

## 2018-05-02 PROCEDURE — 3331090002 HH PPS REVENUE DEBIT

## 2018-05-02 NOTE — PROGRESS NOTES
NN attempted patient outreach today in order to perform HSO follow-up; lvm requesting a return phone call to this NN.

## 2018-05-03 ENCOUNTER — HOME CARE VISIT (OUTPATIENT)
Dept: SCHEDULING | Facility: HOME HEALTH | Age: 83
End: 2018-05-03
Payer: MEDICARE

## 2018-05-03 VITALS
OXYGEN SATURATION: 97 % | BODY MASS INDEX: 30.42 KG/M2 | WEIGHT: 161 LBS | SYSTOLIC BLOOD PRESSURE: 110 MMHG | TEMPERATURE: 97.7 F | HEART RATE: 70 BPM | DIASTOLIC BLOOD PRESSURE: 64 MMHG

## 2018-05-03 VITALS
OXYGEN SATURATION: 96 % | SYSTOLIC BLOOD PRESSURE: 114 MMHG | HEART RATE: 80 BPM | TEMPERATURE: 98.1 F | DIASTOLIC BLOOD PRESSURE: 76 MMHG

## 2018-05-03 PROCEDURE — 3331090001 HH PPS REVENUE CREDIT

## 2018-05-03 PROCEDURE — 3331090002 HH PPS REVENUE DEBIT

## 2018-05-04 ENCOUNTER — PATIENT OUTREACH (OUTPATIENT)
Dept: INTERNAL MEDICINE CLINIC | Age: 83
End: 2018-05-04

## 2018-05-04 VITALS
SYSTOLIC BLOOD PRESSURE: 110 MMHG | DIASTOLIC BLOOD PRESSURE: 72 MMHG | OXYGEN SATURATION: 98 % | BODY MASS INDEX: 30.61 KG/M2 | WEIGHT: 162 LBS | HEART RATE: 70 BPM

## 2018-05-04 PROCEDURE — 3331090001 HH PPS REVENUE CREDIT

## 2018-05-04 PROCEDURE — 3331090002 HH PPS REVENUE DEBIT

## 2018-05-04 NOTE — PROGRESS NOTES
NN completed patient outreach today in order to perform HSO follow-up; two patient identifiers verified. Goals Addressed             Most Recent       Diabetes     Patient verbalizes understanding of self -management goals of living with Diabetes. On track (5/4/2018)             5/4/2018   - Diabetes currently managed by PCP  - most recent Hemoglobin A1c 3/20/18 - 6.6%  - currently taking metformin  mg tablet twice daily; reports adherence  - reports blood glucose monitoring once daily prior to eating breakfast  - glucose flowsheet updated today with patient reported results  - denies prior participation in Diabetes Education course  - reports adherence with diabetic diet         Heart Failure     Reduce risk of CHF exacerbations and complications. On track (5/4/2018)             4/13/2018  - followed by Dr. Lorri Chakraborty in Outpatient Setting  - diagnosis of acute on chronic diastolic CHF  - 8/98/3 - s/p lead extraction/device removal of single lead temp/perm pacemaker and implant of dual chamber pacemaker  - 4/10/18 - Echocardiogram - EF estimated to be 35%  - Daughter reports patient adherence with daily weights  - flowsheet updated today with reported patient daily weight  - daughter denies patient weight gain post-discharge  - patient and daughter currently identify patient as being in the Green HF zone  - Daughter states, \"She doesn't have any trouble breathing at all, there's no swelling anywhere. \"  - daughter is familiar with/knowledgeable regarding HF zones and is able to accurately describe signs and symptoms associated with each HF zone to this NN    - encouraged continued adherence with patient daily weights and maintenance of daily weight record  - encouraged daily assessment of patient HF zone  - reinforced patient/caregiver action plan associated with each HF zone  - advised outreach to Cardiologist to notify of onset of new/worsened symptoms and/or weight gain of 3 pounds in 24 hours and/or 5 pounds in one week    4/20/2018   - Daughter currently identifies patient as being in the Green HF Zone  - adherent with daily weights/daily weight record  - flowsheet updated today with patient daily weights as reported by Daughter  - no patient weight gain of 3 pounds in 24 hours and/or 5 pounds in one week noted since previous NN outreach encounter    - positive reinforcement provided for adherence with self-management  - encouraged continued adherence with patient daily weights and maintenance of daily weight record  - encouraged daily assessment of patient HF zone  - reinforced patient/caregiver action plan associated with each HF zone  - advised outreach to Cardiologist to notify of onset of new/worsened symptoms and/or weight gain of 3 pounds in 24 hours and/or 5 pounds in one week    4/25/2018  - adherent with daily weights/maintenance of daily weight record  - flowsheet updated today with patient reported daily weights  - patient reported daily weights reviewed by this NN; no weight gain of 3 pounds in 24 hours and/or 5 pounds in one week noted  - currently identifies herself as being in the [address] HF Zone  - reports daily self-assessment of HF zone and states that she records this with her daily weight  - reports following a low-sodium diet at home; denies use of table salt when seasoning food  - encouraged continued adherence with patient daily weights/maintenance of daily weight record, daily self-assessment of HF zone  - advised outreach to Cardiologist to notify of onset of new/worsened symptoms and/or weight gain of 3 pounds in 24 hours and/or 5 pounds in one week    5/4/2018   - currently identifies herself as being in the Green HF Zone; states, \"I haven't reached the yellow yet. \"  - reports use of one pillow at night; reports this is baseline  - adherent with daily weights/maintenance of daily weight record  - flowsheet updated today with patient reported daily weights  - no weight gain of 3 pounds in 24 hours and/or 5 pounds within the past seven days noted  - reports adherence to low-sodium diet  - denies decreased and/or loss of appetite post-discharge  - encouraged continued adherence with patient daily weights/maintenance of daily weight record, daily self-assessment of HF zone  - advised outreach to Cardiologist to notify of onset of new/worsened symptoms and/or weight gain of 3 pounds in 24 hours and/or 5 pounds in one week           Post Hospitalization     Attends follow-up appointments as directed. On track (5/4/2018)             3/13/2018 - AFD  Pt is scheduled for OV on 3/20/18 with Dr. Yvonne Lima. Pt cannot identify any barriers to attending OV as scheduled. P - Pt will attend OV as scheduled. 4/13/2018  - readmitted to [facility] 4/7/18-4/12/18  - [facility] appointment previously scheduled with Dr. Yvonne Lima for 4/18/18  - has office visit scheduled with Dr. Fahad Marie (Cardiolgoy) for 4/26/18  - patient/daughter agreeable to [facility] visit for HFU within 3 days post-discharge  - [facility] visit scheduled for today between 3p - 4p for HFU within 3 days post-discharge    4/16/2018  - confirmed that requested [facility] visit for hospital follow-up was completed on 4/13/18;  [facility] note to be faxed to PCP office    4/20/2018  - attended [facility] appointment with Dr. Yvonne Lima for 4/18/18 (post-discharge day 7); advised to f/u in 3 weeks  - attended office visit with Cardiology NP ([facility]) on 4/19/18 for one week pacemaker site check  - has office visit scheduled with Dr. Fahad Marie (Cardiology) for 4/26/18 4/24/2018  - [facility] ED Visit 4/21/18; advised to f/u with PCP and Dr. Anneliese Noriega  - confirmed with Walt Narayanan today that patient attended office visit with Dr. Vaibhav Pascual on today (4/24/18) for ED follow-up; next scheduled office visit 5/8/18 at 9:40 am with Dr. Vaibhav Pascual    4/25/2018  - has office visit scheduled with Dr. Fahad Marie (Cardiology) for 4/26/18 5/1/2018  - attended scheduled office visit with Dr. Alexandra Chance (Cardiology) on 4/26/18; advised to f/u in 3 months  - In-Office Pacemaker Device Check completed by [facility] on 4/26/18; next device check due in 3 months    5/4/2018  - has office visit scheduled with Dr. Leyda Rudolph (PCP) on 5/9/18 for 3 week follow-up; reminded patient today of this scheduled appointment       Prevent complications post hospitalization. On track (5/4/2018)             4/13/2018  - Daughter reports exacerbation of episodes of urinary incontinence post-discharge; attributes this to new order for Lasix 40 mg two times a day at hospital discharge  - reports that patient is unable to ambulate to the bathroom fast enough  - daughter inquiring about Ambulatory Supply order for Depends/Incontinence Briefs; NN will request LPN  assistance with this request      4/16/2018  - confirmed that requested [facility] visit for hospital follow-up was completed on 4/13/18 (Completed on Post-Discharge Day 2); [facility] note to be faxed to PCP office. 4/20/2018   - patient states, \"I'm feeling good, I'm ready to go, go, go. I'm doing much better, I'm not having any trouble breathing and I'm walking as much as I can. \"  - provided Daughter with contact information for [employer] as well as cheapest options for incontinence briefs available for purchase at [address]  - daughter will be contacting [address] on Monday regarding Incontinence Briefs    4/24/2018  - [id number] [facility] ED Visit 4/21/18 with c/o fall; diagnosis of closed non-displaced fracture of proximal phalanx of right little finger, fall. Per ED documentation, splint applied to right hand over 4th and 5th fingers; patient discharged to home with new Rx for Oxycodone and advised to f/u with PCP and Dr. Danielle Tam. 4/25/2018   - CMP, CBC ordered/collected at [facility] appointment with Dr. Leyda Rudolph on 4/18/18  - Reports improvement in urinary frequency; states, \"I'm not running in as much as I was. \"  - reports history of botox injection approximately one year ago for urinary frequency   - patient stated reason for fall which prompted ED [facility] ED visit on 4/21/18 - reports that she fell in her bedroom while getting ready for bed; states, \"I lost my balance and went to reach for the bed and missed it. \"    5/4/2018   - no additional ED Visits and/or hospital admissions noted since ED [facility] ED visit 4/21/18  - patient states, \"I'm doing real good, I'm ready to go-go-go. \"  - denies falls since ED [facility] ED visit on 4/21/18  - Immunizations  Annual Influenza Vaccination: Completed - 9/13/17    PCV-13: Completed - 8/18/16  PPSV-23: Due; patient reports that, to the best of her knowledge, she has not received this vaccination. Will request PCP review at upcoming appointment on 5/9/18.  Supportive resources in place to maintain patient in the community (ie. Home Health, DME equipment, refer to, medication assistant plan, etc.)   On track (5/4/2018)             3/13/2018 - AFD    Pt lives with spouse, sister, and daughter. Pt's daughter performs the antibiotic administration 3 times per day. HHSN began visits today. Have noted SN contact with MD to address elevated BG and BP. Pt denied need for additional support. Pt did not voice awareness of recommendation for home PT for strengthening and stability. Noted pt has previously suffered GLFs with injuries in the last 6 months. P - Will consult with [facility] nurse to review current resources.     4/13/2018  - readmitted to ED [facility] 4/7/18-4/12/18  - discharged to home with resumption of home health through [facility] for SN, PT, OT  - home health SN resumption of care visit scheduled for 4/13/18  - using cane with ambulation  - DME: cane, walker, wheelchair  - reports patient has previously used Cpap, however has not used Cpap within the past 3.5 years and is in need of a new referral to sleep medicine for sleep study  - living situation: one-level house with Spouse, Daughter, [facility]: Daughter is actively engaged and involved in patient's care; Daughter is unemployed and does not work outside of the home. Spouse is able to provide patient assistance with ADLs, however Daughter manages medical care for patient and spouse. Patient has 24/7 supervision/care by family members (spouse, daughter, aunt). Daughter is primary caregiver to patient and spouse. - minimal assistance with bathing/dressing; Daughter is assisting patient with dressing upper body post-op  - Medication Management: Daughter provides minimal assistance with medication management post-discharge; reports utilization of weekly pill box  - denies transportation concerns  - Daughter denies any financial barriers to obtaining patient medications at this time; reports patient's debit card was skimmed and has been overdrawn, however they have filed disputes with the patient's financial institution for the charges.   Aunt lent patient money in order to purchase medications post-discharge due to temporary financial strain secondary to fraudulent charges  - reports Medicaid Application was completed and submitted at the end of March; eligibility decision pending  [facility] and discussed appropriate utilization of [address]  - will request LPN  assistance with obtaining order from PCP for Incontinence Supplies as well as submitting order to DME provider  - denies any additional questions, concerns, needs at this time    4/16/2018  - [facility] SN resumption of care visit completed 4/14/18; next scheduled visit 4/17/18  - [address] [facility] PT scheduled for 4/16/18  - [address] OT scheduled for 4/16/18 4/18/2018  - most recent home health SN visit 4/17/18; next scheduled visit 4/20/18  - per home health PT initial evaluation documentation, patient/family requested no additional PT visits due to patient reportedly functioning at her baseline mobility; also requested no OT evaluation  - per LPN , order for incontinence briefs was submitted to [address] [place] [address] on 4/16/18  - Incontinence Supplies not covered by Medicare and patient does not have Medicaid at this time; [facility] unable to fulfill order request  - NN outreach to [address] to inquire about cost of incontinence briefs  $9.90/package (20 briefs)  $39/case (80 briefs)  May have some briefs available for $29/case; reports that if patient calls ahead then he will check the warehouse to see if this cheaper option is in stock. Hours - M-F 830 am-530 pm Sat 9 am-3 pm    4/20/2018   - during office visit with PCP on 4/18/18, daughter was provided with the above information regarding [employer] and cheapest options for incontinence briefs available for purchase at [address]  - daughter reports that she will be contacting [address] [facility]  on Monday regarding Incontinence Briefs  - daughter denies any additional questions/concerns at this time    4/24/2018  - per home health documentation encounter 4/24/18, home health SN visit scheduled for 4/25/18 due to patient having scheduled office visit with Ortho today.     4/25/2018   - fall risk  - patient previously declined home health PT and OT services due to feeling that she had returned to her baseline mobility/function  - discussed home health PT and OT services with patient today  - patient agreeable to home health PT and OT services; will request order from PCP for re-evaluation  - reports use of cane with ambulation  - denies falls since ED visit at [facility] on 4/21/18  - reports that she will contact [address] [facility]  regarding Incontinence briefs  - denies any additional questions, concerns, needs at this time  - method of communication with Provider: Dr. Marcelo Casiano - telephone  - verbal order per Dr. Marcelo Casiano, Referral to home health PT and OT to evaluate and treat  - NN outreach to [facility]; s/w [facility] - verbal order for home health PT and OT to evaluate and treat given per Dr. Kay Ureña     5/1/2018  - most recent home health SN visit 4/27/18; next scheduled visit 5/2/18  - home health OT initial evaluation scheduled for 5/1/18  - home health PT initial evaluation scheduled for 5/2/18 5/4/2018   - home health OT initial evaluation completed on 5/1/18  - home health PT initial evaluation completed on 5/2/18  - reports expected discharge from home health SN next week  - reports [address] [facility] physical therapist is inquiring about outpatient follow-up with [facility] for impaired balance  - has personal pulse oximeter  - reports home monitoring of oxygen saturation       Understands red flags post discharge. On track (5/4/2018)             3/13/2018 - AFD    Pt states she currently has a lot of \"swelling\" in her legs. Pt states she has been told this is fluid from her infection. Pt states she has not been instructed to perform a daily wt. Pt does not take diuretics. Pt asked to perform a daily wt and add information to her daily record of BP and BG. Pt states her BG this morning was over 250. Pt is taking only metformin. Pt states she was taking insulin in the hospital.  Pt is keeping a record of daily BP   HHSN began visits today  Noted contact with MD and orders. P - Pt will continue to keep daily readings of BG, BP, and wt. Pt will bring record to all OV.    4/13/2018   - daughter denies presence of and/or patient c/o fever, chills, dizziness, shortness of breath at rest, dyspnea on exertion, nausea, vomiting, edema, lower extremity edema  - Daughter reports \"just a dry cough that they noticed in the hospital, she's not bringing anything up. \" Denies worsening of cough post-discharge. - reports presence of pain post-discharge that is associated with \"bruising underneath her right arm\". Reports adequate pain relief with prn Tylenol; denies increase in pain level post-discharge.     4/20/2018   - daughter denies presence of and/or patient c/o fever, chills, dizziness, shortness of breath at rest, dyspnea on exertion, nausea, vomiting, edema, lower extremity edema    4/25/2018  - daughter denies presence of and/or patient c/o fever, chills, dizziness, shortness of breath at rest, dyspnea on exertion, nausea, vomiting, edema, lower extremity edema  - reports \"a dry cough once in a while, but it's not a cold or anything. \" Patient states, \"I've had a dry cough for a while now, even before I was diagnosed with the congestive heart failure. \" Denies worsening of cough post-discharge. 5/4/2018  - denies dizziness, fever, chills, shortness of breath at rest, dyspnea on exertion, nausea, vomiting, edema, lower extremity edema  - reports \"dry cough\" that occurs \"Every once in a while\"; states, \"It's not like a cold or anything. \" Reports that cough is not new and has been present for months; denies worsening of cough and/or increased mucus production. Denies productive cough. - states, \"I am doing great. \"  - states, \"I haven't had any breathing problems since I got out of the hospital.\"  - no cough and/or audible dyspnea noted during this NN outreach encounter                Future Appointments  Date Time Provider Paul Davis   5/8/2018 To Be Determined Cristian Shafer OT [facility]   5/9/2018 To Be Determined Shadia Fu RN [facility]   5/9/2018 To Be Determined Shadia Fu RN [facility]   5/9/2018 12:15 PM Sergo Rdz MD [address]    7/2/2018 2:40 PM Timothy Rodriguez  [facility]   7/26/2018 10:00 AM Jose Espana MD [address]   7/26/2018 10:00 AM PACEMAKER, [facility]          Last Appointment My Department:  4/18/2018

## 2018-05-05 PROCEDURE — 3331090001 HH PPS REVENUE CREDIT

## 2018-05-05 PROCEDURE — 3331090002 HH PPS REVENUE DEBIT

## 2018-05-06 PROCEDURE — 3331090002 HH PPS REVENUE DEBIT

## 2018-05-06 PROCEDURE — 3331090001 HH PPS REVENUE CREDIT

## 2018-05-07 ENCOUNTER — TELEPHONE (OUTPATIENT)
Dept: INTERNAL MEDICINE CLINIC | Age: 83
End: 2018-05-07

## 2018-05-07 ENCOUNTER — HOME CARE VISIT (OUTPATIENT)
Dept: HOME HEALTH SERVICES | Facility: HOME HEALTH | Age: 83
End: 2018-05-07
Payer: MEDICARE

## 2018-05-07 PROCEDURE — 3331090002 HH PPS REVENUE DEBIT

## 2018-05-07 PROCEDURE — 3331090001 HH PPS REVENUE CREDIT

## 2018-05-07 NOTE — TELEPHONE ENCOUNTER
#[phone] Rocky Pereira states pt would do better and wants out patient PT. This would be at [address] and Clarence Pencil will give you the info. Please call her. Thanks.

## 2018-05-08 ENCOUNTER — HOME CARE VISIT (OUTPATIENT)
Dept: HOME HEALTH SERVICES | Facility: HOME HEALTH | Age: 83
End: 2018-05-08
Payer: MEDICARE

## 2018-05-08 ENCOUNTER — HOME CARE VISIT (OUTPATIENT)
Dept: SCHEDULING | Facility: HOME HEALTH | Age: 83
End: 2018-05-08
Payer: MEDICARE

## 2018-05-08 PROCEDURE — G0152 HHCP-SERV OF OT,EA 15 MIN: HCPCS

## 2018-05-08 PROCEDURE — 3331090001 HH PPS REVENUE CREDIT

## 2018-05-08 PROCEDURE — G0299 HHS/HOSPICE OF RN EA 15 MIN: HCPCS

## 2018-05-08 PROCEDURE — 3331090002 HH PPS REVENUE DEBIT

## 2018-05-08 NOTE — TELEPHONE ENCOUNTER
Spoke to Liz at [facility].   Liz informed pt is ok to go to [facility]-order placed and faxed to .

## 2018-05-09 ENCOUNTER — OFFICE VISIT (OUTPATIENT)
Dept: INTERNAL MEDICINE CLINIC | Age: 83
End: 2018-05-09

## 2018-05-09 ENCOUNTER — HOSPITAL ENCOUNTER (OUTPATIENT)
Dept: LAB | Age: 83
Discharge: HOME | End: 2018-05-09
Payer: MEDICARE

## 2018-05-09 VITALS
DIASTOLIC BLOOD PRESSURE: 66 MMHG | RESPIRATION RATE: 16 BRPM | WEIGHT: 163 LBS | HEART RATE: 69 BPM | SYSTOLIC BLOOD PRESSURE: 108 MMHG | TEMPERATURE: 97.9 F | BODY MASS INDEX: 30.78 KG/M2 | HEIGHT: 61 IN | OXYGEN SATURATION: 93 %

## 2018-05-09 VITALS
TEMPERATURE: 98.1 F | DIASTOLIC BLOOD PRESSURE: 66 MMHG | BODY MASS INDEX: 30.99 KG/M2 | HEART RATE: 70 BPM | WEIGHT: 164 LBS | SYSTOLIC BLOOD PRESSURE: 130 MMHG | OXYGEN SATURATION: 96 %

## 2018-05-09 VITALS
HEART RATE: 70 BPM | DIASTOLIC BLOOD PRESSURE: 66 MMHG | RESPIRATION RATE: 18 BRPM | WEIGHT: 164 LBS | SYSTOLIC BLOOD PRESSURE: 130 MMHG | OXYGEN SATURATION: 96 % | BODY MASS INDEX: 30.99 KG/M2 | TEMPERATURE: 98.1 F

## 2018-05-09 DIAGNOSIS — I48.0 PAROXYSMAL ATRIAL FIBRILLATION (HCC): ICD-10-CM

## 2018-05-09 DIAGNOSIS — N39.3 STRESS BLADDER INCONTINENCE, FEMALE: ICD-10-CM

## 2018-05-09 DIAGNOSIS — G47.33 OSA (OBSTRUCTIVE SLEEP APNEA): ICD-10-CM

## 2018-05-09 DIAGNOSIS — D50.0 IRON DEFICIENCY ANEMIA DUE TO CHRONIC BLOOD LOSS: ICD-10-CM

## 2018-05-09 DIAGNOSIS — E11.21 TYPE 2 DIABETES MELLITUS WITH NEPHROPATHY (HCC): Primary | ICD-10-CM

## 2018-05-09 DIAGNOSIS — Z23 ENCOUNTER FOR IMMUNIZATION: ICD-10-CM

## 2018-05-09 DIAGNOSIS — I50.812 CHRONIC RIGHT-SIDED CONGESTIVE HEART FAILURE (HCC): ICD-10-CM

## 2018-05-09 DIAGNOSIS — J81.1 CHRONIC PULMONARY EDEMA: ICD-10-CM

## 2018-05-09 DIAGNOSIS — J81.1 PULMONARY CONGESTION AND HYPOSTASIS: Primary | ICD-10-CM

## 2018-05-09 DIAGNOSIS — E78.2 MIXED HYPERLIPIDEMIA: ICD-10-CM

## 2018-05-09 DIAGNOSIS — S62.91XA CLOSED FRACTURE OF RIGHT HAND, INITIAL ENCOUNTER: ICD-10-CM

## 2018-05-09 DIAGNOSIS — R26.89 POOR BALANCE: ICD-10-CM

## 2018-05-09 DIAGNOSIS — I10 ESSENTIAL HYPERTENSION: ICD-10-CM

## 2018-05-09 PROCEDURE — 85027 COMPLETE CBC AUTOMATED: CPT

## 2018-05-09 PROCEDURE — 3331090002 HH PPS REVENUE DEBIT

## 2018-05-09 PROCEDURE — 36415 COLL VENOUS BLD VENIPUNCTURE: CPT

## 2018-05-09 PROCEDURE — 3331090001 HH PPS REVENUE CREDIT

## 2018-05-09 PROCEDURE — 83880 ASSAY OF NATRIURETIC PEPTIDE: CPT

## 2018-05-09 PROCEDURE — 80053 COMPREHEN METABOLIC PANEL: CPT

## 2018-05-09 NOTE — PROGRESS NOTES
After obtaining consent, and per verbal order from Dr. Magen Lopez, patient received pneumovax 23 vaccine given by Harsh Tilley LPN in L Deltoid. Pneumonia Vaccine 0.5 mL IM now. Patient was observed for 10 minutes post injection. Patient tolerated injection well. VIS given.

## 2018-05-09 NOTE — PROGRESS NOTES
HISTORY OF PRESENT ILLNESS  Portia Scruggs is a [de-identified] y.o. female. HPI   Last here 4/18/18. Pt is here for routine care. Pt presents with her  who provides some of the hx. Pt ambulates with a cane. Advised her to bring in her pill bottles to all ov.     Lov, pt was admitted to the hospital for acute on chronic CHF 4/7/18-4/12/18  Pt no longer has Home Health   Pt is interested in going to PT at [address] [place] [address] - ordered   Ordered repeat CXR      BP is 108/66  BPs are 115-120/70s, 110/60,70s, 130/66, ~100/60 (twice) at home  Pt denies having orthostatic sx    Continues benazepril 40mg and toprol XL 50mg daily  Pt has instructions to cut benazepril in half for SBP <110 at home  Pt also takes lasix 40mg BID  Pt denies having breathing issues or CP (O2 levels are 97, 98 with walking)  Pt does not always take this med when she goes out in public, d/t urinary frequency   Will decrease lasix to 40mg once daily   If she c/o new BLE swelling, wt gain and SOB, will once again increase lasix to 40mg BID  Recall she has an element of [facility]     BS at home running around 110, 115, 120, 106, 116, 130, 135 in the AM fasting  Continues on metformin XR 750mg BID     Wt is stable x lov   Pt has not been eating a lot of candy  Discussed keeping her diet stable     Reviewed labs 4/18  Will get labs today     Pt went to sit down, reached for her bed and fell down  Pt broke one of her fingers on her R hand  Pt went to the ER for a fall on 4/21/18  Pt also saw Dr. Imani Walton (ortho)   Last visit was - will get notes for review  Pt has a brace in place     Pt saw Dr. Eric Ortega (infec dis)   Last visit was 3/29/18     Pt follows with Dr. Fernie Cates (cardio)  Reviewed notes 4/26/18: Nikita Fisher is in sinus rhythm with chb, vpacing. She is A pacing (60%) for sick sinus syndrome. Cont beta blocker and ace -inh for cardiomyopathy. Will reassess his LVEF in 3 months with an echo.  Continue medical management for cardiomyopathy, sss and chb.  Next visit scheduled for 7/26/18     Pt follows with Dr. Franco Homans (cardio)  Last visit was 12/13/17 for a watchman procedure   Continues on ASA and plavix daily     Pt follows with Dr. Hernando Luo (neuro)  Last visit was 1/22/18  Pt had to cancel her last appt, as her sister was hospitalized   Next visit scheduled for 6/2/18     Pt follows with Dr. Caitlyn Lai (neurosurg)  Pt has seen this physician - will get notes for review     Pt follows with Dr. Yokasta Lira ([facility]) for urinary incontinence  Last visit was 9/11/17 - will get notes for review  Pt has had botox in the past     Pt follows with Dr. Tiffany Lerma (ortho)   Last visit was 6/17     Pt follows with Dr. Stan Akbar (ENT)  Pt uses a neti pot once daily      Pt follows with Dr. Patricia López (GI)   Last visit was 10/17  Continues on protonix 40mg daily for heartburn, which works well, no breakthrough   Pt denies having heartburn for a long time  Recall pt has celiac sprue and is somewhat compliant with her gluten-free diet      Pt has been taking a daily probiotic      Continues on iron 325mg qod, which works well  Recall pt has a h/o mild anemia, which is stable     Continues on pravachol 20mg for cholesterol      Continues on 50mcg synthroid daily     Continues on zoloft 100mg qAM for depressoin, which works well, happy with dose   She also takes low dose buspar 5mg BID for anxiety, which works well, happy with dose  This has made a big improvement in her anxiety and depression      Pt is not using her CPAP for MALLORY   Pt has not had a sleep study as of yet  Advised her to schedule a sleep study     Pt had a zostavax vaccine in 2016  Discussed shingrix being a dead vaccine   Discussed it being more effective than zostavax (92% effective)  Discussed it being a 2 part series  Ordered 05/09/18       ACP on file.  SDMs are her  Antoni Veras) and sister Jaylyn Alva).     PREVENTIVE:    Colonoscopy: 12/28/16, Dr. Patricia López, repeat 10 years  EGD: 12/16,  [facility]  Pap: 2017, Dr. Erick Rogel  Mammogram: 06/05/17, nl  Dexa: 11/20/17, nl   Tdap: 4/15    Pneumovax: 05/09/18    Prevnar 13: 8/16  Zostavax: 2/29/16  Shingrix: ordered 05/09/18   Flu shot: 9/13/17  Ophthalmology: Dr. Viola Snow, 3/2017, mild diabetic retinopathy in R eye, scheduled   Foot exam: 05/09/18   Microalbumin: 9/17  A1c: 7/14 6.6, 10/14 6.6,4/15 6.8, 7/15 6.8, 11/15 6.9, 2/16 6.9, 5/16 6.5, 8/16 6.8, 6/17 6.9, 9/17 6.3, 10/17 6.7, 12/17 6.3, 3/18 6.6  Lipids: 9/17 LDL 48    Patient Active Problem List    Diagnosis Date Noted    Acute on chronic diastolic (congestive) heart failure ([address] 04/07/2018    GI bleed 04/07/2018    Severe obesity (BMI 35.0-39. 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    ACP (advance care planning) 11/11/2015    Hypothyroidism, adult 07/31/2015    Type 2 diabetes mellitus without complication ([address] 10/38/0483    Essential hypertension 04/27/2015    MALLORY (obstructive sleep apnea) 07/09/2014    Hyperlipidemia 07/09/2014    Sarcoid 07/09/2014    Stress bladder incontinence, female 07/09/2014    Obesity 07/09/2014    TIA (transient ischemic attack) 07/09/2014    AR (aortic regurgitation) 07/09/2014    Mitral valve insufficiency 07/09/2014    Cervical stenosis of spine 07/09/2014     Current Outpatient Prescriptions   Medication Sig Dispense Refill    metFORMIN ER (GLUCOPHAGE XR) 750 mg tablet take 1 tablet by mouth twice a day 180 Tab 0    furosemide (LASIX) 20 mg tablet take 1 tablet by mouth once daily  0    CONTOUR NEXT STRIPS strip TEST twice a day  0    MICROLET LANCET misc CHECK BLOOD SUGAR TWICE A DAY 1    metoprolol succinate (TOPROL-XL) 50 mg XL tablet Take 1 Tab by mouth daily. 30 Tab 0    furosemide (LASIX) 40 mg tablet Take 1 Tab by mouth two (2) times a day. 60 Tab 0    busPIRone (BUSPAR) 5 mg tablet Take 5 mg by mouth two (2) times a day.  pravastatin (PRAVACHOL) 20 mg tablet TAKE ONE TABLET BY MOUTH NIGHTLY 90 Tab 1    benazepril (LOTENSIN) 40 mg tablet Take 1 Tab by mouth daily. 30 Tab 3    pantoprazole (PROTONIX) 40 mg tablet Take 40 mg by mouth two (2) times a day.  L. acidoph & paracasei- S therm- Bifido (FRAN-Q/RISAQUAD) 8 billion cell cap cap Take 1 Cap by mouth daily. (Patient taking differently: Take 1 Cap by mouth two (2) times a day.) 60 Cap 0    clopidogrel (PLAVIX) 75 mg tab Take 75 mg by mouth daily.  levothyroxine (SYNTHROID) 50 mcg tablet Take 1 Tab by mouth Daily (before breakfast). 90 Tab 1    sertraline (ZOLOFT) 100 mg tablet Take 1 Tab by mouth daily. 135 Tab 1    Cholecalciferol, Vitamin D3, (VITAMIN D3) 2,000 unit cap capsule Take 2,000 Units by mouth daily.  acetaminophen (TYLENOL) 500 mg tablet Take 500 mg by mouth every four (4) hours as needed for Pain.  ferrous sulfate (IRON) 325 mg (65 mg iron) tablet Take 325 mg by mouth every other day.  multivit-min-FA-lycopen-lutein (CENTRUM SILVER) 0.4-300-250 mg-mcg-mcg tab Take 1 Tab by mouth daily.  aspirin delayed-release 81 mg tablet Take 81 mg by mouth daily.  magnesium 250 mg tab Take 250 mg by mouth every evening.  oxyCODONE IR (ROXICODONE) 5 mg immediate release tablet Take 1 Tab by mouth every four (4) hours as needed for Pain. Max Daily Amount: 30 mg. 10 Tab 0    benazepril (LOTENSIN) 20 mg tablet take 1 tablet by mouth once daily  0    DILT- mg XR capsule Take 120 mg by mouth daily.   0    oxyCODONE-acetaminophen (PERCOCET) 5-325 mg per tablet take 1 tablet by mouth every 6 hours if needed MAX DAILY AMOUNT IS 4 TABS  0     Past Surgical History:   Procedure Laterality Date    CARDIAC SURG PROCEDURE UNLIST      cardioversion     CARDIAC SURG PROCEDURE UNLIST      watchman device    COLONOSCOPY N/A 12/28/2016    COLONOSCOPY performed by Moriah Alonzo [facility] at [facility] ENDOSCOPY    HX CATARACT REMOVAL      both eyes    HX CHOLECYSTECTOMY      HX COLONOSCOPY  5/8/2013    Repeat in 5 years    HX CYST REMOVAL  10/15    on head     HX HYSTERECTOMY      HX ORTHOPAEDIC Bilateral     knee replacement to both knees    HX ORTHOPAEDIC      spacer btwn L4-5 and L5-6     HX PACEMAKER PLACEMENT      HX TONSILLECTOMY      HX UROLOGICAL      botox in bladder      Lab Results  Component Value Date/Time   WBC 9.9 04/18/2018 02:52 PM   HGB 11.8 04/18/2018 02:52 PM   HCT 37.5 04/18/2018 02:52 PM   PLATELET 625 (H) 20/39/7951 02:52 PM   MCV 88 04/18/2018 02:52 PM     Lab Results  Component Value Date/Time   Cholesterol, total 149 09/08/2017 09:47 AM   HDL Cholesterol 82 09/08/2017 09:47 AM   LDL, calculated 48 09/08/2017 09:47 AM   LDL-C, External 77 10/18/2013 11:09 AM   Triglyceride 96 09/08/2017 09:47 AM   CHOL/HDL Ratio 1.9 09/08/2016 02:13 AM     Lab Results  Component Value Date/Time   GFR est non-AA 46 (L) 04/18/2018 02:52 PM   GFR est AA 53 (L) 04/18/2018 02:52 PM   Creatinine 1.13 (H) 04/18/2018 02:52 PM   BUN 34 (H) 04/18/2018 02:52 PM   Sodium 140 04/18/2018 02:52 PM   Potassium 4.5 04/18/2018 02:52 PM   Chloride 97 04/18/2018 02:52 PM   CO2 25 04/18/2018 02:52 PM   Magnesium 1.7 04/09/2018 02:34 AM   Phosphorus 3.3 09/07/2016 01:11 AM        Review of Systems   Respiratory: Negative for shortness of breath. Cardiovascular: Negative for chest pain. Gastrointestinal: Negative for heartburn. Musculoskeletal: Positive for falls. Neurological: Negative for dizziness. Physical Exam   Constitutional: She is oriented to person, place, and time. She appears well-developed and well-nourished. No distress. HENT:   Head: Normocephalic and atraumatic. Mouth/Throat: Oropharynx is clear and moist. No oropharyngeal exudate. Eyes: Conjunctivae and EOM are normal. Right eye exhibits no discharge. Left eye exhibits no discharge. Neck: Normal range of motion. Neck supple. Cardiovascular: Normal rate, regular rhythm and intact distal pulses. Exam reveals no gallop and no friction rub. Murmur (1/6) heard. Pulmonary/Chest: Effort normal and breath sounds normal. No respiratory distress. She has no wheezes. She has no rales. She exhibits no tenderness. Abdominal: Soft. She exhibits no distension. There is no tenderness. There is no rebound and no guarding. Musculoskeletal: Normal range of motion. She exhibits no edema, tenderness or deformity. Lymphadenopathy:     She has no cervical adenopathy. Neurological: She is alert and oriented to person, place, and time. Coordination normal.   Monofilament nl BLE, good peripheral pulses, no ulcers    Skin: Skin is warm and dry. No rash noted. She is not diaphoretic. No erythema. No pallor. Psychiatric: She has a normal mood and affect. Her behavior is normal.       ASSESSMENT and PLAN    ICD-10-CM ICD-9-CM    1. Type 2 diabetes mellitus with nephropathy (HCC)    Well-controled on metformin BID, continue, no change to dose    E11.21 250.40 HM DIABETES FOOT EXAM     985.69 BNP      METABOLIC PANEL, COMPREHENSIVE      CBC W/O DIFF   2. Paroxysmal atrial fibrillation (HCC)    Pt is s/p watchman procedure, no longer on anticoagulation other than ASA an plavix, remains in nsr, no longer on dilt, pacemaker has been replaced, she has cardio appt pending   I48.0 199.17 BNP      METABOLIC PANEL, COMPREHENSIVE      CBC W/O DIFF   3.  Essential hypertension    Well controled on benazepril 40mg and toprol daily, BP continues to run on the lower side of nl but will be decreasing her fluid pill and this may bring BP up a bit, she denies orthostasis so no additional changes today,     she has instructions to cut benazepril in half to 20mg if SBP continues to run <110 at home, she expressed understanding    I10 537.5 BNP      METABOLIC PANEL, COMPREHENSIVE      CBC W/O DIFF   4. Stress bladder incontinence, female    Follows with Dr. Win Garcia, botox helped in the past, will see him down the road   N39.3 468.2 BNP      METABOLIC PANEL, COMPREHENSIVE      CBC W/O DIFF   5. Mixed hyperlipidemia    Controled on pravachol 20mg   E78.2 461.7 BNP      METABOLIC PANEL, COMPREHENSIVE      CBC W/O DIFF   6. Chronic pulmonary edema    Will repeat CXR to ensure resolved   J81.1 514 XR CHEST PA LAT      BNP      METABOLIC PANEL, COMPREHENSIVE      CBC W/O DIFF   7. Poor balance    Will start PT with [facility]   R26.89 781.99 REFERRAL TO PHYSICAL THERAPY      BNP      METABOLIC PANEL, COMPREHENSIVE      CBC W/O DIFF   8. MALLORY (obstructive sleep apnea)    Still not wearing CPAP, still needs to schedule appt for sleep eval   G47.33 633.93 BNP      METABOLIC PANEL, COMPREHENSIVE      CBC W/O DIFF   9. Iron deficiency anemia due to chronic blood loss    Resolved on last blood check, will CBC repeat to ensure stable, continues iron qod   D50.0 615.4 BNP      METABOLIC PANEL, COMPREHENSIVE      CBC W/O DIFF   10. Closed fracture of right hand, initial encounter    Following with Dr. Yonatan Hickey, has a brace in place, healing well   S62. 91XA 622.22 BNP      METABOLIC PANEL, COMPREHENSIVE      CBC W/O DIFF   11. CHF -  Pt had acute episode of CHF, currently resolved, she was newly placed on lasix 40mg BID, clinically she is dry, there is no edema, her wt is stable, will be checking CXR to ensure no evidence of HF, will decrease lasix from 40 BID to 40 once daily, discussed disngs and sx to look out for that would cause us to increase dose back up, these sx would include wt gain, leg swelling and SOB, pt expressed understanding     Depression screen reviewed and positive (1). Pt is doing well overall. Her sx are much improved x lov.      Scribed by Dipti Mack of [address] [place] [address] [address], as dictated by Dr. Katlin Galicia. Current diagnosis and concerns discussed with pt at length. Pt understands risks and benefits or current treatment plan and medications, and accepts the treatment and medication with any possible risks. Pt asks appropriate questions, which were answered. Pt was instructed to call with any concerns or problems. This note will not be viewable in [address]

## 2018-05-09 NOTE — MR AVS SNAPSHOT
[address] [facility] 
[phone] Patient: Portia Scruggs MRN: [id number] :[id number] Visit Information Date & Time Provider Department Dept. Phone Encounter #  
 2018 12:15 PM Annabelle Fox, [address] [phone] [id number] Follow-up Instructions Return in about 6 weeks (around 2018). Your Appointments 2018  2:40 PM  
Follow Up with Nereyda Stevens MD  
[address] [facility] Appt Note: balance disorder; R/s, f/u dsk 18  
 [address] [facility] [address] [address]  
[phone]  
  
   
 [address] [facility] [address]  
  
    
 2018 10:00 AM  
ESTABLISHED PATIENT with Tony Jerry MD  
[facility] Appt Note: BSC DCPM 3 mo post op check, discuss latitude [address] [facility]  
[phone] [address] [facility] [address] [address]  
  
    
 2018 10:00 AM  
PROCEDURE with PACEMAKER, [facility] Appt Note: BSC DCPM 3 mo post op check, discuss latitude [address] [facility]  
[phone] [address] [facility] Upcoming Health Maintenance Date Due Pneumococcal 65+ High/Highest Risk (2 of 2 - PPSV23) 10/13/2016 EYE EXAM RETINAL OR DILATED Q1 3/20/2018 FOOT EXAM Q1 2018 Influenza Age 5 to Adult 2018 MICROALBUMIN Q1 2018 LIPID PANEL Q1 2018 HEMOGLOBIN A1C Q6M 2018 MEDICARE YEARLY EXAM 2018 GLAUCOMA SCREENING Q2Y 3/20/2019 DTaP/Tdap/Td series (2 - Td) 2025 Allergies as of 2018  Review Complete On: 2018 By: Annabelle Fox MD  
  
 Severity Noted Reaction Type Reactions Procardia Xl [Nifedipine]  07/09/2014    Other (comments)  
 weakness Current Immunizations  Reviewed on 6/7/2017 Name Date Influenza High Dose Vaccine PF 9/13/2017, 9/19/2015 Influenza Vaccine 10/10/2014 12:38 PM  
 Influenza Vaccine (Quad) PF 9/14/2016 Pneumococcal Conjugate (PCV-13) 8/18/2016 Tdap 4/27/2015 Zoster Vaccine, Live 2/29/2016 Not reviewed this visit You Were Diagnosed With   
  
 Codes Comments Type 2 diabetes mellitus with nephropathy ([facility]    -  Primary ICD-10-CM: E11.21 
ICD-9-CM: 250.40, 583.81 Paroxysmal atrial fibrillation (HCC)     ICD-10-CM: I48.0 ICD-9-CM: 427.31 Essential hypertension     ICD-10-CM: I10 
ICD-9-CM: 401.9 Stress bladder incontinence, female     ICD-10-CM: N39.3 ICD-9-CM: 625.6 Mixed hyperlipidemia     ICD-10-CM: E78.2 ICD-9-CM: 272.2 Chronic pulmonary edema     ICD-10-CM: J81.1 ICD-9-CM: 407 Poor balance     ICD-10-CM: R26.89 
ICD-9-CM: 781.99   
 MALLORY (obstructive sleep apnea)     ICD-10-CM: G47.33 
ICD-9-CM: 327.23 Iron deficiency anemia due to chronic blood loss     ICD-10-CM: D50.0 ICD-9-CM: 280.0 Closed fracture of right hand, initial encounter     ICD-10-CM: S62.91XA ICD-9-CM: 815.00 Chronic right-sided congestive heart failure (HCC)     ICD-10-CM: I02.465 ICD-9-CM: 428.0 Vitals BP Pulse Temp Resp Height(growth percentile) Weight(growth percentile) 108/66 (BP 1 Location: Left arm, BP Patient Position: Sitting) 69 97.9 °F (36.6 °C) (Oral) 16 5' 1\" (1.549 m) 163 lb (73.9 kg) SpO2 BMI OB Status Smoking Status 93% 30.8 kg/m2 Hysterectomy Never Smoker Vitals History BMI and BSA Data Body Mass Index Body Surface Area  
 30.8 kg/m 2 1.78 m 2 Preferred Pharmacy Pharmacy Name Phone [facility] [id number] [place], [address] [phone] Your Updated Medication List  
  
   
 This list is accurate as of 5/9/18 12:54 PM.  Always use your most recent med list.  
  
  
  
  
 acetaminophen 500 mg tablet Commonly known as:  TYLENOL Take 500 mg by mouth every four (4) hours as needed for Pain. aspirin delayed-release 81 mg tablet Take 81 mg by mouth daily. benazepril 40 mg tablet Commonly known as:  LOTENSIN Take 1 Tab by mouth daily. busPIRone 5 mg tablet Commonly known as:  BUSPAR Take 5 mg by mouth two (2) times a day. CENTRUM SILVER 0.4-300-250 mg-mcg-mcg Tab Generic drug:  multivit-min-FA-lycopen-lutein Take 1 Tab by mouth daily. Cholecalciferol (Vitamin D3) 2,000 unit Cap capsule Commonly known as:  VITAMIN D3 Take 2,000 Units by mouth daily. CONTOUR NEXT TEST STRIPS strip Generic drug:  glucose blood VI test strips TEST twice a day  
  
 furosemide 40 mg tablet Commonly known as:  LASIX Take 1 Tab by mouth two (2) times a day. Iron 325 mg (65 mg iron) tablet Generic drug:  ferrous sulfate Take 325 mg by mouth every other day. L. acidoph & paracasei- S therm- Bifido 8 billion cell Cap cap Commonly known as:  FRAN-Q/RISAQUAD Take 1 Cap by mouth daily. levothyroxine 50 mcg tablet Commonly known as:  SYNTHROID Take 1 Tab by mouth Daily (before breakfast). magnesium 250 mg Tab Take 250 mg by mouth every evening. metFORMIN  mg tablet Commonly known as:  GLUCOPHAGE XR  
take 1 tablet by mouth twice a day  
  
 metoprolol succinate 50 mg XL tablet Commonly known as:  TOPROL-XL Take 1 Tab by mouth daily. [facility] Generic drug:  Lancets CHECK BLOOD SUGAR TWICE A DAY pantoprazole 40 mg tablet Commonly known as:  PROTONIX Take 40 mg by mouth two (2) times a day. PLAVIX 75 mg Tab Generic drug:  clopidogrel Take 75 mg by mouth daily. pravastatin 20 mg tablet Commonly known as:  PRAVACHOL  
TAKE ONE TABLET BY MOUTH NIGHTLY sertraline 100 mg tablet Commonly known as:  ZOLOFT Take 1 Tab by mouth daily. We Performed the Following BNP [id number] CPT(R)] CBC W/O DIFF [99505 CPT(R)]  DIABETES FOOT EXAM [HM7 Custom] METABOLIC PANEL, COMPREHENSIVE [47592 CPT(R)] REFERRAL TO PHYSICAL THERAPY [YCQ28 Custom] Follow-up Instructions Return in about 6 weeks (around 6/20/2018). To-Do List   
 05/09/2018 Imaging:  XR CHEST PA LAT   
  
 05/10/2018 12:00 PM  
  Appointment with Dorys Amaro OT at [place] [address] Referral Information Referral ID Referred By Referred To  
  
 [id number] [facility] PHYSICAL THERAPY   
   [address] [facility], [address] Phone: [phone] Visits Status Start Date End Date 1 New Request 5/9/18 5/9/19 If your referral has a status of pending review or denied, additional information will be sent to support the outcome of this decision. Patient Instructions Decrease lasix to 40mg just one time today Labs today Introducing [facility] HEALTH [facility]! Dear Karin Goodman: Thank you for requesting a [employer] account. Our records indicate that you already have an active [employer] account. You can access your account anytime at [email or web address] Did you know that you can access your hospital and ER discharge instructions at any time in [employer]? You can also review all of your test results from your hospital stay or ER visit. Additional Information If you have questions, please visit the Frequently Asked Questions section of the [employer] website at [email or web address] Remember, [employer] is NOT to be used for urgent needs. For medical emergencies, dial 911. Now available from your iPhone and Android! Please provide this summary of care documentation to your next provider. Your primary care clinician is listed as Ambika Mariee. If you have any questions after today's visit, please call [phone].

## 2018-05-10 ENCOUNTER — HOME CARE VISIT (OUTPATIENT)
Dept: SCHEDULING | Facility: HOME HEALTH | Age: 83
End: 2018-05-10
Payer: MEDICARE

## 2018-05-10 LAB
ALBUMIN SERPL-MCNC: 4 G/DL (ref 3.5–4.7)
ALBUMIN/GLOB SERPL: 1.3 {RATIO} (ref 1.2–2.2)
ALP SERPL-CCNC: 85 IU/L (ref 39–117)
ALT SERPL-CCNC: 12 IU/L (ref 0–32)
AST SERPL-CCNC: 21 IU/L (ref 0–40)
BILIRUB SERPL-MCNC: 0.5 MG/DL (ref 0–1.2)
BNP SERPL-MCNC: 138.2 PG/ML (ref 0–100)
BUN SERPL-MCNC: 22 MG/DL (ref 8–27)
BUN/CREAT SERPL: 18 (ref 12–28)
CALCIUM SERPL-MCNC: 9.3 MG/DL (ref 8.7–10.3)
CHLORIDE SERPL-SCNC: 95 MMOL/L (ref 96–106)
CO2 SERPL-SCNC: 26 MMOL/L (ref 18–29)
CREAT SERPL-MCNC: 1.25 MG/DL (ref 0.57–1)
ERYTHROCYTE [DISTWIDTH] IN BLOOD BY AUTOMATED COUNT: 15 % (ref 12.3–15.4)
GFR SERPLBLD CREATININE-BSD FMLA CKD-EPI: 41 ML/MIN/1.73
GFR SERPLBLD CREATININE-BSD FMLA CKD-EPI: 47 ML/MIN/1.73
GLOBULIN SER CALC-MCNC: 3.1 G/DL (ref 1.5–4.5)
GLUCOSE SERPL-MCNC: 113 MG/DL (ref 65–99)
HCT VFR BLD AUTO: 36.7 % (ref 34–46.6)
HGB BLD-MCNC: 11.7 G/DL (ref 11.1–15.9)
MCH RBC QN AUTO: 27.9 PG (ref 26.6–33)
MCHC RBC AUTO-ENTMCNC: 31.9 G/DL (ref 31.5–35.7)
MCV RBC AUTO: 88 FL (ref 79–97)
PLATELET # BLD AUTO: 250 X10E3/UL (ref 150–379)
POTASSIUM SERPL-SCNC: 3.8 MMOL/L (ref 3.5–5.2)
PROT SERPL-MCNC: 7.1 G/DL (ref 6–8.5)
RBC # BLD AUTO: 4.19 X10E6/UL (ref 3.77–5.28)
SODIUM SERPL-SCNC: 141 MMOL/L (ref 134–144)
WBC # BLD AUTO: 9.7 X10E3/UL (ref 3.4–10.8)

## 2018-05-10 PROCEDURE — 3331090003 HH PPS REVENUE ADJ

## 2018-05-10 PROCEDURE — 3331090002 HH PPS REVENUE DEBIT

## 2018-05-10 PROCEDURE — 3331090001 HH PPS REVENUE CREDIT

## 2018-05-10 PROCEDURE — G0152 HHCP-SERV OF OT,EA 15 MIN: HCPCS

## 2018-05-10 RX ORDER — METOPROLOL SUCCINATE 50 MG/1
TABLET, EXTENDED RELEASE ORAL
Qty: 30 TAB | Refills: 0 | Status: SHIPPED | OUTPATIENT
Start: 2018-05-10 | End: 2018-05-29 | Stop reason: SDUPTHER

## 2018-05-11 ENCOUNTER — PATIENT OUTREACH (OUTPATIENT)
Dept: INTERNAL MEDICINE CLINIC | Age: 83
End: 2018-05-11

## 2018-05-11 PROCEDURE — 3331090001 HH PPS REVENUE CREDIT

## 2018-05-11 PROCEDURE — 3331090003 HH PPS REVENUE ADJ

## 2018-05-11 PROCEDURE — 3331090002 HH PPS REVENUE DEBIT

## 2018-05-11 NOTE — PROGRESS NOTES
NN attempted patient outreach today in order to perform HSO follow-up; NN was informed by unidentified female individual that the patient is currently unavailable and is at her local [employer] assisting with a yard sale. NN requested return phone call from patient; NN contact information provided. Goals Addressed             Most Recent       Heart Failure     Reduce risk of CHF exacerbations and complications. On track (5/11/2018)             4/13/2018  - followed by Dr. Win Carias in Outpatient Setting  - diagnosis of acute on chronic diastolic CHF  - 2/02/8 - s/p lead extraction/device removal of single lead temp/perm pacemaker and implant of dual chamber pacemaker  - 4/10/18 - Echocardiogram - EF estimated to be 35%  - Daughter reports patient adherence with daily weights  - flowsheet updated today with reported patient daily weight  - daughter denies patient weight gain post-discharge  - patient and daughter currently identify patient as being in the Green HF zone  - Daughter states, \"She doesn't have any trouble breathing at all, there's no swelling anywhere. \"  - daughter is familiar with/knowledgeable regarding HF zones and is able to accurately describe signs and symptoms associated with each HF zone to this NN    - encouraged continued adherence with patient daily weights and maintenance of daily weight record  - encouraged daily assessment of patient HF zone  - reinforced patient/caregiver action plan associated with each HF zone  - advised outreach to Cardiologist to notify of onset of new/worsened symptoms and/or weight gain of 3 pounds in 24 hours and/or 5 pounds in one week    4/20/2018   - Daughter currently identifies patient as being in the Green HF Zone  - adherent with daily weights/daily weight record  - flowsheet updated today with patient daily weights as reported by Daughter  - no patient weight gain of 3 pounds in 24 hours and/or 5 pounds in one week noted since previous NN outreach encounter    - positive reinforcement provided for adherence with self-management  - encouraged continued adherence with patient daily weights and maintenance of daily weight record  - encouraged daily assessment of patient HF zone  - reinforced patient/caregiver action plan associated with each HF zone  - advised outreach to Cardiologist to notify of onset of new/worsened symptoms and/or weight gain of 3 pounds in 24 hours and/or 5 pounds in one week    4/25/2018  - adherent with daily weights/maintenance of daily weight record  - flowsheet updated today with patient reported daily weights  - patient reported daily weights reviewed by this NN; no weight gain of 3 pounds in 24 hours and/or 5 pounds in one week noted  - currently identifies herself as being in the [place] HF Zone  - reports daily self-assessment of HF zone and states that she records this with her daily weight  - reports following a low-sodium diet at home; denies use of table salt when seasoning food  - encouraged continued adherence with patient daily weights/maintenance of daily weight record, daily self-assessment of HF zone  - advised outreach to Cardiologist to notify of onset of new/worsened symptoms and/or weight gain of 3 pounds in 24 hours and/or 5 pounds in one week    5/4/2018   - currently identifies herself as being in the Green HF Zone; states, \"I haven't reached the yellow yet. \"  - reports use of one pillow at night; reports this is baseline  - adherent with daily weights/maintenance of daily weight record  - flowsheet updated today with patient reported daily weights  - no weight gain of 3 pounds in 24 hours and/or 5 pounds within the past seven days noted  - reports adherence to low-sodium diet  - denies decreased and/or loss of appetite post-discharge  - encouraged continued adherence with patient daily weights/maintenance of daily weight record, daily self-assessment of HF zone  - advised outreach to Cardiologist to notify of onset of new/worsened symptoms and/or weight gain of 3 pounds in 24 hours and/or 5 pounds in one week    5/11/2018  - Lasix dose decreased by PCP at office visit on 5//9/18 - dose decreased from 40 mg twice daily to 40 mg daily  - patient advised by PCP at office visit on 5/9/18 to decrease benazepril dose from 40 mg to 20 mg if SBP continues to run <110 at home  - repeat CXR ordered by PCP on 5/9/18 to ensure chronic pulmonary edema is resolved  - repeat CXR completed 5/9/18; see PCP result note for details         Post Hospitalization     Attends follow-up appointments as directed. On track (5/11/2018)             3/13/2018 - AFD  Pt is scheduled for OV on 3/20/18 with Dr. Anamika William. Pt cannot identify any barriers to attending OV as scheduled. P - Pt will attend OV as scheduled. 4/13/2018  - readmitted to [facility] 4/7/18-4/12/18  - [facility] appointment previously scheduled with Dr. Anamika William for 4/18/18  - has office visit scheduled with Dr. Matilda Quintanilla (Cardiolgoy) for 4/26/18  - patient/daughter agreeable to [facility] visit for HFU within 3 days post-discharge  - [facility] visit scheduled for today between 3p - 4p for HFU within 3 days post-discharge    4/16/2018  - confirmed that requested [facility] visit for hospital follow-up was completed on 4/13/18;  [facility] note to be faxed to PCP office    4/20/2018  - attended [facility] appointment with Dr. Anamika William for 4/18/18 (post-discharge day 7); advised to f/u in 3 weeks  - attended office visit with Cardiology NP ([facility]) on 4/19/18 for one week pacemaker site check  - has office visit scheduled with Dr. Matilda Quintanilla (Cardiology) for 4/26/18 4/24/2018  - [facility] ED Visit 4/21/18; advised to f/u with PCP and Dr. Tracey Screen  - confirmed with Lenny Cordova today that patient attended office visit with Dr. Dionisio Bustillo on today (4/24/18) for ED follow-up; next scheduled office visit 5/8/18 at 9:40 am with Dr. Dionisio Bustillo    4/25/2018  - has office visit scheduled with Dr. Matilda Quintanilla (Cardiology) for 4/26/18 5/1/2018  - attended scheduled office visit with Dr. Kostas Berry (Cardiology) on 4/26/18; advised to f/u in 3 months  - In-Office Pacemaker Device Check completed by [facility] on 4/26/18; next device check due in 3 months    5/4/2018  - has office visit scheduled with Dr. Kelvin Avila (PCP) on 5/9/18 for 3 week follow-up; reminded patient today of this scheduled appointment    5/11/2018  - attended routine office visit with Dr. Kelvin Avila (PCP) on 5/9/18; was advised to f/u in 6 weeks. BNP, CBC, CMP ordered by PCP and collected on 5/9/18. Patient was advised by PCP to schedule appointment for sleep eval.    - has office visit scheduled with Dr. Kelvin Avila (PCP) 6/27/18  - office visit scheduled with Dr. Ramiro Lema (Neurology) 7/2/18  - office visit scheduled with Dr. Kostas Berry (Cardiology) 7/26/18; pacemaker device check scheduled for 7/26/18       Prevent complications post hospitalization. On track (5/11/2018)             4/13/2018  - Daughter reports exacerbation of episodes of urinary incontinence post-discharge; attributes this to new order for Lasix 40 mg two times a day at hospital discharge  - reports that patient is unable to ambulate to the bathroom fast enough  - daughter inquiring about Ambulatory Supply order for Depends/Incontinence Briefs; NN will request LPN  assistance with this request      4/16/2018  - confirmed that requested [facility] visit for hospital follow-up was completed on 4/13/18 (Completed on Post-Discharge Day 2); [facility] note to be faxed to PCP office. 4/20/2018   - patient states, \"I'm feeling good, I'm ready to go, go, go. I'm doing much better, I'm not having any trouble breathing and I'm walking as much as I can. \"  - provided Daughter with contact information for [employer] as well as cheapest options for incontinence briefs available for purchase at [address]   - daughter will be contacting [address]  on Monday regarding Incontinence Briefs    4/24/2018  - [id number] [facility] ED Visit 4/21/18 with c/o fall; diagnosis of closed non-displaced fracture of proximal phalanx of right little finger, fall. Per ED documentation, splint applied to right hand over 4th and 5th fingers; patient discharged to home with new Rx for Oxycodone and advised to f/u with PCP and Dr. Ramsay. 4/25/2018   - CMP, CBC ordered/collected at [facility] appointment with Dr. Sheyla Jackson on 4/18/18  - Reports improvement in urinary frequency; states, \"I'm not running in as much as I was. \"  - reports history of botox injection approximately one year ago for urinary frequency   - patient stated reason for fall which prompted [id number] [facility] ED visit on 4/21/18 - reports that she fell in her bedroom while getting ready for bed; states, \"I lost my balance and went to reach for the bed and missed it. \"    5/4/2018   - no additional ED Visits and/or hospital admissions noted since [id number] [facility] ED visit 4/21/18  - patient states, \"I'm doing real good, I'm ready to go-go-go. \"  - denies falls since [id number] [facility] ED visit on 4/21/18  - Immunizations  Annual Influenza Vaccination: Completed - 9/13/17    PCV-13: Completed - 8/18/16  PPSV-23: Due; patient reports that, to the best of her knowledge, she has not received this vaccination. Will request PCP review at upcoming appointment on 5/9/18.    5/11/2018  - no additional ED Visits and/or hospital admissions noted since [id number] [facility] ED Visit 4/21/18       Supportive resources in place to maintain patient in the community (ie. Home Health, DME equipment, refer to, medication assistant plan, etc.)   On track (5/11/2018)             3/13/2018 - AFD    Pt lives with spouse, sister, and daughter. Pt's daughter performs the antibiotic administration 3 times per day. HHSN began visits today. Have noted SN contact with MD to address elevated BG and BP. Pt denied need for additional support. Pt did not voice awareness of recommendation for home PT for strengthening and stability.   Noted pt has previously suffered GLFs with injuries in the last 6 months. P - Will consult with [place] nurse to review current resources. 4/13/2018  - [facility] [address] 4/[facility]/18-4/12/18  - discharged to home with resumption of home health through [facility] for SN, PT, OT  - home health SN resumption of care visit scheduled for 4/13/18  - using cane with ambulation  - DME: cane, walker, wheelchair  - reports patient has previously used Cpap, however has not used Cpap within the past 3.5 years and is in need of a new referral to sleep medicine for sleep study  - living situation: one-level house with Spouse, Daughter, [address]: Daughter is actively engaged and involved in patient's care; Daughter is unemployed and does not work outside of the home. Spouse is able to provide patient assistance with ADLs, however Daughter manages medical care for patient and spouse. Patient has 24/7 supervision/care by family members (spouse, daughter, aunt). Daughter is primary caregiver to patient and spouse. - minimal assistance with bathing/dressing; Daughter is assisting patient with dressing upper body post-op  - Medication Management: Daughter provides minimal assistance with medication management post-discharge; reports utilization of weekly pill box  - denies transportation concerns  - Daughter denies any financial barriers to obtaining patient medications at this time; reports patient's debit card was skimmed and has been overdrawn, however they have filed disputes with the patient's financial institution for the charges.   Aunt lent patient money in order to purchase medications post-discharge due to temporary financial strain secondary to fraudulent charges  - reports Medicaid Application was completed and submitted at the end of March; eligibility decision pending  - provided contact information for [facility] and discussed appropriate utilization of [address]  - will request N  assistance with obtaining order from PCP for Incontinence Supplies as well as submitting order to DME provider  - denies any additional questions, concerns, needs at this time    4/16/2018  - [facility] SN resumption of care visit completed 4/14/18; next scheduled visit 4/17/18  - [facility] PT scheduled for 4/16/18  - [address] [facility] OT scheduled for 4/16/18 4/18/2018  - most recent home health SN visit 4/17/18; next scheduled visit 4/20/18  - per home health PT initial evaluation documentation, patient/family requested no additional PT visits due to patient reportedly functioning at her baseline mobility; also requested no OT evaluation  - per LPN , order for incontinence briefs was submitted to [facility] on 4/16/18  - Incontinence Supplies not covered by Medicare and patient does not have Medicaid at this time; [facility] unable to fulfill order request  - NN outreach to [address]  to inquire about cost of incontinence briefs  $9.90/package (20 briefs)  $39/case (80 briefs)  May have some briefs available for $29/case; reports that if patient calls ahead then he will check the warehouse to see if this cheaper option is in stock. Hours - M-F 830 am-530 pm Sat 9 am-3 pm    4/20/2018   - during office visit with PCP on 4/18/18, daughter was provided with the above information regarding [employer] [facility] [employer] and cheapest options for incontinence briefs available for purchase at [address]  - daughter reports that she will be contacting [address]  on Monday regarding Incontinence Briefs  - daughter denies any additional questions/concerns at this time    4/24/2018  - per home health documentation encounter 4/24/18, home health SN visit scheduled for 4/25/18 due to patient having scheduled office visit with Ortho today.     4/25/2018   - fall risk  - patient previously declined home health PT and OT services due to feeling that she had returned to her baseline mobility/function  - discussed home health PT and OT services with patient today  - patient agreeable to home health PT and OT services; will request order from PCP for re-evaluation  - reports use of cane with ambulation  - denies falls since [facility]  - reports that she will contact [address] regarding Incontinence briefs  - denies any additional questions, concerns, needs at this time  - method of communication with Provider: Dr. Belle aCrvajal - telephone  - verbal order per Dr. Belle Carvajal, Referral to home health PT and OT to evaluate and treat  - NN outreach to [facility]; s/w Mikala Arellano, Intake Dept - verbal order for home health PT and OT to evaluate and treat given per Dr. Belle Carvajal    5/1/2018  - most recent home health SN visit 4/27/18; next scheduled visit 5/2/18  - home health OT initial evaluation scheduled for 5/1/18  - home health PT initial evaluation scheduled for 5/2/18 5/4/2018   - home health OT initial evaluation completed on 5/1/18  - home health PT initial evaluation completed on 5/2/18  - reports expected discharge from home health SN next week  - reports [address] physical therapist is inquiring about outpatient follow-up with [facility] for impaired balance  - has personal pulse oximeter  - reports home monitoring of oxygen saturation    5/11/2018  - most recent home health OT visit 5/8/18  - discharged from home health SNS on 5/8/18              Future Appointments:  Future Appointments  Date Time Provider Paul Davis   6/27/2018 11:45 AM Niesha Zamarripa   7/2/2018 2:40 PM Bella Valenzuela  [facility]   7/26/2018 10:00 AM Jerod Allen MD [facility]   7/26/2018 10:00 AM PACEMAKER,  [facility] Appointment My Department:  5/9/2018

## 2018-05-17 ENCOUNTER — TELEPHONE (OUTPATIENT)
Dept: INTERNAL MEDICINE CLINIC | Age: 83
End: 2018-05-17

## 2018-05-17 RX ORDER — FUROSEMIDE 40 MG/1
40 TABLET ORAL 2 TIMES DAILY
Qty: 60 TAB | Refills: 6 | Status: SHIPPED | OUTPATIENT
Start: 2018-05-17 | End: 2019-07-08 | Stop reason: SDUPTHER

## 2018-05-17 NOTE — TELEPHONE ENCOUNTER
#[phone] pt states she has a pain in her back side that goes all the way down to her ankles. This has gone on for all week. She has taken tylenol and used heat. Please call pt to give advice. She ask if this could be sciatica?

## 2018-05-17 NOTE — TELEPHONE ENCOUNTER
Called, spoke to pt. Two pt identifiers confirmed. Pt offered and accepted appt for 5/18/18 [id number]. Pt verbalized understanding of information discussed w/ no further questions at this time.

## 2018-05-17 NOTE — TELEPHONE ENCOUNTER
MD Lolis Vaca LPN        Caller: Unspecified (Today, 11:05 AM)                     This could be sciatica     Can come in tomorrow for eval if needed

## 2018-05-18 ENCOUNTER — OFFICE VISIT (OUTPATIENT)
Dept: INTERNAL MEDICINE CLINIC | Age: 83
End: 2018-05-18

## 2018-05-18 VITALS
HEIGHT: 61 IN | WEIGHT: 163 LBS | DIASTOLIC BLOOD PRESSURE: 69 MMHG | TEMPERATURE: 98.1 F | SYSTOLIC BLOOD PRESSURE: 115 MMHG | HEART RATE: 70 BPM | BODY MASS INDEX: 30.78 KG/M2 | RESPIRATION RATE: 16 BRPM | OXYGEN SATURATION: 96 %

## 2018-05-18 DIAGNOSIS — E11.9 TYPE 2 DIABETES MELLITUS WITHOUT COMPLICATION, WITHOUT LONG-TERM CURRENT USE OF INSULIN (HCC): Primary | ICD-10-CM

## 2018-05-18 DIAGNOSIS — M54.31 SCIATICA, RIGHT SIDE: ICD-10-CM

## 2018-05-18 DIAGNOSIS — I10 ESSENTIAL HYPERTENSION: ICD-10-CM

## 2018-05-18 DIAGNOSIS — I50.42 CHRONIC COMBINED SYSTOLIC AND DIASTOLIC CONGESTIVE HEART FAILURE (HCC): ICD-10-CM

## 2018-05-18 DIAGNOSIS — M54.31 RIGHT SIDED SCIATICA: Primary | ICD-10-CM

## 2018-05-18 RX ORDER — GABAPENTIN 100 MG/1
100 CAPSULE ORAL
Qty: 30 CAP | Refills: 0 | Status: SHIPPED | OUTPATIENT
Start: 2018-05-18 | End: 2018-05-24 | Stop reason: SDUPTHER

## 2018-05-18 RX ORDER — GLIPIZIDE 2.5 MG/1
2.5 TABLET, EXTENDED RELEASE ORAL DAILY
Qty: 30 TAB | Refills: 0 | Status: SHIPPED | OUTPATIENT
Start: 2018-05-18 | End: 2018-10-19 | Stop reason: SDUPTHER

## 2018-05-18 RX ORDER — METHYLPREDNISOLONE 4 MG/1
TABLET ORAL
Qty: 1 DOSE PACK | Refills: 0 | Status: SHIPPED | OUTPATIENT
Start: 2018-05-18 | End: 2018-09-26

## 2018-05-18 NOTE — PROGRESS NOTES
HISTORY OF PRESENT ILLNESS  Pb Salazar is a [de-identified] y.o. female. HPI   Last here 5/9/18. Pt is here for acute care. Pt presents with her  who provides some of the hx. Pt ambulates with a cane. BP is 115/69  BPs are >110, 148/90 at home  Pt denies having orthostatic sx    Continues benazepril 40mg and toprol XL 50mg daily  Pt has instructions to cut benazepril in half for SBP <110 at home  Continues full tab, no lows at home    Lov, I decreased her lasix to 40mg once daily - reports compliance and sx are stable  Breathing is good  Reviewed CXR 5/18: No overt CHF. Lucency through one of the cardiac leads. Recall she has an element of [facility]. BS at home running around 115, 118, 120 in the AM fasting  Continues on metformin XR 750mg BID    Wt is stable x lov   Wt is 163, 164 lbs at home  Discussed diet and w/l     Reviewed labs 5/18    Pt c/o pain to her R buttocks, radiating to her RLE x 1 week  Pt states that her sx started out of the blue  Pt denies having falls, bladder/bowel incontinence or F/C  Pt has not started PT d/t fractured R finger   Reviewed MRI L spine 1/16:  Discussed her sx being indicative of sciatica   Ordered XR L spine  Ordered gabapentin 1-2x daily   Ordered medrol dosepack  Will have her take glipizide 2.5mg daily for fasting BS >150 while on steroids   Recall she used to follow with Dr. Corky Maxwell (neurosurg). Patient Active Problem List    Diagnosis Date Noted    Chronic combined systolic and diastolic congestive heart failure ([address] 05/18/2018    Acute on chronic diastolic (congestive) heart failure ([address] 04/07/2018    GI bleed 04/07/2018    Severe obesity (BMI 35.0-39. 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    ACP (advance care planning) 11/11/2015    Hypothyroidism, adult 07/31/2015    Type 2 diabetes mellitus without complication ([facility] 91/51/6019    Essential hypertension 04/27/2015    MALLORY (obstructive sleep apnea) 07/09/2014    Hyperlipidemia 07/09/2014    Sarcoid 07/09/2014    Stress bladder incontinence, female 07/09/2014    Obesity 07/09/2014    TIA (transient ischemic attack) 07/09/2014    AR (aortic regurgitation) 07/09/2014    Mitral valve insufficiency 07/09/2014    Cervical stenosis of spine 07/09/2014     Current Outpatient Prescriptions   Medication Sig Dispense Refill    furosemide (LASIX) 40 mg tablet Take 1 Tab by mouth two (2) times a day. 60 Tab 6    metoprolol succinate (TOPROL-XL) 50 mg XL tablet take 1 tablet by mouth once daily 30 Tab 0    metFORMIN ER (GLUCOPHAGE XR) 750 mg tablet take 1 tablet by mouth twice a day 180 Tab 0    CONTOUR NEXT STRIPS strip TEST twice a day  0    MICROLET LANCET misc CHECK BLOOD SUGAR TWICE A DAY  1    metoprolol succinate (TOPROL-XL) 50 mg XL tablet Take 1 Tab by mouth daily. 30 Tab 0    busPIRone (BUSPAR) 5 mg tablet Take 5 mg by mouth two (2) times a day.  pravastatin (PRAVACHOL) 20 mg tablet TAKE ONE TABLET BY MOUTH NIGHTLY 90 Tab 1    benazepril (LOTENSIN) 40 mg tablet Take 1 Tab by mouth daily. 30 Tab 3    pantoprazole (PROTONIX) 40 mg tablet Take 40 mg by mouth two (2) times a day.  L. acidoph & paracasei- S therm- Bifido (FRAN-Q/RISAQUAD) 8 billion cell cap cap Take 1 Cap by mouth daily. (Patient taking differently: Take 1 Cap by mouth two (2) times a day.) 60 Cap 0    clopidogrel (PLAVIX) 75 mg tab Take 75 mg by mouth daily.  levothyroxine (SYNTHROID) 50 mcg tablet Take 1 Tab by mouth Daily (before breakfast). 90 Tab 1    sertraline (ZOLOFT) 100 mg tablet Take 1 Tab by mouth daily.  135 Tab 1    Cholecalciferol, Vitamin D3, (VITAMIN D3) 2,000 unit cap capsule Take 2,000 Units by mouth daily.  acetaminophen (TYLENOL) 500 mg tablet Take 500 mg by mouth every four (4) hours as needed for Pain.  ferrous sulfate (IRON) 325 mg (65 mg iron) tablet Take 325 mg by mouth every other day.  multivit-min-FA-lycopen-lutein (CENTRUM SILVER) 0.4-300-250 mg-mcg-mcg tab Take 1 Tab by mouth daily.  aspirin delayed-release 81 mg tablet Take 81 mg by mouth daily.  magnesium 250 mg tab Take 250 mg by mouth every evening.        Past Surgical History:   Procedure Laterality Date    CARDIAC SURG PROCEDURE UNLIST      cardioversion     CARDIAC SURG PROCEDURE UNLIST      watchman device    COLONOSCOPY N/A 12/28/2016    COLONOS[facility] Hoang Mcmullen [facility] ENDOSCOPY    HX CATARACT REMOVAL      both eyes    HX CHOLECYSTECTOMY      HX COLONOSCOPY  5/8/2013    Repeat in 5 years    HX CYST REMOVAL  10/15    on head     HX HYSTERECTOMY      HX ORTHOPAEDIC Bilateral     knee replacement to both knees    HX ORTHOPAEDIC      spacer btwn L4-5 and L5-6     HX PACEMAKER PLACEMENT      HX TONSILLECTOMY      HX UROLOGICAL      botox in bladder      Lab Results  Component Value Date/Time   WBC 9.7 05/09/2018 01:10 PM   HGB 11.7 05/09/2018 01:10 PM   HCT 36.7 05/09/2018 01:10 PM   PLATELET 189 31/64/6383 01:10 PM   MCV 88 05/09/2018 01:10 PM     Lab Results  Component Value Date/Time   Cholesterol, total 149 09/08/2017 09:47 AM   HDL Cholesterol 82 09/08/2017 09:47 AM   LDL, calculated 48 09/08/2017 09:47 AM   LDL-C, External 77 10/18/2013 11:09 AM   Triglyceride 96 09/08/2017 09:47 AM   CHOL/HDL Ratio 1.9 09/08/2016 02:13 AM     Lab Results  Component Value Date/Time   GFR est non-AA 41 (L) 05/09/2018 01:10 PM   GFR est AA 47 (L) 05/09/2018 01:10 PM   Creatinine 1.25 (H) 05/09/2018 01:10 PM   BUN 22 05/09/2018 01:10 PM   Sodium 141 05/09/2018 01:10 PM   Potassium 3.8 05/09/2018 01:10 PM   Chloride 95 (L) 05/09/2018 01:10 PM   CO2 26 05/09/2018 01:10 PM   Magnesium 1.7 04/09/2018 02:34 AM   Phosphorus 3.3 09/07/2016 01:11 AM        Review of Systems   Constitutional: Negative for chills and fever. Respiratory: Negative for shortness of breath. Cardiovascular: Negative for chest pain. Musculoskeletal: Positive for myalgias. Negative for falls. Neurological: Negative for dizziness. Physical Exam   Constitutional: She is oriented to person, place, and time. She appears well-developed and well-nourished. No distress. HENT:   Head: Normocephalic and atraumatic. Eyes: Conjunctivae and EOM are normal. Right eye exhibits no discharge. Left eye exhibits no discharge. Neck: Normal range of motion. Neck supple. Cardiovascular: Normal rate, regular rhythm and normal heart sounds. Exam reveals no gallop and no friction rub. No murmur heard. Pulmonary/Chest: Effort normal and breath sounds normal. No respiratory distress. She has no wheezes. She has no rales. She exhibits no tenderness. Musculoskeletal: She exhibits no edema, tenderness or deformity. Sensation intact  5/5 strength BLE   Lymphadenopathy:     She has no cervical adenopathy. Neurological: She is alert and oriented to person, place, and time. Coordination abnormal.   Skin: Skin is warm and dry. No rash noted. She is not diaphoretic. No erythema. No pallor. Psychiatric: She has a normal mood and affect. Her behavior is normal.       ASSESSMENT and PLAN    ICD-10-CM ICD-9-CM    1. Type 2 diabetes mellitus without complication, without long-term current use of insulin ([facility]    Well-controled, pt will be going on steroids briefly, will have her take glipizide 2.5mg PO up to once daily if fasting BS is >150    Continue metformin BID   E11.9 250.00    2.  Chronic combined systolic and diastolic congestive heart failure (HCC)    Decreased lasix to 40mg once daily, doing well, wt stable    No fluid on cxr  Remains euvolemic   I50.42 428.42      428.0    3. Sciatica, right side    Will check L spine plain film to ensure no compression fractures, pt has had several falls, will treat with medrol dosepack and gabapentin   M54.31 724.3 XR SPINE LUMB 2 OR 3 V   4. Essential hypertension    BP well-controled, states SBP has been staying >110 so remains on full tab of benazepril, will continue this for now   I10 401.9         Scribed by [place] Iliana of [address] [place] [address] [address], as dictated by Dr. Maria Luisa Tay. Current diagnosis and concerns discussed with pt at length. Pt understands risks and benefits or current treatment plan and medications, and accepts the treatment and medication with any possible risks. Pt asks appropriate questions, which were answered. Pt was instructed to call with any concerns or problems. This note will not be viewable in [address]

## 2018-05-18 NOTE — PATIENT INSTRUCTIONS
Take steroid pack to decrease inflammation    Gabapentin 1-2 times per day as needed for nerve pain    If fasting sugar climbs above 150 while on steroids take ONE GLIPIZIDE 2.5MG TABLET THAT DAY     XRAY TODAY

## 2018-05-18 NOTE — MR AVS SNAPSHOT
[address] [facility] 
[phone] Patient: Molly Ornelas MRN: [id number] :1935 Visit Information Date & Time Provider Department Dept. Phone Encounter #  
 2018  2:15 PM Katherine Boggs, [address] [place] [address],[address] [phone] [id number] Follow-up Instructions Return for AS SCHEDULED. Your Appointments 2018 11:45 AM  
ACUTE CARE with Katherine Boggs MD  
[facility] Appt Note: 6 week follow up  
 [place] [address] [address]  
[phone]  
  
   
 [place] [address] [facility]  [address] [address]  
  
    
 2018  2:40 PM  
Follow Up with Hussain Jane MD  
[address] [facility]) Appt Note: balance disorder; R/s, f/u dsk 18  
 [address] [facility] 207 [address] [facility] [address]  
[phone]  
  
   
 [address] [facility] [address]  
  
    
 2018 10:00 AM  
ESTABLISHED PATIENT with Diana Swain MD  
[facility]) Appt Note: BSC DCPM 3 mo post op check, discuss latitude [facility]  
[phone] [place] [address] [address]  
  
    
 2018 10:00 AM  
PROCEDURE with PACEMAKER, [facility] Appt Note: BSC DCPM 3 mo post op check, discuss latitude [facility]  
[phone] [facility] Upcoming Health Maintenance Date Due  
 EYE EXAM RETINAL OR DILATED Q1 3/20/2018 Influenza Age 5 to Adult 2018 MICROALBUMIN Q1 2018 LIPID PANEL Q1 2018 HEMOGLOBIN A1C Q6M 2018 MEDICARE YEARLY EXAM 2018 GLAUCOMA SCREENING Q2Y 3/20/2019 FOOT EXAM Q1 2019 DTaP/Tdap/Td series (2 - Td) 4/27/2025 Allergies as of 5/18/2018  Review Complete On: 5/18/2018 By: Jean-Pierre Smith MD  
  
 Severity Noted Reaction Type Reactions Procardia Xl [Nifedipine]  07/09/2014    Other (comments)  
 weakness Current Immunizations  Reviewed on 6/7/2017 Name Date Influenza High Dose Vaccine PF 9/13/2017, 9/19/2015 Influenza Vaccine 10/10/2014 12:38 PM  
 Influenza Vaccine (Quad) PF 9/14/2016 Pneumococcal Conjugate (PCV-13) 8/18/2016 Pneumococcal Polysaccharide (PPSV-23) 5/9/2018 Tdap 4/27/2015 Zoster Vaccine, Live 2/29/2016 Not reviewed this visit You Were Diagnosed With   
  
 Codes Comments Type 2 diabetes mellitus without complication, without long-term current use of insulin (HCC)    -  Primary ICD-10-CM: E11.9 ICD-9-CM: 250.00 Chronic combined systolic and diastolic congestive heart failure (HCC)     ICD-10-CM: I50.42 
ICD-9-CM: 428.42, 428.0 Sciatica, right side     ICD-10-CM: M54.31 
ICD-9-CM: 724.3 Essential hypertension     ICD-10-CM: I10 
ICD-9-CM: 401.9 Vitals BP Pulse Temp Resp Height(growth percentile) Weight(growth percentile)  
 115/69 (BP 1 Location: Left arm, BP Patient Position: Sitting) 70 98.1 °F (36.7 °C) (Oral) 16 5' 1\" (1.549 m) 163 lb (73.9 kg) SpO2 BMI OB Status Smoking Status 96% 30.8 kg/m2 Hysterectomy Never Smoker Vitals History BMI and BSA Data Body Mass Index Body Surface Area  
 30.8 kg/m 2 1.78 m 2 Preferred Pharmacy Pharmacy Name Phone [facility] [id number]. Lukas Barnes, [address] [place] [address] ROAD [phone] Your Updated Medication List  
  
   
This list is accurate as of 5/18/18  2:41 PM.  Always use your most recent med list.  
  
  
  
  
 acetaminophen 500 mg tablet Commonly known as:  TYLENOL Take 500 mg by mouth every four (4) hours as needed for Pain. aspirin delayed-release 81 mg tablet Take 81 mg by mouth daily. benazepril 40 mg tablet Commonly known as:  LOTENSIN Take 1 Tab by mouth daily. busPIRone 5 mg tablet Commonly known as:  BUSPAR Take 5 mg by mouth two (2) times a day. CENTRUM SILVER 0.4-300-250 mg-mcg-mcg Tab Generic drug:  multivit-min-FA-lycopen-lutein Take 1 Tab by mouth daily. Cholecalciferol (Vitamin D3) 2,000 unit Cap capsule Commonly known as:  VITAMIN D3 Take 2,000 Units by mouth daily. CONTOUR NEXT TEST STRIPS strip Generic drug:  glucose blood VI test strips TEST twice a day  
  
 furosemide 40 mg tablet Commonly known as:  LASIX Take 1 Tab by mouth two (2) times a day.  
  
 gabapentin 100 mg capsule Commonly known as:  NEURONTIN Take 1 Cap by mouth nightly as needed. glipiZIDE SR 2.5 mg CR tablet Commonly known as:  GLUCOTROL XL Take 1 Tab by mouth daily. Iron 325 mg (65 mg iron) tablet Generic drug:  ferrous sulfate Take 325 mg by mouth every other day. L. acidoph & paracasei- S therm- Bifido 8 billion cell Cap cap Commonly known as:  FRAN-Q/RISAQUAD Take 1 Cap by mouth daily. levothyroxine 50 mcg tablet Commonly known as:  SYNTHROID Take 1 Tab by mouth Daily (before breakfast). magnesium 250 mg Tab Take 250 mg by mouth every evening. metFORMIN  mg tablet Commonly known as:  GLUCOPHAGE XR  
take 1 tablet by mouth twice a day  
  
 methylPREDNISolone 4 mg tablet Commonly known as:  Lendell Persons Per pack * metoprolol succinate 50 mg XL tablet Commonly known as:  TOPROL-XL Take 1 Tab by mouth daily. * metoprolol succinate 50 mg XL tablet Commonly known as:  TOPROL-XL  
take 1 tablet by mouth once daily [facility] Generic drug:  Lancets CHECK BLOOD SUGAR TWICE A DAY pantoprazole 40 mg tablet Commonly known as:  PROTONIX Take 40 mg by mouth two (2) times a day. PLAVIX 75 mg Tab Generic drug:  clopidogrel Take 75 mg by mouth daily. pravastatin 20 mg tablet Commonly known as:  PRAVACHOL  
TAKE ONE TABLET BY MOUTH NIGHTLY  
  
 sertraline 100 mg tablet Commonly known as:  ZOLOFT Take 1 Tab by mouth daily. * Notice: This list has 2 medication(s) that are the same as other medications prescribed for you. Read the directions carefully, and ask your doctor or other care provider to review them with you. Prescriptions Printed Refills  
 glipiZIDE SR (GLUCOTROL XL) 2.5 mg CR tablet 0 Sig: Take 1 Tab by mouth daily. Class: Print Route: Oral  
  
Prescriptions Sent to Pharmacy Refills  
 methylPREDNISolone (MEDROL DOSEPACK) 4 mg tablet 0 Sig: Per pack Class: Normal  
 [facility]: [facility] [id number] Ph #: [phone]  
 gabapentin (NEURONTIN) 100 mg capsule 0 Sig: Take 1 Cap by mouth nightly as needed. Class: Normal  
 Pharmacy: [address] [facility] Ph #: [phone] Route: Oral  
  
Follow-up Instructions Return for AS SCHEDULED. To-Do List   
 05/18/2018 Imaging:  XR SPINE LUMB 2 OR 3 V Patient Instructions Take steroid pack to decrease inflammation Gabapentin 1-2 times per day as needed for nerve pain If fasting sugar climbs above 150 while on steroids take ONE GLIPIZIDE 2.5MG TABLET THAT DAY  
 
XRAY TODAY [facility]! Dear Rayo Lauren: Thank you for requesting a [facility] account. Our records indicate that you already have an active [facility] account. You can access your account anytime at [email or web address] Did you know that you can access your hospital and ER discharge instructions at any time in [facility]? You can also review all of your test results from your hospital stay or ER visit. Additional Information If you have questions, please visit the Frequently Asked Questions section of the [facility] website at [email or web address] Remember, [email or web address] is NOT to be used for urgent needs. For medical emergencies, dial 911. Now available from your iPhone and Android! Please provide this summary of care documentation to your next provider. Your primary care clinician is listed as Anamika Sosa. If you have any questions after today's visit, please call [phone].

## 2018-05-29 RX ORDER — METOPROLOL SUCCINATE 50 MG/1
TABLET, EXTENDED RELEASE ORAL
Qty: 30 TAB | Refills: 0 | Status: SHIPPED | OUTPATIENT
Start: 2018-05-29 | End: 2018-07-03 | Stop reason: SDUPTHER

## 2018-06-27 ENCOUNTER — HOSPITAL ENCOUNTER (OUTPATIENT)
Dept: LAB | Age: 83
Discharge: HOME | End: 2018-06-27
Payer: MEDICARE

## 2018-06-27 ENCOUNTER — OFFICE VISIT (OUTPATIENT)
Dept: INTERNAL MEDICINE CLINIC | Age: 83
End: 2018-06-27

## 2018-06-27 VITALS
HEIGHT: 61 IN | RESPIRATION RATE: 16 BRPM | OXYGEN SATURATION: 97 % | DIASTOLIC BLOOD PRESSURE: 62 MMHG | HEART RATE: 74 BPM | TEMPERATURE: 97 F | WEIGHT: 165 LBS | SYSTOLIC BLOOD PRESSURE: 99 MMHG | BODY MASS INDEX: 31.15 KG/M2

## 2018-06-27 DIAGNOSIS — G62.9 NEUROPATHY: ICD-10-CM

## 2018-06-27 DIAGNOSIS — I48.0 PAROXYSMAL ATRIAL FIBRILLATION (HCC): ICD-10-CM

## 2018-06-27 DIAGNOSIS — R27.0 ATAXIA: ICD-10-CM

## 2018-06-27 DIAGNOSIS — E78.2 MIXED HYPERLIPIDEMIA: ICD-10-CM

## 2018-06-27 DIAGNOSIS — G47.33 OSA (OBSTRUCTIVE SLEEP APNEA): ICD-10-CM

## 2018-06-27 DIAGNOSIS — E11.21 TYPE 2 DIABETES MELLITUS WITH NEPHROPATHY (HCC): Primary | ICD-10-CM

## 2018-06-27 DIAGNOSIS — I34.0 MITRAL VALVE INSUFFICIENCY, UNSPECIFIED ETIOLOGY: ICD-10-CM

## 2018-06-27 DIAGNOSIS — E03.9 HYPOTHYROIDISM, ADULT: ICD-10-CM

## 2018-06-27 DIAGNOSIS — E66.01 SEVERE OBESITY (BMI 35.0-39.9) WITH COMORBIDITY (HCC): ICD-10-CM

## 2018-06-27 DIAGNOSIS — I50.33 ACUTE ON CHRONIC DIASTOLIC (CONGESTIVE) HEART FAILURE (HCC): ICD-10-CM

## 2018-06-27 DIAGNOSIS — I10 ESSENTIAL HYPERTENSION: ICD-10-CM

## 2018-06-27 PROCEDURE — 82043 UR ALBUMIN QUANTITATIVE: CPT

## 2018-06-27 PROCEDURE — 36415 COLL VENOUS BLD VENIPUNCTURE: CPT

## 2018-06-27 PROCEDURE — 80048 BASIC METABOLIC PNL TOTAL CA: CPT

## 2018-06-27 PROCEDURE — 80061 LIPID PANEL: CPT

## 2018-06-27 PROCEDURE — 83036 HEMOGLOBIN GLYCOSYLATED A1C: CPT

## 2018-06-27 RX ORDER — BENAZEPRIL HYDROCHLORIDE 20 MG/1
20 TABLET ORAL DAILY
Qty: 90 TAB | Refills: 1 | Status: SHIPPED | OUTPATIENT
Start: 2018-06-27 | End: 2019-01-30 | Stop reason: SDUPTHER

## 2018-06-27 NOTE — PROGRESS NOTES
HISTORY OF PRESENT ILLNESS  Linus Trujillo is a 82 y.o. female. HPI   Last here 5/18/18. Pt is here for routine care. Pt presents with her  who provides some of the hx. Pt presents with her pill bottles.   Pt ambulates with a cane.     BP is 99/62  SBPs are 121, 130, 110 at home  Has been running on low side  Continues benazepril 40mg and toprol XL 50mg daily  Pt has instructions to cut benazepril in half for SBP <110 at home  She also takes lasix 40mg once daily    Will decrease benazepril to 20mg daily  Recall she has an element of [facility].     BS at home running around 118, 125, 120 in the AM fasting  Continues on metformin XR 750mg BID     Wt is up 2 lbs x lov    Discussed diet and w/l     Will get labs today     Reviewed depression screen 06/27/18: +1  Pt recently lost her sister--  Pt is going through the nl grieving process     Pt c/o poor balance   Pt has not been able to go back to PT  Ordered PT at [address], pt c/o pain to her R buttocks, radiating to her RLE x 1 week  Lov, I started her on gabapentin 100mg BID for neuropathy, which works well  Improves her pain down her leg    Pt will see Dr. Blane Pratt (uro-gyn) for botox injections     Pt saw Dr. John Dumont (ortho) for a h/o broken finger on her R hand  Last visit was 5/18  Next visit scheduled for 7/2/18      Pt saw Dr. Nakul Garcia (infec dis)   Last visit was 3/29/18      Pt follows with Dr. Melanie Torres (cardio)  Last visit was 4/26/18   Next visit scheduled for 7/26/18      Pt follows with Dr. Rosemary Siegel (cardio)  Last visit was 12/13/17 for a watchman procedure   Continues on ASA and plavix daily      Pt follows with Dr. Lima Fernando (neuro)  Last visit was 1/22/18   Next visit scheduled for 7/2/18      Pt follows with Dr. Sima Pena (neurosurg)  Pt has seen this physician - will get notes for review      Pt follows with Dr. Yoni Mauricio ([facility]) for urinary incontinence  Last visit was 9/11/17 - will get notes for review  Pt has had botox in the past  Has f/u for this pending   botox helped      Pt follows with Dr. Maggy Novak (ortho)   Last visit was 6/17      Pt follows with Dr. Josephine Castaneda (ENT)  Pt uses a neti pot once daily       Pt follows with Dr. Grazyna Garcia (GI)   Last visit was 10/17  Continues on protonix 40mg daily for heartburn, which works well, no breakthrough   Recall pt has celiac sprue and is somewhat compliant with her gluten-free diet      Continues on a daily probiotic       Continues on iron 325mg every other day   Discussed it being OK to stop this element   Recall pt has a h/o mild anemia, which is stable      Continues on pravachol 20mg for cholesterol       Continues on 50mcg synthroid daily      Continues on zoloft 100mg qAM for depressoin, which works well, happy with dose   She also takes low dose buspar 5mg BID for anxiety, which works well, happy with dose  This has made a big improvement in her anxiety and depression   No longer sad and tearful  Handling sisters death well       Pt is not using her CPAP for MALLORY   Pt has not had a sleep study as of yet  Advised her to schedule a sleep study again today 6/18    Her balance is not better  No falls but worsening  Would like to start PT again       ACP on file. SDMs are her  Christie Plaza) and sister Marisela Zheng). Recall she used to follow with Dr. Leatha Givens (neurosurg).       PREVENTIVE:    Colonoscopy: 12/28/16, Dr. Grazyna Garcia, repeat 10 years  EGD: 12/16, Dr. Grazyna Garcia  Pap: 2017, Dr. Judy Walters  Mammogram: 06/05/17, nl  Dexa: 11/20/17, nl   Tdap: 4/15    Pneumovax: 05/09/18    Prevnar 13: 8/16  Zostavax: 2/29/16  Shingrix: ordered 05/09/18   Flu shot: 9/13/17  Ophthalmology: Dr. Gita Cage, 3/2017, mild diabetic retinopathy in R eye, possibly scheduled   Foot exam: 05/09/18   Microalbumin: 9/17  A1c: 7/14 6.6, 10/14 6.6,4/15 6.8, 7/15 6.8, 11/15 6.9, 2/16 6.9, 5/16 6.5, 8/16 6.8, 6/17 6.9, 9/17 6.3, 10/17 6.7, 12/17 6.3, 3/18 6.6  Lipids: 9/17 LDL 48    Patient Active Problem List    Diagnosis Date Noted    Chronic combined systolic and diastolic congestive heart failure ([facility] 05/18/2018    Acute on chronic diastolic (congestive) heart failure ([facility] 04/07/2018    GI bleed 04/07/2018    Severe obesity (BMI 35.0-39. 9) with comorbidity ([facility] 03/20/2018    Infection of pacemaker pocket ([facility] 03/06/2018    Pacemaker 03/06/2018    Irregular heartbeat 12/18/2017    Type 2 diabetes mellitus with nephropathy ([facility] 12/15/2017    Celiac sprue 03/07/2017    Paroxysmal atrial fibrillation (HCC) 09/07/2016    Precordial pain 09/07/2016    Ataxia 02/17/2016    Dehydration 02/17/2016    Webb esophagus 11/11/2015    ACP (advance care planning) 11/11/2015    Hypothyroidism, adult 07/31/2015    Type 2 diabetes mellitus without complication ([facility] 66/14/4737    Essential hypertension 04/27/2015    MALLORY (obstructive sleep apnea) 07/09/2014    Hyperlipidemia 07/09/2014    Sarcoid 07/09/2014    Stress bladder incontinence, female 07/09/2014    Obesity 07/09/2014    TIA (transient ischemic attack) 07/09/2014    AR (aortic regurgitation) 07/09/2014    Mitral valve insufficiency 07/09/2014    Cervical stenosis of spine 07/09/2014     Current Outpatient Prescriptions   Medication Sig Dispense Refill    metoprolol succinate (TOPROL-XL) 50 mg XL tablet take 1 tablet by mouth once daily 30 Tab 0    gabapentin (NEURONTIN) 100 mg capsule Take 1 Cap by mouth two (2) times a day. 60 Cap 3    furosemide (LASIX) 40 mg tablet Take 1 Tab by mouth two (2) times a day. 60 Tab 6    metFORMIN ER (GLUCOPHAGE XR) 750 mg tablet take 1 tablet by mouth twice a day 180 Tab 0    CONTOUR NEXT STRIPS strip TEST twice a day  0    MICROLET LANCET misc CHECK BLOOD SUGAR TWICE A DAY  1    metoprolol succinate (TOPROL-XL) 50 mg XL tablet Take 1 Tab by mouth daily. 30 Tab 0    busPIRone (BUSPAR) 5 mg tablet Take 5 mg by mouth two (2) times a day.       pravastatin (PRAVACHOL) 20 mg tablet TAKE ONE TABLET BY MOUTH NIGHTLY 90 Tab 1    benazepril (LOTENSIN) 40 mg tablet Take 1 Tab by mouth daily. 30 Tab 3    pantoprazole (PROTONIX) 40 mg tablet Take 40 mg by mouth two (2) times a day.  L. acidoph & paracasei- S therm- Bifido (FRAN-Q/RISAQUAD) 8 billion cell cap cap Take 1 Cap by mouth daily. (Patient taking differently: Take 1 Cap by mouth two (2) times a day.) 60 Cap 0    levothyroxine (SYNTHROID) 50 mcg tablet Take 1 Tab by mouth Daily (before breakfast). 90 Tab 1    sertraline (ZOLOFT) 100 mg tablet Take 1 Tab by mouth daily. 135 Tab 1    Cholecalciferol, Vitamin D3, (VITAMIN D3) 2,000 unit cap capsule Take 2,000 Units by mouth daily.  acetaminophen (TYLENOL) 500 mg tablet Take 500 mg by mouth every four (4) hours as needed for Pain.  ferrous sulfate (IRON) 325 mg (65 mg iron) tablet Take 325 mg by mouth every other day.  multivit-min-FA-lycopen-lutein (CENTRUM SILVER) 0.4-300-250 mg-mcg-mcg tab Take 1 Tab by mouth daily.  aspirin delayed-release 81 mg tablet Take 81 mg by mouth daily.  magnesium 250 mg tab Take 250 mg by mouth every evening.  methylPREDNISolone (MEDROL DOSEPACK) 4 mg tablet Per pack 1 Dose Pack 0    glipiZIDE SR (GLUCOTROL XL) 2.5 mg CR tablet Take 1 Tab by mouth daily. 30 Tab 0    clopidogrel (PLAVIX) 75 mg tab Take 75 mg by mouth daily.        Past Surgical History:   Procedure Laterality Date    CARDIAC SURG PROCEDURE UNLIST      cardioversion     CARDIAC SURG PROCEDURE UNLIST      watchman device    COLONOSCOPY N/A 12/28/2016    COLONOSCOPY performed by Juanito Ureña MD at [facility] ENDOSCOPY    HX CATARACT REMOVAL      both eyes    HX CHOLECYSTECTOMY      HX COLONOSCOPY  5/8/2013    Repeat in 5 years    HX CYST REMOVAL  10/15    on head     HX HYSTERECTOMY      HX ORTHOPAEDIC Bilateral     knee replacement to both knees    HX ORTHOPAEDIC      spacer btwn L4-5 and L5-6     HX PACEMAKER PLACEMENT      HX TONSILLECTOMY      HX UROLOGICAL botox in bladder      Lab Results  Component Value Date/Time   WBC 9.7 05/09/2018 01:10 PM   HGB 11.7 05/09/2018 01:10 PM   HCT 36.7 05/09/2018 01:10 PM   PLATELET 267 12/40/6739 01:10 PM   MCV 88 05/09/2018 01:10 PM     Lab Results  Component Value Date/Time   Cholesterol, total 149 09/08/2017 09:47 AM   HDL Cholesterol 82 09/08/2017 09:47 AM   LDL, calculated 48 09/08/2017 09:47 AM   LDL-C, External 77 10/18/2013 11:09 AM   Triglyceride 96 09/08/2017 09:47 AM   CHOL/HDL Ratio 1.9 09/08/2016 02:13 AM     Lab Results  Component Value Date/Time   GFR est non-AA 41 (L) 05/09/2018 01:10 PM   GFR est AA 47 (L) 05/09/2018 01:10 PM   Creatinine 1.25 (H) 05/09/2018 01:10 PM   BUN 22 05/09/2018 01:10 PM   Sodium 141 05/09/2018 01:10 PM   Potassium 3.8 05/09/2018 01:10 PM   Chloride 95 (L) 05/09/2018 01:10 PM   CO2 26 05/09/2018 01:10 PM   Magnesium 1.7 04/09/2018 02:34 AM   Phosphorus 3.3 09/07/2016 01:11 AM        Review of Systems   Respiratory: Negative for shortness of breath. Cardiovascular: Negative for chest pain. Psychiatric/Behavioral: Positive for depression. Physical Exam   Constitutional: She is oriented to person, place, and time. She appears well-developed and well-nourished. No distress. HENT:   Head: Normocephalic and atraumatic. Mouth/Throat: Oropharynx is clear and moist. No oropharyngeal exudate. Eyes: Conjunctivae and EOM are normal. Right eye exhibits no discharge. Left eye exhibits no discharge. Neck: Normal range of motion. Neck supple. Cardiovascular: Normal rate, regular rhythm and normal heart sounds. Exam reveals no gallop and no friction rub. No murmur heard. Pulmonary/Chest: Effort normal and breath sounds normal. No respiratory distress. She has no wheezes. She has no rales. She exhibits no tenderness. Abdominal: Soft. She exhibits no distension. There is no tenderness. There is no rebound. Musculoskeletal: She exhibits no edema, tenderness or deformity. Lymphadenopathy:     She has no cervical adenopathy. Neurological: She is alert and oriented to person, place, and time. Coordination abnormal.   Skin: Skin is warm and dry. No rash noted. She is not diaphoretic. No erythema. No pallor. Psychiatric: She has a normal mood and affect. Her behavior is normal.       ASSESSMENT and PLAN high complex med decision making     ICD-10-CM ICD-9-CM    1. Type 2 diabetes mellitus with nephropathy (HCC)    Well-controled on metformin, check a1c today   Q55.43 142.25 METABOLIC PANEL, BASIC     583.81 HEMOGLOBIN A1C WITH EAG      MICROALBUMIN, UR, RAND W/ MICROALB/CREAT RATIO      LIPID PANEL   2. Acute on chronic diastolic (congestive) heart failure (HCC)    Stable, doing well, no signs or sx of active failure, taking lower dose lasix 40mg daliy   X00.06 564.98 METABOLIC PANEL, BASIC     428.0 HEMOGLOBIN A1C WITH EAG      MICROALBUMIN, UR, RAND W/ MICROALB/CREAT RATIO      LIPID PANEL   3. Paroxysmal atrial fibrillation (HCC)    Remains in nsr, s/p watchman procedure so only on ASA and plavix, will schedule f/u with cardio, she is due later this summer    M87.0 232.55 METABOLIC PANEL, BASIC      HEMOGLOBIN A1C WITH EAG      MICROALBUMIN, UR, RAND W/ MICROALB/CREAT RATIO      LIPID PANEL   4. Essential hypertension    Very well-controled, has continued to run on lower side of nl so will decrease benazepril to 20mg, continue toprol XL 50mg   N83 567.4 METABOLIC PANEL, BASIC      HEMOGLOBIN A1C WITH EAG      MICROALBUMIN, UR, RAND W/ MICROALB/CREAT RATIO      LIPID PANEL   5.  Hypothyroidism, adult    Controled on synthroid, no change to dose   K53.2 743.9 METABOLIC PANEL, BASIC      HEMOGLOBIN A1C WITH EAG      MICROALBUMIN, UR, RAND W/ MICROALB/CREAT RATIO      LIPID PANEL      REFERRAL TO PHYSICAL THERAPY   6. Ataxia    Needs to restart PT, has long h/o poor balance and gait, follows with neuro and neurosurg, spinal stenosis plays a role, reordered PT   V84.5 212.4 METABOLIC PANEL, BASIC      HEMOGLOBIN A1C WITH EAG      MICROALBUMIN, UR, RAND W/ MICROALB/CREAT RATIO      LIPID PANEL      REFERRAL TO PHYSICAL THERAPY   7. Mitral valve insufficiency, unspecified etiology    Stable    G80.6 422.5 METABOLIC PANEL, BASIC      HEMOGLOBIN A1C WITH EAG      MICROALBUMIN, UR, RAND W/ MICROALB/CREAT RATIO      LIPID PANEL   8. MALLORY (obstructive sleep apnea)    Still needs eval for this, she is aware    L80.05 622.06 METABOLIC PANEL, BASIC      HEMOGLOBIN A1C WITH EAG      MICROALBUMIN, UR, RAND W/ MICROALB/CREAT RATIO      LIPID PANEL   9. Mixed hyperlipidemia    Controled on pravachol    O11.9 437.3 METABOLIC PANEL, BASIC      HEMOGLOBIN A1C WITH EAG      MICROALBUMIN, UR, RAND W/ MICROALB/CREAT RATIO      LIPID PANEL   10. Severe obesity (BMI 35.0-39. 9) with comorbidity ([address]    Discussed diet and w/l, discussed [employer]   K92.99 469.58 METABOLIC PANEL, BASIC      HEMOGLOBIN A1C WITH EAG      MICROALBUMIN, UR, RAND W/ MICROALB/CREAT RATIO      LIPID PANEL    11 neuropathy --controlled on gabapentin bid--from sciatica    Depression screen reviewed and positive (1). Pt recently lost her sister. Pt is going through the nl grieving process. Scribed by Yazan Barrientos of [facility], as dictated by Dr. Maria Luisa Tay. Current diagnosis and concerns discussed with pt at length. Pt understands risks and benefits or current treatment plan and medications, and accepts the treatment and medication with any possible risks. Pt asks appropriate questions, which were answered. Pt was instructed to call with any concerns or problems. This note will not be viewable in [address]

## 2018-06-27 NOTE — MR AVS SNAPSHOT
[address] [facility] 
[phone] Patient: Luther Spencer MRN: [id number] :[id number] Visit Information Date & Time Provider Department Dept. Phone Encounter #  
 2018 11:45 AM Anamika Sosa, [address] [facility] [id number] Follow-up Instructions Return in about 3 months (around 2018). Your Appointments 2018  2:40 PM  
Follow Up with Sony Osman MD  
[address] [place] [address] [facility]rology [facility]) Appt Note: balance disorder; R/s, f/u dsk 18  
 [address] [facility] [address] [address]  
[facility] 57 [address] [facility] [address] Upcoming Health Maintenance Date Due  
 EYE EXAM RETINAL OR DILATED Q1 3/20/2018 Influenza Age 5 to Adult 2018 MICROALBUMIN Q1 2018 LIPID PANEL Q1 2018 HEMOGLOBIN A1C Q6M 2018 MEDICARE YEARLY EXAM 2018 GLAUCOMA SCREENING Q2Y 3/20/2019 FOOT EXAM Q1 2019 DTaP/Tdap/Td series (2 - Td) 2025 Allergies as of 2018  Review Complete On: 2018 By: Anamika Sosa MD  
  
 Severity Noted Reaction Type Reactions Procardia Xl [Nifedipine]  2014    Other (comments)  
 weakness Current Immunizations  Reviewed on 2017 Name Date Influenza High Dose Vaccine PF 2017, 2015 Influenza Vaccine 10/10/2014 12:38 PM  
 Influenza Vaccine (Quad) PF 2016 Pneumococcal Conjugate (PCV-13) 2016 Pneumococcal Polysaccharide (PPSV-23) 2018 Tdap 2015 Zoster Vaccine, Live 2016 Not reviewed this visit You Were Diagnosed With   
  
 Codes Comments Type 2 diabetes mellitus with nephropathy ([facility]    -  Primary ICD-10-CM: E11.21 
ICD-9-CM: 250.40, 583.81  Acute on chronic diastolic (congestive) heart failure (HCC)     ICD-10-CM: I50.33 
 ICD-9-CM: 428.33, 428.0 Paroxysmal atrial fibrillation (HCC)     ICD-10-CM: I48.0 ICD-9-CM: 427.31 Essential hypertension     ICD-10-CM: I10 
ICD-9-CM: 401.9 Hypothyroidism, adult     ICD-10-CM: E03.9 ICD-9-CM: 244.9 Ataxia     ICD-10-CM: R27.0 ICD-9-CM: 227. 3 Mitral valve insufficiency, unspecified etiology     ICD-10-CM: I34.0 ICD-9-CM: 424.0   
 MALLORY (obstructive sleep apnea)     ICD-10-CM: G47.33 
ICD-9-CM: 327.23 Mixed hyperlipidemia     ICD-10-CM: E78.2 ICD-9-CM: 272.2 Severe obesity (BMI 35.0-39. 9) with comorbidity ([facility]     ICD-10-CM: E66.01 
ICD-9-CM: 278.01 Vitals BP Pulse Temp Resp Height(growth percentile) Weight(growth percentile) 99/62 (BP 1 Location: Left arm, BP Patient Position: Sitting) 74 97 °F (36.1 °C) (Oral) 16 5' 1\" (1.549 m) 165 lb (74.8 kg) SpO2 BMI OB Status Smoking Status 97% 31.18 kg/m2 Hysterectomy Never Smoker Vitals History BMI and BSA Data Body Mass Index Body Surface Area  
 31.18 kg/m 2 1.79 m 2 Preferred Pharmacy Pharmacy Name Phone [phone] Ethlyn Prader, [address] [phone] Your Updated Medication List  
  
   
This list is accurate as of 6/27/18 12:08 PM.  Always use your most recent med list.  
  
  
  
  
 acetaminophen 500 mg tablet Commonly known as:  TYLENOL Take 500 mg by mouth every four (4) hours as needed for Pain. aspirin delayed-release 81 mg tablet Take 81 mg by mouth daily. benazepril 20 mg tablet Commonly known as:  LOTENSIN Take 1 Tab by mouth daily. busPIRone 5 mg tablet Commonly known as:  BUSPAR Take 5 mg by mouth two (2) times a day. CENTRUM SILVER 0.4-300-250 mg-mcg-mcg Tab Generic drug:  multivit-min-FA-lycopen-lutein Take 1 Tab by mouth daily. Cholecalciferol (Vitamin D3) 2,000 unit Cap capsule Commonly known as:  VITAMIN D3 Take 2,000 Units by mouth daily. CONTOUR NEXT TEST STRIPS strip Generic drug:  glucose blood VI test strips TEST twice a day  
  
 furosemide 40 mg tablet Commonly known as:  LASIX Take 1 Tab by mouth two (2) times a day.  
  
 gabapentin 100 mg capsule Commonly known as:  NEURONTIN Take 1 Cap by mouth two (2) times a day. glipiZIDE SR 2.5 mg CR tablet Commonly known as:  GLUCOTROL XL Take 1 Tab by mouth daily. Iron 325 mg (65 mg iron) tablet Generic drug:  ferrous sulfate Take 325 mg by mouth every other day. L. acidoph & paracasei- S therm- Bifido 8 billion cell Cap cap Commonly known as:  FRAN-Q/RISAQUAD Take 1 Cap by mouth daily. levothyroxine 50 mcg tablet Commonly known as:  SYNTHROID Take 1 Tab by mouth Daily (before breakfast). magnesium 250 mg Tab Take 250 mg by mouth every evening. metFORMIN  mg tablet Commonly known as:  GLUCOPHAGE XR  
take 1 tablet by mouth twice a day  
  
 methylPREDNISolone 4 mg tablet Commonly known as:  Radha Luke Per pack * metoprolol succinate 50 mg XL tablet Commonly known as:  TOPROL-XL Take 1 Tab by mouth daily. * metoprolol succinate 50 mg XL tablet Commonly known as:  TOPROL-XL  
take 1 tablet by mouth once daily [facility] Generic drug:  Lancets CHECK BLOOD SUGAR TWICE A DAY pantoprazole 40 mg tablet Commonly known as:  PROTONIX Take 40 mg by mouth two (2) times a day. PLAVIX 75 mg Tab Generic drug:  clopidogrel Take 75 mg by mouth daily. pravastatin 20 mg tablet Commonly known as:  PRAVACHOL  
TAKE ONE TABLET BY MOUTH NIGHTLY  
  
 sertraline 100 mg tablet Commonly known as:  ZOLOFT Take 1 Tab by mouth daily. * Notice: This list has 2 medication(s) that are the same as other medications prescribed for you. Read the directions carefully, and ask your doctor or other care provider to review them with you. Prescriptions Sent to Pharmacy Refills  
 benazepril (LOTENSIN) 20 mg tablet 1 Sig: Take 1 Tab by mouth daily. Class: Normal  
 [facility] [address] [facility] [address] [facility]  #: [phone] Route: Oral  
  
We Performed the Following HEMOGLOBIN A1C WITH EAG [48444 CPT(R)] LIPID PANEL [89419 CPT(R)] METABOLIC PANEL, BASIC [52774 CPT(R)] MICROALBUMIN, UR, RAND W/ MICROALB/CREAT RATIO [id number] CPT(R)] REFERRAL TO PHYSICAL THERAPY [KSQ58 Custom] Follow-up Instructions Return in about 3 months (around 9/27/2018). Referral Information Referral ID Referred By Referred To  
  
 [id number] T[facility] PHYSICAL THERAPY   
   [address] , [address] Phone: [phone] Visits Status Start Date End Date 1 New Request 6/27/18 6/27/19 If your referral has a status of pending review or denied, additional information will be sent to support the outcome of this decision. Patient Instructions Schedule eye exam and mammogram 
 
Lab today Cut benazapril in half to 20mg Stop iron Introducing [facility]! Dear Joao Scott: Thank you for requesting a [employer] account. Our records indicate that you already have an active [employer] account. You can access your account anytime at [email or web address] Did you know that you can access your hospital and ER discharge instructions at any time in [employer]? You can also review all of your test results from your hospital stay or ER visit. Additional Information If you have questions, please visit the Frequently Asked Questions section of the Smart[employer] website at [email or web address] Remember, Smartesting is NOT to be used for urgent needs. For medical emergencies, dial 911. Now available from your iPhone and Android! Please provide this summary of care documentation to your next provider. Your primary care clinician is listed as Halima Bourgeois. If you have any questions after today's visit, please call [phone].

## 2018-06-28 LAB
ALBUMIN/CREAT UR: 30.2 MG/G CREAT (ref 0–30)
BUN SERPL-MCNC: 24 MG/DL (ref 8–27)
BUN/CREAT SERPL: 20 (ref 12–28)
CALCIUM SERPL-MCNC: 9.5 MG/DL (ref 8.7–10.3)
CHLORIDE SERPL-SCNC: 100 MMOL/L (ref 96–106)
CHOLEST SERPL-MCNC: 193 MG/DL (ref 100–199)
CO2 SERPL-SCNC: 26 MMOL/L (ref 20–29)
CREAT SERPL-MCNC: 1.23 MG/DL (ref 0.57–1)
CREAT UR-MCNC: 105.8 MG/DL
EST. AVERAGE GLUCOSE BLD GHB EST-MCNC: 163 MG/DL
GLUCOSE SERPL-MCNC: 111 MG/DL (ref 65–99)
HBA1C MFR BLD: 7.3 % (ref 4.8–5.6)
HDLC SERPL-MCNC: 66 MG/DL
LDLC SERPL CALC-MCNC: 89 MG/DL (ref 0–99)
MICROALBUMIN UR-MCNC: 32 UG/ML
POTASSIUM SERPL-SCNC: 5 MMOL/L (ref 3.5–5.2)
SODIUM SERPL-SCNC: 142 MMOL/L (ref 134–144)
TRIGL SERPL-MCNC: 188 MG/DL (ref 0–149)
VLDLC SERPL CALC-MCNC: 38 MG/DL (ref 5–40)

## 2018-07-03 RX ORDER — METOPROLOL SUCCINATE 50 MG/1
TABLET, EXTENDED RELEASE ORAL
Qty: 30 TAB | Refills: 0 | Status: SHIPPED | OUTPATIENT
Start: 2018-07-03 | End: 2018-07-31 | Stop reason: SDUPTHER

## 2018-07-25 ENCOUNTER — PATIENT OUTREACH (OUTPATIENT)
Dept: INTERNAL MEDICINE CLINIC | Age: 83
End: 2018-07-25

## 2018-07-25 NOTE — PROGRESS NOTES
NN attempted patient outreach today in order to follow-up; lvm requesting a return phone call to this NN. Goals Addressed             Most Recent       Post Hospitalization     Attends follow-up appointments as directed. On track (5/11/2018)             3/13/2018 - AFD  Pt is scheduled for OV on 3/20/18 with Dr. Tiffanie Bonilla. Pt cannot identify any barriers to attending OV as scheduled. P - Pt will attend OV as scheduled. 4/13/2018  - readmitted to [facility] 4/7/18-4/12/18  - LOU appointment previously scheduled with Dr. Tiffanie Bonilla for 4/18/18  - has office visit scheduled with Dr. Jc Monroy (Cardiolgoy) for 4/26/18  - patient/daughter agreeable to [facility] visit for HFU within 3 days post-discharge  - [facility] visit scheduled for today between 3p - 4p for HFU within 3 days post-discharge    4/16/2018  - confirmed that requested [facility] visit for hospital follow-up was completed on 4/13/18;  [facility] note to be faxed to PCP office    4/20/2018  - attended [facility] appointment with Dr. Tiffanie Bonilla for 4/18/18 (post-discharge day 7); advised to f/u in 3 weeks  - attended office visit with Cardiology NP (FRIEDA) on 4/19/18 for one week pacemaker site check  - has office visit scheduled with Dr. Jc Monroy (Cardiology) for 4/26/18 4/24/2018  - [facility] ED Visit 4/21/18; advised to f/u with PCP and Dr. Ramsay  - confirmed with Chilango Reina today that patient attended office visit with Dr. Radha Burgos on today (4/24/18) for ED follow-up; next scheduled office visit 5/8/18 at 9:40 am with Dr. Radha Burgos    4/25/2018  - has office visit scheduled with Dr. Jc Monroy (Cardiology) for 4/26/18 5/1/2018  - attended scheduled office visit with Dr. Jc Monroy (Cardiology) on 4/26/18; advised to f/u in 3 months  - In-Office Pacemaker Device Check completed by [facility] on 4/26/18; next device check due in 3 months    5/4/2018  - has office visit scheduled with Dr. Tiffanie Bonilla (PCP) on 5/9/18 for 3 week follow-up; reminded patient today of this scheduled appointment    5/11/2018  - attended routine office visit with Dr. Sigifredo Canas (PCP) on 5/9/18; was advised to f/u in 6 weeks. BNP, CBC, CMP ordered by PCP and collected on 5/9/18. Patient was advised by PCP to schedule appointment for sleep eval.    - has office visit scheduled with Dr. Sigifredo Canas (PCP) 6/27/18  - office visit scheduled with Dr. Edmond Hamm (Neurology) 7/2/18  - office visit scheduled with Dr. Samantha Benítez (Cardiology) 7/26/18; pacemaker device check scheduled for 7/26/18 7/25/2018   - attended office visit with Dr. Sigifredo Canas 6/27/18  - did not attend scheduled appointment with Neurology on 7/2/18; patient cancelled  - appt with Cardiology for 7/26/18 cancelled (by ); currently has appointment scheduled for 8/16/18 (office visit and device check)       COMPLETED: Prevent complications post hospitalization. On track (7/25/2018)             4/13/2018  - Daughter reports exacerbation of episodes of urinary incontinence post-discharge; attributes this to new order for Lasix 40 mg two times a day at hospital discharge  - reports that patient is unable to ambulate to the bathroom fast enough  - daughter inquiring about Ambulatory Supply order for Depends/Incontinence Briefs; NN will request LPN  assistance with this request      4/16/2018  - confirmed that requested [facility] visit for hospital follow-up was completed on 4/13/18 (Completed on Post-Discharge Day 2); [facility] note to be faxed to PCP office. 4/20/2018   - patient states, \"I'm feeling good, I'm ready to go, go, go. I'm doing much better, I'm not having any trouble breathing and I'm walking as much as I can. \"  - provided Daughter with contact information for [employer] as well as cheapest options for incontinence briefs available for purchase at [facility] [address]   - daughter will be contacting [facility]  on Monday regarding Incontinence Briefs    4/24/2018  - [facility] ED Visit 4/21/18 with c/o fall; diagnosis of closed non-displaced fracture of proximal phalanx of right little finger, fall. Per ED documentation, splint applied to right hand over 4th and 5th fingers; patient discharged to home with new Rx for Oxycodone and advised to f/u with PCP and Dr. Trudi Tidwell. 4/25/2018   - CMP, CBC ordered/collected at [facility] appointment with Dr. Dawit Montero on 4/18/18  - Reports improvement in urinary frequency; states, \"I'm not running in as much as I was. \"  - reports history of botox injection approximately one year ago for urinary frequency   - patient stated reason for fall which prompted ED [facility] ED visit on 4/21/18 - reports that she fell in her bedroom while getting ready for bed; states, \"I lost my balance and went to reach for the bed and missed it. \"    5/4/2018   - no additional ED Visits and/or hospital admissions noted since [facility] ED visit 4/21/18  - patient states, \"I'm doing real good, I'm ready to go-go-go. \"  - denies falls since [facility] ED visit on 4/21/18  - Immunizations  Annual Influenza Vaccination: Completed - 9/13/17    PCV-13: Completed - 8/18/16  PPSV-23: Due; patient reports that, to the best of her knowledge, she has not received this vaccination. Will request PCP review at upcoming appointment on 5/9/18.    5/11/2018  - no additional ED Visits and/or hospital admissions noted since [facility] ED Visit 4/21/18 7/25/2018   - received PPSV-23 vaccination on 5/9/18  - To the best of this NN's knowledge, this patient had no additional Hospital Admissions during 30 day LOU period following admission to [facility] 4/7/18-4/12/18.   - LOU period has ended. Goal Completed. Patient has graduated from the Transitions of Care Coordination  program on 5/12/18.  Supportive resources in place to maintain patient in the community (ie. Home Health, DME equipment, refer to, medication assistant plan, etc.)   On track (5/11/2018)             3/13/2018 - AFD    Pt lives with spouse, sister, and daughter.   Pt's daughter performs the antibiotic administration 3 times per day[facility] contact with MD to address elevated BG and BP. Pt denied need for additional support. Pt did not voice awareness of recommendation for home PT for strengthening and stability. Noted pt has previously suffered GLFs with injuries in the last 6 months. P - Will consult with [facility] nurse to review current resources. 4/13/2018  - readmitted to [facility] 4/7/18-4/12/18  - discharged to home with resumption of home health through [facility] for SN, PT, OT  - home health SN resumption of care visit scheduled for 4/13/18  - using cane with ambulation  - DME: cane, walker, wheelchair  - reports patient has previously used Cpap, however has not used Cpap within the past 3.5 years and is in need of a new referral to sleep medicine for sleep study  - living situation: one-level house with Spouse, Daughter, [address]: Daughter is actively engaged and involved in patient's care; Daughter is unemployed and does not work outside of the home. Spouse is able to provide patient assistance with ADLs, however Daughter manages medical care for patient and spouse. Patient has 24/7 supervision/care by family members (spouse, daughter, aunt). Daughter is primary caregiver to patient and spouse. - minimal assistance with bathing/dressing; Daughter is assisting patient with dressing upper body post-op  - Medication Management: Daughter provides minimal assistance with medication management post-discharge; reports utilization of weekly pill box  - denies transportation concerns  - Daughter denies any financial barriers to obtaining patient medications at this time; reports patient's debit card was skimmed and has been overdrawn, however they have filed disputes with the patient's financial institution for the charges.   Aunt lent patient money in order to purchase medications post-discharge due to temporary financial strain secondary to fraudulent charges  - reports Medicaid Application was completed and submitted at the end of March; eligibility decision pending  - provided contact information for [facility] and discussed appropriate utilization of [address]  - will request LPN  assistance with obtaining order from PCP for Incontinence Supplies as well as submitting order to DME provider  - denies any additional questions, concerns, needs at this time    4/16/2018  - [facility] SN resumption of care visit completed 4/14/18; next scheduled visit 4/17/18  - [facility] PT scheduled for 4/16/18  - [facility] OT scheduled for 4/16/18 4/18/2018  - most recent home health SN visit 4/17/18; next scheduled visit 4/20/18  - per home health PT initial evaluation documentation, patient/family requested no additional PT visits due to patient reportedly functioning at her baseline mobility; also requested no OT evaluation  - per LPN , order for incontinence briefs was submitted to [address] [place] [address] on 4/16/18  - Incontinence Supplies not covered by Medicare and patient does not have Medicaid at this time; [address] [place] [address] unable to fulfill order request  - NN outreach to [facility] [address]  to inquire about cost of incontinence briefs  $9.90/package (20 briefs)  $39/case (80 briefs)  May have some briefs available for $29/case; reports that if patient calls ahead then he will check the warehouse to see if this cheaper option is in stock.   Hours - M-F 830 am-530 pm Sat 9 am-3 pm    4/20/2018   - during office visit with PCP on 4/18/18, daughter was provided with the above information regarding [facility] [facility] [employer] and cheapest options for incontinence briefs available for purchase at [address]  - daughter reports that she will be contacting [facility] [address]  on Monday regarding Incontinence Briefs  - daughter denies any additional questions/concerns at this time    4/24/2018  - per home health documentation encounter 4/24/18, home health SN visit scheduled for 4/25/18 due to patient having scheduled office visit with Ortho today. 4/25/2018   - fall risk  - patient previously declined home health PT and OT services due to feeling that she had returned to her baseline mobility/function  - discussed home health PT and OT services with patient today  - patient agreeable to home health PT and OT services; will request order from PCP for re-evaluation  - reports use of cane with ambulation  - denies falls since ED visit at [facility] on 4/21/18  - reports that she will contact [address] regarding Incontinence briefs  - denies any additional questions, concerns, needs at this time  - method of communication with Provider: Dr. Lien Serna - telephone  - verbal order per Dr. Lien Serna, Referral to home health PT and OT to evaluate and treat  - NN outreach to [facility]; s/w Grazyna Flower, Intake Dept - verbal order for home health PT and OT to evaluate and treat given per Dr. Lien Serna    5/1/2018  - most recent home health SN visit 4/27/18; next scheduled visit 5/2/18  - home health OT initial evaluation scheduled for 5/1/18  - home health PT initial evaluation scheduled for 5/2/18 5/4/2018   - home health OT initial evaluation completed on 5/1/18  - home health PT initial evaluation completed on 5/2/18  - reports expected discharge from home health SN next week  - reports [address] [facility] physical therapist is inquiring about outpatient follow-up with [facility] for impaired balance  - has personal pulse oximeter  - reports home monitoring of oxygen saturation    5/11/2018  - most recent home health OT visit 5/8/18  - discharged from home health SN on 5/8/18 7/25/2018  - Order for Outpatient Physical Therapy by Dr. Lien Serna (order date 6/27/18) for ataxia, poor balance and gait       COMPLETED: Understands red flags post discharge.    On track (5/4/2018)             3/13/2018 - AFD    Pt states she currently has a lot of \"swelling\" in her legs. Pt states she has been told this is fluid from her infection. Pt states she has not been instructed to perform a daily wt. Pt does not take diuretics. Pt asked to perform a daily wt and add information to her daily record of BP and BG. Pt states her BG this morning was over 250. Pt is taking only metformin. Pt states she was taking insulin in the hospital.  Pt is keeping a record of daily BP   [facility] began visits today  Noted contact with MD and orders. P - Pt will continue to keep daily readings of BG, BP, and wt. Pt will bring record to all OV.    4/13/2018   - daughter denies presence of and/or patient c/o fever, chills, dizziness, shortness of breath at rest, dyspnea on exertion, nausea, vomiting, edema, lower extremity edema  - Daughter reports \"just a dry cough that they noticed in the hospital, she's not bringing anything up. \" Denies worsening of cough post-discharge. - reports presence of pain post-discharge that is associated with \"bruising underneath her right arm\". Reports adequate pain relief with prn Tylenol; denies increase in pain level post-discharge. 4/20/2018   - daughter denies presence of and/or patient c/o fever, chills, dizziness, shortness of breath at rest, dyspnea on exertion, nausea, vomiting, edema, lower extremity edema    4/25/2018  - daughter denies presence of and/or patient c/o fever, chills, dizziness, shortness of breath at rest, dyspnea on exertion, nausea, vomiting, edema, lower extremity edema  - reports \"a dry cough once in a while, but it's not a cold or anything. \" Patient states, \"I've had a dry cough for a while now, even before I was diagnosed with the congestive heart failure. \" Denies worsening of cough post-discharge.     5/4/2018  - denies dizziness, fever, chills, shortness of breath at rest, dyspnea on exertion, nausea, vomiting, edema, lower extremity edema  - reports \"dry cough\" that occurs \"Every once in a while\"; states, \"It's not like a cold or anything. \" Reports that cough is not new and has been present for months; denies worsening of cough and/or increased mucus production. Denies productive cough. - states, \"I am doing great. \"  - states, \"I haven't had any breathing problems since I got out of the hospital.\"  - no cough and/or audible dyspnea noted during this NN outreach encounter    7/25/2018  - LOU period has ended; Goal Completed.               Future Appointments:  Future Appointments  Date Time Provider Paul Davis   8/16/2018 1:15 PM PACEMAKER, Ruthann Clark CHELSIE SCHED   8/16/2018 1:30 PM Negin Pierson NP [facility] SCHED   9/26/2018 2:45 PM [facility]            Last Appointment My Department:  6/27/2018

## 2018-07-30 DIAGNOSIS — I50.42 CHRONIC COMBINED SYSTOLIC AND DIASTOLIC CONGESTIVE HEART FAILURE (HCC): Primary | ICD-10-CM

## 2018-07-31 RX ORDER — LEVOTHYROXINE SODIUM 50 UG/1
TABLET ORAL
Qty: 90 TAB | Refills: 1 | Status: SHIPPED | OUTPATIENT
Start: 2018-07-31 | End: 2019-02-03 | Stop reason: SDUPTHER

## 2018-07-31 RX ORDER — METFORMIN HYDROCHLORIDE 750 MG/1
TABLET, EXTENDED RELEASE ORAL
Qty: 180 TAB | Refills: 0 | Status: SHIPPED | OUTPATIENT
Start: 2018-07-31 | End: 2018-10-03 | Stop reason: SDUPTHER

## 2018-07-31 RX ORDER — METOPROLOL SUCCINATE 50 MG/1
TABLET, EXTENDED RELEASE ORAL
Qty: 30 TAB | Refills: 0 | Status: SHIPPED | OUTPATIENT
Start: 2018-07-31 | End: 2018-08-16 | Stop reason: SDUPTHER

## 2018-08-07 RX ORDER — BUSPIRONE HYDROCHLORIDE 5 MG/1
TABLET ORAL
Qty: 60 TAB | Refills: 3 | Status: SHIPPED | OUTPATIENT
Start: 2018-08-07 | End: 2019-01-02 | Stop reason: SDUPTHER

## 2018-08-10 ENCOUNTER — CLINICAL SUPPORT (OUTPATIENT)
Dept: CARDIOLOGY CLINIC | Age: 83
End: 2018-08-10

## 2018-08-10 DIAGNOSIS — I50.42 CHRONIC COMBINED SYSTOLIC AND DIASTOLIC CONGESTIVE HEART FAILURE (HCC): Primary | ICD-10-CM

## 2018-08-16 ENCOUNTER — OFFICE VISIT (OUTPATIENT)
Dept: CARDIOLOGY CLINIC | Age: 83
End: 2018-08-16

## 2018-08-16 ENCOUNTER — CLINICAL SUPPORT (OUTPATIENT)
Dept: CARDIOLOGY CLINIC | Age: 83
End: 2018-08-16

## 2018-08-16 VITALS
WEIGHT: 174 LBS | HEIGHT: 61 IN | DIASTOLIC BLOOD PRESSURE: 82 MMHG | BODY MASS INDEX: 32.85 KG/M2 | SYSTOLIC BLOOD PRESSURE: 120 MMHG | OXYGEN SATURATION: 95 % | HEART RATE: 74 BPM

## 2018-08-16 DIAGNOSIS — I50.42 CHRONIC COMBINED SYSTOLIC AND DIASTOLIC CONGESTIVE HEART FAILURE (HCC): ICD-10-CM

## 2018-08-16 DIAGNOSIS — I48.0 PAROXYSMAL ATRIAL FIBRILLATION (HCC): Primary | ICD-10-CM

## 2018-08-16 DIAGNOSIS — Z95.0 PACEMAKER: Primary | ICD-10-CM

## 2018-08-16 DIAGNOSIS — I10 ESSENTIAL HYPERTENSION: ICD-10-CM

## 2018-08-16 DIAGNOSIS — I48.0 PAROXYSMAL ATRIAL FIBRILLATION (HCC): ICD-10-CM

## 2018-08-16 DIAGNOSIS — K92.2 GASTROINTESTINAL HEMORRHAGE, UNSPECIFIED GASTROINTESTINAL HEMORRHAGE TYPE: ICD-10-CM

## 2018-08-16 DIAGNOSIS — Z95.0 PACEMAKER: ICD-10-CM

## 2018-08-16 RX ORDER — TRAMADOL HYDROCHLORIDE 50 MG/1
50 TABLET ORAL
COMMUNITY
Start: 2018-08-13 | End: 2018-08-23

## 2018-08-16 NOTE — PROGRESS NOTES
1. Have you been to the ER, urgent care clinic since your last visit? Hospitalized since your last visit? NO    2. Have you seen or consulted any other health care providers outside of the [address] [place] [address] since your last visit? Include any pap smears or colon screening.  NO

## 2018-08-16 NOTE — PROGRESS NOTES
See device report - BSC DCPM in office device check, next check in office in 6 months and enrolled in remote home monitoring.

## 2018-08-16 NOTE — MR AVS SNAPSHOT
[address] 
[phone] Patient: Valerie Vega MRN: [id number] :1935 Visit Information Date & Time Provider Department Dept. Phone Encounter #  
 2018  1:30 PM Uche Kathleen, [address] [facility] [phone] [id number] Follow-up Instructions Return in about 1 year (around 2019). Follow-up and Disposition History Your Appointments 2018  2:45 PM  
ROUTINE CARE with Josh Eubanks, [address] [place] [address] [address] [place] [address]) Appt Note: 3 month follow up  
 [facility] [address]  
[phone]  
  
   
 [facility] [address] [facility] [address] [address]  
  
    
 2019 10:30 AM  
PROCEDURE with PACEMAKER, [facility] [address]) Appt Note: BSC DCPM 6mo check [id number] [facility] [address]  
[phone] [id number] [facility] [address] Upcoming Health Maintenance Date Due  
 EYE EXAM RETINAL OR DILATED Q1 3/20/2018 Influenza Age 5 to Adult 2018 MEDICARE YEARLY EXAM 2018 HEMOGLOBIN A1C Q6M 2018 GLAUCOMA SCREENING Q2Y 3/20/2019 FOOT EXAM Q1 2019 MICROALBUMIN Q1 2019 LIPID PANEL Q1 2019 DTaP/Tdap/Td series (2 - Td) 2025 Allergies as of 2018  Review Complete On: 2018 By: Chata Wolf Severity Noted Reaction Type Reactions Procardia Xl [Nifedipine]  2014    Other (comments)  
 weakness Current Immunizations  Reviewed on 2017 Name Date Influenza High Dose Vaccine PF 2017, 2015 Influenza Vaccine 10/10/2014 12:38 PM  
 Influenza Vaccine (Quad) PF 2016 Pneumococcal Conjugate (PCV-13) 2016 Pneumococcal Polysaccharide (PPSV-23) 2018 Tdap 2015 Zoster Vaccine, Live 2016 Not reviewed this visit You Were Diagnosed With   
  
 Codes Comments Paroxysmal atrial fibrillation (HCC)    -  Primary ICD-10-CM: I48.0 ICD-9-CM: 427.31 Essential hypertension     ICD-10-CM: I10 
ICD-9-CM: 401.9 Gastrointestinal hemorrhage, unspecified gastrointestinal hemorrhage type     ICD-10-CM: K92.2 ICD-9-CM: 578.9 Pacemaker     ICD-10-CM: Z95.0 ICD-9-CM: V45.01 Vitals BP Pulse Height(growth percentile) Weight(growth percentile) SpO2 BMI  
 120/82 (BP 1 Location: Left arm, BP Patient Position: Sitting) 74 5' 1\" (1.549 m) 174 lb (78.9 kg) 95% 32.88 kg/m2 OB Status Smoking Status Hysterectomy Never Smoker Vitals History BMI and BSA Data Body Mass Index Body Surface Area  
 32.88 kg/m 2 1.84 m 2 Preferred Pharmacy Pharmacy Name Phone [facility] [address] [phone] Your Updated Medication List  
  
   
This list is accurate as of 8/16/18  1:56 PM.  Always use your most recent med list.  
  
  
  
  
 acetaminophen 500 mg tablet Commonly known as:  TYLENOL Take 500 mg by mouth every four (4) hours as needed for Pain. aspirin delayed-release 81 mg tablet Take 81 mg by mouth daily. benazepril 20 mg tablet Commonly known as:  LOTENSIN Take 1 Tab by mouth daily. busPIRone 5 mg tablet Commonly known as:  BUSPAR  
take 1 tablet by mouth twice a day CENTRUM SILVER 0.4-300-250 mg-mcg-mcg Tab Generic drug:  multivit-min-FA-lycopen-lutein Take 1 Tab by mouth daily. Cholecalciferol (Vitamin D3) 2,000 unit Cap capsule Commonly known as:  VITAMIN D3 Take 2,000 Units by mouth daily. CONTOUR NEXT TEST STRIPS strip Generic drug:  glucose blood VI test strips TEST twice a day  
  
 furosemide 40 mg tablet Commonly known as:  LASIX Take 1 Tab by mouth two (2) times a day.  
  
 gabapentin 100 mg capsule Commonly known as:  NEURONTIN Take 1 Cap by mouth two (2) times a day. glipiZIDE SR 2.5 mg CR tablet Commonly known as:  GLUCOTROL XL Take 1 Tab by mouth daily. Iron 325 mg (65 mg iron) tablet Generic drug:  ferrous sulfate Take 325 mg by mouth every other day. L. acidoph & paracasei- S therm- Bifido 8 billion cell Cap cap Commonly known as:  FRAN-Q/RISAQUAD Take 1 Cap by mouth daily. levothyroxine 50 mcg tablet Commonly known as:  SYNTHROID  
take 1 tablet by mouth once daily (BEFORE BREAKFAST)  
  
 magnesium 250 mg Tab Take 250 mg by mouth every evening. metFORMIN  mg tablet Commonly known as:  GLUCOPHAGE XR  
take 1 tablet by mouth twice a day  
  
 methylPREDNISolone 4 mg tablet Commonly known as:  Cleta Loss Per pack  
  
 metoprolol succinate 50 mg XL tablet Commonly known as:  TOPROL-XL Take 1 Tab by mouth daily. [facility] Generic drug:  Lancets CHECK BLOOD SUGAR TWICE A DAY pantoprazole 40 mg tablet Commonly known as:  PROTONIX Take 40 mg by mouth two (2) times a day. pravastatin 20 mg tablet Commonly known as:  PRAVACHOL  
TAKE ONE TABLET BY MOUTH NIGHTLY  
  
 sertraline 100 mg tablet Commonly known as:  ZOLOFT Take 1 Tab by mouth daily. traMADol 50 mg tablet Commonly known as:  ULTRAM  
Take 50 mg by mouth. We Performed the Following AMB POC EKG ROUTINE W/ 12 LEADS, INTER & REP [12791 CPT(R)] Follow-up Instructions Return in about 1 year (around 8/16/2019). Introducing [facility]! Dear Johan Blake: Thank you for requesting a [employer] account. Our records indicate that you already have an active [employer] account. You can access your account anytime at [email or web address] Did you know that you can access your hospital and ER discharge instructions at any time in [employer]?   You can also review all of your test results from your hospital stay or ER visit. Additional Information If you have questions, please visit the Frequently Asked Questions section of the [employer] website at [email or web address] Remember, [employer] is NOT to be used for urgent needs. For medical emergencies, dial 911. Now available from your iPhone and Android! Please provide this summary of care documentation to your next provider. Your primary care clinician is listed as Griselda Simmer. If you have any questions after today's visit, please call [phone].

## 2018-08-16 NOTE — PROGRESS NOTES
Subjective:      Dorota Garcia is a 82 y.o. female is here for device follow up. She is feeling well. The patient denies chest pain/ shortness of breath, orthopnea, PND, LE edema, palpitations, syncope, presyncope or fatigue. Patient Active Problem List    Diagnosis Date Noted    Chronic combined systolic and diastolic congestive heart failure ([address] 05/18/2018    Acute on chronic diastolic (congestive) heart failure ([address] 04/07/2018    GI bleed 04/07/2018    Severe obesity (BMI 35.0-39. 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    ACP (advance care planning) 11/11/2015    Hypothyroidism, adult 07/31/2015    Type 2 diabetes mellitus without complication ([address] 32/82/4750    Essential hypertension 04/27/2015    MALLORY (obstructive sleep apnea) 07/09/2014    Hyperlipidemia 07/09/2014    Sarcoid 07/09/2014    Stress bladder incontinence, female 07/09/2014    Obesity 07/09/2014    TIA (transient ischemic attack) 07/09/2014    AR (aortic regurgitation) 07/09/2014    Mitral valve insufficiency 07/09/2014    Cervical stenosis of spine 07/09/2014      Ashanti Levy MD  Past Medical History:   Diagnosis Date    Anemia     Arrhythmia     AFIB    Ataxia     Webb's esophagus     Cervical spinal stenosis     Coronary atherosclerosis of unspecified type of vessel, native or graft     Diabetes ([address]     Fatigue     GERD (gastroesophageal reflux disease)     Hyperlipidemia     Hypertension     Ill-defined condition     right torn rotater cuff- no surgery at this time    Liver disease     pt is unaware    Osteoarthritis     Psychiatric disorder     anxiety and depression    Sarcoidosis     Sleep apnea     uses cpap    Stroke [facility])     TIA'S    Thyroid disease       Past Surgical History:   Procedure Laterality Date    CARDIAC SURG PROCEDURE UNLIST      cardioversion     CARDIAC SURG PROCEDURE UNLIST      watchman device    COLONOSCOPY N/A 12/28/2016    COLONOSCOPY performed by Vane Jackson [facility] at [facility] ENDOSCOPY    HX CATARACT REMOVAL      both eyes    HX CHOLECYSTECTOMY      HX COLONOSCOPY  5/8/2013    Repeat in 5 years    HX CYST REMOVAL  10/15    on head     HX HYSTERECTOMY      HX ORTHOPAEDIC Bilateral     knee replacement to both knees    HX ORTHOPAEDIC      spacer btwn L4-5 and L5-6     HX PACEMAKER PLACEMENT      HX TONSILLECTOMY      HX UROLOGICAL      botox in bladder     Allergies   Allergen Reactions    Procardia Xl [Nifedipine] Other (comments)     weakness      Family History   Problem Relation Age of Onset    Other Mother      Fibromyalgia    Pacemaker Mother     Heart Disease Mother     Heart Failure Mother     COPD Mother     Heart Attack Father 61    Cancer Sister      Multiple Myeloma    Diabetes Brother     Obesity Brother     Pacemaker Brother     Heart Attack Brother      Bundle branch block    No Known Problems Son     Psychiatric Disorder Daughter      Multiple    negative for cardiac disease  Social History     Social History    Marital status:      Spouse name: N/A    Number of children: N/A    Years of education: N/A     Social History Main Topics    Smoking status: Never Smoker    Smokeless tobacco: Never Used    Alcohol use No    Drug use: No    Sexual activity: Yes     Partners: Male     Other Topics Concern    Not on file     Social History Narrative     Current Outpatient Prescriptions   Medication Sig    busPIRone (BUSPAR) 5 mg tablet take 1 tablet by mouth twice a day    levothyroxine (SYNTHROID) 50 mcg tablet take 1 tablet by mouth once daily (BEFORE BREAKFAST)    metFORMIN ER (GLUCOPHAGE XR) 750 mg tablet take 1 tablet by mouth twice a day    metoprolol succinate (TOPROL-XL) 50 mg XL tablet take 1 tablet by mouth once daily    benazepril (LOTENSIN) 20 mg tablet Take 1 Tab by mouth daily.  gabapentin (NEURONTIN) 100 mg capsule Take 1 Cap by mouth two (2) times a day.  methylPREDNISolone (MEDROL DOSEPACK) 4 mg tablet Per pack    glipiZIDE SR (GLUCOTROL XL) 2.5 mg CR tablet Take 1 Tab by mouth daily.  furosemide (LASIX) 40 mg tablet Take 1 Tab by mouth two (2) times a day.  CONTOUR NEXT STRIPS strip TEST twice a day    MICROLET LANCET misc CHECK BLOOD SUGAR TWICE A DAY    metoprolol succinate (TOPROL-XL) 50 mg XL tablet Take 1 Tab by mouth daily.  busPIRone (BUSPAR) 5 mg tablet Take 5 mg by mouth two (2) times a day.  pravastatin (PRAVACHOL) 20 mg tablet TAKE ONE TABLET BY MOUTH NIGHTLY    pantoprazole (PROTONIX) 40 mg tablet Take 40 mg by mouth two (2) times a day.  L. acidoph & paracasei- S therm- Bifido (FRAN-Q/RISAQUAD) 8 billion cell cap cap Take 1 Cap by mouth daily. (Patient taking differently: Take 1 Cap by mouth two (2) times a day.)    clopidogrel (PLAVIX) 75 mg tab Take 75 mg by mouth daily.  sertraline (ZOLOFT) 100 mg tablet Take 1 Tab by mouth daily.  Cholecalciferol, Vitamin D3, (VITAMIN D3) 2,000 unit cap capsule Take 2,000 Units by mouth daily.  acetaminophen (TYLENOL) 500 mg tablet Take 500 mg by mouth every four (4) hours as needed for Pain.  ferrous sulfate (IRON) 325 mg (65 mg iron) tablet Take 325 mg by mouth every other day.  multivit-min-FA-lycopen-lutein (CENTRUM SILVER) 0.4-300-250 mg-mcg-mcg tab Take 1 Tab by mouth daily.  aspirin delayed-release 81 mg tablet Take 81 mg by mouth daily.  magnesium 250 mg tab Take 250 mg by mouth every evening. No current facility-administered medications for this visit. There were no vitals filed for this visit.     I have reviewed the nurses notes, vitals, problem list, allergy list, medical history, family, social history and medications. Review of Symptoms:    General: Pt denies excessive weight gain or loss. Pt is able to conduct ADL's  HEENT: Denies blurred vision, headaches, epistaxis and difficulty swallowing. Respiratory: Denies shortness of breath, [place], wheezing or stridor. Cardiovascular: Denies precordial pain, palpitations, edema or PND  Gastrointestinal: Denies poor appetite, indigestion, abdominal pain or blood in stool  Urinary: Denies dysuria, pyuria  Musculoskeletal: Denies pain or swelling from muscles or joints  Neurologic: Denies tremor, paresthesias, or sensory motor disturbance  Skin: Denies rash, itching or texture change. Psych: Denies depression      Physical Exam:      General: Well developed, in no acute distress. HEENT: Eyes - PERRL, no jvd  Heart:  Normal S1/S2 negative S3 or S4. Regular, no murmur, gallop or rub.   Respiratory: Clear bilaterally x 4, no wheezing or rales  Abdomen:   Soft, non-tender, bowel sounds are active.   Extremities:  No edema, normal cap refill, no cyanosis. Musculoskeletal: No clubbing  Neuro: A&Ox3, speech clear, gait stable. Skin: Skin color is normal. No rashes or lesions.  Non diaphoretic  Vascular: 2+ pulses symmetric in all extremities    Cardiographics    Ekg: AV paced    Results for orders placed or performed during the hospital encounter of 04/07/18   EKG, 12 LEAD, INITIAL   Result Value Ref Range    Ventricular Rate 84 BPM    Atrial Rate 87 BPM    QRS Duration 162 ms    Q-T Interval 474 ms    QTC Calculation (Bezet) 560 ms    Calculated R Axis -51 degrees    Calculated T Axis 81 degrees    Diagnosis       Poor data quality  Confirmed by Regina Sandoval ([id number]) on 4/12/2018 11:06:06 AM           Lab Results   Component Value Date/Time    WBC 9.7 05/09/2018 01:10 PM    HGB 11.7 05/09/2018 01:10 PM    HCT 36.7 05/09/2018 01:10 PM    PLATELET 824 61/88/8199 01:10 PM    MCV 88 05/09/2018 01:10 PM      Lab Results   Component Value Date/Time    Sodium 142 06/27/2018 01:06 PM    Potassium 5.0 06/27/2018 01:06 PM    Chloride 100 06/27/2018 01:06 PM    CO2 26 06/27/2018 01:06 PM    Anion gap 7 04/11/2018 01:02 AM    Glucose 111 (H) 06/27/2018 01:06 PM    BUN 24 06/27/2018 01:06 PM    Creatinine 1.23 (H) 06/27/2018 01:06 PM    BUN/Creatinine ratio 20 06/27/2018 01:06 PM    GFR est AA 48 (L) 06/27/2018 01:06 PM    GFR est non-AA 41 (L) 06/27/2018 01:06 PM    Calcium 9.5 06/27/2018 01:06 PM    Bilirubin, total 0.5 05/09/2018 01:10 PM    AST (SGOT) 21 05/09/2018 01:10 PM    Alk. phosphatase 85 05/09/2018 01:10 PM    Protein, total 7.1 05/09/2018 01:10 PM    Albumin 4.0 05/09/2018 01:10 PM    Globulin 4.0 04/08/2018 03:28 AM    A-G Ratio 1.3 05/09/2018 01:10 PM    ALT (SGPT) 12 05/09/2018 01:10 PM         Assessment:     Assessment:        ICD-10-CM ICD-9-CM    1. Paroxysmal atrial fibrillation (HCC) I48.0 427.31    2. Essential hypertension I10 401.9    3. Gastrointestinal hemorrhage, unspecified gastrointestinal hemorrhage type K92.2 578.9    4. Pacemaker Z95.0 V45.01      No orders of the defined types were placed in this encounter. Plan:   Ms. Car Reina is here for pacemaker follow up. She is doing well and denies cardiac complaints. Device interrogation demonstrates normal functioning, 91% AP and 100% RVP. Repeat echocardiogram demonstrated improvement in her EF to 55%. She can discontinue Plavix s/p Watchman device. Continue aspirin and all other medical therapy and follow up in one year.      Continue medical management for HTN. Thank you for allowing me to participate in Albino Ornelas 's care.     Rosales Valverde NP

## 2018-09-04 RX ORDER — METOPROLOL SUCCINATE 50 MG/1
TABLET, EXTENDED RELEASE ORAL
Qty: 30 TAB | Refills: 0 | Status: SHIPPED | OUTPATIENT
Start: 2018-09-04 | End: 2018-09-26

## 2018-09-26 ENCOUNTER — OFFICE VISIT (OUTPATIENT)
Dept: INTERNAL MEDICINE CLINIC | Age: 83
End: 2018-09-26

## 2018-09-26 ENCOUNTER — HOSPITAL ENCOUNTER (OUTPATIENT)
Dept: LAB | Age: 83
Discharge: HOME | End: 2018-09-26
Payer: MEDICARE

## 2018-09-26 VITALS
BODY MASS INDEX: 33.61 KG/M2 | RESPIRATION RATE: 16 BRPM | TEMPERATURE: 98 F | HEIGHT: 61 IN | OXYGEN SATURATION: 97 % | HEART RATE: 68 BPM | SYSTOLIC BLOOD PRESSURE: 136 MMHG | DIASTOLIC BLOOD PRESSURE: 75 MMHG | WEIGHT: 178 LBS

## 2018-09-26 DIAGNOSIS — Z23 ENCOUNTER FOR IMMUNIZATION: ICD-10-CM

## 2018-09-26 DIAGNOSIS — R27.0 ATAXIA: ICD-10-CM

## 2018-09-26 DIAGNOSIS — F32.9 REACTIVE DEPRESSION: ICD-10-CM

## 2018-09-26 DIAGNOSIS — K21.9 GASTROESOPHAGEAL REFLUX DISEASE WITHOUT ESOPHAGITIS: ICD-10-CM

## 2018-09-26 DIAGNOSIS — I10 ESSENTIAL HYPERTENSION: ICD-10-CM

## 2018-09-26 DIAGNOSIS — I48.0 PAROXYSMAL ATRIAL FIBRILLATION (HCC): ICD-10-CM

## 2018-09-26 DIAGNOSIS — G47.33 OSA (OBSTRUCTIVE SLEEP APNEA): ICD-10-CM

## 2018-09-26 DIAGNOSIS — E03.9 HYPOTHYROIDISM, ADULT: ICD-10-CM

## 2018-09-26 DIAGNOSIS — N39.3 STRESS BLADDER INCONTINENCE, FEMALE: ICD-10-CM

## 2018-09-26 DIAGNOSIS — E66.01 SEVERE OBESITY (BMI 35.0-39.9) WITH COMORBIDITY (HCC): ICD-10-CM

## 2018-09-26 DIAGNOSIS — I50.42 CHRONIC COMBINED SYSTOLIC AND DIASTOLIC CONGESTIVE HEART FAILURE (HCC): ICD-10-CM

## 2018-09-26 DIAGNOSIS — G62.9 NEUROPATHY: ICD-10-CM

## 2018-09-26 DIAGNOSIS — E78.2 MIXED HYPERLIPIDEMIA: ICD-10-CM

## 2018-09-26 DIAGNOSIS — E11.9 TYPE 2 DIABETES MELLITUS WITHOUT COMPLICATION, WITHOUT LONG-TERM CURRENT USE OF INSULIN (HCC): Primary | ICD-10-CM

## 2018-09-26 DIAGNOSIS — Z12.39 SCREENING BREAST EXAMINATION: ICD-10-CM

## 2018-09-26 LAB
LEFT EYE DIABETIC RETINOPATHY: NORMAL
LEFT EYE IMAGE QUALITY: NORMAL
LEFT EYE MACULAR EDEMA: NORMAL
LEFT EYE OTHER RETINOPATHY: NORMAL
RESULT: NORMAL
RIGHT EYE DIABETIC RETINOPATHY: NORMAL
RIGHT EYE IMAGE QUALITY: NORMAL
RIGHT EYE MACULAR EDEMA: NORMAL
RIGHT EYE OTHER RETINOPATHY: NORMAL
SEVERITY: NORMAL

## 2018-09-26 PROCEDURE — 80048 BASIC METABOLIC PNL TOTAL CA: CPT

## 2018-09-26 PROCEDURE — 83036 HEMOGLOBIN GLYCOSYLATED A1C: CPT

## 2018-09-26 PROCEDURE — 36415 COLL VENOUS BLD VENIPUNCTURE: CPT

## 2018-09-26 NOTE — PROGRESS NOTES
HISTORY OF PRESENT ILLNESS 
Tracy Vera is a 82 y.o. female. HPI Last here 6/27/18. Pt is here for routine care. Pt ambulates with a cane. 
   
BP is controlled BP was 120/80 at cardio appointment Continues toprol XL 50mg daily She also takes lasix 40mg once daily  --no swelling, urinating a lot Lov, decreased benazepril to 20mg daily (from 40mg) - reports compliance Not light headed anymore Recall she has an element of [facility].    
Pt has not been taking her BS recently Advised her to check BS daily Continues on metformin XR 750mg BID Pt is not taking glipizide 2.5mg at this time--previously had to take if on steroids Will start glipizide once daily 
   
Wt is up 13 lbs x lov Pt reports that she has been eating healthy foods She states she does not eat very much, just half of her meals Discussed [employer] Discussed diet and w/l  
  
Reviewed labs 6/18 
  
Pt saw Dr. Hunter Canas (ortho) for a h/o broken finger on her R hand Reviewed notes 9/18/18: Have given her a removable wrist splint for protection. We will start therapy for continued motion, weaning from splint, strengthening. I will see her back in 6 weeks time with x-rays out of the splint. PT is helping, pain improved Pt saw Dr. Fred Miles (infec dis) --related to infected pacemaker, d/c from care Last visit was 3/29/18 
   
Pt follows with Dr. Mejia Avery (cardio) Reviewed notes 8/16/18 with AVERY Rogers: Ms. Regina Blake is here for pacemaker follow up. She is doing well and denies cardiac complaints. Device interrogation demonstrates normal functioning, 91% AP and 100% RVP. Repeat echocardiogram demonstrated improvement in her EF to 55%. She can discontinue Plavix s/p Watchman device. Continue aspirin and all other medical therapy and follow up in one year. Continue medical management for HTN.    
Pt follows with Dr. Lamar Nolasco (cardio) Last visit was 12/13/17 for a watchman procedure Continues on ASA Pt is no longer on plavix 
   
 Pt follows with Dr. Yann Serrano (neuro)  For ataxia Missed her July appointment and will reschedule 
   
Pt follows with Dr. Letitia Lo (neurosurg) Pt has seen this physician earlier this year 
   
Pt follows with Dr. Johnny Lira (uro) for urinary incontinence Last visit was 9/11/17 Pt has had botox in the past which helped her Has f/u for this pending --she will call to schedule as she is having difficulty controlling urine Using lots f depends currently Discussed that botox would be OK to take with lasix 
   
Pt follows with Dr. Tita Murray (ortho) Last visit was 6/17 
   
Pt follows with Dr. Ny Ken (ENT) Pt uses a neti pot once daily  
   
Pt follows with Dr. Lynette Lara (GI) Last visit was 10/17 Continues on protonix 40mg BID for heartburn, which works well, no breakthrough Will have her decrease this back to once per day No black or tarry stools Recall pt has celiac sprue and is somewhat compliant with her gluten-free diet 
   
Continues on a daily probiotic  
   
Pt is no longer taking iron Recall pt has a h/o mild anemia, which is stable 
   
Continues on pravachol 20mg for cholesterol  
   
Continues on 50mcg synthroid daily 
   
Continues on zoloft 100mg qAM for depression She also takes low dose buspar 5mg BID for anxiety, which works well, happy with dose Pt states these meds work well for her anxiety but reports being more depressed, she is sad tearful, cries a lot Reports there being many stressful events in her life, such as moving, gaining wt, and her sister passing away in 6/18 Will increase zoloft to 150mg qAM 
 
Continues on gabapentin 100mg BID for neuropathy, which works well However she states she has not recently been bothered by this sx 
   
Pt is not using her CPAP for MALLORY Pt has not had a sleep study as of yet Advised her to schedule a sleep study again  
  
Pt fell and broke her wrist when she tripped over a mat Pt is completing PT for the wrist 
 She has not yet completed PT for balance 
   
ACP on file. SDMs are her  Dajuan Chaney) and sister David Ferguson). 
  
Recall she used to follow with Dr. Danny Concepcion (neurosurg).    
PREVENTIVE:   
Colonoscopy: 12/28/16, Dr. Black Rose, repeat 10 years EGD: 12/16, Dr. Black Rose Pap: 2017, Dr. Ramesh Copdonato 
Mammogram: 06/05/17, nl 
Dexa: 11/20/17, nl  
Tdap: 4/15   
Pneumovax: 05/09/18   
Prevnar 13: 8/16 Zostavax: 2/29/16 Shingrix: ordered 05/09/18  
Flu shot: 9/13/17 Ophthalmology: Dr. Senait Monet, 3/2017, mild diabetic retinopathy in R eye, retinal scan ordered 09/26/18 Foot exam: 09/26/18 Microalbumin: 6/18 A1c:  7/15 6.8, 11/15 6.9, 2/16 6.9, 5/16 6.5, 8/16 6.8, 6/17 6.9, 9/17 6.3, 10/17 6.7, 12/17 6.3, 3/18 6.6, 6/18 7.3 Lipids: 6/18 LDL 89 Patient Active Problem List  
 Diagnosis Date Noted  Chronic combined systolic and diastolic congestive heart failure ([address] 05/18/2018  Acute on chronic diastolic (congestive) heart failure ([address] 04/07/2018  GI bleed 04/07/2018  Severe obesity (BMI 35.0-39. 9) with comorbidity ([address] 03/20/2018  Infection of pacemaker pocket ([address] 03/06/2018  Pacemaker 03/06/2018  Irregular heartbeat 12/18/2017  Type 2 diabetes mellitus with nephropathy ([address] 12/15/2017  Celiac sprue 03/07/2017  Paroxysmal atrial fibrillation ([address] 09/07/2016  Precordial pain 09/07/2016  Ataxia 02/17/2016  Dehydration 02/17/2016  Webb esophagus 11/11/2015  ACP (advance care planning) 11/11/2015  Hypothyroidism, adult 07/31/2015  Type 2 diabetes mellitus without complication ([address] 59/07/1953  Essential hypertension 04/27/2015  MALLORY (obstructive sleep apnea) 07/09/2014  Hyperlipidemia 07/09/2014  Sarcoid 07/09/2014  Stress bladder incontinence, female 07/09/2014  Obesity 07/09/2014  TIA (transient ischemic attack) 07/09/2014  AR (aortic regurgitation) 07/09/2014  Mitral valve insufficiency 07/09/2014  Cervical stenosis of spine 07/09/2014 Current Outpatient Prescriptions Medication Sig Dispense Refill  metoprolol succinate (TOPROL-XL) 50 mg XL tablet take 1 tablet by mouth once daily 30 Tab 0  
 busPIRone (BUSPAR) 5 mg tablet take 1 tablet by mouth twice a day 60 Tab 3  
 levothyroxine (SYNTHROID) 50 mcg tablet take 1 tablet by mouth once daily (BEFORE BREAKFAST) 90 Tab 1  
 metFORMIN ER (GLUCOPHAGE XR) 750 mg tablet take 1 tablet by mouth twice a day 180 Tab 0  
 benazepril (LOTENSIN) 20 mg tablet Take 1 Tab by mouth daily. 90 Tab 1  
 gabapentin (NEURONTIN) 100 mg capsule Take 1 Cap by mouth two (2) times a day. 60 Cap 3  
 methylPREDNISolone (MEDROL DOSEPACK) 4 mg tablet Per pack 1 Dose Pack 0  
 glipiZIDE SR (GLUCOTROL XL) 2.5 mg CR tablet Take 1 Tab by mouth daily. 30 Tab 0  
 furosemide (LASIX) 40 mg tablet Take 1 Tab by mouth two (2) times a day. 60 Tab 6  
 CONTOUR NEXT STRIPS strip TEST twice a day  0  
 MICROLET LANCET misc CHECK BLOOD SUGAR TWICE A DAY  1  
 metoprolol succinate (TOPROL-XL) 50 mg XL tablet Take 1 Tab by mouth daily. 30 Tab 0  pravastatin (PRAVACHOL) 20 mg tablet TAKE ONE TABLET BY MOUTH NIGHTLY 90 Tab 1  
 pantoprazole (PROTONIX) 40 mg tablet Take 40 mg by mouth two (2) times a day.  L. acidoph & paracasei- S therm- Bifido (FRAN-Q/RISAQUAD) 8 billion cell cap cap Take 1 Cap by mouth daily. (Patient taking differently: Take 1 Cap by mouth two (2) times a day.) 60 Cap 0  
 sertraline (ZOLOFT) 100 mg tablet Take 1 Tab by mouth daily. 135 Tab 1  Cholecalciferol, Vitamin D3, (VITAMIN D3) 2,000 unit cap capsule Take 2,000 Units by mouth daily.  acetaminophen (TYLENOL) 500 mg tablet Take 500 mg by mouth every four (4) hours as needed for Pain.  ferrous sulfate (IRON) 325 mg (65 mg iron) tablet Take 325 mg by mouth every other day.  multivit-min-FA-lycopen-lutein (CENTRUM SILVER) 0.4-300-250 mg-mcg-mcg tab Take 1 Tab by mouth daily.  aspirin delayed-release 81 mg tablet Take 81 mg by mouth daily.  magnesium 250 mg tab Take 250 mg by mouth every evening. Past Surgical History:  
Procedure Laterality Date  CARDIAC SURG PROCEDURE UNLIST    
 cardioversion  CARDIAC SURG PROCEDURE UNLIST    
 watchman device  COLONOSCOPY N/A 12/28/2016 COLONOSCOPY performed by Fernando Zhu MD at [facility] ENDOSCOPY  
 HX CATARACT REMOVAL    
 both eyes  HX CHOLECYSTECTOMY  HX COLONOSCOPY  5/8/2013 Repeat in 5 years  HX CYST REMOVAL  10/15  
 on head  HX HYSTERECTOMY  HX ORTHOPAEDIC Bilateral   
 knee replacement to both knees  HX ORTHOPAEDIC    
 spacer btwn L4-5 and L5-6   
 HX PACEMAKER PLACEMENT    
 HX TONSILLECTOMY  HX UROLOGICAL    
 botox in bladder Lab Results Component Value Date/Time WBC 9.7 05/09/2018 01:10 PM  
HGB 11.7 05/09/2018 01:10 PM  
HCT 36.7 05/09/2018 01:10 PM  
PLATELET 800 43/43/4037 01:10 PM  
MCV 88 05/09/2018 01:10 PM  
 
Lab Results Component Value Date/Time Cholesterol, total 193 06/27/2018 01:06 PM  
HDL Cholesterol 66 06/27/2018 01:06 PM  
LDL, calculated 89 06/27/2018 01:06 PM  
LDL-C, External 77 10/18/2013 11:09 AM  
Triglyceride 188 (H) 06/27/2018 01:06 PM  
CHOL/HDL Ratio 1.9 09/08/2016 02:13 AM  
 
Lab Results Component Value Date/Time GFR est non-AA 41 (L) 06/27/2018 01:06 PM  
GFR est AA 48 (L) 06/27/2018 01:06 PM  
Creatinine 1.23 (H) 06/27/2018 01:06 PM  
BUN 24 06/27/2018 01:06 PM  
Sodium 142 06/27/2018 01:06 PM  
Potassium 5.0 06/27/2018 01:06 PM  
Chloride 100 06/27/2018 01:06 PM  
CO2 26 06/27/2018 01:06 PM  
Magnesium 1.7 04/09/2018 02:34 AM  
Phosphorus 3.3 09/07/2016 01:11 AM  
  
 
Review of Systems Respiratory: Negative for shortness of breath. Cardiovascular: Negative for chest pain. Psychiatric/Behavioral: Positive for depression. Physical Exam  
Constitutional: She is oriented to person, place, and time.  She appears well-developed and well-nourished. No distress. HENT:  
Head: Normocephalic and atraumatic. Right Ear: External ear normal.  
Left Ear: External ear normal.  
Mouth/Throat: Oropharynx is clear and moist. No oropharyngeal exudate. Eyes: Conjunctivae and EOM are normal. Right eye exhibits no discharge. Left eye exhibits no discharge. Neck: Normal range of motion. Neck supple. Cardiovascular: Normal rate, regular rhythm, normal heart sounds and intact distal pulses. Exam reveals no gallop and no friction rub. No murmur heard. Pulmonary/Chest: Effort normal and breath sounds normal. No respiratory distress. She has no wheezes. She has no rales. She exhibits no tenderness. Abdominal: Soft. She exhibits no distension and no mass. There is no tenderness. There is no rebound and no guarding. Musculoskeletal: Normal range of motion. She exhibits no edema, tenderness or deformity. Lymphadenopathy:  
  She has no cervical adenopathy. Neurological: She is alert and oriented to person, place, and time. Coordination abnormal.  
Monofilament nl BLE, good peripheral pulses, no ulcers Bunions BL Skin: Skin is warm and dry. No rash noted. She is not diaphoretic. No erythema. No pallor. Varicose veins Psychiatric: She has a normal mood and affect. Her behavior is normal.  
 
 
ASSESSMENT and PLAN high complex med decision making ICD-10-CM ICD-9-CM 1. Type 2 diabetes mellitus without complication, without long-term current use of insulin ([facility] Unknown control, pt stopped monitoring her BS, her last a1c not at goal and she gained 10 lbs, currently taking metformin 750mg BID, will plan on taking glipizide 2.5mg daily unless there is significant improvement on a1c, results should be back tomorrow H27.1 223.20 METABOLIC PANEL, BASIC HEMOGLOBIN A1C WITH EAG  
    DIABETES FOOT EXAM  
   CANCELED: CBC W/O DIFF 2. Paroxysmal atrial fibrillation (HCC) S/p watchman procedure, no longer on plavix, just on ASA, remains in nsr, UTD with Dr Ezra Sandoval clinic R06.7 633.57 METABOLIC PANEL, BASIC HEMOGLOBIN A1C WITH EAG  
   CANCELED: CBC W/O DIFF 3. Chronic combined systolic and diastolic congestive heart failure ([address] Stable sx, medically managed with metformin and beta blocker Z47.46 993.52 METABOLIC PANEL, BASIC  
  428.0 HEMOGLOBIN A1C WITH EAG  
   CANCELED: CBC W/O DIFF  
4. MALLORY (obstructive sleep apnea) Still has not set up appointment with sleep specialist and is not wearing a CPAP, reminded her O66.14 614.67 METABOLIC PANEL, BASIC HEMOGLOBIN A1C WITH EAG  
   CANCELED: CBC W/O DIFF 5. Mixed hyperlipidemia Controlled on pravachol 
 M27.4 915.2 METABOLIC PANEL, BASIC HEMOGLOBIN A1C WITH EAG  
   CANCELED: CBC W/O DIFF 6. Essential hypertension Controlled on benazepril 20mg and toprol XL 50mg U01 135.9 METABOLIC PANEL, BASIC HEMOGLOBIN A1C WITH EAG  
   CANCELED: CBC W/O DIFF 7. Hypothyroidism, adult Controlled on synthroid 50mcgs daily K05.6 315.9 METABOLIC PANEL, BASIC HEMOGLOBIN A1C WITH EAG  
   CANCELED: CBC W/O DIFF 8. Ataxia Chronic issue, currently doing PT for her wrist and when this is complete will start PT for her balance D77.1 609.5 METABOLIC PANEL, BASIC HEMOGLOBIN A1C WITH EAG  
   CANCELED: CBC W/O DIFF  
9. Gastroesophageal reflux disease without esophagitis Controlled on protonix BID, not having any breakthrough sx, will decrease to once daily dosing M29.7 670.20 METABOLIC PANEL, BASIC HEMOGLOBIN A1C WITH EAG  
   CANCELED: CBC W/O DIFF 10. Stress bladder incontinence, female Having more difficulty d/t being on chronic lasix, was previously seeing Dr Thompson Blinks and getting botox injections which helped, she will f/u with him to restart the botox injections Q84.5 325.6 METABOLIC PANEL, BASIC    HEMOGLOBIN A1C WITH EAG  
   CANCELED: CBC W/O DIFF  
 11. Severe obesity (BMI 35.0-39. 9) with comorbidity ([address] Wt up 10 lbs x lov, needs to focus more on portion control, discussed starting [address] to assist with this Q12.30 544.06 METABOLIC PANEL, BASIC HEMOGLOBIN A1C WITH EAG  
   CANCELED: CBC W/O DIFF 12. Neuropathy Improved with gabapentin Y34.5 360.6 METABOLIC PANEL, BASIC HEMOGLOBIN A1C WITH EAG  
   CANCELED: CBC W/O DIFF 13. Reactive depression Will increase zoloft to 150mg daily, currently on buspar 5mg BID which improves anxiety N74.4 108.6 METABOLIC PANEL, BASIC HEMOGLOBIN A1C WITH EAG  
   CANCELED: CBC W/O DIFF 14. Screening breast examination Screen Z12.31 V76.10 [facility] MAMMO BI SCREENING INCL CAD METABOLIC PANEL, BASIC HEMOGLOBIN A1C WITH EAG  
   CANCELED: CBC W/O DIFF Scribed by Lalitha Soliman of [address] [place] [address] [address], as dictated by Dr. Armando Damian. Current diagnosis and concerns discussed with pt at length. Pt understands risks and benefits or current treatment plan and medications, and accepts the treatment and medication with any possible risks. Pt asks appropriate questions, which were answered. Pt was instructed to call with any concerns or problems. This note will not be viewable in [address]

## 2018-09-26 NOTE — PATIENT INSTRUCTIONS
CHECK BLOOD SUGARS DAILY  
 
START GLIPIZIDE 2.5MG DAILY INCREASE ZOLOFT TO 150MG DAILY  
 
LABS TODAY DECREASE PROTONIX TO ONCE DAILY

## 2018-09-26 NOTE — MR AVS SNAPSHOT
[address] [facility] 
[phone] Patient: Sally Jalloh MRN: [id number] :1935 Visit Information Date & Time Provider Department Dept. Phone Encounter #  
 2018  2:45 PM Ciarra Guaman, [address] [id number] Follow-up Instructions Return in about 3 months (around 2018). Your Appointments 2018  8:00 AM  
PROCEDURE with [facility]) Appt Note: not an office visit, [facility]  
 [address] [facility]  
[phone] [address] [facility]  
  
    
 2019 10:30 AM  
PROCEDURE with PACEMAKER, [facility]) Appt Note: BSC DCPM 6mo check [id number] [facility]  
[phone] [address] [facility] Upcoming Health Maintenance Date Due Shingrix Vaccine Age 50> (1 of 2) 1987 EYE EXAM RETINAL OR DILATED Q1 3/20/2018 Influenza Age 5 to Adult 2018 MEDICARE YEARLY EXAM 2018 HEMOGLOBIN A1C Q6M 2018 GLAUCOMA SCREENING Q2Y 3/20/2019 FOOT EXAM Q1 2019 MICROALBUMIN Q1 2019 LIPID PANEL Q1 2019 DTaP/Tdap/Td series (2 - Td) 2025 Allergies as of 2018  Review Complete On: 2018 By: Ciarra Guaman MD  
  
 Severity Noted Reaction Type Reactions Procardia Xl [Nifedipine]  2014    Other (comments)  
 weakness Current Immunizations  Reviewed on 2017 Name Date Influenza High Dose Vaccine PF 2017, 2015 Influenza Vaccine 10/10/2014 12:38 PM  
 Influenza Vaccine (Quad) PF 2016 Pneumococcal Conjugate (PCV-13) 2016 Pneumococcal Polysaccharide (PPSV-23) 2018 Tdap 2015 Zoster Vaccine, Live 2016 Not reviewed this visit You Were Diagnosed With   
  
 Codes Comments Type 2 diabetes mellitus without complication, without long-term current use of insulin (HCC)    -  Primary ICD-10-CM: E11.9 ICD-9-CM: 250.00 Paroxysmal atrial fibrillation (HCC)     ICD-10-CM: I48.0 ICD-9-CM: 427.31 Chronic combined systolic and diastolic congestive heart failure (HCC)     ICD-10-CM: I50.42 
ICD-9-CM: 428.42, 428.0   
 MALLORY (obstructive sleep apnea)     ICD-10-CM: G47.33 
ICD-9-CM: 327.23 Mixed hyperlipidemia     ICD-10-CM: E78.2 ICD-9-CM: 272.2 Essential hypertension     ICD-10-CM: I10 
ICD-9-CM: 401.9 Hypothyroidism, adult     ICD-10-CM: E03.9 ICD-9-CM: 244.9 Ataxia     ICD-10-CM: R27.0 ICD-9-CM: 781.3 Gastroesophageal reflux disease without esophagitis     ICD-10-CM: K21.9 ICD-9-CM: 530.81 Stress bladder incontinence, female     ICD-10-CM: N39.3 ICD-9-CM: 625.6 Severe obesity (BMI 35.0-39. 9) with comorbidity ([facility]     ICD-10-CM: E66.01 
ICD-9-CM: 278.01 Neuropathy     ICD-10-CM: G62.9 ICD-9-CM: 355.9 Reactive depression     ICD-10-CM: F32.9 ICD-9-CM: 300.4 Screening breast examination     ICD-10-CM: Z12.31 
ICD-9-CM: V76.10 Vitals BP Pulse Temp Resp Height(growth percentile) Weight(growth percentile) 136/75 (BP 1 Location: Left arm, BP Patient Position: Sitting) 68 98 °F (36.7 °C) (Oral) 16 5' 1\" (1.549 m) 178 lb (80.7 kg) SpO2 BMI OB Status Smoking Status 97% 33.63 kg/m2 Hysterectomy Never Smoker Vitals History BMI and BSA Data Body Mass Index Body Surface Area  
 33.63 kg/m 2 1.86 m 2 Preferred Pharmacy Pharmacy Name Phone [facility] [address] [phone] Your Updated Medication List  
  
   
This list is accurate as of 9/26/18  3:02 PM.  Always use your most recent med list.  
  
  
  
  
 acetaminophen 500 mg tablet Commonly known as:  TYLENOL Take 500 mg by mouth every four (4) hours as needed for Pain. aspirin delayed-release 81 mg tablet Take 81 mg by mouth daily. benazepril 20 mg tablet Commonly known as:  LOTENSIN Take 1 Tab by mouth daily. busPIRone 5 mg tablet Commonly known as:  BUSPAR  
take 1 tablet by mouth twice a day CENTRUM SILVER 0.4-300-250 mg-mcg-mcg Tab Generic drug:  multivit-min-FA-lycopen-lutein Take 1 Tab by mouth daily. Cholecalciferol (Vitamin D3) 2,000 unit Cap capsule Commonly known as:  VITAMIN D3 Take 2,000 Units by mouth daily. furosemide 40 mg tablet Commonly known as:  LASIX Take 1 Tab by mouth two (2) times a day.  
  
 gabapentin 100 mg capsule Commonly known as:  NEURONTIN Take 1 Cap by mouth two (2) times a day. glipiZIDE SR 2.5 mg CR tablet Commonly known as:  GLUCOTROL XL Take 1 Tab by mouth daily. Iron 325 mg (65 mg iron) tablet Generic drug:  ferrous sulfate Take 325 mg by mouth every other day. L. acidoph & paracasei- S therm- Bifido 8 billion cell Cap cap Commonly known as:  FRAN-Q/RISAQUAD Take 1 Cap by mouth daily. levothyroxine 50 mcg tablet Commonly known as:  SYNTHROID  
take 1 tablet by mouth once daily (BEFORE BREAKFAST)  
  
 magnesium 250 mg Tab Take 250 mg by mouth every evening. metFORMIN  mg tablet Commonly known as:  GLUCOPHAGE XR  
take 1 tablet by mouth twice a day  
  
 metoprolol succinate 50 mg XL tablet Commonly known as:  TOPROL-XL Take 1 Tab by mouth daily. [facility] Generic drug:  Lancets CHECK BLOOD SUGAR TWICE A DAY pantoprazole 40 mg tablet Commonly known as:  PROTONIX Take 40 mg by mouth two (2) times a day. pravastatin 20 mg tablet Commonly known as:  PRAVACHOL  
TAKE ONE TABLET BY MOUTH NIGHTLY  
  
 sertraline 100 mg tablet Commonly known as:  ZOLOFT Take 1 Tab by mouth daily. We Performed the Following HEMOGLOBIN A1C WITH EAG [76657 CPT(R)]  DIABETES FOOT EXAM [HM7 Custom] METABOLIC PANEL, BASIC [90328 CPT(R)] Follow-up Instructions Return in about 3 months (around 12/26/2018). To-Do List   
 09/26/2018 Imaging:  [facility] MAMMO BI SCREENING INCL CAD Patient Instructions CHECK BLOOD SUGARS DAILY  
 
START GLIPIZIDE 2.5MG DAILY INCREASE ZOLOFT TO 150MG DAILY  
 
LABS TODAY DECREASE PROTONIX TO ONCE DAILY Introducing \[facility]\"" & [facility] SERVICES! Dear Elisabeth Wells: Thank you for requesting a [employer] account. Our records indicate that you already have an active [employer] account. You can access your account anytime at [email or web address] Did you know that you can access your hospital and ER discharge instructions at any time in [employer]? You can also review all of your test results from your hospital stay or ER visit. Additional Information If you have questions, please visit the Frequently Asked Questions section of the [employer] website at [email or web address]. Remember, [employer] is NOT to be used for urgent needs. For medical emergencies, dial 911. Now available from your iPhone and Android! Please provide this summary of care documentation to your next provider. Your primary care clinician is listed as Jim Virk. If you have any questions after today's visit, please call [phone].

## 2018-09-27 ENCOUNTER — TELEPHONE (OUTPATIENT)
Dept: INTERNAL MEDICINE CLINIC | Age: 83
End: 2018-09-27

## 2018-09-27 LAB
BUN SERPL-MCNC: 17 MG/DL (ref 8–27)
BUN/CREAT SERPL: 15 (ref 12–28)
CALCIUM SERPL-MCNC: 9.2 MG/DL (ref 8.7–10.3)
CHLORIDE SERPL-SCNC: 103 MMOL/L (ref 96–106)
CO2 SERPL-SCNC: 22 MMOL/L (ref 20–29)
CREAT SERPL-MCNC: 1.1 MG/DL (ref 0.57–1)
EST. AVERAGE GLUCOSE BLD GHB EST-MCNC: 151 MG/DL
GLUCOSE SERPL-MCNC: 87 MG/DL (ref 65–99)
HBA1C MFR BLD: 6.9 % (ref 4.8–5.6)
POTASSIUM SERPL-SCNC: 4.8 MMOL/L (ref 3.5–5.2)
SODIUM SERPL-SCNC: 140 MMOL/L (ref 134–144)

## 2018-09-27 NOTE — PROGRESS NOTES
Called, spoke to pt. Two pt identifiers confirmed. Pt informed per Dr. Fernie Leon scan not gradable-see ophtho in next few months. Pt states having a pended appt for ophtho. Pt verbalized understanding of information discussed w/ no further questions at this time.

## 2018-09-27 NOTE — PROGRESS NOTES
Called, spoke to pt. Two pt identifiers confirmed. Pt informed per Dr. Fernie Leon A1c is okay/improved-STAY OFF glipizide for now, just continue metformin for DM. Pt informed per Dr. Fernie Leon start monitoring glucose again. Pt verbalized understanding of information discussed w/ no further questions at this time.

## 2018-09-27 NOTE — PROGRESS NOTES
A1c is okay/improved,  Have her STAY OFF glipizide for now, just continue metformin for DM Start monitoring glucose again

## 2018-10-03 RX ORDER — METOPROLOL SUCCINATE 50 MG/1
TABLET, EXTENDED RELEASE ORAL
Qty: 90 TAB | Refills: 0 | Status: SHIPPED | OUTPATIENT
Start: 2018-10-03 | End: 2018-11-08 | Stop reason: SDUPTHER

## 2018-10-03 RX ORDER — SERTRALINE HYDROCHLORIDE 100 MG/1
TABLET, FILM COATED ORAL
Qty: 90 TAB | Refills: 0 | Status: SHIPPED | OUTPATIENT
Start: 2018-10-03 | End: 2019-04-12 | Stop reason: SDUPTHER

## 2018-10-03 RX ORDER — GABAPENTIN 100 MG/1
CAPSULE ORAL
Qty: 60 CAP | Refills: 0 | Status: SHIPPED | OUTPATIENT
Start: 2018-10-03 | End: 2018-12-04 | Stop reason: SDUPTHER

## 2018-10-04 RX ORDER — METFORMIN HYDROCHLORIDE 750 MG/1
TABLET, EXTENDED RELEASE ORAL
Qty: 180 TAB | Refills: 0 | Status: SHIPPED | OUTPATIENT
Start: 2018-10-04 | End: 2019-01-30 | Stop reason: SDUPTHER

## 2018-10-05 RX ORDER — PRAVASTATIN SODIUM 20 MG/1
TABLET ORAL
Qty: 90 TAB | Refills: 0 | Status: SHIPPED | OUTPATIENT
Start: 2018-10-05 | End: 2018-10-19 | Stop reason: SDUPTHER

## 2018-10-05 RX ORDER — CLOPIDOGREL BISULFATE 75 MG/1
TABLET ORAL
Qty: 30 TAB | Refills: 0 | Status: SHIPPED | OUTPATIENT
Start: 2018-10-05 | End: 2019-01-30

## 2018-10-10 ENCOUNTER — HOSPITAL ENCOUNTER (OUTPATIENT)
Dept: MAMMOGRAPHY | Age: 83
Discharge: HOME | End: 2018-10-10
Attending: INTERNAL MEDICINE
Payer: MEDICARE

## 2018-10-10 DIAGNOSIS — Z12.39 SCREENING BREAST EXAMINATION: ICD-10-CM

## 2018-10-10 PROCEDURE — 77067 SCR MAMMO BI INCL CAD: CPT

## 2018-10-17 ENCOUNTER — TELEPHONE (OUTPATIENT)
Dept: INTERNAL MEDICINE CLINIC | Age: 83
End: 2018-10-17

## 2018-10-17 NOTE — LETTER
10/17/2018 2:22 PM 
 
Ms. Alice Britton [address] [address] Current Outpatient Medications:  
  clopidogrel (PLAVIX) 75 mg tab, TAKE 1 TABLET BY MOUTH EVERY DAY, Disp: 30 Tab, Rfl: 0 
  pravastatin (PRAVACHOL) 20 mg tablet, TAKE 1 TABLET BY MOUTH EVERY DAY, Disp: 90 Tab, Rfl: 0 
  metFORMIN ER (GLUCOPHAGE XR) 750 mg tablet, TAKE 1 TABLET BY MOUTH TWICE DAILY, Disp: 180 Tab, Rfl: 0 
  gabapentin (NEURONTIN) 100 mg capsule, TAKE 1 CAPSULE BY MOUTH TWICE DAILY, Disp: 60 Cap, Rfl: 0 
  metoprolol succinate (TOPROL-XL) 50 mg XL tablet, TAKE 1 TABLET BY MOUTH DAILY, Disp: 90 Tab, Rfl: 0 
  sertraline (ZOLOFT) 100 mg tablet, TAKE 1 TABLET BY MOUTH DAILY, Disp: 90 Tab, Rfl: 0 
  busPIRone (BUSPAR) 5 mg tablet, take 1 tablet by mouth twice a day, Disp: 60 Tab, Rfl: 3 
  levothyroxine (SYNTHROID) 50 mcg tablet, take 1 tablet by mouth once daily (BEFORE BREAKFAST), Disp: 90 Tab, Rfl: 1 
  benazepril (LOTENSIN) 20 mg tablet, Take 1 Tab by mouth daily. , Disp: 90 Tab, Rfl: 1 
  glipiZIDE SR (GLUCOTROL XL) 2.5 mg CR tablet, Take 1 Tab by mouth daily. , Disp: 30 Tab, Rfl: 0 
  furosemide (LASIX) 40 mg tablet, Take 1 Tab by mouth two (2) times a day., Disp: 60 Tab, Rfl: 6 
  MICROLET LANCET misc, CHECK BLOOD SUGAR TWICE A DAY, Disp: , Rfl: 1 
  pantoprazole (PROTONIX) 40 mg tablet, Take 40 mg by mouth two (2) times a day., Disp: , Rfl:  
  L. acidoph & paracasei- S therm- Bifido (FRAN-Q/RISAQUAD) 8 billion cell cap cap, Take 1 Cap by mouth daily. (Patient taking differently: Take 1 Cap by mouth two (2) times a day.), Disp: 60 Cap, Rfl: 0 
  Cholecalciferol, Vitamin D3, (VITAMIN D3) 2,000 unit cap capsule, Take 2,000 Units by mouth daily. , Disp: , Rfl:  
  acetaminophen (TYLENOL) 500 mg tablet, Take 500 mg by mouth every four (4) hours as needed for Pain., Disp: , Rfl:  
  ferrous sulfate (IRON) 325 mg (65 mg iron) tablet, Take 325 mg by mouth every other day., Disp: , Rfl:  
   multivit-min-FA-lycopen-lutein (CENTRUM SILVER) 0.4-300-250 mg-mcg-mcg tab, Take 1 Tab by mouth daily. , Disp: , Rfl:  
  aspirin delayed-release 81 mg tablet, Take 81 mg by mouth daily. , Disp: , Rfl:  
  magnesium 250 mg tab, Take 250 mg by mouth every evening., Disp: , Rfl:   
 
 
 
Sincerely, 
 
 
Elisa Worthington MD

## 2018-10-17 NOTE — TELEPHONE ENCOUNTER
#[phone] [facility] requesting current med list to be faxed to them as pt's are new to their store.    Fax #[phone]

## 2018-10-19 RX ORDER — GLIPIZIDE 2.5 MG/1
2.5 TABLET, EXTENDED RELEASE ORAL DAILY
Qty: 30 TAB | Refills: 0 | Status: SHIPPED | OUTPATIENT
Start: 2018-10-19 | End: 2019-01-30

## 2018-10-19 RX ORDER — PRAVASTATIN SODIUM 20 MG/1
TABLET ORAL
Qty: 90 TAB | Refills: 0 | Status: SHIPPED | OUTPATIENT
Start: 2018-10-19 | End: 2019-07-12 | Stop reason: SDUPTHER

## 2018-10-19 RX ORDER — GLIPIZIDE 2.5 MG/1
TABLET, EXTENDED RELEASE ORAL
Qty: 90 TAB | Refills: 0 | OUTPATIENT
Start: 2018-10-19

## 2018-10-19 RX ORDER — LANCETS
EACH MISCELLANEOUS
Qty: 1 EACH | Refills: 1 | Status: SHIPPED | OUTPATIENT
Start: 2018-10-19 | End: 2019-01-30 | Stop reason: SDUPTHER

## 2018-10-19 NOTE — TELEPHONE ENCOUNTER
PCP: Sun Robles MD    Last appt: 9/26/2018  Future Appointments   Date Time Provider Paul Davis   [facility]  [facility]   12/19/2018  3:00 PM Sun Robles MD [address]   2/14/2019 10:30 AM PACEMAKER, [facility] SCHED       Requested Prescriptions     Pending Prescriptions Disp Refills    pravastatin (PRAVACHOL) 20 mg tablet 90 Tab 0     Sig: TAKE 1 TABLET BY MOUTH EVERY DAY    glipiZIDE SR (GLUCOTROL XL) 2.5 mg CR tablet 30 Tab 0     Sig: Take 1 Tab by mouth daily.     MICROLET LANCET misc 1 Each 1     Sig: CHECK BLOOD SUGAR TWICE A DAY

## 2018-10-23 ENCOUNTER — TELEPHONE (OUTPATIENT)
Dept: INTERNAL MEDICINE CLINIC | Age: 83
End: 2018-10-23

## 2018-10-23 NOTE — TELEPHONE ENCOUNTER
Called, spoke to pt. Two pt identifiers confirmed. Pt informed per Dr. Nicolasa Reed to not fill glipizide and that it should not have been sent in. Pt informed LPN RR will contact the pharmacy and d/c the order. Pt verbalized understanding of information discussed w/ no further questions at this time.

## 2018-10-23 NOTE — TELEPHONE ENCOUNTER
----- Message from Marisela Ocampo sent at 10/23/2018  9:06 AM EDT -----  Regarding: Dr. Edenilson Corral requesting a call back regarding confusion with her prescriptions at her  Home	[place].  Best contact [phone]    Copy/paste [facility]

## 2018-11-08 RX ORDER — METOPROLOL SUCCINATE 50 MG/1
TABLET, EXTENDED RELEASE ORAL
Qty: 90 TAB | Refills: 2 | Status: SHIPPED | OUTPATIENT
Start: 2018-11-08 | End: 2019-01-02 | Stop reason: SDUPTHER

## 2018-11-16 ENCOUNTER — TELEPHONE (OUTPATIENT)
Dept: INTERNAL MEDICINE CLINIC | Age: 83
End: 2018-11-16

## 2018-11-16 NOTE — TELEPHONE ENCOUNTER
Pt is requesting a callback to discuss changes in their insurance.  Best contact [phone]    Copy/paste Juan Lucero

## 2018-11-19 NOTE — TELEPHONE ENCOUNTER
Called pt and spoke with in regards to insurance. Explained to her that we do accept her insurances. No other questions and call ended.

## 2018-12-04 RX ORDER — GABAPENTIN 100 MG/1
CAPSULE ORAL
Qty: 60 CAP | Refills: 0 | Status: SHIPPED | OUTPATIENT
Start: 2018-12-04 | End: 2019-01-02 | Stop reason: SDUPTHER

## 2018-12-13 ENCOUNTER — HOSPITAL ENCOUNTER (OUTPATIENT)
Dept: LAB | Age: 83
Discharge: HOME | End: 2018-12-13
Payer: MEDICARE

## 2018-12-13 DIAGNOSIS — E11.9 TYPE 2 DIABETES MELLITUS WITHOUT COMPLICATION, WITHOUT LONG-TERM CURRENT USE OF INSULIN (HCC): Primary | ICD-10-CM

## 2018-12-13 PROCEDURE — 80048 BASIC METABOLIC PNL TOTAL CA: CPT

## 2018-12-13 PROCEDURE — 85027 COMPLETE CBC AUTOMATED: CPT

## 2018-12-13 PROCEDURE — 36415 COLL VENOUS BLD VENIPUNCTURE: CPT

## 2018-12-13 PROCEDURE — 83036 HEMOGLOBIN GLYCOSYLATED A1C: CPT

## 2018-12-14 LAB
BUN SERPL-MCNC: 28 MG/DL (ref 8–27)
BUN/CREAT SERPL: 24 (ref 12–28)
CALCIUM SERPL-MCNC: 9.1 MG/DL (ref 8.7–10.3)
CHLORIDE SERPL-SCNC: 105 MMOL/L (ref 96–106)
CO2 SERPL-SCNC: 22 MMOL/L (ref 20–29)
CREAT SERPL-MCNC: 1.17 MG/DL (ref 0.57–1)
ERYTHROCYTE [DISTWIDTH] IN BLOOD BY AUTOMATED COUNT: 14.7 % (ref 12.3–15.4)
EST. AVERAGE GLUCOSE BLD GHB EST-MCNC: 160 MG/DL
GLUCOSE SERPL-MCNC: 203 MG/DL (ref 65–99)
HBA1C MFR BLD: 7.2 % (ref 4.8–5.6)
HCT VFR BLD AUTO: 35 % (ref 34–46.6)
HGB BLD-MCNC: 11.2 G/DL (ref 11.1–15.9)
MCH RBC QN AUTO: 30.1 PG (ref 26.6–33)
MCHC RBC AUTO-ENTMCNC: 32 G/DL (ref 31.5–35.7)
MCV RBC AUTO: 94 FL (ref 79–97)
PLATELET # BLD AUTO: 249 X10E3/UL (ref 150–379)
POTASSIUM SERPL-SCNC: 4.7 MMOL/L (ref 3.5–5.2)
RBC # BLD AUTO: 3.72 X10E6/UL (ref 3.77–5.28)
SODIUM SERPL-SCNC: 140 MMOL/L (ref 134–144)
WBC # BLD AUTO: 9 X10E3/UL (ref 3.4–10.8)

## 2019-01-02 RX ORDER — METOPROLOL SUCCINATE 50 MG/1
TABLET, EXTENDED RELEASE ORAL
Qty: 30 TAB | Refills: 0 | Status: SHIPPED | OUTPATIENT
Start: 2019-01-02 | End: 2019-01-30 | Stop reason: SDUPTHER

## 2019-01-02 RX ORDER — BUSPIRONE HYDROCHLORIDE 5 MG/1
TABLET ORAL
Qty: 60 TAB | Refills: 0 | Status: SHIPPED | OUTPATIENT
Start: 2019-01-02 | End: 2019-01-30 | Stop reason: SDUPTHER

## 2019-01-02 RX ORDER — PRAVASTATIN SODIUM 20 MG/1
TABLET ORAL
Qty: 90 TAB | Refills: 0 | Status: SHIPPED | OUTPATIENT
Start: 2019-01-02

## 2019-01-02 RX ORDER — GABAPENTIN 100 MG/1
CAPSULE ORAL
Qty: 60 CAP | Refills: 0 | Status: SHIPPED | OUTPATIENT
Start: 2019-01-02 | End: 2019-01-30 | Stop reason: SDUPTHER

## 2019-01-29 ENCOUNTER — PATIENT OUTREACH (OUTPATIENT)
Dept: INTERNAL MEDICINE CLINIC | Age: 84
End: 2019-01-29

## 2019-01-29 ENCOUNTER — DOCUMENTATION ONLY (OUTPATIENT)
Dept: INTERNAL MEDICINE CLINIC | Age: 84
End: 2019-01-29

## 2019-01-29 NOTE — PROGRESS NOTES
Patient has maintained routine follow-up with Cardiology and PCP; also has future follow-up scheduled. Episode Resolved. Future Appointments: 
Future Appointments Date Time Provider Paul Susan 1/30/2019  1:00 PM MD [address]  
2/14/2019 10:30 AM PACEMAKER, [address] Last Appointment With Me: 
Visit date not found Last Appointment My Department: 
9/26/2018

## 2019-01-29 NOTE — PROGRESS NOTES
Medicare Part B Preventive Services Limitations Recommendation Scheduled   Bone Mass Measurement  (age 72 & older, biennial) Requires diagnosis related to osteoporosis or estrogen deficiency. Biennial benefit unless patient has history of long-term glucocorticoid tx or baseline is needed because initial test was by other method Completed 11/2017     Recommended every 2 years Due 11/2019   Cardiovascular Screening Blood Tests (every 5 years)  Total cholesterol, HDL, Triglycerides Order as a panel if possible Completed 6/2018     Recommended annually Due 6/2019   Colorectal Cancer Screening  -Fecal occult blood test (annual)  -Flexible sigmoidoscopy (5y)  -Screening colonoscopy (10y)  -Barium Enema    Completed 12/28/16 with Dr. Raymond Pac      Repeat in 10 years Due 12/2026   Counseling to Prevent Tobacco Use (up to 8 sessions per year)  - Counseling greater than 3 and up to 10 minutes  - Counseling greater than 10 minutes Patients must be asymptomatic of tobacco-related conditions to receive as preventive service N/A N/A   Diabetes Screening Tests (at least every 3 years, Medicare covers annually or at 6-month intervals for prediabetic patients)      Fasting blood sugar (FBS) or glucose tolerance test (GTT) Patient must be diagnosed with one of the following:  -Hypertension, Dyslipidemia, obesity, previous impaired FBS or GTT  Or any two of the following: overweight, FH of diabetes, age ? 72, history of gestational diabetes, birth of baby weighing more than 9 pounds Completed 12/2018 with A1C 7.2      Recommended every 3-6 months for diabetics Due 3/2019 - 6/2019   Diabetes Self-Management Training (DSMT) (no USPSTF recommendation) Requires referral by treating physician for patient with diabetes or renal disease. 10 hours of initial DSMT session of no less than 30 minutes each in a continuous 12-month period. 2 hours of follow-up DSMT in subsequent years.  Pt attended this class with her  in the past. Roberto Peters are eligible for this class every 2 years. Please let us know if you are interested in this class. Glaucoma Screening (no USPSTF recommendation) Diabetes mellitus, family history, , age 48 or over,  American, age 72 or over Completed 3/20/17      Recommended annually  Due now   Human Immunodeficiency Virus (HIV) Screening (annually for increased risk patients)  HIV-1 and HIV-2 by EIA, SIMON, rapid antibody test, or oral mucosa transudate Patient must be at increased risk for HIV infection per USPSTF guidelines or pregnant. Tests covered annually for patients at increased risk. Pregnant patients may receive up to 3 test during pregnancy. N/A N/A   Medical Nutrition Therapy (MNT) (for diabetes or renal disease not recommended schedule) Requires referral by treating physician for patient with diabetes or renal disease. Can be provided in same year as diabetes self-management training (DSMT), and CMS recommends medical nutrition therapy take place after DSMT. Up to 3 hours for initial year and 2 hours in subsequent years. Pt attended this class with her  in the past. Lisa Best are eligible for this class every 2 years. Please let us know if you are interested in this class. Seasonal Influenza Vaccination (annually)    Completed 9/2018      Recommended annually Due Fall 2019         Pneumococcal Vaccination (once after 72)    Prevnar 13: 8/18/16     Pneumovax: 2012     Both recommended once over the age of 72 Completed     Completed   Hepatitis B Vaccinations (if medium/high risk) Medium/high risk factors:  End-stage renal disease,  Hemophiliacs who received Factor VIII or IX concentrates, Clients of institutions for the mentally retarded, Persons who live in the same house as a HepB virus carrier, Homosexual men, Illicit injectable drug abusers. N/A N/A   Screening Mammography (biennial age 54-69)?  Annually (age 36 or over) Completed 10/2018     Recommended annually Due 10/2019   Screening Pap Tests and Pelvic Examination (up to age 72 and after 72 if unknown history or abnormal study last 10 years) Every 24 months except high risk Completed 2017    Recommended every other year Due 2019   Ultrasound Screening for Abdominal Aortic Aneurysm (AAA) (once) Patient must be referred through [facility] and not have had a screening for abdominal aortic aneurysm before under Medicare.   Limited to patients who meet one of the following criteria:  - Men who are 73-68 years old and have smoked more than 100 cigarettes in their lifetime.  -Anyone with a FH of AAA  -Anyone recommended for screening by USPSTF N/A N/A

## 2019-01-30 ENCOUNTER — OFFICE VISIT (OUTPATIENT)
Dept: INTERNAL MEDICINE CLINIC | Age: 84
End: 2019-01-30

## 2019-01-30 VITALS
OXYGEN SATURATION: 95 % | DIASTOLIC BLOOD PRESSURE: 77 MMHG | RESPIRATION RATE: 18 BRPM | HEIGHT: 61 IN | BODY MASS INDEX: 34.74 KG/M2 | TEMPERATURE: 97.6 F | HEART RATE: 69 BPM | WEIGHT: 184 LBS | SYSTOLIC BLOOD PRESSURE: 133 MMHG

## 2019-01-30 DIAGNOSIS — E11.9 TYPE 2 DIABETES MELLITUS WITHOUT COMPLICATION, WITHOUT LONG-TERM CURRENT USE OF INSULIN (HCC): Primary | ICD-10-CM

## 2019-01-30 DIAGNOSIS — E78.2 MIXED HYPERLIPIDEMIA: ICD-10-CM

## 2019-01-30 DIAGNOSIS — E03.9 HYPOTHYROIDISM, ADULT: ICD-10-CM

## 2019-01-30 DIAGNOSIS — I48.0 PAROXYSMAL ATRIAL FIBRILLATION (HCC): ICD-10-CM

## 2019-01-30 DIAGNOSIS — N39.3 STRESS INCONTINENCE OF URINE: ICD-10-CM

## 2019-01-30 DIAGNOSIS — I50.42 CHRONIC COMBINED SYSTOLIC AND DIASTOLIC CONGESTIVE HEART FAILURE (HCC): ICD-10-CM

## 2019-01-30 DIAGNOSIS — R27.0 ATAXIA: ICD-10-CM

## 2019-01-30 DIAGNOSIS — G62.9 NEUROPATHY: ICD-10-CM

## 2019-01-30 DIAGNOSIS — I10 ESSENTIAL HYPERTENSION: ICD-10-CM

## 2019-01-30 DIAGNOSIS — G45.9 TIA (TRANSIENT ISCHEMIC ATTACK): ICD-10-CM

## 2019-01-30 DIAGNOSIS — G47.33 OSA (OBSTRUCTIVE SLEEP APNEA): ICD-10-CM

## 2019-01-30 DIAGNOSIS — E66.01 SEVERE OBESITY (BMI 35.0-39.9) WITH COMORBIDITY (HCC): ICD-10-CM

## 2019-01-30 DIAGNOSIS — M48.02 CERVICAL STENOSIS OF SPINE: ICD-10-CM

## 2019-01-30 DIAGNOSIS — Z00.00 MEDICARE ANNUAL WELLNESS VISIT, SUBSEQUENT: ICD-10-CM

## 2019-01-30 RX ORDER — LANCETS
EACH MISCELLANEOUS
Qty: 1 EACH | Refills: 1 | Status: SHIPPED | OUTPATIENT
Start: 2019-01-30

## 2019-01-30 RX ORDER — METOPROLOL SUCCINATE 50 MG/1
TABLET, EXTENDED RELEASE ORAL
Qty: 90 TAB | Refills: 1 | Status: SHIPPED | OUTPATIENT
Start: 2019-01-30 | End: 2019-08-14 | Stop reason: SDUPTHER

## 2019-01-30 RX ORDER — GABAPENTIN 100 MG/1
CAPSULE ORAL
Qty: 180 CAP | Refills: 1 | Status: SHIPPED | OUTPATIENT
Start: 2019-01-30 | End: 2019-07-08 | Stop reason: SDUPTHER

## 2019-01-30 RX ORDER — BENAZEPRIL HYDROCHLORIDE 20 MG/1
20 TABLET ORAL DAILY
Qty: 90 TAB | Refills: 1 | Status: SHIPPED | OUTPATIENT
Start: 2019-01-30 | End: 2019-08-28 | Stop reason: SDUPTHER

## 2019-01-30 RX ORDER — METFORMIN HYDROCHLORIDE 750 MG/1
TABLET, EXTENDED RELEASE ORAL
Qty: 180 TAB | Refills: 1 | Status: SHIPPED | OUTPATIENT
Start: 2019-01-30 | End: 2019-08-14 | Stop reason: SDUPTHER

## 2019-01-30 RX ORDER — BUSPIRONE HYDROCHLORIDE 5 MG/1
TABLET ORAL
Qty: 60 TAB | Refills: 1 | Status: SHIPPED | OUTPATIENT
Start: 2019-01-30 | End: 2019-07-24 | Stop reason: SDUPTHER

## 2019-01-30 NOTE — PROGRESS NOTES
HISTORY OF PRESENT ILLNESS 
Donnie Mcgill is a 82 y.o. female. HPI Last here 9/26/18. Pt is here for routine care. Pt ambulates with a cane. 
   
BP is 133/77 Has not been checking sugar recently Struggles with her machine Continues toprol XL 50mg daily She also takes lasix 40mg once daily  --no swelling, urinating a lot she struggles with taking this b/c does not like the freq urination but legs have been fine Has apopintment with greg green urogyn for freq urination coming up in feb Using lots of depends. Lov, decreased benazepril to 20mg daily Not light headed anymore but no home readings to review Recall she has an element of [facility].    
Pt has been taking her BS recently Running 112, 114 , 125, 106, 108, 120. All good all am readings, no evening readings. Advised her to check BS daily Continues on metformin XR 750mg BID Pt is not taking glipizide 2.5mg at this time - discussed that she can stay off of this Wt today is 184 lbs --up 6 lbs x lov Pt reports that she has been eating healthy foods--cutting out sweets She states she does not eat very much, just half of her meals--frustrated at not being able to loose wt and wt is up Discussed WW--too expensive for her right now Discussed nutritionist 
Discussed diet and w/l  
  
Reviewed labs 12/18 
  
Pt saw Dr. Dorrine Najjar (ortho) for a h/o broken finger on her R hand Reviewed notes 11/6/18: Clinically the fracture seems healed so I think continuing full activities as tolerated would be fine. She does desire an injection for the CTS today. R/b/a d/w her and she consents to proceed. I performed this today under sterile conditions and she tolerated it well. F/u prn. Continue therapy for digital stiffness until graduates. PT is helping, pain improved 
  
Pt saw Dr. Aleksandr Cerrato (infec dis) --related to infected pacemaker, d/c from care Last visit was 3/29/18 
   
Pt follows with Dr. Lucille Morley (cardio) Reviewed notes 8/16/18 with AVERY Salvador: Ms. Treva Flores is here for pacemaker follow up. She is doing well and denies cardiac complaints.  Device interrogation demonstrates normal functioning, 91% AP and 100% RVP.  Repeat echocardiogram demonstrated improvement in her EF to 55%. She can discontinue Plavix s/p Watchman device. Continue aspirin and all other medical therapy and follow up in one year. Continue medical management for HTN.    
Pt follows with Dr. Brunilda Rainey (cardio) Last visit was 12/13/17 for a watchman procedure Continues on ASA Pt is no longer on plavix 
   
Pt follows with Dr. Laura Gan (neuro)  For ataxia Missed her July appointment and will reschedule with new neurology d/t dr nilson cariastive 
 
   
Pt follows with Dr. Richard Phillips (neurosurg) Pt has seen this physician earlier 2018 Had surgery in the past 
   
Pt follows with Dr. Jorge Alberto Sheppard (uro) for urinary incontinence Last visit was 9/11/17 Pt has had botox in the past which helped her Has f/u for this pending --she will call to schedule as she is having difficulty controlling urine Using lots f depends currently Discussed that botox would be OK to take with lasix 
   
Pt follows with Dr. Yolanda Dunham (ortho) Last visit was 6/17 
   
Pt follows with Dr. June Perez (ENT) Pt uses a neti pot once daily  
   
Pt follows with Dr. Bruce Tinoco (GI) Last visit was 10/17 Continues on protonix 40mg now once daily for heartburn, which works well, no breakthrough Recall pt has celiac sprue and is somewhat compliant with her gluten-free diet 
   
Continues on a daily probiotic  
   
Pt is no longer taking iron Recall pt has a h/o mild anemia, which is stable 
   
Continues on pravachol 20mg for cholesterol  
   
Continues on 50mcg synthroid daily 
   
Continues on zoloft 150mg qAM for depression Overall improved She also takes low dose buspar 5mg BID for anxiety, which works well, happy with dose Was down around xmas but this improved, was sad sister was gone Overall improved Reports there being many stressful events in her life, such as moving, gaining wt, and her sister passing away in 6/18 
  
Continues on gabapentin 100mg BID for neuropathy, which works well However she states she has not recently been bothered by this sx 
   
Pt is not using her CPAP for MALLORY Pt has not had a sleep study as of yet 1/19 Advised her to schedule a sleep study again Discussed PT for balance Pt states that she thinks her balance is doing well She sometimes walks without her cane in her house Reviewed mammo 10/18: nl 
   
ACP on file. SDMs are her  Fortino Quan) and sister Zenon Guerrier). 
  
Recall she used to follow with Dr. Iza Villanueva (neurosurg).    
PREVENTIVE:   
Colonoscopy: 12/28/16, Dr. Stephen Sher, repeat 10 years EGD: 12/16, Dr. Stephen Sher Pap: 2017, Dr. Suzie Koenig 
Mammogram:10/18, negative Dexa: 11/20/17, nl  
Tdap: 4/15   
Pneumovax: 05/09/18   
Prevnar 13: 8/16 Zostavax: 2/29/16 Shingrix: ordered 05/09/18  
Flu shot: 9/18 Eye exam: Dr. Jesús Corral, 3/2017, mild diabetic retinopathy in R eye, retinal scan ordered 09/26/18, scheduled for 2/19 Foot exam: 09/26/18 Microalbumin: 6/18 A1c:  5/16 6.5, 8/16 6.8, 6/17 6.9, 9/17 6.3, 10/17 6.7, 12/17 6.3, 3/18 6.6, 6/18 7.3, 12/18 7.2 Lipids: 6/18 LDL 89 Patient Active Problem List  
 Diagnosis Date Noted  Chronic combined systolic and diastolic congestive heart failure ([address] 05/18/2018  Acute on chronic diastolic (congestive) heart failure ([address] 04/07/2018  GI bleed 04/07/2018  Severe obesity (BMI 35.0-39. 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/2015  ACP (advance care planning) 11/11/2015  Hypothyroidism, adult 07/31/2015  Type 2 diabetes mellitus without complication ([facility] 79/65/5381  Essential hypertension 04/27/2015  MALLORY (obstructive sleep apnea) 07/09/2014  Hyperlipidemia 07/09/2014  Sarcoid 07/09/2014  Stress bladder incontinence, female 07/09/2014  Obesity 07/09/2014  TIA (transient ischemic attack) 07/09/2014  AR (aortic regurgitation) 07/09/2014  Mitral valve insufficiency 07/09/2014  Cervical stenosis of spine 07/09/2014 Current Outpatient Medications Medication Sig Dispense Refill  MICROLET LANCET misc CHECK BLOOD SUGAR TWICE A DAY 1 Each 1  
 glucose blood VI test strips (CONTOUR NEXT TEST STRIPS) strip Check glucose once daily 100 Strip 1  metFORMIN ER (GLUCOPHAGE XR) 750 mg tablet TAKE 1 TABLET BY MOUTH TWICE DAILY 180 Tab 1  
 busPIRone (BUSPAR) 5 mg tablet TAKE 1 TABLET BY MOUTH TWICE DAILY 60 Tab 1  
 metoprolol succinate (TOPROL-XL) 50 mg XL tablet TAKE 1 TABLET BY MOUTH DAILY 90 Tab 1  
 benazepril (LOTENSIN) 20 mg tablet Take 1 Tab by mouth daily. 90 Tab 1  
 gabapentin (NEURONTIN) 100 mg capsule TAKE 1 CAPSULE BY MOUTH TWICE DAILY 180 Cap 1  
 pravastatin (PRAVACHOL) 20 mg tablet TAKE 1 TABLET BY MOUTH EVERY DAY 90 Tab 0  pravastatin (PRAVACHOL) 20 mg tablet TAKE 1 TABLET BY MOUTH EVERY DAY 90 Tab 0  clopidogrel (PLAVIX) 75 mg tab TAKE 1 TABLET BY MOUTH EVERY DAY 30 Tab 0  
 sertraline (ZOLOFT) 100 mg tablet TAKE 1 TABLET BY MOUTH DAILY 90 Tab 0  
 levothyroxine (SYNTHROID) 50 mcg tablet take 1 tablet by mouth once daily (BEFORE BREAKFAST) 90 Tab 1  
 furosemide (LASIX) 40 mg tablet Take 1 Tab by mouth two (2) times a day. 60 Tab 6  pantoprazole (PROTONIX) 40 mg tablet Take 40 mg by mouth two (2) times a day.     
 L. acidoph & paracasei- S therm- Bifido (FRAN-Q/RISAQUAD) 8 billion cell cap cap Take 1 Cap by mouth daily. (Patient taking differently: Take 1 Cap by mouth two (2) times a day.) 60 Cap 0  Cholecalciferol, Vitamin D3, (VITAMIN D3) 2,000 unit cap capsule Take 2,000 Units by mouth daily.  acetaminophen (TYLENOL) 500 mg tablet Take 500 mg by mouth every four (4) hours as needed for Pain.  multivit-min-FA-lycopen-lutein (CENTRUM SILVER) 0.4-300-250 mg-mcg-mcg tab Take 1 Tab by mouth daily.  aspirin delayed-release 81 mg tablet Take 81 mg by mouth daily.  magnesium 250 mg tab Take 250 mg by mouth every evening.  glipiZIDE SR (GLUCOTROL XL) 2.5 mg CR tablet Take 1 Tab by mouth daily. 30 Tab 0  
 ferrous sulfate (IRON) 325 mg (65 mg iron) tablet Take 325 mg by mouth every other day. Past Surgical History:  
Procedure Laterality Date  CARDIAC SURG PROCEDURE UNLIST    
 cardioversion  CARDIAC SURG PROCEDURE UNLIST    
 watchman device  COLONOSCOPY N/A 12/28/2016 COLONOSCOPY performed by Tello Samano [facility] at [facility] ENDOSCOPY  
 HX CATARACT REMOVAL    
 both eyes  HX CHOLECYSTECTOMY  HX COLONOSCOPY  5/8/2013 Repeat in 5 years  HX CYST REMOVAL  10/15  
 on head  HX HYSTERECTOMY  HX ORTHOPAEDIC Bilateral   
 knee replacement to both knees  HX ORTHOPAEDIC    
 spacer btwn L4-5 and L5-6   
 HX PACEMAKER PLACEMENT    
 HX TONSILLECTOMY  HX UROLOGICAL    
 botox in bladder Lab Results Component Value Date/Time WBC 9.0 12/13/2018 09:46 AM  
 HGB 11.2 12/13/2018 09:46 AM  
 HCT 35.0 12/13/2018 09:46 AM  
 PLATELET 689 23/39/4156 09:46 AM  
 MCV 94 12/13/2018 09:46 AM  
 
Lab Results Component Value Date/Time Cholesterol, total 193 06/27/2018 01:06 PM  
 HDL Cholesterol 66 06/27/2018 01:06 PM  
 LDL, calculated 89 06/27/2018 01:06 PM  
 LDL-C, External 77 10/18/2013 11:09 AM  
 Triglyceride 188 (H) 06/27/2018 01:06 PM  
 CHOL/HDL Ratio 1.9 09/08/2016 02:13 AM  
 
Lab Results Component Value Date/Time GFR est non-AA 44 (L) 12/13/2018 09:46 AM  
 GFR est AA 50 (L) 12/13/2018 09:46 AM  
 Creatinine 1.17 (H) 12/13/2018 09:46 AM  
 BUN 28 (H) 12/13/2018 09:46 AM  
 Sodium 140 12/13/2018 09:46 AM  
 Potassium 4.7 12/13/2018 09:46 AM  
 Chloride 105 12/13/2018 09:46 AM  
 CO2 22 12/13/2018 09:46 AM  
 Magnesium 1.7 04/09/2018 02:34 AM  
 Phosphorus 3.3 09/07/2016 01:11 AM  
  
Review of Systems Respiratory: Negative for shortness of breath. Cardiovascular: Negative for chest pain. Physical Exam  
Constitutional: She is oriented to person, place, and time. She appears well-developed and well-nourished. No distress. HENT:  
Head: Normocephalic and atraumatic. Mouth/Throat: Oropharynx is clear and moist. No oropharyngeal exudate. Eyes: Conjunctivae and EOM are normal. Right eye exhibits no discharge. Left eye exhibits no discharge. Neck: Normal range of motion. Neck supple. Cardiovascular: Normal rate, regular rhythm, normal heart sounds and intact distal pulses. Exam reveals no gallop and no friction rub. No murmur heard. Pulmonary/Chest: Effort normal and breath sounds normal. No respiratory distress. She has no wheezes. She has no rales. She exhibits no tenderness. Abdominal: Soft. She exhibits no distension. There is no tenderness. There is no rebound. Musculoskeletal: She exhibits no edema, tenderness or deformity. Lymphadenopathy:  
  She has no cervical adenopathy. Neurological: She is alert and oriented to person, place, and time. Coordination abnormal.  
Skin: Skin is warm and dry. No rash noted. She is not diaphoretic. No erythema. No pallor. Psychiatric: She has a normal mood and affect. Her behavior is normal.  
 
 
ASSESSMENT and PLAN 
  ICD-10-CM ICD-9-CM 1. Type 2 diabetes mellitus without complication, without long-term current use of insulin ([address] Borderline control but adequate for advanced age on metformin XR 750mg BID, given her poor functional status will remain off glipizide to avoid risk of hypoglycemia, goal a1c will be 7.5 or less, will have her focus more on diet, will set her up with Akila Robert to review diet and make better choices, she is unable to afford Holly Vail Adalberto at this time E11.9 250.00 REFERRAL TO NUTRITION  
   MICROALBUMIN, UR, RAND W/ MICROALB/CREAT RATIO  
   METABOLIC PANEL, COMPREHENSIVE  
   HEMOGLOBIN A1C WITH EAG  
   TSH 3RD GENERATION 2. Medicare annual wellness visit, subsequent Z00.00 V70.0 MICROALBUMIN, UR, RAND W/ MICROALB/CREAT RATIO  
   METABOLIC PANEL, COMPREHENSIVE  
   HEMOGLOBIN A1C WITH EAG  
   TSH 3RD GENERATION 3. Chronic combined systolic and diastolic congestive heart failure ([facility] Medically managed, continues on lasix 40mg daily, continue I50.42 428.42 MICROALBUMIN, UR, RAND W/ MICROALB/CREAT RATIO  
  939.7 METABOLIC PANEL, COMPREHENSIVE  
   HEMOGLOBIN A1C WITH EAG  
   TSH 3RD GENERATION 4. Severe obesity (BMI 35.0-39. 9) with comorbidity ([facility] Wt continues to climb, discussed need for w/l, discussed [address], she can't afford this, will set her up with Katya to see if she can help her make better dietary changes E66.01 278.01 MICROALBUMIN, UR, RAND W/ MICROALB/CREAT RATIO  
   METABOLIC PANEL, COMPREHENSIVE  
   HEMOGLOBIN A1C WITH EAG  
   TSH 3RD GENERATION 5. Mixed hyperlipidemia At goal on pravachol 20mg daily E78.2 272.2 MICROALBUMIN, UR, RAND W/ MICROALB/CREAT RATIO  
   METABOLIC PANEL, COMPREHENSIVE  
   HEMOGLOBIN A1C WITH EAG  
   TSH 3RD GENERATION 6. MALLORY (obstructive sleep apnea) Not compliant with CPAP, have discussed this every visit for months now, she understands need to f/u with sleep specialist and still plans to do so, this is next on her priority list 
 G47.33 327.23 MICROALBUMIN, UR, RAND W/ MICROALB/CREAT RATIO  
   METABOLIC PANEL, COMPREHENSIVE  
   HEMOGLOBIN A1C WITH EAG  
   TSH 3RD GENERATION 7. Essential hypertension Controlled on toprol XL 50mg daily and benazepril 20mg daily, continue no change to dose I10 401.9 MICROALBUMIN, UR, RAND W/ MICROALB/CREAT RATIO  
   METABOLIC PANEL, COMPREHENSIVE  
   HEMOGLOBIN A1C WITH EAG  
   TSH 3RD GENERATION 8. Cervical stenosis of spine Has gabapentin to use prn, follows with neurosurg Dr Roula Patrick, had surg in the past, also follows with neuro Dr Joe Graham who has left the practice, will find a new neurologist in her practice M48.02 723.0 MICROALBUMIN, UR, RAND W/ MICROALB/CREAT RATIO  
   METABOLIC PANEL, COMPREHENSIVE  
   HEMOGLOBIN A1C WITH EAG  
   TSH 3RD GENERATION 9. Hypothyroidism, adult Controlled on synthroid, continue E03.9 244.9 MICROALBUMIN, UR, RAND W/ MICROALB/CREAT RATIO  
   METABOLIC PANEL, COMPREHENSIVE  
   HEMOGLOBIN A1C WITH EAG  
   TSH 3RD GENERATION 10. TIA (transient ischemic attack) Continues on ASA, had watchman procedure to prevent strokes from a fib, no longer on plavix or stronger anticoagulant G45.9 435.9 MICROALBUMIN, UR, RAND W/ MICROALB/CREAT RATIO  
   METABOLIC PANEL, COMPREHENSIVE  
   HEMOGLOBIN A1C WITH EAG  
   TSH 3RD GENERATION 11. Paroxysmal atrial fibrillation (HCC) S/p watchman procedure, has had ablation in the past, has pacemaker, remains in nsr, just on ASA I48.0 427.31 MICROALBUMIN, UR, RAND W/ MICROALB/CREAT RATIO  
   METABOLIC PANEL, COMPREHENSIVE  
   HEMOGLOBIN A1C WITH EAG  
   TSH 3RD GENERATION 12. Neuropathy Continue gabapentin G62.9 355.9 MICROALBUMIN, UR, RAND W/ MICROALB/CREAT RATIO  
   METABOLIC PANEL, COMPREHENSIVE  
   HEMOGLOBIN A1C WITH EAG  
   TSH 3RD GENERATION 13. Stress incontinence of urine Has f/u with Dr Mirtha Anderson pending, plans on considering botox N39.3 [id number] MICROALBUMIN, UR, RAND W/ MICROALB/CREAT RATIO  
   METABOLIC PANEL, COMPREHENSIVE  
   HEMOGLOBIN A1C WITH EAG  
   TSH 3RD GENERATION 14. Ataxia discussed PT, she feels her balance is improving, has done PT for her hand and is not interested in PT for balance at this time R27.0 781.3 MICROALBUMIN, UR, RAND W/ MICROALB/CREAT RATIO  
   METABOLIC PANEL, COMPREHENSIVE  
   HEMOGLOBIN A1C WITH EAG  
   TSH 3RD GENERATION Scribed by Christopher Garcia of [address] [place] [address] [address], as dictated by Dr. Ros Garcia. Current diagnosis and concerns discussed with pt at length. Pt understands risks and benefits or current treatment plan and medications, and accepts the treatment and medication with any possible risks. Pt asks appropriate questions, which were answered. Pt was instructed to call with any concerns or problems. I have reviewed the note documented by the scribe. The services provided are my own. The documentation is accurate

## 2019-01-30 NOTE — PATIENT INSTRUCTIONS
Medicare Wellness Visit, Female The best way to live healthy is to have a lifestyle where you eat a well-balanced diet, exercise regularly, limit alcohol use, and quit all forms of tobacco/nicotine, if applicable. Regular preventive services are another way to keep healthy. Preventive services (vaccines, screening tests, monitoring & exams) can help personalize your care plan, which helps you manage your own care. Screening tests can find health problems at the earliest stages, when they are easiest to treat. [facility]ask[facility] Shaver follows the current, evidence-based guidelines published by the [facility] ([facility]) when recommending preventive services for our patients. Because we follow these guidelines, sometimes recommendations change over time as research supports it. (For example, mammograms used to be recommended annually. Even though Medicare will still pay for an annual mammogram, the newer guidelines recommend a mammogram every two years for women of average risk.) Of course, you and your doctor may decide to screen more often for some diseases, based on your risk and your health status. Preventive services for you include: - Medicare offers their members a free annual wellness visit, which is time for you and your primary care provider to discuss and plan for your preventive service needs. Take advantage of this benefit every year! 
-All adults over the age of 72 should receive the recommended pneumonia vaccines. Current USPSTF guidelines recommend a series of two vaccines for the best pneumonia protection.  
-All adults should have a flu vaccine yearly and a tetanus vaccine every 10 years. All adults age 61 and older should receive a shingles vaccine once in their lifetime.   
-A bone mass density test is recommended when a woman turns 65 to screen for osteoporosis. This test is only recommended one time, as a screening. Some providers will use this same test as a disease monitoring tool if you already have osteoporosis. -All adults age 38-68 who are overweight should have a diabetes screening test once every three years.  
-Other screening tests and preventive services for persons with diabetes include: an eye exam to screen for diabetic retinopathy, a kidney function test, a foot exam, and stricter control over your cholesterol.  
-Cardiovascular screening for adults with routine risk involves an electrocardiogram (ECG) at intervals determined by your doctor.  
-Colorectal cancer screenings should be done for adults age 54-65 with no increased risk factors for colorectal cancer. There are a number of acceptable methods of screening for this type of cancer. Each test has its own benefits and drawbacks. Discuss with your doctor what is most appropriate for you during your annual wellness visit. The different tests include: colonoscopy (considered the best screening method), a fecal occult blood test, a fecal DNA test, and sigmoidoscopy. -Breast cancer screenings are recommended every other year for women of normal risk, age 54-69. 
-Cervical cancer screenings for women over age 72 are only recommended with certain risk factors.  
-All adults born between [facility] should be screened once for Hepatitis C. Here is a list of your current Health Maintenance items (your personalized list of preventive services) with a due date: 
Health Maintenance Due Topic Date Due  Shingles Vaccine (1 of 2) 06/12/1987 [facility] Annual Well Visit  12/16/2018  Glaucoma Screening   03/20/2019 Medicare Part B Preventive Services Limitations Recommendation Scheduled Bone Mass Measurement 
(age 72 & older, biennial) Requires diagnosis related to osteoporosis or estrogen deficiency. Biennial benefit unless patient has history of long-term glucocorticoid tx or baseline is needed because initial test was by other method Completed 11/2017   
Recommended every 2 years Due 11/2019 Cardiovascular Screening Blood Tests (every 5 years) Total cholesterol, HDL, Triglycerides Order as a panel if possible Completed 6/2018 
  
Recommended annually Due 6/2019 Colorectal Cancer Screening 
-Fecal occult blood test (annual) -Flexible sigmoidoscopy (5y) 
-Screening colonoscopy (10y) -Barium Enema   Completed 12/28/16 with Dr. Portillo Joseph 
  
Repeat in 10 years Due 12/2026 Counseling to Prevent Tobacco Use (up to 8 sessions per year) - Counseling greater than 3 and up to 10 minutes - Counseling greater than 10 minutes Patients must be asymptomatic of tobacco-related conditions to receive as preventive service N/A N/A Diabetes Screening Tests (at least every 3 years, Medicare covers annually or at 6-month intervals for prediabetic patients) 
  Fasting blood sugar (FBS) or glucose tolerance test (GTT) Patient must be diagnosed with one of the following: 
-Hypertension, Dyslipidemia, obesity, previous impaired FBS or GTT 
Or any two of the following: overweight, FH of diabetes, age ? 72, history of gestational diabetes, birth of baby weighing more than 9 pounds Completed 12/2018 with A1C 7.2 
  
Recommended every 3-6 months for diabetics Due 3/2019  6/2019 Diabetes Self-Management Training (DSMT) (no USPSTF recommendation) Requires referral by treating physician for patient with diabetes or renal disease. 10 hours of initial DSMT session of no less than 30 minutes each in a continuous 12-month period.  2 hours of follow-up DSMT in subsequent years. Pt attended this class with her  in the past. You are eligible for this class every 2 years. Please let us know if you are interested in this class. Glaucoma Screening (no USPSTF recommendation) Diabetes mellitus, family history, , age 48 or over,  American, age 72 or over Completed 3/20/17 
  
Recommended annually  Due now Human Immunodeficiency Virus (HIV) Screening (annually for increased risk patients) HIV-1 and HIV-2 by EIA, SIMON, rapid antibody test, or oral mucosa transudate Patient must be at increased risk for HIV infection per USPSTF guidelines or pregnant.  Tests covered annually for patients at increased risk.  Pregnant patients may receive up to 3 test during pregnancy. N/A N/A Medical Nutrition Therapy (MNT) (for diabetes or renal disease not recommended schedule) Requires referral by treating physician for patient with diabetes or renal disease.  Can be provided in same year as diabetes self-management training (DSMT), and CMS recommends medical nutrition therapy take place after DSMT.  Up to 3 hours for initial year and 2 hours in subsequent years. Pt attended this class with her  in the past. Brian Dave are eligible for this class every 2 years. Please let us know if you are interested in this class. Seasonal Influenza Vaccination (annually)   Completed 9/2018 
  
Recommended annually Due Fall 2019  
         
Pneumococcal Vaccination (once after 72)   Prevnar 13: 8/18/16 
  
Pneumovax: 2012 
  
Both recommended once over the age of 72 Completed 
  
Completed Hepatitis B Vaccinations (if medium/high risk) Medium/high risk factors:  End-stage renal disease, Hemophiliacs who received Factor VIII or IX concentrates, Clients of institutions for the mentally retarded, Persons who live in the same house as a HepB virus carrier, Homosexual men, Illicit injectable drug abusers. N/A N/A Screening Mammography (biennial age 54-69)? Annually (age 36 or over) Completed 10/2018 
  
Recommended annually Due 10/2019 Screening Pap Tests and Pelvic Examination (up to age 72 and after 72 if unknown history or abnormal study last 10 years) Every 24 months except high risk Completed 2017 
  
Recommended every other year Due 2019 Ultrasound Screening for Abdominal Aortic Aneurysm (AAA) (once) Patient must be referred through IPPE and not have had a screening for abdominal aortic aneurysm before under Medicare.  Limited to patients who meet one of the following criteria: 
- Men who are 73-68 years old and have smoked more than 100 cigarettes in their lifetime. 
-Anyone with a FH of AAA 
-Anyone recommended for screening by USPSTF N/A N/A

## 2019-01-30 NOTE — PROGRESS NOTES
This is the Subsequent Medicare Annual Wellness Exam, performed 12 months or more after the Initial AWV or the last Subsequent AWV I have reviewed the patient's medical history in detail and updated the computerized patient record. History Past Medical History:  
Diagnosis Date  Anemia  Arrhythmia AFIB  Ataxia  Webb's esophagus  Cervical spinal stenosis  Coronary atherosclerosis of unspecified type of vessel, native or graft  Diabetes ([facility] [address]  Fatigue  GERD (gastroesophageal reflux disease)  Hyperlipidemia  Hypertension  Ill-defined condition   
 right torn rotater cuff- no surgery at this time  Liver disease   
 pt is unaware  Osteoarthritis  Psychiatric disorder   
 anxiety and depression  Sarcoidosis  Sleep apnea   
 uses cpap  Stroke ([facility] [address] TIA'S  Thyroid disease Past Surgical History:  
Procedure Laterality Date  CARDIAC SURG PROCEDURE UNLIST    
 cardioversion  CARDIAC SURG PROCEDURE UNLIST    
 watchman device  COLONOSCOPY N/A 12/28/2016 COLONOSCOPY performed by [facility]ita Rdz [facility] at [facility] ENDOSCOPY  
 HX CATARACT REMOVAL    
 both eyes  HX CHOLECYSTECTOMY  HX COLONOSCOPY  5/8/2013 Repeat in 5 years  HX CYST REMOVAL  10/15  
 on head  HX HYSTERECTOMY  HX ORTHOPAEDIC Bilateral   
 knee replacement to both knees  HX ORTHOPAEDIC    
 spacer btwn L4-5 and L5-6   
 HX PACEMAKER PLACEMENT    
 HX TONSILLECTOMY  HX UROLOGICAL    
 botox in bladder Current Outpatient Medications Medication Sig Dispense Refill  MICROLET LANCET misc CHECK BLOOD SUGAR TWICE A DAY 1 Each 1  
 glucose blood VI test strips (CONTOUR NEXT TEST STRIPS) strip Check glucose once daily 100 Strip 1  metFORMIN ER (GLUCOPHAGE XR) 750 mg tablet TAKE 1 TABLET BY MOUTH TWICE DAILY 180 Tab 1  
 busPIRone (BUSPAR) 5 mg tablet TAKE 1 TABLET BY MOUTH TWICE DAILY 60 Tab 1  
  metoprolol succinate (TOPROL-XL) 50 mg XL tablet TAKE 1 TABLET BY MOUTH DAILY 90 Tab 1  
 benazepril (LOTENSIN) 20 mg tablet Take 1 Tab by mouth daily. 90 Tab 1  
 gabapentin (NEURONTIN) 100 mg capsule TAKE 1 CAPSULE BY MOUTH TWICE DAILY 180 Cap 1  
 pravastatin (PRAVACHOL) 20 mg tablet TAKE 1 TABLET BY MOUTH EVERY DAY 90 Tab 0  pravastatin (PRAVACHOL) 20 mg tablet TAKE 1 TABLET BY MOUTH EVERY DAY 90 Tab 0  clopidogrel (PLAVIX) 75 mg tab TAKE 1 TABLET BY MOUTH EVERY DAY 30 Tab 0  
 sertraline (ZOLOFT) 100 mg tablet TAKE 1 TABLET BY MOUTH DAILY 90 Tab 0  
 levothyroxine (SYNTHROID) 50 mcg tablet take 1 tablet by mouth once daily (BEFORE BREAKFAST) 90 Tab 1  
 furosemide (LASIX) 40 mg tablet Take 1 Tab by mouth two (2) times a day. 60 Tab 6  pantoprazole (PROTONIX) 40 mg tablet Take 40 mg by mouth two (2) times a day.  L. acidoph & paracasei- S therm- Bifido (FRAN-Q/RISAQUAD) 8 billion cell cap cap Take 1 Cap by mouth daily. (Patient taking differently: Take 1 Cap by mouth two (2) times a day.) 60 Cap 0  Cholecalciferol, Vitamin D3, (VITAMIN D3) 2,000 unit cap capsule Take 2,000 Units by mouth daily.  acetaminophen (TYLENOL) 500 mg tablet Take 500 mg by mouth every four (4) hours as needed for Pain.  multivit-min-FA-lycopen-lutein (CENTRUM SILVER) 0.4-300-250 mg-mcg-mcg tab Take 1 Tab by mouth daily.  aspirin delayed-release 81 mg tablet Take 81 mg by mouth daily.  magnesium 250 mg tab Take 250 mg by mouth every evening.  glipiZIDE SR (GLUCOTROL XL) 2.5 mg CR tablet Take 1 Tab by mouth daily. 30 Tab 0  
 ferrous sulfate (IRON) 325 mg (65 mg iron) tablet Take 325 mg by mouth every other day. Allergies Allergen Reactions  Procardia Xl [Nifedipine] Other (comments)  
  weakness Family History Problem Relation Age of Onset [facility] Other Mother Fibromyalgia  Pacemaker Mother  Heart Disease Mother  Heart Failure Mother  COPD Mother  Heart Attack Father 61  Cancer Sister Multiple Myeloma  Diabetes Brother  Obesity Brother  Pacemaker Brother  Heart Attack Brother Bundle branch block  No Known Problems Son  Psychiatric Disorder Daughter Multiple Social History Tobacco Use  Smoking status: Never Smoker  Smokeless tobacco: Never Used Substance Use Topics  Alcohol use: No  
 
Patient Active Problem List  
Diagnosis Code  MALLORY (obstructive sleep apnea) G47.33  
 Hyperlipidemia E78.5  Sarcoid D86.9  Stress bladder incontinence, female N39.3  Obesity E66.9  TIA (transient ischemic attack) G45.9  AR (aortic regurgitation) I35.1  Mitral valve insufficiency I34.0  Cervical stenosis of spine M48.02  
 Type 2 diabetes mellitus without complication (HCC) V35.0  
 Essential hypertension I10  
 Hypothyroidism, adult E03.9  Webb esophagus K22.70  ACP (advance care planning) Z71.89  
 Ataxia R27.0  Dehydration E86.0  Paroxysmal atrial fibrillation (HCC) I48.0  Precordial pain R07.2  Celiac sprue K90.0  Type 2 diabetes mellitus with nephropathy (HCC) E11.21  
 Irregular heartbeat I49.9  Infection of pacemaker pocket ([address] T82. 7XXA  Pacemaker Z95.0  Severe obesity (BMI 35.0-39. 9) with comorbidity (HCC) E66.01  
 Acute on chronic diastolic (congestive) heart failure (HCC) I50.33  
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  
on medications, discussed see attached note, improved lately Alcohol Risk Factor Screening: You do not drink alcohol or very rarely. Functional Ability and Level of Safety:  
Hearing Loss Hearing is good. Activities of Daily Living The home contains: no safety equipment.--grab bars in bathroom Patient needs help with:  transportation, shopping and managing medications Fall Risk Fall Risk Assessment, last 12 mths 1/30/2019 Able to walk? Yes Fall in past 12 months? No  
Fall with injury? -  
Number of falls in past 12 months - Fall Risk Score -  
 
 
Abuse Screen Patient is not abused Lives with huband  
safe Cognitive Screening Evaluation of Cognitive Function: 
Has your family/caregiver stated any concerns about your memory: no 
Normal 
 
Patient Care Team  
Patient Care Team: 
Brittnee Neal MD as PCP - General (Internal Medicine) Anabella Batista MD (Ophthalmology) Mata Lugo MD (Orthopedic Surgery) Abril Gan MD as Physician (Cardiology) Briant Angelucci, MD (Gastroenterology) Daphne Malin MD (Rheumatology) Dallas Bird MD (Neurology) Gustabo Zamorano MD (Otolaryngology) Chencho Floyd MD (Cardiology) Seun Weston MD (Cardiology) Tanner Kussmaul, MD (Neurosurgery) Bonnie Benitez MD (Orthopedic Surgery) Violetta Mccullough NP as Nurse Practitioner (Nurse Practitioner) Justina Fam MD (Hand Surgery) Dejon Sanchez MD (Urology)  
updated Assessment/Plan Education and counseling provided: 
Are appropriate based on today's review and evaluation End-of-Life planning (with patient's consent) Cardiovascular screening blood test 
Bone mass measurement (DEXA) Screening for glaucoma Diabetes screening test 
 
Diagnoses and all orders for this visit: 
 
1. Medicare annual wellness visit, subsequent Other orders -     MICROLET LANCET misc; CHECK BLOOD SUGAR TWICE A DAY 
-     glucose blood VI test strips (CONTOUR NEXT TEST STRIPS) strip; Check glucose once daily 
-     metFORMIN ER (GLUCOPHAGE XR) 750 mg tablet; TAKE 1 TABLET BY MOUTH TWICE DAILY 
-     busPIRone (BUSPAR) 5 mg tablet; TAKE 1 TABLET BY MOUTH TWICE DAILY -     metoprolol succinate (TOPROL-XL) 50 mg XL tablet; TAKE 1 TABLET BY MOUTH DAILY 
-     benazepril (LOTENSIN) 20 mg tablet; Take 1 Tab by mouth daily. 
-     gabapentin (NEURONTIN) 100 mg capsule; TAKE 1 CAPSULE BY MOUTH TWICE DAILY Health Maintenance Due Topic Date Due  Shingrix Vaccine Age 50> (1 of 2) 06/12/1985  MEDICARE YEARLY EXAM  12/16/2018  GLAUCOMA SCREENING Q2Y  03/20/2019  
 
   Discussed with patient about advance medical directive.     
ACP on file. SDMs are her  Chanda Wu) and sister Alexei Carter).   
 
   
 
Colonoscopy: 12/28/16, Dr. Carlie Howard, repeat 10 years Pap: 2017, Dr. Denisse Lopez Mammogram:10/18, negative annual  
Dexa: 11/20/17, nl  due 11/19 Tdap: 4/15   
Pneumovax: 05/09/18   
Prevnar 13: 8/16 Zostavax: 2/29/16 Shingrix: ordered 05/09/18  
Flu shot: 9/18 Eye exam: Dr. Jodie Sainz, 3/2017, scheduled for 2/19 A1c:  , 12/18 7.2 due q3mont Lipids: 6/18 LDL 89 annual 
 
Medication reconciliation completed by MA and reviewed by me. Medical/surgical/social/family history reviewed and updated by me. Patient provided AVS and preventative screening table. Patient verbalized understanding of all information discussed.

## 2019-01-30 NOTE — ACP (ADVANCE CARE PLANNING)
ACP planning begun today, SDM is.  On file  rajani    Sister will need to be removed from directive

## 2019-01-31 ENCOUNTER — PATIENT OUTREACH (OUTPATIENT)
Dept: INTERNAL MEDICINE CLINIC | Age: 84
End: 2019-01-31

## 2019-01-31 NOTE — PROGRESS NOTES
Left message for pt to call Naresh Gil back at [phone]. Message Received: Antonina Solum Message Contents REYNALDO Loredo, RN  
  
   
  
Dr. Iris Donald would like you to set up appointment with patient please. 2/7/19-left second message to call Naresh Gil back at [phone] to schedule an appt for diabetes education. Spoke to pt. She scheduled herself and her  for diabetes education on 2/22/19 at 1:30.

## 2019-02-04 RX ORDER — LEVOTHYROXINE SODIUM 50 UG/1
TABLET ORAL
Qty: 90 TAB | Refills: 0 | Status: SHIPPED | OUTPATIENT
Start: 2019-02-04 | End: 2019-05-06 | Stop reason: SDUPTHER

## 2019-02-13 ENCOUNTER — CLINICAL SUPPORT (OUTPATIENT)
Dept: CARDIOLOGY CLINIC | Age: 84
End: 2019-02-13

## 2019-02-13 DIAGNOSIS — Z95.0 CARDIAC PACEMAKER IN SITU: Primary | ICD-10-CM

## 2019-02-13 DIAGNOSIS — I48.0 PAROXYSMAL ATRIAL FIBRILLATION (HCC): ICD-10-CM

## 2019-03-12 ENCOUNTER — PATIENT OUTREACH (OUTPATIENT)
Dept: INTERNAL MEDICINE CLINIC | Age: 84
End: 2019-03-12

## 2019-03-12 ENCOUNTER — OFFICE VISIT (OUTPATIENT)
Dept: INTERNAL MEDICINE CLINIC | Age: 84
End: 2019-03-12

## 2019-03-12 DIAGNOSIS — E11.21 TYPE 2 DIABETES MELLITUS WITH NEPHROPATHY (HCC): Primary | ICD-10-CM

## 2019-03-12 RX ORDER — INSULIN PUMP SYRINGE, 3 ML
EACH MISCELLANEOUS
Qty: 1 KIT | Refills: 0 | Status: SHIPPED | OUTPATIENT
Start: 2019-03-12 | End: 2019-09-16

## 2019-03-12 NOTE — PATIENT INSTRUCTIONS
Plan/goals:  -continue checking fasting blood sugar; add a check before dinner or bedtime a few days a week; new glucometer and testing supplies were sent to your pharmacy  -continue your same dose of metformin er   -start walking 15 minutes most days of the week; check with your cardiologist for more specific parameters and guidelines  -strive to start following the healthy plate meal plan being mindful of what is a carb and portion size; guideline is up to 45 grams of carb per meal (3 meals per day) or 3 servings per meal; snack guideline - 1 oz. protein serving and may add 1 carb serving   -follow up with Dr. Florin Gaviria on April 29th as scheduled  -call Alma Delia Bailon at [phone] with any questions

## 2019-03-12 NOTE — PROGRESS NOTES
Was asked by Dr. Leyda Rudolph to see patient for diabetes self management education. Last A1c was 7.2% on 12/13/18. Previous A1c was 6.9% on 9/26/18 and 7.3% on 6/27/18. Previous diabetes education - with her  Hany Nielson, who is also a diabetic, around 2004. Some other diagnoses are afib, pacemaker, kelby, hypothyroid, htn, hld, chf    Presentation/Accompanied by - ambulatory with cane and accompanied by  who is also using a cane    Social History - pt is retired and lives with her ; she has a daughter and a son    History/Family History - pt has had diabetes since the 36s. There is a history of diabetes in her dad, sister, brother, and a maternal uncle    Symptoms of high blood sugar- nocturia x2; blurred vision that comes and goes - she has an appt with Dr. Asad Jimenez coming up; fatigue    Motivation - pt is looking forward to finding out why she is gaining weight and she is depressed about it; she is taking furosemide for chf; pt will discuss with Dr. Leyda Rudolph at visit on 4/29/19.   Vitals 1/30/2019 9/26/2018 8/16/2018 6/27/2018 5/18/2018   Weight 184 lb 178 lb 174 lb 165 lb 163 lb       Self Care Behaviors-    1) Healthy Eating/Food Recall- pt stated she is not a snacker or big eater; does not like red meat; likes veggies  BK - 10:00- bagel and egg, coffee with Splenda and little bit of [employer] flavored creamer; or 4 Croatian toast sticks with about 2 T reduced calorie syrup  LN - does not typically eat lunch due to the late breakfast  DN - steak, tossed salad with creamy poppyseed dressing; or chicken, seafood, stir parra rice and chicken; breaded flounder  SN - between dinner and bedtime may have a jello cup with mandarin oranges or peaches   MICHELE - flavored calorie free water, caffeine free diet coke or diet eat    2) Being Active- not currently active, but used to [facility] [employer] when she lived in [place]; would like to start walking as tolerated on a treadmill that she has access to 15 minutes most days of the week; pt's  would like to start walking with her as tolerated     3) Self Monitoring Blood Glucose (SMBG)- checks blood sugar fasting and the highest it has been is 122; other results are 110 and 115. Pt's glucometer is old and she requested a new one be sent to her pharmacy, which was done per written order from Dr. Phuong Quintero. How to treat a low blood sugar -  Not discussed this visit    4) Taking Medication- takes metformin er twice daily and does not miss doses; she has been taking this medication for years  Key Antihyperglycemic Medications             metFORMIN ER (GLUCOPHAGE XR) 750 mg tablet TAKE 1 TABLET BY MOUTH TWICE DAILY        5) Problem Solving- pt is ready and willing to make adjustments in her lifestyle to decrease her high blood sugars    6) Reducing Risk-   Tobacco - never smoked  HTN - takes benazepril, metoprolol, furosemide  HLD - takes pravastatin  Aspirin - takes 81 mg    Discussed possible complications of uncontrolled diabetes ie fatigue, dehydration, damage to vital organs.     7) Healthy Coping-   Support system - pt's  Angus  Tiffani Lancaster) is very supportive; pt will take advantage of the support and education provided today and of the support of her health care team.    Barriers identified - none identified    Resources provided:  Diabetes Made Simple Video    Living With Type 2 Diabetes    Label Reading Basics for Diabetes    Carb Counting and Meal Planning    DSMT- not discussed this visit    Plan / Pt Goals -   -continue checking fasting blood sugar; add a check before dinner or bedtime a few days a week; new glucometer and testing supplies were sent to pharmacy  -continue your same dose of metformin er   -start walking 15 minutes most days of the week; check with cardiologist for more specific parameters and guidelines  -strive to start following the healthy plate meal plan being mindful of what is a carb and portion size; guideline is up to 45 grams of carb per meal (3 meals per day) or 3 servings per meal; snack guideline - 1 oz. protein serving and may add 1 carb serving   -follow up with Dr. Sergei Jimenez on April 29th as scheduled  -call Shannan Marquis at [phone] with any questions    Future Appointments   Date Time Provider Paul Davis   4/29/2019  1:45 PM Nicholas Navarro MD [address]   5/15/2019  8:00 AM [address] [facility]   3/16/2020  1:30 PM PACEMAKER, [address] [facility]   3/16/2020  1:40 PM Kary Bonilla NP [address] [facility]      Last Appointment My Department:  1/30/2019     Chart was routed to Dr. Sergei Jimenez.     Isabelle Rao, RN, CDE, CCM  (Phone) [phone]

## 2019-03-12 NOTE — PROGRESS NOTES
Was asked by Dr. Jen Hobbs to see patient for diabetes self management education. Last A1c was 7.2% on 12/13/18. Previous A1c was 6.9% on 9/26/18 and 7.3% on 6/27/18. Previous diabetes education - with her  Omayra Abdullahi, who is also a diabetic, around 2004. Some other diagnoses are afib, pacemaker, kelby, hypothyroid, htn, hld, chf    Presentation/Accompanied by - ambulatory with cane and accompanied by  who is also using a cane    Social History - pt is retired and lives with her ; she has a daughter and a son    History/Family History - pt has had diabetes since the 36s. There is a history of diabetes in her dad, sister, brother, and a maternal uncle    Symptoms of high blood sugar- nocturia x2; blurred vision that comes and goes - she has an appt with Dr. Urbina Child coming up; fatigue    Motivation - pt is looking forward to finding out why she is gaining weight and she is depressed about it; she is taking furosemide for chf; pt will discuss with Dr. Jen Hobbs at visit on 4/29/19.   Vitals 1/30/2019 9/26/2018 8/16/2018 6/27/2018 5/18/2018   Weight 184 lb 178 lb 174 lb 165 lb 163 lb       Self Care Behaviors-    1) Healthy Eating/Food Recall- pt stated she is not a snacker or big eater; does not like red meat; likes veggies  BK - 10:00- bagel and egg, coffee with Splenda and little bit of [employer] flavored creamer; or 4 Hungarian toast sticks with about 2 T reduced calorie syrup  LN - does not typically eat lunch due to the late breakfast  DN - steak, tossed salad with creamy poppyseed dressing; or chicken, seafood, stir parra rice and chicken; breaded flounder  SN - between dinner and bedtime may have a jello cup with mandarin oranges or peaches   MICHELE - flavored calorie free water, caffeine free diet coke or diet eat    2) Being Active- not currently active, but used to go to [employer] when she lived in [place]; would like to start walking as tolerated on a treadmill that she has access to 15 minutes most days of the week; pt's  would like to start walking with her as tolerated     3) Self Monitoring Blood Glucose (SMBG)- checks blood sugar fasting and the highest it has been is 122; other results are 110 and 115. Pt's glucometer is old and she requested a new one be sent to her pharmacy, which was done per written order from Dr. Seth Cardenas. How to treat a low blood sugar -  Not discussed this visit    4) Taking Medication- takes metformin er twice daily and does not miss doses; she has been taking this medication for years  Key Antihyperglycemic Medications             metFORMIN ER (GLUCOPHAGE XR) 750 mg tablet TAKE 1 TABLET BY MOUTH TWICE DAILY        5) Problem Solving- pt is ready and willing to make adjustments in her lifestyle to decrease her high blood sugars    6) Reducing Risk-   Tobacco - never smoked  HTN - takes benazepril, metoprolol, furosemide  HLD - takes pravastatin  Aspirin - takes 81 mg    Discussed possible complications of uncontrolled diabetes ie fatigue, dehydration, damage to vital organs.     7) Healthy Coping-   Support system - pt's  Santos Lawrence Kirstin) is very supportive; pt will take advantage of the support and education provided today and of the support of her health care team.    Barriers identified - none identified    Resources provided:  Diabetes Made Simple Video    Living With Type 2 Diabetes    Label Reading Basics for Diabetes    Carb Counting and Meal Planning    DSMT- not discussed this visit    Plan / Pt Goals -   -continue checking fasting blood sugar; add a check before dinner or bedtime a few days a week; new glucometer and testing supplies were sent to pharmacy  -continue your same dose of metformin er   -start walking 15 minutes most days of the week; check with cardiologist for more specific parameters and guidelines  -strive to start following the healthy plate meal plan being mindful of what is a carb and portion size; guideline is up to 45 grams of carb per meal (3 meals per day) or 3 servings per meal; snack guideline - 1 oz. protein serving and may add 1 carb serving   -follow up with Dr. Anila Jensen on April 29th as scheduled  -call Kailey Whitley at [phone] with any questions    Future Appointments   Date Time Provider Paul Davis   4/29/2019  1:45 PM Colette Atkins MD [address]   5/15/2019  8:00 AM [address] [facility]   3/16/2020  1:30 PM [facility]   3/16/2020  1:40 PM Jason Paulino NP [address] [facility]      Last Appointment My Department:  1/30/2019     Chart was routed to Dr. Anila Jensen.     Demond Fairbanks, RN, CDE, CCM  (Phone) [phone]

## 2019-03-12 NOTE — PROGRESS NOTES
Message   Received: Today   Message Contents   MD Moy Tinoco Arm, RN             Please send glucometer and supplies,     Thanks for your help! Glucometer and test strips were ordered in office visit encounter.

## 2019-03-12 NOTE — Clinical Note
Pt seen today. Her  was present. Thanks for referral. Pt's plan is to add walking most days of the week and per her cardiologist, and read labels to be able to stay within the guideline of no more than 45 grams of carb per meal. Pt is depressed about gaining 20# since last summer, since she started Buspar. You ordered a TSH that she will get drawn before she sees you next month. Her glucometer is old and she would like new supplies. I am willing to send those over to her pharmacy if you would like. Thanks again.

## 2019-04-12 RX ORDER — SERTRALINE HYDROCHLORIDE 100 MG/1
TABLET, FILM COATED ORAL
Qty: 135 TAB | Refills: 0 | Status: SHIPPED | OUTPATIENT
Start: 2019-04-12 | End: 2019-12-30

## 2019-04-15 ENCOUNTER — TELEPHONE (OUTPATIENT)
Dept: INTERNAL MEDICINE CLINIC | Age: 84
End: 2019-04-15

## 2019-04-15 NOTE — TELEPHONE ENCOUNTER
Spoke to [facility] to clarify that pt IS taking 1.5 tabs of 100mg zoloft. 90 day authorized per Dr. Winnie Son w/ 0R.

## 2019-04-15 NOTE — TELEPHONE ENCOUNTER
#[phone] [facility] needs a call pertaining to sertraline 100 mg pt has been taking 1 1/2 tablets a day and the script is for 1 tablet a day.

## 2019-05-06 RX ORDER — LEVOTHYROXINE SODIUM 50 UG/1
TABLET ORAL
Qty: 90 TAB | Refills: 0 | Status: SHIPPED | OUTPATIENT
Start: 2019-05-06 | End: 2019-08-20 | Stop reason: SDUPTHER

## 2019-06-11 ENCOUNTER — HOSPITAL ENCOUNTER (OUTPATIENT)
Dept: LAB | Age: 84
Discharge: HOME | End: 2019-06-11
Payer: MEDICARE

## 2019-06-11 DIAGNOSIS — M48.02 CERVICAL STENOSIS OF SPINE: ICD-10-CM

## 2019-06-11 DIAGNOSIS — I48.0 PAROXYSMAL ATRIAL FIBRILLATION (HCC): ICD-10-CM

## 2019-06-11 DIAGNOSIS — E03.9 HYPOTHYROIDISM, ADULT: ICD-10-CM

## 2019-06-11 DIAGNOSIS — G47.33 OSA (OBSTRUCTIVE SLEEP APNEA): ICD-10-CM

## 2019-06-11 DIAGNOSIS — E11.9 TYPE 2 DIABETES MELLITUS WITHOUT COMPLICATION, WITHOUT LONG-TERM CURRENT USE OF INSULIN (HCC): ICD-10-CM

## 2019-06-11 DIAGNOSIS — I50.42 CHRONIC COMBINED SYSTOLIC AND DIASTOLIC CONGESTIVE HEART FAILURE (HCC): ICD-10-CM

## 2019-06-11 DIAGNOSIS — G62.9 NEUROPATHY: ICD-10-CM

## 2019-06-11 DIAGNOSIS — G45.9 TIA (TRANSIENT ISCHEMIC ATTACK): ICD-10-CM

## 2019-06-11 DIAGNOSIS — N39.3 STRESS INCONTINENCE OF URINE: ICD-10-CM

## 2019-06-11 DIAGNOSIS — E78.2 MIXED HYPERLIPIDEMIA: ICD-10-CM

## 2019-06-11 DIAGNOSIS — Z00.00 MEDICARE ANNUAL WELLNESS VISIT, SUBSEQUENT: ICD-10-CM

## 2019-06-11 DIAGNOSIS — I10 ESSENTIAL HYPERTENSION: ICD-10-CM

## 2019-06-11 DIAGNOSIS — E66.01 SEVERE OBESITY (BMI 35.0-39.9) WITH COMORBIDITY (HCC): ICD-10-CM

## 2019-06-11 DIAGNOSIS — R27.0 ATAXIA: ICD-10-CM

## 2019-06-11 PROCEDURE — 82043 UR ALBUMIN QUANTITATIVE: CPT

## 2019-06-11 PROCEDURE — 80053 COMPREHEN METABOLIC PANEL: CPT

## 2019-06-11 PROCEDURE — 84443 ASSAY THYROID STIM HORMONE: CPT

## 2019-06-11 PROCEDURE — 83036 HEMOGLOBIN GLYCOSYLATED A1C: CPT

## 2019-06-11 PROCEDURE — 36415 COLL VENOUS BLD VENIPUNCTURE: CPT

## 2019-06-11 NOTE — TELEPHONE ENCOUNTER
Pt is requesting \"Pantoprazole\" 40 mg to be called into [facility] on file.     Best contact: [phone]       Message received & copied from Jd Lyles

## 2019-06-12 LAB
ALBUMIN SERPL-MCNC: 3.8 G/DL (ref 3.5–4.7)
ALBUMIN/CREAT UR: 4.5 MG/G CREAT (ref 0–30)
ALBUMIN/GLOB SERPL: 1.4 {RATIO} (ref 1.2–2.2)
ALP SERPL-CCNC: 78 IU/L (ref 39–117)
ALT SERPL-CCNC: 14 IU/L (ref 0–32)
AST SERPL-CCNC: 19 IU/L (ref 0–40)
BILIRUB SERPL-MCNC: 0.5 MG/DL (ref 0–1.2)
BUN SERPL-MCNC: 25 MG/DL (ref 8–27)
BUN/CREAT SERPL: 21 (ref 12–28)
CALCIUM SERPL-MCNC: 9.1 MG/DL (ref 8.7–10.3)
CHLORIDE SERPL-SCNC: 103 MMOL/L (ref 96–106)
CO2 SERPL-SCNC: 22 MMOL/L (ref 20–29)
CREAT SERPL-MCNC: 1.19 MG/DL (ref 0.57–1)
CREAT UR-MCNC: 86.1 MG/DL
EST. AVERAGE GLUCOSE BLD GHB EST-MCNC: 154 MG/DL
GLOBULIN SER CALC-MCNC: 2.8 G/DL (ref 1.5–4.5)
GLUCOSE SERPL-MCNC: 110 MG/DL (ref 65–99)
HBA1C MFR BLD: 7 % (ref 4.8–5.6)
MICROALBUMIN UR-MCNC: 3.9 UG/ML
POTASSIUM SERPL-SCNC: 4.7 MMOL/L (ref 3.5–5.2)
PROT SERPL-MCNC: 6.6 G/DL (ref 6–8.5)
SODIUM SERPL-SCNC: 140 MMOL/L (ref 134–144)
TSH SERPL DL<=0.005 MIU/L-ACNC: 2.97 UIU/ML (ref 0.45–4.5)

## 2019-06-13 ENCOUNTER — TELEPHONE (OUTPATIENT)
Dept: INTERNAL MEDICINE CLINIC | Age: 84
End: 2019-06-13

## 2019-06-13 ENCOUNTER — OFFICE VISIT (OUTPATIENT)
Dept: INTERNAL MEDICINE CLINIC | Age: 84
End: 2019-06-13

## 2019-06-13 ENCOUNTER — PATIENT OUTREACH (OUTPATIENT)
Dept: INTERNAL MEDICINE CLINIC | Age: 84
End: 2019-06-13

## 2019-06-13 VITALS
HEIGHT: 61 IN | SYSTOLIC BLOOD PRESSURE: 122 MMHG | OXYGEN SATURATION: 94 % | HEART RATE: 72 BPM | WEIGHT: 188 LBS | DIASTOLIC BLOOD PRESSURE: 73 MMHG | BODY MASS INDEX: 35.5 KG/M2 | RESPIRATION RATE: 16 BRPM | TEMPERATURE: 97.6 F

## 2019-06-13 DIAGNOSIS — E78.2 MIXED HYPERLIPIDEMIA: ICD-10-CM

## 2019-06-13 DIAGNOSIS — M48.02 CERVICAL STENOSIS OF SPINE: ICD-10-CM

## 2019-06-13 DIAGNOSIS — N39.3 STRESS INCONTINENCE OF URINE: ICD-10-CM

## 2019-06-13 DIAGNOSIS — F41.9 ANXIETY AND DEPRESSION: ICD-10-CM

## 2019-06-13 DIAGNOSIS — E66.01 SEVERE OBESITY (BMI 35.0-39.9) WITH COMORBIDITY (HCC): ICD-10-CM

## 2019-06-13 DIAGNOSIS — I48.0 PAROXYSMAL ATRIAL FIBRILLATION (HCC): ICD-10-CM

## 2019-06-13 DIAGNOSIS — G47.33 OSA (OBSTRUCTIVE SLEEP APNEA): ICD-10-CM

## 2019-06-13 DIAGNOSIS — R27.0 ATAXIA: ICD-10-CM

## 2019-06-13 DIAGNOSIS — K90.0 CELIAC SPRUE: ICD-10-CM

## 2019-06-13 DIAGNOSIS — I50.42 CHRONIC COMBINED SYSTOLIC AND DIASTOLIC CONGESTIVE HEART FAILURE (HCC): ICD-10-CM

## 2019-06-13 DIAGNOSIS — F32.A ANXIETY AND DEPRESSION: ICD-10-CM

## 2019-06-13 DIAGNOSIS — I10 ESSENTIAL HYPERTENSION: Primary | ICD-10-CM

## 2019-06-13 DIAGNOSIS — E11.21 TYPE 2 DIABETES MELLITUS WITH NEPHROPATHY (HCC): ICD-10-CM

## 2019-06-13 DIAGNOSIS — E03.9 HYPOTHYROIDISM, ADULT: ICD-10-CM

## 2019-06-13 RX ORDER — LANCETS
EACH MISCELLANEOUS
Qty: 100 EACH | Refills: 1 | Status: SHIPPED | OUTPATIENT
Start: 2019-06-13

## 2019-06-13 RX ORDER — PANTOPRAZOLE SODIUM 40 MG/1
40 TABLET, DELAYED RELEASE ORAL 2 TIMES DAILY
Qty: 60 TAB | Refills: 3 | Status: SHIPPED | OUTPATIENT
Start: 2019-06-13

## 2019-06-13 NOTE — TELEPHONE ENCOUNTER
Pt here for follow up with Dr. Tiffany Cameron and stopped by. She does not have lancets to go with the new glucometer that was ordered last visit on 3/12/19 and is requesting an order be sent to her [facility] pharmacy. She likes her new glucometer much better than her old one. Order sent.

## 2019-06-13 NOTE — PATIENT INSTRUCTIONS
Office Policies Phone calls/patient messages: Please allow up to 24 hours for someone in the office to contact you about your call or message. Be mindful your provider may be out of the office or your message may require further review. We encourage you to use [email or web address] for your messages as this is a faster, more efficient way to communicate with our office Medication Refills: 
         
Prescription medications require 48-72 business hours to process. We encourage you to use [email or web address] for your refills. For controlled medications: Please allow 72 business hours to process. Certain medications may require you to  a written prescription at our office. NO narcotic/controlled medications will be prescribed after 4pm Monday through Friday or on weekends Form/Paperwork Completion: 
         
Please note a $25 fee may incur for all paperwork for completed by our providers. We ask that you allow 7-10 business days. Pre-payment is due prior to picking up/faxing the completed form. You may also download your forms to [email or web address] to have your doctor print off.  
 
Schedule appointment with dr Jonathan Holley for august

## 2019-06-13 NOTE — PROGRESS NOTES
HISTORY OF PRESENT ILLNESS  Desiree Borden is a 82 y.o. female. HPI   Last here 1/30/19. Pt is here for routine care. Pt ambulates with a cane.      BP is 122/73  BP at home around 120s-130s/70s  Continues toprol XL 50mg daily and benazepril 20 mg  Pt has been her taking her lasix just 20mg in the AM - had been 40mg but she stopped the evening dose d/t urinary frequency  Recall she has an element of [facility].       BS at home has been around 112, 115, 110  Continues on metformin XR 750mg BID  Not on glipizide      Wt today is 188 bs --up 4 lbs x lov  Pt is concerned that her medications are contributing to her wt as her wt used to be stable  Discussed that gabapentin can cause wt gain - discussed taking this is up to her  Pt eats things such as stir parra, chicken or fish, jello with fruit in it  Pt thinks that she does not eat very much  Discussed WW  Discussed counting calories  Discussed diet and w/l      Reviewed labs 6/19     Pt saw Dr. Anahi Gaona (ortho) for a h/o broken finger on her R hand  Last visit was 11/6/18     Pt saw Dr. Bruno Jimenez (infec dis) --related to infected pacemaker, d/c from care  Last visit was 3/29/18      Pt follows with Dr. Joshua Adame (cardio)  Last visit was 8/16/18 with AVERY Dalal  Pt follows with Dr. Florene Homans (cardio)  Last visit was 12/13/17 for a watchman procedure   Continues on ASA  Pt is no longer on plavix  Due for f/u discussed       Pt follows with Dr. Catalina Alexis (neuro)  for ataxia previously   Missed her July appointment and will reschedule with new neurology d/t dr nilson evans as needed  Currently no recent falls      Pt follows with Dr. Tracy Ackerman (neurosurg) for spinal stenosis  Pt has seen this physician earlier 2018  Had surgery in the past      Pt follows with Dr. Yadi Bauman (uro) for urinary incontinence  Last visit was ~5/15/19  She had further botox recently   Helps sx      Pt follows with Dr. Paulo Lau (ortho) prn  Last visit was 6/17    Pt saw [facility] about her L hip (ortho)  Reviewed notes 5/14/19: Steroid injection of the left trochanter. Discussed transient elevation of blood glucose. All questions were answered to her satisfaction. Follow up p.r.n.     Pt follows with Dr. Robin Palomino (ENT)  Pt uses a neti pot once daily       Pt follows with Dr. Yuri Flores (GI)   Last visit was 10/17  Continues on protonix 40mg now once daily for heartburn, which works well, no breakthrough 6/19     Recall pt has celiac sprue and is somewhat compliant with her gluten-free diet--does not do a great job      Continues on a daily probiotic       Pt is no longer taking iron  Recall pt has a h/o mild anemia, which is stable      Continues on pravachol 20mg for cholesterol       Continues on 50mcg synthroid daily at goal on 6/19 labs       Continues on zoloft 150mg qAM for depression, overall improved  doingwell not sad or down  Has had stress with ill  and recent move   She also takes low dose buspar 5mg BID for anxiety, which works well, happy with dose  Pt is concerned that buspar might be causing her wt gain     Continues on gabapentin 100mg BID for neuropathy, which works well in feet and arms  However she is concerned about wt gain as a SE  She may try to not take this--will try tapering    however Pt c/o getting pain down her legs at night  May not be able to taper off      Pt is not using her CPAP for MALLORY   Pt has an appointment scheduled for 6/17/19 with sleep specialist        Pt c/o getting a back ache sometimes after walking around  She takes tylenol prn for this which helps her sx    Pt did not go to PT for her balance not interested for now      ACP on file. SDMs are her  Maynor Cervantes) and sister Glo Boone).     Recall she used to follow with Dr. Xavier Medel (neurosurg).       PREVENTIVE:    Colonoscopy: 12/28/16, Dr. Yuri Flores, repeat 10 years  EGD: 12/16, Dr. Yuri Flores  Pap: 2017, Dr. Hollie Nieto  Mammogram:10/18, negative  Dexa: 11/20/17, nl   Tdap: 4/15    Pneumovax: 05/09/18    Prevnar 13: 8/16  Zostavax: 2/29/16  Shingrix: ordered 05/09/18   Flu shot: 9/18  Eye exam: Dr. Gia Bruno, ~2/19, mild diabetic retinopathy in R eye  Foot exam: 06/13/19   Microalbumin: 6/19  A1c: 5/16 6.5, 8/16 6.8, 6/17 6.9, 9/17 6.3, 10/17 6.7, 12/17 6.3, 3/18 6.6, 6/18 7.3, 12/18 7.2, 6/19 7.0  Lipids: 6/18 LDL 89    Patient Active Problem List    Diagnosis Date Noted    Chronic combined systolic and diastolic congestive heart failure ([facility] 05/18/2018    Acute on chronic diastolic (congestive) heart failure ([address] 04/07/2018    GI bleed 04/07/2018    Severe obesity (BMI 35.0-39. 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    ACP (advance care planning) 11/11/2015    Hypothyroidism, adult 07/31/2015    Type 2 diabetes mellitus without complication ([address] 66/97/1184    Essential hypertension 04/27/2015    MALLORY (obstructive sleep apnea) 07/09/2014    Hyperlipidemia 07/09/2014    Sarcoid 07/09/2014    Stress bladder incontinence, female 07/09/2014    Obesity 07/09/2014    TIA (transient ischemic attack) 07/09/2014    AR (aortic regurgitation) 07/09/2014    Mitral valve insufficiency 07/09/2014    Cervical stenosis of spine 07/09/2014     Current Outpatient Medications   Medication Sig Dispense Refill    levothyroxine (SYNTHROID) 50 mcg tablet TAKE 1 TABLET BY MOUTH DAILY BEFORE BREAKFAST 90 Tab 0    sertraline (ZOLOFT) 100 mg tablet TAKE 1 1/2 TABLET BY MOUTH EVERY  Tab 0    Blood-Glucose Meter monitoring kit Check blood sugar fasting and before dinner or bedtime daily.  Dx e11.21. 1 Kit 0    glucose blood VI test strips (BLOOD GLUCOSE TEST) strip Check blood sugar fasting and before dinner or bedtime daily. Dx e11.21. 100 Strip 0    MICROLET LANCET misc CHECK BLOOD SUGAR TWICE A DAY 1 Each 1    metFORMIN ER (GLUCOPHAGE XR) 750 mg tablet TAKE 1 TABLET BY MOUTH TWICE DAILY 180 Tab 1    busPIRone (BUSPAR) 5 mg tablet TAKE 1 TABLET BY MOUTH TWICE DAILY 60 Tab 1    metoprolol succinate (TOPROL-XL) 50 mg XL tablet TAKE 1 TABLET BY MOUTH DAILY 90 Tab 1    benazepril (LOTENSIN) 20 mg tablet Take 1 Tab by mouth daily. 90 Tab 1    gabapentin (NEURONTIN) 100 mg capsule TAKE 1 CAPSULE BY MOUTH TWICE DAILY 180 Cap 1    pravastatin (PRAVACHOL) 20 mg tablet TAKE 1 TABLET BY MOUTH EVERY DAY 90 Tab 0    pravastatin (PRAVACHOL) 20 mg tablet TAKE 1 TABLET BY MOUTH EVERY DAY 90 Tab 0    furosemide (LASIX) 40 mg tablet Take 1 Tab by mouth two (2) times a day. 60 Tab 6    pantoprazole (PROTONIX) 40 mg tablet Take 40 mg by mouth two (2) times a day.  L. acidoph & paracasei- S therm- Bifido (FRAN-Q/RISAQUAD) 8 billion cell cap cap Take 1 Cap by mouth daily. (Patient taking differently: Take 1 Cap by mouth two (2) times a day.) 60 Cap 0    Cholecalciferol, Vitamin D3, (VITAMIN D3) 2,000 unit cap capsule Take 2,000 Units by mouth daily.  acetaminophen (TYLENOL) 500 mg tablet Take 500 mg by mouth every four (4) hours as needed for Pain.  ferrous sulfate (IRON) 325 mg (65 mg iron) tablet Take 325 mg by mouth every other day.  multivit-min-FA-lycopen-lutein (CENTRUM SILVER) 0.4-300-250 mg-mcg-mcg tab Take 1 Tab by mouth daily.  aspirin delayed-release 81 mg tablet Take 81 mg by mouth daily.  magnesium 250 mg tab Take 250 mg by mouth every evening.        Past Surgical History:   Procedure Laterality Date    CARDIAC SURG PROCEDURE UNLIST      cardioversion     CARDIAC SURG PROCEDURE UNLIST      watchman device    COLONOSCOPY N/A 12/28/2016    COLONOSCOPY performed by Swapnil Oseguera [facility] ENDOSCOPY    HX CATARACT REMOVAL      both eyes    HX CHOLECYSTECTOMY      HX COLONOSCOPY  5/8/2013    Repeat in 5 years    HX CYST REMOVAL  10/15    on head     HX HYSTERECTOMY      HX ORTHOPAEDIC Bilateral     knee replacement to both knees    HX ORTHOPAEDIC      spacer btwn L4-5 and L5-6     HX PACEMAKER PLACEMENT      HX TONSILLECTOMY      HX UROLOGICAL      botox in bladder      Lab Results   Component Value Date/Time    WBC 9.0 12/13/2018 09:46 AM    HGB 11.2 12/13/2018 09:46 AM    HCT 35.0 12/13/2018 09:46 AM    PLATELET 526 29/20/1366 09:46 AM    MCV 94 12/13/2018 09:46 AM     Lab Results   Component Value Date/Time    Cholesterol, total 193 06/27/2018 01:06 PM    HDL Cholesterol 66 06/27/2018 01:06 PM    LDL, calculated 89 06/27/2018 01:06 PM    LDL-C, External 77 10/18/2013 11:09 AM    Triglyceride 188 (H) 06/27/2018 01:06 PM    CHOL/HDL Ratio 1.9 09/08/2016 02:13 AM     Lab Results   Component Value Date/Time    GFR est non-AA 43 (L) 06/11/2019 09:07 AM    GFR est AA 49 (L) 06/11/2019 09:07 AM    Creatinine 1.19 (H) 06/11/2019 09:07 AM    BUN 25 06/11/2019 09:07 AM    Sodium 140 06/11/2019 09:07 AM    Potassium 4.7 06/11/2019 09:07 AM    Chloride 103 06/11/2019 09:07 AM    CO2 22 06/11/2019 09:07 AM    Magnesium 1.7 04/09/2018 02:34 AM    Phosphorus 3.3 09/07/2016 01:11 AM        Review of Systems   Respiratory: Negative for shortness of breath. Cardiovascular: Negative for chest pain. Musculoskeletal: Positive for back pain and myalgias. Physical Exam   Constitutional: She is oriented to person, place, and time. She appears well-developed and well-nourished. No distress. HENT:   Head: Normocephalic and atraumatic. Mouth/Throat: Oropharynx is clear and moist. No oropharyngeal exudate. Eyes: Conjunctivae and EOM are normal. Right eye exhibits no discharge. Left eye exhibits no discharge. Neck: Normal range of motion. Neck supple.    Cardiovascular: Normal rate, regular rhythm, normal heart sounds and intact distal pulses. Exam reveals no gallop and no friction rub. No murmur heard. Pulmonary/Chest: Effort normal and breath sounds normal. No respiratory distress. She has no wheezes. She has no rales. She exhibits no tenderness. Musculoskeletal: She exhibits no edema, tenderness or deformity. Lymphadenopathy:     She has no cervical adenopathy. Neurological: She is alert and oriented to person, place, and time. Coordination abnormal.   Monofilament nl BLE, good peripheral pulses, no ulcers    Skin: Skin is warm and dry. No rash noted. She is not diaphoretic. No erythema. No pallor. Varicose veins   Psychiatric: She has a normal mood and affect. Her behavior is normal.       ASSESSMENT and PLAN    ICD-10-CM ICD-9-CM    1. Essential hypertension    Controlled on benazepril 20mg and toprol XL, continue, no change to dose   I10 401.9    2. Chronic combined systolic and diastolic congestive heart failure (HCC)    On lasix 40mg daily, and toprol, medically managed, follows with cardiology, sees both Dr Igor Tran and Dr Kindra Barrios, overdue to see cardio, advised her to schedule f/u, s/p watchman procedure, only on ASA   I50.42 428.42      428.0    3. Severe obesity (BMI 35.0-39. 9) with comorbidity ([address]    Discussed diet, w/l, and WW   E66.01 278.01    4. Type 2 diabetes mellitus with nephropathy (HCC)    a1c overall stable at 7.0, slightly improved from last check, near goal, for now continue metformin XR 750mg BID   E11.21 250.40      583.81    5. Mixed hyperlipidemia    Due for lipid check, ordered   E78.2 272.2    6. Paroxysmal atrial fibrillation (HCC)    S/p watchman, just on ASA, rate controlled   I48.0 427.31    7. Celiac sprue    Partially following a gluten-free diet   K90.0 579.0    8. MALLORY (obstructive sleep apnea)    Has appointment with sleep specialist scheduled for tomorrow   G47.33 327.23    9.  Hypothyroidism, adult    TSH at goal in 6/19 on synthroid 50mcgs daily   E03.9 244.9    10. Cervical stenosis of spine    Gabapentin improves her neuropathic sx, previously followed with neurology and Dr Jenelle Alexis    Pt is concerned this causes wt gain, discussed this is up to her whether she takes it, she can try tapering it off but pain may recur   M48.02 723.0    11. Ataxia    Did PT in the fall   R27.0 781.3    12. Stress incontinence of urine    Did get botox, UTD with Dr Didi Zuleta, will get his notes   N39.3 [id number]     13. Depression and anxiety - improved with zoloft and buspar    Scribed by Lilliam Farrar of [address] [place] [address] [address], as dictated by Dr. Eleonora Talamantes. Current diagnosis and concerns discussed with pt at length. Pt understands risks and benefits or current treatment plan and medications, and accepts the treatment and medication with any possible risks. Pt asks appropriate questions, which were answered. Pt was instructed to call with any concerns or problems. I have reviewed the note documented by the scribe. The services provided are my own.   The documentation is accurate

## 2019-06-13 NOTE — PROGRESS NOTES
Pt here for follow up with Dr. Rasta Browning and stopped by. She does not have lancets to go with the new glucometer that was ordered last visit on 3/12/19 and is requesting an order be sent to her [facility] pharmacy. She likes her new glucometer much better than her old one. Order sent. Pt's A1c was down to 7.0% on 6/11/19, from 7.2% on 12/13/19. Pt is upset about weight gain and was given a 1200 calorie 14-day sample diabetes menu meal plan as a guide Pt only eats two meals a day so will eat <1200 calories a day. Discussed Weight [employer]chers, which she cannot afford. Pt will call with any questions.

## 2019-07-08 ENCOUNTER — DOCUMENTATION ONLY (OUTPATIENT)
Dept: INTERNAL MEDICINE CLINIC | Age: 84
End: 2019-07-08

## 2019-07-08 DIAGNOSIS — M48.02 CERVICAL STENOSIS OF SPINE: Primary | ICD-10-CM

## 2019-07-08 RX ORDER — GABAPENTIN 100 MG/1
CAPSULE ORAL
Qty: 180 CAP | Refills: 0 | Status: SHIPPED | OUTPATIENT
Start: 2019-07-08 | End: 2019-11-19 | Stop reason: SDUPTHER

## 2019-07-08 RX ORDER — FUROSEMIDE 40 MG/1
40 TABLET ORAL 2 TIMES DAILY
Qty: 60 TAB | Refills: 6 | Status: SHIPPED | OUTPATIENT
Start: 2019-07-08 | End: 2019-07-08 | Stop reason: SDUPTHER

## 2019-07-08 NOTE — TELEPHONE ENCOUNTER
PCP: Ashlee Klein MD    Last appt: 6/13/2019  Future Appointments   Date Time Provider Paul Davis   9/16/2019  1:15 PM Ashlee Klein MD [address]   3/16/2020  1:30 PM PACEMAKER, [facility] [address] [facility]   3/16/2020  1:40 PM Mike Lyn, AVERY [facility]HELSIE SCHED       Requested Prescriptions     Pending Prescriptions Disp Refills    furosemide (LASIX) 40 mg tablet 60 Tab 6     Sig: Take 1 Tab by mouth two (2) times a day.

## 2019-07-08 NOTE — PROGRESS NOTES
Following rx was faxed to pharmacy with confirmation received:      gabapentin (NEURONTIN) 100 mg capsule [[id number]]     Order Details   Dose, Route, Frequency: As Directed    Dispense Quantity: 180 Cap Refills: 0 Fills remaining: --           Sig: TAKE 1 CAPSULE BY MOUTH TWICE DAILY          Written Date: 07/08/19 Expiration Date: --     Start Date: 07/08/19 End Date: --

## 2019-07-12 RX ORDER — PRAVASTATIN SODIUM 20 MG/1
TABLET ORAL
Qty: 90 TAB | Refills: 0 | Status: SHIPPED | OUTPATIENT
Start: 2019-07-12 | End: 2019-09-16

## 2019-07-24 RX ORDER — BUSPIRONE HYDROCHLORIDE 5 MG/1
TABLET ORAL
Qty: 60 TAB | Refills: 0 | Status: SHIPPED | OUTPATIENT
Start: 2019-07-24 | End: 2019-08-28 | Stop reason: SDUPTHER

## 2019-08-14 RX ORDER — METOPROLOL SUCCINATE 50 MG/1
TABLET, EXTENDED RELEASE ORAL
Qty: 90 TAB | Refills: 0 | Status: SHIPPED | OUTPATIENT
Start: 2019-08-14 | End: 2019-11-11 | Stop reason: SDUPTHER

## 2019-08-14 RX ORDER — METFORMIN HYDROCHLORIDE 750 MG/1
TABLET, EXTENDED RELEASE ORAL
Qty: 180 TAB | Refills: 0 | Status: SHIPPED | OUTPATIENT
Start: 2019-08-14 | End: 2019-11-11 | Stop reason: SDUPTHER

## 2019-08-20 RX ORDER — LEVOTHYROXINE SODIUM 50 UG/1
TABLET ORAL
Qty: 90 TAB | Refills: 0 | Status: SHIPPED | OUTPATIENT
Start: 2019-08-20 | End: 2019-11-18 | Stop reason: SDUPTHER

## 2019-08-28 RX ORDER — BENAZEPRIL HYDROCHLORIDE 20 MG/1
TABLET ORAL
Qty: 90 TAB | Refills: 0 | Status: SHIPPED | OUTPATIENT
Start: 2019-08-28 | End: 2019-11-11 | Stop reason: SDUPTHER

## 2019-08-28 RX ORDER — BUSPIRONE HYDROCHLORIDE 5 MG/1
TABLET ORAL
Qty: 60 TAB | Refills: 0 | Status: SHIPPED | OUTPATIENT
Start: 2019-08-28 | End: 2019-10-09 | Stop reason: SDUPTHER

## 2019-09-13 DIAGNOSIS — M48.02 CERVICAL STENOSIS OF SPINE: ICD-10-CM

## 2019-09-13 DIAGNOSIS — I50.42 CHRONIC COMBINED SYSTOLIC AND DIASTOLIC CONGESTIVE HEART FAILURE (HCC): ICD-10-CM

## 2019-09-13 DIAGNOSIS — I48.0 PAROXYSMAL ATRIAL FIBRILLATION (HCC): ICD-10-CM

## 2019-09-13 DIAGNOSIS — E66.01 SEVERE OBESITY (BMI 35.0-39.9) WITH COMORBIDITY (HCC): ICD-10-CM

## 2019-09-13 DIAGNOSIS — F41.9 ANXIETY AND DEPRESSION: ICD-10-CM

## 2019-09-13 DIAGNOSIS — R27.0 ATAXIA: ICD-10-CM

## 2019-09-13 DIAGNOSIS — E03.9 HYPOTHYROIDISM, ADULT: ICD-10-CM

## 2019-09-13 DIAGNOSIS — G47.33 OSA (OBSTRUCTIVE SLEEP APNEA): ICD-10-CM

## 2019-09-13 DIAGNOSIS — E78.2 MIXED HYPERLIPIDEMIA: ICD-10-CM

## 2019-09-13 DIAGNOSIS — I10 ESSENTIAL HYPERTENSION: ICD-10-CM

## 2019-09-13 DIAGNOSIS — K90.0 CELIAC SPRUE: ICD-10-CM

## 2019-09-13 DIAGNOSIS — E11.21 TYPE 2 DIABETES MELLITUS WITH NEPHROPATHY (HCC): ICD-10-CM

## 2019-09-13 DIAGNOSIS — N39.3 STRESS INCONTINENCE OF URINE: ICD-10-CM

## 2019-09-13 DIAGNOSIS — F32.A ANXIETY AND DEPRESSION: ICD-10-CM

## 2019-09-14 LAB
BUN SERPL-MCNC: 39 MG/DL (ref 8–27)
BUN/CREAT SERPL: 24 (ref 12–28)
CALCIUM SERPL-MCNC: 9.8 MG/DL (ref 8.7–10.3)
CHLORIDE SERPL-SCNC: 100 MMOL/L (ref 96–106)
CHOLEST SERPL-MCNC: 171 MG/DL (ref 100–199)
CO2 SERPL-SCNC: 25 MMOL/L (ref 20–29)
CREAT SERPL-MCNC: 1.65 MG/DL (ref 0.57–1)
EST. AVERAGE GLUCOSE BLD GHB EST-MCNC: 151 MG/DL
GLUCOSE SERPL-MCNC: 117 MG/DL (ref 65–99)
HBA1C MFR BLD: 6.9 % (ref 4.8–5.6)
HDLC SERPL-MCNC: 73 MG/DL
LDLC SERPL CALC-MCNC: 69 MG/DL (ref 0–99)
POTASSIUM SERPL-SCNC: 5.1 MMOL/L (ref 3.5–5.2)
SODIUM SERPL-SCNC: 140 MMOL/L (ref 134–144)
TRIGL SERPL-MCNC: 147 MG/DL (ref 0–149)
VLDLC SERPL CALC-MCNC: 29 MG/DL (ref 5–40)

## 2019-09-16 ENCOUNTER — OFFICE VISIT (OUTPATIENT)
Dept: INTERNAL MEDICINE CLINIC | Age: 84
End: 2019-09-16

## 2019-09-16 ENCOUNTER — TELEPHONE (OUTPATIENT)
Dept: INTERNAL MEDICINE CLINIC | Age: 84
End: 2019-09-16

## 2019-09-16 VITALS
RESPIRATION RATE: 16 BRPM | HEIGHT: 61 IN | TEMPERATURE: 97.8 F | SYSTOLIC BLOOD PRESSURE: 127 MMHG | BODY MASS INDEX: 34.55 KG/M2 | HEART RATE: 70 BPM | WEIGHT: 183 LBS | OXYGEN SATURATION: 96 % | DIASTOLIC BLOOD PRESSURE: 66 MMHG

## 2019-09-16 DIAGNOSIS — R91.1 LUNG NODULE: ICD-10-CM

## 2019-09-16 DIAGNOSIS — E03.9 HYPOTHYROIDISM, ADULT: ICD-10-CM

## 2019-09-16 DIAGNOSIS — Z23 ENCOUNTER FOR IMMUNIZATION: ICD-10-CM

## 2019-09-16 DIAGNOSIS — E78.2 MIXED HYPERLIPIDEMIA: ICD-10-CM

## 2019-09-16 DIAGNOSIS — E11.21 TYPE 2 DIABETES MELLITUS WITH NEPHROPATHY (HCC): ICD-10-CM

## 2019-09-16 DIAGNOSIS — G47.33 OSA (OBSTRUCTIVE SLEEP APNEA): ICD-10-CM

## 2019-09-16 DIAGNOSIS — D86.9 SARCOID: ICD-10-CM

## 2019-09-16 DIAGNOSIS — M48.02 CERVICAL STENOSIS OF SPINE: ICD-10-CM

## 2019-09-16 DIAGNOSIS — E66.01 SEVERE OBESITY (BMI 35.0-39.9) WITH COMORBIDITY (HCC): ICD-10-CM

## 2019-09-16 DIAGNOSIS — F32.9 REACTIVE DEPRESSION: ICD-10-CM

## 2019-09-16 DIAGNOSIS — F41.9 ANXIETY: ICD-10-CM

## 2019-09-16 DIAGNOSIS — N17.9 ACUTE RENAL FAILURE, UNSPECIFIED ACUTE RENAL FAILURE TYPE (HCC): ICD-10-CM

## 2019-09-16 DIAGNOSIS — I48.0 PAROXYSMAL ATRIAL FIBRILLATION (HCC): ICD-10-CM

## 2019-09-16 DIAGNOSIS — I72.9 ANEURYSM (HCC): ICD-10-CM

## 2019-09-16 DIAGNOSIS — I10 ESSENTIAL HYPERTENSION: Primary | ICD-10-CM

## 2019-09-16 DIAGNOSIS — Z12.39 BREAST SCREENING: ICD-10-CM

## 2019-09-16 DIAGNOSIS — I50.42 CHRONIC COMBINED SYSTOLIC AND DIASTOLIC CONGESTIVE HEART FAILURE (HCC): ICD-10-CM

## 2019-09-16 PROBLEM — T82.7XXA INFECTION OF PACEMAKER POCKET (HCC): Status: RESOLVED | Noted: 2018-03-06 | Resolved: 2019-09-16

## 2019-09-16 PROBLEM — I50.33 ACUTE ON CHRONIC DIASTOLIC (CONGESTIVE) HEART FAILURE (HCC): Status: RESOLVED | Noted: 2018-04-07 | Resolved: 2019-09-16

## 2019-09-16 PROBLEM — I49.9 IRREGULAR HEARTBEAT: Status: RESOLVED | Noted: 2017-12-18 | Resolved: 2019-09-16

## 2019-09-16 PROBLEM — K92.2 GI BLEED: Status: RESOLVED | Noted: 2018-04-07 | Resolved: 2019-09-16

## 2019-09-16 RX ORDER — VENLAFAXINE HYDROCHLORIDE 37.5 MG/1
CAPSULE, EXTENDED RELEASE ORAL
Qty: 90 CAP | Refills: 1 | Status: SHIPPED | OUTPATIENT
Start: 2019-09-16

## 2019-09-16 RX ORDER — CLOPIDOGREL BISULFATE 75 MG/1
TABLET ORAL
COMMUNITY
End: 2019-09-16

## 2019-09-16 RX ORDER — VENLAFAXINE HYDROCHLORIDE 37.5 MG/1
37.5 CAPSULE, EXTENDED RELEASE ORAL DAILY
Qty: 30 CAP | Refills: 1 | Status: SHIPPED | OUTPATIENT
Start: 2019-09-16 | End: 2019-09-16 | Stop reason: SDUPTHER

## 2019-09-16 RX ORDER — FUROSEMIDE 20 MG/1
TABLET ORAL
Qty: 90 TAB | Refills: 2 | Status: SHIPPED | OUTPATIENT
Start: 2019-09-16

## 2019-09-16 RX ORDER — FUROSEMIDE 20 MG/1
20 TABLET ORAL DAILY
Qty: 30 TAB | Refills: 2 | Status: SHIPPED | OUTPATIENT
Start: 2019-09-16 | End: 2019-09-16 | Stop reason: SDUPTHER

## 2019-09-16 NOTE — PATIENT INSTRUCTIONS
Decrease zoloft to 1 pill per day for 2 weeks  Then cut in half for 2 weeks    Then she can stop it    Start effexor now    Decrease lasix to 20mg daily     Go to lab in 1-2 weeks

## 2019-09-16 NOTE — TELEPHONE ENCOUNTER
Called and spoke to pt. Two pt identifiers confirmed. Pt informed per Dr. Sabi Montano that a yearly follow up chest CT was ordered and mailed. Pt verbalized understanding with no further questions.

## 2019-09-16 NOTE — PROGRESS NOTES
HISTORY OF PRESENT ILLNESS  Mary Lou Mueller is a 82 y.o. female. HPI   Last here 6/13/19  Pt is here for routine care. Pt ambulates with a cane.   Pt is here with her daughter who provides some of her hx       BP is 143/78   BP at home around 120s-130s/70s  Continues toprol XL 50mg daily and benazepril 20 mg  Pt has been her taking her lasix 1 40mg in the AM  Will decrease lasix to 20 mg d/t cr elevation   She has no swelling in her legs, no difficulty breathing   Recall she has an element of [facility].     She is diabetic   BS at home has been around 131 140s 150s 128   Previously betterin 115 range, up recently with increased stress  Continues on metformin XR 750mg BID  Not on glipizide      Wt today is 183 lbs-- down 5 lbs x lov   Discussed diet and wt loss   Pt states she does not eat a lot   Pt's daughter states she eats french toast sticks with syrup every morning   Discussed syrup is not good for a diabetic   Discussed portion control   Discussed [employer]      Reviewed labs 9/19   Cr was 1.6 , normally under 1.0     Pt saw Dr. Elmer Vazquez (ortho) for a h/o broken finger on her R hand  Last visit was 11/6/18     Pt saw Dr. Jerrica Harmon (infec dis) --related to infected pacemaker, d/c from care  Last visit was 3/29/18      Pt follows with Dr. Heaven Downs (cardio)  Last visit was 8/16/18 with AVERY Rangel--f/u scheduled for 3/19      Pt follows with Dr. Kianna Chaidez (cardio)  Last visit was 12/13/17 for a watchman procedure   Continues on ASA  Pt is no longer on plavix  Has a f/u in 4/20       Pt follows with Dr. Geeta Fuller (neuro)  for ataxia previously   Missed her July appointment and will [facility] d/t dr devine practive as needed  Currently no recent falls      Pt follows with Dr. Conchita Lujan (neurosurg) for spinal stenosis  Pt has seen this physician earlier 2018  Had surgery in the past      Pt follows with Dr. Silvia Mcdaniels (uro) for urinary incontinence  Last visit was ~5/15/19  She had further botox recently   Helps sx      Pt follows with Dr. Leanna Basilio (ortho) prn  Last visit was 6/17     Pt saw MELI Maria about her L hip (ortho)  Last visit was 5/14/19      Pt follows with Dr. Lion Guzmán (ENT)  Pt uses a neti pot once daily       Pt follows with Dr. Bahena Patient (GI)   Last visit was 10/17  Continues on protonix 40mg now once daily for heartburn, which works well, no breakthrough 9/19   Pt wonders if she should f/u with him about the celiac sprue   Discussed she does not need to f/u just follow celiac sprue diet      Recall pt has celiac sprue and is somewhat compliant with her gluten-free diet--does not do a great job      Continues on a daily probiotic       Pt is no longer taking iron  Recall pt has a h/o mild anemia, which is stable      Continues on pravachol 20mg for cholesterol       Continues on 50mcg synthroid daily at goal on 6/19 labs       Continues on zoloft 150mg qAM for depression  She also takes low dose buspar 5mg BID for anxiety, which works well, happy with dose  Pt states she is more stressed due to money problems and her daughter living with her   Pt is also stressed due to her higher BS readings   Pt's daughter thinks she needs to change her depression med   Will start effexor 37.5 mg   Will taper down on zoloft      Pt is no longer on gabapentin 100mg BID for neuropathy  States she does not have as much pain anymore in her legs so she recently stopped it  She was concerned about wt gain and states she lost wt since stopping it   Pt takes tylenol for breakthrough pain         Pt is not using her CPAP for MALLORY   Pt had an appointment scheduled for 6/17/19 with sleep specialist but canceled this appt   Pt states she  will reschedule this appt         ACP on file. SDMs are her  Mesfin Jimenez) and sister Rylan Gu).     Recall she used to follow with Dr. Jade Guevara (neurosurg).       PREVENTIVE:    Colonoscopy: 12/28/16, Dr. Bahena Patient, repeat 10 years  EGD: 12/16, Dr. Bahena Patient  Pap: 2017, Dr. Edmond Ayala Green    Mammogram:10/18, negative, ordered   Dexa: 11/20/17, nl   Tdap: 4/15    Pneumovax: 05/09/18    Prevnar 13: 8/16  Zostavax: 2/29/16  Shingrix: ordered 05/09/18 --too expensive  Flu shot: 09/16/19   Eye exam: Dr. Chacorta Mayo, 3/22/19, reviewed notes mild diabetic retinopathy in R eye  Foot exam: 06/13/19   Microalbumin: 6/19  A1c: 5/16 6.5, 8/16 6.8, 6/17 6.9, 9/17 6.3, 10/17 6.7, 12/17 6.3, 3/18 6.6, 6/18 7.3, 12/18 7.2, 6/19 7.0, 9/19 6.9   Lipids: 9/19 LDL 69    Patient Active Problem List    Diagnosis Date Noted    Chronic combined systolic and diastolic congestive heart failure ([address] 05/18/2018    Acute on chronic diastolic (congestive) heart failure ([address] 04/07/2018    GI bleed 04/07/2018    Severe obesity (BMI 35.0-39. 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    ACP (advance care planning) 11/11/2015    Hypothyroidism, adult 07/31/2015    Type 2 diabetes mellitus without complication ([address] 12/34/5302    Essential hypertension 04/27/2015    MALLORY (obstructive sleep apnea) 07/09/2014    Hyperlipidemia 07/09/2014    Sarcoid 07/09/2014    Stress bladder incontinence, female 07/09/2014    Obesity 07/09/2014    TIA (transient ischemic attack) 07/09/2014    AR (aortic regurgitation) 07/09/2014    Mitral valve insufficiency 07/09/2014    Cervical stenosis of spine 07/09/2014     Current Outpatient Medications   Medication Sig Dispense Refill    busPIRone (BUSPAR) 5 mg tablet TAKE 1 TABLET BY MOUTH TWICE DAILY 60 Tab 0    benazepril (LOTENSIN) 20 mg tablet TAKE 1 TABLET BY MOUTH DAILY 90 Tab 0    levothyroxine (SYNTHROID) 50 mcg tablet TAKE 1 TABLET BY MOUTH DAILY BEFORE BREAKFAST 90 Tab 0    metoprolol succinate (TOPROL-XL) 50 mg XL tablet TAKE 1 TABLET BY MOUTH DAILY 90 Tab 0    metFORMIN ER (GLUCOPHAGE XR) 750 mg tablet TAKE 1 TABLET BY MOUTH TWICE DAILY 180 Tab 0    pravastatin (PRAVACHOL) 20 mg tablet TAKE 1 TABLET BY MOUTH EVERY DAY 90 Tab 0    gabapentin (NEURONTIN) 100 mg capsule TAKE 1 CAPSULE BY MOUTH TWICE DAILY 180 Cap 0    furosemide (LASIX) 40 mg tablet TAKE 1 TABLET BY MOUTH TWICE DAILY 180 Tab 0    pantoprazole (PROTONIX) 40 mg tablet Take 1 Tab by mouth two (2) times a day. 60 Tab 3    lancets misc Check blood sugar twice a day. Dx. E11.9 100 Each 1    sertraline (ZOLOFT) 100 mg tablet TAKE 1 1/2 TABLET BY MOUTH EVERY  Tab 0    Blood-Glucose Meter monitoring kit Check blood sugar fasting and before dinner or bedtime daily. Dx e11.21. 1 Kit 0    glucose blood VI test strips (BLOOD GLUCOSE TEST) strip Check blood sugar fasting and before dinner or bedtime daily. Dx e11.21. 100 Strip 0    MICROLET LANCET misc CHECK BLOOD SUGAR TWICE A DAY 1 Each 1    pravastatin (PRAVACHOL) 20 mg tablet TAKE 1 TABLET BY MOUTH EVERY DAY 90 Tab 0    L. acidoph & paracasei- S therm- Bifido (FRAN-Q/RISAQUAD) 8 billion cell cap cap Take 1 Cap by mouth daily. (Patient taking differently: Take 1 Cap by mouth two (2) times a day.) 60 Cap 0    Cholecalciferol, Vitamin D3, (VITAMIN D3) 2,000 unit cap capsule Take 2,000 Units by mouth daily.  acetaminophen (TYLENOL) 500 mg tablet Take 500 mg by mouth every four (4) hours as needed for Pain.  ferrous sulfate (IRON) 325 mg (65 mg iron) tablet Take 325 mg by mouth every other day.  multivit-min-FA-lycopen-lutein (CENTRUM SILVER) 0.4-300-250 mg-mcg-mcg tab Take 1 Tab by mouth daily.  aspirin delayed-release 81 mg tablet Take 81 mg by mouth daily.  magnesium 250 mg tab Take 250 mg by mouth every evening.        Past Surgical History:   Procedure Laterality Date    CARDIAC SURG PROCEDURE UNLIST      cardioversion     CARDIAC SURG PROCEDURE UNLIST      watchman device    COLONOSCOPY N/A 12/28/2016    COLONOSCOPY performed by Goldie Koyanagi, [facility] at [facility]    HX CATARACT REMOVAL      both eyes    HX CHOLECYSTECTOMY      HX COLONOSCOPY  5/8/2013    Repeat in 5 years    HX CYST REMOVAL  10/15    on head     HX HYSTERECTOMY      HX ORTHOPAEDIC Bilateral     knee replacement to both knees    HX ORTHOPAEDIC      spacer btwn L4-5 and L5-6     HX PACEMAKER PLACEMENT      HX TONSILLECTOMY      HX UROLOGICAL      botox in bladder      Lab Results   Component Value Date/Time    WBC 9.0 12/13/2018 09:46 AM    HGB 11.2 12/13/2018 09:46 AM    HCT 35.0 12/13/2018 09:46 AM    PLATELET 663 96/91/3919 09:46 AM    MCV 94 12/13/2018 09:46 AM     Lab Results   Component Value Date/Time    Cholesterol, total 171 09/13/2019 11:48 AM    HDL Cholesterol 73 09/13/2019 11:48 AM    LDL, calculated 69 09/13/2019 11:48 AM    LDL-C, External 77 10/18/2013 11:09 AM    Triglyceride 147 09/13/2019 11:48 AM    CHOL/HDL Ratio 1.9 09/08/2016 02:13 AM     Lab Results   Component Value Date/Time    GFR est non-AA 29 (L) 09/13/2019 11:48 AM    GFR est AA 33 (L) 09/13/2019 11:48 AM    Creatinine 1.65 (H) 09/13/2019 11:48 AM    BUN 39 (H) 09/13/2019 11:48 AM    Sodium 140 09/13/2019 11:48 AM    Potassium 5.1 09/13/2019 11:48 AM    Chloride 100 09/13/2019 11:48 AM    CO2 25 09/13/2019 11:48 AM    Magnesium 1.7 04/09/2018 02:34 AM    Phosphorus 3.3 09/07/2016 01:11 AM        Review of Systems   Respiratory: Negative for shortness of breath and wheezing. Cardiovascular: Negative for chest pain and leg swelling. Genitourinary: Positive for frequency. Negative for dysuria. Musculoskeletal: Positive for myalgias. Negative for falls. Neurological: Negative for loss of consciousness. Psychiatric/Behavioral: Positive for depression. The patient is nervous/anxious.         Physical Exam   Constitutional: She is oriented to person, place, and time. She appears well-developed and well-nourished. No distress. HENT:   Head: Normocephalic and atraumatic. Mouth/Throat: Oropharynx is clear and moist. No oropharyngeal exudate. Eyes: Conjunctivae and EOM are normal. Right eye exhibits no discharge. Left eye exhibits no discharge. Neck: Normal range of motion. Neck supple. Cardiovascular: Normal rate, regular rhythm and normal heart sounds. Exam reveals no gallop and no friction rub. No murmur heard. Pulmonary/Chest: Effort normal and breath sounds normal. No respiratory distress. She has no wheezes. She has no rales. She exhibits no tenderness. Abdominal: Soft. She exhibits no distension. There is no tenderness. There is no rebound and no guarding. Musculoskeletal: She exhibits no edema, tenderness or deformity. Lymphadenopathy:     She has no cervical adenopathy. Neurological: She is alert and oriented to person, place, and time. Coordination abnormal.   Skin: Skin is warm and dry. No rash noted. She is not diaphoretic. No erythema. No pallor. Psychiatric: She has a normal mood and affect. Her behavior is normal.       ASSESSMENT and PLAN High Complex Medical Decision Making     ICD-10-CM ICD-9-CM    1. Essential hypertension                  controlled on toprol and benazepril not too low of note had recent cr bump if not improved on repeat labs may need to adjust benazepril dose  Q13 941.2 METABOLIC PANEL, BASIC   2. Encounter for immunization Z23 V03.89 INFLUENZA VACCINE INACTIVATED (IIV), SUBUNIT, ADJUVANTED, IM      ADMIN INFLUENZA VIRUS VAC      METABOLIC PANEL, BASIC   3. Aneurysm ([facility]              Had mild dilation of aorta on ct scan last year will repeat ct of chest to ensure this is stable  T38.9 791.8 METABOLIC PANEL, BASIC   4.  Paroxysmal atrial fibrillation (HCC)              Follows with dr Guera Perez and dr Belen Beebe s/p watchman procedure no longer on plavix just on asa for anti coag has f/u pending with dr Joaquin Gagnon in 3/19  K50.1 793.16 METABOLIC PANEL, BASIC   5. Severe obesity (BMI 35.0-39. 9) with comorbidity ([address]              Discussed ww again and calorie counting and calorie reduction  J96.15 494.25 METABOLIC PANEL, BASIC   6. Chronic combined systolic and diastolic congestive heart failure ([address]            Medically managed currently on lasix 40 mg daily has no difficult breathing or edema has worsening renal function will decrease lasix to 20 mg and repeat labs in 2 wks  G09.80 814.70 METABOLIC PANEL, BASIC     428.0    7. Sarcoid            Hx of years ago was followed with rheum no active sxs  X43.6 626 METABOLIC PANEL, BASIC   8. Mixed hyperlipidemia          Controlled on pravachol in 9/19  O74.5 516.0 METABOLIC PANEL, BASIC   9. MALLORY (obstructive sleep apnea)            Still has not seen sleep specialist is not wearing cpap we have discussed sleep study for many years but have made no progress she states she wants to f/u with this had appt in 6/19 but cancelled she will reschedule  U75.62 922.24 METABOLIC PANEL, BASIC   10. Hypothyroidism, adult            Controlled on synthroid 50 mcgs daily  Y57.6 419.0 METABOLIC PANEL, BASIC   11. Type 2 diabetes mellitus with nephropathy (HCC)          a1c at goal though home readings have been higher she is currently on metformin 750 mg BID also had recent bump in cr for now will continue current dose of metformin and work on diet if repeat cr remains elevated may need to stop metformin and change meds was on glipizide in the past but not recently     C27.39 385.79 METABOLIC PANEL, BASIC     583.81    12. Breast screening Z12.31 V76.10 [facility] MAMMO BI SCREENING INCL CAD   15. Cervical stenosis of spine            Has had surg was followed by dr Gabriel Salcedo in the past has chronic stenosis has followed with dr hess in the past stopped using gabapentin because she was not having pain recently discussed can use it prn  M48.02 723.0       14.  Depression- will try to transition from zoloft to effexor as zoloft does not seem to be working well decrease to 100 after 2 weeks to 50 and after 2 weeks stop will start effexor at 37.5 mg daily   15. Anxiety-- improved with buspar bid    16. Lung nodules- follow up ct scan ordered   17. ARF- pt with cr bumped to 1.6 normally under 1 will decrease lasix to 20 mg repeat bmp in 1-2 wks if not back to baseline will evaluate further at that time       Depression screen reviewed and positive (+4). Pt has been more stressed lately due to her daughter moving in and her health, will give effexor 37.5 mg   Scribed by Tomas Paredes, as dictated by Dr. Abdirahman Abrams. Current diagnosis and concerns discussed with pt at length. Pt understands risks and benefits or current treatment plan and medications, and accepts the treatment and medication with any possible risks. Pt asks appropriate questions, which were answered. Pt was instructed to call with any concerns or problems. I have reviewed the note documented by the scribe. The services provided are my own.   The documentation is accurate

## 2019-09-25 ENCOUNTER — TELEPHONE (OUTPATIENT)
Dept: INTERNAL MEDICINE CLINIC | Age: 84
End: 2019-09-25

## 2019-09-25 NOTE — TELEPHONE ENCOUNTER
[facility]             General Message/Vendor Calls     Caller's first and last name:       Reason for call:   MRI screening     Callback required yes/no and why:   Yes, to let her know who she needs to call     Best contact number(s):   [phone]     Details to clarify the request:   Pt states no body has called her to schedule her MRI screening     [address] received & copied from [facility]

## 2019-09-25 NOTE — TELEPHONE ENCOUNTER
Called, spoke to pt. Two pt identifiers confirmed. Pt given information and number about general imaging. Pt verbalized understanding of information discussed w/ no further questions at this time.

## 2019-10-03 DIAGNOSIS — E11.21 TYPE 2 DIABETES MELLITUS WITH NEPHROPATHY (HCC): ICD-10-CM

## 2019-10-03 DIAGNOSIS — N17.9 ACUTE RENAL FAILURE, UNSPECIFIED ACUTE RENAL FAILURE TYPE (HCC): Primary | ICD-10-CM

## 2019-10-03 LAB
BUN SERPL-MCNC: 31 MG/DL (ref 8–27)
BUN/CREAT SERPL: 19 (ref 12–28)
CALCIUM SERPL-MCNC: 9.2 MG/DL (ref 8.7–10.3)
CHLORIDE SERPL-SCNC: 100 MMOL/L (ref 96–106)
CO2 SERPL-SCNC: 21 MMOL/L (ref 20–29)
CREAT SERPL-MCNC: 1.59 MG/DL (ref 0.57–1)
GLUCOSE SERPL-MCNC: 123 MG/DL (ref 65–99)
POTASSIUM SERPL-SCNC: 4.6 MMOL/L (ref 3.5–5.2)
SODIUM SERPL-SCNC: 137 MMOL/L (ref 134–144)

## 2019-10-03 NOTE — PROGRESS NOTES
Continued mild decrease in renal fxn    Check renal US, continue with LOWER dose lasix 20mg    Check a1c, bmp, spep, upep, UA, cbc 1 week prior to f/u

## 2019-10-03 NOTE — PROGRESS NOTES
Called, spoke to pt. Two pt identifiers confirmed. Pt informed per Dr. Meli Goodwin continued mild decrease in renal fxn. Pt informed per Dr. Meli Goodwin check renal US, continue with LOWER dose lasix 20mg--[facility] Dr. Meli Goodwin, [address] ordered. Pt informed per Dr. Meli Goodwin check a1c, bmp, spep, upep, UA, cbc 1 week prior to f/u--Labs ordered and mailed to pt. Pt verbalized understanding of information discussed w/ no further questions at this time.

## 2019-10-09 RX ORDER — BUSPIRONE HYDROCHLORIDE 5 MG/1
TABLET ORAL
Qty: 60 TAB | Refills: 0 | Status: SHIPPED | OUTPATIENT
Start: 2019-10-09 | End: 2019-11-20 | Stop reason: SDUPTHER

## 2019-10-09 RX ORDER — PRAVASTATIN SODIUM 20 MG/1
TABLET ORAL
Qty: 90 TAB | Refills: 0 | Status: SHIPPED | OUTPATIENT
Start: 2019-10-09 | End: 2020-01-13

## 2019-10-10 ENCOUNTER — HOSPITAL ENCOUNTER (OUTPATIENT)
Dept: ULTRASOUND IMAGING | Age: 84
Discharge: HOME | End: 2019-10-10
Attending: INTERNAL MEDICINE
Payer: MEDICARE

## 2019-10-10 DIAGNOSIS — N17.9 ACUTE RENAL FAILURE, UNSPECIFIED ACUTE RENAL FAILURE TYPE (HCC): ICD-10-CM

## 2019-10-10 PROCEDURE — 76770 US EXAM ABDO BACK WALL COMP: CPT

## 2019-10-11 NOTE — PROGRESS NOTES
Called, spoke to pt. Two pt identifiers confirmed. Pt informed per Dr. Itz Castillo ultrasound was unremarkable. Pt verbalized understanding of information discussed w/ no further questions at this time.

## 2019-11-11 RX ORDER — BENAZEPRIL HYDROCHLORIDE 20 MG/1
TABLET ORAL
Qty: 90 TAB | Refills: 0 | Status: SHIPPED | OUTPATIENT
Start: 2019-11-11

## 2019-11-11 RX ORDER — METFORMIN HYDROCHLORIDE 750 MG/1
TABLET, EXTENDED RELEASE ORAL
Qty: 180 TAB | Refills: 0 | Status: SHIPPED | OUTPATIENT
Start: 2019-11-11 | End: 2020-02-18 | Stop reason: SDUPTHER

## 2019-11-11 RX ORDER — METOPROLOL SUCCINATE 50 MG/1
TABLET, EXTENDED RELEASE ORAL
Qty: 90 TAB | Refills: 0 | Status: SHIPPED | OUTPATIENT
Start: 2019-11-11 | End: 2020-02-18 | Stop reason: SDUPTHER

## 2019-11-15 DIAGNOSIS — M48.02 CERVICAL STENOSIS OF SPINE: ICD-10-CM

## 2019-11-15 NOTE — TELEPHONE ENCOUNTER
Franky[facility] states they electronically faxed refill requests over 48 hrs ago & we did not receive & patient needs refills done asap. Please call if any questions. Pharmacy is [facility] indicated.  Thank you

## 2019-11-18 RX ORDER — GABAPENTIN 100 MG/1
CAPSULE ORAL
Qty: 180 CAP | Refills: 0 | OUTPATIENT
Start: 2019-11-18

## 2019-11-18 RX ORDER — LEVOTHYROXINE SODIUM 50 UG/1
TABLET ORAL
Qty: 90 TAB | Refills: 0 | Status: SHIPPED | OUTPATIENT
Start: 2019-11-18 | End: 2020-02-18 | Stop reason: SDUPTHER

## 2019-11-18 NOTE — TELEPHONE ENCOUNTER
Called, spoke to pt. Two pt identifiers confirmed. Pt states that she went off the gabapentin  Pt states at night, pt has pain in foot and leg--from neuropathy. During the day, pt states that she is okay. Pt not sure if she wants to go back on in fear of weight gain. Pt has lost weight since stopping gabapentin. Pt asking if there is another medication that may be able to help. Pt informed Dr. Luther Cuellar will be notified. Pt verbalized understanding of information discussed w/ no further questions at this time.

## 2019-11-18 NOTE — TELEPHONE ENCOUNTER
Spoke to [facility] to inform that the pt wants to hold off of the gabapentin for now.

## 2019-11-19 RX ORDER — GABAPENTIN 100 MG/1
100 CAPSULE ORAL
Qty: 90 CAP | Refills: 0 | Status: SHIPPED | OUTPATIENT
Start: 2019-11-19 | End: 2019-12-13 | Stop reason: SDUPTHER

## 2019-11-19 NOTE — TELEPHONE ENCOUNTER
Called, spoke to pt. Two pt identifiers confirmed. Pt informed per Dr. Stella Dempsey other similar meds can all cause wt gain and it is up to her if she wants to take it or not. Pt states that she would like to try the gabapentin again but trying 1 capsule at night rather than taking BID--pended. Pt informed Dr. Stella Dempsey will be notified. Pt verbalized understanding of information discussed w/ no further questions at this time.

## 2019-11-20 NOTE — TELEPHONE ENCOUNTER
The following prescription was faxed into pt's [address] w/ confirmation received:      gabapentin (NEURONTIN) 100 mg capsule [[id number]]     Order Details   Dose: 100 mg Route: Oral Frequency: EVERY BEDTIME   Dispense Quantity: 90 Cap Refills: 0 Fills remaining: --           Sig: Take 1 Cap by mouth nightly.  Max Daily Amount: 100 mg.          Written Date: 11/19/19 Expiration Date: --     Start Date: 11/19/19 End Date: --            Ordering Provider: -- ASHLI #:  -- NPI:  --    Authorizing Provider: Brooksie Gottron, MD ASHLI #:  [id number] NPI:  [id number]    Ordering User:  Brooksie Gottron, MD            Diagnosis Association: Cervical stenosis of spine (M48.02)      Original Order:  gabapentin (NEURONTIN) 100 mg capsule [[id number]]      Pharmacy:  [id number] - [place], [address] [place] [address] ASHLI #:  [id number]

## 2019-11-21 RX ORDER — BUSPIRONE HYDROCHLORIDE 5 MG/1
TABLET ORAL
Qty: 60 TAB | Refills: 0 | Status: SHIPPED | OUTPATIENT
Start: 2019-11-21 | End: 2019-12-19

## 2019-11-26 ENCOUNTER — HOSPITAL ENCOUNTER (OUTPATIENT)
Dept: MAMMOGRAPHY | Age: 84
Discharge: HOME | End: 2019-11-26
Attending: INTERNAL MEDICINE
Payer: MEDICARE

## 2019-11-26 DIAGNOSIS — Z12.31 VISIT FOR SCREENING MAMMOGRAM: ICD-10-CM

## 2019-11-26 PROCEDURE — 77067 SCR MAMMO BI INCL CAD: CPT

## 2019-12-11 ENCOUNTER — APPOINTMENT (OUTPATIENT)
Dept: GENERAL RADIOLOGY | Age: 84
End: 2019-12-11
Attending: EMERGENCY MEDICINE
Payer: MEDICARE

## 2019-12-11 ENCOUNTER — HOSPITAL ENCOUNTER (EMERGENCY)
Age: 84
Discharge: HOME | End: 2019-12-11
Attending: EMERGENCY MEDICINE | Admitting: EMERGENCY MEDICINE
Payer: MEDICARE

## 2019-12-11 VITALS
BODY MASS INDEX: 31.28 KG/M2 | SYSTOLIC BLOOD PRESSURE: 165 MMHG | TEMPERATURE: 97.3 F | HEART RATE: 74 BPM | HEIGHT: 62 IN | OXYGEN SATURATION: 96 % | RESPIRATION RATE: 16 BRPM | WEIGHT: 170 LBS | DIASTOLIC BLOOD PRESSURE: 90 MMHG

## 2019-12-11 DIAGNOSIS — S32.010A COMPRESSION FRACTURE OF L1 VERTEBRA, INITIAL ENCOUNTER (HCC): Primary | ICD-10-CM

## 2019-12-11 PROCEDURE — 72100 X-RAY EXAM L-S SPINE 2/3 VWS: CPT

## 2019-12-11 PROCEDURE — 74011250637 HC RX REV CODE- 250/637: Performed by: PHYSICIAN ASSISTANT

## 2019-12-11 PROCEDURE — 99283 EMERGENCY DEPT VISIT LOW MDM: CPT

## 2019-12-11 RX ORDER — LIDOCAINE 4 G/100G
1 PATCH TOPICAL EVERY 12 HOURS
Qty: 30 PATCH | Refills: 0 | Status: SHIPPED | OUTPATIENT
Start: 2019-12-11

## 2019-12-11 RX ORDER — HYDROCODONE BITARTRATE AND ACETAMINOPHEN 5; 325 MG/1; MG/1
1 TABLET ORAL
Status: COMPLETED | OUTPATIENT
Start: 2019-12-11 | End: 2019-12-11

## 2019-12-11 RX ORDER — OXYCODONE AND ACETAMINOPHEN 5; 325 MG/1; MG/1
1 TABLET ORAL
Qty: 12 TAB | Refills: 0 | Status: SHIPPED | OUTPATIENT
Start: 2019-12-11 | End: 2019-12-14

## 2019-12-11 RX ADMIN — HYDROCODONE BITARTRATE AND ACETAMINOPHEN 1 TABLET: 5; 325 TABLET ORAL at 11:55

## 2019-12-11 NOTE — ED TRIAGE NOTES
Pt presents today from home following fall 2 weeks ago. Pt thought that she was just bruised from the fall, but has had increased pain and yesterday her  noticed a \"bump\" on her bottom. Pt has been taking tylenol/ibuprofen which controls the pain at rest but has otherwise been confined to bed due to the pain. Pt is in other wise good health, NAD, VSS, family at bedside, will continue to monitor.

## 2019-12-11 NOTE — ED NOTES
Pt discharged at this time. VSS at time of discharge. Discharge instructions reviewed and follow up care discussed at this time. Pt verbalized understanding and denies any questions at this time. Pt given dressing bandage for bruise on buttocks. Pt dressed and wheeled out of ED at this time.  All care provided within pt best interest.

## 2019-12-11 NOTE — ED PROVIDER NOTES
EMERGENCY DEPARTMENT HISTORY AND PHYSICAL EXAM      Date: 12/11/2019  Patient Name: Emerita Rogers    History of Presenting Illness     Chief Complaint   Patient presents with    Back Pain     Pain at low back/buttock area after Fall 11/27. Pt unable to get up by self, using ice/heat at home       History Provided By: Patient, her daughter, and her     HPI: Emerita Rogers, 82 y.o. female with PMHx significant for anemia, ataxia, Webb's esophagitis, cervical spine stenosis, diabetes, GERD, hyperlipidemia, hypertension, liver disease, arthritis, sarcoidosis, TIA, thyroid disease, presents to the ED with cc of low back pain. The patient reports that she tripped over a carpet and landed on her buttocks on November 27. Since then, she has had gradually worsening. Today, her daughter and her  noted a bump over her lower spine. They also note that she has a rash to the area, which they think may be due to heating pads and irritation from her diapers. She has been taking Tylenol and ibuprofen for the pain without significant relief. She reports a history of falls and ambulates with a cane at baseline. She denies lower extremity numbness or focal weakness, changes in bowel or bladder function, saddle anesthesia. There are no other complaints, changes, or physical findings at this time. PCP: Cheyenne Boucher MD    No current facility-administered medications on file prior to encounter. Current Outpatient Medications on File Prior to Encounter   Medication Sig Dispense Refill    busPIRone (BUSPAR) 5 mg tablet TAKE 1 TABLET BY MOUTH TWICE DAILY 60 Tab 0    gabapentin (NEURONTIN) 100 mg capsule Take 1 Cap by mouth nightly.  Max Daily Amount: 100 mg. 90 Cap 0    levothyroxine (SYNTHROID) 50 mcg tablet TAKE 1 TABLET BY MOUTH DAILY BEFORE BREAKFAST 90 Tab 0    ONETOUCH ULTRA BLUE TEST STRIP strip CHECK BLOOD SUGAR FASTING AND BEFORE DINNER OR BEDTIME DAILY 100 Strip 0    metoprolol succinate (TOPROL-XL) 50 mg XL tablet TAKE 1 TABLET BY MOUTH DAILY 90 Tab 0    metFORMIN ER (GLUCOPHAGE XR) 750 mg tablet TAKE 1 TABLET BY MOUTH TWICE DAILY 180 Tab 0    benazepril (LOTENSIN) 20 mg tablet TAKE 1 TABLET BY MOUTH DAILY 90 Tab 0    pravastatin (PRAVACHOL) 20 mg tablet TAKE 1 TABLET BY MOUTH EVERY DAY 90 Tab 0    furosemide (LASIX) 20 mg tablet TAKE 1 TABLET BY MOUTH DAILY 90 Tab 2    venlafaxine-SR (EFFEXOR-XR) 37.5 mg capsule TAKE 1 CAPSULE BY MOUTH DAILY 90 Cap 1    pantoprazole (PROTONIX) 40 mg tablet Take 1 Tab by mouth two (2) times a day. 60 Tab 3    lancets misc Check blood sugar twice a day. Dx. E11.9 100 Each 1    sertraline (ZOLOFT) 100 mg tablet TAKE 1 1/2 TABLET BY MOUTH EVERY  Tab 0    MICROLET LANCET misc CHECK BLOOD SUGAR TWICE A DAY 1 Each 1    pravastatin (PRAVACHOL) 20 mg tablet TAKE 1 TABLET BY MOUTH EVERY DAY 90 Tab 0    L. acidoph & paracasei- S therm- Bifido (FRAN-Q/RISAQUAD) 8 billion cell cap cap Take 1 Cap by mouth daily. (Patient taking differently: Take 1 Cap by mouth two (2) times a day.) 60 Cap 0    Cholecalciferol, Vitamin D3, (VITAMIN D3) 2,000 unit cap capsule Take 2,000 Units by mouth daily.  acetaminophen (TYLENOL) 500 mg tablet Take 500 mg by mouth every four (4) hours as needed for Pain.  ferrous sulfate (IRON) 325 mg (65 mg iron) tablet Take 325 mg by mouth every other day.  multivit-min-FA-lycopen-lutein (CENTRUM SILVER) 0.4-300-250 mg-mcg-mcg tab Take 1 Tab by mouth daily.  aspirin delayed-release 81 mg tablet Take 81 mg by mouth daily.  magnesium 250 mg tab Take 250 mg by mouth every evening.          Past History     Past Medical History:  Past Medical History:   Diagnosis Date    Anemia     Arrhythmia     AFIB    Ataxia     Webb's esophagus     Cervical spinal stenosis     Coronary atherosclerosis of unspecified type of vessel, native or graft     Diabetes (HCC)     Fatigue     GERD (gastroesophageal reflux disease)     Hyperlipidemia     Hypertension     Ill-defined condition     right torn rotater cuff- no surgery at this time    Liver disease     pt is unaware    Osteoarthritis     Psychiatric disorder     anxiety and depression    Sarcoidosis     Sleep apnea     uses cpap    Stroke ([address]     TIA'S    Thyroid disease        Past Surgical History:  Past Surgical History:   Procedure Laterality Date    CARDIAC SURG PROCEDURE UNLIST      cardioversion     CARDIAC SURG PROCEDURE UNLIST      watchman device    COLONOSCOPY N/A 12/28/2016    COLONOSCOPY performed by Merlin Scheuermann, [facility] at [facility] ENDOSCOPY    HX CATARACT REMOVAL      both eyes    HX CHOLECYSTECTOMY      HX COLONOSCOPY  5/8/2013    Repeat in 5 years    HX CYST REMOVAL  10/15    on head     HX HYSTERECTOMY      HX ORTHOPAEDIC Bilateral     knee replacement to both knees    HX ORTHOPAEDIC      spacer btwn L4-5 and L5-6     HX PACEMAKER PLACEMENT      HX TONSILLECTOMY      HX UROLOGICAL      botox in bladder       Family History:  Family History   Problem Relation Age of Onset    Other Mother         Fibromyalgia    Pacemaker Mother     Heart Disease Mother     Heart Failure Mother     COPD Mother     Heart Attack Father 61    Cancer Sister         Multiple Myeloma    Diabetes Brother     Obesity Brother     Pacemaker Brother     Heart Attack Brother         Bundle branch block    No Known Problems Son     Psychiatric Disorder Daughter         Multiple       Social History:  Social History     Tobacco Use    Smoking status: Never Smoker    Smokeless tobacco: Never Used   Substance Use Topics    Alcohol use: No    Drug use: No       Allergies: Allergies   Allergen Reactions    Procardia Xl [Nifedipine] Other (comments)     weakness         Review of Systems   Review of Systems   Constitutional: Negative for chills and fever. HENT: Negative for ear pain and sore throat.     Eyes: Negative for redness and visual disturbance. Respiratory: Negative for cough and shortness of breath. Cardiovascular: Negative for chest pain and palpitations. Gastrointestinal: Negative for abdominal pain, nausea and vomiting. Genitourinary: Negative for dysuria and hematuria. Musculoskeletal: Positive for back pain. Negative for gait problem. Skin: Negative for rash and wound. Neurological: Negative for dizziness and headaches. Psychiatric/Behavioral: Negative for behavioral problems and confusion. All other systems reviewed and are negative. Physical Exam   Physical Exam  Vitals signs and nursing note reviewed. Constitutional:       Appearance: She is well-developed. Comments: Alert, elderly female in no acute distress. HENT:      Head: Normocephalic and atraumatic. Eyes:      Conjunctiva/sclera: Conjunctivae normal.      Pupils: Pupils are equal, round, and reactive to light. Neck:      Musculoskeletal: Normal range of motion and neck supple. Cardiovascular:      Rate and Rhythm: Normal rate and regular rhythm. Pulses:           Dorsalis pedis pulses are 2+ on the right side and 2+ on the left side. Heart sounds: Normal heart sounds. Pulmonary:      Effort: Pulmonary effort is normal.      Breath sounds: Normal breath sounds. Musculoskeletal: Normal range of motion. Lumbar back: She exhibits tenderness (bilateral) and bony tenderness. She exhibits normal range of motion and no deformity. Skin:     General: Skin is warm and dry. Comments: Erythematous macular rash to the lower back/buttocks. Neurological:      Mental Status: She is alert and oriented to person, place, and time. GCS: GCS eye subscore is 4. GCS verbal subscore is 5. GCS motor subscore is 6. Cranial Nerves: No cranial nerve deficit. Sensory: No sensory deficit.    Psychiatric:         Behavior: Behavior normal.           Diagnostic Study Results     Labs -   No results found for this or any previous visit (from the past 12 hour(s)). Radiologic Studies -   XR SPINE LUMB 2 OR 3 V   Final Result   IMPRESSION: L1 compression fracture. CT Results  (Last 48 hours)    None        CXR Results  (Last 48 hours)    None            Medical Decision Making   I am the first provider for this patient. I reviewed the vital signs, available nursing notes, past medical history, past surgical history, family history and social history. Vital Signs-Reviewed the patient's vital signs. Patient Vitals for the past 12 hrs:   Temp Pulse Resp BP SpO2   12/11/19 1034 97.3 °F (36.3 °C) 74 16 (!) 152/96 96 %         Records Reviewed: Nursing Notes and Old Medical Records      Provider Notes (Medical Decision Making):   DDx: low back contusion, compression fracture, contact dermatitis, burn, skin breakdown    Pt has no red flag signs and neurological exam is intact. Plan to obtain L-spine x-rays and treat symptomatically with pain medication. ED Course:   Initial assessment performed. The patients presenting problems have been discussed, and they are in agreement with the care plan formulated and outlined with them. I have encouraged them to ask questions as they arise throughout their visit. ED Course as of Dec 12 2301   Wed Dec 11, 2019   1303 Reassessed patient. She is still having pain after hydrocodone. She has had oxycodone in the past for her pain and reports that this works better. Discussed that she has a compression fracture and she will need to follow-up with neurosurgery. She has seen Dr. Yahir Cordero in the past for a cervical spine surgery. [GUY]      ED Course User Index  [GUY] Charlette Dutton         Disposition:  1:15 PM -    The patient has been re-evaluated and is ready for discharge. Reviewed available results with patient. Counseled patient on diagnosis and care plan. Patient has expressed understanding, and all questions have been answered.  Patient agrees with plan and agrees to follow up as recommended, or to return to the ED if their symptoms worsen. Discharge instructions have been provided and explained to the patient, along with reasons to return to the ED. PLAN:  1. Discharge Medication List as of 12/11/2019  1:08 PM      START taking these medications    Details   oxyCODONE-acetaminophen (PERCOCET) 5-325 mg per tablet Take 1 Tab by mouth every six (6) hours as needed for Pain for up to 3 days. Max Daily Amount: 4 Tabs., Print, Disp-12 Tab, R-0      lidocaine 4 % patch 1 Patch by TransDERmal route every twelve (12) hours every twelve (12) hours. , Print, Disp-30 Patch, R-0         CONTINUE these medications which have NOT CHANGED    Details   busPIRone (BUSPAR) 5 mg tablet TAKE 1 TABLET BY MOUTH TWICE DAILY, Normal, Disp-60 Tab, R-0      gabapentin (NEURONTIN) 100 mg capsule Take 1 Cap by mouth nightly. Max Daily Amount: 100 mg., Print, Disp-90 Cap, R-0      levothyroxine (SYNTHROID) 50 mcg tablet TAKE 1 TABLET BY MOUTH DAILY BEFORE BREAKFAST, Normal, Disp-90 Tab, R-0      ONETOUCH ULTRA BLUE TEST STRIP strip CHECK BLOOD SUGAR FASTING AND BEFORE DINNER OR BEDTIME DAILY, Normal, Disp-100 Strip, R-0      metoprolol succinate (TOPROL-XL) 50 mg XL tablet TAKE 1 TABLET BY MOUTH DAILY, Normal, Disp-90 Tab, R-0      metFORMIN ER (GLUCOPHAGE XR) 750 mg tablet TAKE 1 TABLET BY MOUTH TWICE DAILY, Normal, Disp-180 Tab, R-0      benazepril (LOTENSIN) 20 mg tablet TAKE 1 TABLET BY MOUTH DAILY, Normal, Disp-90 Tab, R-0      !! pravastatin (PRAVACHOL) 20 mg tablet TAKE 1 TABLET BY MOUTH EVERY DAY, Normal, Disp-90 Tab, R-0      furosemide (LASIX) 20 mg tablet TAKE 1 TABLET BY MOUTH DAILY, Normal**Patient requests 90 days supply**Disp-90 Tab, R-2      venlafaxine-SR (EFFEXOR-XR) 37.5 mg capsule TAKE 1 CAPSULE BY MOUTH DAILY, Normal**Patient requests 90 days supply**Disp-90 Cap, R-1      !! lancets misc Check blood sugar twice a day. Dx. E11.9, Normal, Disp-100 Each, R-1      !!  Oziel Lemon LANCET [facility] CHECK BLOOD SUGAR TWICE A DAY, Normal, Disp-1 Each, R-1, LAVELL      !! pravastatin (PRAVACHOL) 20 mg tablet TAKE 1 TABLET BY MOUTH EVERY DAY, Normal, Disp-90 Tab, R-0      L. acidoph & paracasei- S therm- Bifido (FRAN-Q/RISAQUAD) 8 billion cell cap cap Take 1 Cap by mouth daily. , Print, Disp-60 Cap, R-0      Cholecalciferol, Vitamin D3, (VITAMIN D3) 2,000 unit cap capsule Take 2,000 Units by mouth daily. , Historical Med      ferrous sulfate (IRON) 325 mg (65 mg iron) tablet Take 325 mg by mouth every other day., Historical Med      multivit-min-FA-lycopen-lutein (CENTRUM SILVER) 0.4-300-250 mg-mcg-mcg tab Take 1 Tab by mouth daily. , Historical Med      aspirin delayed-release 81 mg tablet Take 81 mg by mouth daily. , Historical Med      magnesium 250 mg tab Take 250 mg by mouth every evening., Historical Med      pantoprazole (PROTONIX) 40 mg tablet Take 1 Tab by mouth two (2) times a day., Normal, Disp-60 Tab, R-3      sertraline (ZOLOFT) 100 mg tablet TAKE 1 1/2 TABLET BY MOUTH EVERY DAY, Normal, Disp-135 Tab, R-0      acetaminophen (TYLENOL) 500 mg tablet Take 500 mg by mouth every four (4) hours as needed for Pain., Historical Med       !! - Potential duplicate medications found. Please discuss with provider. 2.   Follow-up Information     Follow up With Specialties Details Why Contact Info    Umm Frias MD Neurosurgery Call today to schedule a follow up appointment [address] [place] [id number] [phone]      [facility] EMERGENCY DEPT Emergency Medicine Go to If symptoms worsen [address] [place] [address]  [phone]        Return to ED if worse     Diagnosis     Clinical Impression:   1. Compression fracture of L1 vertebra, initial encounter ([facility] [address]            Tana Rosario PA-C

## 2019-12-11 NOTE — DISCHARGE INSTRUCTIONS
Xr Spine Lumb 2 Or 3 V    Result Date: 12/11/2019  IMPRESSION: L1 compression fracture. Wash the sores on her buttocks with water and mild soap daily. Dry completely afterwards. Apply antibiotic ointment and dressing. Patient Education        Compression Fracture of the Spine: Care Instructions  Your Care Instructions    A compression fracture happens when the front part of a spinal bone breaks and collapses. A fall or other accident can cause it. A minor injury or moving the wrong way can cause a break if you have thin or brittle bones (osteoporosis). These types of breaks will heal in 8 to 10 weeks. You will need rest and pain medicines. Your doctor may recommend physical therapy. Some doctors recommend that certain people with compression fractures wear braces. Your doctor also may treat thin or brittle bones. You may need surgery if you have lasting pain or if the bone presses on the spinal cord or nerves. You heal best when you take good care of yourself. Eat a variety of healthy foods, and don't smoke. Follow-up care is a key part of your treatment and safety. Be sure to make and go to all appointments, and call your doctor if you are having problems. It's also a good idea to know your test results and keep a list of the medicines you take. How can you care for yourself at home? · Be safe with medicines. Read and follow all instructions on the label. ? If the doctor gave you a prescription medicine for pain, take it as prescribed. ? If you are not taking a prescription pain medicine, ask your doctor if you can take an over-the-counter medicine. · Talk to your doctor about how to make your bones stronger. You may need medicines or a change in what you eat. · Be active only as directed by your doctor. When should you call for help? Call 911 anytime you think you may need emergency care.  For example, call if:    · You are unable to move an arm or a leg at all.   [facility] your doctor now or seek immediate medical care if:    · You have new or worse symptoms in your arms, legs, belly, or buttocks. Symptoms may include:  ? Numbness or tingling. ? Weakness. ? Pain.     · You lose bladder or bowel control.     · You have belly pain, bloating, vomiting, or nausea.    Watch closely for changes in your health, and be sure to contact your doctor if:    · You do not get better as expected. Where can you learn more? Go to http://mason-saw.info/. Enter P445 in the search box to learn more about \"Compression Fracture of the Spine: Care Instructions. \"  Current as of: June 26, 2019  Content Version: 12.2  © 5175-3879 "[employer]. Care instructions adapted under license by [employer] (which disclaims liability or warranty for this information). If you have questions about a medical condition or this instruction, always ask your healthcare professional. [address] any warranty or liability for your use of this information.

## 2019-12-12 DIAGNOSIS — M48.02 CERVICAL STENOSIS OF SPINE: ICD-10-CM

## 2019-12-12 NOTE — TELEPHONE ENCOUNTER
[place], [address] [facility]   Phone Number: [phone]             Anju Beth, the pt's daughter is requesting a return call from [facility] regarding to her [facility] ER visit and a prescription discussion. Best contact phone number is [phone].      Copy/Paste  ENVERA

## 2019-12-13 NOTE — TELEPHONE ENCOUNTER
Spoke to [facility] (HIPAA). Two pt identifiers confirmed. Erick Hernandez informed per Dr. Corine Garcia that pt would need o/v for narcotic and there is no openings this evening. Pt informed per Dr. Corine Garcia that pt can restart gabapentin. Erick Hernandez states pt has restarted and it has not helped. Erick Hernandez asking for a refill on the gabapentin for pt. Pt informed Dr. Corine Garcia will be notified. Pt verbalized understanding of information discussed w/ no further questions at this time. Shawn Garcia, gabapentin can be increased to 200mg qhs--pended. Script called in. Gabapentin 200mg, 2 tabs QHS, #180 w/ 0R.

## 2019-12-13 NOTE — TELEPHONE ENCOUNTER
[place]а, [address] [facility] Front Office Pool             Pt's daughter Ken Traylor is requesting a call back in regards to Ingris Pacheco states that she was told to call back and number is disconnected [[phone]] Best contact [phone]     Copy/Paste  Pina

## 2019-12-13 NOTE — TELEPHONE ENCOUNTER
shruthi requesting a refill of the patient's gabapentin 100 mg. shruthi states that the patient has been trying to refill this script for a week.

## 2019-12-13 NOTE — TELEPHONE ENCOUNTER
Spoke to [employer] (HIPAA). Two pt identifiers confirmed. Sylvia Scott informed per Dr. Kemal Coffey that gabapentin increased to 200mg qhs. Sylvia Scott informed per Dr. Kemal Coffey that if pt worsens, pt can go to ED or UC. Sylvia Scott verbalized understanding of information discussed w/ no further questions at this time.

## 2019-12-13 NOTE — TELEPHONE ENCOUNTER
Attempt to call Roger Fraire. Phone went straight to this message; \"the person you are calling is not accepting calls at this time\"; no vm option given.

## 2019-12-13 NOTE — TELEPHONE ENCOUNTER
Spoke to [employer] (HIPAA). Two pt identifiers confirmed. Ruth Cohn states pt fell and has a compression fx in L1. Ruth Cohn states that the ED only gave pt 3 days of oxycodone. Ruth Cohn asking if pt can get a short term script until pt sees Dr. Abel Dennis on 12/17. Ruth Cohn states pt is having a lot trouble moving. Ruth Cohn informed likely o/v needed for pain med d/t new controlled substance laws. Ruth Cohn informed Dr. Hosey Dakin will be notified. Ruth Cohn verbalized understanding of information discussed w/ no further questions at this time.

## 2019-12-14 RX ORDER — GABAPENTIN 100 MG/1
200 CAPSULE ORAL
Qty: 180 CAP | Refills: 0
Start: 2019-12-14

## 2019-12-19 RX ORDER — BUSPIRONE HYDROCHLORIDE 5 MG/1
TABLET ORAL
Qty: 60 TAB | Refills: 0 | Status: SHIPPED | OUTPATIENT
Start: 2019-12-19 | End: 2020-01-28 | Stop reason: SDUPTHER

## 2019-12-30 ENCOUNTER — OFFICE VISIT (OUTPATIENT)
Dept: INTERNAL MEDICINE CLINIC | Age: 84
End: 2019-12-30

## 2019-12-30 VITALS
TEMPERATURE: 97.9 F | WEIGHT: 172 LBS | BODY MASS INDEX: 31.65 KG/M2 | SYSTOLIC BLOOD PRESSURE: 157 MMHG | HEART RATE: 79 BPM | RESPIRATION RATE: 16 BRPM | DIASTOLIC BLOOD PRESSURE: 89 MMHG | HEIGHT: 62 IN | OXYGEN SATURATION: 98 %

## 2019-12-30 DIAGNOSIS — N39.3 STRESS BLADDER INCONTINENCE, FEMALE: ICD-10-CM

## 2019-12-30 DIAGNOSIS — E03.9 HYPOTHYROIDISM, ADULT: ICD-10-CM

## 2019-12-30 DIAGNOSIS — I48.0 PAROXYSMAL ATRIAL FIBRILLATION (HCC): Primary | ICD-10-CM

## 2019-12-30 DIAGNOSIS — E78.2 MIXED HYPERLIPIDEMIA: ICD-10-CM

## 2019-12-30 DIAGNOSIS — I35.1 AORTIC VALVE INSUFFICIENCY, ETIOLOGY OF CARDIAC VALVE DISEASE UNSPECIFIED: ICD-10-CM

## 2019-12-30 DIAGNOSIS — R91.1 LUNG NODULE: ICD-10-CM

## 2019-12-30 DIAGNOSIS — G47.33 OSA (OBSTRUCTIVE SLEEP APNEA): ICD-10-CM

## 2019-12-30 DIAGNOSIS — S32.010A CLOSED COMPRESSION FRACTURE OF BODY OF L1 VERTEBRA (HCC): ICD-10-CM

## 2019-12-30 DIAGNOSIS — G45.9 TIA (TRANSIENT ISCHEMIC ATTACK): ICD-10-CM

## 2019-12-30 DIAGNOSIS — E66.01 SEVERE OBESITY (BMI 35.0-39.9) WITH COMORBIDITY (HCC): ICD-10-CM

## 2019-12-30 DIAGNOSIS — E11.21 TYPE 2 DIABETES MELLITUS WITH NEPHROPATHY (HCC): ICD-10-CM

## 2019-12-30 DIAGNOSIS — N17.9 ACUTE RENAL FAILURE, UNSPECIFIED ACUTE RENAL FAILURE TYPE (HCC): ICD-10-CM

## 2019-12-30 DIAGNOSIS — I50.42 CHRONIC COMBINED SYSTOLIC AND DIASTOLIC CONGESTIVE HEART FAILURE (HCC): ICD-10-CM

## 2019-12-30 DIAGNOSIS — N18.30 STAGE 3 CHRONIC KIDNEY DISEASE (HCC): ICD-10-CM

## 2019-12-30 DIAGNOSIS — I10 ESSENTIAL HYPERTENSION: ICD-10-CM

## 2019-12-30 DIAGNOSIS — E66.9 OBESITY (BMI 30-39.9): ICD-10-CM

## 2019-12-30 DIAGNOSIS — R27.0 ATAXIA: ICD-10-CM

## 2019-12-30 RX ORDER — BENAZEPRIL HYDROCHLORIDE 40 MG/1
40 TABLET ORAL DAILY
Qty: 30 TAB | Refills: 2 | Status: SHIPPED | OUTPATIENT
Start: 2019-12-30 | End: 2020-02-20 | Stop reason: SDUPTHER

## 2019-12-30 NOTE — PROGRESS NOTES
HISTORY OF PRESENT ILLNESS  Shadia Barker is a 82 y.o. female. HPI     Last here 9/16/19  Pt is here for routine care. Pt ambulates with a cane. Pt is here with her  who provides some of her hx     ED Summary: Pt fell on 11/27/19 and went to ED [facility] on 12/11/19 for back pain. XR Spine Lumbar 2 or 3 V: AP, lateral and spot lateral views of the lumbar spine demonstrate normal alignment. There is a new compression deformity of L1. The wedging of T12 is unchanged. There are mild degenerative disc changes and facet arthrosis throughout the lumbar spine. There is no subluxation. L1 compression fracture. Pt has a compression fracture at L1   Pt follows with Dr. Shandra Angeles (neurosurg) for spinal stenosis  Pt has seen this physician 12/19 d/t recent fall and L compression fracture  Had surgery in the past  She discussed treatment with him and he provided a limited amount of oxycodone   He discussed kyphoplasty and told her that he would not intervene, but this could be done with radiology   She is in the process of scheduling an appointment with radiology to have this completed    Pt also restarted gabapentin   Ordered 100 mg to take BID  Pt notes that she is out of this and needs a refill.       BP is 168/86  BP at home systolic 'X since her fall.   Continues toprol XL 50mg daily and benazepril 20 mg  Pt has been her taking her lasix 1 20mg in the AM  She has no swelling in her legs, no difficulty breathing   Recall she has an element of [facility].     She is diabetic   BS at home has been around 145, 140, 140, 135, 139  Previously better in 115 range, up recently with increased stress  Continues on metformin XR 750mg BID  Not on glipizide   Pt reports that she has no appetite.     Wt today is 172 lbs-- down 11 lbs x lov   Discussed diet and wt loss   Pt states she does not eat a lot   Pt's daughter states she eats Yi toast sticks with syrup every morning   Discussed syrup is not good for a diabetic   Discussed portion control   Discussed [employer]      Reviewed labs 10/19  Reviewed mammo   Reviewed US  Cr was 1.59  Ordered an US and additional labs which are not yet completed.     Pt saw Dr. Octavio Watt (ortho) for a h/o broken finger on her R hand  Last visit was 11/6/18     Pt saw Dr. Alex Busch (infec dis) --related to infected pacemaker, d/c from care  Last visit was 3/29/18      Pt follows with Dr. Luis Manuel Sage (cardio)  Last visit was 8/16/18 with AVERY Rangel--f/u scheduled for 3/19 that she did not attend  She had a pacemaker check in 02/19 but is overdue for a cardiology visit      Pt follows with Dr. Angela Diaz (cardio)  Last visit was 12/13/17 for a watchman procedure   Continues on ASA  Pt is no longer on plavix  Has a f/u in 4/20       Pt follows with Dr. Wyatt Bethea (neuro)  for ataxia previously   Missed her July appointment and will [facility] new neurology d/t dr nilson Baez needed  Currently no recent falls      Pt follows with Dr. Lilly Petty (uro) for urinary incontinence  Last visit was ~5/15/19  She had further botox recently   Helps sx      Pt follows with Dr. Margareth Muro (ortho) prn  Last visit was 6/17     Pt saw MELI Anderson about her L hip (ortho)  Last visit was 5/14/19      Pt follows with Dr. Yaneli Prescott (ENT)  Pt uses a neti pot once daily       Pt follows with Dr. Brittney Barker (GI)   Last visit was 10/17  Continues on protonix 40mg now once daily for heartburn, which works well, no breakthrough 12/19   Pt should follow a celiac sprue diet.     Recall pt has celiac sprue and is somewhat compliant with her gluten-free diet--does not do a great job      Continues on a daily probiotic       Pt is no longer taking iron  Recall pt has a h/o mild anemia, which is stable      Continues on pravachol 20mg for cholesterol       Continues on 50mcg synthroid daily at goal on 6/19 labs       She has tapered off her zoloft and is now on effexor 37.5 mg for depression and anxiety  She also takes low dose buspar 5mg BID for anxiety, which works well, happy with dose  Pt states she is more stressed due to impending move to [place]  Pt is also stressed due to her higher BS readings      Pt is not using her CPAP for MALLORY   Pt had an appointment scheduled for 6/17/19 with sleep specialist but canceled this appt   Pt states she  will reschedule this appt      ACP on file. SDMs are her  Jordyn Nathan) and sister Thaddeus Damian).     Recall she used to follow with Dr. Ava Rendon (neurosurg).     PREVENTIVE:    Colonoscopy: 12/28/16, Dr. Roselia Olivas, repeat 10 years  EGD: 12/16, Dr. Roselia Olivas  Pap: 2017, Dr. Shabbir Wilsonse: 11/19, negative  Dexa: 11/20/17, nl   Tdap: 4/15    Pneumovax: 05/09/18    Prevnar 13: 8/16  Zostavax: 2/29/16  Shingrix: ordered 05/09/18 --too expensive  Flu shot: 09/16/19   Eye exam: Dr. Espinoza, 3/22/19, reviewed notes mild diabetic retinopathy in R eye  Foot exam: 06/13/19   Microalbumin: 6/19  A1c: 5/16 6.5, 8/16 6.8, 6/17 6.9, 9/17 6.3, 10/17 6.7, 12/17 6.3, 3/18 6.6, 6/18 7.3, 12/18 7.2, 6/19 7.0, 9/19 6.9   [facility] Serum  AAA screen: negative    Patient Active Problem List    Diagnosis Date Noted    Chronic combined systolic and diastolic congestive heart failure ([address] 05/18/2018    Severe obesity (BMI 35.0-39. 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/2015    Hypothyroidism, adult 07/31/2015    Essential hypertension 04/27/2015    MALLORY (obstructive sleep apnea) 07/09/2014    Hyperlipidemia 07/09/2014    Sarcoid 07/09/2014    Stress bladder incontinence, female 07/09/2014    TIA (transient ischemic attack) 07/09/2014    AR (aortic regurgitation) 07/09/2014    Mitral valve insufficiency 07/09/2014    Cervical stenosis of spine 07/09/2014     Current Outpatient Medications Medication Sig Dispense Refill    busPIRone (BUSPAR) 5 mg tablet TAKE 1 TABLET BY MOUTH TWICE DAILY 60 Tab 0    gabapentin (NEURONTIN) 100 mg capsule Take 2 Caps by mouth nightly. Max Daily Amount: 200 mg. 180 Cap 0    lidocaine 4 % patch 1 Patch by TransDERmal route every twelve (12) hours every twelve (12) hours. 30 Patch 0    levothyroxine (SYNTHROID) 50 mcg tablet TAKE 1 TABLET BY MOUTH DAILY BEFORE BREAKFAST 90 Tab 0    metoprolol succinate (TOPROL-XL) 50 mg XL tablet TAKE 1 TABLET BY MOUTH DAILY 90 Tab 0    metFORMIN ER (GLUCOPHAGE XR) 750 mg tablet TAKE 1 TABLET BY MOUTH TWICE DAILY 180 Tab 0    benazepril (LOTENSIN) 20 mg tablet TAKE 1 TABLET BY MOUTH DAILY 90 Tab 0    pravastatin (PRAVACHOL) 20 mg tablet TAKE 1 TABLET BY MOUTH EVERY DAY 90 Tab 0    furosemide (LASIX) 20 mg tablet TAKE 1 TABLET BY MOUTH DAILY 90 Tab 2    venlafaxine-SR (EFFEXOR-XR) 37.5 mg capsule TAKE 1 CAPSULE BY MOUTH DAILY 90 Cap 1    pantoprazole (PROTONIX) 40 mg tablet Take 1 Tab by mouth two (2) times a day. 60 Tab 3    pravastatin (PRAVACHOL) 20 mg tablet TAKE 1 TABLET BY MOUTH EVERY DAY 90 Tab 0    L. acidoph & paracasei- S therm- Bifido (FRAN-Q/RISAQUAD) 8 billion cell cap cap Take 1 Cap by mouth daily. (Patient taking differently: Take 1 Cap by mouth two (2) times a day.) 60 Cap 0    Cholecalciferol, Vitamin D3, (VITAMIN D3) 2,000 unit cap capsule Take 2,000 Units by mouth daily.  acetaminophen (TYLENOL) 500 mg tablet Take 500 mg by mouth every four (4) hours as needed for Pain.  multivit-min-FA-lycopen-lutein (CENTRUM SILVER) 0.4-300-250 mg-mcg-mcg tab Take 1 Tab by mouth daily.  aspirin delayed-release 81 mg tablet Take 81 mg by mouth daily.  magnesium 250 mg tab Take 250 mg by mouth every evening.  ONETOUCH ULTRA BLUE TEST STRIP strip CHECK BLOOD SUGAR FASTING AND BEFORE DINNER OR BEDTIME DAILY 100 Strip 0    lancets misc Check blood sugar twice a day.  Dx. E11.9 100 Each 1    MICROLET LANCET misc CHECK BLOOD SUGAR TWICE A DAY 1 Each 1    ferrous sulfate (IRON) 325 mg (65 mg iron) tablet Take 325 mg by mouth every other day. Past Surgical History:   Procedure Laterality Date    CARDIAC SURG PROCEDURE UNLIST      cardioversion     CARDIAC SURG PROCEDURE UNLIST      watchman device    COLONOSCOPY N/A 12/28/2016    COL[facility]av[facility] END[facility]COPY    HX CATARACT REMOVAL      both eyes    HX CHOLECYSTECTOMY      HX COLONOSCOPY  5/8/2013    Repeat in 5 years    HX CYST REMOVAL  10/15    on head     HX HYSTERECTOMY      HX ORTHOPAEDIC Bilateral     knee replacement to both knees    HX ORTHOPAEDIC      spacer btwn L4-5 and L5-6     HX PACEMAKER PLACEMENT      HX TONSILLECTOMY      HX UROLOGICAL      botox in bladder      Lab Results   Component Value Date/Time    WBC 9.0 12/13/2018 09:46 AM    HGB 11.2 12/13/2018 09:46 AM    HCT 35.0 12/13/2018 09:46 AM    PLATELET 626 57/09/6124 09:46 AM    MCV 94 12/13/2018 09:46 AM     Lab Results   Component Value Date/Time    Cholesterol, total 171 09/13/2019 11:48 AM    HDL Cholesterol 73 09/13/2019 11:48 AM    LDL, calculated 69 09/13/2019 11:48 AM    LDL-C, External 77 10/18/2013 11:09 AM    Triglyceride 147 09/13/2019 11:48 AM    CHOL/HDL Ratio 1.9 09/08/2016 02:13 AM     Lab Results   Component Value Date/Time    GFR est non-AA 30 (L) 10/02/2019 10:26 AM    GFR est AA 35 (L) 10/02/2019 10:26 AM    Creatinine 1.59 (H) 10/02/2019 10:26 AM    BUN 31 (H) 10/02/2019 10:26 AM    Sodium 137 10/02/2019 10:26 AM    Potassium 4.6 10/02/2019 10:26 AM    Chloride 100 10/02/2019 10:26 AM    CO2 21 10/02/2019 10:26 AM    Magnesium 1.7 04/09/2018 02:34 AM    Phosphorus 3.3 09/07/2016 01:11 AM        Review of Systems   Respiratory: Negative for shortness of breath. Cardiovascular: Negative for chest pain. Physical Exam  Constitutional:       General: She is not in acute distress.      Appearance: She is well-developed. She is not diaphoretic. HENT:      Head: Normocephalic and atraumatic. Right Ear: Tympanic membrane, ear canal and external ear normal.      Left Ear: Tympanic membrane, ear canal and external ear normal.      Mouth/Throat:      Mouth: Mucous membranes are moist.      Pharynx: Oropharynx is clear. No oropharyngeal exudate or posterior oropharyngeal erythema. Eyes:      General: No scleral icterus. Right eye: No discharge. Left eye: No discharge. Conjunctiva/sclera: Conjunctivae normal.      Pupils: Pupils are equal, round, and reactive to light. Neck:      Musculoskeletal: Normal range of motion and neck supple. Vascular: No carotid bruit. Cardiovascular:      Rate and Rhythm: Normal rate. Rhythm irregular. Heart sounds: Murmur (2/6) present. No friction rub. No gallop. Pulmonary:      Effort: Pulmonary effort is normal. No respiratory distress. Breath sounds: Normal breath sounds. No wheezing or rales. Chest:      Chest wall: No tenderness. Musculoskeletal: Normal range of motion. General: No tenderness or deformity. Right lower leg: No edema. Left lower leg: No edema. Lymphadenopathy:      Cervical: No cervical adenopathy. Skin:     General: Skin is warm and dry. Coloration: Skin is not pale. Findings: No erythema or rash. Neurological:      Mental Status: She is alert and oriented to person, place, and time. Coordination: Coordination abnormal.   Psychiatric:         Mood and Affect: Mood normal.         Behavior: Behavior normal.         ASSESSMENT and PLAN    ICD-10-CM ICD-9-CM    1. Paroxysmal atrial fibrillation (HCC)    Pt with paced ekg unchanged from prior today s/p watchman procedure only on ASA had bleeding on stronger anticoagulants I48.0 427.31 AMB POC EKG ROUTINE W/ 12 LEADS, INTER & REP   2.  Type 2 diabetes mellitus with nephropathy ([facility]    Unknown control check a1c today home readings are borderline for now continue metformin 750 BID adjust medication further once a1c results are back E11.21 250.40      583.81    3. Severe obesity (BMI 35.0-39. 9) with comorbidity ([facility]    Wt improved from lov have discussed [employer] on multiple occasions discussed wt loss E66.01 278.01    4. Chronic combined systolic and diastolic congestive heart failure (HCC)    On lower dose lasix 20 mg daily stable sx I50.42 428.42      428.0    5. MALLORY (obstructive sleep apnea)    Not compliant with CPAP still needs sleep evaluation G47.33 327.23    6. Mixed hyperlipidemia    Controlled on pravachol E78.2 272.2    7. Stress bladder incontinence, female    Improved with botox in the past with dr sampson will establish new uro-gyn in [place] N39.3 625.6    8. Ataxia    Has completed pt in the past has seen neurology and neurosurgery in the past all stable R27.0 781.3    9. Hypothyroidism, adult    Stable on synthroid 50 mcgs check tsh E03.9 244.9    10. Essential hypertension    Initial bp elevated repeat bp not improved reports elevated bp readings at home continue toprol xl increase benazepril to 40 mg  I10 401.9    11. TIA (transient ischemic attack)    On ASA to prevent stroke G45.9 435.9    12. Aortic valve insufficiency, etiology of cardiac valve disease unspecified    Follows with dr Madeleine Torres will need new cardio in [place] I35.1 424.1    13. Obesity (BMI 30-39. 9)     Weight improved discussed WW see above E66.9 278.00    14. Stage 3 chronic kidney disease (HCC)    Repeat BMP today. N18.3 585.3    15. Closed compression fracture of body of L1 vertebra ([facility]    Recent injury saw dr Kevin Jessica was referred to radiology for kyphoplasty is working on this currently using gabapentin 250 mg she ran out of this will refill today S32.010A 805.4    16. Lung nodule    Needs Chest CT completed--reprinted order R91.1 793.11      Depression screen reviewed and positive (+1).  Overall happy with current meds not change    Scribed by Holland Mcfarlane of Yanira Johnson, as dictated by Dr. Jose Guadalupe Pelayo. Current diagnosis and concerns discussed with pt at length. Pt understands risks and benefits or current treatment plan and medications, and accepts the treatment and medication with any possible risks. Pt asks appropriate questions, which were answered. Pt was instructed to call with any concerns or problems. I have reviewed the note documented by the scribe. The services provided are my own. The documentation is accurate.

## 2019-12-31 LAB
ALBUMIN MFR UR ELPH: 51.5 %
ALBUMIN SERPL ELPH-MCNC: 3.7 G/DL (ref 2.9–4.4)
ALBUMIN/GLOB SERPL: 1.2 {RATIO} (ref 0.7–1.7)
ALPHA1 GLOB MFR UR ELPH: 4.7 %
ALPHA1 GLOB SERPL ELPH-MCNC: 0.2 G/DL (ref 0–0.4)
ALPHA2 GLOB MFR UR ELPH: 10.1 %
ALPHA2 GLOB SERPL ELPH-MCNC: 1 G/DL (ref 0.4–1)
APPEARANCE UR: CLEAR
B-GLOBULIN MFR UR ELPH: 16.6 %
B-GLOBULIN SERPL ELPH-MCNC: 1 G/DL (ref 0.7–1.3)
BILIRUB UR QL STRIP: NEGATIVE
BUN SERPL-MCNC: 27 MG/DL (ref 8–27)
BUN/CREAT SERPL: 16 (ref 12–28)
CALCIUM SERPL-MCNC: 9.6 MG/DL (ref 8.7–10.3)
CHLORIDE SERPL-SCNC: 100 MMOL/L (ref 96–106)
CO2 SERPL-SCNC: 24 MMOL/L (ref 20–29)
COLOR UR: YELLOW
CREAT SERPL-MCNC: 1.68 MG/DL (ref 0.57–1)
ERYTHROCYTE [DISTWIDTH] IN BLOOD BY AUTOMATED COUNT: 12.9 % (ref 12.3–15.4)
EST. AVERAGE GLUCOSE BLD GHB EST-MCNC: 148 MG/DL
GAMMA GLOB MFR UR ELPH: 17.2 %
GAMMA GLOB SERPL ELPH-MCNC: 1 G/DL (ref 0.4–1.8)
GLOBULIN SER CALC-MCNC: 3.2 G/DL (ref 2.2–3.9)
GLUCOSE SERPL-MCNC: 121 MG/DL (ref 65–99)
GLUCOSE UR QL: NEGATIVE
HBA1C MFR BLD: 6.8 % (ref 4.8–5.6)
HCT VFR BLD AUTO: 36.1 % (ref 34–46.6)
HGB BLD-MCNC: 11.8 G/DL (ref 11.1–15.9)
HGB UR QL STRIP: NEGATIVE
KETONES UR QL STRIP: NEGATIVE
LEUKOCYTE ESTERASE UR QL STRIP: NEGATIVE
M PROTEIN MFR UR ELPH: NORMAL %
M PROTEIN SERPL ELPH-MCNC: NORMAL G/DL
MCH RBC QN AUTO: 30.3 PG (ref 26.6–33)
MCHC RBC AUTO-ENTMCNC: 32.7 G/DL (ref 31.5–35.7)
MCV RBC AUTO: 93 FL (ref 79–97)
MICRO URNS: NORMAL
NITRITE UR QL STRIP: NEGATIVE
PH UR STRIP: 5.5 [PH] (ref 5–7.5)
PLATELET # BLD AUTO: 292 X10E3/UL (ref 150–450)
PLEASE NOTE, 011150: NORMAL
PLEASE NOTE:, 133800: NORMAL
POTASSIUM SERPL-SCNC: 4.1 MMOL/L (ref 3.5–5.2)
PROT SERPL-MCNC: 6.9 G/DL (ref 6–8.5)
PROT UR QL STRIP: NORMAL
PROT UR-MCNC: 20.7 MG/DL
RBC # BLD AUTO: 3.9 X10E6/UL (ref 3.77–5.28)
SODIUM SERPL-SCNC: 143 MMOL/L (ref 134–144)
SP GR UR: 1.02 (ref 1–1.03)
UROBILINOGEN UR STRIP-MCNC: 0.2 MG/DL (ref 0.2–1)
WBC # BLD AUTO: 10 X10E3/UL (ref 3.4–10.8)

## 2020-01-01 NOTE — PROGRESS NOTES
Let her know stable renal fxn though decreased over the last 3-6 months  Focus on good bp control--adjusted benazapril at ov    Will need close f/u of renal fxn, already had US kidneys and lab w/u which was unremarkable    [place] moving to [place] currently --needs to establish with new pcp and have labs rechecked, may need to see nephrology

## 2020-01-06 ENCOUNTER — PATIENT MESSAGE (OUTPATIENT)
Dept: INTERNAL MEDICINE CLINIC | Age: 85
End: 2020-01-06

## 2020-01-13 RX ORDER — PRAVASTATIN SODIUM 20 MG/1
TABLET ORAL
Qty: 90 TAB | Refills: 0 | Status: SHIPPED | OUTPATIENT
Start: 2020-01-13

## 2020-01-28 ENCOUNTER — TELEPHONE (OUTPATIENT)
Dept: INTERNAL MEDICINE CLINIC | Age: 85
End: 2020-01-28

## 2020-01-28 NOTE — TELEPHONE ENCOUNTER
Patient is staying in [facility]. She needs Buspar routed to [place] in [place]. She did not have the address but phone number is [phone].

## 2020-01-29 RX ORDER — BUSPIRONE HYDROCHLORIDE 5 MG/1
TABLET ORAL
Qty: 60 TAB | Refills: 0 | Status: SHIPPED | OUTPATIENT
Start: 2020-01-29

## 2020-02-18 RX ORDER — LEVOTHYROXINE SODIUM 50 UG/1
TABLET ORAL
Qty: 30 TAB | Refills: 0 | Status: SHIPPED | OUTPATIENT
Start: 2020-02-18

## 2020-02-18 RX ORDER — METOPROLOL SUCCINATE 50 MG/1
TABLET, EXTENDED RELEASE ORAL
Qty: 30 TAB | Refills: 0 | Status: SHIPPED | OUTPATIENT
Start: 2020-02-18

## 2020-02-18 RX ORDER — METFORMIN HYDROCHLORIDE 750 MG/1
TABLET, EXTENDED RELEASE ORAL
Qty: 60 TAB | Refills: 0 | Status: SHIPPED | OUTPATIENT
Start: 2020-02-18

## 2020-02-18 NOTE — TELEPHONE ENCOUNTER
Zula Kussmaul [facility] Front Office Pool             Medication Refill     Caller (if not patient):       Relationship of caller (if not patient):       Best contact number(s):  [phone]       Name of medication and dosage if known:  \"Metformin 750mg, \"Benazepril 7mg\", \"Levothyroxine 0.05mg\", \"Metroprolol ER 50mg\"       Is patient out of this medication (yes/no):  Yes.  Pt is out of all four requested medications. Pharmacy name:  [facility], [address] [place] [address] [address] [place], [address]      Pharmacy listed in chart? (yes/no):  No.   Pharmacy phone number:  [phone]       Details to clarify the request:  Pt is currently in [place].        Minh Mancera     Copy/Paste  [facility] after closing on 2/17/20

## 2020-02-20 NOTE — TELEPHONE ENCOUNTER
Best contact number(s):[phone]   Name of medication and dosage if known: Benazepril - 20 mg   Is patient out of this medication (yes/no): Yes   Pharmacy name: [id number] , [address] [place] [address]    Pharmacy listed in chart? (yes/no):  Unknown   Pharmacy phone [phone]   Date of last visit:  December 30, 2019   Details to clarify the request:      Angela Crowe

## 2020-02-21 RX ORDER — BENAZEPRIL HYDROCHLORIDE 40 MG/1
40 TABLET ORAL DAILY
Qty: 30 TAB | Refills: 0 | Status: SHIPPED | OUTPATIENT
Start: 2020-02-21

## 2020-03-09 RX ORDER — BUSPIRONE HYDROCHLORIDE 5 MG/1
TABLET ORAL
Qty: 60 TAB | Refills: 0 | OUTPATIENT
Start: 2020-03-09

## 2020-03-12 NOTE — TELEPHONE ENCOUNTER
----- Message from Jose Iglesias sent at 3/12/2020 12:39 PM EDT -----  Regarding: Dr. Arce / telephone  General Message/Vendor Calls    Caller's first and last name:      Reason for call: Pt would like to speak someone about her medication refusal.         Callback required yes/no and why:yes       Best contact number(s): [phone]      Details to clarify the request:      Jose Iglesias

## 2020-03-15 RX ORDER — BENAZEPRIL HYDROCHLORIDE 40 MG/1
TABLET ORAL
Qty: 30 TAB | Refills: 0 | OUTPATIENT
Start: 2020-03-15

## 2020-03-28 RX ORDER — PANTOPRAZOLE SODIUM 40 MG/1
TABLET, DELAYED RELEASE ORAL
Qty: 60 TAB | Refills: 0 | OUTPATIENT
Start: 2020-03-28

## 2020-04-22 RX ORDER — BENAZEPRIL HYDROCHLORIDE 20 MG/1
TABLET ORAL
Qty: 90 TAB | Refills: 0 | OUTPATIENT
Start: 2020-04-22

## 2020-04-22 RX ORDER — METOPROLOL SUCCINATE 50 MG/1
TABLET, EXTENDED RELEASE ORAL
Qty: 30 TAB | Refills: 0 | OUTPATIENT
Start: 2020-04-22

## 2023-05-17 NOTE — PERIOP NOTES
Report received from Ron Solitario RN. Report consisted of SBAR, KARDEX, lines, drains, and airways, and summary of the procedure. An initial assessment was performed and documented by off-going nurse. No

## 2023-11-13 NOTE — PROGRESS NOTES
Hospitalist Progress Note    NAME: Tayla Mike   :  1935   MRN:  [id number]       Assessment / Plan:  Sepsis due to UTI vs PM pocket infection in setting of afib s/p ablation and PM placement with Dr. Sandra Sabillon 18, present on admission:   S/P  Pacemaker removal   Last echo 20 with systolic function normal. 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 Pt denies infectious sx of any type other than continued leakage from PM pocket. Appreciate Infectious Disease Consult. Cultures growing Staph, await official C/S  Needs a JESSIE  Urine also growing gram negative ( off ceftriaxone now per ID)  PICC Line placement  Will likely need longterm antibiotics  - IV fluids  - con't diltiazem, lopressor  - con't ASA and plavix  Non insulin dependent DM2 controlled without complication:  - holding glucophage for now  - lispro sliding scale  Hypothyroidism:  con't synthroid  Hyperlipidemia: con't pravachol  Depression: con't zoloft     Code Status: Full  Surrogate Decision Maker:   DVT Prophylaxis: SCDs      Body mass index is 35.03 kg/(m^2). Subjective:     Chief Complaint / Reason for Physician Visit f/u for SEPSIS, UTI versus PM pocket infection  3/7   No Im not in pain . How long do I have to get antibiotics  3/6  \"  I feel a lot better than when I came in. They say I catrina have to have my PM out? \". Discussed with RN events overnight. Review of Systems:  Symptom Y/N Comments  Symptom Y/N Comments   Fever/Chills y   Chest Pain n    Poor Appetite n   Edema n    Cough n   Abdominal Pain n    Sputum n   Joint Pain     SOB/STRICKLAND n   Pruritis/Rash     Nausea/vomit n   Tolerating PT/OT     Diarrhea n   Tolerating Diet y    Constipation    Other       Could NOT obtain due to:      Objective:     VITALS:   Last 24hrs VS reviewed since prior progress note.  Most recent are:  Patient Vitals for the past 24 hrs:   Temp Pulse Resp BP SpO2   18 1404 98.7 °F (37.1 °C) 74 18 Last OV: 8/17/2023  Next OV: 11/27/2023    Next appointment 4 month f/u andria/ Charanjit Harrington    Last fill:9/26/2023 132/79 95 %   03/07/18 1300 - 75 - 131/64 96 %   03/07/18 1245 - 75 - 117/87 96 %   03/07/18 1230 - 75 - 90/49 94 %   03/07/18 1200 - 75 - 118/63 98 %   03/07/18 1145 - 75 - 128/72 95 %   03/07/18 1136 97.8 °F (36.6 °C) 75 20 135/72 97 %   03/07/18 1100 - 75 21 140/72 97 %   03/07/18 1045 - 75 19 141/86 95 %   03/07/18 1031 - 75 19 125/72 94 %   03/07/18 1025 - 75 23 143/67 93 %   03/07/18 1020 - 75 20 127/72 96 %   03/07/18 1015 - 75 17 91/67 93 %   03/07/18 1010 - 75 20 135/63 94 %   03/07/18 1005 98.1 °F (36.7 °C) 75 21 119/74 93 %   03/07/18 0954 - 75 13 112/73 97 %   03/07/18 0631 97.7 °F (36.5 °C) 75 20 120/72 93 %   03/07/18 0445 98 °F (36.7 °C) 73 19 117/72 95 %   03/07/18 0036 98.5 °F (36.9 °C) 75 18 130/70 95 %   03/06/18 2241 98.4 °F (36.9 °C) 74 18 117/72 94 %   03/06/18 2122 - 75 - 126/71 93 %   03/06/18 1946 98.7 °F (37.1 °C) 75 20 100/70 93 %   03/06/18 1731 98.9 °F (37.2 °C) 75 20 148/73 93 %   03/06/18 1442 98.3 °F (36.8 °C) 74 18 122/72 94 %       Intake/Output Summary (Last 24 hours) at 03/07/18 1420  Last data filed at 03/07/18 0945   Gross per 24 hour   Intake          4430.83 ml   Output               25 ml   Net          4405.83 ml        PHYSICAL EXAM:   She does not look septic  General: WD, WN. Alert, cooperative, no acute distress    EENT:  EOMI. Anicteric sclerae. MMM  Resp:  CTA bilaterally, no wheezing or rales. No accessory muscle use  CV:  Regular  rhythm,  No edema, PM site is not erythematous, non tender, not warm,   GI:  Soft, Non distended, Non tender.  +Bowel sounds  Neurologic:  Alert and oriented X 3, normal speech,   Psych:   Good insight. Not anxious nor agitated  Skin:  No rashes.   No jaundice    Reviewed most current lab test results and cultures  YES  Reviewed most current radiology test results   YES  Review and summation of old records today    NO  Reviewed patient's current orders and MAR    YES  PMH/SH reviewed - no change compared to H&P  ________________________________________________________________________  Care Plan discussed with:    Comments   Patient y    Family      RN y    Care Manager     Consultant                        Multidiciplinary team rounds were held today with , nursing, pharmacist and clinical coordinator. Patient's plan of care was discussed; medications were reviewed and discharge planning was addressed. ________________________________________________________________________  Total NON critical care TIME:  25   Minutes    Total CRITICAL CARE TIME Spent:   Minutes non procedure based      Comments   >50% of visit spent in counseling and coordination of care Yes Reviewed Chart   ________________________________________________________________________  Yesenia Mirza MD     Procedures: see electronic medical records for all procedures/Xrays and details which were not copied into this note but were reviewed prior to creation of Plan. LABS:  I reviewed today's most current labs and imaging studies.   Pertinent labs include:  Recent Labs      03/07/18 0520 03/06/18 0417 03/05/18   2100   WBC  11.8*  16.7*  16.4*   HGB  9.4*  9.0*  10.6*   HCT  29.7*  28.5*  33.6*   PLT  205  200  229     Recent Labs      03/07/18 0520 03/06/18 0417 03/05/18   2100   NA  138  140  137   K  3.8  3.7  4.2   CL  109*  110*  105   CO2  19*  19*  23   GLU  139*  184*  191*   BUN  26*  23*  25*   CREA  1.06*  0.97  1.28*   CA  7.4*  7.9*  8.6   MG  2.0   --    --    ALB   --    --   3.2*   TBILI   --    --   1.4*   SGOT   --    --   20   ALT   --    --   23   INR   --   1.2*   --        Signed: Yesenia Mirza MD

## 2024-05-02 NOTE — PROGRESS NOTES
Ashkan Cooney is a 80 y.o. female  Chief Complaint   Patient presents with    Hypertension    Diabetes    Cholesterol Problem     1. Have you been to an emergency room, urgent clinic, or hospitalized since your last visit? NO  If yes, where when, and reason for visit? 2. Have seen or consulted any other health care provider since your last visit? NO  Please include any pap smears or colon screening in this section  If yes, where when, and reason for visit? 6. Do you have an Advanced Directive/ Living Will in place?  YES  If yes, do we have a copy on file YES  If no, would you like information NO Physical Therapy Visit    Visit Type: Daily Treatment Note  Visit: 5  Referring Provider: Jai Aranda MD  Medical Diagnosis (from order): M25.562 - Acute pain of left knee     SUBJECTIVE                                                                                                               Patient states she golfed yesterday and she can feel it.  She states during the Nu step warm up that she can \"tell her knee is there\".      OBJECTIVE                                                                                                                                     Treatment     Therapeutic Exercise  Left knee:  Nustep: seat 8 L5 x 5 min (while subjective was taken)  Seated Hamstring Stretch: 2 sets - 30 sec hold bilaterally  Standing hip abduction with resistance (yellow t-band): 15 reps bilaterally - 2 sets  Supine Straight Leg Raises: 2 sets - 10 reps bilaterally  Supine quad stretch 2x30 sec on left (no strap, manual overpressure from therapist for set 2) - following manual therapy efforts  Seated knee extension with resistance (green) 2 x 10 repetitions  Seated knee flexion with resistance (green) 2 x 10 repetitions    Not completed:  Supine bridge: 10 reps - 5 sec hold (onset of right hamstring cramping)  Seated long arc quads 3 x 20 reps alternating followed by sit to stands  Sidelying Hip Abduction: 2 sets - 10 reps on left only    Manual Therapy   Hooklying left tibiofemoral joint mobilizations anterior-posterior (grade 2); Fascial counterstrain to the left knee and calf arterial system.    Therapeutic Activity  DL squats at // bars: 15 reps - 2 sets  Anterior step ups (6 inches): 15 reps - 2 sets - unilateral upper extremity support  Step downs 2 inch box - 10 reps - 2 sets    Skilled input: verbal instruction/cues and tactile instruction/cues    Writer verbally educated and received verbal consent for hand placement, positioning of patient, and techniques to be performed today from patient for  therapist position for techniques, hand placement and palpation for techniques and clothing adjustments for techniques as described above and how they are pertinent to the patient's plan of care.  Home Exercise Program  Access Code: [email or web address]  URL: [email or web address]  Date: 05/02/2024  Prepared by: Chio Cervantes    Program Notes  Ice and elevate your left knee, especially after exercises, for 15-20 minutes at a time.    Exercises  - Seated Hamstring Stretch  - 2 x daily - 7 x weekly - 2 sets - 30 sec hold  - Straight Leg Raise  - 2 x daily - 7 x weekly - 2 sets - 10 reps  - Sidelying Hip Abduction  - 2 x daily - 7 x weekly - 2 sets - 10 reps  - Supine Quadriceps Stretch with Strap on Table  - 2 x daily - 7 x weekly - 2 sets - 30 sec hold  - Mini Squat with Counter Support  - 1-2 x daily - 7 x weekly - 2 sets - 10-20 reps  - Standing Hip Abduction with Resistance at Ankles and Counter Support  - 1 x daily - 7 x weekly - 3 sets - 10 reps      ASSESSMENT                                                                                                            Patient tolerated progressions well without incident, including the addition of seated knee extension and flexion with resistance, and step downs.     PLAN                                                                                                                           Suggestions for next session as indicated: Progress per plan of care, continue manual therapy efforts as appropriate; progress with left lower extremity strengthening -        Therapy procedure time and total treatment time can be found documented on the Time Entry flowsheet    YESSI Butler PT, [place] was present and supervised this patient visit. Notes were reviewed and care agreed upon.     I was present and supervised Chio Cervantes PTA. Notes were reviewed and care agreed upon.  Tiffanie Acosta PT, [place]